# Patient Record
Sex: MALE | Race: WHITE | NOT HISPANIC OR LATINO | Employment: OTHER | URBAN - METROPOLITAN AREA
[De-identification: names, ages, dates, MRNs, and addresses within clinical notes are randomized per-mention and may not be internally consistent; named-entity substitution may affect disease eponyms.]

---

## 2017-01-17 DIAGNOSIS — Z01.812 PRE-OPERATIVE LABORATORY EXAMINATION: ICD-10-CM

## 2017-01-17 DIAGNOSIS — Z01.810 PRE-OPERATIVE CARDIOVASCULAR EXAMINATION: ICD-10-CM

## 2017-01-17 LAB
ANION GAP SERPL CALC-SCNC: 9 MMOL/L (ref 0–11.9)
APPEARANCE UR: CLEAR
BILIRUB UR QL STRIP.AUTO: NEGATIVE
BUN SERPL-MCNC: 15 MG/DL (ref 8–22)
CALCIUM SERPL-MCNC: 9.7 MG/DL (ref 8.4–10.2)
CHLORIDE SERPL-SCNC: 102 MMOL/L (ref 96–112)
CO2 SERPL-SCNC: 26 MMOL/L (ref 20–33)
COLOR UR: YELLOW
CREAT SERPL-MCNC: 0.67 MG/DL (ref 0.5–1.4)
CULTURE IF INDICATED INDCX: NO UA CULTURE
ERYTHROCYTE [DISTWIDTH] IN BLOOD BY AUTOMATED COUNT: 43.8 FL (ref 35.9–50)
GFR SERPL CREATININE-BSD FRML MDRD: >60 ML/MIN/1.73 M 2
GLUCOSE SERPL-MCNC: 118 MG/DL (ref 65–99)
GLUCOSE UR STRIP.AUTO-MCNC: NEGATIVE MG/DL
HCT VFR BLD AUTO: 42.3 % (ref 42–52)
HGB BLD-MCNC: 14.3 G/DL (ref 14–18)
KETONES UR STRIP.AUTO-MCNC: NEGATIVE MG/DL
LEUKOCYTE ESTERASE UR QL STRIP.AUTO: NEGATIVE
MCH RBC QN AUTO: 27.3 PG (ref 27–33)
MCHC RBC AUTO-ENTMCNC: 33.8 G/DL (ref 33.7–35.3)
MCV RBC AUTO: 80.7 FL (ref 81.4–97.8)
MICRO URNS: NORMAL
NITRITE UR QL STRIP.AUTO: NEGATIVE
PH UR STRIP.AUTO: 5.5 [PH]
PLATELET # BLD AUTO: 67 K/UL (ref 164–446)
PMV BLD AUTO: 9.8 FL (ref 9–12.9)
POTASSIUM SERPL-SCNC: 4.3 MMOL/L (ref 3.6–5.5)
PROT UR QL STRIP: NEGATIVE MG/DL
RBC # BLD AUTO: 5.24 M/UL (ref 4.7–6.1)
RBC UR QL AUTO: NEGATIVE
SCCMEC + MECA PNL NOSE NAA+PROBE: NEGATIVE
SCCMEC + MECA PNL NOSE NAA+PROBE: POSITIVE
SODIUM SERPL-SCNC: 137 MMOL/L (ref 135–145)
SP GR UR STRIP.AUTO: 1.02
WBC # BLD AUTO: 3.1 K/UL (ref 4.8–10.8)

## 2017-01-17 PROCEDURE — 85027 COMPLETE CBC AUTOMATED: CPT

## 2017-01-17 PROCEDURE — 36415 COLL VENOUS BLD VENIPUNCTURE: CPT

## 2017-01-17 PROCEDURE — 80048 BASIC METABOLIC PNL TOTAL CA: CPT

## 2017-01-17 PROCEDURE — 87640 STAPH A DNA AMP PROBE: CPT

## 2017-01-17 PROCEDURE — 81003 URINALYSIS AUTO W/O SCOPE: CPT

## 2017-01-17 PROCEDURE — 87641 MR-STAPH DNA AMP PROBE: CPT

## 2017-01-18 LAB — EKG IMPRESSION: NORMAL

## 2017-02-08 ENCOUNTER — TELEPHONE (OUTPATIENT)
Dept: CARDIOLOGY | Facility: MEDICAL CENTER | Age: 67
End: 2017-02-08

## 2017-02-08 ENCOUNTER — OFFICE VISIT (OUTPATIENT)
Dept: CARDIOLOGY | Facility: MEDICAL CENTER | Age: 67
End: 2017-02-08
Payer: COMMERCIAL

## 2017-02-08 VITALS
OXYGEN SATURATION: 94 % | HEIGHT: 72 IN | DIASTOLIC BLOOD PRESSURE: 78 MMHG | WEIGHT: 210 LBS | HEART RATE: 82 BPM | BODY MASS INDEX: 28.44 KG/M2 | SYSTOLIC BLOOD PRESSURE: 140 MMHG

## 2017-02-08 DIAGNOSIS — I10 ESSENTIAL HYPERTENSION: Primary | ICD-10-CM

## 2017-02-08 DIAGNOSIS — R93.1 ELEVATED CORONARY ARTERY CALCIUM SCORE: ICD-10-CM

## 2017-02-08 DIAGNOSIS — I10 ESSENTIAL HYPERTENSION, BENIGN: ICD-10-CM

## 2017-02-08 LAB — EKG IMPRESSION: NORMAL

## 2017-02-08 PROCEDURE — 99214 OFFICE O/P EST MOD 30 MIN: CPT | Performed by: NURSE PRACTITIONER

## 2017-02-08 PROCEDURE — 93000 ELECTROCARDIOGRAM COMPLETE: CPT | Performed by: NURSE PRACTITIONER

## 2017-02-08 RX ORDER — METOPROLOL SUCCINATE 25 MG/1
12.5 TABLET, EXTENDED RELEASE ORAL DAILY
Qty: 30 TAB | Refills: 1 | Status: SHIPPED | OUTPATIENT
Start: 2017-02-08 | End: 2017-06-13 | Stop reason: SDUPTHER

## 2017-02-08 ASSESSMENT — ENCOUNTER SYMPTOMS
NAUSEA: 0
SHORTNESS OF BREATH: 0
HEADACHES: 0
PALPITATIONS: 0
WEIGHT LOSS: 0
DIZZINESS: 0
SPEECH CHANGE: 0
HEMOPTYSIS: 0
ORTHOPNEA: 0
DOUBLE VISION: 0
TINGLING: 0
TREMORS: 0
PND: 0
DIARRHEA: 0
FEVER: 0
CONSTIPATION: 0
FOCAL WEAKNESS: 0
SORE THROAT: 0
ABDOMINAL PAIN: 0
BLURRED VISION: 0
CHILLS: 0
SPUTUM PRODUCTION: 0
WHEEZING: 0
LOSS OF CONSCIOUSNESS: 0
SENSORY CHANGE: 0
MUSCULOSKELETAL NEGATIVE: 1
VOMITING: 0
HEARTBURN: 0
WEAKNESS: 0
COUGH: 0

## 2017-02-08 NOTE — Clinical Note
[facility]   [address] [facility], [address] [address]  Phone: [phone]  Fax: [phone]              Nitesh Hardy Domi  1950    Encounter Date: 2/8/2017    VANESA Graves          PROGRESS NOTE:  No notes on file      NARCISA Alvarez  [address] [facility] [address]  VIA Facsimile: [phone]

## 2017-02-08 NOTE — MR AVS SNAPSHOT
"        Nitesh Duron   2017 3:20 PM   Office Visit   MRN: [id number]    Department:  [facility]   Dept Phone:  [phone]    Description:  Male : 1950   Provider:  VANESA Graves           Reason for Visit     New Patient           Allergies as of 2017     Allergen Noted Reactions    Ciprofloxacin 2016   Unspecified    convulsions    Statins [Hmg-Coa-R Inhibitors] 02/10/2016   Unspecified    Stabbing pains in kidneys    Acetaminophen 2009       Kidney pain     Ibuprofen 2009       Kidney pain    Naproxen 2009       Kidney pain    Shellfish Allergy 2017       Kidney stones    Soap &  [Alcohol] 2017   Rash    Antibacterial soap- contact dermatitis      You were diagnosed with     Essential hypertension   [8775048]  -  Primary     Essential hypertension, benign   [401.1.ICD-9-CM]       Elevated coronary artery calcium score   [7186755]         Vital Signs     Blood Pressure Pulse Height Weight Body Mass Index Oxygen Saturation    140/78 mmHg 82 1.829 m (6' 0.01\") 95.255 kg (210 lb) 28.47 kg/m2 94%    Smoking Status                   Former Smoker           Basic Information     Date Of Birth Sex Race Ethnicity Preferred Language    1950 Male White Non- English      Problem List              ICD-10-CM Priority Class Noted - Resolved    Undiagnosed cardiac murmurs R01.1   2016 - Present    Essential hypertension, benign I10   2016 - Present    Calculus of both kidneys N20.0   2016 - Present    Elevated coronary artery calcium score I25.10   2017 - Present      Health Maintenance        Date Due Completion Dates    IMM DTaP/Tdap/Td Vaccine (1 - Tdap) 1969 ---    COLONOSCOPY 2000 ---    IMM ZOSTER VACCINE 2010 ---    IMM PNEUMOCOCCAL 65+ (ADULT) LOW/MEDIUM RISK SERIES (1 of 2 - PCV13) 2015 ---    IMM INFLUENZA (1) 2016 ---            Results       Current Immunizations     No " immunizations on file.      Below and/or attached are the medications your provider expects you to take. Review all of your home medications and newly ordered medications with your provider and/or pharmacist. Follow medication instructions as directed by your provider and/or pharmacist. Please keep your medication list with you and share with your provider. Update the information when medications are discontinued, doses are changed, or new medications (including over-the-counter products) are added; and carry medication information at all times in the event of emergency situations     Allergies:  CIPROFLOXACIN - Unspecified     STATINS - Unspecified     ACETAMINOPHEN - (reactions not documented)     IBUPROFEN - (reactions not documented)     NAPROXEN - (reactions not documented)     SHELLFISH ALLERGY - (reactions not documented)     SOAP &  - Rash               Medications  Valid as of: February 08, 2017 -  4:13 PM    Generic Name Brand Name Tablet Size Instructions for use    Acai   Take  by mouth 2 Times a Day.        Astaxanthin   Take  by mouth every day.        Cholecalciferol (Tab) Vitamin D3 3000 UNITS Take  by mouth every day.        Krill Oil   Take  by mouth every day.        Lisinopril   Take 10 mg by mouth every evening.        MetFORMIN HCl (Tab) GLUCOPHAGE 1000 MG Take 1,000 mg by mouth 2 times a day, with meals.        Metoprolol Succinate (TABLET SR 24 HR) TOPROL XL 25 MG Take 0.5 Tabs by mouth every day.        Non Formulary Request Non Formulary Request  Take  by mouth 2 Times a Day. Uric acid cleanse        Non Formulary Request Non Formulary Request  every 7 days. Aloe Lax        Omega-3 Fatty Acids (Cap) fish oil 1000 MG Take 1,000 mg by mouth every day.        Resveratrol (Cap) Resveratrol 100 MG Take  by mouth every day.        Saw Palmetto (Serenoa repens)   Take  by mouth every day.        Vitamin A (Cap) vitamin A 8000 UNIT Take 8,000 Units by mouth 2 Times a Day.        Vitamin E  (Cap) VITAMIN E 400 UNIT Take 400 Units by mouth every day.        .                 Medicines prescribed today were sent to:     [facility] [place] - [address]    [address] [place] [address]    Phone: [phone] Fax: [phone]    Open 24 Hours?: No      Medication refill instructions:       If your prescription bottle indicates you have medication refills left, it is not necessary to call your provider’s office. Please contact your pharmacy and they will refill your medication.    If your prescription bottle indicates you do not have any refills left, you may request refills at any time through one of the following ways: The online [employer] system (except Urgent Care), by calling your provider’s office, or by asking your pharmacy to contact your provider’s office with a refill request. Medication refills are processed only during regular business hours and may not be available until the next business day. Your provider may request additional information or to have a follow-up visit with you prior to refilling your medication.   *Please Note: Medication refills are assigned a new Rx number when refilled electronically. Your pharmacy may indicate that no refills were authorized even though a new prescription for the same medication is available at the pharmacy. Please request the medicine by name with the pharmacy before contacting your provider for a refill.           [employer] Access Code: [email or web address]  Expires: 3/10/2017  4:13 PM    E[employer]  A secure, online tool to manage your health information     [employer]’s Ecov[employer] Design® is a secure, online tool that connects you to your personalized health information from the privacy of your home -- day or night - making it very easy for you to manage your healthcare. Once the activation process is completed, you can even access your medical information using the Ec[employer] kenny, which is available for free in the Apple Kenny  store or Google Play store.     [email or web address] provides the following levels of access (as shown below):   My Chart Features   [facility] Doctor [facility]  Specialists [facility]  Urgent  Care Non-[facility]  Primary Care  Doctor   Email your healthcare team securely and privately 24/7 X X X    Manage appointments: schedule your next appointment; view details of past/upcoming appointments X      Request prescription refills. X      View recent personal medical records, including lab and immunizations X X X X   View health record, including health history, allergies, medications X X X X   Read reports about your outpatient visits, procedures, consult and ER notes X X X X   See your discharge summary, which is a recap of your hospital and/or ER visit that includes your diagnosis, lab results, and care plan. X X       How to register for [email or web address]:  1. Go to  [email or web address].  2. Click on the Sign Up Now box, which takes you to the New Member Sign Up page. You will need to provide the following information:  a. Enter your [email or web address] Access Code exactly as it appears at the top of this page. (You will not need to use this code after you’ve completed the sign-up process. If you do not sign up before the expiration date, you must request a new code.)   b. Enter your date of birth.   c. Enter your home email address.   d. Click Submit, and follow the next screen’s instructions.  3. Create a [email or web address] ID. This will be your [email or web address] login ID and cannot be changed, so think of one that is secure and easy to remember.  4. Create a [email or web address] password. You can change your password at any time.  5. Enter your Password Reset Question and Answer. This can be used at a later time if you forget your password.   6. Enter your e-mail address. This allows you to receive e-mail notifications when new information is available in [email or web address].  7. Click Sign Up. You can now view your health information.    For assistance activating your [employer]  account, call [phone]

## 2017-02-09 NOTE — TELEPHONE ENCOUNTER
----- Message from VANESA Graves sent at 2/8/2017  4:35 PM PST -----  Can we try to get recent lab results from PCP, in particular lipids.     Thanks!

## 2017-02-09 NOTE — PROGRESS NOTES
Subjective:   Nitesh Duron is a 66 y.o. male who presents today for follow up and review of his cardiac status.    He is followed by Dr. Cano.     Today in follow up he states that he initially scheduled a surgical clearance for knee surgery.  However, he states scheduling for knee surgery took too long so he has decided against it all together.    He has not had any chest pain/pressure, or angina symptoms.  He has not had any dizziness, presyncope, syncope, dyspnea, or edema.  He relates that he has been feeling 'quite good' and that he has been trying hard to take good care of his health.     He states PCP check lipids every 3 months, has previously been intolerant to statins.     He was supposed to have an echo completed, but copay was too high so had to cancel.      Past Medical History   Diagnosis Date   • Hyperlipidemia    • Arthritis      OA- hands and L knee   • Diabetes      oral meds   • Hypertension    • Dental disorder      upper   • Cataract      OU     Past Surgical History   Procedure Laterality Date   • Rotator cuff repair Left 2014   • Cystoscopy  2004,2008     stone extraction   • Ankle orif Right 1988     spiral fx   • Meniscectomy Left 1979   • Appendectomy  1958     Family History   Problem Relation Age of Onset   • Other Mother    • Heart Attack Father    • Heart Failure Brother      History   Smoking status   • Former Smoker   • Types: Cigarettes   • Quit date: 01/01/2008   Smokeless tobacco   • Never Used     Comment: quit 2008     Allergies   Allergen Reactions   • Ciprofloxacin Unspecified     convulsions   • Statins [Hmg-Coa-R Inhibitors] Unspecified     Stabbing pains in kidneys   • Acetaminophen      Kidney pain    • Ibuprofen      Kidney pain   • Naproxen      Kidney pain   • Shellfish Allergy      Kidney stones   • Soap &  [Alcohol] Rash     Antibacterial soap- contact dermatitis     Outpatient Encounter Prescriptions as of 2/8/2017   Medication Sig Dispense Refill  "  • metoprolol SR (TOPROL XL) 25 MG TABLET SR 24 HR Take 0.5 Tabs by mouth every day. 30 Tab 1   • metformin (GLUCOPHAGE) 1000 MG tablet Take 1,000 mg by mouth 2 times a day, with meals.     • Non Formulary Request Take  by mouth 2 Times a Day. Uric acid cleanse     • vitamin e (VITAMIN E) 400 UNIT Cap Take 400 Units by mouth every day.     • vitamin A 8000 UNIT capsule Take 8,000 Units by mouth 2 Times a Day.     • Cholecalciferol (VITAMIN D3) 3000 UNITS Tab Take  by mouth every day.     • Resveratrol 100 MG Cap Take  by mouth every day.     • KRILL OIL PO Take  by mouth every day.     • Omega-3 Fatty Acids (FISH OIL) 1000 MG Cap capsule Take 1,000 mg by mouth every day.     • ASTAXANTHIN PO Take  by mouth every day.     • ACAI MATHEWS PO Take  by mouth 2 Times a Day.     • SAW PALMETTO, SERENOA REPENS, PO Take  by mouth every day.     • Non Formulary Request every 7 days. Aloe Lax     • LISINOPRIL PO Take 10 mg by mouth every evening.       No facility-administered encounter medications on file as of 2/8/2017.     Review of Systems   Constitutional: Negative for fever, chills, weight loss and malaise/fatigue.   HENT: Negative for congestion, hearing loss, sore throat and tinnitus.    Eyes: Negative for blurred vision and double vision.   Respiratory: Negative for cough, hemoptysis, sputum production, shortness of breath and wheezing.    Cardiovascular: Negative for chest pain, palpitations, orthopnea, leg swelling and PND.   Gastrointestinal: Negative for heartburn, nausea, vomiting, abdominal pain, diarrhea and constipation.   Genitourinary: Negative.    Musculoskeletal: Negative.    Skin: Negative for rash.   Neurological: Negative for dizziness, tingling, tremors, sensory change, speech change, focal weakness, loss of consciousness, weakness and headaches.        Objective:   /78 mmHg  Pulse 82  Ht 1.829 m (6' 0.01\")  Wt 95.255 kg (210 lb)  BMI 28.47 kg/m2  SpO2 94%    Physical Exam   Constitutional: " He is oriented to person, place, and time. He appears well-developed and well-nourished.   HENT:   Head: Normocephalic and atraumatic.   Eyes: Conjunctivae are normal.   Neck: Normal range of motion. Neck supple. No JVD present.   Cardiovascular: Normal rate, regular rhythm, normal heart sounds and intact distal pulses.   Extrasystoles are present. Exam reveals no gallop and no friction rub.    No murmur heard.  Pulmonary/Chest: Effort normal and breath sounds normal. No respiratory distress. He has no wheezes. He has no rales.   Abdominal: Soft. Bowel sounds are normal. There is no tenderness.   Musculoskeletal: He exhibits no edema.   Neurological: He is alert and oriented to person, place, and time.   Skin: Skin is warm and dry. No erythema.   Psychiatric: He has a normal mood and affect. His behavior is normal.       Assessment:     1. Essential hypertension  [facility] EKG (Clinic Performed)    CANCELED: EKG   2. Essential hypertension, benign  metoprolol SR (TOPROL XL) 25 MG TABLET SR 24 HR   3. Elevated coronary artery calcium score  metoprolol SR (TOPROL XL) 25 MG TABLET SR 24 HR       Medical Decision Making:  Today's Assessment / Status / Plan:   1. HTN:  - Blood pressure borderline elevated today.  Per his report SBP range at home has been 140-460, DBP 80s.  Would like improved control with his elevated coronary artery score.  Will add low dose Toprol 12.5 mg to start.   - Continue heart healthy, low sodium diet.    2. Elevated coronary artery calcium score:  - No chest pain or angina symptoms.  - Add Toprol as above.  - Continue lifestyle and risk factor modifications.  Will try to get most recent lipids from PCP.  If still too high can consider injectable vs referral to Dr. Bloch.   - Attempt to decrease red meat from diet even more.     He remains alcohol and tobacco free.     RTC in 3 months for review, sooner if needed.  Collaborating MD/ADD: Yosef.

## 2017-05-30 ENCOUNTER — TELEPHONE (OUTPATIENT)
Dept: CARDIOLOGY | Facility: MEDICAL CENTER | Age: 67
End: 2017-05-30

## 2017-05-30 NOTE — TELEPHONE ENCOUNTER
Returned patient call. Pt states he never had echo completed in April secondary to cost. He states he shopped around and wants to have it done at [facility].Order printed and given to Vijaya for scheduling. Pt states he works 2-11 pm and I can reach him before 1:30 pm or tomorrow.

## 2017-05-30 NOTE — TELEPHONE ENCOUNTER
----- Message from Madhuri Cordova L.P.N. sent at 5/30/2017 11:24 AM PDT -----  Regarding: FW: patient wants order for ultrasound      ----- Message -----     From: Chelsea Carranza R.N.     Sent: 5/30/2017  11:19 AM       To: Madhuri Cordova L.P.N.  Subject: FW: patient wants order for ultrasound           EA no longer with practice. To PTK to address with Dr Cano.       ----- Message -----     From: Nathaly Pritchett     Sent: 5/30/2017   9:30 AM       To: Chelsea Carranza R.N.  Subject: patient wants order for ultrasound               FREDY/Chelsea Mena is asking for an order for an ultrasound of his heart. He can be reached at [phone].

## 2017-06-13 ENCOUNTER — OFFICE VISIT (OUTPATIENT)
Dept: CARDIOLOGY | Facility: MEDICAL CENTER | Age: 67
End: 2017-06-13
Payer: COMMERCIAL

## 2017-06-13 VITALS
HEIGHT: 72 IN | OXYGEN SATURATION: 94 % | WEIGHT: 207 LBS | BODY MASS INDEX: 28.04 KG/M2 | SYSTOLIC BLOOD PRESSURE: 110 MMHG | HEART RATE: 78 BPM | DIASTOLIC BLOOD PRESSURE: 76 MMHG

## 2017-06-13 DIAGNOSIS — R93.1 ELEVATED CORONARY ARTERY CALCIUM SCORE: ICD-10-CM

## 2017-06-13 DIAGNOSIS — I25.10 CORONARY ARTERY DISEASE INVOLVING NATIVE CORONARY ARTERY OF NATIVE HEART WITHOUT ANGINA PECTORIS: ICD-10-CM

## 2017-06-13 DIAGNOSIS — I10 ESSENTIAL HYPERTENSION, BENIGN: ICD-10-CM

## 2017-06-13 DIAGNOSIS — N20.0 CALCULUS OF BOTH KIDNEYS: ICD-10-CM

## 2017-06-13 DIAGNOSIS — R01.1 UNDIAGNOSED CARDIAC MURMURS: ICD-10-CM

## 2017-06-13 PROCEDURE — 99215 OFFICE O/P EST HI 40 MIN: CPT | Performed by: INTERNAL MEDICINE

## 2017-06-13 RX ORDER — SPIRONOLACTONE 25 MG/1
25 TABLET ORAL DAILY
Qty: 30 TAB | Refills: 11 | Status: SHIPPED | OUTPATIENT
Start: 2017-06-13 | End: 2018-09-07

## 2017-06-13 RX ORDER — METOPROLOL SUCCINATE 25 MG/1
25 TABLET, EXTENDED RELEASE ORAL DAILY
Qty: 30 TAB | Refills: 11 | Status: SHIPPED | OUTPATIENT
Start: 2017-06-13 | End: 2018-07-05 | Stop reason: SDUPTHER

## 2017-06-13 ASSESSMENT — ENCOUNTER SYMPTOMS
CHILLS: 0
PND: 0
BRUISES/BLEEDS EASILY: 0
NEUROLOGICAL NEGATIVE: 1
MUSCULOSKELETAL NEGATIVE: 1
WHEEZING: 0
CARDIOVASCULAR NEGATIVE: 1
COUGH: 0
RESPIRATORY NEGATIVE: 1
CLAUDICATION: 0
EYES NEGATIVE: 1
SHORTNESS OF BREATH: 0
SORE THROAT: 0
WEAKNESS: 0
LOSS OF CONSCIOUSNESS: 0
CONSTITUTIONAL NEGATIVE: 1
DIZZINESS: 0
FEVER: 0
STRIDOR: 0
HEMOPTYSIS: 0
SPUTUM PRODUCTION: 0
GASTROINTESTINAL NEGATIVE: 1
ORTHOPNEA: 0
PALPITATIONS: 0

## 2017-06-13 NOTE — PROGRESS NOTES
Subjective:   Nitesh Duron is a 66 y.o. male who presents today as a follow-up for his occasional PVCs and PACs as well as his murmur. He started to get lower extremity edema but he blames this on standing at work as a . He was given a prescription for Lasix by his primary care physician but is unable to do this because he works as a  and can be leading to the bathroom all the time. He says his occasional palpitations and been calmed down by the metoprolol which is only taking 12 1/2 mg per day. He also said that it got rid of his chest pain. He does have a relatively high calcium score and has been intolerant to statins. He has not had chest pains in over a year.    Past Medical History   Diagnosis Date   • Hyperlipidemia    • Arthritis      OA- hands and L knee   • Diabetes      oral meds   • Hypertension    • Dental disorder      upper   • Cataract      OU     Past Surgical History   Procedure Laterality Date   • Rotator cuff repair Left 2014   • Cystoscopy  2004,2008     stone extraction   • Ankle orif Right 1988     spiral fx   • Meniscectomy Left 1979   • Appendectomy  1958     Family History   Problem Relation Age of Onset   • Other Mother    • Heart Attack Father    • Heart Failure Brother      History   Smoking status   • Former Smoker   • Types: Cigarettes   • Quit date: 01/01/2008   Smokeless tobacco   • Never Used     Comment: quit 2008     Allergies   Allergen Reactions   • Ciprofloxacin Unspecified     convulsions   • Statins [Hmg-Coa-R Inhibitors] Unspecified     Stabbing pains in kidneys   • Acetaminophen      Kidney pain    • Ibuprofen      Kidney pain   • Naproxen      Kidney pain   • Shellfish Allergy      Kidney stones   • Soap &  [Alcohol] Rash     Antibacterial soap- contact dermatitis     Outpatient Encounter Prescriptions as of 6/13/2017   Medication Sig Dispense Refill   • metoprolol SR (TOPROL XL) 25 MG TABLET SR 24 HR Take 1 Tab by mouth  "every day. 30 Tab 11   • spironolactone (ALDACTONE) 25 MG Tab Take 1 Tab by mouth every day. 30 Tab 11   • metformin (GLUCOPHAGE) 1000 MG tablet Take 1,000 mg by mouth 2 times a day, with meals.     • vitamin e (VITAMIN E) 400 UNIT Cap Take 400 Units by mouth every day.     • vitamin A 8000 UNIT capsule Take 8,000 Units by mouth 2 Times a Day.     • Cholecalciferol (VITAMIN D3) 3000 UNITS Tab Take  by mouth every day.     • Resveratrol 100 MG Cap Take  by mouth every day.     • KRILL OIL PO Take  by mouth every day.     • Omega-3 Fatty Acids (FISH OIL) 1000 MG Cap capsule Take 1,000 mg by mouth every day.     • ACAI MATHEWS PO Take  by mouth 2 Times a Day.     • SAW PALMETTO, SERENOA REPENS, PO Take  by mouth every day.     • LISINOPRIL PO Take 10 mg by mouth every evening.     • [DISCONTINUED] metoprolol SR (TOPROL XL) 25 MG TABLET SR 24 HR Take 0.5 Tabs by mouth every day. 30 Tab 1   • Non Formulary Request Take  by mouth 2 Times a Day. Uric acid cleanse     • ASTAXANTHIN PO Take  by mouth every day.     • Non Formulary Request every 7 days. Aloe Lax       No facility-administered encounter medications on file as of 6/13/2017.     Review of Systems   Constitutional: Negative.  Negative for fever, chills and malaise/fatigue.   HENT: Negative.  Negative for sore throat.    Eyes: Negative.    Respiratory: Negative.  Negative for cough, hemoptysis, sputum production, shortness of breath, wheezing and stridor.    Cardiovascular: Negative.  Negative for chest pain, palpitations, orthopnea, claudication, leg swelling and PND.   Gastrointestinal: Negative.    Genitourinary: Negative.    Musculoskeletal: Negative.    Skin: Negative.    Neurological: Negative.  Negative for dizziness, loss of consciousness and weakness.   Endo/Heme/Allergies: Negative.  Does not bruise/bleed easily.   All other systems reviewed and are negative.       Objective:   /76 mmHg  Pulse 78  Ht 1.829 m (6' 0.01\")  Wt 93.895 kg (207 lb)  BMI " 28.07 kg/m2  SpO2 94%    Physical Exam   Constitutional: He is oriented to person, place, and time. He appears well-developed and well-nourished. No distress.   HENT:   Head: Normocephalic.   Mouth/Throat: Oropharynx is clear and moist.   Eyes: EOM are normal. Pupils are equal, round, and reactive to light. Right eye exhibits no discharge. Left eye exhibits no discharge. No scleral icterus.   Neck: Normal range of motion. Neck supple. No JVD present. No tracheal deviation present.   Cardiovascular: Normal rate, regular rhythm, S1 normal, S2 normal, normal heart sounds, intact distal pulses and normal pulses.   Occasional extrasystoles are present. Exam reveals no gallop, no S3, no S4 and no friction rub.    No murmur heard.   No systolic murmur is present    No diastolic murmur is present   Pulses:       Carotid pulses are 2+ on the right side, and 2+ on the left side.       Radial pulses are 2+ on the right side, and 2+ on the left side.        Dorsalis pedis pulses are 2+ on the right side, and 2+ on the left side.        Posterior tibial pulses are 2+ on the right side, and 2+ on the left side.   Pulmonary/Chest: Effort normal and breath sounds normal. No respiratory distress. He has no wheezes. He has no rales.   Abdominal: Soft. Bowel sounds are normal. He exhibits no distension and no mass. There is no tenderness. There is no rebound and no guarding.   Musculoskeletal: He exhibits no edema.   Neurological: He is alert and oriented to person, place, and time. No cranial nerve deficit.   Skin: Skin is warm and dry. He is not diaphoretic. No pallor.   Psychiatric: He has a normal mood and affect. His behavior is normal. Judgment and thought content normal.   Nursing note and vitals reviewed.      Assessment:     1. Calculus of both kidneys  ECHOCARDIOGRAM COMP W/O CONT   2. Undiagnosed cardiac murmurs  ECHOCARDIOGRAM COMP W/O CONT   3. Essential hypertension, benign  metoprolol SR (TOPROL XL) 25 MG TABLET SR 24  HR    spironolactone (ALDACTONE) 25 MG Tab    BASIC METABOLIC PANEL    ECHOCARDIOGRAM COMP W/O CONT   4. Elevated coronary artery calcium score  metoprolol SR (TOPROL XL) 25 MG TABLET SR 24 HR    spironolactone (ALDACTONE) 25 MG Tab    BASIC METABOLIC PANEL    ECHOCARDIOGRAM COMP W/O CONT   5. Coronary artery disease involving native coronary artery of native heart without angina pectoris  spironolactone (ALDACTONE) 25 MG Tab    BASIC METABOLIC PANEL       Medical Decision Making:  Today's Assessment / Status / Plan:     65 y/o M with abnormal ECG, benign variant and LE edema. Now that he has Medicare we will go ahead and get an echocardiogram. I've also started him on spironolactone 25 once daily.  We'll check labs in one week. I will also increase the dose of metoprolol that he is taking to 25 mg per day.    1. Abnormal ECG, occ PVCS  - normal variant no workup  - increase metop to 25 daily    2. Murmur with PCP, not heard on exam  - outpatient echo    3. LE edema  - outpatient echo  - start francisco 25, labs in one week    4. Strong family history, Ca score 438 81% for age  - intolerant to statins, to consider injectables at next visit    Thank for you allowing me to take part in your patient's care, please call should you have any questions or would like to discuss this patient.

## 2017-06-13 NOTE — Clinical Note
[facility]   [address] [facility], [facility] [address]  [place] [address]  Phone: [phone]  Fax: [phone]              Nitesh Duron  1950    Encounter Date: 6/13/2017    Balaji Cano M.D.          PROGRESS NOTE:  Subjective:   Nitesh Duron is a 66 y.o. male who presents today as a follow-up for his occasional PVCs and PACs as well as his murmur. He started to get lower extremity edema but he blames this on standing at work as a . He was given a prescription for Lasix by his primary care physician but is unable to do this because he works as a  and can be leading to the bathroom all the time. He says his occasional palpitations and been calmed down by the metoprolol which is only taking 12 1/2 mg per day. He also said that it got rid of his chest pain. He does have a relatively high calcium score and has been intolerant to statins. He has not had chest pains in over a year.    Past Medical History   Diagnosis Date   • Hyperlipidemia    • Arthritis      OA- hands and L knee   • Diabetes      oral meds   • Hypertension    • Dental disorder      upper   • Cataract      OU     Past Surgical History   Procedure Laterality Date   • Rotator cuff repair Left 2014   • Cystoscopy  2004,2008     stone extraction   • Ankle orif Right 1988     spiral fx   • Meniscectomy Left 1979   • Appendectomy  1958     Family History   Problem Relation Age of Onset   • Other Mother    • Heart Attack Father    • Heart Failure Brother      History   Smoking status   • Former Smoker   • Types: Cigarettes   • Quit date: 01/01/2008   Smokeless tobacco   • Never Used     Comment: quit 2008     Allergies   Allergen Reactions   • Ciprofloxacin Unspecified     convulsions   • Statins [Hmg-Coa-R Inhibitors] Unspecified     Stabbing pains in kidneys   • Acetaminophen      Kidney pain    • Ibuprofen      Kidney pain   • Naproxen      Kidney pain   •  Shellfish Allergy      Kidney stones   • Soap &  [Alcohol] Rash     Antibacterial soap- contact dermatitis     Outpatient Encounter Prescriptions as of 6/13/2017   Medication Sig Dispense Refill   • metoprolol SR (TOPROL XL) 25 MG TABLET SR 24 HR Take 1 Tab by mouth every day. 30 Tab 11   • spironolactone (ALDACTONE) 25 MG Tab Take 1 Tab by mouth every day. 30 Tab 11   • metformin (GLUCOPHAGE) 1000 MG tablet Take 1,000 mg by mouth 2 times a day, with meals.     • vitamin e (VITAMIN E) 400 UNIT Cap Take 400 Units by mouth every day.     • vitamin A 8000 UNIT capsule Take 8,000 Units by mouth 2 Times a Day.     • Cholecalciferol (VITAMIN D3) 3000 UNITS Tab Take  by mouth every day.     • Resveratrol 100 MG Cap Take  by mouth every day.     • KRILL OIL PO Take  by mouth every day.     • Omega-3 Fatty Acids (FISH OIL) 1000 MG Cap capsule Take 1,000 mg by mouth every day.     • ACAI MATHEWS PO Take  by mouth 2 Times a Day.     • SAW PALMETTO, SERENOA REPENS, PO Take  by mouth every day.     • LISINOPRIL PO Take 10 mg by mouth every evening.     • [DISCONTINUED] metoprolol SR (TOPROL XL) 25 MG TABLET SR 24 HR Take 0.5 Tabs by mouth every day. 30 Tab 1   • Non Formulary Request Take  by mouth 2 Times a Day. Uric acid cleanse     • ASTAXANTHIN PO Take  by mouth every day.     • Non Formulary Request every 7 days. Aloe Lax       No facility-administered encounter medications on file as of 6/13/2017.     Review of Systems   Constitutional: Negative.  Negative for fever, chills and malaise/fatigue.   HENT: Negative.  Negative for sore throat.    Eyes: Negative.    Respiratory: Negative.  Negative for cough, hemoptysis, sputum production, shortness of breath, wheezing and stridor.    Cardiovascular: Negative.  Negative for chest pain, palpitations, orthopnea, claudication, leg swelling and PND.   Gastrointestinal: Negative.    Genitourinary: Negative.    Musculoskeletal: Negative.    Skin: Negative.    Neurological:  "Negative.  Negative for dizziness, loss of consciousness and weakness.   Endo/Heme/Allergies: Negative.  Does not bruise/bleed easily.   All other systems reviewed and are negative.       Objective:   /76 mmHg  Pulse 78  Ht 1.829 m (6' 0.01\")  Wt 93.895 kg (207 lb)  BMI 28.07 kg/m2  SpO2 94%    Physical Exam   Constitutional: He is oriented to person, place, and time. He appears well-developed and well-nourished. No distress.   HENT:   Head: Normocephalic.   Mouth/Throat: Oropharynx is clear and moist.   Eyes: EOM are normal. Pupils are equal, round, and reactive to light. Right eye exhibits no discharge. Left eye exhibits no discharge. No scleral icterus.   Neck: Normal range of motion. Neck supple. No JVD present. No tracheal deviation present.   Cardiovascular: Normal rate, regular rhythm, S1 normal, S2 normal, normal heart sounds, intact distal pulses and normal pulses.   Occasional extrasystoles are present. Exam reveals no gallop, no S3, no S4 and no friction rub.    No murmur heard.   No systolic murmur is present    No diastolic murmur is present   Pulses:       Carotid pulses are 2+ on the right side, and 2+ on the left side.       Radial pulses are 2+ on the right side, and 2+ on the left side.        Dorsalis pedis pulses are 2+ on the right side, and 2+ on the left side.        Posterior tibial pulses are 2+ on the right side, and 2+ on the left side.   Pulmonary/Chest: Effort normal and breath sounds normal. No respiratory distress. He has no wheezes. He has no rales.   Abdominal: Soft. Bowel sounds are normal. He exhibits no distension and no mass. There is no tenderness. There is no rebound and no guarding.   Musculoskeletal: He exhibits no edema.   Neurological: He is alert and oriented to person, place, and time. No cranial nerve deficit.   Skin: Skin is warm and dry. He is not diaphoretic. No pallor.   Psychiatric: He has a normal mood and affect. His behavior is normal. Judgment and " thought content normal.   Nursing note and vitals reviewed.      Assessment:     1. Calculus of both kidneys  ECHOCARDIOGRAM COMP W/O CONT   2. Undiagnosed cardiac murmurs  ECHOCARDIOGRAM COMP W/O CONT   3. Essential hypertension, benign  metoprolol SR (TOPROL XL) 25 MG TABLET SR 24 HR    spironolactone (ALDACTONE) 25 MG Tab    BASIC METABOLIC PANEL    ECHOCARDIOGRAM COMP W/O CONT   4. Elevated coronary artery calcium score  metoprolol SR (TOPROL XL) 25 MG TABLET SR 24 HR    spironolactone (ALDACTONE) 25 MG Tab    BASIC METABOLIC PANEL    ECHOCARDIOGRAM COMP W/O CONT   5. Coronary artery disease involving native coronary artery of native heart without angina pectoris  spironolactone (ALDACTONE) 25 MG Tab    BASIC METABOLIC PANEL       Medical Decision Making:  Today's Assessment / Status / Plan:     65 y/o M with abnormal ECG, benign variant and LE edema. Now that he has Medicare we will go ahead and get an echocardiogram. I've also started him on spironolactone 25 once daily.  We'll check labs in one week. I will also increase the dose of metoprolol that he is taking to 25 mg per day.    1. Abnormal ECG, occ PVCS  - normal variant no workup  - increase metop to 25 daily    2. Murmur with PCP, not heard on exam  - outpatient echo    3. LE edema  - outpatient echo  - start francisco 25, labs in one week    4. Strong family history, Ca score 438 81% for age  - intolerant to statins, to consider injectables at next visit    Thank for you allowing me to take part in your patient's care, please call should you have any questions or would like to discuss this patient.      RUPINDER Alvarez.  [address] [place] [address]  VIA Facsimile: [phone]

## 2017-06-13 NOTE — MR AVS SNAPSHOT
"        Nitesh Duron   2017 10:15 AM   Office Visit   MRN: [id number]    Department:  [facility]   Dept Phone:  [phone]    Description:  Male : 1950   Provider:  Balaji Cano M.D.           Reason for Visit     Follow-Up           Allergies as of 2017     Allergen Noted Reactions    Ciprofloxacin 2016   Unspecified    convulsions    Statins [Hmg-Coa-R Inhibitors] 02/10/2016   Unspecified    Stabbing pains in kidneys    Acetaminophen 2009       Kidney pain     Ibuprofen 2009       Kidney pain    Naproxen 2009       Kidney pain    Shellfish Allergy 2017       Kidney stones    Soap &  [Alcohol] 2017   Rash    Antibacterial soap- contact dermatitis      You were diagnosed with     Calculus of both kidneys   [473754]       Undiagnosed cardiac murmurs   [785.2.ICD-9-CM]       Essential hypertension, benign   [401.1.ICD-9-CM]       Elevated coronary artery calcium score   [4480893]       Coronary artery disease involving native coronary artery of native heart without angina pectoris   [9601847]         Vital Signs     Blood Pressure Pulse Height Weight Body Mass Index Oxygen Saturation    110/76 mmHg 78 1.829 m (6' 0.01\") 93.895 kg (207 lb) 28.07 kg/m2 94%    Smoking Status                   Former Smoker           Basic Information     Date Of Birth Sex Race Ethnicity Preferred Language    1950 Male White Non- English      Your appointments     2017 10:15 AM   ECHO with ECHO [facility] EXAM 12   ECHOCARDIOLOGY [facility] ([facility])    [address] [facility]  [place] [address]   [phone]            Sep 14, 2017 10:30 AM   FOLLOW UP with Balaji Cano M.D.   [facility] (--)    [address]  [place] [address] [phone]              Problem List              ICD-10-CM Priority Class Noted - Resolved    Undiagnosed cardiac murmurs R01.1   2016 - Present   " Essential hypertension, benign I10   1/18/2016 - Present    Calculus of both kidneys N20.0   1/18/2016 - Present    Elevated coronary artery calcium score R93.1   2/8/2017 - Present    Coronary artery disease involving native coronary artery without angina pectoris I25.10   6/13/2017 - Present      Health Maintenance        Date Due Completion Dates    IMM DTaP/Tdap/Td Vaccine (1 - Tdap) 6/18/1969 ---    COLONOSCOPY 6/18/2000 ---    IMM ZOSTER VACCINE 6/18/2010 ---    IMM PNEUMOCOCCAL 65+ (ADULT) LOW/MEDIUM RISK SERIES (1 of 2 - PCV13) 6/18/2015 ---            Current Immunizations     No immunizations on file.      Below and/or attached are the medications your provider expects you to take. Review all of your home medications and newly ordered medications with your provider and/or pharmacist. Follow medication instructions as directed by your provider and/or pharmacist. Please keep your medication list with you and share with your provider. Update the information when medications are discontinued, doses are changed, or new medications (including over-the-counter products) are added; and carry medication information at all times in the event of emergency situations     Allergies:  CIPROFLOXACIN - Unspecified     STATINS - Unspecified     ACETAMINOPHEN - (reactions not documented)     IBUPROFEN - (reactions not documented)     NAPROXEN - (reactions not documented)     SHELLFISH ALLERGY - (reactions not documented)     SOAP &  - Rash               Medications  Valid as of: June 13, 2017 - 10:37 AM    Generic Name Brand Name Tablet Size Instructions for use    Acai   Take  by mouth 2 Times a Day.        Astaxanthin   Take  by mouth every day.        Cholecalciferol (Tab) Vitamin D3 3000 UNITS Take  by mouth every day.        Krill Oil   Take  by mouth every day.        Lisinopril   Take 10 mg by mouth every evening.        MetFORMIN HCl (Tab) GLUCOPHAGE 1000 MG Take 1,000 mg by mouth 2 times a day, with meals.          Metoprolol Succinate (TABLET SR 24 HR) TOPROL XL 25 MG Take 1 Tab by mouth every day.        Non Formulary Request Non Formulary Request  Take  by mouth 2 Times a Day. Uric acid cleanse        Non Formulary Request Non Formulary Request  every 7 days. Aloe Lax        Omega-3 Fatty Acids (Cap) fish oil 1000 MG Take 1,000 mg by mouth every day.        Resveratrol (Cap) Resveratrol 100 MG Take  by mouth every day.        Saw Palmetto (Serenoa repens)   Take  by mouth every day.        Spironolactone (Tab) ALDACTONE 25 MG Take 1 Tab by mouth every day.        Vitamin A (Cap) vitamin A 8000 UNIT Take 8,000 Units by mouth 2 Times a Day.        Vitamin E (Cap) VITAMIN E 400 UNIT Take 400 Units by mouth every day.        .                 Medicines prescribed today were sent to:     [facility] [address] [place] [address] [address] [facility]    [address] [facility] [address]    Phone: [phone] Fax: [phone]    Open 24 Hours?: No      Medication refill instructions:       If your prescription bottle indicates you have medication refills left, it is not necessary to call your provider’s office. Please contact your pharmacy and they will refill your medication.    If your prescription bottle indicates you do not have any refills left, you may request refills at any time through one of the following ways: The online [employer] system (except Urgent Care), by calling your provider’s office, or by asking your pharmacy to contact your provider’s office with a refill request. Medication refills are processed only during regular business hours and may not be available until the next business day. Your provider may request additional information or to have a follow-up visit with you prior to refilling your medication.   *Please Note: Medication refills are assigned a new Rx number when refilled electronically. Your pharmacy may indicate that no refills were authorized even though a new prescription for the  same medication is available at the pharmacy. Please request the medicine by name with the pharmacy before contacting your provider for a refill.        Your To Do List     Future Labs/Procedures Complete By Expires    BASIC METABOLIC PANEL  6/20/2017 6/14/2018    ECHOCARDIOGRAM COMP W/O CONT  As directed 6/14/2018         [employer] Access Code: [email or web address]  Expires: 7/13/2017 10:35 AM    Animal Kingdom  A secure, online tool to manage your health information     [employer]® is a secure, online tool that connects you to your personalized health information from the privacy of your home -- day or night - making it very easy for you to manage your healthcare. Once the activation process is completed, you can even access your medical information using the Animal Kingdom kenny, which is available for free in the Apple Kenny store or Google Play store.     Animal Kingdom provides the following levels of access (as shown below):   My Chart Features   [facility] Doctor [facility]  Specialists [facility]  Urgent  Care [facility]  Primary Care  Doctor   Email your healthcare team securely and privately 24/7 X X X    Manage appointments: schedule your next appointment; view details of past/upcoming appointments X      Request prescription refills. X      View recent personal medical records, including lab and immunizations X X X X   View health record, including health history, allergies, medications X X X X   Read reports about your outpatient visits, procedures, consult and ER notes X X X X   See your discharge summary, which is a recap of your hospital and/or ER visit that includes your diagnosis, lab results, and care plan. X X       How to register for Animal Kingdom:  1. Go to  [email or web address].  2. Click on the Sign Up Now box, which takes you to the New Member Sign Up page. You will need to provide the following information:  a. Enter your Animal Kingdom Access Code exactly as it appears at the top of this page. (You will not  need to use this code after you’ve completed the sign-up process. If you do not sign up before the expiration date, you must request a new code.)   b. Enter your date of birth.   c. Enter your home email address.   d. Click Submit, and follow the next screen’s instructions.  3. Create a [employer] ID. This will be your [employer] login ID and cannot be changed, so think of one that is secure and easy to remember.  4. Create a [employer] password. You can change your password at any time.  5. Enter your Password Reset Question and Answer. This can be used at a later time if you forget your password.   6. Enter your e-mail address. This allows you to receive e-mail notifications when new information is available in [employer].  7. Click Sign Up. You can now view your health information.    For assistance activating your [employer] account, call [phone]

## 2017-07-12 ENCOUNTER — HOSPITAL ENCOUNTER (OUTPATIENT)
Dept: CARDIOLOGY | Facility: MEDICAL CENTER | Age: 67
End: 2017-07-12
Attending: INTERNAL MEDICINE
Payer: COMMERCIAL

## 2017-07-12 DIAGNOSIS — R01.1 UNDIAGNOSED CARDIAC MURMURS: ICD-10-CM

## 2017-07-12 DIAGNOSIS — N20.0 CALCULUS OF BOTH KIDNEYS: ICD-10-CM

## 2017-07-12 DIAGNOSIS — R93.1 ELEVATED CORONARY ARTERY CALCIUM SCORE: ICD-10-CM

## 2017-07-12 DIAGNOSIS — I10 ESSENTIAL HYPERTENSION, BENIGN: ICD-10-CM

## 2017-07-12 LAB
LV EJECT FRACT  99904: 65
LV EJECT FRACT MOD 2C 99903: 65.88
LV EJECT FRACT MOD 4C 99902: 68.11
LV EJECT FRACT MOD BP 99901: 66.24

## 2017-07-12 PROCEDURE — 93306 TTE W/DOPPLER COMPLETE: CPT

## 2017-07-12 PROCEDURE — 93306 TTE W/DOPPLER COMPLETE: CPT | Mod: 26 | Performed by: INTERNAL MEDICINE

## 2017-07-27 ENCOUNTER — TELEPHONE (OUTPATIENT)
Dept: CARDIOLOGY | Facility: MEDICAL CENTER | Age: 67
End: 2017-07-27

## 2017-07-27 NOTE — TELEPHONE ENCOUNTER
Tried calling patient, no answer. Mailed patient a letter regarding Dr. Cano's echocardiogram interpretation.    ARTURO FOSS

## 2017-08-21 ENCOUNTER — TELEPHONE (OUTPATIENT)
Dept: CARDIOLOGY | Facility: MEDICAL CENTER | Age: 67
End: 2017-08-21

## 2017-08-21 ENCOUNTER — HOSPITAL ENCOUNTER (OUTPATIENT)
Dept: RADIOLOGY | Facility: MEDICAL CENTER | Age: 67
End: 2017-08-21
Attending: SPECIALIST
Payer: COMMERCIAL

## 2017-08-21 DIAGNOSIS — B19.20 HEPATITIS C VIRUS INFECTION WITHOUT HEPATIC COMA, UNSPECIFIED CHRONICITY: ICD-10-CM

## 2017-08-21 PROCEDURE — 0346T US-LIVER ELASTOGRAPHY: CPT

## 2017-08-21 PROCEDURE — 76700 US EXAM ABDOM COMPLETE: CPT

## 2017-08-21 NOTE — TELEPHONE ENCOUNTER
LVM asking patient to call back into office to find out if he ever had the blood work done that was ordered by Rachael on 06/2017. Patient has a FV with Rachael on 8/23/2017 @ 8:45am*YARED

## 2017-08-23 ENCOUNTER — OFFICE VISIT (OUTPATIENT)
Dept: CARDIOLOGY | Facility: MEDICAL CENTER | Age: 67
End: 2017-08-23
Payer: COMMERCIAL

## 2017-08-23 VITALS
HEART RATE: 73 BPM | DIASTOLIC BLOOD PRESSURE: 70 MMHG | OXYGEN SATURATION: 98 % | SYSTOLIC BLOOD PRESSURE: 122 MMHG | WEIGHT: 202 LBS | HEIGHT: 72 IN | BODY MASS INDEX: 27.36 KG/M2

## 2017-08-23 DIAGNOSIS — R01.1 UNDIAGNOSED CARDIAC MURMURS: ICD-10-CM

## 2017-08-23 DIAGNOSIS — I25.10 CORONARY ARTERY DISEASE INVOLVING NATIVE CORONARY ARTERY OF NATIVE HEART WITHOUT ANGINA PECTORIS: ICD-10-CM

## 2017-08-23 DIAGNOSIS — R93.1 ELEVATED CORONARY ARTERY CALCIUM SCORE: ICD-10-CM

## 2017-08-23 DIAGNOSIS — I10 ESSENTIAL HYPERTENSION, BENIGN: ICD-10-CM

## 2017-08-23 PROCEDURE — 99214 OFFICE O/P EST MOD 30 MIN: CPT | Performed by: INTERNAL MEDICINE

## 2017-08-23 RX ORDER — OXYCODONE HYDROCHLORIDE AND ASPIRIN 4.8355; 325 MG/1; MG/1
TABLET ORAL
COMMUNITY
Start: 2017-08-14 | End: 2021-01-29

## 2017-08-23 RX ORDER — LISINOPRIL 10 MG/1
10 TABLET ORAL DAILY
COMMUNITY
Start: 2017-07-27 | End: 2020-07-02 | Stop reason: SDUPTHER

## 2017-08-23 ASSESSMENT — ENCOUNTER SYMPTOMS
RESPIRATORY NEGATIVE: 1
CLAUDICATION: 0
COUGH: 0
NEUROLOGICAL NEGATIVE: 1
WEAKNESS: 0
WHEEZING: 0
SPUTUM PRODUCTION: 0
CARDIOVASCULAR NEGATIVE: 1
CHILLS: 0
HEMOPTYSIS: 0
DIZZINESS: 0
PALPITATIONS: 0
PND: 0
FEVER: 0
STRIDOR: 0
ORTHOPNEA: 0
CONSTITUTIONAL NEGATIVE: 1
SORE THROAT: 0
GASTROINTESTINAL NEGATIVE: 1
EYES NEGATIVE: 1
MUSCULOSKELETAL NEGATIVE: 1
BRUISES/BLEEDS EASILY: 0
LOSS OF CONSCIOUSNESS: 0
SHORTNESS OF BREATH: 0

## 2017-08-23 NOTE — PROGRESS NOTES
Subjective:   Nitesh Duron is a 67 y.o. male who presents today as a follow-up for his premature accelerated atherosclerosis as well as high blood pressure. He's been intolerant to every statin that we've tried as well as other ones in the past. This causes unremitting back pain. His blood pressures otherwise well controlled. He's not interested in being on a cholesterol lowering medication.    Past Medical History   Diagnosis Date   • Hyperlipidemia    • Arthritis      OA- hands and L knee   • Diabetes      oral meds   • Hypertension    • Dental disorder      upper   • Cataract      OU     Past Surgical History   Procedure Laterality Date   • Rotator cuff repair Left 2014   • Cystoscopy  2004,2008     stone extraction   • Ankle orif Right 1988     spiral fx   • Meniscectomy Left 1979   • Appendectomy  1958     Family History   Problem Relation Age of Onset   • Other Mother    • Heart Attack Father    • Heart Failure Brother      History   Smoking status   • Former Smoker   • Types: Cigarettes   • Quit date: 01/01/2008   Smokeless tobacco   • Never Used     Comment: quit 2008     Allergies   Allergen Reactions   • Ciprofloxacin Unspecified     convulsions   • Statins [Hmg-Coa-R Inhibitors] Unspecified     Stabbing pains in kidneys   • Acetaminophen      Kidney pain    • Ibuprofen      Kidney pain   • Naproxen      Kidney pain   • Shellfish Allergy      Kidney stones   • Soap &  [Alcohol] Rash     Antibacterial soap- contact dermatitis     Outpatient Encounter Prescriptions as of 8/23/2017   Medication Sig Dispense Refill   • lisinopril (PRINIVIL) 10 MG Tab      • oxycodone-aspirin (PERCODAN) 4.8355-325 MG Tab      • metoprolol SR (TOPROL XL) 25 MG TABLET SR 24 HR Take 1 Tab by mouth every day. 30 Tab 11   • spironolactone (ALDACTONE) 25 MG Tab Take 1 Tab by mouth every day. 30 Tab 11   • metformin (GLUCOPHAGE) 1000 MG tablet Take 1,000 mg by mouth 2 times a day, with meals.     • vitamin e  "(VITAMIN E) 400 UNIT Cap Take 400 Units by mouth every day.     • vitamin A 8000 UNIT capsule Take 8,000 Units by mouth 2 Times a Day.     • Cholecalciferol (VITAMIN D3) 3000 UNITS Tab Take  by mouth every day.     • Resveratrol 100 MG Cap Take  by mouth every day.     • KRILL OIL PO Take  by mouth every day.     • Omega-3 Fatty Acids (FISH OIL) 1000 MG Cap capsule Take 1,000 mg by mouth every day.     • ASTAXANTHIN PO Take  by mouth every day.     • SAW PALMETTO, SERENOA REPENS, PO Take  by mouth every day.     • Non Formulary Request Take  by mouth 2 Times a Day. Uric acid cleanse     • ACAI MATHEWS PO Take  by mouth 2 Times a Day.     • Non Formulary Request every 7 days. Aloe Lax     • LISINOPRIL PO Take 10 mg by mouth every evening.       No facility-administered encounter medications on file as of 8/23/2017.     Review of Systems   Constitutional: Negative.  Negative for fever, chills and malaise/fatigue.   HENT: Negative.  Negative for sore throat.    Eyes: Negative.    Respiratory: Negative.  Negative for cough, hemoptysis, sputum production, shortness of breath, wheezing and stridor.    Cardiovascular: Negative.  Negative for chest pain, palpitations, orthopnea, claudication, leg swelling and PND.   Gastrointestinal: Negative.    Genitourinary: Negative.    Musculoskeletal: Negative.    Skin: Negative.    Neurological: Negative.  Negative for dizziness, loss of consciousness and weakness.   Endo/Heme/Allergies: Negative.  Does not bruise/bleed easily.   All other systems reviewed and are negative.       Objective:   /70 mmHg  Pulse 73  Ht 1.829 m (6' 0.01\")  Wt 91.627 kg (202 lb)  BMI 27.39 kg/m2  SpO2 98%    Physical Exam   Constitutional: He is oriented to person, place, and time. He appears well-developed and well-nourished. No distress.   HENT:   Head: Normocephalic.   Mouth/Throat: Oropharynx is clear and moist.   Eyes: EOM are normal. Pupils are equal, round, and reactive to light. Right eye " exhibits no discharge. Left eye exhibits no discharge. No scleral icterus.   Neck: Normal range of motion. Neck supple. No JVD present. No tracheal deviation present.   Cardiovascular: Normal rate, regular rhythm, S1 normal, S2 normal, normal heart sounds, intact distal pulses and normal pulses.  Exam reveals no gallop, no S3, no S4 and no friction rub.    No murmur heard.   No systolic murmur is present    No diastolic murmur is present   Pulses:       Carotid pulses are 2+ on the right side, and 2+ on the left side.       Radial pulses are 2+ on the right side, and 2+ on the left side.        Dorsalis pedis pulses are 2+ on the right side, and 2+ on the left side.        Posterior tibial pulses are 2+ on the right side, and 2+ on the left side.   Pulmonary/Chest: Effort normal and breath sounds normal. No respiratory distress. He has no wheezes. He has no rales.   Abdominal: Soft. Bowel sounds are normal. He exhibits no distension and no mass. There is no tenderness. There is no rebound and no guarding.   Musculoskeletal: He exhibits no edema.   Neurological: He is alert and oriented to person, place, and time. No cranial nerve deficit.   Skin: Skin is warm and dry. He is not diaphoretic. No pallor.   Psychiatric: He has a normal mood and affect. His behavior is normal. Judgment and thought content normal.   Nursing note and vitals reviewed.      Assessment:     1. Coronary artery disease involving native coronary artery of native heart without angina pectoris     2. Elevated coronary artery calcium score     3. Essential hypertension, benign     4. Undiagnosed cardiac murmurs         Medical Decision Making:  Today's Assessment / Status / Plan:     67 y/o M with abnormal ECG, benign variant and LE edema. He continues to do well and is taking multiple omega-3 supplements. We will recheck his cholesterol some point here in the future but he does not want to do this today. I will see him back in 6 months. His PVCs  are now resolved on the metoprolol.    1. Abnormal ECG, occ PVCS  - normal variant no workup  - cont metop to 25 daily    2. Murmur with PCP, not heard on exam  - normal echo    3. LE edema  - outpatient echo  - cont francisco 25    4. Strong family history, Ca score 438 81% for age  - intolerant to statins, to consider injectables at next visit    Thank for you allowing me to take part in your patient's care, please call should you have any questions or would like to discuss this patient.

## 2017-08-23 NOTE — MR AVS SNAPSHOT
"        Nitesh Lawsonport   2017 8:45 AM   Office Visit   MRN: [id number]    Department:  [facility]   Dept Phone:  [phone]    Description:  Male : 1950   Provider:  Balaji Cano M.D.           Reason for Visit     Follow-Up           Allergies as of 2017     Allergen Noted Reactions    Ciprofloxacin 2016   Unspecified    convulsions    Statins [Hmg-Coa-R Inhibitors] 02/10/2016   Unspecified    Stabbing pains in kidneys    Acetaminophen 2009       Kidney pain     Ibuprofen 2009       Kidney pain    Naproxen 2009       Kidney pain    Shellfish Allergy 2017       Kidney stones    Soap &  [Alcohol] 2017   Rash    Antibacterial soap- contact dermatitis      You were diagnosed with     Coronary artery disease involving native coronary artery of native heart without angina pectoris   [2811641]       Elevated coronary artery calcium score   [9051557]       Essential hypertension, benign   [401.1.ICD-9-CM]       Undiagnosed cardiac murmurs   [785.2.ICD-9-CM]         Vital Signs     Blood Pressure Pulse Height Weight Body Mass Index Oxygen Saturation    122/70 mmHg 73 1.829 m (6' 0.01\") 91.627 kg (202 lb) 27.39 kg/m2 98%    Smoking Status                   Former Smoker           Basic Information     Date Of Birth Sex Race Ethnicity Preferred Language    1950 Male White Non- English      Problem List              ICD-10-CM Priority Class Noted - Resolved    Undiagnosed cardiac murmurs R01.1   2016 - Present    Essential hypertension, benign I10   2016 - Present    Calculus of both kidneys N20.0   2016 - Present    Elevated coronary artery calcium score R93.1   2017 - Present    Coronary artery disease involving native coronary artery without angina pectoris I25.10   2017 - Present      Health Maintenance        Date Due Completion Dates    IMM DTaP/Tdap/Td Vaccine (1 - Tdap) 1969 ---    COLONOSCOPY " 6/18/2000 ---    IMM ZOSTER VACCINE 6/18/2010 ---    IMM PNEUMOCOCCAL 65+ (ADULT) LOW/MEDIUM RISK SERIES (1 of 2 - PCV13) 6/18/2015 ---    IMM INFLUENZA (1) 9/1/2017 ---            Current Immunizations     No immunizations on file.      Below and/or attached are the medications your provider expects you to take. Review all of your home medications and newly ordered medications with your provider and/or pharmacist. Follow medication instructions as directed by your provider and/or pharmacist. Please keep your medication list with you and share with your provider. Update the information when medications are discontinued, doses are changed, or new medications (including over-the-counter products) are added; and carry medication information at all times in the event of emergency situations     Allergies:  CIPROFLOXACIN - Unspecified     STATINS - Unspecified     ACETAMINOPHEN - (reactions not documented)     IBUPROFEN - (reactions not documented)     NAPROXEN - (reactions not documented)     SHELLFISH ALLERGY - (reactions not documented)     SOAP &  - Rash               Medications  Valid as of: August 23, 2017 -  9:22 AM    Generic Name Brand Name Tablet Size Instructions for use    Acai   Take  by mouth 2 Times a Day.        Astaxanthin   Take  by mouth every day.        Cholecalciferol (Tab) Vitamin D3 3000 units Take  by mouth every day.        Krill Oil   Take  by mouth every day.        Lisinopril   Take 10 mg by mouth every evening.        Lisinopril (Tab) PRINIVIL 10 MG         MetFORMIN HCl (Tab) GLUCOPHAGE 1000 MG Take 1,000 mg by mouth 2 times a day, with meals.        Metoprolol Succinate (TABLET SR 24 HR) TOPROL XL 25 MG Take 1 Tab by mouth every day.        Non Formulary Request Non Formulary Request  Take  by mouth 2 Times a Day. Uric acid cleanse        Non Formulary Request Non Formulary Request  every 7 days. Aloe Lax        Omega-3 Fatty Acids (Cap) fish oil 1000 MG Take 1,000 mg by mouth  every day.        Oxycodone-Aspirin (Tab) PERCODAN 4.8355-325 MG         Resveratrol (Cap) Resveratrol 100 MG Take  by mouth every day.        Saw Palmetto (Serenoa repens)   Take  by mouth every day.        Spironolactone (Tab) ALDACTONE 25 MG Take 1 Tab by mouth every day.        Vitamin A (Cap) vitamin A 8000 UNIT Take 8,000 Units by mouth 2 Times a Day.        Vitamin E (Cap) VITAMIN E 400 UNIT Take 400 Units by mouth every day.        .                 Medicines prescribed today were sent to:     [facility] [address] [place] [address], [place] - [address] [facility]    [address] [facility] [place] [address]    Phone: [phone] Fax: [phone]    Open 24 Hours?: No      Medication refill instructions:       If your prescription bottle indicates you have medication refills left, it is not necessary to call your provider’s office. Please contact your pharmacy and they will refill your medication.    If your prescription bottle indicates you do not have any refills left, you may request refills at any time through one of the following ways: The online [employer] system (except Urgent Care), by calling your provider’s office, or by asking your pharmacy to contact your provider’s office with a refill request. Medication refills are processed only during regular business hours and may not be available until the next business day. Your provider may request additional information or to have a follow-up visit with you prior to refilling your medication.   *Please Note: Medication refills are assigned a new Rx number when refilled electronically. Your pharmacy may indicate that no refills were authorized even though a new prescription for the same medication is available at the pharmacy. Please request the medicine by name with the pharmacy before contacting your provider for a refill.           MyChart Status: Patient Declined

## 2017-08-23 NOTE — Clinical Note
[facility]   [address] [facility], [facility] [address]  [place] [address]  Phone: [phone]  Fax: [phone]              Nitesh Duron  1950    Encounter Date: 8/23/2017    Balaji Cano M.D.          PROGRESS NOTE:  Subjective:   Nitesh Duron is a 67 y.o. male who presents today as a follow-up for his premature accelerated atherosclerosis as well as high blood pressure. He's been intolerant to every statin that we've tried as well as other ones in the past. This causes unremitting back pain. His blood pressures otherwise well controlled. He's not interested in being on a cholesterol lowering medication.    Past Medical History   Diagnosis Date   • Hyperlipidemia    • Arthritis      OA- hands and L knee   • Diabetes      oral meds   • Hypertension    • Dental disorder      upper   • Cataract      OU     Past Surgical History   Procedure Laterality Date   • Rotator cuff repair Left 2014   • Cystoscopy  2004,2008     stone extraction   • Ankle orif Right 1988     spiral fx   • Meniscectomy Left 1979   • Appendectomy  1958     Family History   Problem Relation Age of Onset   • Other Mother    • Heart Attack Father    • Heart Failure Brother      History   Smoking status   • Former Smoker   • Types: Cigarettes   • Quit date: 01/01/2008   Smokeless tobacco   • Never Used     Comment: quit 2008     Allergies   Allergen Reactions   • Ciprofloxacin Unspecified     convulsions   • Statins [Hmg-Coa-R Inhibitors] Unspecified     Stabbing pains in kidneys   • Acetaminophen      Kidney pain    • Ibuprofen      Kidney pain   • Naproxen      Kidney pain   • Shellfish Allergy      Kidney stones   • Soap &  [Alcohol] Rash     Antibacterial soap- contact dermatitis     Outpatient Encounter Prescriptions as of 8/23/2017   Medication Sig Dispense Refill   • lisinopril (PRINIVIL) 10 MG Tab      • oxycodone-aspirin (PERCODAN) 4.8355-325 MG Tab      • metoprolol  "SR (TOPROL XL) 25 MG TABLET SR 24 HR Take 1 Tab by mouth every day. 30 Tab 11   • spironolactone (ALDACTONE) 25 MG Tab Take 1 Tab by mouth every day. 30 Tab 11   • metformin (GLUCOPHAGE) 1000 MG tablet Take 1,000 mg by mouth 2 times a day, with meals.     • vitamin e (VITAMIN E) 400 UNIT Cap Take 400 Units by mouth every day.     • vitamin A 8000 UNIT capsule Take 8,000 Units by mouth 2 Times a Day.     • Cholecalciferol (VITAMIN D3) 3000 UNITS Tab Take  by mouth every day.     • Resveratrol 100 MG Cap Take  by mouth every day.     • KRILL OIL PO Take  by mouth every day.     • Omega-3 Fatty Acids (FISH OIL) 1000 MG Cap capsule Take 1,000 mg by mouth every day.     • ASTAXANTHIN PO Take  by mouth every day.     • SAW PALMETTO, SERENOA REPENS, PO Take  by mouth every day.     • Non Formulary Request Take  by mouth 2 Times a Day. Uric acid cleanse     • ACAI MATHEWS PO Take  by mouth 2 Times a Day.     • Non Formulary Request every 7 days. Aloe Lax     • LISINOPRIL PO Take 10 mg by mouth every evening.       No facility-administered encounter medications on file as of 8/23/2017.     Review of Systems   Constitutional: Negative.  Negative for fever, chills and malaise/fatigue.   HENT: Negative.  Negative for sore throat.    Eyes: Negative.    Respiratory: Negative.  Negative for cough, hemoptysis, sputum production, shortness of breath, wheezing and stridor.    Cardiovascular: Negative.  Negative for chest pain, palpitations, orthopnea, claudication, leg swelling and PND.   Gastrointestinal: Negative.    Genitourinary: Negative.    Musculoskeletal: Negative.    Skin: Negative.    Neurological: Negative.  Negative for dizziness, loss of consciousness and weakness.   Endo/Heme/Allergies: Negative.  Does not bruise/bleed easily.   All other systems reviewed and are negative.       Objective:   /70 mmHg  Pulse 73  Ht 1.829 m (6' 0.01\")  Wt 91.627 kg (202 lb)  BMI 27.39 kg/m2  SpO2 98%    Physical Exam   "   Constitutional: He is oriented to person, place, and time. He appears well-developed and well-nourished. No distress.   HENT:   Head: Normocephalic.   Mouth/Throat: Oropharynx is clear and moist.   Eyes: EOM are normal. Pupils are equal, round, and reactive to light. Right eye exhibits no discharge. Left eye exhibits no discharge. No scleral icterus.   Neck: Normal range of motion. Neck supple. No JVD present. No tracheal deviation present.   Cardiovascular: Normal rate, regular rhythm, S1 normal, S2 normal, normal heart sounds, intact distal pulses and normal pulses.  Exam reveals no gallop, no S3, no S4 and no friction rub.    No murmur heard.   No systolic murmur is present    No diastolic murmur is present   Pulses:       Carotid pulses are 2+ on the right side, and 2+ on the left side.       Radial pulses are 2+ on the right side, and 2+ on the left side.        Dorsalis pedis pulses are 2+ on the right side, and 2+ on the left side.        Posterior tibial pulses are 2+ on the right side, and 2+ on the left side.   Pulmonary/Chest: Effort normal and breath sounds normal. No respiratory distress. He has no wheezes. He has no rales.   Abdominal: Soft. Bowel sounds are normal. He exhibits no distension and no mass. There is no tenderness. There is no rebound and no guarding.   Musculoskeletal: He exhibits no edema.   Neurological: He is alert and oriented to person, place, and time. No cranial nerve deficit.   Skin: Skin is warm and dry. He is not diaphoretic. No pallor.   Psychiatric: He has a normal mood and affect. His behavior is normal. Judgment and thought content normal.   Nursing note and vitals reviewed.      Assessment:     1. Coronary artery disease involving native coronary artery of native heart without angina pectoris     2. Elevated coronary artery calcium score     3. Essential hypertension, benign     4. Undiagnosed cardiac murmurs         Medical Decision Making:  Today's Assessment / Status /  Plan:     67 y/o M with abnormal ECG, benign variant and LE edema. He continues to do well and is taking multiple omega-3 supplements. We will recheck his cholesterol some point here in the future but he does not want to do this today. I will see him back in 6 months. His PVCs are now resolved on the metoprolol.    1. Abnormal ECG, occ PVCS  - normal variant no workup  - cont metop to 25 daily    2. Murmur with PCP, not heard on exam  - normal echo    3. LE edema  - outpatient echo  - cont francisco 25    4. Strong family history, Ca score 438 81% for age  - intolerant to statins, to consider injectables at next visit    Thank for you allowing me to take part in your patient's care, please call should you have any questions or would like to discuss this patient.      RUPINDER Alvarez.  [address] [place] [address]  VIA Facsimile: [phone]

## 2018-02-28 ENCOUNTER — TELEPHONE (OUTPATIENT)
Dept: CARDIOLOGY | Facility: MEDICAL CENTER | Age: 68
End: 2018-02-28

## 2018-02-28 ENCOUNTER — OFFICE VISIT (OUTPATIENT)
Dept: CARDIOLOGY | Facility: MEDICAL CENTER | Age: 68
End: 2018-02-28
Payer: COMMERCIAL

## 2018-02-28 VITALS
WEIGHT: 210 LBS | SYSTOLIC BLOOD PRESSURE: 140 MMHG | HEART RATE: 82 BPM | DIASTOLIC BLOOD PRESSURE: 86 MMHG | HEIGHT: 72 IN | BODY MASS INDEX: 28.44 KG/M2 | OXYGEN SATURATION: 96 %

## 2018-02-28 DIAGNOSIS — R93.1 ELEVATED CORONARY ARTERY CALCIUM SCORE: ICD-10-CM

## 2018-02-28 DIAGNOSIS — I10 ESSENTIAL HYPERTENSION, BENIGN: ICD-10-CM

## 2018-02-28 DIAGNOSIS — Z79.899 HIGH RISK MEDICATION USE: ICD-10-CM

## 2018-02-28 DIAGNOSIS — R01.1 UNDIAGNOSED CARDIAC MURMURS: ICD-10-CM

## 2018-02-28 DIAGNOSIS — I25.10 CORONARY ARTERY DISEASE INVOLVING NATIVE CORONARY ARTERY OF NATIVE HEART WITHOUT ANGINA PECTORIS: ICD-10-CM

## 2018-02-28 PROCEDURE — 99215 OFFICE O/P EST HI 40 MIN: CPT | Performed by: INTERNAL MEDICINE

## 2018-02-28 ASSESSMENT — ENCOUNTER SYMPTOMS
RESPIRATORY NEGATIVE: 1
CHILLS: 0
WHEEZING: 0
HEMOPTYSIS: 0
GASTROINTESTINAL NEGATIVE: 1
BRUISES/BLEEDS EASILY: 0
PALPITATIONS: 0
ORTHOPNEA: 0
EYES NEGATIVE: 1
FEVER: 0
COUGH: 0
MUSCULOSKELETAL NEGATIVE: 1
CLAUDICATION: 0
CONSTITUTIONAL NEGATIVE: 1
SHORTNESS OF BREATH: 0
SPUTUM PRODUCTION: 0
CARDIOVASCULAR NEGATIVE: 1
SORE THROAT: 0
PND: 0
NEUROLOGICAL NEGATIVE: 1
LOSS OF CONSCIOUSNESS: 0
STRIDOR: 0
WEAKNESS: 0
DIZZINESS: 0

## 2018-02-28 NOTE — TELEPHONE ENCOUNTER
Message     The referral to Lipid Clinic will be sent to [facility].  The contact number is [phone].

## 2018-02-28 NOTE — LETTER
[facility]   [address] [facility] [facility] [address]  [place] [address]  Phone: [phone]  Fax: [phone]              Nitesh Duron  1950    Encounter Date: 2/28/2018    Balaji Cano M.D.          PROGRESS NOTE:  Subjective:   Nitesh Duron is a 67 y.o. male who presents today as a follow-up for his palpitations hyperlipidemia and elevated calcium score. Again he reports intolerance to every statin on demand. We don't have a sample of his lipids in our system. We will check that today. Otherwise he's been having no chest pain palpitations or PND. Blood pressure is controlled. His lower extremity edema that we start spironolactone 4 is stable. His echocardiogram is unremarkable.    Past Medical History:   Diagnosis Date   • Arthritis     OA- hands and L knee   • Cataract     OU   • Dental disorder     upper   • Diabetes     oral meds   • Hyperlipidemia    • Hypertension      Past Surgical History:   Procedure Laterality Date   • ROTATOR CUFF REPAIR Left 2014   • ANKLE ORIF Right 1988    spiral fx   • MENISCECTOMY Left 1979   • APPENDECTOMY  1958   • CYSTOSCOPY  2004,2008    stone extraction     Family History   Problem Relation Age of Onset   • Other Mother    • Heart Attack Father    • Heart Failure Brother      History   Smoking Status   • Former Smoker   • Types: Cigarettes   • Quit date: 1/1/2008   Smokeless Tobacco   • Never Used     Comment: quit 2008     Allergies   Allergen Reactions   • Ciprofloxacin Unspecified     convulsions   • Statins [Hmg-Coa-R Inhibitors] Unspecified     Stabbing pains in kidneys   • Acetaminophen      Kidney pain    • Ibuprofen      Kidney pain   • Naproxen      Kidney pain   • Shellfish Allergy      Kidney stones   • Soap &  [Alcohol] Rash     Antibacterial soap- contact dermatitis     Outpatient Encounter Prescriptions as of 2/28/2018   Medication Sig Dispense Refill   • lisinopril (PRINIVIL) 10 MG Tab       • oxycodone-aspirin (PERCODAN) 4.8355-325 MG Tab      • metoprolol SR (TOPROL XL) 25 MG TABLET SR 24 HR Take 1 Tab by mouth every day. 30 Tab 11   • spironolactone (ALDACTONE) 25 MG Tab Take 1 Tab by mouth every day. 30 Tab 11   • metformin (GLUCOPHAGE) 1000 MG tablet Take 1,000 mg by mouth 2 times a day, with meals.     • Non Formulary Request Take  by mouth 2 Times a Day. Uric acid cleanse     • vitamin e (VITAMIN E) 400 UNIT Cap Take 400 Units by mouth every day.     • vitamin A 8000 UNIT capsule Take 8,000 Units by mouth 2 Times a Day.     • Cholecalciferol (VITAMIN D3) 3000 UNITS Tab Take  by mouth every day.     • Resveratrol 100 MG Cap Take  by mouth every day.     • KRILL OIL PO Take  by mouth every day.     • Omega-3 Fatty Acids (FISH OIL) 1000 MG Cap capsule Take 1,000 mg by mouth every day.     • ASTAXANTHIN PO Take  by mouth every day.     • SAW PALMETTO, SERENOA REPENS, PO Take  by mouth every day.     • Non Formulary Request every 7 days. Aloe Lax     • [DISCONTINUED] ACAI MATHEWS PO Take  by mouth 2 Times a Day.     • [DISCONTINUED] LISINOPRIL PO Take 10 mg by mouth every evening.       No facility-administered encounter medications on file as of 2/28/2018.      Review of Systems   Constitutional: Negative.  Negative for chills, fever and malaise/fatigue.   HENT: Negative.  Negative for sore throat.    Eyes: Negative.    Respiratory: Negative.  Negative for cough, hemoptysis, sputum production, shortness of breath, wheezing and stridor.    Cardiovascular: Negative.  Negative for chest pain, palpitations, orthopnea, claudication, leg swelling and PND.   Gastrointestinal: Negative.    Genitourinary: Negative.    Musculoskeletal: Negative.    Skin: Negative.    Neurological: Negative.  Negative for dizziness, loss of consciousness and weakness.   Endo/Heme/Allergies: Negative.  Does not bruise/bleed easily.   All other systems reviewed and are negative.       Objective:   /86   Pulse 82   Ht  1.829 m (6')   Wt 95.3 kg (210 lb)   SpO2 96%   BMI 28.48 kg/m²      Physical Exam   Constitutional: He is oriented to person, place, and time. He appears well-developed and well-nourished. No distress.   HENT:   Head: Normocephalic.   Mouth/Throat: Oropharynx is clear and moist.   Eyes: EOM are normal. Pupils are equal, round, and reactive to light. Right eye exhibits no discharge. Left eye exhibits no discharge. No scleral icterus.   Neck: Normal range of motion. Neck supple. No JVD present. No tracheal deviation present.   Cardiovascular: Normal rate, regular rhythm, S1 normal, S2 normal, normal heart sounds, intact distal pulses and normal pulses.  Exam reveals no gallop, no S3, no S4 and no friction rub.    No murmur heard.   No systolic murmur is present    No diastolic murmur is present   Pulses:       Carotid pulses are 2+ on the right side, and 2+ on the left side.       Radial pulses are 2+ on the right side, and 2+ on the left side.        Dorsalis pedis pulses are 2+ on the right side, and 2+ on the left side.        Posterior tibial pulses are 2+ on the right side, and 2+ on the left side.   Pulmonary/Chest: Effort normal and breath sounds normal. No respiratory distress. He has no wheezes. He has no rales.   Abdominal: Soft. Bowel sounds are normal. He exhibits no distension and no mass. There is no tenderness. There is no rebound and no guarding.   Musculoskeletal: He exhibits no edema.   Neurological: He is alert and oriented to person, place, and time. No cranial nerve deficit.   Skin: Skin is warm and dry. He is not diaphoretic. No pallor.   Psychiatric: He has a normal mood and affect. His behavior is normal. Judgment and thought content normal.   Nursing note and vitals reviewed.      Assessment:     1. Elevated coronary artery calcium score     2. Coronary artery disease involving native coronary artery of native heart without angina pectoris  LIPID PROFILE    REFERRAL TO LIPID CLINIC   3.  Essential hypertension, benign  BASIC METABOLIC PANEL    REFERRAL TO LIPID CLINIC   4. Undiagnosed cardiac murmurs  BASIC METABOLIC PANEL    LIPID PROFILE   5. High risk medication use  BASIC METABOLIC PANEL    LIPID PROFILE    REFERRAL TO LIPID CLINIC       Medical Decision Making:  Today's Assessment / Status / Plan:     65 y/o M with abnormal ECG, benign variant and LE edema. He satisfied with his lower extremity edema and therefore we will just keep on the spironolactone today. We'll check a basic metabolic panel to make sure that he is not becoming hyperkalemic. In terms of his lipids and his elevated calcium score I would like to send him to the lipid clinic for further treatment. In the meantime we will check his lipids.     1. Abnormal ECG, occ PVCS  - normal variant no workup  - cont metop to 25 daily     2. Murmur with PCP, not heard on exam  - normal echo     3. LE edema  - outpatient echo  - cont francisco 25     4. Strong family history, Ca score 438 81% for age  - refer to lipid clinic    Thank for you allowing me to take part in your patient's care, please call should you have any questions or would like to discuss this patient.      Tiesha Breaux PVIANCA.  [address] [place] [id number]  VIA Facsimile: [phone]

## 2018-02-28 NOTE — PROGRESS NOTES
Subjective:   Nitesh Duron is a 67 y.o. male who presents today as a follow-up for his palpitations hyperlipidemia and elevated calcium score. Again he reports intolerance to every statin on demand. We don't have a sample of his lipids in our system. We will check that today. Otherwise he's been having no chest pain palpitations or PND. Blood pressure is controlled. His lower extremity edema that we start spironolactone 4 is stable. His echocardiogram is unremarkable.    Past Medical History:   Diagnosis Date   • Arthritis     OA- hands and L knee   • Cataract     OU   • Dental disorder     upper   • Diabetes     oral meds   • Hyperlipidemia    • Hypertension      Past Surgical History:   Procedure Laterality Date   • ROTATOR CUFF REPAIR Left 2014   • ANKLE ORIF Right 1988    spiral fx   • MENISCECTOMY Left 1979   • APPENDECTOMY  1958   • CYSTOSCOPY  2004,2008    stone extraction     Family History   Problem Relation Age of Onset   • Other Mother    • Heart Attack Father    • Heart Failure Brother      History   Smoking Status   • Former Smoker   • Types: Cigarettes   • Quit date: 1/1/2008   Smokeless Tobacco   • Never Used     Comment: quit 2008     Allergies   Allergen Reactions   • Ciprofloxacin Unspecified     convulsions   • Statins [Hmg-Coa-R Inhibitors] Unspecified     Stabbing pains in kidneys   • Acetaminophen      Kidney pain    • Ibuprofen      Kidney pain   • Naproxen      Kidney pain   • Shellfish Allergy      Kidney stones   • Soap &  [Alcohol] Rash     Antibacterial soap- contact dermatitis     Outpatient Encounter Prescriptions as of 2/28/2018   Medication Sig Dispense Refill   • lisinopril (PRINIVIL) 10 MG Tab      • oxycodone-aspirin (PERCODAN) 4.8355-325 MG Tab      • metoprolol SR (TOPROL XL) 25 MG TABLET SR 24 HR Take 1 Tab by mouth every day. 30 Tab 11   • spironolactone (ALDACTONE) 25 MG Tab Take 1 Tab by mouth every day. 30 Tab 11   • metformin (GLUCOPHAGE) 1000 MG tablet  Take 1,000 mg by mouth 2 times a day, with meals.     • Non Formulary Request Take  by mouth 2 Times a Day. Uric acid cleanse     • vitamin e (VITAMIN E) 400 UNIT Cap Take 400 Units by mouth every day.     • vitamin A 8000 UNIT capsule Take 8,000 Units by mouth 2 Times a Day.     • Cholecalciferol (VITAMIN D3) 3000 UNITS Tab Take  by mouth every day.     • Resveratrol 100 MG Cap Take  by mouth every day.     • KRILL OIL PO Take  by mouth every day.     • Omega-3 Fatty Acids (FISH OIL) 1000 MG Cap capsule Take 1,000 mg by mouth every day.     • ASTAXANTHIN PO Take  by mouth every day.     • SAW PALMETTO, SERENOA REPENS, PO Take  by mouth every day.     • Non Formulary Request every 7 days. Aloe Lax     • [DISCONTINUED] ACAI MATHEWS PO Take  by mouth 2 Times a Day.     • [DISCONTINUED] LISINOPRIL PO Take 10 mg by mouth every evening.       No facility-administered encounter medications on file as of 2/28/2018.      Review of Systems   Constitutional: Negative.  Negative for chills, fever and malaise/fatigue.   HENT: Negative.  Negative for sore throat.    Eyes: Negative.    Respiratory: Negative.  Negative for cough, hemoptysis, sputum production, shortness of breath, wheezing and stridor.    Cardiovascular: Negative.  Negative for chest pain, palpitations, orthopnea, claudication, leg swelling and PND.   Gastrointestinal: Negative.    Genitourinary: Negative.    Musculoskeletal: Negative.    Skin: Negative.    Neurological: Negative.  Negative for dizziness, loss of consciousness and weakness.   Endo/Heme/Allergies: Negative.  Does not bruise/bleed easily.   All other systems reviewed and are negative.       Objective:   /86   Pulse 82   Ht 1.829 m (6')   Wt 95.3 kg (210 lb)   SpO2 96%   BMI 28.48 kg/m²     Physical Exam   Constitutional: He is oriented to person, place, and time. He appears well-developed and well-nourished. No distress.   HENT:   Head: Normocephalic.   Mouth/Throat: Oropharynx is clear and  moist.   Eyes: EOM are normal. Pupils are equal, round, and reactive to light. Right eye exhibits no discharge. Left eye exhibits no discharge. No scleral icterus.   Neck: Normal range of motion. Neck supple. No JVD present. No tracheal deviation present.   Cardiovascular: Normal rate, regular rhythm, S1 normal, S2 normal, normal heart sounds, intact distal pulses and normal pulses.  Exam reveals no gallop, no S3, no S4 and no friction rub.    No murmur heard.   No systolic murmur is present    No diastolic murmur is present   Pulses:       Carotid pulses are 2+ on the right side, and 2+ on the left side.       Radial pulses are 2+ on the right side, and 2+ on the left side.        Dorsalis pedis pulses are 2+ on the right side, and 2+ on the left side.        Posterior tibial pulses are 2+ on the right side, and 2+ on the left side.   Pulmonary/Chest: Effort normal and breath sounds normal. No respiratory distress. He has no wheezes. He has no rales.   Abdominal: Soft. Bowel sounds are normal. He exhibits no distension and no mass. There is no tenderness. There is no rebound and no guarding.   Musculoskeletal: He exhibits no edema.   Neurological: He is alert and oriented to person, place, and time. No cranial nerve deficit.   Skin: Skin is warm and dry. He is not diaphoretic. No pallor.   Psychiatric: He has a normal mood and affect. His behavior is normal. Judgment and thought content normal.   Nursing note and vitals reviewed.      Assessment:     1. Elevated coronary artery calcium score     2. Coronary artery disease involving native coronary artery of native heart without angina pectoris  LIPID PROFILE    REFERRAL TO LIPID CLINIC   3. Essential hypertension, benign  BASIC METABOLIC PANEL    REFERRAL TO LIPID CLINIC   4. Undiagnosed cardiac murmurs  BASIC METABOLIC PANEL    LIPID PROFILE   5. High risk medication use  BASIC METABOLIC PANEL    LIPID PROFILE    REFERRAL TO LIPID CLINIC       Medical Decision  Making:  Today's Assessment / Status / Plan:     67 y/o M with abnormal ECG, benign variant and LE edema. He satisfied with his lower extremity edema and therefore we will just keep on the spironolactone today. We'll check a basic metabolic panel to make sure that he is not becoming hyperkalemic. In terms of his lipids and his elevated calcium score I would like to send him to the lipid clinic for further treatment. In the meantime we will check his lipids.     1. Abnormal ECG, occ PVCS  - normal variant no workup  - cont metop to 25 daily     2. Murmur with PCP, not heard on exam  - normal echo     3. LE edema  - outpatient echo  - cont francisco 25     4. Strong family history, Ca score 438 81% for age  - refer to lipid clinic    Thank for you allowing me to take part in your patient's care, please call should you have any questions or would like to discuss this patient.

## 2018-07-05 DIAGNOSIS — I10 ESSENTIAL HYPERTENSION, BENIGN: ICD-10-CM

## 2018-07-05 DIAGNOSIS — R93.1 ELEVATED CORONARY ARTERY CALCIUM SCORE: ICD-10-CM

## 2018-07-06 RX ORDER — METOPROLOL SUCCINATE 25 MG/1
TABLET, EXTENDED RELEASE ORAL
Qty: 90 TAB | Refills: 3 | Status: SHIPPED | OUTPATIENT
Start: 2018-07-06 | End: 2019-07-02 | Stop reason: SDUPTHER

## 2018-07-13 ENCOUNTER — HOSPITAL ENCOUNTER (OUTPATIENT)
Dept: RADIOLOGY | Facility: MEDICAL CENTER | Age: 68
End: 2018-07-13
Attending: INTERNAL MEDICINE
Payer: COMMERCIAL

## 2018-07-13 DIAGNOSIS — Z86.19 HISTORY OF HEPATITIS C: ICD-10-CM

## 2018-07-13 PROCEDURE — 76700 US EXAM ABDOM COMPLETE: CPT

## 2018-07-27 ENCOUNTER — HOSPITAL ENCOUNTER (OUTPATIENT)
Dept: LAB | Facility: MEDICAL CENTER | Age: 68
End: 2018-07-27
Attending: INTERNAL MEDICINE
Payer: COMMERCIAL

## 2018-07-27 LAB
ALBUMIN SERPL BCP-MCNC: 4.1 G/DL (ref 3.2–4.9)
ALBUMIN/GLOB SERPL: 1.2 G/DL
ALP SERPL-CCNC: 82 U/L (ref 30–99)
ALT SERPL-CCNC: 16 U/L (ref 2–50)
ANION GAP SERPL CALC-SCNC: 10 MMOL/L (ref 0–11.9)
ANISOCYTOSIS BLD QL SMEAR: ABNORMAL
AST SERPL-CCNC: 26 U/L (ref 12–45)
BASOPHILS # BLD AUTO: 1 % (ref 0–1.8)
BASOPHILS # BLD: 0.05 K/UL (ref 0–0.12)
BILIRUB SERPL-MCNC: 0.4 MG/DL (ref 0.1–1.5)
BUN SERPL-MCNC: 19 MG/DL (ref 8–22)
CALCIUM SERPL-MCNC: 9.4 MG/DL (ref 8.5–10.5)
CHLORIDE SERPL-SCNC: 103 MMOL/L (ref 96–112)
CO2 SERPL-SCNC: 25 MMOL/L (ref 20–33)
COMMENT 1642: NORMAL
CREAT SERPL-MCNC: 0.9 MG/DL (ref 0.5–1.4)
EOSINOPHIL # BLD AUTO: 0.41 K/UL (ref 0–0.51)
EOSINOPHIL NFR BLD: 8.5 % (ref 0–6.9)
ERYTHROCYTE [DISTWIDTH] IN BLOOD BY AUTOMATED COUNT: 47.8 FL (ref 35.9–50)
GLOBULIN SER CALC-MCNC: 3.3 G/DL (ref 1.9–3.5)
GLUCOSE SERPL-MCNC: 112 MG/DL (ref 65–99)
HCT VFR BLD AUTO: 37 % (ref 42–52)
HGB BLD-MCNC: 10.8 G/DL (ref 14–18)
IMM GRANULOCYTES # BLD AUTO: 0.02 K/UL (ref 0–0.11)
IMM GRANULOCYTES NFR BLD AUTO: 0.4 % (ref 0–0.9)
INR PPP: 1.08 (ref 0.87–1.13)
LYMPHOCYTES # BLD AUTO: 1.22 K/UL (ref 1–4.8)
LYMPHOCYTES NFR BLD: 25.2 % (ref 22–41)
MCH RBC QN AUTO: 21.1 PG (ref 27–33)
MCHC RBC AUTO-ENTMCNC: 29.2 G/DL (ref 33.7–35.3)
MCV RBC AUTO: 72.1 FL (ref 81.4–97.8)
MICROCYTES BLD QL SMEAR: ABNORMAL
MONOCYTES # BLD AUTO: 0.49 K/UL (ref 0–0.85)
MONOCYTES NFR BLD AUTO: 10.1 % (ref 0–13.4)
MORPHOLOGY BLD-IMP: NORMAL
NEUTROPHILS # BLD AUTO: 2.66 K/UL (ref 1.82–7.42)
NEUTROPHILS NFR BLD: 54.8 % (ref 44–72)
NRBC # BLD AUTO: 0 K/UL
NRBC BLD-RTO: 0 /100 WBC
OVALOCYTES BLD QL SMEAR: NORMAL
PLATELET # BLD AUTO: 135 K/UL (ref 164–446)
PLATELET BLD QL SMEAR: NORMAL
PMV BLD AUTO: 9.7 FL (ref 9–12.9)
POIKILOCYTOSIS BLD QL SMEAR: NORMAL
POTASSIUM SERPL-SCNC: 4.1 MMOL/L (ref 3.6–5.5)
PROT SERPL-MCNC: 7.4 G/DL (ref 6–8.2)
PROTHROMBIN TIME: 13.7 SEC (ref 12–14.6)
RBC # BLD AUTO: 5.13 M/UL (ref 4.7–6.1)
RBC BLD AUTO: PRESENT
SMUDGE CELLS BLD QL SMEAR: NORMAL
SODIUM SERPL-SCNC: 138 MMOL/L (ref 135–145)
WBC # BLD AUTO: 4.9 K/UL (ref 4.8–10.8)

## 2018-07-27 PROCEDURE — 85610 PROTHROMBIN TIME: CPT

## 2018-07-27 PROCEDURE — 82105 ALPHA-FETOPROTEIN SERUM: CPT

## 2018-07-27 PROCEDURE — 36415 COLL VENOUS BLD VENIPUNCTURE: CPT

## 2018-07-27 PROCEDURE — 85025 COMPLETE CBC W/AUTO DIFF WBC: CPT

## 2018-07-27 PROCEDURE — 80053 COMPREHEN METABOLIC PANEL: CPT

## 2018-07-29 LAB — AFP-TM SERPL-MCNC: 5 NG/ML (ref 0–9)

## 2018-07-31 ENCOUNTER — HOSPITAL ENCOUNTER (OUTPATIENT)
Dept: LAB | Facility: MEDICAL CENTER | Age: 68
End: 2018-07-31
Attending: PHYSICIAN ASSISTANT
Payer: COMMERCIAL

## 2018-07-31 LAB
ALBUMIN SERPL BCP-MCNC: 3.9 G/DL (ref 3.2–4.9)
ALBUMIN/GLOB SERPL: 1.3 G/DL
ALP SERPL-CCNC: 73 U/L (ref 30–99)
ALT SERPL-CCNC: 15 U/L (ref 2–50)
ANION GAP SERPL CALC-SCNC: 8 MMOL/L (ref 0–11.9)
ANISOCYTOSIS BLD QL SMEAR: ABNORMAL
AST SERPL-CCNC: 20 U/L (ref 12–45)
BASOPHILS # BLD AUTO: 0.9 % (ref 0–1.8)
BASOPHILS # BLD: 0.03 K/UL (ref 0–0.12)
BILIRUB SERPL-MCNC: 0.4 MG/DL (ref 0.1–1.5)
BUN SERPL-MCNC: 18 MG/DL (ref 8–22)
CALCIUM SERPL-MCNC: 9.3 MG/DL (ref 8.5–10.5)
CHLORIDE SERPL-SCNC: 104 MMOL/L (ref 96–112)
CHOLEST SERPL-MCNC: 206 MG/DL (ref 100–199)
CO2 SERPL-SCNC: 27 MMOL/L (ref 20–33)
COMMENT 1642: NORMAL
CREAT SERPL-MCNC: 0.88 MG/DL (ref 0.5–1.4)
EOSINOPHIL # BLD AUTO: 0.19 K/UL (ref 0–0.51)
EOSINOPHIL NFR BLD: 5.9 % (ref 0–6.9)
ERYTHROCYTE [DISTWIDTH] IN BLOOD BY AUTOMATED COUNT: 49.1 FL (ref 35.9–50)
ERYTHROCYTE [SEDIMENTATION RATE] IN BLOOD BY WESTERGREN METHOD: 12 MM/HOUR (ref 0–20)
GLOBULIN SER CALC-MCNC: 3 G/DL (ref 1.9–3.5)
GLUCOSE SERPL-MCNC: 122 MG/DL (ref 65–99)
HCT VFR BLD AUTO: 35.5 % (ref 42–52)
HDLC SERPL-MCNC: 33 MG/DL
HGB BLD-MCNC: 10.3 G/DL (ref 14–18)
IMM GRANULOCYTES # BLD AUTO: 0.01 K/UL (ref 0–0.11)
IMM GRANULOCYTES NFR BLD AUTO: 0.3 % (ref 0–0.9)
LDLC SERPL CALC-MCNC: 152 MG/DL
LYMPHOCYTES # BLD AUTO: 0.79 K/UL (ref 1–4.8)
LYMPHOCYTES NFR BLD: 24.6 % (ref 22–41)
MCH RBC QN AUTO: 21.2 PG (ref 27–33)
MCHC RBC AUTO-ENTMCNC: 29 G/DL (ref 33.7–35.3)
MCV RBC AUTO: 73 FL (ref 81.4–97.8)
MICROCYTES BLD QL SMEAR: ABNORMAL
MONOCYTES # BLD AUTO: 0.49 K/UL (ref 0–0.85)
MONOCYTES NFR BLD AUTO: 15.3 % (ref 0–13.4)
MORPHOLOGY BLD-IMP: NORMAL
NEUTROPHILS # BLD AUTO: 1.7 K/UL (ref 1.82–7.42)
NEUTROPHILS NFR BLD: 53 % (ref 44–72)
NRBC # BLD AUTO: 0 K/UL
NRBC BLD-RTO: 0 /100 WBC
OVALOCYTES BLD QL SMEAR: NORMAL
PLATELET # BLD AUTO: 102 K/UL (ref 164–446)
PLATELET BLD QL SMEAR: NORMAL
PMV BLD AUTO: 10.3 FL (ref 9–12.9)
POIKILOCYTOSIS BLD QL SMEAR: NORMAL
POTASSIUM SERPL-SCNC: 4.3 MMOL/L (ref 3.6–5.5)
PROT SERPL-MCNC: 6.9 G/DL (ref 6–8.2)
RBC # BLD AUTO: 4.86 M/UL (ref 4.7–6.1)
RBC BLD AUTO: PRESENT
RHEUMATOID FACT SER IA-ACNC: <10 IU/ML (ref 0–14)
SODIUM SERPL-SCNC: 139 MMOL/L (ref 135–145)
TRIGL SERPL-MCNC: 107 MG/DL (ref 0–149)
WBC # BLD AUTO: 3.2 K/UL (ref 4.8–10.8)

## 2018-07-31 PROCEDURE — 36415 COLL VENOUS BLD VENIPUNCTURE: CPT

## 2018-07-31 PROCEDURE — 80061 LIPID PANEL: CPT

## 2018-07-31 PROCEDURE — 86038 ANTINUCLEAR ANTIBODIES: CPT

## 2018-07-31 PROCEDURE — 85025 COMPLETE CBC W/AUTO DIFF WBC: CPT

## 2018-07-31 PROCEDURE — 86431 RHEUMATOID FACTOR QUANT: CPT

## 2018-07-31 PROCEDURE — 86235 NUCLEAR ANTIGEN ANTIBODY: CPT | Mod: 91

## 2018-07-31 PROCEDURE — 86225 DNA ANTIBODY NATIVE: CPT

## 2018-07-31 PROCEDURE — 80053 COMPREHEN METABOLIC PANEL: CPT

## 2018-07-31 PROCEDURE — 85652 RBC SED RATE AUTOMATED: CPT

## 2018-07-31 PROCEDURE — 83036 HEMOGLOBIN GLYCOSYLATED A1C: CPT

## 2018-08-01 LAB
EST. AVERAGE GLUCOSE BLD GHB EST-MCNC: 140 MG/DL
HBA1C MFR BLD: 6.5 % (ref 0–5.6)

## 2018-08-02 LAB — NUCLEAR IGG SER QL IA: NORMAL

## 2018-08-13 ENCOUNTER — HOSPITAL ENCOUNTER (OUTPATIENT)
Dept: LAB | Facility: MEDICAL CENTER | Age: 68
End: 2018-08-13
Attending: INTERNAL MEDICINE
Payer: COMMERCIAL

## 2018-08-13 DIAGNOSIS — I25.10 CORONARY ARTERY DISEASE INVOLVING NATIVE CORONARY ARTERY OF NATIVE HEART WITHOUT ANGINA PECTORIS: ICD-10-CM

## 2018-08-13 DIAGNOSIS — R01.1 UNDIAGNOSED CARDIAC MURMURS: ICD-10-CM

## 2018-08-13 DIAGNOSIS — Z79.899 HIGH RISK MEDICATION USE: ICD-10-CM

## 2018-08-13 LAB
ANION GAP SERPL CALC-SCNC: 9 MMOL/L (ref 0–11.9)
BUN SERPL-MCNC: 15 MG/DL (ref 8–22)
CALCIUM SERPL-MCNC: 9.3 MG/DL (ref 8.5–10.5)
CHLORIDE SERPL-SCNC: 103 MMOL/L (ref 96–112)
CHOLEST SERPL-MCNC: 231 MG/DL (ref 100–199)
CO2 SERPL-SCNC: 25 MMOL/L (ref 20–33)
CREAT SERPL-MCNC: 0.86 MG/DL (ref 0.5–1.4)
GLUCOSE SERPL-MCNC: 113 MG/DL (ref 65–99)
HDLC SERPL-MCNC: 38 MG/DL
LDLC SERPL CALC-MCNC: 176 MG/DL
POTASSIUM SERPL-SCNC: 4.1 MMOL/L (ref 3.6–5.5)
SODIUM SERPL-SCNC: 137 MMOL/L (ref 135–145)
TRIGL SERPL-MCNC: 86 MG/DL (ref 0–149)

## 2018-08-13 PROCEDURE — 80061 LIPID PANEL: CPT

## 2018-08-13 PROCEDURE — 36415 COLL VENOUS BLD VENIPUNCTURE: CPT

## 2018-08-13 PROCEDURE — 80048 BASIC METABOLIC PNL TOTAL CA: CPT

## 2018-08-16 ENCOUNTER — OFFICE VISIT (OUTPATIENT)
Dept: CARDIOLOGY | Facility: MEDICAL CENTER | Age: 68
End: 2018-08-16
Payer: COMMERCIAL

## 2018-08-16 VITALS
OXYGEN SATURATION: 98 % | HEART RATE: 76 BPM | WEIGHT: 195 LBS | DIASTOLIC BLOOD PRESSURE: 74 MMHG | HEIGHT: 72 IN | SYSTOLIC BLOOD PRESSURE: 126 MMHG | BODY MASS INDEX: 26.41 KG/M2

## 2018-08-16 DIAGNOSIS — R01.1 UNDIAGNOSED CARDIAC MURMURS: ICD-10-CM

## 2018-08-16 DIAGNOSIS — R93.1 ELEVATED CORONARY ARTERY CALCIUM SCORE: ICD-10-CM

## 2018-08-16 DIAGNOSIS — I25.10 CORONARY ARTERY DISEASE INVOLVING NATIVE CORONARY ARTERY OF NATIVE HEART WITHOUT ANGINA PECTORIS: ICD-10-CM

## 2018-08-16 DIAGNOSIS — I10 ESSENTIAL HYPERTENSION, BENIGN: ICD-10-CM

## 2018-08-16 PROCEDURE — 99214 OFFICE O/P EST MOD 30 MIN: CPT | Performed by: INTERNAL MEDICINE

## 2018-08-16 ASSESSMENT — ENCOUNTER SYMPTOMS
CHILLS: 0
CARDIOVASCULAR NEGATIVE: 1
SORE THROAT: 0
DIZZINESS: 0
ORTHOPNEA: 0
HEMOPTYSIS: 0
PALPITATIONS: 0
MUSCULOSKELETAL NEGATIVE: 1
GASTROINTESTINAL NEGATIVE: 1
NEUROLOGICAL NEGATIVE: 1
BRUISES/BLEEDS EASILY: 0
WEAKNESS: 0
COUGH: 0
SHORTNESS OF BREATH: 0
RESPIRATORY NEGATIVE: 1
PND: 0
WHEEZING: 0
LOSS OF CONSCIOUSNESS: 0
CONSTITUTIONAL NEGATIVE: 1
CLAUDICATION: 0
STRIDOR: 0
EYES NEGATIVE: 1
FEVER: 0
SPUTUM PRODUCTION: 0

## 2018-08-16 NOTE — LETTER
[facility]   [address] [place] [address] [address]  [place] [address]  Phone: [phone]  Fax: [phone]              Nitesh Duron  1950    Encounter Date: 8/16/2018    Balaji Cano M.D.          PROGRESS NOTE:  Chief Complaint   Patient presents with   • HTN (Controlled)       Subjective:   Nitesh Duron is a 68 y.o. male who presents today as a follow-up for his elevated calcium score is high cholesterol height blood pressure with lower extremity edema.  His lower extremity edema stable.  His blood pressure stable.  He continues to refuse medications for cholesterol management.  He does have a high risk calcium score and has not been having any chest pain.    Past Medical History:   Diagnosis Date   • Arthritis     OA- hands and L knee   • Cataract     OU   • Dental disorder     upper   • Diabetes     oral meds   • Hyperlipidemia    • Hypertension      Past Surgical History:   Procedure Laterality Date   • ROTATOR CUFF REPAIR Left 2014   • ANKLE ORIF Right 1988    spiral fx   • MENISCECTOMY Left 1979   • APPENDECTOMY  1958   • CYSTOSCOPY  2004,2008    stone extraction     Family History   Problem Relation Age of Onset   • Other Mother    • Heart Attack Father    • Heart Failure Brother      Social History     Social History   • Marital status: Single     Spouse name: N/A   • Number of children: N/A   • Years of education: N/A     Occupational History   • Not on file.     Social History Main Topics   • Smoking status: Former Smoker     Types: Cigarettes     Quit date: 1/1/2008   • Smokeless tobacco: Never Used      Comment: quit 2008   • Alcohol use No   • Drug use: No   • Sexual activity: No     Other Topics Concern   • Not on file     Social History Narrative   • No narrative on file     Allergies   Allergen Reactions   • Ciprofloxacin Unspecified     convulsions   • Statins [Hmg-Coa-R Inhibitors] Unspecified     Stabbing pains in kidneys      • Acetaminophen      Kidney pain    • Ibuprofen      Kidney pain   • Naproxen      Kidney pain   • Shellfish Allergy      Kidney stones   • Soap &  [Alcohol] Rash     Antibacterial soap- contact dermatitis     Outpatient Encounter Prescriptions as of 8/16/2018   Medication Sig Dispense Refill   • Flaxseed, Linseed, (FLAX SEED OIL PO) Take  by mouth.     • metoprolol SR (TOPROL XL) 25 MG TABLET SR 24 HR TAKE ONE TABLET BY MOUTH ONCE DAILY 90 Tab 3   • lisinopril (PRINIVIL) 10 MG Tab      • oxycodone-aspirin (PERCODAN) 4.8355-325 MG Tab      • spironolactone (ALDACTONE) 25 MG Tab Take 1 Tab by mouth every day. 30 Tab 11   • metformin (GLUCOPHAGE) 1000 MG tablet Take 1,000 mg by mouth 2 times a day, with meals.     • Non Formulary Request Take  by mouth 2 Times a Day. Uric acid cleanse     • vitamin e (VITAMIN E) 400 UNIT Cap Take 400 Units by mouth every day.     • vitamin A 8000 UNIT capsule Take 8,000 Units by mouth 2 Times a Day.     • Cholecalciferol (VITAMIN D3) 3000 UNITS Tab Take  by mouth every day.     • Resveratrol 100 MG Cap Take  by mouth every day.     • KRILL OIL PO Take  by mouth every day.     • Omega-3 Fatty Acids (FISH OIL) 1000 MG Cap capsule Take 1,000 mg by mouth every day.     • ASTAXANTHIN PO Take  by mouth every day.     • SAW PALMETTO, SERENOA REPENS, PO Take  by mouth every day.     • Non Formulary Request every 7 days. Aloe Lax       No facility-administered encounter medications on file as of 8/16/2018.      Review of Systems   Constitutional: Negative.  Negative for chills, fever and malaise/fatigue.   HENT: Negative.  Negative for sore throat.    Eyes: Negative.    Respiratory: Negative.  Negative for cough, hemoptysis, sputum production, shortness of breath, wheezing and stridor.    Cardiovascular: Negative.  Negative for chest pain, palpitations, orthopnea, claudication, leg swelling and PND.   Gastrointestinal: Negative.    Genitourinary: Negative.    Musculoskeletal:  Negative.    Skin: Negative.    Neurological: Negative.  Negative for dizziness, loss of consciousness and weakness.   Endo/Heme/Allergies: Negative.  Does not bruise/bleed easily.   All other systems reviewed and are negative.       Objective:   /74   Pulse 76   Ht 1.829 m (6')   Wt 88.5 kg (195 lb)   SpO2 98%   BMI 26.45 kg/m²      Physical Exam   Constitutional: He appears well-developed and well-nourished. No distress.   HENT:   Head: Normocephalic and atraumatic.   Right Ear: External ear normal.   Left Ear: External ear normal.   Nose: Nose normal.   Mouth/Throat: No oropharyngeal exudate.   Eyes: Pupils are equal, round, and reactive to light. Conjunctivae and EOM are normal. Right eye exhibits no discharge. Left eye exhibits no discharge. No scleral icterus.   Neck: Neck supple. No JVD present.   Cardiovascular: Normal rate, regular rhythm and intact distal pulses.  Exam reveals no gallop and no friction rub.    No murmur heard.  Pulmonary/Chest: Effort normal. No stridor. No respiratory distress. He has no wheezes. He has no rales. He exhibits no tenderness.   Abdominal: Soft. He exhibits no distension. There is no guarding.   Musculoskeletal: Normal range of motion. He exhibits no edema, tenderness or deformity.   Neurological: He is alert. He has normal reflexes. He displays normal reflexes. No cranial nerve deficit. He exhibits normal muscle tone. Coordination normal.   Skin: Skin is warm and dry. No rash noted. He is not diaphoretic. No erythema. No pallor.   Psychiatric: He has a normal mood and affect. His behavior is normal. Judgment and thought content normal.   Nursing note and vitals reviewed.      Assessment:     1. Undiagnosed cardiac murmurs     2. Essential hypertension, benign     3. Elevated coronary artery calcium score     4. Coronary artery disease involving native coronary artery of native heart without angina pectoris         Medical Decision Making:  Today's Assessment /  Status / Plan:   67 y/o M with abnormal ECG, benign variant and LE edema. Again, he declines lipid lowering medications.  We will continue his current medications and see him back in 6 months.     1. Abnormal ECG, occ PVCS  - normal variant no workup  - cont metop to 25 daily     2. Murmur with PCP, not heard on exam  - normal echo     3. LE edema  - outpatient echo  - cont francisco 25     4. Strong family history, Ca score 438 81% for age  - declines meds and referrals  - start red rice yeast    Thank for you allowing me to take part in your patient's care, please call should you have any questions or would like to discuss this patient.      RUPINDER Alvarez.  [address] [facility] [address]  VIA Facsimile: [phone]

## 2018-08-16 NOTE — PROGRESS NOTES
Chief Complaint   Patient presents with   • HTN (Controlled)       Subjective:   Nitesh Duron is a 68 y.o. male who presents today as a follow-up for his elevated calcium score is high cholesterol height blood pressure with lower extremity edema.  His lower extremity edema stable.  His blood pressure stable.  He continues to refuse medications for cholesterol management.  He does have a high risk calcium score and has not been having any chest pain.    Past Medical History:   Diagnosis Date   • Arthritis     OA- hands and L knee   • Cataract     OU   • Dental disorder     upper   • Diabetes     oral meds   • Hyperlipidemia    • Hypertension      Past Surgical History:   Procedure Laterality Date   • ROTATOR CUFF REPAIR Left 2014   • ANKLE ORIF Right 1988    spiral fx   • MENISCECTOMY Left 1979   • APPENDECTOMY  1958   • CYSTOSCOPY  2004,2008    stone extraction     Family History   Problem Relation Age of Onset   • Other Mother    • Heart Attack Father    • Heart Failure Brother      Social History     Social History   • Marital status: Single     Spouse name: N/A   • Number of children: N/A   • Years of education: N/A     Occupational History   • Not on file.     Social History Main Topics   • Smoking status: Former Smoker     Types: Cigarettes     Quit date: 1/1/2008   • Smokeless tobacco: Never Used      Comment: quit 2008   • Alcohol use No   • Drug use: No   • Sexual activity: No     Other Topics Concern   • Not on file     Social History Narrative   • No narrative on file     Allergies   Allergen Reactions   • Ciprofloxacin Unspecified     convulsions   • Statins [Hmg-Coa-R Inhibitors] Unspecified     Stabbing pains in kidneys   • Acetaminophen      Kidney pain    • Ibuprofen      Kidney pain   • Naproxen      Kidney pain   • Shellfish Allergy      Kidney stones   • Soap &  [Alcohol] Rash     Antibacterial soap- contact dermatitis     Outpatient Encounter Prescriptions as of 8/16/2018    Medication Sig Dispense Refill   • Flaxseed, Linseed, (FLAX SEED OIL PO) Take  by mouth.     • metoprolol SR (TOPROL XL) 25 MG TABLET SR 24 HR TAKE ONE TABLET BY MOUTH ONCE DAILY 90 Tab 3   • lisinopril (PRINIVIL) 10 MG Tab      • oxycodone-aspirin (PERCODAN) 4.8355-325 MG Tab      • spironolactone (ALDACTONE) 25 MG Tab Take 1 Tab by mouth every day. 30 Tab 11   • metformin (GLUCOPHAGE) 1000 MG tablet Take 1,000 mg by mouth 2 times a day, with meals.     • Non Formulary Request Take  by mouth 2 Times a Day. Uric acid cleanse     • vitamin e (VITAMIN E) 400 UNIT Cap Take 400 Units by mouth every day.     • vitamin A 8000 UNIT capsule Take 8,000 Units by mouth 2 Times a Day.     • Cholecalciferol (VITAMIN D3) 3000 UNITS Tab Take  by mouth every day.     • Resveratrol 100 MG Cap Take  by mouth every day.     • KRILL OIL PO Take  by mouth every day.     • Omega-3 Fatty Acids (FISH OIL) 1000 MG Cap capsule Take 1,000 mg by mouth every day.     • ASTAXANTHIN PO Take  by mouth every day.     • SAW PALMETTO, SERENOA REPENS, PO Take  by mouth every day.     • Non Formulary Request every 7 days. Komal Garza       No facility-administered encounter medications on file as of 8/16/2018.      Review of Systems   Constitutional: Negative.  Negative for chills, fever and malaise/fatigue.   HENT: Negative.  Negative for sore throat.    Eyes: Negative.    Respiratory: Negative.  Negative for cough, hemoptysis, sputum production, shortness of breath, wheezing and stridor.    Cardiovascular: Negative.  Negative for chest pain, palpitations, orthopnea, claudication, leg swelling and PND.   Gastrointestinal: Negative.    Genitourinary: Negative.    Musculoskeletal: Negative.    Skin: Negative.    Neurological: Negative.  Negative for dizziness, loss of consciousness and weakness.   Endo/Heme/Allergies: Negative.  Does not bruise/bleed easily.   All other systems reviewed and are negative.       Objective:   /74   Pulse 76   Ht  1.829 m (6')   Wt 88.5 kg (195 lb)   SpO2 98%   BMI 26.45 kg/m²     Physical Exam   Constitutional: He appears well-developed and well-nourished. No distress.   HENT:   Head: Normocephalic and atraumatic.   Right Ear: External ear normal.   Left Ear: External ear normal.   Nose: Nose normal.   Mouth/Throat: No oropharyngeal exudate.   Eyes: Pupils are equal, round, and reactive to light. Conjunctivae and EOM are normal. Right eye exhibits no discharge. Left eye exhibits no discharge. No scleral icterus.   Neck: Neck supple. No JVD present.   Cardiovascular: Normal rate, regular rhythm and intact distal pulses.  Exam reveals no gallop and no friction rub.    No murmur heard.  Pulmonary/Chest: Effort normal. No stridor. No respiratory distress. He has no wheezes. He has no rales. He exhibits no tenderness.   Abdominal: Soft. He exhibits no distension. There is no guarding.   Musculoskeletal: Normal range of motion. He exhibits no edema, tenderness or deformity.   Neurological: He is alert. He has normal reflexes. He displays normal reflexes. No cranial nerve deficit. He exhibits normal muscle tone. Coordination normal.   Skin: Skin is warm and dry. No rash noted. He is not diaphoretic. No erythema. No pallor.   Psychiatric: He has a normal mood and affect. His behavior is normal. Judgment and thought content normal.   Nursing note and vitals reviewed.      Assessment:     1. Undiagnosed cardiac murmurs     2. Essential hypertension, benign     3. Elevated coronary artery calcium score     4. Coronary artery disease involving native coronary artery of native heart without angina pectoris         Medical Decision Making:  Today's Assessment / Status / Plan:   67 y/o M with abnormal ECG, benign variant and LE edema. Again, he declines lipid lowering medications.  We will continue his current medications and see him back in 6 months.     1. Abnormal ECG, occ PVCS  - normal variant no workup  - cont metop to 25  daily     2. Murmur with PCP, not heard on exam  - normal echo     3. LE edema  - outpatient echo  - cont francisco 25     4. Strong family history, Ca score 438 81% for age  - declines meds and referrals  - start red rice yeast    Thank for you allowing me to take part in your patient's care, please call should you have any questions or would like to discuss this patient.

## 2018-09-07 ENCOUNTER — NON-PROVIDER VISIT (OUTPATIENT)
Dept: WOUND CARE | Facility: MEDICAL CENTER | Age: 68
End: 2018-09-07
Attending: PHYSICIAN ASSISTANT
Payer: COMMERCIAL

## 2018-09-07 ENCOUNTER — HOSPITAL ENCOUNTER (OUTPATIENT)
Facility: MEDICAL CENTER | Age: 68
End: 2018-09-07
Attending: NURSE PRACTITIONER
Payer: COMMERCIAL

## 2018-09-07 DIAGNOSIS — I87.8 VENOUS STASIS: ICD-10-CM

## 2018-09-07 DIAGNOSIS — Z01.810 PRE-OPERATIVE CARDIOVASCULAR EXAMINATION: ICD-10-CM

## 2018-09-07 DIAGNOSIS — Z01.812 PRE-OPERATIVE LABORATORY EXAMINATION: ICD-10-CM

## 2018-09-07 DIAGNOSIS — L03.116 CELLULITIS OF LEFT LOWER EXTREMITY: Primary | ICD-10-CM

## 2018-09-07 DIAGNOSIS — L08.9 WOUND INFECTION: Primary | ICD-10-CM

## 2018-09-07 DIAGNOSIS — T14.8XXA WOUND INFECTION: ICD-10-CM

## 2018-09-07 DIAGNOSIS — L08.9 WOUND INFECTION: ICD-10-CM

## 2018-09-07 DIAGNOSIS — T14.8XXA WOUND INFECTION: Primary | ICD-10-CM

## 2018-09-07 PROBLEM — L03.90 CELLULITIS: Status: ACTIVE | Noted: 2018-09-07

## 2018-09-07 LAB
ANION GAP SERPL CALC-SCNC: 8 MMOL/L (ref 0–11.9)
BUN SERPL-MCNC: 19 MG/DL (ref 8–22)
CALCIUM SERPL-MCNC: 9.2 MG/DL (ref 8.5–10.5)
CHLORIDE SERPL-SCNC: 104 MMOL/L (ref 96–112)
CO2 SERPL-SCNC: 23 MMOL/L (ref 20–33)
CREAT SERPL-MCNC: 1 MG/DL (ref 0.5–1.4)
ERYTHROCYTE [DISTWIDTH] IN BLOOD BY AUTOMATED COUNT: 47.8 FL (ref 35.9–50)
GLUCOSE SERPL-MCNC: 162 MG/DL (ref 65–99)
HCT VFR BLD AUTO: 35.7 % (ref 42–52)
HGB BLD-MCNC: 10.4 G/DL (ref 14–18)
MCH RBC QN AUTO: 21.3 PG (ref 27–33)
MCHC RBC AUTO-ENTMCNC: 29.1 G/DL (ref 33.7–35.3)
MCV RBC AUTO: 73.2 FL (ref 81.4–97.8)
PLATELET # BLD AUTO: 135 K/UL (ref 164–446)
PMV BLD AUTO: 9.9 FL (ref 9–12.9)
POTASSIUM SERPL-SCNC: 4.4 MMOL/L (ref 3.6–5.5)
RBC # BLD AUTO: 4.88 M/UL (ref 4.7–6.1)
SODIUM SERPL-SCNC: 135 MMOL/L (ref 135–145)
WBC # BLD AUTO: 3.9 K/UL (ref 4.8–10.8)

## 2018-09-07 PROCEDURE — 97602 WOUND(S) CARE NON-SELECTIVE: CPT

## 2018-09-07 PROCEDURE — 93005 ELECTROCARDIOGRAM TRACING: CPT

## 2018-09-07 PROCEDURE — 87070 CULTURE OTHR SPECIMN AEROBIC: CPT

## 2018-09-07 PROCEDURE — 36415 COLL VENOUS BLD VENIPUNCTURE: CPT

## 2018-09-07 PROCEDURE — 87205 SMEAR GRAM STAIN: CPT

## 2018-09-07 PROCEDURE — 85027 COMPLETE CBC AUTOMATED: CPT

## 2018-09-07 PROCEDURE — 80048 BASIC METABOLIC PNL TOTAL CA: CPT

## 2018-09-07 PROCEDURE — 93010 ELECTROCARDIOGRAM REPORT: CPT | Performed by: INTERNAL MEDICINE

## 2018-09-07 RX ORDER — AMOXICILLIN AND CLAVULANATE POTASSIUM 875; 125 MG/1; MG/1
1 TABLET, FILM COATED ORAL 2 TIMES DAILY
Qty: 20 TAB | Refills: 0 | Status: SHIPPED | OUTPATIENT
Start: 2018-09-07 | End: 2018-09-17
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
## 2018-09-08 LAB
EKG IMPRESSION: NORMAL
GRAM STN SPEC: NORMAL
SIGNIFICANT IND 70042: NORMAL
SITE SITE: NORMAL
SOURCE SOURCE: NORMAL
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
## 2018-09-10 ENCOUNTER — NON-PROVIDER VISIT (OUTPATIENT)
Dept: WOUND CARE | Facility: MEDICAL CENTER | Age: 68
End: 2018-09-10
Attending: PHYSICIAN ASSISTANT
Payer: COMMERCIAL

## 2018-09-10 LAB
BACTERIA WND AEROBE CULT: NORMAL
GRAM STN SPEC: NORMAL
SIGNIFICANT IND 70042: NORMAL
SITE SITE: NORMAL
SOURCE SOURCE: NORMAL

## 2018-09-10 PROCEDURE — 97602 WOUND(S) CARE NON-SELECTIVE: CPT
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
## 2018-09-12 ENCOUNTER — HOSPITAL ENCOUNTER (OUTPATIENT)
Dept: LAB | Facility: MEDICAL CENTER | Age: 68
End: 2018-09-12
Attending: INTERNAL MEDICINE
Payer: COMMERCIAL

## 2018-09-12 LAB
ALBUMIN SERPL BCP-MCNC: 3.9 G/DL (ref 3.2–4.9)
ALBUMIN/GLOB SERPL: 1.1 G/DL
ALP SERPL-CCNC: 82 U/L (ref 30–99)
ALT SERPL-CCNC: 16 U/L (ref 2–50)
ANION GAP SERPL CALC-SCNC: 10 MMOL/L (ref 0–11.9)
ANISOCYTOSIS BLD QL SMEAR: ABNORMAL
AST SERPL-CCNC: 21 U/L (ref 12–45)
BASOPHILS # BLD AUTO: 1.8 % (ref 0–1.8)
BASOPHILS # BLD: 0.07 K/UL (ref 0–0.12)
BILIRUB SERPL-MCNC: 0.4 MG/DL (ref 0.1–1.5)
BUN SERPL-MCNC: 18 MG/DL (ref 8–22)
CALCIUM SERPL-MCNC: 9.1 MG/DL (ref 8.5–10.5)
CHLORIDE SERPL-SCNC: 102 MMOL/L (ref 96–112)
CO2 SERPL-SCNC: 25 MMOL/L (ref 20–33)
CREAT SERPL-MCNC: 0.88 MG/DL (ref 0.5–1.4)
EOSINOPHIL # BLD AUTO: 0.03 K/UL (ref 0–0.51)
EOSINOPHIL NFR BLD: 0.9 % (ref 0–6.9)
ERYTHROCYTE [DISTWIDTH] IN BLOOD BY AUTOMATED COUNT: 46.4 FL (ref 35.9–50)
FERRITIN SERPL-MCNC: 8.6 NG/ML (ref 22–322)
GLOBULIN SER CALC-MCNC: 3.6 G/DL (ref 1.9–3.5)
GLUCOSE SERPL-MCNC: 134 MG/DL (ref 65–99)
HCT VFR BLD AUTO: 38.3 % (ref 42–52)
HGB BLD-MCNC: 11.1 G/DL (ref 14–18)
HGB RETIC QN AUTO: 22.4 PG/CELL (ref 29–35)
IMM RETICS NFR: 27.6 % (ref 9.3–17.4)
IRON SATN MFR SERPL: 8 % (ref 15–55)
IRON SERPL-MCNC: 42 UG/DL (ref 50–180)
LYMPHOCYTES # BLD AUTO: 0.92 K/UL (ref 1–4.8)
LYMPHOCYTES NFR BLD: 24.3 % (ref 22–41)
MANUAL DIFF BLD: NORMAL
MCH RBC QN AUTO: 20.9 PG (ref 27–33)
MCHC RBC AUTO-ENTMCNC: 29 G/DL (ref 33.7–35.3)
MCV RBC AUTO: 72 FL (ref 81.4–97.8)
MICROCYTES BLD QL SMEAR: ABNORMAL
MONOCYTES # BLD AUTO: 0.17 K/UL (ref 0–0.85)
MONOCYTES NFR BLD AUTO: 4.5 % (ref 0–13.4)
MORPHOLOGY BLD-IMP: NORMAL
NEUTROPHILS # BLD AUTO: 2.6 K/UL (ref 1.82–7.42)
NEUTROPHILS NFR BLD: 67.6 % (ref 44–72)
NEUTS BAND NFR BLD MANUAL: 0.9 % (ref 0–10)
NRBC # BLD AUTO: 0 K/UL
NRBC BLD-RTO: 0 /100 WBC
OVALOCYTES BLD QL SMEAR: NORMAL
PLATELET # BLD AUTO: 145 K/UL (ref 164–446)
PLATELET BLD QL SMEAR: NORMAL
PMV BLD AUTO: 9.7 FL (ref 9–12.9)
POIKILOCYTOSIS BLD QL SMEAR: NORMAL
POTASSIUM SERPL-SCNC: 4.2 MMOL/L (ref 3.6–5.5)
PROT SERPL-MCNC: 7.5 G/DL (ref 6–8.2)
RBC # BLD AUTO: 5.32 M/UL (ref 4.7–6.1)
RBC BLD AUTO: PRESENT
RETICS # AUTO: 0.07 M/UL (ref 0.04–0.06)
RETICS/RBC NFR: 1.3 % (ref 0.8–2.1)
SODIUM SERPL-SCNC: 137 MMOL/L (ref 135–145)
TIBC SERPL-MCNC: 542 UG/DL (ref 250–450)
VIT B12 SERPL-MCNC: 268 PG/ML (ref 211–911)
WBC # BLD AUTO: 3.8 K/UL (ref 4.8–10.8)

## 2018-09-12 PROCEDURE — 85046 RETICYTE/HGB CONCENTRATE: CPT

## 2018-09-12 PROCEDURE — 83883 ASSAY NEPHELOMETRY NOT SPEC: CPT

## 2018-09-12 PROCEDURE — 82607 VITAMIN B-12: CPT

## 2018-09-12 PROCEDURE — 82784 ASSAY IGA/IGD/IGG/IGM EACH: CPT

## 2018-09-12 PROCEDURE — 83010 ASSAY OF HAPTOGLOBIN QUANT: CPT

## 2018-09-12 PROCEDURE — 84165 PROTEIN E-PHORESIS SERUM: CPT

## 2018-09-12 PROCEDURE — 80053 COMPREHEN METABOLIC PANEL: CPT

## 2018-09-12 PROCEDURE — 84160 ASSAY OF PROTEIN ANY SOURCE: CPT

## 2018-09-12 PROCEDURE — 83550 IRON BINDING TEST: CPT

## 2018-09-12 PROCEDURE — 85027 COMPLETE CBC AUTOMATED: CPT

## 2018-09-12 PROCEDURE — 83540 ASSAY OF IRON: CPT

## 2018-09-12 PROCEDURE — 85007 BL SMEAR W/DIFF WBC COUNT: CPT

## 2018-09-12 PROCEDURE — 82728 ASSAY OF FERRITIN: CPT

## 2018-09-12 PROCEDURE — 36415 COLL VENOUS BLD VENIPUNCTURE: CPT

## 2018-09-13 ENCOUNTER — NON-PROVIDER VISIT (OUTPATIENT)
Dept: WOUND CARE | Facility: MEDICAL CENTER | Age: 68
End: 2018-09-13
Attending: PHYSICIAN ASSISTANT
Payer: COMMERCIAL

## 2018-09-13 PROCEDURE — 99211 OFF/OP EST MAY X REQ PHY/QHP: CPT
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
## 2018-09-14 LAB
HAPTOGLOB SERPL-MCNC: 109 MG/DL (ref 30–200)
IGA SERPL-MCNC: 317 MG/DL (ref 68–408)
IGG SERPL-MCNC: 1350 MG/DL (ref 768–1632)
IGM SERPL-MCNC: 45 MG/DL (ref 35–263)

## 2018-09-15 LAB
ALBUMIN SERPL-MCNC: 4.1 G/DL (ref 3.75–5.01)
ALPHA1 GLOB SERPL ELPH-MCNC: 0.32 G/DL (ref 0.19–0.46)
ALPHA2 GLOB SERPL ELPH-MCNC: 0.89 G/DL (ref 0.48–1.05)
B-GLOBULIN SERPL ELPH-MCNC: 0.93 G/DL (ref 0.48–1.1)
GAMMA GLOB SERPL ELPH-MCNC: 1.36 G/DL (ref 0.62–1.51)
INTERPRETATION SERPL IFE-IMP: NORMAL
KAPPA LC FREE SER-MCNC: 3.98 MG/DL (ref 0.33–1.94)
KAPPA LC FREE/LAMBDA FREE SER NEPH: 1.72 {RATIO} (ref 0.26–1.65)
LAMBDA LC FREE SERPL-MCNC: 2.31 MG/DL (ref 0.57–2.63)
MONOCLON BAND OBS SERPL: NORMAL
PATHOLOGY STUDY: NORMAL
PROT SERPL-MCNC: 7.6 G/DL (ref 6–8.3)

## 2018-09-17 ENCOUNTER — NON-PROVIDER VISIT (OUTPATIENT)
Dept: WOUND CARE | Facility: MEDICAL CENTER | Age: 68
End: 2018-09-17
Attending: PHYSICIAN ASSISTANT
Payer: COMMERCIAL

## 2018-09-17 PROCEDURE — 29580 STRAPPING UNNA BOOT: CPT
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
## 2018-09-18 ENCOUNTER — HOSPITAL ENCOUNTER (OUTPATIENT)
Facility: MEDICAL CENTER | Age: 68
End: 2018-09-18
Attending: INTERNAL MEDICINE | Admitting: INTERNAL MEDICINE
Payer: COMMERCIAL

## 2018-09-18 VITALS
OXYGEN SATURATION: 94 % | DIASTOLIC BLOOD PRESSURE: 71 MMHG | TEMPERATURE: 98.4 F | RESPIRATION RATE: 16 BRPM | BODY MASS INDEX: 25.35 KG/M2 | SYSTOLIC BLOOD PRESSURE: 118 MMHG | WEIGHT: 187.17 LBS | HEIGHT: 72 IN | HEART RATE: 69 BPM

## 2018-09-18 LAB — GLUCOSE BLD-MCNC: 120 MG/DL (ref 65–99)

## 2018-09-18 PROCEDURE — 160035 HCHG PACU - 1ST 60 MINS PHASE I: Performed by: INTERNAL MEDICINE

## 2018-09-18 PROCEDURE — 500066 HCHG BITE BLOCK, ECT: Performed by: INTERNAL MEDICINE

## 2018-09-18 PROCEDURE — 82962 GLUCOSE BLOOD TEST: CPT

## 2018-09-18 PROCEDURE — 160048 HCHG OR STATISTICAL LEVEL 1-5: Performed by: INTERNAL MEDICINE

## 2018-09-18 PROCEDURE — 160002 HCHG RECOVERY MINUTES (STAT): Performed by: INTERNAL MEDICINE

## 2018-09-18 PROCEDURE — A9270 NON-COVERED ITEM OR SERVICE: HCPCS

## 2018-09-18 PROCEDURE — 700111 HCHG RX REV CODE 636 W/ 250 OVERRIDE (IP)

## 2018-09-18 PROCEDURE — 160009 HCHG ANES TIME/MIN: Performed by: INTERNAL MEDICINE

## 2018-09-18 PROCEDURE — 700101 HCHG RX REV CODE 250

## 2018-09-18 PROCEDURE — 502240 HCHG MISC OR SUPPLY RC 0272: Performed by: INTERNAL MEDICINE

## 2018-09-18 PROCEDURE — 700102 HCHG RX REV CODE 250 W/ 637 OVERRIDE(OP)

## 2018-09-18 PROCEDURE — 160036 HCHG PACU - EA ADDL 30 MINS PHASE I: Performed by: INTERNAL MEDICINE

## 2018-09-18 PROCEDURE — 160203 HCHG ENDO MINUTES - 1ST 30 MINS LEVEL 4: Performed by: INTERNAL MEDICINE

## 2018-09-18 RX ORDER — SODIUM CHLORIDE 9 MG/ML
INJECTION, SOLUTION INTRAVENOUS CONTINUOUS
Status: DISCONTINUED | OUTPATIENT
Start: 2018-09-18 | End: 2018-09-18 | Stop reason: HOSPADM

## 2018-09-18 RX ORDER — OXYCODONE HCL 5 MG/5 ML
SOLUTION, ORAL ORAL
Status: COMPLETED
Start: 2018-09-18 | End: 2018-09-18

## 2018-09-18 RX ADMIN — OXYCODONE HYDROCHLORIDE 5 MG: 5 SOLUTION ORAL at 15:15

## 2018-09-18 RX ADMIN — SODIUM CHLORIDE: 9 INJECTION, SOLUTION INTRAVENOUS at 12:00

## 2018-09-18 RX ADMIN — OXYCODONE HYDROCHLORIDE 5 MG: 5 SOLUTION ORAL at 14:30

## 2018-09-18 RX ADMIN — FENTANYL CITRATE 25 MCG: 50 INJECTION, SOLUTION INTRAMUSCULAR; INTRAVENOUS at 15:10

## 2018-09-18 ASSESSMENT — PAIN SCALES - GENERAL
PAINLEVEL_OUTOF10: 3
PAINLEVEL_OUTOF10: 2
PAINLEVEL_OUTOF10: 4
PAINLEVEL_OUTOF10: 8
PAINLEVEL_OUTOF10: 0

## 2018-09-18 NOTE — OR SURGEON
Immediate Post OP Note    PreOp Diagnosis:  ESLD  Esophageall varices    PostOp Diagnosis:   Banding times 5 of esophageal varices  Mild portal HTN    Procedure(s):  GASTROSCOPY - Wound Class: Clean Contaminated  Endoscopic variceal band ligation  Surgeon(s):  Matt Young M.D.    Anesthesiologist/Type of Anesthesia:  Anesthesiologist: Miah Guo M.D./General    Surgical Staff:  Circulator: Shaq Osorio R.N.  Endoscopy Technician: Mago Navarro    Specimens removed if any:  None immediate    Estimated Blood Loss:None    Findings:    Banding times 5 of esophageal varices  Mild portal HTN    Complications:   None immediate    Rec:  Clears today  Advance to soft food in AM        9/18/2018 1:58 PM Matt Young M.D.

## 2018-09-18 NOTE — OR NURSING
REPORT FROM АНДРЕЙ FOSS.  PATIENT C/O PAIN 8/10; CONTINUE TO MEDICATE PER ORDER.    1535  PAIN IMPROVING - 4/10.  FRIEND IN LOBBY; ADVISED PATIENT WOULD BE READY IN THIRTY MINUTES.  1545 SLEEPING  1610  UP AND DRESSED.  AMBULATES TO BATHROOM AND VOID WITHOUT PROBLEM.  FRIEND BROUGHT TO BEDSIDE.  DISCHARGE INSTRUCTIONS TO PATIENT AND FRIEND.  PATIENT FEELS READY FOR DISCHARGE.  ALL QUESTIONS ANSWERED.
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
## 2018-09-18 NOTE — DISCHARGE INSTRUCTIONS
ACTIVITY: Rest and take it easy for the first 24 hours.  A responsible adult is recommended to remain with you during that time.  It is normal to feel sleepy.  We encourage you to not do anything that requires balance, judgment or coordination.    MILD FLU-LIKE SYMPTOMS ARE NORMAL. YOU MAY EXPERIENCE GENERALIZED MUSCLE ACHES, THROAT IRRITATION, HEADACHE AND/OR SOME NAUSEA.    FOR 24 HOURS DO NOT:  Drive, operate machinery or run household appliances.  Drink beer or alcoholic beverages.   Make important decisions or sign legal documents.    SPECIAL INSTRUCTIONS: *Clear liquids today & Advance to pureed and soft diet for next 48 to 72 days   You will need repeat EGD with variceal ligation in 6 months**    DIET: To avoid nausea, slowly advance diet as tolerated, avoiding spicy or greasy foods for the first day.  Add more substantial food to your diet according to your physician's instructions.  Babies can be fed formula or breast milk as soon as they are hungry.  INCREASE FLUIDS AND FIBER TO AVOID CONSTIPATION.    SURGICAL DRESSING/BATHING: *may shower**    FOLLOW-UP APPOINTMENT:  A follow-up appointment should be arranged with your doctor in *call for f/u 6 months*; call to schedule.    You should CALL YOUR PHYSICIAN if you develop:  Fever greater than 101 degrees F.  Pain not relieved by medication, or persistent nausea or vomiting.  Excessive bleeding (blood soaking through dressing) or unexpected drainage from the wound.  Extreme redness or swelling around the incision site, drainage of pus or foul smelling drainage.  Inability to urinate or empty your bladder within 8 hours.  Problems with breathing or chest pain.    You should call 911 if you develop problems with breathing or chest pain.  If you are unable to contact your doctor or surgical center, you should go to the nearest emergency room or urgent care center.  Physician's telephone #: *  Dr Momin [phone]**    If any questions arise, call your doctor.   If your doctor is not available, please feel free to call the Surgical Center at [phone].  The Center is open Monday through Friday from 7AM to 7PM.  You can also call the HEALTH HOTLINE open 24 hours/day, 7 days/week and speak to a nurse at [phone], or toll free at [phone].    A registered nurse may call you a few days after your surgery to see how you are doing after your procedure.    MEDICATIONS: Resume taking daily medication.  Take prescribed pain medication with food.  If no medication is prescribed, you may take non-aspirin pain medication if needed.  PAIN MEDICATION CAN BE VERY CONSTIPATING.  Take a stool softener or laxative such as senokot, pericolace, or milk of magnesia if needed.    Prescription given for *none**.  Last pain medication given at *2:30 P.M.**.    If your physician has prescribed pain medication that includes Acetaminophen (Tylenol), do not take additional Acetaminophen (Tylenol) while taking the prescribed medication.    Depression / Suicide Risk    As you are discharged from this [facility] facility, it is important to learn how to keep safe from harming yourself.    Recognize the warning signs:  · Abrupt changes in personality, positive or negative- including increase in energy   · Giving away possessions  · Change in eating patterns- significant weight changes-  positive or negative  · Change in sleeping patterns- unable to sleep or sleeping all the time   · Unwillingness or inability to communicate  · Depression  · Unusual sadness, discouragement and loneliness  · Talk of wanting to die  · Neglect of personal appearance   · Rebelliousness- reckless behavior  · Withdrawal from people/activities they love  · Confusion- inability to concentrate     If you or a loved one observes any of these behaviors or has concerns about self-harm, here's what you can do:  · Talk about it- your feelings and reasons for harming yourself  · Remove any means that you might use to  hurt yourself (examples: pills, rope, extension cords, firearm)  · Get professional help from the community (Mental Health, Substance Abuse, psychological counseling)  · Do not be alone:Call your Safe Contact- someone whom you trust who will be there for you.  · Call your local CRISIS HOTLINE 658-8908 or 055-787-0000  · Call your local Children's Mobile Crisis Response Team [place] (191) 407-5482 or www.[employer]  · Call the toll free National Suicide Prevention Hotlines   · National Suicide Prevention Lifeline 454-420-MQVW (9943)  · National Hope Line Network 800-SUICIDE (108-5925)

## 2018-09-20 ENCOUNTER — HOSPITAL ENCOUNTER (OUTPATIENT)
Dept: RADIOLOGY | Facility: MEDICAL CENTER | Age: 68
End: 2018-09-20
Attending: NURSE PRACTITIONER
Payer: COMMERCIAL

## 2018-09-20 DIAGNOSIS — I87.8 VENOUS STASIS: ICD-10-CM

## 2018-09-20 PROCEDURE — 93971 EXTREMITY STUDY: CPT | Mod: RT

## 2018-09-21 ENCOUNTER — NON-PROVIDER VISIT (OUTPATIENT)
Dept: WOUND CARE | Facility: MEDICAL CENTER | Age: 68
End: 2018-09-21
Attending: PHYSICIAN ASSISTANT
Payer: COMMERCIAL

## 2018-09-21 ENCOUNTER — HOSPITAL ENCOUNTER (OUTPATIENT)
Dept: RADIOLOGY | Facility: MEDICAL CENTER | Age: 68
End: 2018-09-21
Attending: NURSE PRACTITIONER
Payer: COMMERCIAL

## 2018-09-21 PROCEDURE — 97602 WOUND(S) CARE NON-SELECTIVE: CPT

## 2018-09-21 PROCEDURE — 93971 EXTREMITY STUDY: CPT | Mod: 26 | Performed by: SURGERY

## 2018-09-21 NOTE — PATIENT INSTRUCTIONS
Reviewed POC, compression therapy, importance of keeping the secondary dressing dry and intact, nutrition for wound healing, s/s of complications/infection, when to notify MD/go to ER.  Pt verbalized understanding to all.

## 2018-09-25 ENCOUNTER — NON-PROVIDER VISIT (OUTPATIENT)
Dept: WOUND CARE | Facility: MEDICAL CENTER | Age: 68
End: 2018-09-25
Attending: PHYSICIAN ASSISTANT
Payer: COMMERCIAL

## 2018-09-25 PROCEDURE — 29580 STRAPPING UNNA BOOT: CPT

## 2018-09-25 NOTE — PROCEDURES
Wound washed with NS and gauze, leg washed with warm wash cloth and no-rinse foam cleanser. Unna boot applied with modification to include kerlix gauze wrap in-between visco paste wrap and coban to help with absorbing drainage. Previous dressing was retaining moisture against the skin and causing irritation.

## 2018-09-27 ENCOUNTER — NON-PROVIDER VISIT (OUTPATIENT)
Dept: WOUND CARE | Facility: MEDICAL CENTER | Age: 68
End: 2018-09-27
Attending: PHYSICIAN ASSISTANT
Payer: COMMERCIAL

## 2018-09-27 PROCEDURE — 97602 WOUND(S) CARE NON-SELECTIVE: CPT

## 2018-09-27 NOTE — PROCEDURES
Nonselective debridement with soapy washcloth then NS and gauze to remove dried flaky skin from LLE and periwound

## 2018-09-27 NOTE — PATIENT INSTRUCTIONS
-Keep your wound dressing clean, dry, and intact.    -Change your dressing if it becomes soiled, soaked, or falls off.    -Monitor for signs and symptoms of infection (increased redness, pain, swelling, drainage, fever, chills, fatigue, malaise, nausea/vomiting). If you have signs and symptoms of infection, call the office or go to Urgent Care/ER.    -Remove your compression wrap if you have severe pain, severe swelling, numbness, color change, or temperature change in your toes. If you need to remove your compression wrap, do so by unrolling it. Do not cut the compression wrap off to prevent cutting yourself on accident.

## 2018-10-02 ENCOUNTER — NON-PROVIDER VISIT (OUTPATIENT)
Dept: WOUND CARE | Facility: MEDICAL CENTER | Age: 68
End: 2018-10-02
Attending: PHYSICIAN ASSISTANT
Payer: COMMERCIAL

## 2018-10-02 PROCEDURE — 97602 WOUND(S) CARE NON-SELECTIVE: CPT

## 2018-10-02 NOTE — PROCEDURES
Non selective debridement with soapy washcloth to remove non viable tissue from wound bed and riddhi wound    Modified Unna's boot applied with sof roll between layers. ABD pads x2 over lateral foot and bilateral ankles as padding per pt request.

## 2018-10-02 NOTE — PATIENT INSTRUCTIONS
-Keep your wound dressing clean, dry, and intact.    -Change your dressing if it becomes soiled, soaked, or falls off.    -Monitor for signs and symptoms of infection (increased redness, pain, swelling, drainage, fever, chills, fatigue, malaise, nausea/vomiting). If you have signs and symptoms of infection, call the office or go to Urgent Care/ER.    -Remove your compression wrap if you have severe pain, severe swelling, numbness, color change, or temperature change in your toes. If you need to remove your compression wrap, do so by unrolling it. Do not cut the compression wrap off, this is to prevent cutting yourself on accident.

## 2018-10-05 ENCOUNTER — NON-PROVIDER VISIT (OUTPATIENT)
Dept: WOUND CARE | Facility: MEDICAL CENTER | Age: 68
End: 2018-10-05
Attending: PHYSICIAN ASSISTANT
Payer: COMMERCIAL

## 2018-10-05 PROCEDURE — 97602 WOUND(S) CARE NON-SELECTIVE: CPT

## 2018-10-05 NOTE — PATIENT INSTRUCTIONS
Should you experience any significant changes in your wound(s) such as infection (redness, swelling, localized heat, increased pain, fever >101 F, chills) or have any questions regarding your home care instructions, please contact the wound center [phone]. If after hours, contact your primary care physician or go the hospital emergency room.  Keep dressing clean and dry and cover while bathing. Only change dressing if over saturated, soiled or its falling off.

## 2018-10-08 ENCOUNTER — NON-PROVIDER VISIT (OUTPATIENT)
Dept: WOUND CARE | Facility: MEDICAL CENTER | Age: 68
End: 2018-10-08
Attending: PHYSICIAN ASSISTANT
Payer: COMMERCIAL

## 2018-10-08 PROCEDURE — 97597 DBRDMT OPN WND 1ST 20 CM/<: CPT

## 2018-10-08 NOTE — PROCEDURES
CSWD with forceps and scissors to remove ~5 cm2 peeling skin and callus from around foot and ankle.

## 2018-10-12 ENCOUNTER — NON-PROVIDER VISIT (OUTPATIENT)
Dept: WOUND CARE | Facility: MEDICAL CENTER | Age: 68
End: 2018-10-12
Attending: PHYSICIAN ASSISTANT
Payer: COMMERCIAL

## 2018-10-12 PROCEDURE — 97602 WOUND(S) CARE NON-SELECTIVE: CPT

## 2018-10-12 NOTE — PATIENT INSTRUCTIONS
Avoid prolonged standing or sitting without elevating your legs.    - Multilayer compression wrap to right leg. Do not get wet and keep on for the week. Only remove if temperature or sensation changes.    Keep dressing clean and dry and cover while bathing. Only change dressing if over saturated, soiled or its falling off.     Should you experience any significant changes in your wound(s) such as infection (redness, swelling, localized heat, increased pain, fever >101 F, chills) or have any questions regarding your home care instructions, please contact the wound center [phone]. If after hours, contact your primary care physician or go the hospital emergency room.

## 2018-10-16 ENCOUNTER — NON-PROVIDER VISIT (OUTPATIENT)
Dept: WOUND CARE | Facility: MEDICAL CENTER | Age: 68
End: 2018-10-16
Attending: PHYSICIAN ASSISTANT
Payer: COMMERCIAL

## 2018-10-16 PROCEDURE — 97602 WOUND(S) CARE NON-SELECTIVE: CPT

## 2018-10-16 NOTE — PATIENT INSTRUCTIONS
Avoid prolonged standing or sitting without elevating your legs.  - Multilayer compression wrap to right leg. Do not get wet.  Keep on until your next appt. Only remove if temperature or sensation changes.   If compression needs to be removed, un-wrap it do not cut it off.     Should you experience any significant changes in your wound(s), such as infection (redness, swelling, localized heat, increased pain, fever > 101 F, chills) or have any questions regarding your home care instructions, please contact the wound center at [phone]. If after hours, contact your primary care physician or go to the hospital emergency room.   Keep dressing clean, dry and covered while bathing. Only change dressing if it becomes over saturated, soiled or falls off.

## 2018-10-19 ENCOUNTER — NON-PROVIDER VISIT (OUTPATIENT)
Dept: WOUND CARE | Facility: MEDICAL CENTER | Age: 68
End: 2018-10-19
Attending: PHYSICIAN ASSISTANT
Payer: COMMERCIAL

## 2018-10-19 PROCEDURE — 97602 WOUND(S) CARE NON-SELECTIVE: CPT

## 2018-10-19 NOTE — PATIENT INSTRUCTIONS
Should you experience any significant changes in your wound(s) such as infection (redness, swelling, localized heat, increased pain, fever >101 F, chills) or have any questions regarding your home care instructions, please contact the wound center [phone]. If after hours, contact your primary care physician or go the hospital emergency room.  Keep dressing clean and dry and cover while bathing. Only change dressing if over saturated, soiled or its falling off.

## 2018-10-24 ENCOUNTER — NON-PROVIDER VISIT (OUTPATIENT)
Dept: WOUND CARE | Facility: MEDICAL CENTER | Age: 68
End: 2018-10-24
Attending: PHYSICIAN ASSISTANT
Payer: COMMERCIAL

## 2018-10-24 PROCEDURE — 97602 WOUND(S) CARE NON-SELECTIVE: CPT

## 2018-10-24 NOTE — PATIENT INSTRUCTIONS
-Keep your wound dressing clean, dry, and intact.    -Change your dressing if it becomes soiled, soaked, or falls off.    -Remove your compression wrap if you have severe pain, severe swelling, numbness, color change, or temperature change in your toes. If you need to remove your compression wrap, do so by unrolling it. Do not cut the compression wrap off to prevent cutting yourself on accident.    -Should you experience any significant changes in your wound(s), such as infection (redness, swelling, localized heat, increased pain, fever > 101 F, chills) or have any questions regarding your home care instructions, please contact the wound center at [phone]. If after hours, contact your primary care physician or go to the hospital emergency room.

## 2018-10-26 ENCOUNTER — NON-PROVIDER VISIT (OUTPATIENT)
Dept: WOUND CARE | Facility: MEDICAL CENTER | Age: 68
End: 2018-10-26
Attending: PHYSICIAN ASSISTANT
Payer: COMMERCIAL

## 2018-10-26 PROCEDURE — 97602 WOUND(S) CARE NON-SELECTIVE: CPT

## 2018-10-26 NOTE — PROCEDURES
Wound Care Procedures 10/26/2018:  Nonselective debridement with soapy washcloth to remove loose flaking skin from periwounds.

## 2018-10-26 NOTE — PATIENT INSTRUCTIONS
-Keep dressings clean, dry and covered while bathing. Only change dressings if they become over saturated, soiled or fall off.     -Avoid prolonged standing or sitting without elevating your legs.    -Remove your compression wrap if you have severe pain, severe swelling, numbness, color change, or temperature change in your toes. If you need to remove your compression wrap, do so by unrolling it. Do not cut the compression wrap off, this is to prevent cutting yourself on accident.    -Should you experience any significant changes in your wound(s), such as infection (redness, swelling, localized heat, increased pain, fever > 101 F, chills) or have any questions regarding your home care instructions, please contact the wound center at [phone]. If after hours, contact your primary care physician or go to the hospital emergency room.

## 2018-10-30 ENCOUNTER — HOSPITAL ENCOUNTER (OUTPATIENT)
Dept: LAB | Facility: MEDICAL CENTER | Age: 68
End: 2018-10-30
Attending: PHYSICIAN ASSISTANT
Payer: COMMERCIAL

## 2018-10-30 ENCOUNTER — NON-PROVIDER VISIT (OUTPATIENT)
Dept: WOUND CARE | Facility: MEDICAL CENTER | Age: 68
End: 2018-10-30
Attending: PHYSICIAN ASSISTANT
Payer: COMMERCIAL

## 2018-10-30 LAB
ALBUMIN SERPL BCP-MCNC: 3.9 G/DL (ref 3.2–4.9)
ALBUMIN/GLOB SERPL: 1.1 G/DL
ALP SERPL-CCNC: 75 U/L (ref 30–99)
ALT SERPL-CCNC: 16 U/L (ref 2–50)
ANION GAP SERPL CALC-SCNC: 8 MMOL/L (ref 0–11.9)
ANISOCYTOSIS BLD QL SMEAR: ABNORMAL
AST SERPL-CCNC: 17 U/L (ref 12–45)
BASOPHILS # BLD AUTO: 0.6 % (ref 0–1.8)
BASOPHILS # BLD: 0.02 K/UL (ref 0–0.12)
BILIRUB SERPL-MCNC: 0.3 MG/DL (ref 0.1–1.5)
BUN SERPL-MCNC: 18 MG/DL (ref 8–22)
CALCIUM SERPL-MCNC: 9.8 MG/DL (ref 8.5–10.5)
CHLORIDE SERPL-SCNC: 103 MMOL/L (ref 96–112)
CHOLEST SERPL-MCNC: 219 MG/DL (ref 100–199)
CO2 SERPL-SCNC: 27 MMOL/L (ref 20–33)
COMMENT 1642: NORMAL
CREAT SERPL-MCNC: 0.76 MG/DL (ref 0.5–1.4)
EOSINOPHIL # BLD AUTO: 0.11 K/UL (ref 0–0.51)
EOSINOPHIL NFR BLD: 3.3 % (ref 0–6.9)
ERYTHROCYTE [DISTWIDTH] IN BLOOD BY AUTOMATED COUNT: 46.5 FL (ref 35.9–50)
EST. AVERAGE GLUCOSE BLD GHB EST-MCNC: 151 MG/DL
FASTING STATUS PATIENT QL REPORTED: NORMAL
GLOBULIN SER CALC-MCNC: 3.6 G/DL (ref 1.9–3.5)
GLUCOSE SERPL-MCNC: 98 MG/DL (ref 65–99)
HBA1C MFR BLD: 6.9 % (ref 0–5.6)
HCT VFR BLD AUTO: 37.4 % (ref 42–52)
HDLC SERPL-MCNC: 39 MG/DL
HGB BLD-MCNC: 11.1 G/DL (ref 14–18)
IMM GRANULOCYTES # BLD AUTO: 0.01 K/UL (ref 0–0.11)
IMM GRANULOCYTES NFR BLD AUTO: 0.3 % (ref 0–0.9)
LDLC SERPL CALC-MCNC: 153 MG/DL
LYMPHOCYTES # BLD AUTO: 0.82 K/UL (ref 1–4.8)
LYMPHOCYTES NFR BLD: 24.3 % (ref 22–41)
MCH RBC QN AUTO: 21.4 PG (ref 27–33)
MCHC RBC AUTO-ENTMCNC: 29.7 G/DL (ref 33.7–35.3)
MCV RBC AUTO: 72.2 FL (ref 81.4–97.8)
MICROCYTES BLD QL SMEAR: ABNORMAL
MONOCYTES # BLD AUTO: 0.32 K/UL (ref 0–0.85)
MONOCYTES NFR BLD AUTO: 9.5 % (ref 0–13.4)
MORPHOLOGY BLD-IMP: NORMAL
NEUTROPHILS # BLD AUTO: 2.09 K/UL (ref 1.82–7.42)
NEUTROPHILS NFR BLD: 62 % (ref 44–72)
NRBC # BLD AUTO: 0 K/UL
NRBC BLD-RTO: 0 /100 WBC
OVALOCYTES BLD QL SMEAR: NORMAL
PLATELET # BLD AUTO: 112 K/UL (ref 164–446)
PLATELET BLD QL SMEAR: NORMAL
PMV BLD AUTO: 10.2 FL (ref 9–12.9)
POIKILOCYTOSIS BLD QL SMEAR: NORMAL
POLYCHROMASIA BLD QL SMEAR: NORMAL
POTASSIUM SERPL-SCNC: 4.4 MMOL/L (ref 3.6–5.5)
PROT SERPL-MCNC: 7.5 G/DL (ref 6–8.2)
RBC # BLD AUTO: 5.18 M/UL (ref 4.7–6.1)
RBC BLD AUTO: PRESENT
SODIUM SERPL-SCNC: 138 MMOL/L (ref 135–145)
TRIGL SERPL-MCNC: 133 MG/DL (ref 0–149)
WBC # BLD AUTO: 3.4 K/UL (ref 4.8–10.8)

## 2018-10-30 PROCEDURE — 36415 COLL VENOUS BLD VENIPUNCTURE: CPT

## 2018-10-30 PROCEDURE — 85025 COMPLETE CBC W/AUTO DIFF WBC: CPT

## 2018-10-30 PROCEDURE — 29580 STRAPPING UNNA BOOT: CPT

## 2018-10-30 PROCEDURE — 83036 HEMOGLOBIN GLYCOSYLATED A1C: CPT

## 2018-10-30 PROCEDURE — 80061 LIPID PANEL: CPT

## 2018-10-30 PROCEDURE — 80053 COMPREHEN METABOLIC PANEL: CPT

## 2018-10-30 NOTE — PATIENT INSTRUCTIONS
Should you experience any significant changes in your wound(s) such as infection (redness, swelling, localized heat, increased pain, fever >101 F, chills) or have any questions regarding your home care instructions, please contact the wound center [phone]. If after hours, contact your primary care physician or go the hospital emergency room.  Keep dressing clean and dry and cover while bathing. Only change dressing if over saturated, soiled or its falling off.

## 2018-10-30 NOTE — PROCEDURES
Kirtigena jesusot applied to RLE with soft-roll between viscopaste layer and coban layer to help pad and protect at patient request. Wounds nearly resolved. Patient brought in a short-term disability paper for us to fill out, I gave the paper to PRUDENCE Leiva Supervisor and she said that she would complete the form for patient so he can pick-up at his next appointment on Friday.

## 2018-11-02 ENCOUNTER — NON-PROVIDER VISIT (OUTPATIENT)
Dept: WOUND CARE | Facility: MEDICAL CENTER | Age: 68
End: 2018-11-02
Attending: PHYSICIAN ASSISTANT
Payer: COMMERCIAL

## 2018-11-02 PROCEDURE — 97602 WOUND(S) CARE NON-SELECTIVE: CPT

## 2018-11-02 NOTE — PATIENT INSTRUCTIONS
Avoid prolonged standing or sitting without elevating your legs.    - Multilayer compression wrap to right leg. Do not get wet and keep on for the week. Only remove if temperature or sensation changes.    Keep dressing clean and dry and cover while bathing. Only change dressing if over saturated, soiled or its falling off.     Should you experience any significant changes in your wound(s) such as infection (redness, swelling, localized heat, increased pain, fever >101 F, chills) or have any questions regarding your home care instructions, please contact the wound center [phone]. If after hours, contact your primary care physician or go the hospital emergency room.

## 2018-11-02 NOTE — NON-PROVIDER
Pt came in with his own wrapping with abd pads and coban. Pt states wrap got wet last night while in shower so he wrapped himself. After wrap was removed new wounds to RLE circumferential noted. Previous wounds to R lateral and medial ankle wounds resolved. Discussed continuing modified unna boot with softroll, pt agreeable. Reinforced importance of keeping wrap CDI, pt verbalized understanding.

## 2018-11-05 ENCOUNTER — NON-PROVIDER VISIT (OUTPATIENT)
Dept: WOUND CARE | Facility: MEDICAL CENTER | Age: 68
End: 2018-11-05
Attending: PHYSICIAN ASSISTANT
Payer: COMMERCIAL

## 2018-11-05 PROCEDURE — 29580 STRAPPING UNNA BOOT: CPT

## 2018-11-05 NOTE — PATIENT INSTRUCTIONS
Should you experience any significant changes in your wound(s) such as infection (redness, swelling, localized heat, increased pain, fever >101 F, chills) or have any questions regarding your home care instructions, please contact the wound center [phone]. If after hours, contact your primary care physician or go the hospital emergency room.  Keep dressing clean and dry and cover while bathing. Only change dressing if over saturated, soiled or its falling off.

## 2018-11-09 ENCOUNTER — NON-PROVIDER VISIT (OUTPATIENT)
Dept: WOUND CARE | Facility: MEDICAL CENTER | Age: 68
End: 2018-11-09
Attending: PHYSICIAN ASSISTANT
Payer: COMMERCIAL

## 2018-11-09 PROCEDURE — 99211 OFF/OP EST MAY X REQ PHY/QHP: CPT

## 2018-11-09 NOTE — CERTIFICATION
Advanced Wound Care   [facility]   [address]   [address]   [place] [address]   [phone] Fax: [phone]     Discharge Note        Referring Physician:  Tiesha Breaux PA-c  Wound Etiology: CVI  Wound location: Right lower extremity  ICD-9: R LE stasis ulcers/dermatitis E11.628, S91.301A  Date of Discharge: 11/9/2018     Assessment:  Discharge patient at this time secondary to wound resolution. Educated pt on maturation process of wound healing and how it will take several months up to more than a year to fully heal, which will be about 80% tensile strength of what it was. Instructed pt to continue gentle care, protect the wound site, avoid sunlight, apply sun block if exposed to sun, and apply moisturizer daily. Pt requested MD note to return to work without restriction, note provided by Erinn WATSON. Discussed importance of daily application of compression stockings on BLE. Pt verbalized understanding to all and states he has compression stocking for RLE at home and is currently wearing them on LLE.    Thank you for the referral and the opportunity to treat your patient.

## 2018-11-09 NOTE — PATIENT INSTRUCTIONS
Wound site will take several months up to more than a year to fully heal, which will be about 80% tensile strength of what it was. Please continue gentle care, protect the wound site, avoid sunlight, apply sun block if exposed to sun, and apply moisturizer sam

## 2018-11-12 ENCOUNTER — APPOINTMENT (OUTPATIENT)
Dept: WOUND CARE | Facility: MEDICAL CENTER | Age: 68
End: 2018-11-12
Attending: PHYSICIAN ASSISTANT
Payer: COMMERCIAL

## 2019-01-29 ENCOUNTER — HOSPITAL ENCOUNTER (OUTPATIENT)
Dept: LAB | Facility: MEDICAL CENTER | Age: 69
End: 2019-01-29
Attending: INTERNAL MEDICINE
Payer: COMMERCIAL

## 2019-01-29 LAB
ALBUMIN SERPL BCP-MCNC: 3.7 G/DL (ref 3.2–4.9)
ALBUMIN/GLOB SERPL: 1.1 G/DL
ALP SERPL-CCNC: 77 U/L (ref 30–99)
ALT SERPL-CCNC: 12 U/L (ref 2–50)
ANION GAP SERPL CALC-SCNC: 9 MMOL/L (ref 0–11.9)
ANISOCYTOSIS BLD QL SMEAR: ABNORMAL
AST SERPL-CCNC: 18 U/L (ref 12–45)
BASOPHILS # BLD AUTO: 0.9 % (ref 0–1.8)
BASOPHILS # BLD: 0.03 K/UL (ref 0–0.12)
BILIRUB SERPL-MCNC: 0.3 MG/DL (ref 0.1–1.5)
BUN SERPL-MCNC: 22 MG/DL (ref 8–22)
CALCIUM SERPL-MCNC: 9.2 MG/DL (ref 8.5–10.5)
CHLORIDE SERPL-SCNC: 102 MMOL/L (ref 96–112)
CO2 SERPL-SCNC: 28 MMOL/L (ref 20–33)
CREAT SERPL-MCNC: 0.91 MG/DL (ref 0.5–1.4)
EOSINOPHIL # BLD AUTO: 0.17 K/UL (ref 0–0.51)
EOSINOPHIL NFR BLD: 4.4 % (ref 0–6.9)
ERYTHROCYTE [DISTWIDTH] IN BLOOD BY AUTOMATED COUNT: 49.9 FL (ref 35.9–50)
FERRITIN SERPL-MCNC: 10.9 NG/ML (ref 22–322)
GLOBULIN SER CALC-MCNC: 3.5 G/DL (ref 1.9–3.5)
GLUCOSE SERPL-MCNC: 136 MG/DL (ref 65–99)
HCT VFR BLD AUTO: 38.3 % (ref 42–52)
HGB BLD-MCNC: 11.1 G/DL (ref 14–18)
HYPOCHROMIA BLD QL SMEAR: ABNORMAL
LYMPHOCYTES # BLD AUTO: 0.63 K/UL (ref 1–4.8)
LYMPHOCYTES NFR BLD: 16.7 % (ref 22–41)
MANUAL DIFF BLD: NORMAL
MCH RBC QN AUTO: 22.1 PG (ref 27–33)
MCHC RBC AUTO-ENTMCNC: 29 G/DL (ref 33.7–35.3)
MCV RBC AUTO: 76.3 FL (ref 81.4–97.8)
MICROCYTES BLD QL SMEAR: ABNORMAL
MONOCYTES # BLD AUTO: 0.2 K/UL (ref 0–0.85)
MONOCYTES NFR BLD AUTO: 5.2 % (ref 0–13.4)
MORPHOLOGY BLD-IMP: NORMAL
NEUTROPHILS # BLD AUTO: 2.77 K/UL (ref 1.82–7.42)
NEUTROPHILS NFR BLD: 72.8 % (ref 44–72)
NRBC # BLD AUTO: 0 K/UL
NRBC BLD-RTO: 0 /100 WBC
PLATELET # BLD AUTO: 115 K/UL (ref 164–446)
PLATELET BLD QL SMEAR: NORMAL
PMV BLD AUTO: 10.9 FL (ref 9–12.9)
POTASSIUM SERPL-SCNC: 4.3 MMOL/L (ref 3.6–5.5)
PROT SERPL-MCNC: 7.2 G/DL (ref 6–8.2)
RBC # BLD AUTO: 5.02 M/UL (ref 4.7–6.1)
RBC BLD AUTO: PRESENT
SODIUM SERPL-SCNC: 139 MMOL/L (ref 135–145)
WBC # BLD AUTO: 3.8 K/UL (ref 4.8–10.8)

## 2019-01-29 PROCEDURE — 80053 COMPREHEN METABOLIC PANEL: CPT

## 2019-01-29 PROCEDURE — 36415 COLL VENOUS BLD VENIPUNCTURE: CPT

## 2019-01-29 PROCEDURE — 85007 BL SMEAR W/DIFF WBC COUNT: CPT

## 2019-01-29 PROCEDURE — 85027 COMPLETE CBC AUTOMATED: CPT

## 2019-01-29 PROCEDURE — 82728 ASSAY OF FERRITIN: CPT | Mod: GA

## 2019-01-30 ENCOUNTER — HOSPITAL ENCOUNTER (OUTPATIENT)
Dept: LAB | Facility: MEDICAL CENTER | Age: 69
End: 2019-01-30
Attending: PHYSICIAN ASSISTANT
Payer: COMMERCIAL

## 2019-01-30 LAB
ALBUMIN SERPL BCP-MCNC: 3.8 G/DL (ref 3.2–4.9)
ALBUMIN/GLOB SERPL: 1.2 G/DL
ALP SERPL-CCNC: 77 U/L (ref 30–99)
ALT SERPL-CCNC: 11 U/L (ref 2–50)
ANION GAP SERPL CALC-SCNC: 7 MMOL/L (ref 0–11.9)
AST SERPL-CCNC: 16 U/L (ref 12–45)
BILIRUB SERPL-MCNC: 0.4 MG/DL (ref 0.1–1.5)
BUN SERPL-MCNC: 20 MG/DL (ref 8–22)
CALCIUM SERPL-MCNC: 9.6 MG/DL (ref 8.5–10.5)
CHLORIDE SERPL-SCNC: 105 MMOL/L (ref 96–112)
CO2 SERPL-SCNC: 29 MMOL/L (ref 20–33)
CREAT SERPL-MCNC: 0.82 MG/DL (ref 0.5–1.4)
EST. AVERAGE GLUCOSE BLD GHB EST-MCNC: 137 MG/DL
GLOBULIN SER CALC-MCNC: 3.3 G/DL (ref 1.9–3.5)
GLUCOSE SERPL-MCNC: 121 MG/DL (ref 65–99)
HBA1C MFR BLD: 6.4 % (ref 0–5.6)
POTASSIUM SERPL-SCNC: 3.9 MMOL/L (ref 3.6–5.5)
PROT SERPL-MCNC: 7.1 G/DL (ref 6–8.2)
SODIUM SERPL-SCNC: 141 MMOL/L (ref 135–145)

## 2019-01-30 PROCEDURE — 83036 HEMOGLOBIN GLYCOSYLATED A1C: CPT

## 2019-01-30 PROCEDURE — 36415 COLL VENOUS BLD VENIPUNCTURE: CPT

## 2019-01-30 PROCEDURE — 80053 COMPREHEN METABOLIC PANEL: CPT

## 2019-02-14 ENCOUNTER — NON-PROVIDER VISIT (OUTPATIENT)
Dept: WOUND CARE | Facility: MEDICAL CENTER | Age: 69
End: 2019-02-14
Attending: PHYSICIAN ASSISTANT
Payer: COMMERCIAL

## 2019-02-14 PROCEDURE — 97602 WOUND(S) CARE NON-SELECTIVE: CPT

## 2019-02-14 PROCEDURE — 99211 OFF/OP EST MAY X REQ PHY/QHP: CPT

## 2019-02-14 NOTE — PATIENT INSTRUCTIONS
-Keep dressings clean, dry and covered while bathing. Only change dressings if they become over saturated, soiled or fall off.     -Avoid prolonged standing or sitting without elevating your legs.    -Remove your compression garments if you have severe pain, severe swelling, numbness, color change, or temperature change in your toes. If you need to remove your compression garments, do so by unrolling them. Do not cut the compression garments off, this is to prevent cutting yourself on accident.    -Should you experience any significant changes in your wound(s), such as infection (redness, swelling, localized heat, increased pain, fever > 101 F, chills) or have any questions regarding your home care instructions, please contact the wound center at [phone]. If after hours, contact your primary care physician or go to the hospital emergency room.

## 2019-02-14 NOTE — CERTIFICATION
[facility]  [facility]  [address]  [address]  [place] [address]  [phone] Fax: [phone]      Initial Evaluation  For Certification Period: 2/14/2019-5/4/2019  Start of Care: 2/14/2019          Referring Provider: NARCISA Addison  Primary Provider: NARCISA Addison           Wound(s): Right Lower Leg       Subjective:        HPI: Patient is a 68 year old diabetic male with right lower extremity partial thickness wounds. He was previously seen in this clinic in 2018 for similar wounds. He noticed the wounds on his right leg in early January 2019, and he was covering the wounds with Aquaphor and gauze. He had a venous duplex on 9/21/2018 which showed greater saphenous vein reflux and multiple varices.             Pain: Patient reports mild RLE pain with palpation.           Past Medical History:  Past Medical History:   Diagnosis Date   • Arrhythmia 09/07/2018   • Arthritis     OA- hands and L knee   • Cataract     OU   • Dental disorder     upper   • Diabetes     oral meds   • High cholesterol 09/07/2018    no med; diet controlled   • Hyperlipidemia    • Hypertension    • Renal disorder 09/07/2018    kidney stones       Current Medications:  Current Outpatient Prescriptions:   •  Flaxseed, Linseed, (FLAX SEED OIL PO), Take  by mouth., Disp: , Rfl:   •  metoprolol SR (TOPROL XL) 25 MG TABLET SR 24 HR, TAKE ONE TABLET BY MOUTH ONCE DAILY, Disp: 90 Tab, Rfl: 3  •  lisinopril (PRINIVIL) 10 MG Tab, 10 mg every day., Disp: , Rfl:   •  oxycodone-aspirin (PERCODAN) 4.8355-325 MG Tab, , Disp: , Rfl:   •  metformin (GLUCOPHAGE) 1000 MG tablet, Take 1,000 mg by mouth 2 times a day, with meals., Disp: , Rfl:   •  Non Formulary Request, Take  by mouth 2 Times a Day. Uric acid cleanse, Disp: , Rfl:   •  vitamin e (VITAMIN E) 400 UNIT Cap, Take 400 Units by mouth every day., Disp: , Rfl:   •  vitamin A 8000 UNIT capsule, Take 8,000 Units by mouth 2 Times a Day., Disp: , Rfl:   •   Cholecalciferol (VITAMIN D3) 3000 UNITS Tab, Take  by mouth every day., Disp: , Rfl:   •  Resveratrol 100 MG Cap, Take 250 mg by mouth every day., Disp: , Rfl:   •  KRILL OIL PO, Take  by mouth every day., Disp: , Rfl:   •  Omega-3 Fatty Acids (FISH OIL) 1000 MG Cap capsule, Take 1,000 mg by mouth every day., Disp: , Rfl:   •  ASTAXANTHIN PO, Take 4 mg by mouth every day., Disp: , Rfl:   •  SAW PALMETTO, SERENOA REPENS, PO, Take  by mouth every day., Disp: , Rfl:     Allergies:   Allergies   Allergen Reactions   • Ciprofloxacin Unspecified     convulsions   • Statins [Hmg-Coa-R Inhibitors] Unspecified     Stabbing pains in kidneys   • Acetaminophen      Kidney pain    • Ibuprofen      Kidney pain   • Naproxen      Kidney pain   • Shellfish Allergy      Kidney stones   • Soap &  [Alcohol] Rash     Antibacterial soap- contact dermatitis       Past Surgical History:   Past Surgical History:   Procedure Laterality Date   • GASTROSCOPY N/A 9/18/2018    Procedure: GASTROSCOPY;  Surgeon: Matt Young M.D.;  Location: SURGERY SAME DAY [facility];  Service: Gastroenterology   • ROTATOR CUFF REPAIR Left 2014   • ANKLE ORIF Right 1988    spiral fx   • MENISCECTOMY Left 1979   • APPENDECTOMY  1958   • CYSTOSCOPY  2004,2008    stone extraction       Social History:   Social History     Social History   • Marital status:      Spouse name: N/A   • Number of children: N/A   • Years of education: N/A     Occupational History   • Not on file.     Social History Main Topics   • Smoking status: Former Smoker     Types: Cigarettes     Quit date: 1/1/2008   • Smokeless tobacco: Never Used      Comment: quit 2008   • Alcohol use No   • Drug use: Yes     Types: Marijuana   • Sexual activity: No     Other Topics Concern   • Not on file     Social History Narrative   • No narrative on file             Objective:      Tests and Measures:  DANIELA performed in this clinic 2/14/2019:     Right   , biphasic   ,  biphasic   Brachial 120, biphasic   DANIELA PT=1.0, DP=1.08     Orthotic, protective, supportive devices: none    Fall Risk Assessment (mirtha all that apply with an X):  Completed 2/14/2019, low fall risk.    X 65 years or older        Fall within the last 2 years      Uses ambulatory devices     Loss of protective sensation in feet      Use of prostethic/orthotic       Presence of lower extremity/foot/toe amputation   X Taking medication that increases risk (per facility policy)    Interventions Recommended (if any of the above are selected):   Use of Assistive Device: None   Supervision with ambulation: Independent   Assistance with ambulation: Independent   Home safety education: Educational material provided                    Wound 02/14/19 Venous Ulcer Leg -Right Lower Leg (Active)   Wound Image      2/14/2019  7:45 AM   Site Assessment Excoriated;Pink;Red 2/14/2019  7:45 AM   Nicolasa-wound Assessment Edema 2/14/2019  7:45 AM   Margins Attached edges;Undefined edges 2/14/2019  7:45 AM   Wound Length (cm) 28 cm 2/14/2019  7:45 AM   Wound Width (cm) 24.5 cm 2/14/2019  7:45 AM   Wound Surface Area (cm^2) 686 cm^2 2/14/2019  7:45 AM   Drainage Amount Moderate 2/14/2019  7:45 AM   Drainage Description Serous 2/14/2019  7:45 AM   Non-staged Wound Description Partial thickness 2/14/2019  7:45 AM   Treatments Cleansed;Other (Comment) 2/14/2019  7:45 AM   Cleansing Approved Wound Cleanser 2/14/2019  7:45 AM   Periwound Protectant Barrier Paste 2/14/2019  7:45 AM   Dressing Options Hydrofiber Silver;Unna Boot 2/14/2019  7:45 AM   Dressing Changed New 2/14/2019  7:45 AM   WOUND NURSE ONLY - Odor None 2/14/2019  7:45 AM   WOUND NURSE ONLY - Pulses 2+;DP 2/14/2019  7:45 AM   WOUND NURSE ONLY - Exposed Structures None 2/14/2019  7:45 AM   WOUND NURSE ONLY - Tissue Type and Percentage Multiple partial thickness wounds with 100% pink moist tissue. 2/14/2019  7:45 AM        Procedures:     Debridement: Nonselective with moistened  washcloth to remove loose skin flakes and biofilm.     Cleansed with: No rinse foam cleanser.                                                                         Periwound protected with: Zinc barrier paste.   Primary dressing: Aquacel Ag   Secondary Dressing: Unna's Boot, coban.        Patient Education: Patient educated on plan of care and rationale behind dressing selection. Reviewed the s/s of infection and when to present to the ER (fever, chills, nausea/vomiting, redness that spreads from the wound, sudden increased drainage or pain), leaving dressing clean/dry/intact, instructions regarding when to remove Unna's Boots (for severe pain/swelling, numbness/color change/temperature change in toes), and how to remove Unna's Boot (by unrolling, not cutting). Patient verbalized understanding of instructions.    Professional Collaboration: Note sent to referring provider via EPIC.      Assessment:      Wound etiology: Chronic venous insufficiency.    Wound Progress: Initial Evaluation    Rationale for Treatment: Aquacel Ag to manage bioburden, absorb exudate, and maintain moist wound environment without laterally wicking exudate therefore reducing riddhi-wound maceration. Unna's boot for compression therapy for chronic venous insufficiency wounds, zinc oxide to encourage re-epithelialization of denuded areas.    Patient tolerance/compliance: Patient tolerated treatment well, agrees with plan of care.    Complicating factors: Chronic venous insufficiency, stands at work.    Need for ongoing Advanced Wound Care services: Patient requires skilled therapeutic wound care services for product selection, application of product, debridement, close monitoring with clinical assessment to expedite wound healing.         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    Frequency: Twice per  week.      At the time of each visit a thorough assessment of the patient is completed to assure the  appropriateness of our plan of care.  The dressings or modalities may need to be adapted   from the original plan to address any significant changes in the wound environment.

## 2019-02-18 ENCOUNTER — NON-PROVIDER VISIT (OUTPATIENT)
Dept: WOUND CARE | Facility: MEDICAL CENTER | Age: 69
End: 2019-02-18
Attending: PHYSICIAN ASSISTANT
Payer: COMMERCIAL

## 2019-02-18 PROCEDURE — 29580 STRAPPING UNNA BOOT: CPT

## 2019-02-18 NOTE — PATIENT INSTRUCTIONS
Should you experience any significant changes in your wound(s) such as infection (redness, swelling, localized heat, increased pain, fever >101 F, chills) or have any questions regarding your home care instructions, please contact the wound center [phone]. If after hours, contact your primary care physician or go the hospital emergency room.  Keep dressing clean and dry and cover while bathing. Only change dressing if over saturated, soiled or its falling off.

## 2019-02-18 NOTE — PROCEDURES
"RLE cleansed with foaming cleanser and warm washcloth then normal saline and gauze.   Patient reports that leg looks \"100% better\" no itchiness though this does look more like dermatitis, will continue with current plan of care.   Niecy delgado applied to RLE.  "

## 2019-02-21 ENCOUNTER — NON-PROVIDER VISIT (OUTPATIENT)
Dept: WOUND CARE | Facility: MEDICAL CENTER | Age: 69
End: 2019-02-21
Attending: PHYSICIAN ASSISTANT
Payer: COMMERCIAL

## 2019-02-21 PROCEDURE — 29580 STRAPPING UNNA BOOT: CPT

## 2019-02-21 NOTE — PATIENT INSTRUCTIONS
Should you experience any significant changes in your wound(s) such as infection (redness, swelling, localized heat, increased pain, fever >101 F, chills) or have any questions regarding your home care instructions, please contact the wound center [phone]. If after hours, contact your primary care physician or go the hospital emergency room.  Keep dressing clean and dry and cover while bathing. Only change dressing if over saturated, soiled or its falling off.

## 2019-02-25 ENCOUNTER — NON-PROVIDER VISIT (OUTPATIENT)
Dept: WOUND CARE | Facility: MEDICAL CENTER | Age: 69
End: 2019-02-25
Attending: PHYSICIAN ASSISTANT
Payer: COMMERCIAL

## 2019-02-25 PROCEDURE — 29580 STRAPPING UNNA BOOT: CPT

## 2019-02-25 NOTE — PATIENT INSTRUCTIONS
Keep dressing clean and dry and cover while bathing. Only change dressing if over saturated, soiled or its falling off.     Should you experience any significant changes in your wound(s) such as infection (redness, swelling, localized heat, increased pain, fever >101 F, chills) or have any questions regarding your home care instructions, please contact the wound center [phone]. If after hours, contact your primary care physician or go the hospital emergency room.

## 2019-02-28 ENCOUNTER — NON-PROVIDER VISIT (OUTPATIENT)
Dept: WOUND CARE | Facility: MEDICAL CENTER | Age: 69
End: 2019-02-28
Attending: PHYSICIAN ASSISTANT
Payer: COMMERCIAL

## 2019-02-28 PROCEDURE — 97602 WOUND(S) CARE NON-SELECTIVE: CPT

## 2019-02-28 NOTE — PATIENT INSTRUCTIONS
Should you experience any significant changes in your wound(s) such as infection (redness, swelling, localized heat, increased pain, fever >101 F, chills) or have any questions regarding your home care instructions, please contact the wound center [phone]. If after hours, contact your primary care physician or go the hospital emergency room.  Keep dressing clean and dry and cover while bathing. Only change dressing if over saturated, soiled or its falling off.

## 2019-03-05 ENCOUNTER — NON-PROVIDER VISIT (OUTPATIENT)
Dept: WOUND CARE | Facility: MEDICAL CENTER | Age: 69
End: 2019-03-05
Attending: PHYSICIAN ASSISTANT
Payer: COMMERCIAL

## 2019-03-05 PROCEDURE — 97602 WOUND(S) CARE NON-SELECTIVE: CPT

## 2019-03-05 NOTE — PATIENT INSTRUCTIONS
Should you experience any significant changes in your wound(s) such as infection (redness, swelling, localized heat, increased pain, fever >101 F, chills) or have any questions regarding your home care instructions, please contact the wound center [phone]. If after hours, contact your primary care physician or go the hospital emergency room.  Keep dressing clean and dry and cover while bathing. Only change dressing if over saturated, soiled or its falling off.

## 2019-03-08 ENCOUNTER — NON-PROVIDER VISIT (OUTPATIENT)
Dept: WOUND CARE | Facility: MEDICAL CENTER | Age: 69
End: 2019-03-08
Attending: PHYSICIAN ASSISTANT
Payer: COMMERCIAL

## 2019-03-08 PROCEDURE — 29580 STRAPPING UNNA BOOT: CPT

## 2019-03-11 ENCOUNTER — NON-PROVIDER VISIT (OUTPATIENT)
Dept: WOUND CARE | Facility: MEDICAL CENTER | Age: 69
End: 2019-03-11
Attending: PHYSICIAN ASSISTANT
Payer: COMMERCIAL

## 2019-03-11 PROCEDURE — 97602 WOUND(S) CARE NON-SELECTIVE: CPT

## 2019-03-11 NOTE — PATIENT INSTRUCTIONS
Keep dressing clean and dry and cover while bathing. Only change dressing if over saturated, soiled or its falling off.     Should you experience any significant changes in your wound(s) such as infection (redness, swelling, localized heat, increased pain, fever >101 F, chills) or have any questions regarding your home care instructions, please contact the wound center [phone]. If after hours, contact your primary care physician or go the hospital emergency room.

## 2019-03-14 ENCOUNTER — NON-PROVIDER VISIT (OUTPATIENT)
Dept: WOUND CARE | Facility: MEDICAL CENTER | Age: 69
End: 2019-03-14
Attending: PHYSICIAN ASSISTANT
Payer: COMMERCIAL

## 2019-03-14 PROCEDURE — 97602 WOUND(S) CARE NON-SELECTIVE: CPT

## 2019-03-14 NOTE — PATIENT INSTRUCTIONS
-Keep dressings clean, dry and covered while bathing. Only change dressings if they become over saturated, soiled or fall off.     -Avoid prolonged standing or sitting without elevating your legs.    -Remove your compression garments if you have severe pain, severe swelling, numbness, color change, or temperature change in your toes. If you need to remove your compression garments, do so by unrolling them. Do not cut the compression garments off, this is to prevent cutting yourself on accident.    -Should you experience any significant changes in your wound(s), such as infection (redness, swelling, localized heat, increased pain, fever > 101 F, chills) or have any questions regarding your home care instructions, please contact the wound center at [phone]. If after hours, contact your primary care physician or go to the hospital emergency room.

## 2019-03-14 NOTE — PROCEDURES
Wound Care Procedures 3/14/2019:  Nonselective debridement with soapy washcloth to remove biofilm from RLE partial thickness wounds.

## 2019-03-18 ENCOUNTER — HOSPITAL ENCOUNTER (OUTPATIENT)
Dept: RADIOLOGY | Facility: MEDICAL CENTER | Age: 69
End: 2019-03-18
Attending: INTERNAL MEDICINE
Payer: COMMERCIAL

## 2019-03-18 DIAGNOSIS — Z86.19 HISTORY OF HEPATITIS C: ICD-10-CM

## 2019-03-18 DIAGNOSIS — K74.60 CIRRHOSIS OF LIVER WITHOUT ASCITES, UNSPECIFIED HEPATIC CIRRHOSIS TYPE (HCC): ICD-10-CM

## 2019-03-18 PROCEDURE — 76700 US EXAM ABDOM COMPLETE: CPT

## 2019-03-19 ENCOUNTER — NON-PROVIDER VISIT (OUTPATIENT)
Dept: WOUND CARE | Facility: MEDICAL CENTER | Age: 69
End: 2019-03-19
Attending: PHYSICIAN ASSISTANT
Payer: COMMERCIAL

## 2019-03-19 PROCEDURE — 97597 DBRDMT OPN WND 1ST 20 CM/<: CPT

## 2019-03-19 NOTE — PATIENT INSTRUCTIONS
Should you experience any significant changes in your wound(s) such as infection (redness, swelling, localized heat, increased pain, fever >101 F, chills) or have any questions regarding your home care instructions, please contact the wound center [phone]. If after hours, contact your primary care physician or go the hospital emergency room.  Keep dressing clean and dry and cover while bathing. Only change dressing if over saturated, soiled or its falling off.

## 2019-03-21 ENCOUNTER — HOSPITAL ENCOUNTER (OUTPATIENT)
Dept: LAB | Facility: MEDICAL CENTER | Age: 69
End: 2019-03-21
Attending: INTERNAL MEDICINE
Payer: COMMERCIAL

## 2019-03-21 LAB
ALBUMIN SERPL BCP-MCNC: 4.1 G/DL (ref 3.2–4.9)
ALBUMIN/GLOB SERPL: 1.5 G/DL
ALP SERPL-CCNC: 73 U/L (ref 30–99)
ALT SERPL-CCNC: 17 U/L (ref 2–50)
ANION GAP SERPL CALC-SCNC: 10 MMOL/L (ref 0–11.9)
AST SERPL-CCNC: 19 U/L (ref 12–45)
BASOPHILS # BLD AUTO: 1 % (ref 0–1.8)
BASOPHILS # BLD: 0.04 K/UL (ref 0–0.12)
BILIRUB SERPL-MCNC: 0.5 MG/DL (ref 0.1–1.5)
BUN SERPL-MCNC: 24 MG/DL (ref 8–22)
CALCIUM SERPL-MCNC: 9.6 MG/DL (ref 8.5–10.5)
CHLORIDE SERPL-SCNC: 103 MMOL/L (ref 96–112)
CO2 SERPL-SCNC: 27 MMOL/L (ref 20–33)
CREAT SERPL-MCNC: 0.86 MG/DL (ref 0.5–1.4)
EOSINOPHIL # BLD AUTO: 0.36 K/UL (ref 0–0.51)
EOSINOPHIL NFR BLD: 8.7 % (ref 0–6.9)
ERYTHROCYTE [DISTWIDTH] IN BLOOD BY AUTOMATED COUNT: 59.5 FL (ref 35.9–50)
FERRITIN SERPL-MCNC: 29 NG/ML (ref 22–322)
GLOBULIN SER CALC-MCNC: 2.8 G/DL (ref 1.9–3.5)
GLUCOSE SERPL-MCNC: 212 MG/DL (ref 65–99)
HCT VFR BLD AUTO: 44.8 % (ref 42–52)
HGB BLD-MCNC: 13.6 G/DL (ref 14–18)
HGB RETIC QN AUTO: 32.3 PG/CELL (ref 29–35)
IMM GRANULOCYTES # BLD AUTO: 0.01 K/UL (ref 0–0.11)
IMM GRANULOCYTES NFR BLD AUTO: 0.2 % (ref 0–0.9)
IMM RETICS NFR: 5.2 % (ref 9.3–17.4)
LYMPHOCYTES # BLD AUTO: 1.04 K/UL (ref 1–4.8)
LYMPHOCYTES NFR BLD: 25 % (ref 22–41)
MCH RBC QN AUTO: 24.5 PG (ref 27–33)
MCHC RBC AUTO-ENTMCNC: 30.4 G/DL (ref 33.7–35.3)
MCV RBC AUTO: 80.6 FL (ref 81.4–97.8)
MONOCYTES # BLD AUTO: 0.41 K/UL (ref 0–0.85)
MONOCYTES NFR BLD AUTO: 9.9 % (ref 0–13.4)
NEUTROPHILS # BLD AUTO: 2.3 K/UL (ref 1.82–7.42)
NEUTROPHILS NFR BLD: 55.2 % (ref 44–72)
NRBC # BLD AUTO: 0 K/UL
NRBC BLD-RTO: 0 /100 WBC
PLATELET # BLD AUTO: 105 K/UL (ref 164–446)
PMV BLD AUTO: 10.4 FL (ref 9–12.9)
POTASSIUM SERPL-SCNC: 4.5 MMOL/L (ref 3.6–5.5)
PROT SERPL-MCNC: 6.9 G/DL (ref 6–8.2)
RBC # BLD AUTO: 5.56 M/UL (ref 4.7–6.1)
RETICS # AUTO: 0.07 M/UL (ref 0.04–0.06)
RETICS/RBC NFR: 1.2 % (ref 0.8–2.1)
SODIUM SERPL-SCNC: 140 MMOL/L (ref 135–145)
VIT B12 SERPL-MCNC: >1500 PG/ML (ref 211–911)
WBC # BLD AUTO: 4.2 K/UL (ref 4.8–10.8)

## 2019-03-21 PROCEDURE — 85046 RETICYTE/HGB CONCENTRATE: CPT

## 2019-03-21 PROCEDURE — 36415 COLL VENOUS BLD VENIPUNCTURE: CPT

## 2019-03-21 PROCEDURE — 80053 COMPREHEN METABOLIC PANEL: CPT

## 2019-03-21 PROCEDURE — 85025 COMPLETE CBC W/AUTO DIFF WBC: CPT

## 2019-03-21 PROCEDURE — 82607 VITAMIN B-12: CPT

## 2019-03-21 PROCEDURE — 82728 ASSAY OF FERRITIN: CPT

## 2019-03-21 PROCEDURE — 83010 ASSAY OF HAPTOGLOBIN QUANT: CPT

## 2019-03-22 ENCOUNTER — NON-PROVIDER VISIT (OUTPATIENT)
Dept: WOUND CARE | Facility: MEDICAL CENTER | Age: 69
End: 2019-03-22
Attending: PHYSICIAN ASSISTANT
Payer: COMMERCIAL

## 2019-03-22 PROCEDURE — 97602 WOUND(S) CARE NON-SELECTIVE: CPT

## 2019-03-22 NOTE — PATIENT INSTRUCTIONS
-Keep dressings clean, dry and covered while bathing. Only change dressings if they become over saturated, soiled or fall off.     -Avoid prolonged standing or sitting without elevating your legs.    -Remove your compression garments if you have severe pain, severe swelling, numbness, color change, or temperature change in your toes. If you need to remove your compression garments, do so by unrolling them. Do not cut the compression garments off, this is to prevent cutting yourself on accident.    -Should you experience any significant changes in your wound(s), such as infection (redness, swelling, localized heat, increased pain, fever > 101 F, chills) or have any questions regarding your home care instructions, please contact the wound center at [phone]. If after hours, contact your primary care physician or go to the hospital emergency room.

## 2019-03-22 NOTE — PROCEDURES
Non selective debridement with NS gauze and washcloth to remove non viable tissue from wound bed and riddhi wound

## 2019-03-23 LAB — HAPTOGLOB SERPL-MCNC: 80 MG/DL (ref 30–200)

## 2019-03-26 ENCOUNTER — NON-PROVIDER VISIT (OUTPATIENT)
Dept: WOUND CARE | Facility: MEDICAL CENTER | Age: 69
End: 2019-03-26
Attending: PHYSICIAN ASSISTANT
Payer: COMMERCIAL

## 2019-03-26 PROCEDURE — 97602 WOUND(S) CARE NON-SELECTIVE: CPT

## 2019-03-26 NOTE — PROCEDURES
Non selective with washcloth and no rinse foam cleanser to remove dry and peeling skin from right lower leg.

## 2019-03-26 NOTE — PATIENT INSTRUCTIONS
Should you experience any significant changes in your wound(s) such as infection (redness, swelling, localized heat, increased pain, fever >101 F, chills) or have any questions regarding your home care instructions, please contact the wound center [phone]. If after hours, contact your primary care physician or go the hospital emergency room.  Keep dressing clean and dry and cover while bathing. Only change dressing if over saturated, soiled or its falling off.

## 2019-03-29 ENCOUNTER — APPOINTMENT (OUTPATIENT)
Dept: WOUND CARE | Facility: MEDICAL CENTER | Age: 69
End: 2019-03-29
Attending: PHYSICIAN ASSISTANT
Payer: COMMERCIAL

## 2019-04-01 ENCOUNTER — NON-PROVIDER VISIT (OUTPATIENT)
Dept: WOUND CARE | Facility: MEDICAL CENTER | Age: 69
End: 2019-04-01
Attending: PHYSICIAN ASSISTANT
Payer: COMMERCIAL

## 2019-04-01 PROCEDURE — 99211 OFF/OP EST MAY X REQ PHY/QHP: CPT

## 2019-04-01 NOTE — PATIENT INSTRUCTIONS
Avoid prolonged standing or sitting without elevating your legs.  Wear Knee-high gradient compression to legs, especially at work.  Try soccer shin guards to protect your legs at work.  Apply AmLactin lotion to your legs if they look like alligators with the flaky brown skin; otherwise use moisturizing lotion.  Acquire a new referral if you need to return to see us due to opening of wounds.    Should you experience any significant changes in your wound(s), such as infection (redness, swelling, localized heat, increased pain, fever > 101 F, chills) or have any questions regarding your home care instructions, please contact the wound center at [phone]. If after hours, contact your primary care physician or go to the hospital emergency room.

## 2019-04-01 NOTE — CERTIFICATION
[facility]   [facility]   [address]   [address]   [place] [address]   [phone] Fax: [phone]    Discharge Note      Referring Physician: NARCISA Addison  Wound location: Right Lower Leg  Date of Discharge: 4/1/19    Assessment:  Discharge patient at this time secondary to wound resolution.    Patient taught:  Avoid prolonged standing or sitting without elevating your legs.  Wear Knee-high gradient compression to legs, especially at work.  Try soccer shin guards to protect your legs at work.  Apply AmLactin lotion to your legs if they look like alligators with the flaky brown skin; otherwise use moisturizing lotion.  Acquire a new referral if you need to return to see us due to opening of wounds.  Should you experience any significant changes in your wound(s), such as infection (redness, swelling, localized heat, increased pain, fever > 101 F, chills) or have any questions regarding your home care instructions, please contact the wound center at [phone]. If after hours, contact your primary care physician or go to the hospital emergency room.         Thank you for the referral and the opportunity to treat your patient.      Clinician Signature: _____________________________ Date:_______________

## 2019-04-15 ENCOUNTER — APPOINTMENT (OUTPATIENT)
Dept: WOUND CARE | Facility: MEDICAL CENTER | Age: 69
End: 2019-04-15
Attending: PHYSICIAN ASSISTANT
Payer: COMMERCIAL

## 2019-04-16 ENCOUNTER — HOSPITAL ENCOUNTER (OUTPATIENT)
Dept: LAB | Facility: MEDICAL CENTER | Age: 69
End: 2019-04-16
Attending: INTERNAL MEDICINE
Payer: COMMERCIAL

## 2019-04-16 LAB
ALBUMIN SERPL BCP-MCNC: 4 G/DL (ref 3.2–4.9)
ALBUMIN/GLOB SERPL: 1.3 G/DL
ALP SERPL-CCNC: 71 U/L (ref 30–99)
ALT SERPL-CCNC: 17 U/L (ref 2–50)
ANION GAP SERPL CALC-SCNC: 8 MMOL/L (ref 0–11.9)
AST SERPL-CCNC: 19 U/L (ref 12–45)
BASOPHILS # BLD AUTO: 0.9 % (ref 0–1.8)
BASOPHILS # BLD: 0.03 K/UL (ref 0–0.12)
BILIRUB SERPL-MCNC: 0.3 MG/DL (ref 0.1–1.5)
BUN SERPL-MCNC: 26 MG/DL (ref 8–22)
CALCIUM SERPL-MCNC: 9.1 MG/DL (ref 8.5–10.5)
CHLORIDE SERPL-SCNC: 105 MMOL/L (ref 96–112)
CO2 SERPL-SCNC: 27 MMOL/L (ref 20–33)
CREAT SERPL-MCNC: 0.87 MG/DL (ref 0.5–1.4)
EOSINOPHIL # BLD AUTO: 0.21 K/UL (ref 0–0.51)
EOSINOPHIL NFR BLD: 6 % (ref 0–6.9)
ERYTHROCYTE [DISTWIDTH] IN BLOOD BY AUTOMATED COUNT: 59.7 FL (ref 35.9–50)
GLOBULIN SER CALC-MCNC: 3 G/DL (ref 1.9–3.5)
GLUCOSE SERPL-MCNC: 105 MG/DL (ref 65–99)
HCT VFR BLD AUTO: 45.4 % (ref 42–52)
HGB BLD-MCNC: 13.6 G/DL (ref 14–18)
IMM GRANULOCYTES # BLD AUTO: 0.01 K/UL (ref 0–0.11)
IMM GRANULOCYTES NFR BLD AUTO: 0.3 % (ref 0–0.9)
INR PPP: 1.07 (ref 0.87–1.13)
LYMPHOCYTES # BLD AUTO: 0.69 K/UL (ref 1–4.8)
LYMPHOCYTES NFR BLD: 19.8 % (ref 22–41)
MCH RBC QN AUTO: 25.1 PG (ref 27–33)
MCHC RBC AUTO-ENTMCNC: 30 G/DL (ref 33.7–35.3)
MCV RBC AUTO: 83.8 FL (ref 81.4–97.8)
MONOCYTES # BLD AUTO: 0.38 K/UL (ref 0–0.85)
MONOCYTES NFR BLD AUTO: 10.9 % (ref 0–13.4)
NEUTROPHILS # BLD AUTO: 2.17 K/UL (ref 1.82–7.42)
NEUTROPHILS NFR BLD: 62.1 % (ref 44–72)
NRBC # BLD AUTO: 0 K/UL
NRBC BLD-RTO: 0 /100 WBC
PLATELET # BLD AUTO: 100 K/UL (ref 164–446)
PMV BLD AUTO: 10.6 FL (ref 9–12.9)
POTASSIUM SERPL-SCNC: 4.3 MMOL/L (ref 3.6–5.5)
PROT SERPL-MCNC: 7 G/DL (ref 6–8.2)
PROTHROMBIN TIME: 14 SEC (ref 12–14.6)
RBC # BLD AUTO: 5.42 M/UL (ref 4.7–6.1)
SODIUM SERPL-SCNC: 140 MMOL/L (ref 135–145)
WBC # BLD AUTO: 3.5 K/UL (ref 4.8–10.8)

## 2019-04-16 PROCEDURE — 80053 COMPREHEN METABOLIC PANEL: CPT

## 2019-04-16 PROCEDURE — 82105 ALPHA-FETOPROTEIN SERUM: CPT

## 2019-04-16 PROCEDURE — 36415 COLL VENOUS BLD VENIPUNCTURE: CPT

## 2019-04-16 PROCEDURE — 85610 PROTHROMBIN TIME: CPT

## 2019-04-16 PROCEDURE — 85025 COMPLETE CBC W/AUTO DIFF WBC: CPT

## 2019-04-18 LAB — AFP-TM SERPL-MCNC: 6 NG/ML (ref 0–9)

## 2019-04-19 ENCOUNTER — OFFICE VISIT (OUTPATIENT)
Dept: CARDIOLOGY | Facility: MEDICAL CENTER | Age: 69
End: 2019-04-19
Payer: COMMERCIAL

## 2019-04-19 VITALS
DIASTOLIC BLOOD PRESSURE: 78 MMHG | OXYGEN SATURATION: 97 % | WEIGHT: 196 LBS | HEART RATE: 68 BPM | BODY MASS INDEX: 26.55 KG/M2 | SYSTOLIC BLOOD PRESSURE: 114 MMHG | HEIGHT: 72 IN

## 2019-04-19 DIAGNOSIS — R93.1 ELEVATED CORONARY ARTERY CALCIUM SCORE: ICD-10-CM

## 2019-04-19 DIAGNOSIS — I25.10 CORONARY ARTERY DISEASE INVOLVING NATIVE CORONARY ARTERY OF NATIVE HEART WITHOUT ANGINA PECTORIS: ICD-10-CM

## 2019-04-19 DIAGNOSIS — I10 ESSENTIAL HYPERTENSION, BENIGN: ICD-10-CM

## 2019-04-19 DIAGNOSIS — R01.1 UNDIAGNOSED CARDIAC MURMURS: ICD-10-CM

## 2019-04-19 DIAGNOSIS — Z79.899 HIGH RISK MEDICATION USE: ICD-10-CM

## 2019-04-19 DIAGNOSIS — I87.8 VENOUS STASIS: ICD-10-CM

## 2019-04-19 PROCEDURE — 99214 OFFICE O/P EST MOD 30 MIN: CPT | Performed by: INTERNAL MEDICINE

## 2019-04-19 RX ORDER — TADALAFIL 20 MG/1
TABLET ORAL
COMMUNITY
Start: 2019-04-12 | End: 2022-03-15

## 2019-04-19 RX ORDER — FERROUS SULFATE 325(65) MG
325 TABLET ORAL 2 TIMES DAILY
COMMUNITY
End: 2022-03-15

## 2019-04-19 ASSESSMENT — ENCOUNTER SYMPTOMS
SORE THROAT: 0
GASTROINTESTINAL NEGATIVE: 1
EYES NEGATIVE: 1
WEAKNESS: 0
COUGH: 0
CHILLS: 0
BRUISES/BLEEDS EASILY: 0
ORTHOPNEA: 0
FEVER: 0
HEMOPTYSIS: 0
SPUTUM PRODUCTION: 0
CLAUDICATION: 0
SHORTNESS OF BREATH: 0
MUSCULOSKELETAL NEGATIVE: 1
CONSTITUTIONAL NEGATIVE: 1
LOSS OF CONSCIOUSNESS: 0
CARDIOVASCULAR NEGATIVE: 1
STRIDOR: 0
WHEEZING: 0
DIZZINESS: 0
PND: 0
RESPIRATORY NEGATIVE: 1
PALPITATIONS: 0
NEUROLOGICAL NEGATIVE: 1

## 2019-04-19 NOTE — LETTER
[facility]   [address], [address]  [place] [address]  Phone: [phone]  Fax: [phone]              Nitesh Duron  1950    Encounter Date: 4/19/2019    Balaji Cano M.D.          PROGRESS NOTE:  Chief Complaint   Patient presents with   • Coronary Artery Disease     F/V: 1 YR       Subjective:   Nitesh Duron is a 68 y.o. male who presents today as a follow-up for his hyperlipidemia and high risk calcium score and hypertension.  Since he was last seen he continues to do well.  He did not start the red rice yeast that we recommended to him.  He is having only occasional lower extremity edema.  His high blood pressures otherwise under control.    Past Medical History:   Diagnosis Date   • Arrhythmia 09/07/2018   • Arthritis     OA- hands and L knee   • Cataract     OU   • Dental disorder     upper   • Diabetes     oral meds   • High cholesterol 09/07/2018    no med; diet controlled   • Hyperlipidemia    • Hypertension    • Renal disorder 09/07/2018    kidney stones     Past Surgical History:   Procedure Laterality Date   • GASTROSCOPY N/A 9/18/2018    Procedure: GASTROSCOPY;  Surgeon: Matt Young M.D.;  Location: SURGERY SAME DAY [facility];  Service: Gastroenterology   • ROTATOR CUFF REPAIR Left 2014   • ANKLE ORIF Right 1988    spiral fx   • MENISCECTOMY Left 1979   • APPENDECTOMY  1958   • CYSTOSCOPY  2004,2008    stone extraction     Family History   Problem Relation Age of Onset   • Other Mother    • Heart Attack Father    • Heart Failure Brother      Social History     Social History   • Marital status:      Spouse name: N/A   • Number of children: N/A   • Years of education: N/A     Occupational History   • Not on file.     Social History Main Topics   • Smoking status: Former Smoker     Types: Cigarettes     Quit date: 1/1/2008   • Smokeless tobacco: Never Used      Comment: quit 2008   • Alcohol use No      • Drug use: Yes     Types: Marijuana   • Sexual activity: No     Other Topics Concern   • Not on file     Social History Narrative   • No narrative on file     Allergies   Allergen Reactions   • Ciprofloxacin Unspecified     convulsions   • Statins [Hmg-Coa-R Inhibitors] Unspecified     Stabbing pains in kidneys   • Acetaminophen      Kidney pain    • Ibuprofen      Kidney pain   • Naproxen      Kidney pain   • Shellfish Allergy      Kidney stones   • Soap &  [Alcohol] Rash     Antibacterial soap- contact dermatitis     Outpatient Encounter Prescriptions as of 4/19/2019   Medication Sig Dispense Refill   • ferrous sulfate 325 (65 Fe) MG tablet Take 325 mg by mouth 2 Times a Day.     • Cyanocobalamin (VITAMIN B-12 PO) Take  by mouth 2 Times a Day.     • Flaxseed, Linseed, (FLAX SEED OIL PO) Take  by mouth.     • metoprolol SR (TOPROL XL) 25 MG TABLET SR 24 HR TAKE ONE TABLET BY MOUTH ONCE DAILY 90 Tab 3   • lisinopril (PRINIVIL) 10 MG Tab 10 mg every day.     • oxycodone-aspirin (PERCODAN) 4.8355-325 MG Tab      • metformin (GLUCOPHAGE) 1000 MG tablet Take 1,000 mg by mouth 2 times a day, with meals.     • Non Formulary Request Take  by mouth 2 Times a Day. Uric acid cleanse     • vitamin e (VITAMIN E) 400 UNIT Cap Take 400 Units by mouth every day.     • vitamin A 8000 UNIT capsule Take 8,000 Units by mouth 2 Times a Day.     • Cholecalciferol (VITAMIN D3) 3000 UNITS Tab Take  by mouth every day.     • Resveratrol 100 MG Cap Take 250 mg by mouth every day.     • KRILL OIL PO Take  by mouth every day.     • Omega-3 Fatty Acids (FISH OIL) 1000 MG Cap capsule Take 1,000 mg by mouth every day.     • ASTAXANTHIN PO Take 4 mg by mouth every day.     • SAW PALMETTO, SERENOA REPENS, PO Take  by mouth every day.     • tadalafil (CIALIS) 20 MG tablet        No facility-administered encounter medications on file as of 4/19/2019.      Review of Systems   Constitutional: Negative.  Negative for chills, fever and  malaise/fatigue.   HENT: Negative.  Negative for sore throat.    Eyes: Negative.    Respiratory: Negative.  Negative for cough, hemoptysis, sputum production, shortness of breath, wheezing and stridor.    Cardiovascular: Negative.  Negative for chest pain, palpitations, orthopnea, claudication, leg swelling and PND.   Gastrointestinal: Negative.    Genitourinary: Negative.    Musculoskeletal: Negative.    Skin: Negative.    Neurological: Negative.  Negative for dizziness, loss of consciousness and weakness.   Endo/Heme/Allergies: Negative.  Does not bruise/bleed easily.   All other systems reviewed and are negative.       Objective:   /78 (BP Location: Left arm, Patient Position: Sitting, BP Cuff Size: Adult)   Pulse 68   Ht 1.829 m (6')   Wt 88.9 kg (196 lb)   SpO2 97%   BMI 26.58 kg/m²      Physical Exam   Constitutional: He appears well-developed and well-nourished. No distress.   HENT:   Head: Normocephalic and atraumatic.   Right Ear: External ear normal.   Left Ear: External ear normal.   Nose: Nose normal.   Mouth/Throat: No oropharyngeal exudate.   Eyes: Pupils are equal, round, and reactive to light. Conjunctivae and EOM are normal. Right eye exhibits no discharge. Left eye exhibits no discharge. No scleral icterus.   Neck: Neck supple. No JVD present.   Cardiovascular: Normal rate, regular rhythm and intact distal pulses.  Exam reveals no gallop and no friction rub.    No murmur heard.  Pulmonary/Chest: Effort normal. No stridor. No respiratory distress. He has no wheezes. He has no rales. He exhibits no tenderness.   Abdominal: Soft. He exhibits no distension. There is no guarding.   Musculoskeletal: Normal range of motion. He exhibits no edema, tenderness or deformity.   Neurological: He is alert. He has normal reflexes. He displays normal reflexes. No cranial nerve deficit. He exhibits normal muscle tone. Coordination normal.   Skin: Skin is warm and dry. No rash noted. He is not diaphoretic.  No erythema. No pallor.   Psychiatric: He has a normal mood and affect. His behavior is normal. Judgment and thought content normal.   Nursing note and vitals reviewed.      Assessment:     1. Coronary artery disease involving native coronary artery of native heart without angina pectoris     2. Elevated coronary artery calcium score     3. Essential hypertension, benign     4. High risk medication use     5. Undiagnosed cardiac murmurs     6. Venous stasis         Medical Decision Making:  Today's Assessment / Status / Plan:     60-year-old male with hyperlipidemia hypertension and elevated cardiac calcium score.  I again advised him to get some red rice yeast.  I gave him instructions on where to go to get some.  In the meantime his blood pressure will continue to follow.  He prefers to stay off medications.  We will see him back in 6 months.    Thank for you allowing me to take part in your patient's care, please call should you have any questions or would like to discuss this patient.      RUPINDER Alvarez.  [address] [place] [id number]  VIA Facsimile: [phone]

## 2019-04-19 NOTE — PROGRESS NOTES
Chief Complaint   Patient presents with   • Coronary Artery Disease     F/V: 1 YR       Subjective:   Nitesh Duron is a 68 y.o. male who presents today as a follow-up for his hyperlipidemia and high risk calcium score and hypertension.  Since he was last seen he continues to do well.  He did not start the red rice yeast that we recommended to him.  He is having only occasional lower extremity edema.  His high blood pressures otherwise under control.    Past Medical History:   Diagnosis Date   • Arrhythmia 09/07/2018   • Arthritis     OA- hands and L knee   • Cataract     OU   • Dental disorder     upper   • Diabetes     oral meds   • High cholesterol 09/07/2018    no med; diet controlled   • Hyperlipidemia    • Hypertension    • Renal disorder 09/07/2018    kidney stones     Past Surgical History:   Procedure Laterality Date   • GASTROSCOPY N/A 9/18/2018    Procedure: GASTROSCOPY;  Surgeon: Matt Young M.D.;  Location: SURGERY SAME DAY [facility];  Service: Gastroenterology   • ROTATOR CUFF REPAIR Left 2014   • ANKLE ORIF Right 1988    spiral fx   • MENISCECTOMY Left 1979   • APPENDECTOMY  1958   • CYSTOSCOPY  2004,2008    stone extraction     Family History   Problem Relation Age of Onset   • Other Mother    • Heart Attack Father    • Heart Failure Brother      Social History     Social History   • Marital status:      Spouse name: N/A   • Number of children: N/A   • Years of education: N/A     Occupational History   • Not on file.     Social History Main Topics   • Smoking status: Former Smoker     Types: Cigarettes     Quit date: 1/1/2008   • Smokeless tobacco: Never Used      Comment: quit 2008   • Alcohol use No   • Drug use: Yes     Types: Marijuana   • Sexual activity: No     Other Topics Concern   • Not on file     Social History Narrative   • No narrative on file     Allergies   Allergen Reactions   • Ciprofloxacin Unspecified     convulsions   • Statins [Hmg-Coa-R Inhibitors]  Unspecified     Stabbing pains in kidneys   • Acetaminophen      Kidney pain    • Ibuprofen      Kidney pain   • Naproxen      Kidney pain   • Shellfish Allergy      Kidney stones   • Soap &  [Alcohol] Rash     Antibacterial soap- contact dermatitis     Outpatient Encounter Prescriptions as of 4/19/2019   Medication Sig Dispense Refill   • ferrous sulfate 325 (65 Fe) MG tablet Take 325 mg by mouth 2 Times a Day.     • Cyanocobalamin (VITAMIN B-12 PO) Take  by mouth 2 Times a Day.     • Flaxseed, Linseed, (FLAX SEED OIL PO) Take  by mouth.     • metoprolol SR (TOPROL XL) 25 MG TABLET SR 24 HR TAKE ONE TABLET BY MOUTH ONCE DAILY 90 Tab 3   • lisinopril (PRINIVIL) 10 MG Tab 10 mg every day.     • oxycodone-aspirin (PERCODAN) 4.8355-325 MG Tab      • metformin (GLUCOPHAGE) 1000 MG tablet Take 1,000 mg by mouth 2 times a day, with meals.     • Non Formulary Request Take  by mouth 2 Times a Day. Uric acid cleanse     • vitamin e (VITAMIN E) 400 UNIT Cap Take 400 Units by mouth every day.     • vitamin A 8000 UNIT capsule Take 8,000 Units by mouth 2 Times a Day.     • Cholecalciferol (VITAMIN D3) 3000 UNITS Tab Take  by mouth every day.     • Resveratrol 100 MG Cap Take 250 mg by mouth every day.     • KRILL OIL PO Take  by mouth every day.     • Omega-3 Fatty Acids (FISH OIL) 1000 MG Cap capsule Take 1,000 mg by mouth every day.     • ASTAXANTHIN PO Take 4 mg by mouth every day.     • SAW PALMETTO, SERENOA REPENS, PO Take  by mouth every day.     • tadalafil (CIALIS) 20 MG tablet        No facility-administered encounter medications on file as of 4/19/2019.      Review of Systems   Constitutional: Negative.  Negative for chills, fever and malaise/fatigue.   HENT: Negative.  Negative for sore throat.    Eyes: Negative.    Respiratory: Negative.  Negative for cough, hemoptysis, sputum production, shortness of breath, wheezing and stridor.    Cardiovascular: Negative.  Negative for chest pain, palpitations,  orthopnea, claudication, leg swelling and PND.   Gastrointestinal: Negative.    Genitourinary: Negative.    Musculoskeletal: Negative.    Skin: Negative.    Neurological: Negative.  Negative for dizziness, loss of consciousness and weakness.   Endo/Heme/Allergies: Negative.  Does not bruise/bleed easily.   All other systems reviewed and are negative.       Objective:   /78 (BP Location: Left arm, Patient Position: Sitting, BP Cuff Size: Adult)   Pulse 68   Ht 1.829 m (6')   Wt 88.9 kg (196 lb)   SpO2 97%   BMI 26.58 kg/m²     Physical Exam   Constitutional: He appears well-developed and well-nourished. No distress.   HENT:   Head: Normocephalic and atraumatic.   Right Ear: External ear normal.   Left Ear: External ear normal.   Nose: Nose normal.   Mouth/Throat: No oropharyngeal exudate.   Eyes: Pupils are equal, round, and reactive to light. Conjunctivae and EOM are normal. Right eye exhibits no discharge. Left eye exhibits no discharge. No scleral icterus.   Neck: Neck supple. No JVD present.   Cardiovascular: Normal rate, regular rhythm and intact distal pulses.  Exam reveals no gallop and no friction rub.    No murmur heard.  Pulmonary/Chest: Effort normal. No stridor. No respiratory distress. He has no wheezes. He has no rales. He exhibits no tenderness.   Abdominal: Soft. He exhibits no distension. There is no guarding.   Musculoskeletal: Normal range of motion. He exhibits no edema, tenderness or deformity.   Neurological: He is alert. He has normal reflexes. He displays normal reflexes. No cranial nerve deficit. He exhibits normal muscle tone. Coordination normal.   Skin: Skin is warm and dry. No rash noted. He is not diaphoretic. No erythema. No pallor.   Psychiatric: He has a normal mood and affect. His behavior is normal. Judgment and thought content normal.   Nursing note and vitals reviewed.      Assessment:     1. Coronary artery disease involving native coronary artery of native heart  without angina pectoris     2. Elevated coronary artery calcium score     3. Essential hypertension, benign     4. High risk medication use     5. Undiagnosed cardiac murmurs     6. Venous stasis         Medical Decision Making:  Today's Assessment / Status / Plan:     60-year-old male with hyperlipidemia hypertension and elevated cardiac calcium score.  I again advised him to get some red rice yeast.  I gave him instructions on where to go to get some.  In the meantime his blood pressure will continue to follow.  He prefers to stay off medications.  We will see him back in 6 months.    Thank for you allowing me to take part in your patient's care, please call should you have any questions or would like to discuss this patient.

## 2019-05-02 ENCOUNTER — HOSPITAL ENCOUNTER (OUTPATIENT)
Dept: LAB | Facility: MEDICAL CENTER | Age: 69
End: 2019-05-02
Attending: PHYSICIAN ASSISTANT
Payer: COMMERCIAL

## 2019-05-02 LAB
ALBUMIN SERPL BCP-MCNC: 3.8 G/DL (ref 3.2–4.9)
ALBUMIN/GLOB SERPL: 1.3 G/DL
ALP SERPL-CCNC: 65 U/L (ref 30–99)
ALT SERPL-CCNC: 18 U/L (ref 2–50)
ANION GAP SERPL CALC-SCNC: 7 MMOL/L (ref 0–11.9)
AST SERPL-CCNC: 18 U/L (ref 12–45)
BASOPHILS # BLD AUTO: 0.9 % (ref 0–1.8)
BASOPHILS # BLD: 0.03 K/UL (ref 0–0.12)
BILIRUB SERPL-MCNC: 0.4 MG/DL (ref 0.1–1.5)
BUN SERPL-MCNC: 18 MG/DL (ref 8–22)
CALCIUM SERPL-MCNC: 9.3 MG/DL (ref 8.5–10.5)
CHLORIDE SERPL-SCNC: 103 MMOL/L (ref 96–112)
CHOLEST SERPL-MCNC: 188 MG/DL (ref 100–199)
CO2 SERPL-SCNC: 27 MMOL/L (ref 20–33)
CREAT SERPL-MCNC: 0.72 MG/DL (ref 0.5–1.4)
CREAT UR-MCNC: 146.1 MG/DL
EOSINOPHIL # BLD AUTO: 0.13 K/UL (ref 0–0.51)
EOSINOPHIL NFR BLD: 4 % (ref 0–6.9)
ERYTHROCYTE [DISTWIDTH] IN BLOOD BY AUTOMATED COUNT: 55.7 FL (ref 35.9–50)
EST. AVERAGE GLUCOSE BLD GHB EST-MCNC: 134 MG/DL
FASTING STATUS PATIENT QL REPORTED: NORMAL
GLOBULIN SER CALC-MCNC: 3 G/DL (ref 1.9–3.5)
GLUCOSE SERPL-MCNC: 139 MG/DL (ref 65–99)
HBA1C MFR BLD: 6.3 % (ref 0–5.6)
HCT VFR BLD AUTO: 42.4 % (ref 42–52)
HDLC SERPL-MCNC: 35 MG/DL
HGB BLD-MCNC: 13.4 G/DL (ref 14–18)
IMM GRANULOCYTES # BLD AUTO: 0.01 K/UL (ref 0–0.11)
IMM GRANULOCYTES NFR BLD AUTO: 0.3 % (ref 0–0.9)
LDLC SERPL CALC-MCNC: 136 MG/DL
LYMPHOCYTES # BLD AUTO: 0.6 K/UL (ref 1–4.8)
LYMPHOCYTES NFR BLD: 18.3 % (ref 22–41)
MCH RBC QN AUTO: 26.2 PG (ref 27–33)
MCHC RBC AUTO-ENTMCNC: 31.6 G/DL (ref 33.7–35.3)
MCV RBC AUTO: 82.8 FL (ref 81.4–97.8)
MICROALBUMIN UR-MCNC: 2.6 MG/DL
MICROALBUMIN/CREAT UR: 18 MG/G (ref 0–30)
MONOCYTES # BLD AUTO: 0.35 K/UL (ref 0–0.85)
MONOCYTES NFR BLD AUTO: 10.7 % (ref 0–13.4)
NEUTROPHILS # BLD AUTO: 2.15 K/UL (ref 1.82–7.42)
NEUTROPHILS NFR BLD: 65.8 % (ref 44–72)
NRBC # BLD AUTO: 0 K/UL
NRBC BLD-RTO: 0 /100 WBC
PLATELET # BLD AUTO: 76 K/UL (ref 164–446)
PMV BLD AUTO: 9.9 FL (ref 9–12.9)
POTASSIUM SERPL-SCNC: 4.2 MMOL/L (ref 3.6–5.5)
PROT SERPL-MCNC: 6.8 G/DL (ref 6–8.2)
RBC # BLD AUTO: 5.12 M/UL (ref 4.7–6.1)
SODIUM SERPL-SCNC: 137 MMOL/L (ref 135–145)
TRIGL SERPL-MCNC: 85 MG/DL (ref 0–149)
WBC # BLD AUTO: 3.3 K/UL (ref 4.8–10.8)

## 2019-05-02 PROCEDURE — 80061 LIPID PANEL: CPT

## 2019-05-02 PROCEDURE — 83036 HEMOGLOBIN GLYCOSYLATED A1C: CPT | Mod: GA

## 2019-05-02 PROCEDURE — 82043 UR ALBUMIN QUANTITATIVE: CPT

## 2019-05-02 PROCEDURE — 36415 COLL VENOUS BLD VENIPUNCTURE: CPT

## 2019-05-02 PROCEDURE — 82570 ASSAY OF URINE CREATININE: CPT

## 2019-05-02 PROCEDURE — 85025 COMPLETE CBC W/AUTO DIFF WBC: CPT

## 2019-05-02 PROCEDURE — 80053 COMPREHEN METABOLIC PANEL: CPT

## 2019-07-02 DIAGNOSIS — R93.1 ELEVATED CORONARY ARTERY CALCIUM SCORE: ICD-10-CM

## 2019-07-02 DIAGNOSIS — I10 ESSENTIAL HYPERTENSION, BENIGN: ICD-10-CM

## 2019-07-03 RX ORDER — METOPROLOL SUCCINATE 25 MG/1
TABLET, EXTENDED RELEASE ORAL
Qty: 90 TAB | Refills: 3 | Status: SHIPPED | OUTPATIENT
Start: 2019-07-03 | End: 2020-07-02 | Stop reason: SDUPTHER

## 2019-08-14 ENCOUNTER — HOSPITAL ENCOUNTER (OUTPATIENT)
Dept: LAB | Facility: MEDICAL CENTER | Age: 69
End: 2019-08-14
Attending: INTERNAL MEDICINE
Payer: COMMERCIAL

## 2019-08-14 LAB
ALBUMIN SERPL BCP-MCNC: 3.8 G/DL (ref 3.2–4.9)
ALBUMIN/GLOB SERPL: 1.1 G/DL
ALP SERPL-CCNC: 78 U/L (ref 30–99)
ALT SERPL-CCNC: 15 U/L (ref 2–50)
ANION GAP SERPL CALC-SCNC: 8 MMOL/L (ref 0–11.9)
AST SERPL-CCNC: 20 U/L (ref 12–45)
BASOPHILS # BLD AUTO: 0.8 % (ref 0–1.8)
BASOPHILS # BLD: 0.03 K/UL (ref 0–0.12)
BILIRUB SERPL-MCNC: 0.6 MG/DL (ref 0.1–1.5)
BUN SERPL-MCNC: 18 MG/DL (ref 8–22)
CALCIUM SERPL-MCNC: 9.6 MG/DL (ref 8.5–10.5)
CHLORIDE SERPL-SCNC: 104 MMOL/L (ref 96–112)
CO2 SERPL-SCNC: 26 MMOL/L (ref 20–33)
CREAT SERPL-MCNC: 0.9 MG/DL (ref 0.5–1.4)
EOSINOPHIL # BLD AUTO: 0.18 K/UL (ref 0–0.51)
EOSINOPHIL NFR BLD: 4.5 % (ref 0–6.9)
ERYTHROCYTE [DISTWIDTH] IN BLOOD BY AUTOMATED COUNT: 48.6 FL (ref 35.9–50)
GLOBULIN SER CALC-MCNC: 3.5 G/DL (ref 1.9–3.5)
GLUCOSE SERPL-MCNC: 118 MG/DL (ref 65–99)
HCT VFR BLD AUTO: 41.3 % (ref 42–52)
HGB BLD-MCNC: 13 G/DL (ref 14–18)
IMM GRANULOCYTES # BLD AUTO: 0.02 K/UL (ref 0–0.11)
IMM GRANULOCYTES NFR BLD AUTO: 0.5 % (ref 0–0.9)
LYMPHOCYTES # BLD AUTO: 0.75 K/UL (ref 1–4.8)
LYMPHOCYTES NFR BLD: 18.8 % (ref 22–41)
MCH RBC QN AUTO: 28.3 PG (ref 27–33)
MCHC RBC AUTO-ENTMCNC: 31.5 G/DL (ref 33.7–35.3)
MCV RBC AUTO: 90 FL (ref 81.4–97.8)
MONOCYTES # BLD AUTO: 0.44 K/UL (ref 0–0.85)
MONOCYTES NFR BLD AUTO: 11 % (ref 0–13.4)
NEUTROPHILS # BLD AUTO: 2.57 K/UL (ref 1.82–7.42)
NEUTROPHILS NFR BLD: 64.4 % (ref 44–72)
NRBC # BLD AUTO: 0 K/UL
NRBC BLD-RTO: 0 /100 WBC
PLATELET # BLD AUTO: 75 K/UL (ref 164–446)
PMV BLD AUTO: 10.4 FL (ref 9–12.9)
POTASSIUM SERPL-SCNC: 4.2 MMOL/L (ref 3.6–5.5)
PROT SERPL-MCNC: 7.3 G/DL (ref 6–8.2)
RBC # BLD AUTO: 4.59 M/UL (ref 4.7–6.1)
SODIUM SERPL-SCNC: 138 MMOL/L (ref 135–145)
WBC # BLD AUTO: 4 K/UL (ref 4.8–10.8)

## 2019-08-14 PROCEDURE — 36415 COLL VENOUS BLD VENIPUNCTURE: CPT

## 2019-08-14 PROCEDURE — 80053 COMPREHEN METABOLIC PANEL: CPT

## 2019-08-14 PROCEDURE — 85025 COMPLETE CBC W/AUTO DIFF WBC: CPT

## 2019-08-16 ENCOUNTER — HOSPITAL ENCOUNTER (OUTPATIENT)
Dept: LAB | Facility: MEDICAL CENTER | Age: 69
End: 2019-08-16
Attending: PHYSICIAN ASSISTANT
Payer: COMMERCIAL

## 2019-08-16 LAB
ALBUMIN SERPL BCP-MCNC: 3.8 G/DL (ref 3.2–4.9)
ALBUMIN/GLOB SERPL: 1.2 G/DL
ALP SERPL-CCNC: 74 U/L (ref 30–99)
ALT SERPL-CCNC: 12 U/L (ref 2–50)
ANION GAP SERPL CALC-SCNC: 6 MMOL/L (ref 0–11.9)
AST SERPL-CCNC: 16 U/L (ref 12–45)
BASOPHILS # BLD AUTO: 0.3 % (ref 0–1.8)
BASOPHILS # BLD: 0.01 K/UL (ref 0–0.12)
BILIRUB SERPL-MCNC: 0.5 MG/DL (ref 0.1–1.5)
BUN SERPL-MCNC: 20 MG/DL (ref 8–22)
CALCIUM SERPL-MCNC: 9.4 MG/DL (ref 8.5–10.5)
CHLORIDE SERPL-SCNC: 105 MMOL/L (ref 96–112)
CHOLEST SERPL-MCNC: 182 MG/DL (ref 100–199)
CO2 SERPL-SCNC: 28 MMOL/L (ref 20–33)
CREAT SERPL-MCNC: 0.85 MG/DL (ref 0.5–1.4)
EOSINOPHIL # BLD AUTO: 0.15 K/UL (ref 0–0.51)
EOSINOPHIL NFR BLD: 5 % (ref 0–6.9)
ERYTHROCYTE [DISTWIDTH] IN BLOOD BY AUTOMATED COUNT: 48.6 FL (ref 35.9–50)
FASTING STATUS PATIENT QL REPORTED: NORMAL
GLOBULIN SER CALC-MCNC: 3.2 G/DL (ref 1.9–3.5)
GLUCOSE SERPL-MCNC: 141 MG/DL (ref 65–99)
HCT VFR BLD AUTO: 40.4 % (ref 42–52)
HDLC SERPL-MCNC: 38 MG/DL
HGB BLD-MCNC: 12.9 G/DL (ref 14–18)
IMM GRANULOCYTES # BLD AUTO: 0.02 K/UL (ref 0–0.11)
IMM GRANULOCYTES NFR BLD AUTO: 0.7 % (ref 0–0.9)
LDLC SERPL CALC-MCNC: 126 MG/DL
LYMPHOCYTES # BLD AUTO: 0.54 K/UL (ref 1–4.8)
LYMPHOCYTES NFR BLD: 18.1 % (ref 22–41)
MCH RBC QN AUTO: 28.8 PG (ref 27–33)
MCHC RBC AUTO-ENTMCNC: 31.9 G/DL (ref 33.7–35.3)
MCV RBC AUTO: 90.2 FL (ref 81.4–97.8)
MONOCYTES # BLD AUTO: 0.35 K/UL (ref 0–0.85)
MONOCYTES NFR BLD AUTO: 11.7 % (ref 0–13.4)
NEUTROPHILS # BLD AUTO: 1.92 K/UL (ref 1.82–7.42)
NEUTROPHILS NFR BLD: 64.2 % (ref 44–72)
NRBC # BLD AUTO: 0 K/UL
NRBC BLD-RTO: 0 /100 WBC
PLATELET # BLD AUTO: 64 K/UL (ref 164–446)
PMV BLD AUTO: 10 FL (ref 9–12.9)
POTASSIUM SERPL-SCNC: 4 MMOL/L (ref 3.6–5.5)
PROT SERPL-MCNC: 7 G/DL (ref 6–8.2)
RBC # BLD AUTO: 4.48 M/UL (ref 4.7–6.1)
SODIUM SERPL-SCNC: 139 MMOL/L (ref 135–145)
TRIGL SERPL-MCNC: 92 MG/DL (ref 0–149)
WBC # BLD AUTO: 3 K/UL (ref 4.8–10.8)

## 2019-08-16 PROCEDURE — 80053 COMPREHEN METABOLIC PANEL: CPT

## 2019-08-16 PROCEDURE — 36415 COLL VENOUS BLD VENIPUNCTURE: CPT

## 2019-08-16 PROCEDURE — 80061 LIPID PANEL: CPT

## 2019-08-16 PROCEDURE — 83036 HEMOGLOBIN GLYCOSYLATED A1C: CPT | Mod: GA

## 2019-08-16 PROCEDURE — 85025 COMPLETE CBC W/AUTO DIFF WBC: CPT

## 2019-08-17 LAB
EST. AVERAGE GLUCOSE BLD GHB EST-MCNC: 126 MG/DL
HBA1C MFR BLD: 6 % (ref 0–5.6)

## 2019-08-26 ENCOUNTER — TELEPHONE (OUTPATIENT)
Dept: CARDIOLOGY | Facility: MEDICAL CENTER | Age: 69
End: 2019-08-26

## 2019-08-26 DIAGNOSIS — R93.1 ELEVATED CORONARY ARTERY CALCIUM SCORE: ICD-10-CM

## 2019-08-26 DIAGNOSIS — I10 ESSENTIAL HYPERTENSION, BENIGN: ICD-10-CM

## 2019-08-26 NOTE — TELEPHONE ENCOUNTER
ANTHONY/Liam      Patient is calling to request labs for 11/01 appt. He is also asking if he needs an echo order. Patient is getting ready to leave for work, so is asking for you to leave a message on his voicemail. Pt. #[phone].

## 2019-09-23 ENCOUNTER — OFFICE VISIT (OUTPATIENT)
Dept: URGENT CARE | Facility: PHYSICIAN GROUP | Age: 69
End: 2019-09-23
Payer: COMMERCIAL

## 2019-09-23 VITALS
BODY MASS INDEX: 25.63 KG/M2 | SYSTOLIC BLOOD PRESSURE: 104 MMHG | DIASTOLIC BLOOD PRESSURE: 72 MMHG | TEMPERATURE: 99 F | WEIGHT: 189 LBS | HEART RATE: 62 BPM | OXYGEN SATURATION: 95 % | RESPIRATION RATE: 14 BRPM

## 2019-09-23 DIAGNOSIS — M79.671 RIGHT FOOT PAIN: ICD-10-CM

## 2019-09-23 DIAGNOSIS — E11.9 TYPE 2 DIABETES MELLITUS WITHOUT COMPLICATION, UNSPECIFIED WHETHER LONG TERM INSULIN USE (HCC): ICD-10-CM

## 2019-09-23 PROCEDURE — 99203 OFFICE O/P NEW LOW 30 MIN: CPT | Performed by: NURSE PRACTITIONER

## 2019-09-23 ASSESSMENT — ENCOUNTER SYMPTOMS: FEVER: 0

## 2019-09-23 NOTE — PROGRESS NOTES
Subjective:      Nitesh Duron is a 69 y.o. male who presents with Letter for School/Work (to go back to work/ Pt states he was off for 3d for his R foot pain/ )            Foot Problem   This is a new problem. Episode onset: pt reports he is a diabetic. he states he sometimes struggles with foot pain and sores d/t DM. He states he developed a sore last week on his right foot and missed 3 days of work. Requesting note to return to work today. Progression since onset: he states the foot has improved significantly and he wants to go back to work. Pertinent negatives include no fever. Treatments tried: states he is wrapping the foot with his own materials and wound care creams, he is declining to remove wrap during visit because he states we cannot provide the same materials to re-wrap it. The treatment provided significant relief.       Review of Systems   Constitutional: Negative for fever.   Musculoskeletal:        Right foot problem   All other systems reviewed and are negative.    Past Medical History:   Diagnosis Date   • Arrhythmia 09/07/2018   • Arthritis     OA- hands and L knee   • Cataract     OU   • Dental disorder     upper   • Diabetes     oral meds   • High cholesterol 09/07/2018    no med; diet controlled   • Hyperlipidemia    • Hypertension    • Renal disorder 09/07/2018    kidney stones      Past Surgical History:   Procedure Laterality Date   • GASTROSCOPY N/A 9/18/2018    Procedure: GASTROSCOPY;  Surgeon: Matt Young M.D.;  Location: SURGERY SAME DAY [facility];  Service: Gastroenterology   • ROTATOR CUFF REPAIR Left 2014   • ANKLE ORIF Right 1988    spiral fx   • MENISCECTOMY Left 1979   • APPENDECTOMY  1958   • CYSTOSCOPY  2004,2008    stone extraction      Social History     Socioeconomic History   • Marital status:      Spouse name: Not on file   • Number of children: Not on file   • Years of education: Not on file   • Highest education level: Not on file   Occupational  History   • Not on file   Social Needs   • Financial resource strain: Not on file   • Food insecurity:     Worry: Not on file     Inability: Not on file   • Transportation needs:     Medical: Not on file     Non-medical: Not on file   Tobacco Use   • Smoking status: Former Smoker     Types: Cigarettes     Last attempt to quit: 2008     Years since quittin.7   • Smokeless tobacco: Never Used   • Tobacco comment: quit    Substance and Sexual Activity   • Alcohol use: No   • Drug use: Yes     Types: Marijuana   • Sexual activity: Never   Lifestyle   • Physical activity:     Days per week: Not on file     Minutes per session: Not on file   • Stress: Not on file   Relationships   • Social connections:     Talks on phone: Not on file     Gets together: Not on file     Attends Sikhism service: Not on file     Active member of club or organization: Not on file     Attends meetings of clubs or organizations: Not on file     Relationship status: Not on file   • Intimate partner violence:     Fear of current or ex partner: Not on file     Emotionally abused: Not on file     Physically abused: Not on file     Forced sexual activity: Not on file   Other Topics Concern   • Not on file   Social History Narrative   • Not on file          Objective:     /72   Pulse 62   Temp 37.2 °C (99 °F) (Temporal)   Resp 14   Wt 85.7 kg (189 lb)   SpO2 95%   BMI 25.63 kg/m²      Physical Exam   Constitutional: He is oriented to person, place, and time. Vital signs are normal. He appears well-developed and well-nourished.   HENT:   Head: Normocephalic and atraumatic.   Eyes: Pupils are equal, round, and reactive to light. EOM are normal.   Neck: Normal range of motion.   Cardiovascular: Normal rate and regular rhythm.   Pulmonary/Chest: Effort normal.   Musculoskeletal: Normal range of motion.        Feet:    Neurological: He is alert and oriented to person, place, and time.   Skin: Skin is warm and dry. Capillary  refill takes less than 2 seconds.   Psychiatric: He has a normal mood and affect. His speech is normal and behavior is normal. Thought content normal.   Vitals reviewed.              Assessment/Plan:     1. Type 2 diabetes mellitus without complication, unspecified whether long term insulin use (HCC)    2. Right foot pain    Pt is only requesting a note to return to work  He is refusing to remove current dressing for me to eval his right foot.  He states there is no infection and the current dressing is on the way he likes it and does not want to remove it  Informed pt there is no way for me to evaluate the foot and clear any infection if I cannot see it. He still refuses  Work note provided  Supportive care, differential diagnoses, and indications for immediate follow-up discussed with patient.    Pathogenesis of diagnosis discussed including typical length and natural progression.      Instructed to return to  or nearest emergency department if symptoms fail to improve, for any change in condition, further concerns, or new concerning symptoms.  Patient states understanding of the plan of care and discharge instructions.

## 2019-09-23 NOTE — LETTER
September 23, 2019         Patient: Nitesh Duron   YOB: 1950   Date of Visit: 9/23/2019           To Whom it May Concern:    Nitesh Duron was seen in my clinic on 9/23/2019. Please excuse him from work 9/18/19-9/20/19. He can return to work 9/23/19.        Sincerely,           VERNON Tejeda.  Electronically Signed

## 2019-10-04 ENCOUNTER — HOSPITAL ENCOUNTER (OUTPATIENT)
Dept: LAB | Facility: MEDICAL CENTER | Age: 69
End: 2019-10-04
Attending: INTERNAL MEDICINE
Payer: COMMERCIAL

## 2019-10-04 LAB
ALBUMIN SERPL BCP-MCNC: 4.2 G/DL (ref 3.2–4.9)
ALBUMIN/GLOB SERPL: 1.4 G/DL
ALP SERPL-CCNC: 75 U/L (ref 30–99)
ALT SERPL-CCNC: 16 U/L (ref 2–50)
ANION GAP SERPL CALC-SCNC: 6 MMOL/L (ref 0–11.9)
AST SERPL-CCNC: 20 U/L (ref 12–45)
BASOPHILS # BLD AUTO: 0.7 % (ref 0–1.8)
BASOPHILS # BLD: 0.02 K/UL (ref 0–0.12)
BILIRUB SERPL-MCNC: 0.5 MG/DL (ref 0.1–1.5)
BUN SERPL-MCNC: 17 MG/DL (ref 8–22)
CALCIUM SERPL-MCNC: 8.8 MG/DL (ref 8.5–10.5)
CHLORIDE SERPL-SCNC: 105 MMOL/L (ref 96–112)
CO2 SERPL-SCNC: 29 MMOL/L (ref 20–33)
CREAT SERPL-MCNC: 0.88 MG/DL (ref 0.5–1.4)
EOSINOPHIL # BLD AUTO: 0.15 K/UL (ref 0–0.51)
EOSINOPHIL NFR BLD: 5.1 % (ref 0–6.9)
ERYTHROCYTE [DISTWIDTH] IN BLOOD BY AUTOMATED COUNT: 50.4 FL (ref 35.9–50)
GLOBULIN SER CALC-MCNC: 2.9 G/DL (ref 1.9–3.5)
GLUCOSE SERPL-MCNC: 115 MG/DL (ref 65–99)
HCT VFR BLD AUTO: 43.6 % (ref 42–52)
HGB BLD-MCNC: 13.9 G/DL (ref 14–18)
IMM GRANULOCYTES # BLD AUTO: 0.01 K/UL (ref 0–0.11)
IMM GRANULOCYTES NFR BLD AUTO: 0.3 % (ref 0–0.9)
INR PPP: 1.09 (ref 0.87–1.13)
LYMPHOCYTES # BLD AUTO: 0.78 K/UL (ref 1–4.8)
LYMPHOCYTES NFR BLD: 26.6 % (ref 22–41)
MCH RBC QN AUTO: 29.3 PG (ref 27–33)
MCHC RBC AUTO-ENTMCNC: 31.9 G/DL (ref 33.7–35.3)
MCV RBC AUTO: 91.8 FL (ref 81.4–97.8)
MONOCYTES # BLD AUTO: 0.4 K/UL (ref 0–0.85)
MONOCYTES NFR BLD AUTO: 13.7 % (ref 0–13.4)
NEUTROPHILS # BLD AUTO: 1.57 K/UL (ref 1.82–7.42)
NEUTROPHILS NFR BLD: 53.6 % (ref 44–72)
NRBC # BLD AUTO: 0 K/UL
NRBC BLD-RTO: 0 /100 WBC
PLATELET # BLD AUTO: 69 K/UL (ref 164–446)
PMV BLD AUTO: 10.2 FL (ref 9–12.9)
POTASSIUM SERPL-SCNC: 4.2 MMOL/L (ref 3.6–5.5)
PROT SERPL-MCNC: 7.1 G/DL (ref 6–8.2)
PROTHROMBIN TIME: 14.4 SEC (ref 12–14.6)
RBC # BLD AUTO: 4.75 M/UL (ref 4.7–6.1)
SODIUM SERPL-SCNC: 140 MMOL/L (ref 135–145)
WBC # BLD AUTO: 2.9 K/UL (ref 4.8–10.8)

## 2019-10-04 PROCEDURE — 36415 COLL VENOUS BLD VENIPUNCTURE: CPT

## 2019-10-04 PROCEDURE — 85025 COMPLETE CBC W/AUTO DIFF WBC: CPT

## 2019-10-04 PROCEDURE — 85610 PROTHROMBIN TIME: CPT

## 2019-10-04 PROCEDURE — 80053 COMPREHEN METABOLIC PANEL: CPT

## 2019-10-18 ENCOUNTER — HOSPITAL ENCOUNTER (OUTPATIENT)
Dept: RADIOLOGY | Facility: MEDICAL CENTER | Age: 69
End: 2019-10-18
Attending: INTERNAL MEDICINE
Payer: COMMERCIAL

## 2019-10-18 DIAGNOSIS — K76.6 PORTAL HYPERTENSION (HCC): ICD-10-CM

## 2019-10-18 DIAGNOSIS — K72.10 END-STAGE LIVER DISEASE (HCC): ICD-10-CM

## 2019-10-18 PROCEDURE — 76700 US EXAM ABDOM COMPLETE: CPT

## 2019-10-22 ENCOUNTER — TELEPHONE (OUTPATIENT)
Dept: CARDIOLOGY | Facility: MEDICAL CENTER | Age: 69
End: 2019-10-22

## 2019-10-22 NOTE — TELEPHONE ENCOUNTER
Noted that pt called for lab orders on 8/26/19 and orders were placed at that time. Called pt and notified of this. He is planning on using a [facility] location for lab draws, so informed him they will already have the order.

## 2019-10-22 NOTE — TELEPHONE ENCOUNTER
RO    Hello, patient is calling because he would like to know If he has to do any blood work before his appt with  he would like a call back at [phone]

## 2019-10-23 NOTE — TELEPHONE ENCOUNTER
Patient called back to see if they needs to fast for their labs. Please call the patient  back [phone] to advise.     Thank you,  Janessa GONG

## 2019-10-25 ENCOUNTER — OFFICE VISIT (OUTPATIENT)
Dept: URGENT CARE | Facility: PHYSICIAN GROUP | Age: 69
End: 2019-10-25
Payer: COMMERCIAL

## 2019-10-25 VITALS
OXYGEN SATURATION: 96 % | SYSTOLIC BLOOD PRESSURE: 138 MMHG | WEIGHT: 187 LBS | BODY MASS INDEX: 25.36 KG/M2 | HEART RATE: 70 BPM | RESPIRATION RATE: 18 BRPM | TEMPERATURE: 99.6 F | DIASTOLIC BLOOD PRESSURE: 90 MMHG

## 2019-10-25 DIAGNOSIS — L98.9 SKIN LESION OF FOOT: ICD-10-CM

## 2019-10-25 PROCEDURE — 99213 OFFICE O/P EST LOW 20 MIN: CPT | Performed by: PHYSICIAN ASSISTANT

## 2019-10-25 NOTE — PROGRESS NOTES
"Chief Complaint   Patient presents with   • Letter for School/Work     patient was on FMLA and needs a work release to return to work        HISTORY OF PRESENT ILLNESS: Patient is a 69 y.o. male who presents today for the following:    Patient comes in for clearance to go back to work.  Patient had a \"diabetic flare\", and has been unable to work because of a sore on his foot.  He is being followed for this and states it is improving.  He denies any pain, redness, and drainage in his foot.  He declined removing his shoe and bandage in the clinic as he is afraid it will make it worse with exposures in the urgent care.  Patient states he is ready to go back today without restrictions.    Patient Active Problem List    Diagnosis Date Noted   • Cellulitis 09/07/2018   • Venous stasis 09/07/2018   • High risk medication use 02/28/2018   • Coronary artery disease involving native coronary artery without angina pectoris 06/13/2017   • Elevated coronary artery calcium score 02/08/2017   • Undiagnosed cardiac murmurs 01/18/2016   • Essential hypertension, benign 01/18/2016   • Calculus of both kidneys 01/18/2016       Allergies:Ciprofloxacin; Statins [hmg-coa-r inhibitors]; Acetaminophen; Ibuprofen; Naproxen; Shellfish allergy; and Soap &  [alcohol]    Current Outpatient Medications Ordered in Epic   Medication Sig Dispense Refill   • metoprolol SR (TOPROL XL) 25 MG TABLET SR 24 HR TAKE 1 TABLET BY MOUTH ONCE DAILY 90 Tab 3   • tadalafil (CIALIS) 20 MG tablet      • ferrous sulfate 325 (65 Fe) MG tablet Take 325 mg by mouth 2 Times a Day.     • Cyanocobalamin (VITAMIN B-12 PO) Take  by mouth 2 Times a Day.     • Flaxseed, Linseed, (FLAX SEED OIL PO) Take  by mouth.     • lisinopril (PRINIVIL) 10 MG Tab 10 mg every day.     • oxycodone-aspirin (PERCODAN) 4.8355-325 MG Tab      • metformin (GLUCOPHAGE) 1000 MG tablet Take 1,000 mg by mouth 2 times a day, with meals.     • Non Formulary Request Take  by mouth 2 Times a " Day. Uric acid cleanse     • vitamin e (VITAMIN E) 400 UNIT Cap Take 400 Units by mouth every day.     • vitamin A 8000 UNIT capsule Take 8,000 Units by mouth 2 Times a Day.     • Cholecalciferol (VITAMIN D3) 3000 UNITS Tab Take  by mouth every day.     • KRILL OIL PO Take  by mouth every day.     • ASTAXANTHIN PO Take 4 mg by mouth every day.     • SAW PALMETTO, SERENOA REPENS, PO Take  by mouth every day.     • Resveratrol 100 MG Cap Take 250 mg by mouth every day.     • Omega-3 Fatty Acids (FISH OIL) 1000 MG Cap capsule Take 1,000 mg by mouth every day.       No current [facility]-ordered facility-administered medications on file.        Past Medical History:   Diagnosis Date   • Arrhythmia 2018   • Arthritis     OA- hands and L knee   • Cataract     OU   • Dental disorder     upper   • Diabetes     oral meds   • High cholesterol 2018    no med; diet controlled   • Hyperlipidemia    • Hypertension    • Renal disorder 2018    kidney stones       Social History     Tobacco Use   • Smoking status: Former Smoker     Types: Cigarettes     Last attempt to quit: 2008     Years since quittin.8   • Smokeless tobacco: Never Used   • Tobacco comment: quit    Substance Use Topics   • Alcohol use: No   • Drug use: Yes     Types: Marijuana       Family Status   Relation Name Status   • Mo     • Fa     • Bro       Family History   Problem Relation Age of Onset   • Other Mother    • Heart Attack Father    • Heart Failure Brother        Review of Systems:   Constitutional ROS: No unexpected change in weight, No weakness, No fatigue  Musculoskeletal/Extremities ROS: Right foot wound.  Hematologic/Lymphatic ROS: No chills, No night sweats, No weight loss  Skin/Integumentary ROS: No edema, No evidence of rash, No itching      Exam:  /90   Pulse 70   Temp 37.6 °C (99.6 °F) (Temporal)   Resp 18   Wt 84.8 kg (187 lb)   SpO2 96%   General: Well developed, well nourished. No  distress.  Pulmonary: Unlabored respiratory effort.    Extremities: Deferred by patient.  Neurologic: Grossly nonfocal. No facial asymmetry noted.  Skin: Warm, dry, good turgor. No rashes in visible areas.   Psych: Normal mood. Alert and oriented x3. Judgment and insight is normal.    Assessment/Plan:  Return to work without restrictions per patient request.  1. Skin lesion of foot

## 2019-10-25 NOTE — LETTER
October 25, 2019         Patient: Nitesh Duron   YOB: 1950   Date of Visit: 10/25/2019           To Whom it May Concern:    Nitesh Duron was seen in my clinic on 10/25/2019. He may return to work on 10/25/19 without restrictions.    If you have any questions or concerns, please don't hesitate to call.        Sincerely,           Roxana Hauser P.A.-C.  Electronically Signed

## 2019-11-02 ENCOUNTER — HOSPITAL ENCOUNTER (OUTPATIENT)
Dept: LAB | Facility: MEDICAL CENTER | Age: 69
End: 2019-11-02
Attending: INTERNAL MEDICINE
Payer: COMMERCIAL

## 2019-11-02 ENCOUNTER — HOSPITAL ENCOUNTER (OUTPATIENT)
Dept: LAB | Facility: MEDICAL CENTER | Age: 69
End: 2019-11-02
Attending: PHYSICIAN ASSISTANT
Payer: COMMERCIAL

## 2019-11-02 DIAGNOSIS — R93.1 ELEVATED CORONARY ARTERY CALCIUM SCORE: ICD-10-CM

## 2019-11-02 DIAGNOSIS — I10 ESSENTIAL HYPERTENSION, BENIGN: ICD-10-CM

## 2019-11-02 LAB
ALBUMIN SERPL BCP-MCNC: 4.1 G/DL (ref 3.2–4.9)
ALBUMIN/GLOB SERPL: 1.2 G/DL
ALP SERPL-CCNC: 76 U/L (ref 30–99)
ALT SERPL-CCNC: 20 U/L (ref 2–50)
ANION GAP SERPL CALC-SCNC: 7 MMOL/L (ref 0–11.9)
ANION GAP SERPL CALC-SCNC: 8 MMOL/L (ref 0–11.9)
AST SERPL-CCNC: 24 U/L (ref 12–45)
BASOPHILS # BLD AUTO: 0.9 % (ref 0–1.8)
BASOPHILS # BLD: 0.03 K/UL (ref 0–0.12)
BILIRUB SERPL-MCNC: 0.7 MG/DL (ref 0.1–1.5)
BUN SERPL-MCNC: 14 MG/DL (ref 8–22)
BUN SERPL-MCNC: 15 MG/DL (ref 8–22)
CALCIUM SERPL-MCNC: 9.2 MG/DL (ref 8.5–10.5)
CALCIUM SERPL-MCNC: 9.2 MG/DL (ref 8.5–10.5)
CHLORIDE SERPL-SCNC: 104 MMOL/L (ref 96–112)
CHLORIDE SERPL-SCNC: 105 MMOL/L (ref 96–112)
CHOLEST SERPL-MCNC: 187 MG/DL (ref 100–199)
CHOLEST SERPL-MCNC: 188 MG/DL (ref 100–199)
CO2 SERPL-SCNC: 29 MMOL/L (ref 20–33)
CO2 SERPL-SCNC: 30 MMOL/L (ref 20–33)
CREAT SERPL-MCNC: 0.92 MG/DL (ref 0.5–1.4)
CREAT SERPL-MCNC: 0.95 MG/DL (ref 0.5–1.4)
EOSINOPHIL # BLD AUTO: 0.12 K/UL (ref 0–0.51)
EOSINOPHIL NFR BLD: 3.6 % (ref 0–6.9)
ERYTHROCYTE [DISTWIDTH] IN BLOOD BY AUTOMATED COUNT: 47.1 FL (ref 35.9–50)
EST. AVERAGE GLUCOSE BLD GHB EST-MCNC: 114 MG/DL
GLOBULIN SER CALC-MCNC: 3.5 G/DL (ref 1.9–3.5)
GLUCOSE SERPL-MCNC: 111 MG/DL (ref 65–99)
GLUCOSE SERPL-MCNC: 113 MG/DL (ref 65–99)
HBA1C MFR BLD: 5.6 % (ref 0–5.6)
HCT VFR BLD AUTO: 44.5 % (ref 42–52)
HDLC SERPL-MCNC: 35 MG/DL
HDLC SERPL-MCNC: 36 MG/DL
HGB BLD-MCNC: 14.6 G/DL (ref 14–18)
IMM GRANULOCYTES # BLD AUTO: 0.02 K/UL (ref 0–0.11)
IMM GRANULOCYTES NFR BLD AUTO: 0.6 % (ref 0–0.9)
LDLC SERPL CALC-MCNC: 132 MG/DL
LDLC SERPL CALC-MCNC: 132 MG/DL
LYMPHOCYTES # BLD AUTO: 0.71 K/UL (ref 1–4.8)
LYMPHOCYTES NFR BLD: 21.3 % (ref 22–41)
MCH RBC QN AUTO: 29.4 PG (ref 27–33)
MCHC RBC AUTO-ENTMCNC: 32.8 G/DL (ref 33.7–35.3)
MCV RBC AUTO: 89.5 FL (ref 81.4–97.8)
MONOCYTES # BLD AUTO: 0.3 K/UL (ref 0–0.85)
MONOCYTES NFR BLD AUTO: 9 % (ref 0–13.4)
NEUTROPHILS # BLD AUTO: 2.16 K/UL (ref 1.82–7.42)
NEUTROPHILS NFR BLD: 64.6 % (ref 44–72)
NRBC # BLD AUTO: 0 K/UL
NRBC BLD-RTO: 0 /100 WBC
PLATELET # BLD AUTO: 74 K/UL (ref 164–446)
PMV BLD AUTO: 10 FL (ref 9–12.9)
POTASSIUM SERPL-SCNC: 4.3 MMOL/L (ref 3.6–5.5)
POTASSIUM SERPL-SCNC: 4.4 MMOL/L (ref 3.6–5.5)
PROT SERPL-MCNC: 7.6 G/DL (ref 6–8.2)
RBC # BLD AUTO: 4.97 M/UL (ref 4.7–6.1)
SODIUM SERPL-SCNC: 141 MMOL/L (ref 135–145)
SODIUM SERPL-SCNC: 142 MMOL/L (ref 135–145)
TRIGL SERPL-MCNC: 101 MG/DL (ref 0–149)
TRIGL SERPL-MCNC: 102 MG/DL (ref 0–149)
WBC # BLD AUTO: 3.3 K/UL (ref 4.8–10.8)

## 2019-11-02 PROCEDURE — 83036 HEMOGLOBIN GLYCOSYLATED A1C: CPT

## 2019-11-02 PROCEDURE — 80061 LIPID PANEL: CPT | Mod: 91

## 2019-11-02 PROCEDURE — 80061 LIPID PANEL: CPT

## 2019-11-02 PROCEDURE — 80053 COMPREHEN METABOLIC PANEL: CPT

## 2019-11-02 PROCEDURE — 80048 BASIC METABOLIC PNL TOTAL CA: CPT

## 2019-11-02 PROCEDURE — 85025 COMPLETE CBC W/AUTO DIFF WBC: CPT

## 2019-11-11 ENCOUNTER — OFFICE VISIT (OUTPATIENT)
Dept: CARDIOLOGY | Facility: MEDICAL CENTER | Age: 69
End: 2019-11-11
Payer: COMMERCIAL

## 2019-11-11 VITALS
SYSTOLIC BLOOD PRESSURE: 116 MMHG | DIASTOLIC BLOOD PRESSURE: 76 MMHG | OXYGEN SATURATION: 97 % | WEIGHT: 188 LBS | HEIGHT: 72 IN | BODY MASS INDEX: 25.47 KG/M2 | HEART RATE: 66 BPM

## 2019-11-11 DIAGNOSIS — R93.1 ELEVATED CORONARY ARTERY CALCIUM SCORE: ICD-10-CM

## 2019-11-11 DIAGNOSIS — R01.1 UNDIAGNOSED CARDIAC MURMURS: ICD-10-CM

## 2019-11-11 DIAGNOSIS — R16.1 SPLENOMEGALY: ICD-10-CM

## 2019-11-11 DIAGNOSIS — Z79.899 HIGH RISK MEDICATION USE: ICD-10-CM

## 2019-11-11 DIAGNOSIS — I10 ESSENTIAL HYPERTENSION, BENIGN: ICD-10-CM

## 2019-11-11 DIAGNOSIS — D61.818 PANCYTOPENIA (HCC): ICD-10-CM

## 2019-11-11 DIAGNOSIS — I25.10 CORONARY ARTERY DISEASE INVOLVING NATIVE CORONARY ARTERY OF NATIVE HEART WITHOUT ANGINA PECTORIS: ICD-10-CM

## 2019-11-11 PROCEDURE — 99214 OFFICE O/P EST MOD 30 MIN: CPT | Performed by: INTERNAL MEDICINE

## 2019-11-11 RX ORDER — GARLIC 180 MG
TABLET, DELAYED RELEASE (ENTERIC COATED) ORAL DAILY
COMMUNITY
End: 2022-03-15

## 2019-11-11 RX ORDER — EZETIMIBE 10 MG/1
10 TABLET ORAL DAILY
Qty: 30 TAB | Refills: 11 | Status: SHIPPED | OUTPATIENT
Start: 2019-11-11 | End: 2020-07-02 | Stop reason: SDUPTHER

## 2019-11-11 RX ORDER — LORAZEPAM 0.5 MG
2500 TABLET ORAL 2 TIMES DAILY
COMMUNITY
End: 2021-07-12

## 2019-11-11 ASSESSMENT — ENCOUNTER SYMPTOMS
SORE THROAT: 0
SHORTNESS OF BREATH: 0
ORTHOPNEA: 0
MUSCULOSKELETAL NEGATIVE: 1
BRUISES/BLEEDS EASILY: 0
CHILLS: 0
SPUTUM PRODUCTION: 0
PALPITATIONS: 0
WHEEZING: 0
COUGH: 0
PND: 0
HEMOPTYSIS: 0
EYES NEGATIVE: 1
LOSS OF CONSCIOUSNESS: 0
CARDIOVASCULAR NEGATIVE: 1
FEVER: 0
WEAKNESS: 0
CLAUDICATION: 0
STRIDOR: 0
GASTROINTESTINAL NEGATIVE: 1
DIZZINESS: 0
CONSTITUTIONAL NEGATIVE: 1
NEUROLOGICAL NEGATIVE: 1
RESPIRATORY NEGATIVE: 1

## 2019-11-11 NOTE — PROGRESS NOTES
Chief Complaint   Patient presents with   • Coronary Artery Disease     F/V: 6 MO       Subjective:   Nitesh Duron is a 69 y.o. male who presents today as a follow-up for his hyperlipidemia high risk calcium score diabetes and high risk medication use.  Since he was last seen his blood pressure continued to be controlled.  He said no chest pain palpitations or PND.  He did start the red rice yeast twice per day.  His most recent LDL actually increased.  In terms of his statins he said that he always gets back and kidney pain from statins.  He is tried numerous ones of them in the past.  He is never been on Zetia.  He does have a dad who had a heart attack at the age of 49.  His other brother had a heart attack at 64.    Past Medical History:   Diagnosis Date   • Arrhythmia 09/07/2018   • Arthritis     OA- hands and L knee   • Cataract     OU   • Dental disorder     upper   • Diabetes     oral meds   • High cholesterol 09/07/2018    no med; diet controlled   • Hyperlipidemia    • Hypertension    • Renal disorder 09/07/2018    kidney stones     Past Surgical History:   Procedure Laterality Date   • GASTROSCOPY N/A 9/18/2018    Procedure: GASTROSCOPY;  Surgeon: Matt Young M.D.;  Location: SURGERY SAME DAY [facility];  Service: Gastroenterology   • ROTATOR CUFF REPAIR Left 2014   • ANKLE ORIF Right 1988    spiral fx   • MENISCECTOMY Left 1979   • APPENDECTOMY  1958   • CYSTOSCOPY  2004,2008    stone extraction     Family History   Problem Relation Age of Onset   • Other Mother    • Heart Attack Father    • Heart Failure Brother      Social History     Socioeconomic History   • Marital status:      Spouse name: Not on file   • Number of children: Not on file   • Years of education: Not on file   • Highest education level: Not on file   Occupational History   • Not on file   Social Needs   • Financial resource strain: Not on file   • Food insecurity:     Worry: Not on file     Inability: Not on  file   • Transportation needs:     Medical: Not on file     Non-medical: Not on file   Tobacco Use   • Smoking status: Former Smoker     Types: Cigarettes     Last attempt to quit: 2008     Years since quittin.8   • Smokeless tobacco: Never Used   • Tobacco comment: quit    Substance and Sexual Activity   • Alcohol use: No   • Drug use: Yes     Types: Marijuana   • Sexual activity: Never   Lifestyle   • Physical activity:     Days per week: Not on file     Minutes per session: Not on file   • Stress: Not on file   Relationships   • Social connections:     Talks on phone: Not on file     Gets together: Not on file     Attends Anglican service: Not on file     Active member of club or organization: Not on file     Attends meetings of clubs or organizations: Not on file     Relationship status: Not on file   • Intimate partner violence:     Fear of current or ex partner: Not on file     Emotionally abused: Not on file     Physically abused: Not on file     Forced sexual activity: Not on file   Other Topics Concern   • Not on file   Social History Narrative   • Not on file     Allergies   Allergen Reactions   • Ciprofloxacin Unspecified     convulsions   • Statins [Hmg-Coa-R Inhibitors] Unspecified     Stabbing pains in kidneys   • Acetaminophen      Kidney pain    • Ibuprofen      Kidney pain   • Naproxen      Kidney pain   • Shellfish Allergy      Kidney stones   • Soap &  [Alcohol] Rash     Antibacterial soap- contact dermatitis     Outpatient Encounter Medications as of 2019   Medication Sig Dispense Refill   • Potassium 99 MG Tab Take  by mouth every day.     • Non Formulary Request Collagen-1000 mg BID     • Red Yeast Rice 600 MG Tab Take  by mouth 2 Times a Day.     • Non Formulary Request Beet Root- 1000 mg daily     • Ginkgo Biloba 60 MG Cap Take  by mouth every day.     • Misc Natural Products (TART PENNY ADVANCED) Cap Take 2,500 Caps by mouth 2 Times a Day.     • Apolonia, Zingiber  officinalis, (GINGER ROOT) 550 MG Cap Take  by mouth as needed.     • ezetimibe (ZETIA) 10 MG Tab Take 1 Tab by mouth every day. 30 Tab 11   • metoprolol SR (TOPROL XL) 25 MG TABLET SR 24 HR TAKE 1 TABLET BY MOUTH ONCE DAILY 90 Tab 3   • tadalafil (CIALIS) 20 MG tablet      • ferrous sulfate 325 (65 Fe) MG tablet Take 325 mg by mouth 2 Times a Day.     • Cyanocobalamin (VITAMIN B-12 PO) Take  by mouth 2 Times a Day.     • Flaxseed, Linseed, (FLAX SEED OIL PO) Take  by mouth.     • lisinopril (PRINIVIL) 10 MG Tab 10 mg every day.     • oxycodone-aspirin (PERCODAN) 4.8355-325 MG Tab      • metformin (GLUCOPHAGE) 1000 MG tablet Take 500 mg by mouth 2 times a day, with meals.     • Non Formulary Request Take  by mouth 2 Times a Day. Uric acid cleanse     • vitamin e (VITAMIN E) 400 UNIT Cap Take 400 Units by mouth every day.     • vitamin A 8000 UNIT capsule Take 8,000 Units by mouth 2 Times a Day.     • Cholecalciferol (VITAMIN D3) 3000 UNITS Tab Take  by mouth every day.     • Resveratrol 100 MG Cap Take 250 mg by mouth every day.     • KRILL OIL PO Take  by mouth every day.     • Omega-3 Fatty Acids (FISH OIL) 1000 MG Cap capsule Take 1,000 mg by mouth every day.     • ASTAXANTHIN PO Take 4 mg by mouth every day.     • SAW PALMETTO, SERENOA REPENS, PO Take  by mouth every day.       No facility-administered encounter medications on file as of 11/11/2019.      Review of Systems   Constitutional: Negative.  Negative for chills, fever and malaise/fatigue.   HENT: Negative.  Negative for sore throat.    Eyes: Negative.    Respiratory: Negative.  Negative for cough, hemoptysis, sputum production, shortness of breath, wheezing and stridor.    Cardiovascular: Negative.  Negative for chest pain, palpitations, orthopnea, claudication, leg swelling and PND.   Gastrointestinal: Negative.    Genitourinary: Negative.    Musculoskeletal: Negative.    Skin: Negative.    Neurological: Negative.  Negative for dizziness, loss of  consciousness and weakness.   Endo/Heme/Allergies: Negative.  Does not bruise/bleed easily.   All other systems reviewed and are negative.       Objective:   /76 (BP Location: Left arm, Patient Position: Sitting, BP Cuff Size: Adult)   Pulse 66   Ht 1.829 m (6')   Wt 85.3 kg (188 lb)   SpO2 97%   BMI 25.50 kg/m²     Physical Exam   Constitutional: He appears well-developed and well-nourished. No distress.   HENT:   Head: Normocephalic and atraumatic.   Right Ear: External ear normal.   Left Ear: External ear normal.   Nose: Nose normal.   Mouth/Throat: No oropharyngeal exudate.   Eyes: Pupils are equal, round, and reactive to light. Conjunctivae and EOM are normal. Right eye exhibits no discharge. Left eye exhibits no discharge. No scleral icterus.   Neck: Neck supple. No JVD present.   Cardiovascular: Normal rate, regular rhythm and intact distal pulses. Exam reveals no gallop and no friction rub.   No murmur heard.  Pulmonary/Chest: Effort normal. No stridor. No respiratory distress. He has no wheezes. He has no rales. He exhibits no tenderness.   Abdominal: Soft. He exhibits no distension. There is no guarding.   Musculoskeletal: Normal range of motion.         General: No tenderness, deformity or edema.   Neurological: He is alert. He has normal reflexes. He displays normal reflexes. No cranial nerve deficit. He exhibits normal muscle tone. Coordination normal.   Skin: Skin is warm and dry. No rash noted. He is not diaphoretic. No erythema. No pallor.   Psychiatric: He has a normal mood and affect. His behavior is normal. Judgment and thought content normal.   Nursing note and vitals reviewed.      Assessment:     1. Coronary artery disease involving native coronary artery of native heart without angina pectoris  ezetimibe (ZETIA) 10 MG Tab   2. Elevated coronary artery calcium score  ezetimibe (ZETIA) 10 MG Tab    REFERRAL TO LIPID CLINIC   3. Essential hypertension, benign  REFERRAL TO LIPID CLINIC    4. High risk medication use  ezetimibe (ZETIA) 10 MG Tab    REFERRAL TO LIPID CLINIC   5. Undiagnosed cardiac murmurs     6. Pancytopenia (HCC)     7. Splenomegaly  REFERRAL TO LIPID CLINIC       Medical Decision Making:  Today's Assessment / Status / Plan:     69-year-old male with possible FH, high risk medication usage and a elevated calcium score which is high risk.  I will put him on Zetia.  I would like him to start on Praluent.  He will need training to do this.  In the meantime will refer him to the lipid clinic to get started on a PCSK9 inhibitor.

## 2019-12-04 ENCOUNTER — HOSPITAL ENCOUNTER (OUTPATIENT)
Dept: LAB | Facility: MEDICAL CENTER | Age: 69
End: 2019-12-04
Attending: INTERNAL MEDICINE
Payer: COMMERCIAL

## 2019-12-04 PROCEDURE — 36415 COLL VENOUS BLD VENIPUNCTURE: CPT

## 2019-12-04 PROCEDURE — 82105 ALPHA-FETOPROTEIN SERUM: CPT

## 2019-12-06 LAB — AFP-TM SERPL-MCNC: 6 NG/ML (ref 0–9)

## 2020-02-03 ENCOUNTER — HOSPITAL ENCOUNTER (OUTPATIENT)
Dept: LAB | Facility: MEDICAL CENTER | Age: 70
End: 2020-02-03
Attending: PHYSICIAN ASSISTANT
Payer: COMMERCIAL

## 2020-02-03 LAB
ALBUMIN SERPL BCP-MCNC: 4.2 G/DL (ref 3.2–4.9)
ALBUMIN/GLOB SERPL: 1.2 G/DL
ALP SERPL-CCNC: 77 U/L (ref 30–99)
ALT SERPL-CCNC: 14 U/L (ref 2–50)
ANION GAP SERPL CALC-SCNC: 9 MMOL/L (ref 0–11.9)
AST SERPL-CCNC: 17 U/L (ref 12–45)
BASOPHILS # BLD AUTO: 0.9 % (ref 0–1.8)
BASOPHILS # BLD: 0.03 K/UL (ref 0–0.12)
BILIRUB SERPL-MCNC: 0.6 MG/DL (ref 0.1–1.5)
BUN SERPL-MCNC: 19 MG/DL (ref 8–22)
CALCIUM SERPL-MCNC: 9.6 MG/DL (ref 8.5–10.5)
CHLORIDE SERPL-SCNC: 105 MMOL/L (ref 96–112)
CHOLEST SERPL-MCNC: 169 MG/DL (ref 100–199)
CO2 SERPL-SCNC: 29 MMOL/L (ref 20–33)
CREAT SERPL-MCNC: 0.87 MG/DL (ref 0.5–1.4)
CREAT UR-MCNC: 127.2 MG/DL
EOSINOPHIL # BLD AUTO: 0.16 K/UL (ref 0–0.51)
EOSINOPHIL NFR BLD: 4.8 % (ref 0–6.9)
ERYTHROCYTE [DISTWIDTH] IN BLOOD BY AUTOMATED COUNT: 48.9 FL (ref 35.9–50)
EST. AVERAGE GLUCOSE BLD GHB EST-MCNC: 117 MG/DL
FASTING STATUS PATIENT QL REPORTED: NORMAL
GLOBULIN SER CALC-MCNC: 3.5 G/DL (ref 1.9–3.5)
GLUCOSE SERPL-MCNC: 135 MG/DL (ref 65–99)
HBA1C MFR BLD: 5.7 % (ref 0–5.6)
HCT VFR BLD AUTO: 45.2 % (ref 42–52)
HDLC SERPL-MCNC: 39 MG/DL
HGB BLD-MCNC: 14.6 G/DL (ref 14–18)
IMM GRANULOCYTES # BLD AUTO: 0.01 K/UL (ref 0–0.11)
IMM GRANULOCYTES NFR BLD AUTO: 0.3 % (ref 0–0.9)
LDLC SERPL CALC-MCNC: 111 MG/DL
LYMPHOCYTES # BLD AUTO: 0.74 K/UL (ref 1–4.8)
LYMPHOCYTES NFR BLD: 22.3 % (ref 22–41)
MCH RBC QN AUTO: 29.3 PG (ref 27–33)
MCHC RBC AUTO-ENTMCNC: 32.3 G/DL (ref 33.7–35.3)
MCV RBC AUTO: 90.6 FL (ref 81.4–97.8)
MICROALBUMIN UR-MCNC: 1.9 MG/DL
MICROALBUMIN/CREAT UR: 15 MG/G (ref 0–30)
MONOCYTES # BLD AUTO: 0.39 K/UL (ref 0–0.85)
MONOCYTES NFR BLD AUTO: 11.7 % (ref 0–13.4)
NEUTROPHILS # BLD AUTO: 1.99 K/UL (ref 1.82–7.42)
NEUTROPHILS NFR BLD: 60 % (ref 44–72)
NRBC # BLD AUTO: 0 K/UL
NRBC BLD-RTO: 0 /100 WBC
PLATELET # BLD AUTO: 84 K/UL (ref 164–446)
PMV BLD AUTO: 9.9 FL (ref 9–12.9)
POTASSIUM SERPL-SCNC: 4 MMOL/L (ref 3.6–5.5)
PROT SERPL-MCNC: 7.7 G/DL (ref 6–8.2)
RBC # BLD AUTO: 4.99 M/UL (ref 4.7–6.1)
SODIUM SERPL-SCNC: 143 MMOL/L (ref 135–145)
TRIGL SERPL-MCNC: 93 MG/DL (ref 0–149)
WBC # BLD AUTO: 3.3 K/UL (ref 4.8–10.8)

## 2020-02-03 PROCEDURE — 80053 COMPREHEN METABOLIC PANEL: CPT

## 2020-02-03 PROCEDURE — 82570 ASSAY OF URINE CREATININE: CPT

## 2020-02-03 PROCEDURE — 83036 HEMOGLOBIN GLYCOSYLATED A1C: CPT

## 2020-02-03 PROCEDURE — 85025 COMPLETE CBC W/AUTO DIFF WBC: CPT

## 2020-02-03 PROCEDURE — 80061 LIPID PANEL: CPT

## 2020-02-03 PROCEDURE — 36415 COLL VENOUS BLD VENIPUNCTURE: CPT

## 2020-02-03 PROCEDURE — 82043 UR ALBUMIN QUANTITATIVE: CPT

## 2020-02-14 ENCOUNTER — HOSPITAL ENCOUNTER (OUTPATIENT)
Dept: LAB | Facility: MEDICAL CENTER | Age: 70
End: 2020-02-14
Attending: INTERNAL MEDICINE
Payer: COMMERCIAL

## 2020-02-14 LAB
ALBUMIN SERPL BCP-MCNC: 4.2 G/DL (ref 3.2–4.9)
ALBUMIN/GLOB SERPL: 1.1 G/DL
ALP SERPL-CCNC: 89 U/L (ref 30–99)
ALT SERPL-CCNC: 16 U/L (ref 2–50)
ANION GAP SERPL CALC-SCNC: 8 MMOL/L (ref 0–11.9)
AST SERPL-CCNC: 18 U/L (ref 12–45)
BASOPHILS # BLD AUTO: 0.2 % (ref 0–1.8)
BASOPHILS # BLD: 0.01 K/UL (ref 0–0.12)
BILIRUB SERPL-MCNC: 0.5 MG/DL (ref 0.1–1.5)
BUN SERPL-MCNC: 31 MG/DL (ref 8–22)
CALCIUM SERPL-MCNC: 9.8 MG/DL (ref 8.5–10.5)
CHLORIDE SERPL-SCNC: 105 MMOL/L (ref 96–112)
CO2 SERPL-SCNC: 25 MMOL/L (ref 20–33)
CREAT SERPL-MCNC: 0.85 MG/DL (ref 0.5–1.4)
EOSINOPHIL # BLD AUTO: 0.01 K/UL (ref 0–0.51)
EOSINOPHIL NFR BLD: 0.2 % (ref 0–6.9)
ERYTHROCYTE [DISTWIDTH] IN BLOOD BY AUTOMATED COUNT: 49.1 FL (ref 35.9–50)
GLOBULIN SER CALC-MCNC: 3.7 G/DL (ref 1.9–3.5)
GLUCOSE SERPL-MCNC: 137 MG/DL (ref 65–99)
HCT VFR BLD AUTO: 46.9 % (ref 42–52)
HGB BLD-MCNC: 15.1 G/DL (ref 14–18)
IMM GRANULOCYTES # BLD AUTO: 0.02 K/UL (ref 0–0.11)
IMM GRANULOCYTES NFR BLD AUTO: 0.4 % (ref 0–0.9)
LYMPHOCYTES # BLD AUTO: 0.86 K/UL (ref 1–4.8)
LYMPHOCYTES NFR BLD: 15.4 % (ref 22–41)
MCH RBC QN AUTO: 28.8 PG (ref 27–33)
MCHC RBC AUTO-ENTMCNC: 32.2 G/DL (ref 33.7–35.3)
MCV RBC AUTO: 89.5 FL (ref 81.4–97.8)
MONOCYTES # BLD AUTO: 0.54 K/UL (ref 0–0.85)
MONOCYTES NFR BLD AUTO: 9.6 % (ref 0–13.4)
NEUTROPHILS # BLD AUTO: 4.16 K/UL (ref 1.82–7.42)
NEUTROPHILS NFR BLD: 74.2 % (ref 44–72)
NRBC # BLD AUTO: 0 K/UL
NRBC BLD-RTO: 0 /100 WBC
PLATELET # BLD AUTO: 103 K/UL (ref 164–446)
PMV BLD AUTO: 10.5 FL (ref 9–12.9)
POTASSIUM SERPL-SCNC: 4 MMOL/L (ref 3.6–5.5)
PROT SERPL-MCNC: 7.9 G/DL (ref 6–8.2)
RBC # BLD AUTO: 5.24 M/UL (ref 4.7–6.1)
SODIUM SERPL-SCNC: 138 MMOL/L (ref 135–145)
WBC # BLD AUTO: 5.6 K/UL (ref 4.8–10.8)

## 2020-02-14 PROCEDURE — 82784 ASSAY IGA/IGD/IGG/IGM EACH: CPT

## 2020-02-14 PROCEDURE — 84165 PROTEIN E-PHORESIS SERUM: CPT

## 2020-02-14 PROCEDURE — 80053 COMPREHEN METABOLIC PANEL: CPT

## 2020-02-14 PROCEDURE — 84155 ASSAY OF PROTEIN SERUM: CPT

## 2020-02-14 PROCEDURE — 36415 COLL VENOUS BLD VENIPUNCTURE: CPT

## 2020-02-14 PROCEDURE — 83883 ASSAY NEPHELOMETRY NOT SPEC: CPT

## 2020-02-14 PROCEDURE — 85025 COMPLETE CBC W/AUTO DIFF WBC: CPT

## 2020-02-16 LAB
IGA SERPL-MCNC: 252 MG/DL (ref 68–408)
IGG SERPL-MCNC: 1260 MG/DL (ref 768–1632)
IGM SERPL-MCNC: 44 MG/DL (ref 35–263)

## 2020-02-17 LAB
KAPPA LC FREE SER-MCNC: 1.96 MG/DL (ref 0.33–1.94)
KAPPA LC FREE/LAMBDA FREE SER NEPH: 1.38 {RATIO} (ref 0.26–1.65)
LAMBDA LC FREE SERPL-MCNC: 1.42 MG/DL (ref 0.57–2.63)

## 2020-02-18 LAB
ALBUMIN SERPL ELPH-MCNC: 4.52 G/DL (ref 3.75–5.01)
ALPHA1 GLOB SERPL ELPH-MCNC: 0.28 G/DL (ref 0.19–0.46)
ALPHA2 GLOB SERPL ELPH-MCNC: 0.76 G/DL (ref 0.48–1.05)
B-GLOBULIN SERPL ELPH-MCNC: 0.78 G/DL (ref 0.48–1.1)
EER PROT ELECT SER Q1092: NORMAL
GAMMA GLOB SERPL ELPH-MCNC: 1.36 G/DL (ref 0.62–1.51)
INTERPRETATION SERPL IFE-IMP: NORMAL
PROT SERPL-MCNC: 7.7 G/DL (ref 6.3–8.2)

## 2020-04-20 ENCOUNTER — TELEPHONE (OUTPATIENT)
Dept: CARDIOLOGY | Facility: MEDICAL CENTER | Age: 70
End: 2020-04-20

## 2020-04-20 NOTE — TELEPHONE ENCOUNTER
ANTHONY evangelista is requesting to have labs ordered for his upcoming appt in May. #[phone]

## 2020-04-21 ENCOUNTER — TELEPHONE (OUTPATIENT)
Dept: CARDIOLOGY | Facility: MEDICAL CENTER | Age: 70
End: 2020-04-21

## 2020-04-21 NOTE — TELEPHONE ENCOUNTER
ANTHONY Hylton, patient would like to get some labs done prior to appt. He does get his labs done at [facility] through [facility]. He would like a call back at [phone]

## 2020-04-22 DIAGNOSIS — R93.1 ELEVATED CORONARY ARTERY CALCIUM SCORE: ICD-10-CM

## 2020-04-22 DIAGNOSIS — Z79.899 HIGH RISK MEDICATION USE: ICD-10-CM

## 2020-04-22 DIAGNOSIS — I10 ESSENTIAL HYPERTENSION, BENIGN: ICD-10-CM

## 2020-04-22 DIAGNOSIS — I25.10 CORONARY ARTERY DISEASE INVOLVING NATIVE CORONARY ARTERY OF NATIVE HEART WITHOUT ANGINA PECTORIS: ICD-10-CM

## 2020-04-22 NOTE — TELEPHONE ENCOUNTER
Balaji Cano M.D.  You 21 minutes ago (3:06 PM)      Lp(a), fasting lipids, CMP    Routing comment      Ordered per MD request.    Pt called to notify. No answer. LVM with information and fasting lab instructions. Will mail lab req to patients home.

## 2020-04-24 ENCOUNTER — HOSPITAL ENCOUNTER (OUTPATIENT)
Dept: LAB | Facility: MEDICAL CENTER | Age: 70
End: 2020-04-24
Attending: INTERNAL MEDICINE
Payer: COMMERCIAL

## 2020-04-24 DIAGNOSIS — I25.10 CORONARY ARTERY DISEASE INVOLVING NATIVE CORONARY ARTERY OF NATIVE HEART WITHOUT ANGINA PECTORIS: ICD-10-CM

## 2020-04-24 DIAGNOSIS — R93.1 ELEVATED CORONARY ARTERY CALCIUM SCORE: ICD-10-CM

## 2020-04-24 DIAGNOSIS — Z79.899 HIGH RISK MEDICATION USE: ICD-10-CM

## 2020-04-24 DIAGNOSIS — I10 ESSENTIAL HYPERTENSION, BENIGN: ICD-10-CM

## 2020-04-24 LAB
ALBUMIN SERPL BCP-MCNC: 4.2 G/DL (ref 3.2–4.9)
ALBUMIN/GLOB SERPL: 1.6 G/DL
ALP SERPL-CCNC: 87 U/L (ref 30–99)
ALT SERPL-CCNC: 13 U/L (ref 2–50)
ANION GAP SERPL CALC-SCNC: 10 MMOL/L (ref 7–16)
AST SERPL-CCNC: 16 U/L (ref 12–45)
BILIRUB SERPL-MCNC: 0.5 MG/DL (ref 0.1–1.5)
BUN SERPL-MCNC: 18 MG/DL (ref 8–22)
CALCIUM SERPL-MCNC: 9 MG/DL (ref 8.5–10.5)
CHLORIDE SERPL-SCNC: 103 MMOL/L (ref 96–112)
CHOLEST SERPL-MCNC: 169 MG/DL (ref 100–199)
CO2 SERPL-SCNC: 27 MMOL/L (ref 20–33)
CREAT SERPL-MCNC: 0.8 MG/DL (ref 0.5–1.4)
FASTING STATUS PATIENT QL REPORTED: NORMAL
GLOBULIN SER CALC-MCNC: 2.7 G/DL (ref 1.9–3.5)
GLUCOSE SERPL-MCNC: 115 MG/DL (ref 65–99)
HDLC SERPL-MCNC: 39 MG/DL
LDLC SERPL CALC-MCNC: 117 MG/DL
POTASSIUM SERPL-SCNC: 4.5 MMOL/L (ref 3.6–5.5)
PROT SERPL-MCNC: 6.9 G/DL (ref 6–8.2)
SODIUM SERPL-SCNC: 140 MMOL/L (ref 135–145)
TRIGL SERPL-MCNC: 63 MG/DL (ref 0–149)

## 2020-04-24 PROCEDURE — 80053 COMPREHEN METABOLIC PANEL: CPT

## 2020-04-24 PROCEDURE — 36415 COLL VENOUS BLD VENIPUNCTURE: CPT

## 2020-04-24 PROCEDURE — 80061 LIPID PANEL: CPT

## 2020-04-24 PROCEDURE — 83695 ASSAY OF LIPOPROTEIN(A): CPT

## 2020-04-26 LAB — LPA SERPL-MCNC: <6 MG/DL

## 2020-04-30 ENCOUNTER — HOSPITAL ENCOUNTER (OUTPATIENT)
Dept: LAB | Facility: MEDICAL CENTER | Age: 70
End: 2020-04-30
Attending: PHYSICIAN ASSISTANT
Payer: COMMERCIAL

## 2020-04-30 LAB
BASOPHILS # BLD AUTO: 0.9 % (ref 0–1.8)
BASOPHILS # BLD: 0.03 K/UL (ref 0–0.12)
EOSINOPHIL # BLD AUTO: 0.17 K/UL (ref 0–0.51)
EOSINOPHIL NFR BLD: 5.1 % (ref 0–6.9)
ERYTHROCYTE [DISTWIDTH] IN BLOOD BY AUTOMATED COUNT: 49.8 FL (ref 35.9–50)
HCT VFR BLD AUTO: 40.9 % (ref 42–52)
HGB BLD-MCNC: 13.6 G/DL (ref 14–18)
IMM GRANULOCYTES # BLD AUTO: 0.01 K/UL (ref 0–0.11)
IMM GRANULOCYTES NFR BLD AUTO: 0.3 % (ref 0–0.9)
LYMPHOCYTES # BLD AUTO: 0.77 K/UL (ref 1–4.8)
LYMPHOCYTES NFR BLD: 23.1 % (ref 22–41)
MCH RBC QN AUTO: 30.2 PG (ref 27–33)
MCHC RBC AUTO-ENTMCNC: 33.3 G/DL (ref 33.7–35.3)
MCV RBC AUTO: 90.9 FL (ref 81.4–97.8)
MONOCYTES # BLD AUTO: 0.32 K/UL (ref 0–0.85)
MONOCYTES NFR BLD AUTO: 9.6 % (ref 0–13.4)
NEUTROPHILS # BLD AUTO: 2.03 K/UL (ref 1.82–7.42)
NEUTROPHILS NFR BLD: 61 % (ref 44–72)
NRBC # BLD AUTO: 0 K/UL
NRBC BLD-RTO: 0 /100 WBC
PLATELET # BLD AUTO: 84 K/UL (ref 164–446)
PMV BLD AUTO: 10 FL (ref 9–12.9)
RBC # BLD AUTO: 4.5 M/UL (ref 4.7–6.1)
WBC # BLD AUTO: 3.3 K/UL (ref 4.8–10.8)

## 2020-04-30 PROCEDURE — 83036 HEMOGLOBIN GLYCOSYLATED A1C: CPT | Mod: GA

## 2020-04-30 PROCEDURE — 85025 COMPLETE CBC W/AUTO DIFF WBC: CPT

## 2020-04-30 PROCEDURE — 36415 COLL VENOUS BLD VENIPUNCTURE: CPT

## 2020-04-30 PROCEDURE — 80053 COMPREHEN METABOLIC PANEL: CPT

## 2020-05-01 ENCOUNTER — APPOINTMENT (OUTPATIENT)
Dept: CARDIOLOGY | Facility: MEDICAL CENTER | Age: 70
End: 2020-05-01
Payer: MEDICARE

## 2020-05-01 LAB
ALBUMIN SERPL BCP-MCNC: 3.8 G/DL (ref 3.2–4.9)
ALBUMIN/GLOB SERPL: 1.2 G/DL
ALP SERPL-CCNC: 84 U/L (ref 30–99)
ALT SERPL-CCNC: 15 U/L (ref 2–50)
ANION GAP SERPL CALC-SCNC: 11 MMOL/L (ref 7–16)
AST SERPL-CCNC: 20 U/L (ref 12–45)
BILIRUB SERPL-MCNC: 0.5 MG/DL (ref 0.1–1.5)
BUN SERPL-MCNC: 21 MG/DL (ref 8–22)
CALCIUM SERPL-MCNC: 9.3 MG/DL (ref 8.5–10.5)
CHLORIDE SERPL-SCNC: 101 MMOL/L (ref 96–112)
CO2 SERPL-SCNC: 27 MMOL/L (ref 20–33)
CREAT SERPL-MCNC: 0.9 MG/DL (ref 0.5–1.4)
EST. AVERAGE GLUCOSE BLD GHB EST-MCNC: 120 MG/DL
GLOBULIN SER CALC-MCNC: 3.2 G/DL (ref 1.9–3.5)
GLUCOSE SERPL-MCNC: 110 MG/DL (ref 65–99)
HBA1C MFR BLD: 5.8 % (ref 0–5.6)
POTASSIUM SERPL-SCNC: 4.6 MMOL/L (ref 3.6–5.5)
PROT SERPL-MCNC: 7 G/DL (ref 6–8.2)
SODIUM SERPL-SCNC: 139 MMOL/L (ref 135–145)

## 2020-06-10 ENCOUNTER — HOSPITAL ENCOUNTER (OUTPATIENT)
Dept: RADIOLOGY | Facility: MEDICAL CENTER | Age: 70
End: 2020-06-10
Attending: INTERNAL MEDICINE
Payer: COMMERCIAL

## 2020-06-10 DIAGNOSIS — Z86.19 HISTORY OF HEPATITIS C: ICD-10-CM

## 2020-06-10 DIAGNOSIS — K74.60 CIRRHOSIS OF LIVER WITHOUT ASCITES, UNSPECIFIED HEPATIC CIRRHOSIS TYPE (HCC): ICD-10-CM

## 2020-06-10 DIAGNOSIS — K76.6 PORTAL HYPERTENSION (HCC): ICD-10-CM

## 2020-06-10 PROCEDURE — 76700 US EXAM ABDOM COMPLETE: CPT

## 2020-06-18 ENCOUNTER — HOSPITAL ENCOUNTER (OUTPATIENT)
Dept: LAB | Facility: MEDICAL CENTER | Age: 70
End: 2020-06-18
Attending: INTERNAL MEDICINE
Payer: COMMERCIAL

## 2020-06-18 LAB
ALBUMIN SERPL BCP-MCNC: 4.2 G/DL (ref 3.2–4.9)
ALBUMIN/GLOB SERPL: 1.4 G/DL
ALP SERPL-CCNC: 89 U/L (ref 30–99)
ALT SERPL-CCNC: 13 U/L (ref 2–50)
ANION GAP SERPL CALC-SCNC: 13 MMOL/L (ref 7–16)
AST SERPL-CCNC: 15 U/L (ref 12–45)
BASOPHILS # BLD AUTO: 0.8 % (ref 0–1.8)
BASOPHILS # BLD: 0.03 K/UL (ref 0–0.12)
BILIRUB SERPL-MCNC: 0.5 MG/DL (ref 0.1–1.5)
BUN SERPL-MCNC: 17 MG/DL (ref 8–22)
CALCIUM SERPL-MCNC: 9.4 MG/DL (ref 8.5–10.5)
CHLORIDE SERPL-SCNC: 102 MMOL/L (ref 96–112)
CO2 SERPL-SCNC: 25 MMOL/L (ref 20–33)
CREAT SERPL-MCNC: 0.73 MG/DL (ref 0.5–1.4)
EOSINOPHIL # BLD AUTO: 0.11 K/UL (ref 0–0.51)
EOSINOPHIL NFR BLD: 2.8 % (ref 0–6.9)
ERYTHROCYTE [DISTWIDTH] IN BLOOD BY AUTOMATED COUNT: 48.2 FL (ref 35.9–50)
GLOBULIN SER CALC-MCNC: 2.9 G/DL (ref 1.9–3.5)
GLUCOSE SERPL-MCNC: 125 MG/DL (ref 65–99)
HCT VFR BLD AUTO: 43.6 % (ref 42–52)
HGB BLD-MCNC: 14.1 G/DL (ref 14–18)
IMM GRANULOCYTES # BLD AUTO: 0.02 K/UL (ref 0–0.11)
IMM GRANULOCYTES NFR BLD AUTO: 0.5 % (ref 0–0.9)
LYMPHOCYTES # BLD AUTO: 0.75 K/UL (ref 1–4.8)
LYMPHOCYTES NFR BLD: 18.8 % (ref 22–41)
MCH RBC QN AUTO: 29.7 PG (ref 27–33)
MCHC RBC AUTO-ENTMCNC: 32.3 G/DL (ref 33.7–35.3)
MCV RBC AUTO: 91.8 FL (ref 81.4–97.8)
MONOCYTES # BLD AUTO: 0.31 K/UL (ref 0–0.85)
MONOCYTES NFR BLD AUTO: 7.8 % (ref 0–13.4)
NEUTROPHILS # BLD AUTO: 2.76 K/UL (ref 1.82–7.42)
NEUTROPHILS NFR BLD: 69.3 % (ref 44–72)
NRBC # BLD AUTO: 0 K/UL
NRBC BLD-RTO: 0 /100 WBC
PLATELET # BLD AUTO: 98 K/UL (ref 164–446)
PMV BLD AUTO: 10.3 FL (ref 9–12.9)
POTASSIUM SERPL-SCNC: 4.3 MMOL/L (ref 3.6–5.5)
PROT SERPL-MCNC: 7.1 G/DL (ref 6–8.2)
RBC # BLD AUTO: 4.75 M/UL (ref 4.7–6.1)
SODIUM SERPL-SCNC: 140 MMOL/L (ref 135–145)
WBC # BLD AUTO: 4 K/UL (ref 4.8–10.8)

## 2020-06-18 PROCEDURE — 85025 COMPLETE CBC W/AUTO DIFF WBC: CPT

## 2020-06-18 PROCEDURE — 80053 COMPREHEN METABOLIC PANEL: CPT

## 2020-06-18 PROCEDURE — 36415 COLL VENOUS BLD VENIPUNCTURE: CPT

## 2020-06-18 PROCEDURE — 82105 ALPHA-FETOPROTEIN SERUM: CPT

## 2020-06-20 LAB — AFP-TM SERPL-MCNC: 6 NG/ML (ref 0–9)

## 2020-06-26 ENCOUNTER — HOSPITAL ENCOUNTER (OUTPATIENT)
Dept: LAB | Facility: MEDICAL CENTER | Age: 70
End: 2020-06-26
Attending: INTERNAL MEDICINE
Payer: COMMERCIAL

## 2020-07-02 ENCOUNTER — OFFICE VISIT (OUTPATIENT)
Dept: CARDIOLOGY | Facility: MEDICAL CENTER | Age: 70
End: 2020-07-02
Payer: COMMERCIAL

## 2020-07-02 VITALS
OXYGEN SATURATION: 95 % | HEIGHT: 70 IN | SYSTOLIC BLOOD PRESSURE: 140 MMHG | BODY MASS INDEX: 26.97 KG/M2 | DIASTOLIC BLOOD PRESSURE: 68 MMHG | HEART RATE: 68 BPM | WEIGHT: 188.4 LBS | RESPIRATION RATE: 18 BRPM

## 2020-07-02 DIAGNOSIS — R93.1 ELEVATED CORONARY ARTERY CALCIUM SCORE: ICD-10-CM

## 2020-07-02 DIAGNOSIS — I25.10 CORONARY ARTERY DISEASE INVOLVING NATIVE CORONARY ARTERY OF NATIVE HEART WITHOUT ANGINA PECTORIS: ICD-10-CM

## 2020-07-02 DIAGNOSIS — Z79.899 HIGH RISK MEDICATION USE: ICD-10-CM

## 2020-07-02 DIAGNOSIS — I10 ESSENTIAL HYPERTENSION, BENIGN: ICD-10-CM

## 2020-07-02 DIAGNOSIS — R01.1 UNDIAGNOSED CARDIAC MURMURS: ICD-10-CM

## 2020-07-02 DIAGNOSIS — R16.1 SPLENOMEGALY: ICD-10-CM

## 2020-07-02 PROCEDURE — 99214 OFFICE O/P EST MOD 30 MIN: CPT | Performed by: INTERNAL MEDICINE

## 2020-07-02 RX ORDER — METOPROLOL SUCCINATE 25 MG/1
25 TABLET, EXTENDED RELEASE ORAL DAILY
Qty: 90 TAB | Refills: 3 | Status: SHIPPED | OUTPATIENT
Start: 2020-07-02 | End: 2021-01-07 | Stop reason: SDUPTHER

## 2020-07-02 RX ORDER — EZETIMIBE 10 MG/1
10 TABLET ORAL DAILY
Qty: 90 TAB | Refills: 3 | Status: SHIPPED | OUTPATIENT
Start: 2020-07-02 | End: 2021-01-07 | Stop reason: SDUPTHER

## 2020-07-02 RX ORDER — LISINOPRIL 10 MG/1
10 TABLET ORAL DAILY
Qty: 90 TAB | Refills: 3 | Status: SHIPPED | OUTPATIENT
Start: 2020-07-02 | End: 2021-01-07 | Stop reason: SDUPTHER

## 2020-07-02 ASSESSMENT — ENCOUNTER SYMPTOMS
DIZZINESS: 0
NEUROLOGICAL NEGATIVE: 1
RESPIRATORY NEGATIVE: 1
PALPITATIONS: 0
HEMOPTYSIS: 0
SPUTUM PRODUCTION: 0
GASTROINTESTINAL NEGATIVE: 1
WEAKNESS: 0
SHORTNESS OF BREATH: 0
WHEEZING: 0
SORE THROAT: 0
ORTHOPNEA: 0
COUGH: 0
CONSTITUTIONAL NEGATIVE: 1
LOSS OF CONSCIOUSNESS: 0
PND: 0
STRIDOR: 0
CHILLS: 0
BRUISES/BLEEDS EASILY: 0
CLAUDICATION: 0
FEVER: 0
EYES NEGATIVE: 1
MUSCULOSKELETAL NEGATIVE: 1
CARDIOVASCULAR NEGATIVE: 1

## 2020-07-02 ASSESSMENT — FIBROSIS 4 INDEX: FIB4 SCORE: 2.97

## 2020-07-02 NOTE — PROGRESS NOTES
Chief Complaint   Patient presents with   • Coronary Artery Disease      involving native coronary artery of native heart without angina pectoris    • HTN (Controlled)       Subjective:   Nitesh Duron is a 70 y.o. male who presents today as a follow-up for his high cholesterol and high calcium score with [facility].  Since he was last seen he did get started on Zetia.  He is tolerating without any problems.  He did run out of red rice yeast.  He is being worked up for cirrhosis.  He is blood pressure has been controlled.  He has no chest pain or shortness of breath.    Past Medical History:   Diagnosis Date   • Arrhythmia 09/07/2018   • Arthritis     OA- hands and L knee   • Cataract     OU   • Dental disorder     upper   • Diabetes     oral meds   • High cholesterol 09/07/2018    no med; diet controlled   • Hyperlipidemia    • Hypertension    • Renal disorder 09/07/2018    kidney stones     Past Surgical History:   Procedure Laterality Date   • GASTROSCOPY N/A 9/18/2018    Procedure: GASTROSCOPY;  Surgeon: Matt Young M.D.;  Location: SURGERY SAME DAY [facility];  Service: Gastroenterology   • ROTATOR CUFF REPAIR Left 2014   • ANKLE ORIF Right 1988    spiral fx   • MENISCECTOMY Left 1979   • APPENDECTOMY  1958   • CYSTOSCOPY  2004,2008    stone extraction     Family History   Problem Relation Age of Onset   • Other Mother    • Heart Attack Father    • Heart Failure Brother      Social History     Socioeconomic History   • Marital status:      Spouse name: Not on file   • Number of children: Not on file   • Years of education: Not on file   • Highest education level: Not on file   Occupational History   • Not on file   Social Needs   • Financial resource strain: Not on file   • Food insecurity     Worry: Not on file     Inability: Not on file   • Transportation needs     Medical: Not on file     Non-medical: Not on file   Tobacco Use   • Smoking status: Former Smoker     Types: Cigarettes      Last attempt to quit: 2008     Years since quittin.5   • Smokeless tobacco: Never Used   • Tobacco comment: quit    Substance and Sexual Activity   • Alcohol use: No   • Drug use: Yes     Types: Marijuana   • Sexual activity: Never   Lifestyle   • Physical activity     Days per week: Not on file     Minutes per session: Not on file   • Stress: Not on file   Relationships   • Social connections     Talks on phone: Not on file     Gets together: Not on file     Attends Cheondoism service: Not on file     Active member of club or organization: Not on file     Attends meetings of clubs or organizations: Not on file     Relationship status: Not on file   • Intimate partner violence     Fear of current or ex partner: Not on file     Emotionally abused: Not on file     Physically abused: Not on file     Forced sexual activity: Not on file   Other Topics Concern   • Not on file   Social History Narrative   • Not on file     Allergies   Allergen Reactions   • Ciprofloxacin Unspecified     convulsions   • Statins [Hmg-Coa-R Inhibitors] Unspecified     Stabbing pains in kidneys   • Acetaminophen      Kidney pain    • Ibuprofen      Kidney pain   • Naproxen      Kidney pain   • Shellfish Allergy      Kidney stones   • Soap &  [Alcohol] Rash     Antibacterial soap- contact dermatitis     Outpatient Encounter Medications as of 2020   Medication Sig Dispense Refill   • ezetimibe (ZETIA) 10 MG Tab Take 1 Tab by mouth every day. 90 Tab 3   • metoprolol SR (TOPROL XL) 25 MG TABLET SR 24 HR Take 1 Tab by mouth every day. 90 Tab 3   • lisinopril (PRINIVIL) 10 MG Tab Take 1 Tab by mouth every day. 90 Tab 3   • Potassium 99 MG Tab Take  by mouth every day.     • Non Formulary Request Collagen-1000 mg BID     • Red Yeast Rice 600 MG Tab Take  by mouth 2 Times a Day.     • Non Formulary Request Beet Root- 1000 mg daily     • Ginkgo Biloba 60 MG Cap Take  by mouth every day.     • Misc Natural Products (TART CHERRY  ADVANCED) Cap Take 2,500 Caps by mouth 2 Times a Day.     • Ginger, Zingiber officinalis, (GINGER ROOT) 550 MG Cap Take  by mouth as needed.     • tadalafil (CIALIS) 20 MG tablet      • ferrous sulfate 325 (65 Fe) MG tablet Take 325 mg by mouth 2 Times a Day.     • Cyanocobalamin (VITAMIN B-12 PO) Take  by mouth 2 Times a Day.     • Flaxseed, Linseed, (FLAX SEED OIL PO) Take  by mouth.     • oxycodone-aspirin (PERCODAN) 4.8355-325 MG Tab      • Non Formulary Request Take  by mouth 2 Times a Day. Uric acid cleanse     • vitamin e (VITAMIN E) 400 UNIT Cap Take 400 Units by mouth every day.     • vitamin A 8000 UNIT capsule Take 8,000 Units by mouth 2 Times a Day.     • Cholecalciferol (VITAMIN D3) 3000 UNITS Tab Take  by mouth every day.     • Resveratrol 100 MG Cap Take 250 mg by mouth every day.     • KRILL OIL PO Take  by mouth every day.     • Omega-3 Fatty Acids (FISH OIL) 1000 MG Cap capsule Take 1,000 mg by mouth every day.     • ASTAXANTHIN PO Take 4 mg by mouth every day.     • SAW PALMETTO, SERENOA REPENS, PO Take  by mouth every day.     • [DISCONTINUED] ezetimibe (ZETIA) 10 MG Tab Take 1 Tab by mouth every day. 30 Tab 11   • [DISCONTINUED] metoprolol SR (TOPROL XL) 25 MG TABLET SR 24 HR TAKE 1 TABLET BY MOUTH ONCE DAILY 90 Tab 3   • [DISCONTINUED] lisinopril (PRINIVIL) 10 MG Tab 10 mg every day.     • metformin (GLUCOPHAGE) 1000 MG tablet Take 500 mg by mouth 2 times a day, with meals.       No facility-administered encounter medications on file as of 7/2/2020.      Review of Systems   Constitutional: Negative.  Negative for chills, fever and malaise/fatigue.   HENT: Negative.  Negative for sore throat.    Eyes: Negative.    Respiratory: Negative.  Negative for cough, hemoptysis, sputum production, shortness of breath, wheezing and stridor.    Cardiovascular: Negative.  Negative for chest pain, palpitations, orthopnea, claudication, leg swelling and PND.   Gastrointestinal: Negative.    Genitourinary:  "Negative.    Musculoskeletal: Negative.    Skin: Negative.    Neurological: Negative.  Negative for dizziness, loss of consciousness and weakness.   Endo/Heme/Allergies: Negative.  Does not bruise/bleed easily.   All other systems reviewed and are negative.       Objective:   /68 (BP Location: Left arm, Patient Position: Sitting, BP Cuff Size: Adult)   Pulse 68   Resp 18   Ht 1.778 m (5' 10\")   Wt 85.5 kg (188 lb 6.4 oz)   SpO2 95%   BMI 27.03 kg/m²     Physical Exam   Constitutional: He appears well-developed and well-nourished. No distress.   HENT:   Head: Normocephalic and atraumatic.   Right Ear: External ear normal.   Left Ear: External ear normal.   Nose: Nose normal.   Mouth/Throat: No oropharyngeal exudate.   Eyes: Pupils are equal, round, and reactive to light. Conjunctivae and EOM are normal. Right eye exhibits no discharge. Left eye exhibits no discharge. No scleral icterus.   Neck: Neck supple. No JVD present.   Cardiovascular: Normal rate, regular rhythm and intact distal pulses. Exam reveals no gallop and no friction rub.   No murmur heard.  Pulmonary/Chest: Effort normal. No stridor. No respiratory distress. He has no wheezes. He has no rales. He exhibits no tenderness.   Abdominal: Soft. He exhibits no distension. There is no guarding.   Musculoskeletal: Normal range of motion.         General: No tenderness, deformity or edema.   Neurological: He is alert. He has normal reflexes. He displays normal reflexes. No cranial nerve deficit. He exhibits normal muscle tone. Coordination normal.   Skin: Skin is warm and dry. No rash noted. He is not diaphoretic. No erythema. No pallor.   Psychiatric: He has a normal mood and affect. His behavior is normal. Judgment and thought content normal.   Nursing note and vitals reviewed.      Assessment:     1. High risk medication use  ezetimibe (ZETIA) 10 MG Tab    LIPID PANEL   2. Splenomegaly  LIPID PANEL   3. Undiagnosed cardiac murmurs     4. " Essential hypertension, benign  metoprolol SR (TOPROL XL) 25 MG TABLET SR 24 HR    lisinopril (PRINIVIL) 10 MG Tab    LIPID PANEL   5. Elevated coronary artery calcium score  ezetimibe (ZETIA) 10 MG Tab    metoprolol SR (TOPROL XL) 25 MG TABLET SR 24 HR    LIPID PANEL   6. Coronary artery disease involving native coronary artery of native heart without angina pectoris  ezetimibe (ZETIA) 10 MG Tab       Medical Decision Making:  Today's Assessment / Status / Plan:     70-year-old male with some degree of cirrhosis with an elevated calcium score high blood pressure and hyperlipidemia.  We will get a lipid panel to see where he is on Zetia.  I refilled his lisinopril metoprolol and Zetia.  I will await the results of his liver ultrasound.  I will see him back in 6 months.

## 2020-07-30 ENCOUNTER — HOSPITAL ENCOUNTER (OUTPATIENT)
Dept: LAB | Facility: MEDICAL CENTER | Age: 70
End: 2020-07-30
Attending: PHYSICIAN ASSISTANT
Payer: COMMERCIAL

## 2020-07-30 LAB
ALBUMIN SERPL BCP-MCNC: 4.2 G/DL (ref 3.2–4.9)
ALBUMIN/GLOB SERPL: 1.6 G/DL
ALP SERPL-CCNC: 75 U/L (ref 30–99)
ALT SERPL-CCNC: 10 U/L (ref 2–50)
ANION GAP SERPL CALC-SCNC: 12 MMOL/L (ref 7–16)
AST SERPL-CCNC: 11 U/L (ref 12–45)
BASOPHILS # BLD AUTO: 1.1 % (ref 0–1.8)
BASOPHILS # BLD: 0.05 K/UL (ref 0–0.12)
BILIRUB SERPL-MCNC: 0.5 MG/DL (ref 0.1–1.5)
BUN SERPL-MCNC: 20 MG/DL (ref 8–22)
CALCIUM SERPL-MCNC: 9 MG/DL (ref 8.5–10.5)
CHLORIDE SERPL-SCNC: 103 MMOL/L (ref 96–112)
CHOLEST SERPL-MCNC: 176 MG/DL (ref 100–199)
CO2 SERPL-SCNC: 24 MMOL/L (ref 20–33)
CREAT SERPL-MCNC: 0.97 MG/DL (ref 0.5–1.4)
EOSINOPHIL # BLD AUTO: 0.26 K/UL (ref 0–0.51)
EOSINOPHIL NFR BLD: 5.7 % (ref 0–6.9)
ERYTHROCYTE [DISTWIDTH] IN BLOOD BY AUTOMATED COUNT: 45.3 FL (ref 35.9–50)
EST. AVERAGE GLUCOSE BLD GHB EST-MCNC: 120 MG/DL
FASTING STATUS PATIENT QL REPORTED: NORMAL
GLOBULIN SER CALC-MCNC: 2.6 G/DL (ref 1.9–3.5)
GLUCOSE SERPL-MCNC: 115 MG/DL (ref 65–99)
HBA1C MFR BLD: 5.8 % (ref 0–5.6)
HCT VFR BLD AUTO: 42.5 % (ref 42–52)
HDLC SERPL-MCNC: 37 MG/DL
HGB BLD-MCNC: 13.8 G/DL (ref 14–18)
IMM GRANULOCYTES # BLD AUTO: 0.01 K/UL (ref 0–0.11)
IMM GRANULOCYTES NFR BLD AUTO: 0.2 % (ref 0–0.9)
LDLC SERPL CALC-MCNC: 125 MG/DL
LYMPHOCYTES # BLD AUTO: 1.05 K/UL (ref 1–4.8)
LYMPHOCYTES NFR BLD: 23.2 % (ref 22–41)
MCH RBC QN AUTO: 29 PG (ref 27–33)
MCHC RBC AUTO-ENTMCNC: 32.5 G/DL (ref 33.7–35.3)
MCV RBC AUTO: 89.3 FL (ref 81.4–97.8)
MONOCYTES # BLD AUTO: 0.38 K/UL (ref 0–0.85)
MONOCYTES NFR BLD AUTO: 8.4 % (ref 0–13.4)
NEUTROPHILS # BLD AUTO: 2.78 K/UL (ref 1.82–7.42)
NEUTROPHILS NFR BLD: 61.4 % (ref 44–72)
NRBC # BLD AUTO: 0 K/UL
NRBC BLD-RTO: 0 /100 WBC
PLATELET # BLD AUTO: 96 K/UL (ref 164–446)
PMV BLD AUTO: 9.3 FL (ref 9–12.9)
POTASSIUM SERPL-SCNC: 4.2 MMOL/L (ref 3.6–5.5)
PROT SERPL-MCNC: 6.8 G/DL (ref 6–8.2)
RBC # BLD AUTO: 4.76 M/UL (ref 4.7–6.1)
SODIUM SERPL-SCNC: 139 MMOL/L (ref 135–145)
TRIGL SERPL-MCNC: 72 MG/DL (ref 0–149)
WBC # BLD AUTO: 4.5 K/UL (ref 4.8–10.8)

## 2020-07-30 PROCEDURE — 36415 COLL VENOUS BLD VENIPUNCTURE: CPT

## 2020-07-30 PROCEDURE — 80061 LIPID PANEL: CPT

## 2020-07-30 PROCEDURE — 85025 COMPLETE CBC W/AUTO DIFF WBC: CPT

## 2020-07-30 PROCEDURE — 83036 HEMOGLOBIN GLYCOSYLATED A1C: CPT | Mod: GA

## 2020-07-30 PROCEDURE — 80053 COMPREHEN METABOLIC PANEL: CPT

## 2020-08-10 ENCOUNTER — OFFICE VISIT (OUTPATIENT)
Dept: WOUND CARE | Facility: MEDICAL CENTER | Age: 70
End: 2020-08-10
Attending: PHYSICIAN ASSISTANT
Payer: COMMERCIAL

## 2020-08-10 VITALS
DIASTOLIC BLOOD PRESSURE: 76 MMHG | HEART RATE: 66 BPM | OXYGEN SATURATION: 96 % | TEMPERATURE: 97.5 F | RESPIRATION RATE: 20 BRPM | SYSTOLIC BLOOD PRESSURE: 134 MMHG

## 2020-08-10 DIAGNOSIS — L89.512 PRESSURE INJURY OF RIGHT ANKLE, STAGE 2 (HCC): ICD-10-CM

## 2020-08-10 DIAGNOSIS — I87.2 VENOUS INSUFFICIENCY OF RIGHT LEG: ICD-10-CM

## 2020-08-10 PROCEDURE — 99215 OFFICE O/P EST HI 40 MIN: CPT

## 2020-08-10 PROCEDURE — 11042 DBRDMT SUBQ TIS 1ST 20SQCM/<: CPT

## 2020-08-10 ASSESSMENT — PAIN SCALES - GENERAL: PAINLEVEL: NO PAIN

## 2020-08-10 ASSESSMENT — ENCOUNTER SYMPTOMS
RESPIRATORY NEGATIVE: 1
GASTROINTESTINAL NEGATIVE: 1
EYES NEGATIVE: 1
CONSTITUTIONAL NEGATIVE: 1
PSYCHIATRIC NEGATIVE: 1
PALPITATIONS: 0
NEUROLOGICAL NEGATIVE: 1
MUSCULOSKELETAL NEGATIVE: 1

## 2020-08-10 NOTE — PROGRESS NOTES
" Provider Encounter- Pressure Injury          HISTORY OF PRESENT ILLNESS     START OF CARE IN CLINIC: 8/10/74049    REFERRING PROVIDER: Tiesha Breaux,      WOUND- Pressure injury   STAGE:2   LOCATION: Right anterior ankle   WOUND HISTORY: Patient wears boots and it sounds like it rubbed on the anterior aspect of the ankle.     PREVIOUS TREATMENT: Compression and wound care for a different type of wound in 2019   PERTINENT PMH: Diabetes \"beat after I lost 60 pounds\"; venous insufficiency,  High cholesterol     IMAGING:none   LAST  WOUND CULTURE:  DATE : none                OFFLOADING: N/A    DIABETES: resolved with weight loss    TOBACCO USE: no    RESULTS:   Most recent A1c:  5.8 DATE 7/30/2020    VASCULAR STUDIES:  LE Vein Reflux 9/21/2018   Report      Vascular Laboratory   CONCLUSIONS   Right greater saphenous has significant reflux and is generally small in    diameter.   No reflux right lesser saphenous vein.   No deep venous reflux.   No venous thrombosis.          Greater Saphenous Vein          RIGHT          Diam          (cm)                  Reflux     Characteristics    SFJ   0.71    Yes        Normal       HT    0.24    Yes        Normal       MT    0.38    Yes        Normal       LT    0.47    Yes        Normal       Knee  0.22    Yes        Normal       HC    0.26    Yes        Normal       LC                       Normal    Ankle                    Normal         PAST MEDICAL HISTORY:   Past Medical History:   Diagnosis Date   • Arrhythmia 09/07/2018   • Arthritis     OA- hands and L knee   • Cataract     OU   • Dental disorder     upper   • Diabetes     oral meds   • High cholesterol 09/07/2018    no med; diet controlled   • Hyperlipidemia    • Hypertension    • Renal disorder 09/07/2018    kidney stones       PAST SURGICAL HISTORY:   Past Surgical History:   Procedure Laterality Date   • GASTROSCOPY N/A 9/18/2018    Procedure: GASTROSCOPY;  Surgeon: Matt Young M.D.;  Location: SURGERY SAME " [facility];  Service: Gastroenterology   • ROTATOR CUFF REPAIR Left 2014   • ANKLE ORIF Right 1988    spiral fx   • MENISCECTOMY Left 1979   • APPENDECTOMY  1958   • CYSTOSCOPY  2004,2008    stone extraction        MEDICATIONS:   Current Outpatient Medications   Medication   • ezetimibe (ZETIA) 10 MG Tab   • metoprolol SR (TOPROL XL) 25 MG TABLET SR 24 HR   • lisinopril (PRINIVIL) 10 MG Tab   • Ginkgo Biloba 60 MG Cap   • Cyanocobalamin (VITAMIN B-12 PO)   • oxycodone-aspirin (PERCODAN) 4.8355-325 MG Tab   • Non Formulary Request   • vitamin e (VITAMIN E) 400 UNIT Cap   • vitamin A 8000 UNIT capsule   • Resveratrol 100 MG Cap   • KRILL OIL PO   • Omega-3 Fatty Acids (FISH OIL) 1000 MG Cap capsule   • ASTAXANTHIN PO   • SAW PALMETTO, SERENOA REPENS, PO   • Potassium 99 MG Tab   • Non Formulary Request   • Red Yeast Rice 600 MG Tab   • Non Formulary Request   • Misc Natural Products (TART CHERRY ADVANCED) Cap   • Apolonia, Zingiber officinalis, (APOLONIA ROOT) 550 MG Cap   • tadalafil (CIALIS) 20 MG tablet   • ferrous sulfate 325 (65 Fe) MG tablet   • Flaxseed, Linseed, (FLAX SEED OIL PO)   • metformin (GLUCOPHAGE) 1000 MG tablet   • Cholecalciferol (VITAMIN D3) 3000 UNITS Tab     No current facility-administered medications for this visit.        ALLERGIES:    Allergies   Allergen Reactions   • Ciprofloxacin Unspecified     convulsions   • Statins [Hmg-Coa-R Inhibitors] Unspecified     Stabbing pains in kidneys   • Acetaminophen      Kidney pain    • Ibuprofen      Kidney pain   • Naproxen      Kidney pain   • Shellfish Allergy      Kidney stones   • Soap &  [Alcohol] Rash     Antibacterial soap- contact dermatitis         SOCIAL HISTORY:   Social History     Socioeconomic History   • Marital status:      Spouse name: Not on file   • Number of children: Not on file   • Years of education: Not on file   • Highest education level: Not on file   Occupational History   • Not on file   Social Needs   •  Financial resource strain: Not on file   • Food insecurity     Worry: Not on file     Inability: Not on file   • Transportation needs     Medical: Not on file     Non-medical: Not on file   Tobacco Use   • Smoking status: Former Smoker     Types: Cigarettes     Quit date: 2008     Years since quittin.6   • Smokeless tobacco: Never Used   • Tobacco comment: quit    Substance and Sexual Activity   • Alcohol use: No   • Drug use: Yes     Types: Marijuana   • Sexual activity: Never   Lifestyle   • Physical activity     Days per week: Not on file     Minutes per session: Not on file   • Stress: Not on file   Relationships   • Social connections     Talks on phone: Not on file     Gets together: Not on file     Attends Muslim service: Not on file     Active member of club or organization: Not on file     Attends meetings of clubs or organizations: Not on file     Relationship status: Not on file   • Intimate partner violence     Fear of current or ex partner: Not on file     Emotionally abused: Not on file     Physically abused: Not on file     Forced sexual activity: Not on file   Other Topics Concern   • Not on file   Social History Narrative   • Not on file       FAMILY HISTORY:   Family History   Problem Relation Age of Onset   • Other Mother    • Heart Attack Father    • Heart Failure Brother           REVIEW OF SYSTEMS:   Review of Systems   Constitutional: Negative.    HENT: Negative.    Eyes: Negative.    Respiratory: Negative.    Cardiovascular: Negative for chest pain, palpitations and leg swelling.        Legs swell without compression   Gastrointestinal: Negative.    Musculoskeletal: Negative.    Skin:        Tiny open wound on the anterior aspect of the right ankle   Neurological: Negative.    Endo/Heme/Allergies: Negative.    Psychiatric/Behavioral: Negative.        PHYSICAL EXAMINATION:   /76 (BP Location: Right arm, Patient Position: Sitting, BP Cuff Size: Adult)   Pulse 66   Temp  36.4 °C (97.5 °F) (Temporal)   Resp 20   SpO2 96%   Physical Exam   Constitutional: He is oriented to person, place, and time and well-developed, well-nourished, and in no distress.   HENT:   Head: Normocephalic and atraumatic.   Eyes: Pupils are equal, round, and reactive to light.   Neck: Normal range of motion. Neck supple.   Cardiovascular: Normal rate, regular rhythm and intact distal pulses.   Pulmonary/Chest: Effort normal. No respiratory distress.   Abdominal: Soft. He exhibits no distension.   Musculoskeletal: Normal range of motion.         General: No deformity or edema.   Neurological: He is alert and oriented to person, place, and time.   Skin:   Tiny Stage 2 pressure ulcer on the anterior right ankle.   Psychiatric: Memory, affect and judgment normal.       WOUND ASSESSMENT- Refer to wound flowsheet     Wound 08/10/20 Pretibial Distal Right lower leg distal anterior (Active)   Wound Image     08/10/20 1000   Site Assessment Red;Yellow 08/10/20 1000   Periwound Assessment Fragile;Dry;Other (Comment);Edema 08/10/20 1000   Margins Attached edges 08/10/20 1000   Closure Secondary intention 08/10/20 1000   Drainage Amount Moderate 08/10/20 1000   Drainage Description Serous 08/10/20 1000   Treatments Cleansed;Provider debridement;Topical Lidocaine 08/10/20 1000   Wound Cleansing Normal Saline Irrigation 08/10/20 1000   Periwound Protectant Barrier Paste 08/10/20 1000   Dressing Cleansing/Solutions Normal Saline 08/10/20 1000   Dressing Options Hydrofera Blue Ready;Hypafix Tape;Dry Roll Gauze;Petrolatum Gauze (yellow) 08/10/20 1000   Dressing Changed Changed 08/10/20 1000   Dressing Status Clean;Dry;Intact 08/10/20 1000   Dressing Change/Treatment Frequency Every 48 hrs, and As Needed 08/10/20 1000   Non-staged Wound Description Full thickness 08/10/20 1000   Wound Length (cm) 0.3 cm 08/10/20 1000   Wound Width (cm) 0.4 cm 08/10/20 1000   Wound Depth (cm) 0.1 cm 08/10/20 1000   Wound Surface Area (cm^2)  0.12 cm^2 08/10/20 1000   Wound Volume (cm^3) 0.01 cm^3 08/10/20 1000   Post-Procedure Length (cm) 0.3 cm 08/10/20 1000   Post-Procedure Width (cm) 0.9 cm 08/10/20 1000   Post-Procedure Depth (cm) 0.1 cm 08/10/20 1000   Post-Procedure Surface Area (cm^2) 0.27 cm^2 08/10/20 1000   Post-Procedure Volume (cm^3) 0.03 cm^3 08/10/20 1000   Wound Bed Granulation (%) - Post-Procedure 90 % 08/10/20 1000   Wound Bed Slough % - (Post-Procedure) 10 % 08/10/20 1000   Tunneling (cm) 0 cm 08/10/20 1000   Undermining (cm) 0 cm 08/10/20 1000   Shape oval 08/10/20 1000   Wound Odor None 08/10/20 1000   Pulses 2+;Right;DP 08/10/20 1000   Exposed Structures None 08/10/20 1000          PROCEDURE  -2% viscous lidocaine applied topically to wound bed for approximately 5 minutes prior to debridement  -  4m curette used to debride wound bed..  Excisional debridement was performed to remove devitalized tissue until healthy, bleeding tissue was visualized.   Entire surface of wound, .12cm2, debrided  Tissue debrided into the subcutaneous layer.    -Pain controlled with manual pressure   -Wound care completed by PRUDENCE Soto , per orders- refer to flowsheet    PATIENT EDUCATION  -Etiology of pressure injury  -Importance of offloading  -Strategies for offloading in bed and when seated discussed and demonstrated  - Importance of adequate nutrition for wound healing  -Increase protein intake (unless contraindicated by renal status)   - Advised to go to ER for any increased redness, swelling, drainage or odor, or if patient develops fever, chills, nausea or vomiting.    ASSESSMENT AND PLAN:   1. Pressure injury of right ankle, stage 2 ([facility])  8/10/2020:  Today's ulcer is tiny and does not represent a venous stasis wound with it's location.  LIkely related to bunched up socks in his boot.  It may bell be healed when he returns next week.  No indication for venous intervention as the patient is completely compliant with compression.  (Although he cut his  current pair)    2. Venous insufficiency of right leg  8/10/2020:  Documented previously, but I do not think this wound is related.  No indication for intervention at present.        Please note that this dictation was created using voice recognition software. I have worked with technical experts from [facility] [employer] to optimize the interface.  I have made every reasonable attempt to correct obvious errors, but there may be errors of grammar and possibly content that I did not discover before finalizing the note.

## 2020-08-10 NOTE — PATIENT INSTRUCTIONS
Avoid prolonged standing or sitting without elevating your legs.  - Apply knee high compression stockings to your legs ending 2 fingers below back of knee without wrinkles & all the way to your toes.   Change your dressing every 2 days as shown during your appointment & your can shower.     Should you experience any significant changes in your wound(s), such as infection (redness, swelling, localized heat, increased pain, fever > 101 F, chills) or have any questions regarding your home care instructions, please contact the wound center at [phone]. If after hours, contact your primary care physician or go to the hospital emergency room.   Change dressing if it becomes over saturated, soiled or falls off.   Your supplies will come from a company call [employer] within a week.

## 2020-08-14 ENCOUNTER — HOSPITAL ENCOUNTER (OUTPATIENT)
Dept: LAB | Facility: MEDICAL CENTER | Age: 70
End: 2020-08-14
Attending: INTERNAL MEDICINE
Payer: COMMERCIAL

## 2020-08-14 LAB
ALBUMIN SERPL BCP-MCNC: 4.2 G/DL (ref 3.2–4.9)
ALBUMIN/GLOB SERPL: 1.7 G/DL
ALP SERPL-CCNC: 82 U/L (ref 30–99)
ALT SERPL-CCNC: 12 U/L (ref 2–50)
ANION GAP SERPL CALC-SCNC: 13 MMOL/L (ref 7–16)
AST SERPL-CCNC: 15 U/L (ref 12–45)
BASOPHILS # BLD AUTO: 0.8 % (ref 0–1.8)
BASOPHILS # BLD: 0.04 K/UL (ref 0–0.12)
BILIRUB SERPL-MCNC: 0.7 MG/DL (ref 0.1–1.5)
BUN SERPL-MCNC: 18 MG/DL (ref 8–22)
CALCIUM SERPL-MCNC: 9.5 MG/DL (ref 8.5–10.5)
CHLORIDE SERPL-SCNC: 100 MMOL/L (ref 96–112)
CO2 SERPL-SCNC: 25 MMOL/L (ref 20–33)
CREAT SERPL-MCNC: 0.8 MG/DL (ref 0.5–1.4)
EOSINOPHIL # BLD AUTO: 0.29 K/UL (ref 0–0.51)
EOSINOPHIL NFR BLD: 5.8 % (ref 0–6.9)
ERYTHROCYTE [DISTWIDTH] IN BLOOD BY AUTOMATED COUNT: 48 FL (ref 35.9–50)
GLOBULIN SER CALC-MCNC: 2.5 G/DL (ref 1.9–3.5)
GLUCOSE SERPL-MCNC: 127 MG/DL (ref 65–99)
HCT VFR BLD AUTO: 43.3 % (ref 42–52)
HGB BLD-MCNC: 13.9 G/DL (ref 14–18)
IMM GRANULOCYTES # BLD AUTO: 0.04 K/UL (ref 0–0.11)
IMM GRANULOCYTES NFR BLD AUTO: 0.8 % (ref 0–0.9)
LYMPHOCYTES # BLD AUTO: 0.77 K/UL (ref 1–4.8)
LYMPHOCYTES NFR BLD: 15.4 % (ref 22–41)
MCH RBC QN AUTO: 29.1 PG (ref 27–33)
MCHC RBC AUTO-ENTMCNC: 32.1 G/DL (ref 33.7–35.3)
MCV RBC AUTO: 90.8 FL (ref 81.4–97.8)
MONOCYTES # BLD AUTO: 0.4 K/UL (ref 0–0.85)
MONOCYTES NFR BLD AUTO: 8 % (ref 0–13.4)
NEUTROPHILS # BLD AUTO: 3.47 K/UL (ref 1.82–7.42)
NEUTROPHILS NFR BLD: 69.2 % (ref 44–72)
NRBC # BLD AUTO: 0 K/UL
NRBC BLD-RTO: 0 /100 WBC
PLATELET # BLD AUTO: 89 K/UL (ref 164–446)
PMV BLD AUTO: 10.1 FL (ref 9–12.9)
POTASSIUM SERPL-SCNC: 4.6 MMOL/L (ref 3.6–5.5)
PROT SERPL-MCNC: 6.7 G/DL (ref 6–8.2)
RBC # BLD AUTO: 4.77 M/UL (ref 4.7–6.1)
SODIUM SERPL-SCNC: 138 MMOL/L (ref 135–145)
WBC # BLD AUTO: 5 K/UL (ref 4.8–10.8)

## 2020-08-14 PROCEDURE — 85025 COMPLETE CBC W/AUTO DIFF WBC: CPT

## 2020-08-14 PROCEDURE — 36415 COLL VENOUS BLD VENIPUNCTURE: CPT

## 2020-08-14 PROCEDURE — 80053 COMPREHEN METABOLIC PANEL: CPT

## 2020-08-18 ENCOUNTER — HOSPITAL ENCOUNTER (OUTPATIENT)
Dept: LAB | Facility: MEDICAL CENTER | Age: 70
End: 2020-08-18
Attending: INTERNAL MEDICINE
Payer: COMMERCIAL

## 2020-08-18 LAB — FERRITIN SERPL-MCNC: 139 NG/ML (ref 22–322)

## 2020-08-18 PROCEDURE — 82728 ASSAY OF FERRITIN: CPT

## 2020-08-25 ENCOUNTER — OFFICE VISIT (OUTPATIENT)
Dept: WOUND CARE | Facility: MEDICAL CENTER | Age: 70
End: 2020-08-25
Attending: PHYSICIAN ASSISTANT
Payer: COMMERCIAL

## 2020-08-25 VITALS
DIASTOLIC BLOOD PRESSURE: 76 MMHG | HEART RATE: 76 BPM | TEMPERATURE: 97.3 F | SYSTOLIC BLOOD PRESSURE: 136 MMHG | RESPIRATION RATE: 20 BRPM | OXYGEN SATURATION: 96 %

## 2020-08-25 DIAGNOSIS — I87.2 VENOUS INSUFFICIENCY OF RIGHT LEG: Primary | ICD-10-CM

## 2020-08-25 DIAGNOSIS — L89.512 PRESSURE INJURY OF RIGHT ANKLE, STAGE 2 (HCC): ICD-10-CM

## 2020-08-25 PROCEDURE — 99213 OFFICE O/P EST LOW 20 MIN: CPT | Performed by: NURSE PRACTITIONER

## 2020-08-25 PROCEDURE — 29580 STRAPPING UNNA BOOT: CPT

## 2020-08-25 PROCEDURE — 99213 OFFICE O/P EST LOW 20 MIN: CPT

## 2020-08-25 ASSESSMENT — ENCOUNTER SYMPTOMS
HEADACHES: 0
VOMITING: 0
WEAKNESS: 0
COUGH: 0
BLURRED VISION: 0
SHORTNESS OF BREATH: 0
FEVER: 0
DIZZINESS: 0
NAUSEA: 0
WHEEZING: 0
PALPITATIONS: 0
CHILLS: 0

## 2020-08-25 NOTE — PATIENT INSTRUCTIONS
-Keep your wound dressing clean, dry, and intact.    -Change your dressing if it becomes soiled, soaked, or falls off.    -Remove your compression wrap if you have severe pain, severe swelling, numbness, color change, or temperature change in your toes. If you need to remove your compression wrap, do so by unrolling it. Do not cut the compression wrap off to prevent cutting yourself on accident.    -Should you experience any significant changes in your wound(s), such as infection (redness, swelling, localized heat, increased pain, fever > 101 F, chills) or have any questions regarding your home care instructions, please contact the wound center at [phone]. If after hours, contact your primary care physician or go to the hospital emergency room.

## 2020-08-26 NOTE — PROGRESS NOTES
" Provider Encounter- Pressure Injury          HISTORY OF PRESENT ILLNESS     START OF CARE IN CLINIC: 8/10/86658    REFERRING PROVIDER: Tiesha Breaux,      WOUND- Pressure injury   STAGE:2   LOCATION: Right anterior ankle   WOUND HISTORY: Patient wears boots and it sounds like it rubbed on the anterior aspect of the ankle.     PREVIOUS TREATMENT: Compression and wound care for a different type of wound in 2019   PERTINENT PMH: Diabetes \"beat after I lost 60 pounds\"; venous insufficiency,  High cholesterol     IMAGING:none   LAST  WOUND CULTURE:  DATE : none                OFFLOADING: N/A    DIABETES: resolved with weight loss    TOBACCO USE: no    Interval History: 8/25/2020: Clinic visit with WALTER Berger.  Patient states that they are feeling well today.  Patient denies fever, chills, nausea, vomiting, lightheadedness, dizziness, shortness of breath and chest pain. Ulcer has resolved patient does have significant denuded tissue applied Unna boot with hopeful resolution next week.    RESULTS:   Most recent A1c:  5.8 DATE 7/30/2020    VASCULAR STUDIES:  LE Vein Reflux 9/21/2018   Report      Vascular Laboratory   CONCLUSIONS   Right greater saphenous has significant reflux and is generally small in    diameter.   No reflux right lesser saphenous vein.   No deep venous reflux.   No venous thrombosis.          Greater Saphenous Vein          RIGHT          Diam          (cm)                  Reflux     Characteristics    SFJ   0.71    Yes        Normal       HT    0.24    Yes        Normal       MT    0.38    Yes        Normal       LT    0.47    Yes        Normal       Knee  0.22    Yes        Normal       HC    0.26    Yes        Normal       LC                       Normal    Ankle                    Normal         PAST MEDICAL HISTORY:   Past Medical History:   Diagnosis Date   • Arrhythmia 09/07/2018   • Arthritis     OA- hands and L knee   • Cataract     OU   • Dental disorder     upper   • Diabetes  "    oral meds   • High cholesterol 09/07/2018    no med; diet controlled   • Hyperlipidemia    • Hypertension    • Renal disorder 09/07/2018    kidney stones       PAST SURGICAL HISTORY:   Past Surgical History:   Procedure Laterality Date   • GASTROSCOPY N/A 9/18/2018    Procedure: GASTROSCOPY;  Surgeon: Matt Young M.D.;  [facility];  Service: Gastroenterology   • ROTATOR CUFF REPAIR Left 2014   • ANKLE ORIF Right 1988    spiral fx   • MENISCECTOMY Left 1979   • APPENDECTOMY  1958   • CYSTOSCOPY  2004,2008    stone extraction        MEDICATIONS:   Current Outpatient Medications   Medication   • ezetimibe (ZETIA) 10 MG Tab   • metoprolol SR (TOPROL XL) 25 MG TABLET SR 24 HR   • lisinopril (PRINIVIL) 10 MG Tab   • Potassium 99 MG Tab   • Non Formulary Request   • Red Yeast Rice 600 MG Tab   • Non Formulary Request   • Ginkgo Biloba 60 MG Cap   • Misc Natural Products (TART CHERRY ADVANCED) Cap   • Apolonia, Zingiber officinalis, (APOLONIA ROOT) 550 MG Cap   • tadalafil (CIALIS) 20 MG tablet   • ferrous sulfate 325 (65 Fe) MG tablet   • Cyanocobalamin (VITAMIN B-12 PO)   • Flaxseed, Linseed, (FLAX SEED OIL PO)   • oxycodone-aspirin (PERCODAN) 4.8355-325 MG Tab   • metformin (GLUCOPHAGE) 1000 MG tablet   • Non Formulary Request   • vitamin e (VITAMIN E) 400 UNIT Cap   • vitamin A 8000 UNIT capsule   • Cholecalciferol (VITAMIN D3) 3000 UNITS Tab   • Resveratrol 100 MG Cap   • KRILL OIL PO   • Omega-3 Fatty Acids (FISH OIL) 1000 MG Cap capsule   • ASTAXANTHIN PO   • SAW PALMETTO, SERENOA REPENS, PO     No current facility-administered medications for this visit.        ALLERGIES:    Allergies   Allergen Reactions   • Ciprofloxacin Unspecified     convulsions   • Statins [Hmg-Coa-R Inhibitors] Unspecified     Stabbing pains in kidneys   • Acetaminophen      Kidney pain    • Ibuprofen      Kidney pain   • Naproxen      Kidney pain   • Shellfish Allergy      Kidney stones   • Soap &   [Alcohol] Rash     Antibacterial soap- contact dermatitis         SOCIAL HISTORY:   Social History     Socioeconomic History   • Marital status:      Spouse name: Not on file   • Number of children: Not on file   • Years of education: Not on file   • Highest education level: Not on file   Occupational History   • Not on file   Social Needs   • Financial resource strain: Not on file   • Food insecurity     Worry: Not on file     Inability: Not on file   • Transportation needs     Medical: Not on file     Non-medical: Not on file   Tobacco Use   • Smoking status: Former Smoker     Types: Cigarettes     Quit date: 2008     Years since quittin.6   • Smokeless tobacco: Never Used   • Tobacco comment: quit    Substance and Sexual Activity   • Alcohol use: No   • Drug use: Yes     Types: Marijuana   • Sexual activity: Never   Lifestyle   • Physical activity     Days per week: Not on file     Minutes per session: Not on file   • Stress: Not on file   Relationships   • Social connections     Talks on phone: Not on file     Gets together: Not on file     Attends Christianity service: Not on file     Active member of club or organization: Not on file     Attends meetings of clubs or organizations: Not on file     Relationship status: Not on file   • Intimate partner violence     Fear of current or ex partner: Not on file     Emotionally abused: Not on file     Physically abused: Not on file     Forced sexual activity: Not on file   Other Topics Concern   • Not on file   Social History Narrative   • Not on file       FAMILY HISTORY:   Family History   Problem Relation Age of Onset   • Other Mother    • Heart Attack Father    • Heart Failure Brother           REVIEW OF SYSTEMS:   Review of Systems   Constitutional: Negative for chills and fever.   HENT: Negative.    Eyes: Negative for blurred vision.   Respiratory: Negative for cough, shortness of breath and wheezing.    Cardiovascular: Positive for leg  swelling. Negative for chest pain and palpitations.        Swelling right leg     Gastrointestinal: Negative for nausea and vomiting.   Skin:        Tiny open wound on the anterior aspect of the right ankle  New denudes skin from right foot up to right ankle   Neurological: Negative for dizziness, weakness and headaches.       PHYSICAL EXAMINATION:   /76   Pulse 76   Temp 36.3 °C (97.3 °F)   Resp 20   SpO2 96%   Physical Exam   Constitutional: He is oriented to person, place, and time and well-developed, well-nourished, and in no distress.   HENT:   Head: Normocephalic and atraumatic.   Eyes: Pupils are equal, round, and reactive to light.   Neck: Normal range of motion. Neck supple.   Cardiovascular: Regular rhythm and intact distal pulses.   Pulmonary/Chest: Effort normal. No respiratory distress. He has no wheezes.   Musculoskeletal: Normal range of motion.         General: No deformity.      Comments: Minimal edema dependent to right LE   Neurological: He is alert and oriented to person, place, and time.   Skin: There is erythema.   Denuded skin to right foot up to right ankle   Psychiatric: Memory, affect and judgment normal.       WOUND ASSESSMENT       Wound 08/10/20 Pretibial Distal Right lower leg distal anterior (Active)   Wound Image   08/25/20 1600   Site Assessment Other (Comment) 08/25/20 1600   Periwound Assessment Other (Comment);Denuded;Edema;Red 08/25/20 1600   Margins Attached edges 08/10/20 1000   Closure Secondary intention 08/25/20 1600   Drainage Amount Moderate 08/25/20 1600   Drainage Description Serosanguineous 08/25/20 1600   Treatments Cleansed 08/25/20 1600   Wound Cleansing Foam Cleanser/Washcloth 08/25/20 1600   Periwound Protectant Not Applicable 08/25/20 1600   Dressing Cleansing/Solutions Normal Saline 08/10/20 1000   Dressing Options Unna Boot;Other (Comments) 08/25/20 1600   Dressing Changed Changed 08/25/20 1600   Dressing Status Clean;Dry;Intact 08/25/20 1600    Dressing Change/Treatment Frequency Every 72 hrs, and As Needed 08/25/20 1600   Non-staged Wound Description Full thickness 08/25/20 1600   Wound Length (cm) 0.3 cm 08/10/20 1000   Wound Width (cm) 0.4 cm 08/10/20 1000   Wound Depth (cm) 0.1 cm 08/10/20 1000   Wound Surface Area (cm^2) 0.12 cm^2 08/10/20 1000   Wound Volume (cm^3) 0.01 cm^3 08/10/20 1000   Post-Procedure Length (cm) 0.3 cm 08/10/20 1000   Post-Procedure Width (cm) 0.9 cm 08/10/20 1000   Post-Procedure Depth (cm) 0.1 cm 08/10/20 1000   Post-Procedure Surface Area (cm^2) 0.27 cm^2 08/10/20 1000   Post-Procedure Volume (cm^3) 0.03 cm^3 08/10/20 1000   Wound Bed Granulation (%) - Post-Procedure 90 % 08/10/20 1000   Wound Bed Slough % - (Post-Procedure) 10 % 08/10/20 1000   Tunneling (cm) 0 cm 08/10/20 1000   Undermining (cm) 0 cm 08/10/20 1000   Shape oval 08/10/20 1000   Wound Odor None 08/25/20 1600   Pulses 2+;Right;DP 08/10/20 1000   Exposed Structures None 08/25/20 1600        PROCEDURE  No debridement necessary in clinic today.     PATIENT EDUCATION  -Etiology of pressure injury  -Importance of offloading  -Strategies for offloading in bed and when seated discussed and demonstrated  - Importance of adequate nutrition for wound healing  -Increase protein intake (unless contraindicated by renal status)   - Advised to go to ER for any increased redness, swelling, drainage or odor, or if patient develops fever, chills, nausea or vomiting.    ASSESSMENT AND PLAN:   1. Pressure injury of right ankle, stage 2 ([facility])  08/25/20: Ulcer has resolved.  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  He has been standing on his legs all his life.  If ulcer returns may need further review with more current study.    - right ankle to foot with significant denuded tissue.    - Will have patient return again  to assess denuded irritated tissue with hopeful discharge.     2. Venous insufficiency of right  leg  08/25/20: Was previously seen by Dr. Hemphill who stated no venous intervention warranted at this time(through review of previous note).    -Right lower extremity edema. Compression wrap applied   - Denuded tissue to right lower extremity.       Wound Care: Unna Boot, kerlix between layers, hydrocolloid to small fissure under hallux.  Wool between toes to prevent breakdown due to moisture.         Please note that this dictation was created using voice recognition software. I have worked with technical experts from [facility] to optimize the interface.  I have made every reasonable attempt to correct obvious errors, but there may be errors of grammar and possibly content that I did not discover before finalizing the note.

## 2020-09-01 ENCOUNTER — OFFICE VISIT (OUTPATIENT)
Dept: WOUND CARE | Facility: MEDICAL CENTER | Age: 70
End: 2020-09-01
Attending: PHYSICIAN ASSISTANT
Payer: COMMERCIAL

## 2020-09-01 VITALS
SYSTOLIC BLOOD PRESSURE: 133 MMHG | DIASTOLIC BLOOD PRESSURE: 82 MMHG | TEMPERATURE: 97.2 F | RESPIRATION RATE: 16 BRPM | OXYGEN SATURATION: 97 % | HEART RATE: 70 BPM

## 2020-09-01 DIAGNOSIS — I87.8 VENOUS STASIS: ICD-10-CM

## 2020-09-01 DIAGNOSIS — I87.2 VENOUS INSUFFICIENCY OF RIGHT LEG: ICD-10-CM

## 2020-09-01 PROCEDURE — 99213 OFFICE O/P EST LOW 20 MIN: CPT | Performed by: NURSE PRACTITIONER

## 2020-09-01 PROCEDURE — 99214 OFFICE O/P EST MOD 30 MIN: CPT

## 2020-09-01 ASSESSMENT — ENCOUNTER SYMPTOMS
WEAKNESS: 0
CHILLS: 0
COUGH: 0
WHEEZING: 0
NAUSEA: 0
BLURRED VISION: 0
PALPITATIONS: 0
FEVER: 0
HEADACHES: 0
SHORTNESS OF BREATH: 0
DIZZINESS: 0
VOMITING: 0

## 2020-09-01 NOTE — PROGRESS NOTES
" Provider Encounter- Pressure Injury          HISTORY OF PRESENT ILLNESS     START OF CARE IN CLINIC: 8/10/62239    REFERRING PROVIDER: Tiesha Breaux,      WOUND- Pressure injury   STAGE:2   LOCATION: Right anterior ankle   WOUND HISTORY: Patient wears boots and it sounds like it rubbed on the anterior aspect of the ankle.     PREVIOUS TREATMENT: Compression and wound care for a different type of wound in 2019   PERTINENT PMH: Diabetes \"beat after I lost 60 pounds\"; venous insufficiency,  High cholesterol     IMAGING:none   LAST  WOUND CULTURE:  DATE : none                OFFLOADING: N/A    DIABETES: resolved with weight loss    TOBACCO USE: no    Interval History: 8/25/2020: Clinic visit with WALTER Berger.  Patient states that they are feeling well today.  Patient denies fever, chills, nausea, vomiting, lightheadedness, dizziness, shortness of breath and chest pain. Ulcer has resolved patient does have significant denuded tissue applied Unna boot with hopeful resolution next week.    9/1/2020: Clinic visit with WALTER Berger. Patient states that they are feeling well today.  Patient denies fever, chills, nausea, vomiting, lightheadedness, dizziness, shortness of breath and chest pain.  Patient continues to have severely denuded tissue to right lower extremity.  Right lateral lower extremity with weeping.    RESULTS:   Most recent A1c:  5.8 DATE 7/30/2020    VASCULAR STUDIES:  LE Vein Reflux 9/21/2018   Report      Vascular Laboratory   CONCLUSIONS   Right greater saphenous has significant reflux and is generally small in    diameter.   No reflux right lesser saphenous vein.   No deep venous reflux.   No venous thrombosis.          Greater Saphenous Vein          RIGHT          Diam          (cm)                  Reflux     Characteristics    SFJ   0.71    Yes        Normal       HT    0.24    Yes        Normal       MT    0.38    Yes        Normal       LT    0.47    Yes        Normal       Knee  " 0.22    Yes        Normal       HC    0.26    Yes        Normal       LC                       Normal    Ankle                    Normal         PAST MEDICAL HISTORY:   Past Medical History:   Diagnosis Date   • Arrhythmia 09/07/2018   • Arthritis     OA- hands and L knee   • Cataract     OU   • Dental disorder     upper   • Diabetes     oral meds   • High cholesterol 09/07/2018    no med; diet controlled   • Hyperlipidemia    • Hypertension    • Renal disorder 09/07/2018    kidney stones       PAST SURGICAL HISTORY:   Past Surgical History:   Procedure Laterality Date   • GASTROSCOPY N/A 9/18/2018    Procedure: GASTROSCOPY;  Surgeon: Matt Young M.D.;  Location: SURGERY SAME DAY [facility];  Service: Gastroenterology   • ROTATOR CUFF REPAIR Left 2014   • ANKLE ORIF Right 1988    spiral fx   • MENISCECTOMY Left 1979   • APPENDECTOMY  1958   • CYSTOSCOPY  2004,2008    stone extraction        MEDICATIONS:   Current Outpatient Medications   Medication   • ezetimibe (ZETIA) 10 MG Tab   • metoprolol SR (TOPROL XL) 25 MG TABLET SR 24 HR   • lisinopril (PRINIVIL) 10 MG Tab   • Potassium 99 MG Tab   • Non Formulary Request   • Red Yeast Rice 600 MG Tab   • Non Formulary Request   • Ginkgo Biloba 60 MG Cap   • Misc Natural Products (TART CHERRY ADVANCED) Cap   • Apolonia, Zingiber officinalis, (APOLONIA ROOT) 550 MG Cap   • tadalafil (CIALIS) 20 MG tablet   • ferrous sulfate 325 (65 Fe) MG tablet   • Cyanocobalamin (VITAMIN B-12 PO)   • Flaxseed, Linseed, (FLAX SEED OIL PO)   • oxycodone-aspirin (PERCODAN) 4.8355-325 MG Tab   • metformin (GLUCOPHAGE) 1000 MG tablet   • Non Formulary Request   • vitamin e (VITAMIN E) 400 UNIT Cap   • vitamin A 8000 UNIT capsule   • Cholecalciferol (VITAMIN D3) 3000 UNITS Tab   • Resveratrol 100 MG Cap   • KRILL OIL PO   • Omega-3 Fatty Acids (FISH OIL) 1000 MG Cap capsule   • ASTAXANTHIN PO   • SAW PALMETTO, SERENOA REPENS, PO     No current facility-administered medications for this  visit.        ALLERGIES:    Allergies   Allergen Reactions   • Ciprofloxacin Unspecified     convulsions   • Statins [Hmg-Coa-R Inhibitors] Unspecified     Stabbing pains in kidneys   • Acetaminophen      Kidney pain    • Ibuprofen      Kidney pain   • Naproxen      Kidney pain   • Shellfish Allergy      Kidney stones   • Soap &  [Alcohol] Rash     Antibacterial soap- contact dermatitis         SOCIAL HISTORY:   Social History     Socioeconomic History   • Marital status:      Spouse name: Not on file   • Number of children: Not on file   • Years of education: Not on file   • Highest education level: Not on file   Occupational History   • Not on file   Social Needs   • Financial resource strain: Not on file   • Food insecurity     Worry: Not on file     Inability: Not on file   • Transportation needs     Medical: Not on file     Non-medical: Not on file   Tobacco Use   • Smoking status: Former Smoker     Types: Cigarettes     Quit date: 2008     Years since quittin.6   • Smokeless tobacco: Never Used   • Tobacco comment: quit    Substance and Sexual Activity   • Alcohol use: No   • Drug use: Yes     Types: Marijuana   • Sexual activity: Never   Lifestyle   • Physical activity     Days per week: Not on file     Minutes per session: Not on file   • Stress: Not on file   Relationships   • Social connections     Talks on phone: Not on file     Gets together: Not on file     Attends Congregation service: Not on file     Active member of club or organization: Not on file     Attends meetings of clubs or organizations: Not on file     Relationship status: Not on file   • Intimate partner violence     Fear of current or ex partner: Not on file     Emotionally abused: Not on file     Physically abused: Not on file     Forced sexual activity: Not on file   Other Topics Concern   • Not on file   Social History Narrative   • Not on file       FAMILY HISTORY:   Family History   Problem Relation Age of  Onset   • Other Mother    • Heart Attack Father    • Heart Failure Brother           REVIEW OF SYSTEMS:   Review of Systems   Constitutional: Negative for chills and fever.   HENT: Negative.    Eyes: Negative for blurred vision.   Respiratory: Negative for cough, shortness of breath and wheezing.    Cardiovascular: Positive for leg swelling. Negative for chest pain and palpitations.        Swelling right leg     Gastrointestinal: Negative for nausea and vomiting.   Skin:        Denuded tissue to right lower extremity   Neurological: Negative for dizziness, weakness and headaches.       PHYSICAL EXAMINATION:   There were no vitals taken for this visit.  Physical Exam   Constitutional: He is oriented to person, place, and time and well-developed, well-nourished, and in no distress.   HENT:   Head: Normocephalic and atraumatic.   Eyes: Pupils are equal, round, and reactive to light.   Neck: Normal range of motion. Neck supple.   Cardiovascular: Regular rhythm and intact distal pulses.   Pulmonary/Chest: Effort normal. No respiratory distress. He has no wheezes.   Musculoskeletal: Normal range of motion.         General: Edema present. No deformity.      Comments:  edema dependent to right LE   Neurological: He is alert and oriented to person, place, and time.   Skin: There is erythema.   Denuded skin to right foot and right leg   Psychiatric: Memory, affect and judgment normal.       WOUND ASSESSMENT             Wound 08/10/20 Pretibial Distal Right lower leg distal anterior (Active)   Wound Image      09/01/20 1400   Site Assessment Other (Comment) 09/01/20 1400   Periwound Assessment Other (Comment);Denuded;Edema;Red 09/01/20 1400   Margins Attached edges 08/10/20 1000   Closure Secondary intention 09/01/20 1400   Drainage Amount Moderate 09/01/20 1400   Drainage Description Serous 09/01/20 1400   Treatments Cleansed 09/01/20 1400   Wound Cleansing Foam Cleanser/Washcloth 09/01/20 1400   Periwound Protectant Not  Applicable 09/01/20 1400   Dressing Cleansing/Solutions Normal Saline 08/10/20 1000   Dressing Options Viscopaste;Dry Gauze;Absorbent Abdominal Pad;Dry Roll Gauze;Tubigrip;Other (Comments) 09/01/20 1400   Dressing Changed New 09/01/20 1400   Dressing Status Clean;Dry;Intact 09/01/20 1400   Dressing Change/Treatment Frequency Every 48 hrs, and As Needed 09/01/20 1400   Non-staged Wound Description Partial thickness 09/01/20 1400   Wound Length (cm) 0.3 cm 08/10/20 1000   Wound Width (cm) 0.4 cm 08/10/20 1000   Wound Depth (cm) 0.1 cm 08/10/20 1000   Wound Surface Area (cm^2) 0.12 cm^2 08/10/20 1000   Wound Volume (cm^3) 0.01 cm^3 08/10/20 1000   Post-Procedure Length (cm) 0.3 cm 08/10/20 1000   Post-Procedure Width (cm) 0.9 cm 08/10/20 1000   Post-Procedure Depth (cm) 0.1 cm 08/10/20 1000   Post-Procedure Surface Area (cm^2) 0.27 cm^2 08/10/20 1000   Post-Procedure Volume (cm^3) 0.03 cm^3 08/10/20 1000   Wound Bed Granulation (%) - Post-Procedure 90 % 08/10/20 1000   Wound Bed Slough % - (Post-Procedure) 10 % 08/10/20 1000   Tunneling (cm) 0 cm 09/01/20 1400   Undermining (cm) 0 cm 09/01/20 1400   Shape oval 08/10/20 1000   Wound Odor None 09/01/20 1400   Pulses 2+;Right;DP 08/10/20 1000   Exposed Structures None 09/01/20 1400     PROCEDURE  Discussed ways to decrease the amount of maceration and denuded tissue.  Patient is to increase dressing changes to daily or as needed due to saturation.  Patient is to check the area daily especially after working 8 hours standing as a .  Patient is to complete venous ultrasound bilateral lower extremities to rule out perforators, reflux, and varicose veins as contributing to his right lower extremity edema, denuded tissues and weeping.        ASSESSMENT AND PLAN:   1. 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  He has been standing on his legs all his  "life.  If ulcer returns may need further review with more current study.    - right ankle to foot with significant denuded tissue.    -Denuded tissue has not decreased in fact is slightly worse in clinic today.  Patient has had significant weeping from right lower extremity.  Patient is not able to take any time off of work states that, \"if he does not work he does not eat\".      2. Venous insufficiency of right leg  9/1/2020: Was previously seen by Dr. Hemphill who stated no venous intervention warranted at this time(through review of previous note).    -Right lower extremity edema.  2 layer Tubigrip applied to allow the patient to more easily remove the dressing and change with more frequent sleep.  - Denuded tissue to right lower extremity has increased.  -Weeping right lateral lower extremity  -Venous ultrasound of bilateral lower extremities ordered in clinic to rule out for venous reflux, incompetent perforators, superficial reflux, varicose veins that could be contributing to patient's right lower extremity erythema, edema and weeping.     Wound Care: Viscopaste, dry gauze, absorbent abdominal pad, dry roll gauze Tubigrip E    > 15 min spent face to face with patient, >50% of time spent counseling, coordinating care, reviewing records, discussing POC, educating patient    Please note that this dictation was created using voice recognition software. I have worked with technical experts from [employer] to optimize the interface.  I have made every reasonable attempt to correct obvious errors, but there may be errors of grammar and possibly content that I did not discover before finalizing the note.  "

## 2020-09-01 NOTE — PATIENT INSTRUCTIONS
-Keep your wound dressing clean, dry, and intact.    -Change your dressing every 48 hours and if it becomes soiled, soaked, or falls off.    -Should you experience any significant changes in your wound(s), such as infection (redness, swelling, localized heat, increased pain, fever > 101 F, chills) or have any questions regarding your home care instructions, please contact the wound center at [phone]. If after hours, contact your primary care physician or go to the hospital emergency room.

## 2020-09-04 ENCOUNTER — APPOINTMENT (OUTPATIENT)
Dept: WOUND CARE | Facility: MEDICAL CENTER | Age: 70
End: 2020-09-04
Attending: PHYSICIAN ASSISTANT
Payer: COMMERCIAL

## 2020-09-08 ENCOUNTER — OFFICE VISIT (OUTPATIENT)
Dept: WOUND CARE | Facility: MEDICAL CENTER | Age: 70
End: 2020-09-08
Attending: PHYSICIAN ASSISTANT
Payer: COMMERCIAL

## 2020-09-08 ENCOUNTER — HOSPITAL ENCOUNTER (OUTPATIENT)
Dept: RADIOLOGY | Facility: MEDICAL CENTER | Age: 70
End: 2020-09-08
Attending: NURSE PRACTITIONER
Payer: COMMERCIAL

## 2020-09-08 VITALS
TEMPERATURE: 98.4 F | RESPIRATION RATE: 20 BRPM | HEART RATE: 72 BPM | SYSTOLIC BLOOD PRESSURE: 135 MMHG | OXYGEN SATURATION: 96 % | DIASTOLIC BLOOD PRESSURE: 84 MMHG

## 2020-09-08 DIAGNOSIS — I87.8 VENOUS STASIS: ICD-10-CM

## 2020-09-08 DIAGNOSIS — I87.2 VENOUS INSUFFICIENCY OF RIGHT LEG: Primary | ICD-10-CM

## 2020-09-08 DIAGNOSIS — I87.2 VENOUS INSUFFICIENCY OF RIGHT LEG: ICD-10-CM

## 2020-09-08 DIAGNOSIS — L89.512 PRESSURE INJURY OF RIGHT ANKLE, STAGE 2 (HCC): ICD-10-CM

## 2020-09-08 PROCEDURE — 99213 OFFICE O/P EST LOW 20 MIN: CPT | Performed by: NURSE PRACTITIONER

## 2020-09-08 PROCEDURE — 99214 OFFICE O/P EST MOD 30 MIN: CPT

## 2020-09-08 PROCEDURE — 93970 EXTREMITY STUDY: CPT

## 2020-09-08 ASSESSMENT — ENCOUNTER SYMPTOMS
HEADACHES: 0
BLURRED VISION: 0
CHILLS: 0
WEAKNESS: 0
SHORTNESS OF BREATH: 0
PALPITATIONS: 0
WHEEZING: 0
COUGH: 0
DIZZINESS: 0
VOMITING: 0
FEVER: 0
NAUSEA: 0

## 2020-09-08 NOTE — PROGRESS NOTES
" Provider Encounter- Pressure Injury          HISTORY OF PRESENT ILLNESS     START OF CARE IN CLINIC: 8/10/27311    REFERRING PROVIDER: Tiesha Breaux,      WOUND- Pressure injury   STAGE:2   LOCATION: Right anterior ankle   WOUND HISTORY: Patient wears boots and it sounds like it rubbed on the anterior aspect of the ankle.     PREVIOUS TREATMENT: Compression and wound care for a different type of wound in 2019   PERTINENT PMH: Diabetes \"beat after I lost 60 pounds\"; venous insufficiency,  High cholesterol     IMAGING:none   LAST  WOUND CULTURE:  DATE : none                OFFLOADING: N/A    DIABETES: resolved with weight loss    TOBACCO USE: no    Interval History: 8/25/2020: Clinic visit with WALTER Berger.  Patient states that they are feeling well today.  Patient denies fever, chills, nausea, vomiting, lightheadedness, dizziness, shortness of breath and chest pain. Ulcer has resolved patient does have significant denuded tissue applied Unna boot with hopeful resolution next week.    9/1/2020: Clinic visit with WALTER Berger. Patient states that they are feeling well today.  Patient denies fever, chills, nausea, vomiting, lightheadedness, dizziness, shortness of breath and chest pain.  Patient continues to have severely denuded tissue to right lower extremity.  Right lateral lower extremity with weeping.    9/8/2020: Clinic visit with WALTER Berger. Patient states that they are feeling well today.  Patient denies fever, chills, nausea, vomiting, lightheadedness, dizziness, shortness of breath and chest pain.  Improving erythema and denuded tissue to right lower extremity.  Review of patient's ultrasound results on 9/9/2020 following patient visit positive for venous reflux as well as perforators patient needs to be referred to vascular see Dr. Hemphill in clinic.    RESULTS:   Most recent A1c:  5.8 DATE 7/30/2020    VASCULAR STUDIES:  LE Vein Reflux 9/21/2018   Report      Vascular " Laboratory   CONCLUSIONS   Right greater saphenous has significant reflux and is generally small in    diameter.   No reflux right lesser saphenous vein.   No deep venous reflux.   No venous thrombosis.          Greater Saphenous Vein          RIGHT          Diam          (cm)                  Reflux     Characteristics    SFJ   0.71    Yes        Normal       HT    0.24    Yes        Normal       MT    0.38    Yes        Normal       LT    0.47    Yes        Normal       Knee  0.22    Yes        Normal       HC    0.26    Yes        Normal       LC                       Normal    Ankle                    Normal         PAST MEDICAL HISTORY:   Past Medical History:   Diagnosis Date   • Arrhythmia 09/07/2018   • Arthritis     OA- hands and L knee   • Cataract     OU   • Dental disorder     upper   • Diabetes     oral meds   • High cholesterol 09/07/2018    no med; diet controlled   • Hyperlipidemia    • Hypertension    • Renal disorder 09/07/2018    kidney stones       PAST SURGICAL HISTORY:   Past Surgical History:   Procedure Laterality Date   • GASTROSCOPY N/A 9/18/2018    Procedure: GASTROSCOPY;  Surgeon: Matt Young M.D.;  Location: SURGERY SAME DAY [facility];  Service: Gastroenterology   • ROTATOR CUFF REPAIR Left 2014   • ANKLE ORIF Right 1988    spiral fx   • MENISCECTOMY Left 1979   • APPENDECTOMY  1958   • CYSTOSCOPY  2004,2008    stone extraction        MEDICATIONS:   Current Outpatient Medications   Medication   • ezetimibe (ZETIA) 10 MG Tab   • metoprolol SR (TOPROL XL) 25 MG TABLET SR 24 HR   • lisinopril (PRINIVIL) 10 MG Tab   • Potassium 99 MG Tab   • Non Formulary Request   • Red Yeast Rice 600 MG Tab   • Non Formulary Request   • Ginkgo Biloba 60 MG Cap   • Misc Natural Products (TART CHERRY ADVANCED) Cap   • Apolonia, Zingiber officinalis, (APOLONIA ROOT) 550 MG Cap   • tadalafil (CIALIS) 20 MG tablet   • ferrous sulfate 325 (65 Fe) MG tablet   • Cyanocobalamin (VITAMIN B-12 PO)   • Flaxseed,  Linseed, (FLAX SEED OIL PO)   • oxycodone-aspirin (PERCODAN) 4.8355-325 MG Tab   • metformin (GLUCOPHAGE) 1000 MG tablet   • Non Formulary Request   • vitamin e (VITAMIN E) 400 UNIT Cap   • vitamin A 8000 UNIT capsule   • Cholecalciferol (VITAMIN D3) 3000 UNITS Tab   • Resveratrol 100 MG Cap   • KRILL OIL PO   • Omega-3 Fatty Acids (FISH OIL) 1000 MG Cap capsule   • ASTAXANTHIN PO   • SAW PALMETTO, SERENOA REPENS, PO     No current facility-administered medications for this visit.        ALLERGIES:    Allergies   Allergen Reactions   • Ciprofloxacin Unspecified     convulsions   • Statins [Hmg-Coa-R Inhibitors] Unspecified     Stabbing pains in kidneys   • Acetaminophen      Kidney pain    • Ibuprofen      Kidney pain   • Naproxen      Kidney pain   • Shellfish Allergy      Kidney stones   • Soap &  [Alcohol] Rash     Antibacterial soap- contact dermatitis         SOCIAL HISTORY:   Social History     Socioeconomic History   • Marital status:      Spouse name: Not on file   • Number of children: Not on file   • Years of education: Not on file   • Highest education level: Not on file   Occupational History   • Not on file   Social Needs   • Financial resource strain: Not on file   • Food insecurity     Worry: Not on file     Inability: Not on file   • Transportation needs     Medical: Not on file     Non-medical: Not on file   Tobacco Use   • Smoking status: Former Smoker     Types: Cigarettes     Quit date: 2008     Years since quittin.6   • Smokeless tobacco: Never Used   • Tobacco comment: quit    Substance and Sexual Activity   • Alcohol use: No   • Drug use: Yes     Types: Marijuana   • Sexual activity: Never   Lifestyle   • Physical activity     Days per week: Not on file     Minutes per session: Not on file   • Stress: Not on file   Relationships   • Social connections     Talks on phone: Not on file     Gets together: Not on file     Attends [employer] service: Not on file      Active member of club or organization: Not on file     Attends meetings of clubs or organizations: Not on file     Relationship status: Not on file   • Intimate partner violence     Fear of current or ex partner: Not on file     Emotionally abused: Not on file     Physically abused: Not on file     Forced sexual activity: Not on file   Other Topics Concern   • Not on file   Social History Narrative   • Not on file       FAMILY HISTORY:   Family History   Problem Relation Age of Onset   • Other Mother    • Heart Attack Father    • Heart Failure Brother           REVIEW OF SYSTEMS:   Review of Systems   Constitutional: Negative for chills and fever.   HENT: Negative.    Eyes: Negative for blurred vision.   Respiratory: Negative for cough, shortness of breath and wheezing.    Cardiovascular: Positive for leg swelling. Negative for chest pain and palpitations.        Swelling right leg     Gastrointestinal: Negative for nausea and vomiting.   Skin:        Denuded tissue to right lower extremity   Neurological: Negative for dizziness, weakness and headaches.       PHYSICAL EXAMINATION:   /84   Pulse 72   Temp 36.9 °C (98.4 °F)   Resp 20   SpO2 96%   Physical Exam   Constitutional: He is oriented to person, place, and time and well-developed, well-nourished, and in no distress.   HENT:   Head: Normocephalic and atraumatic.   Eyes: Pupils are equal, round, and reactive to light.   Neck: Normal range of motion. Neck supple.   Cardiovascular: Regular rhythm and intact distal pulses.   Pulmonary/Chest: Effort normal. No respiratory distress. He has no wheezes.   Musculoskeletal: Normal range of motion.         General: Edema present. No deformity.      Comments:  edema dependent to right LE   Neurological: He is alert and oriented to person, place, and time.   Skin: There is erythema.   Denuded skin to right foot and right leg   Psychiatric: Memory, affect and judgment normal.       WOUND ASSESSMENT       Wound  08/10/20 Pretibial Distal Right lower leg distal anterior (Active)   Wound Image      09/08/20 1400   Site Assessment Other (Comment) 09/08/20 1400   Periwound Assessment Denuded;Edema;Red;Fragile 09/08/20 1400   Margins Attached edges 08/10/20 1000   Closure Secondary intention 09/08/20 1400   Drainage Amount Small 09/08/20 1400   Drainage Description Serous 09/08/20 1400   Treatments Cleansed 09/08/20 1400   Wound Cleansing Foam Cleanser/Washcloth 09/08/20 1400   Periwound Protectant Not Applicable 09/08/20 1400   Dressing Cleansing/Solutions Normal Saline 08/10/20 1000   Dressing Options Dry Gauze;Dry Roll Gauze;Tubigrip;Other (Comments);Petrolatum Gauze (yellow) 09/08/20 1400   Dressing Changed Changed 09/08/20 1400   Dressing Status Clean;Dry;Intact 09/08/20 1400   Dressing Change/Treatment Frequency Every 48 hrs, and As Needed 09/08/20 1400   Non-staged Wound Description Partial thickness 09/08/20 1400   Wound Length (cm) 0.3 cm 08/10/20 1000   Wound Width (cm) 0.4 cm 08/10/20 1000   Wound Depth (cm) 0.1 cm 08/10/20 1000   Wound Surface Area (cm^2) 0.12 cm^2 08/10/20 1000   Wound Volume (cm^3) 0.01 cm^3 08/10/20 1000   Post-Procedure Length (cm) 0.3 cm 08/10/20 1000   Post-Procedure Width (cm) 0.9 cm 08/10/20 1000   Post-Procedure Depth (cm) 0.1 cm 08/10/20 1000   Post-Procedure Surface Area (cm^2) 0.27 cm^2 08/10/20 1000   Post-Procedure Volume (cm^3) 0.03 cm^3 08/10/20 1000   Wound Bed Granulation (%) - Post-Procedure 90 % 08/10/20 1000   Wound Bed Slough % - (Post-Procedure) 10 % 08/10/20 1000   Tunneling (cm) 0 cm 09/08/20 1400   Undermining (cm) 0 cm 09/08/20 1400   Shape oval 08/10/20 1000   Wound Odor None 09/08/20 1400   Pulses 2+;Right;DP 08/10/20 1000   Exposed Structures None 09/08/20 1400       PROCEDURE patient's right lower extremity assessed no need for debridement in clinic today.  Patient to follow-up with Dr. Hemphill regarding his ultrasound results.  Patient with red irritated denuded  "tissue.  Improvement from last week.  Patient has been using Xeroform with a cover dressing with success.  Patient had venous ultrasound after this clinical appointment.  Petroleum gauze yellow, dry gauze, dry roll gauze, Tubigrip placed, to help with denuded irritated tissue and mild compression.        ASSESSMENT AND PLAN:   1. 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  He has been standing on his legs all his life.  If ulcer returns may need further review with more current study.    - right ankle to foot with significant denuded tissue.    -Denuded tissue has decreased. Patient has had significant weeping from right lower extremity.  Patient is not able to take any time off of work states that, \"if he does not work he does not eat\".  -Venous ultrasound study to be performed later today 9/8/2020   Wound care: Petroleum gauze (yellow), dry gauze, dry roll gauze, Tubigrip      2. Venous insufficiency of right leg  9/8/2020:     -Right lower extremity edema.  2 layer Tubigrip applied to allow the patient to more easily remove the dressing and change with more frequent sleep.  - Denuded tissue to right lower extremity has decreased  -Weeping right lateral lower extremity  -Venous ultrasound of bilateral lower extremities ordered in clinic to rule out for venous reflux, incompetent perforators, superficial reflux, varicose veins that could be contributing to patient's right lower extremity erythema, edema and weeping.     Wound Care: Petroleum gauze (yellow), dry gauze, dry roll gauze, Tubigrip    > 15 min spent face to face with patient, >50% of time spent counseling, coordinating care, reviewing records, discussing POC, educating patient      Please note that this dictation was created using voice recognition software. I have worked with technical experts from [employer] to optimize the interface.  I " have made every reasonable attempt to correct obvious errors, but there may be errors of grammar and possibly content that I did not discover before finalizing the note.

## 2020-09-08 NOTE — PATIENT INSTRUCTIONS
-Keep your wound dressing clean, dry, and intact.    -Change your dressing if it becomes soiled, soaked, or falls off.    -Should you experience any significant changes in your wound(s), such as infection (redness, swelling, localized heat, increased pain, fever > 101 F, chills) or have any questions regarding your home care instructions, please contact the wound center at [phone]. If after hours, contact your primary care physician or go to the hospital emergency room.

## 2020-09-15 ENCOUNTER — NON-PROVIDER VISIT (OUTPATIENT)
Dept: WOUND CARE | Facility: MEDICAL CENTER | Age: 70
End: 2020-09-15
Attending: PHYSICIAN ASSISTANT
Payer: COMMERCIAL

## 2020-09-15 PROCEDURE — 97602 WOUND(S) CARE NON-SELECTIVE: CPT

## 2020-09-15 NOTE — PROCEDURES
Non selective blunt debridement with foam cleanser and washcloth to remove non viable tissue covering epithelialized wound bed. Patient tolerated well.     Took photo of dorsal foot just below toes due to redness. Patient states that this is resolving and looks less red today. Will have patient come back next week to assess.

## 2020-09-22 ENCOUNTER — NON-PROVIDER VISIT (OUTPATIENT)
Dept: WOUND CARE | Facility: MEDICAL CENTER | Age: 70
End: 2020-09-22
Attending: PHYSICIAN ASSISTANT
Payer: COMMERCIAL

## 2020-09-22 PROCEDURE — 97602 WOUND(S) CARE NON-SELECTIVE: CPT

## 2020-09-22 NOTE — PROCEDURES
Non selective blunt debridement with no rinse foam cleanser and washcloth to remove loose peeling skin. Patient tolerated well .

## 2020-09-29 ENCOUNTER — NON-PROVIDER VISIT (OUTPATIENT)
Dept: WOUND CARE | Facility: MEDICAL CENTER | Age: 70
End: 2020-09-29
Attending: PHYSICIAN ASSISTANT
Payer: COMMERCIAL

## 2020-09-29 PROCEDURE — 97602 WOUND(S) CARE NON-SELECTIVE: CPT

## 2020-09-30 NOTE — PATIENT INSTRUCTIONS
Avoid prolonged standing or sitting without elevating your legs.    - Knee-high gradient compression to legs  - Multilayer compression wrap to R leg. Do not get wet and keep on for the week. Only remove if temperature or sensation changes.   If compression needs to be removed, un-wrap it do not cut it off.     Should you experience any significant changes in your wound(s), such as infection (redness, swelling, localized heat, increased pain, fever > 101 F, chills) or have any questions regarding your home care instructions, please contact the wound center at [phone]. If after hours, contact your primary care physician or go to the hospital emergency room.   Keep dressing clean, dry and covered while bathing. Only change dressing if it becomes over saturated, soiled or falls off.

## 2020-10-06 ENCOUNTER — NON-PROVIDER VISIT (OUTPATIENT)
Dept: WOUND CARE | Facility: MEDICAL CENTER | Age: 70
End: 2020-10-06
Attending: PHYSICIAN ASSISTANT
Payer: COMMERCIAL

## 2020-10-06 DIAGNOSIS — B49 FUNGAL INFECTION: ICD-10-CM

## 2020-10-06 PROCEDURE — 97602 WOUND(S) CARE NON-SELECTIVE: CPT

## 2020-10-06 RX ORDER — NYSTATIN 100000 [USP'U]/G
POWDER TOPICAL
Qty: 60 G | Refills: 2 | Status: SHIPPED | OUTPATIENT
Start: 2020-10-06 | End: 2022-03-15

## 2020-10-06 NOTE — PROGRESS NOTES
I was asked briefly to stop by and look at this patient's right plantar foot for possible infection.  It appears through assessment that the patient has a fungal infection due to severe moisture to the distal aspect of the right foot.  Therefore I have ordered nystatin powder to be placed twice daily.  I have sent a prescription to the patient's pharmacy [facility] in [place].

## 2020-10-07 NOTE — PATIENT INSTRUCTIONS
Should you experience any significant changes in your wound(s) such as infection (redness, swelling, localized heat, increased pain, fever >101 F, chills) or have any questions regarding your home care instructions, please contact the wound center [phone]. If after hours, contact your primary care physician or go the hospital emergency room.  Keep dressing clean and dry and cover while bathing. Only change dressing if over saturated, soiled or its falling off.

## 2020-10-13 ENCOUNTER — OFFICE VISIT (OUTPATIENT)
Dept: WOUND CARE | Facility: MEDICAL CENTER | Age: 70
End: 2020-10-13
Attending: PHYSICIAN ASSISTANT
Payer: COMMERCIAL

## 2020-10-13 VITALS
DIASTOLIC BLOOD PRESSURE: 79 MMHG | RESPIRATION RATE: 18 BRPM | SYSTOLIC BLOOD PRESSURE: 133 MMHG | TEMPERATURE: 98.1 F | HEART RATE: 75 BPM

## 2020-10-13 DIAGNOSIS — I87.2 VENOUS INSUFFICIENCY OF RIGHT LEG: Primary | ICD-10-CM

## 2020-10-13 DIAGNOSIS — L89.512 PRESSURE INJURY OF RIGHT ANKLE, STAGE 2 (HCC): ICD-10-CM

## 2020-10-13 PROCEDURE — 11042 DBRDMT SUBQ TIS 1ST 20SQCM/<: CPT | Performed by: NURSE PRACTITIONER

## 2020-10-13 PROCEDURE — 11042 DBRDMT SUBQ TIS 1ST 20SQCM/<: CPT

## 2020-10-13 ASSESSMENT — ENCOUNTER SYMPTOMS
CHILLS: 0
SHORTNESS OF BREATH: 0
HEADACHES: 0
VOMITING: 0
FEVER: 0
NAUSEA: 0
BLURRED VISION: 0
WHEEZING: 0
WEAKNESS: 0
DIZZINESS: 0
PALPITATIONS: 0
COUGH: 0

## 2020-10-13 ASSESSMENT — PAIN SCALES - GENERAL: PAIN_LEVEL: 1

## 2020-10-13 NOTE — PROGRESS NOTES
" Provider Encounter- Pressure Injury          HISTORY OF PRESENT ILLNESS     START OF CARE IN CLINIC: 8/10/51776    REFERRING PROVIDER: Tiesha Breaux,      WOUND- Pressure injury   STAGE:2   LOCATION: Right anterior ankle   WOUND HISTORY: Patient wears boots and it sounds like it rubbed on the anterior aspect of the ankle.     PREVIOUS TREATMENT: Compression and wound care for a different type of wound in 2019   PERTINENT PMH: Diabetes \"beat after I lost 60 pounds\"; venous insufficiency,  High cholesterol     IMAGING:none   LAST  WOUND CULTURE:  DATE : none                OFFLOADING: N/A    DIABETES: resolved with weight loss    TOBACCO USE: no    Interval History: 8/25/2020: Clinic visit with WALTER Berger.  Patient states that they are feeling well today.  Patient denies fever, chills, nausea, vomiting, lightheadedness, dizziness, shortness of breath and chest pain. Ulcer has resolved patient does have significant denuded tissue applied Unna boot with hopeful resolution next week.    9/1/2020: Clinic visit with WALTER Berger. Patient states that they are feeling well today.  Patient denies fever, chills, nausea, vomiting, lightheadedness, dizziness, shortness of breath and chest pain.  Patient continues to have severely denuded tissue to right lower extremity.  Right lateral lower extremity with weeping.    9/8/2020: Clinic visit with WALTER Berger. Patient states that they are feeling well today.  Patient denies fever, chills, nausea, vomiting, lightheadedness, dizziness, shortness of breath and chest pain.  Improving erythema and denuded tissue to right lower extremity.  Review of patient's ultrasound results on 9/9/2020 following patient visit positive for venous reflux as well as perforators patient needs to be referred to vascular see Dr. Hemphill in clinic.    10/13/2020: Clinic visit with WALTER Berger. Patient states that they are feeling well today.  Patient denies " fever, chills, nausea, vomiting, lightheadedness, dizziness, shortness of breath and chest pain.  Erythema to right foot has significantly improved.  Patient has 2 small open wounds to right ankle.  Erythema significantly improved with nystatin powder.  Patient to continue that this week.  Patient has appointment with Dr. Hemphill on 10/26/2020 for vascular procedure.    RESULTS:   Most recent A1c:  5.8 DATE 7/30/2020    VASCULAR STUDIES:  LE Vein Reflux 9/21/2018   Report      Vascular Laboratory   CONCLUSIONS   Right greater saphenous has significant reflux and is generally small in    diameter.   No reflux right lesser saphenous vein.   No deep venous reflux.   No venous thrombosis.          Greater Saphenous Vein          RIGHT          Diam          (cm)                  Reflux     Characteristics    SFJ   0.71    Yes        Normal       HT    0.24    Yes        Normal       MT    0.38    Yes        Normal       LT    0.47    Yes        Normal       Knee  0.22    Yes        Normal       HC    0.26    Yes        Normal       LC                       Normal    Ankle                    Normal         PAST MEDICAL HISTORY:   Past Medical History:   Diagnosis Date   • Arrhythmia 09/07/2018   • Arthritis     OA- hands and L knee   • Cataract     OU   • Dental disorder     upper   • Diabetes     oral meds   • High cholesterol 09/07/2018    no med; diet controlled   • Hyperlipidemia    • Hypertension    • Renal disorder 09/07/2018    kidney stones       PAST SURGICAL HISTORY:   Past Surgical History:   Procedure Laterality Date   • GASTROSCOPY N/A 9/18/2018    Procedure: GASTROSCOPY;  Surgeon: Matt Young M.D.;  Location: SURGERY SAME DAY [facility];  Service: Gastroenterology   • ROTATOR CUFF REPAIR Left 2014   • ANKLE ORIF Right 1988    spiral fx   • MENISCECTOMY Left 1979   • APPENDECTOMY  1958   • CYSTOSCOPY  2004,2008    stone extraction        MEDICATIONS:   Current Outpatient Medications   Medication   •  nystatin (MYCOSTATIN) powder   • ezetimibe (ZETIA) 10 MG Tab   • metoprolol SR (TOPROL XL) 25 MG TABLET SR 24 HR   • lisinopril (PRINIVIL) 10 MG Tab   • Potassium 99 MG Tab   • Non Formulary Request   • Red Yeast Rice 600 MG Tab   • Non Formulary Request   • Ginkgo Biloba 60 MG Cap   • Misc Natural Products (TART CHERRY ADVANCED) Cap   • Apolonia, Zingiber officinalis, (APOLONIA ROOT) 550 MG Cap   • tadalafil (CIALIS) 20 MG tablet   • ferrous sulfate 325 (65 Fe) MG tablet   • Cyanocobalamin (VITAMIN B-12 PO)   • Flaxseed, Linseed, (FLAX SEED OIL PO)   • oxycodone-aspirin (PERCODAN) 4.8355-325 MG Tab   • metformin (GLUCOPHAGE) 1000 MG tablet   • Non Formulary Request   • vitamin e (VITAMIN E) 400 UNIT Cap   • vitamin A 8000 UNIT capsule   • Cholecalciferol (VITAMIN D3) 3000 UNITS Tab   • Resveratrol 100 MG Cap   • KRILL OIL PO   • Omega-3 Fatty Acids (FISH OIL) 1000 MG Cap capsule   • ASTAXANTHIN PO   • SAW PALMETTO, SERENOA REPENS, PO     No current facility-administered medications for this visit.        ALLERGIES:    Allergies   Allergen Reactions   • Ciprofloxacin Unspecified     convulsions   • Statins [Hmg-Coa-R Inhibitors] Unspecified     Stabbing pains in kidneys   • Acetaminophen      Kidney pain    • Ibuprofen      Kidney pain   • Naproxen      Kidney pain   • Shellfish Allergy      Kidney stones   • Soap &  [Alcohol] Rash     Antibacterial soap- contact dermatitis         SOCIAL HISTORY:   Social History     Socioeconomic History   • Marital status:      Spouse name: Not on file   • Number of children: Not on file   • Years of education: Not on file   • Highest education level: Not on file   Occupational History   • Not on file   Social Needs   • Financial resource strain: Not on file   • Food insecurity     Worry: Not on file     Inability: Not on file   • Transportation needs     Medical: Not on file     Non-medical: Not on file   Tobacco Use   • Smoking status: Former Smoker     Types:  Cigarettes     Quit date: 2008     Years since quittin.7   • Smokeless tobacco: Never Used   • Tobacco comment: quit    Substance and Sexual Activity   • Alcohol use: No   • Drug use: Yes     Types: Marijuana   • Sexual activity: Never   Lifestyle   • Physical activity     Days per week: Not on file     Minutes per session: Not on file   • Stress: Not on file   Relationships   • Social connections     Talks on phone: Not on file     Gets together: Not on file     Attends Protestant service: Not on file     Active member of club or organization: Not on file     Attends meetings of clubs or organizations: Not on file     Relationship status: Not on file   • Intimate partner violence     Fear of current or ex partner: Not on file     Emotionally abused: Not on file     Physically abused: Not on file     Forced sexual activity: Not on file   Other Topics Concern   • Not on file   Social History Narrative   • Not on file       FAMILY HISTORY:   Family History   Problem Relation Age of Onset   • Other Mother    • Heart Attack Father    • Heart Failure Brother           REVIEW OF SYSTEMS:   Review of Systems   Constitutional: Negative for chills and fever.   HENT: Negative.    Eyes: Negative for blurred vision.   Respiratory: Negative for cough, shortness of breath and wheezing.    Cardiovascular: Positive for leg swelling. Negative for chest pain and palpitations.        Swelling right leg     Gastrointestinal: Negative for nausea and vomiting.   Skin:        Denuded tissue to right lower extremity   Neurological: Negative for dizziness, weakness and headaches.       PHYSICAL EXAMINATION:   There were no vitals taken for this visit.  Physical Exam   Constitutional: He is oriented to person, place, and time and well-developed, well-nourished, and in no distress.   HENT:   Head: Normocephalic and atraumatic.   Eyes: Pupils are equal, round, and reactive to light.   Neck: Normal range of motion. Neck supple.      Cardiovascular: Regular rhythm and intact distal pulses.   Pulmonary/Chest: Effort normal. No respiratory distress. He has no wheezes.   Musculoskeletal: Normal range of motion.         General: Edema present. No deformity.      Comments:  edema dependent to right LE   Neurological: He is alert and oriented to person, place, and time.   Skin: There is erythema.   Denuded skin to right foot and right leg   Psychiatric: Memory, affect and judgment normal.       WOUND ASSESSMENT        Wound 08/10/20 Pretibial Distal Right lower leg distal anterior (Active)   Wound Image   10/13/20 1500   Site Assessment Red 10/13/20 1500   Periwound Assessment Dry;Intact 10/13/20 1500   Margins Defined edges 10/13/20 1500   Closure Secondary intention 10/13/20 1500   Drainage Amount Scant 10/13/20 1500   Drainage Description Serosanguineous 10/13/20 1500   Treatments Cleansed 10/13/20 1500   Wound Cleansing Approved Wound Cleanser 10/13/20 1500   Periwound Protectant Barrier Paste 10/13/20 1500   Dressing Cleansing/Solutions Normal Saline 10/13/20 1500   Dressing Options Coban;Viscopaste;Unna Boot 10/13/20 1500   Dressing Changed Changed 10/13/20 1500   Dressing Status Clean;Dry;Intact 10/13/20 1500   Dressing Change/Treatment Frequency Every 48 hrs, and As Needed 09/29/20 1500   Non-staged Wound Description Partial thickness 10/13/20 1500   Wound Length (cm) 0.5 cm 10/13/20 1500   Wound Width (cm) 0.3 cm 10/13/20 1500   Wound Depth (cm) 0.1 cm 10/13/20 1500   Wound Surface Area (cm^2) 0.15 cm^2 10/13/20 1500   Wound Volume (cm^3) 0.02 cm^3 10/13/20 1500   Post-Procedure Length (cm) 0 cm 09/15/20 0730   Post-Procedure Width (cm) 0 cm 09/15/20 0730   Post-Procedure Depth (cm) 0 cm 09/15/20 0730   Post-Procedure Surface Area (cm^2) 0 cm^2 09/15/20 0730   Post-Procedure Volume (cm^3) 0 cm^3 09/15/20 0730   Wound Healing % -100 10/13/20 1500   Wound Bed Granulation (%) 100 % 10/13/20 1500   Wound Bed Granulation (%) - Post-Procedure 90  % 08/10/20 1000   Wound Bed Slough % - (Post-Procedure) 10 % 08/10/20 1000   Tunneling (cm) 0 cm 10/13/20 1500   Undermining (cm) 0 cm 10/13/20 1500   Shape oval 08/10/20 1000   Wound Odor None 10/13/20 1500   Pulses 2+;Right;DP 08/10/20 1000   Exposed Structures None 10/13/20 1500       Wound 09/29/20 Venous Ulcer Foot Dorsal Right CVI ulcer distal R dorsal foot and base of toes (Active)   Wound Image   10/13/20 1500   Site Assessment Red 10/13/20 1500   Periwound Assessment Denuded;Red 10/13/20 1500   Margins Attached edges;Defined edges 10/13/20 1500   Closure Secondary intention 10/13/20 1500   Drainage Amount Large 10/13/20 1500   Drainage Description Yellow 10/13/20 1500   Treatments Cleansed;Provider debridement 10/13/20 1500   Wound Cleansing Approved Wound Cleanser 10/13/20 1500   Periwound Protectant Barrier Paste;Viscopaste;Antifungal Therapy 10/13/20 1500   Dressing Cleansing/Solutions Normal Saline 10/13/20 1500   Dressing Options Viscopaste;Unna Boot;Dry Roll Gauze;Absorbent Abdominal Pad 10/13/20 1500   Dressing Changed Changed 10/13/20 1500   Dressing Status Clean;Dry;Intact 10/13/20 1500   Dressing Change/Treatment Frequency Weekly, and As Needed 10/13/20 1500   Non-staged Wound Description Partial thickness 10/13/20 1500   Wound Length (cm) 6.5 cm 10/06/20 0900   Wound Width (cm) 8.5 cm 10/06/20 0900   Wound Depth (cm) 0.1 cm 10/06/20 0900   Wound Surface Area (cm^2) 55.25 cm^2 10/06/20 0900   Wound Volume (cm^3) 5.52 cm^3 10/06/20 0900   Post-Procedure Length (cm) 5 cm 10/13/20 1500   Post-Procedure Width (cm) 9 cm 10/13/20 1500   Post-Procedure Depth (cm) 0.1 cm 10/13/20 1500   Post-Procedure Surface Area (cm^2) 45 cm^2 10/13/20 1500   Post-Procedure Volume (cm^3) 4.5 cm^3 10/13/20 1500   Wound Healing % -15 10/06/20 0900   Wound Bed Granulation (%) 100 % 10/06/20 0900   Wound Bed Granulation (%) - Post-Procedure 100 % 10/13/20 1500   Tunneling (cm) 0 cm 10/13/20 1500   Undermining (cm) 0 cm  10/13/20 1500   Wound Odor None 10/13/20 1500   Pulses 1+;Right;DP;PT 09/29/20 1500   Exposed Structures None 10/13/20 1500       Wound 09/29/20 Venous Ulcer Ankle Lateral Right R lateral ankle CVI ulcer PTW (Active)   Wound Image   10/13/20 1500   Site Assessment Red 10/13/20 1500   Periwound Assessment Denuded 10/13/20 1500   Margins Attached edges 10/13/20 1500   Closure Secondary intention 10/13/20 1500   Drainage Amount Large 10/13/20 1500   Drainage Description Serosanguineous 10/13/20 1500   Treatments Cleansed 10/13/20 1500   Wound Cleansing Soap and Water 10/13/20 1500   Periwound Protectant Barrier Paste;Antifungal Therapy 10/13/20 1500   Dressing Cleansing/Solutions Normal Saline 10/13/20 1500   Dressing Options Viscopaste;Unna Boot;Super Absorbent Pad 10/13/20 1500   Dressing Changed Changed 10/13/20 1500   Dressing Status Clean;Dry;Intact 10/13/20 1500   Dressing Change/Treatment Frequency Every 72 hrs, and As Needed 10/13/20 1500   Non-staged Wound Description Full thickness 10/13/20 1500   Wound Length (cm) 8 cm 10/06/20 0900   Wound Width (cm) 7 cm 10/06/20 0900   Wound Depth (cm) 0.3 cm 10/06/20 0900   Wound Surface Area (cm^2) 56 cm^2 10/06/20 0900   Wound Volume (cm^3) 16.8 cm^3 10/06/20 0900   Post-Procedure Length (cm) 7.5 cm 10/13/20 1500   Post-Procedure Width (cm) 7 cm 10/13/20 1500   Post-Procedure Depth (cm) 0.3 cm 10/13/20 1500   Post-Procedure Surface Area (cm^2) 52.5 cm^2 10/13/20 1500   Post-Procedure Volume (cm^3) 15.75 cm^3 10/13/20 1500   Wound Healing % -78 10/06/20 0900   Wound Bed Granulation (%) 100 % 10/06/20 0900   Wound Bed Granulation (%) - Post-Procedure 100 % 10/13/20 1500   Tunneling (cm) 0 cm 10/13/20 1500   Undermining (cm) 0 cm 10/13/20 1500   Wound Odor None 10/13/20 1500   Exposed Structures None 10/13/20 1500      Debridement     Consent obtained? verbal  Consent given by: patient  Risks discussed? procedural risks discussed  Performed by: NP  Pain control: lidocaine  2%  Post-debridement measurements  Length (cm): 0.5  Width (cm): 0.4  Depth (cm): 0.2  Percent debrided: 100%  Surface Area (cm^2): 0.2  Area debrided (cm^2): 0.2  Volume (cm^3): 0.04  Tissue and other material debrided: subcutaneous tissue  Devitalized tissue debrided: slough  Instrument(s) utilized: curette  Bleeding: small  Hemostasis obtained with: pressure  Procedural pain (0-10): 0  Post-procedural pain: 1   Response to treatment: procedure was tolerated well               ASSESSMENT AND PLAN:   1. Pressure injury of right ankle, stage 2 ([facility], located within 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  He has been standing on his legs all his life.  If ulcer returns may need further review with more current study.    -Excisional debridement of wound in clinic today, medically necessary to promote wound healing.  -Patient to return to clinic weekly for assessment and debridement  -Patient to change dressing 1-2 times per week in between clinic visits     Wound care: Lambswool between toes, Viscopaste-Unna boot, dry roll gauze, absorbent abdominal pad.  Antifungal powder between toes and distal aspect of right foot.      2. Venous insufficiency of right leg  10/13/2020:     -Right lower extremity edema.  2 layer Tubigrip applied to allow the patient to more easily remove the dressing and change with more frequent sleep.  - Denuded tissue to right lower extremity has decreased.  Improved with nystatin.  -Weeping right lateral lower extremity significantly improved  -Patient has significant venous insufficiency see ultrasound results.  Patient to undergo procedure with Dr. Hemphill on 10/26/2020.          Please note that this dictation was created using voice recognition software. I have worked with technical experts from [employer] to optimize the interface.  I have made every reasonable attempt to correct obvious errors, but there may be errors of grammar  and possibly content that I did not discover before finalizing the note.

## 2020-10-13 NOTE — PATIENT INSTRUCTIONS
Avoid prolonged standing or sitting without elevating your legs.    - Knee-high gradient compression to legs  - Multilayer compression wrap to R leg. Do not get wet and keep on for the week. Only remove if temperature or sensation changes.   If compression needs to be removed, un-wrap it do not cut it off.     Should you experience any significant changes in your wound(s), such as infection (redness, swelling, localized heat, increased pain, fever > 101 F, chills) or have any questions regarding your home care instructions, please contact the wound center at [phone]. If after hours, contact your primary care physician or go to the hospital emergency room.   Keep dressing clean, dry and covered while bathing. Only change dressing if it becomes over saturated, soiled or falls off.

## 2020-10-16 ENCOUNTER — NON-PROVIDER VISIT (OUTPATIENT)
Dept: WOUND CARE | Facility: MEDICAL CENTER | Age: 70
End: 2020-10-16
Attending: PHYSICIAN ASSISTANT
Payer: COMMERCIAL

## 2020-10-16 PROCEDURE — 97602 WOUND(S) CARE NON-SELECTIVE: CPT

## 2020-10-17 NOTE — PATIENT INSTRUCTIONS
-Keep your wound dressing clean, dry, and intact.    -Remove your compression wrap if you have severe pain, severe swelling, numbness, color change, or temperature change in your toes. If you need to remove your compression wrap, do so by unrolling it. Do not cut the compression wrap off to prevent cutting yourself on accident.    -Should you experience any significant changes in your wound(s), such as infection (redness, swelling, localized heat, increased pain, fever > 101 F, chills) or have any questions regarding your home care instructions, please contact the wound center at [phone]. If after hours, contact your primary care physician or go to the hospital emergency room.

## 2020-10-17 NOTE — PROCEDURES
Nonselective debridement with Foam cleanser and a washcloth  to remove nonviable tissue from wound beds. Patient tolerated the dressing change well.

## 2020-10-20 ENCOUNTER — OFFICE VISIT (OUTPATIENT)
Dept: WOUND CARE | Facility: MEDICAL CENTER | Age: 70
End: 2020-10-20
Attending: PHYSICIAN ASSISTANT
Payer: COMMERCIAL

## 2020-10-20 VITALS
TEMPERATURE: 98.1 F | HEART RATE: 74 BPM | DIASTOLIC BLOOD PRESSURE: 65 MMHG | RESPIRATION RATE: 20 BRPM | SYSTOLIC BLOOD PRESSURE: 143 MMHG | OXYGEN SATURATION: 99 %

## 2020-10-20 DIAGNOSIS — I87.2 VENOUS INSUFFICIENCY OF RIGHT LEG: ICD-10-CM

## 2020-10-20 DIAGNOSIS — L89.512 PRESSURE INJURY OF RIGHT ANKLE, STAGE 2 (HCC): ICD-10-CM

## 2020-10-20 PROCEDURE — 99213 OFFICE O/P EST LOW 20 MIN: CPT | Mod: 25 | Performed by: NURSE PRACTITIONER

## 2020-10-20 PROCEDURE — 11042 DBRDMT SUBQ TIS 1ST 20SQCM/<: CPT

## 2020-10-20 PROCEDURE — 99213 OFFICE O/P EST LOW 20 MIN: CPT

## 2020-10-20 PROCEDURE — 11042 DBRDMT SUBQ TIS 1ST 20SQCM/<: CPT | Performed by: NURSE PRACTITIONER

## 2020-10-20 ASSESSMENT — ENCOUNTER SYMPTOMS
WEAKNESS: 0
SHORTNESS OF BREATH: 0
CHILLS: 0
NAUSEA: 0
HEADACHES: 0
COUGH: 0
WHEEZING: 0
BLURRED VISION: 0
PALPITATIONS: 0
FEVER: 0
VOMITING: 0
DIZZINESS: 0

## 2020-10-20 NOTE — PROGRESS NOTES
" Provider Encounter- Pressure Injury          HISTORY OF PRESENT ILLNESS     START OF CARE IN CLINIC: 8/10/19208    REFERRING PROVIDER: Tiesha Breaux,      WOUND- Pressure injury   STAGE:2   LOCATION: Right anterior ankle   WOUND HISTORY: Patient wears boots and it sounds like it rubbed on the anterior aspect of the ankle.     PREVIOUS TREATMENT: Compression and wound care for a different type of wound in 2019   PERTINENT PMH: Diabetes \"beat after I lost 60 pounds\"; venous insufficiency,  High cholesterol     IMAGING:none   LAST  WOUND CULTURE:  DATE : none                OFFLOADING: N/A    DIABETES: resolved with weight loss    TOBACCO USE: no    Interval History: 8/25/2020: Clinic visit with WALTER Berger.  Patient states that they are feeling well today.  Patient denies fever, chills, nausea, vomiting, lightheadedness, dizziness, shortness of breath and chest pain. Ulcer has resolved patient does have significant denuded tissue applied Unna boot with hopeful resolution next week.    9/1/2020: Clinic visit with WALTER Berger. Patient states that they are feeling well today.  Patient denies fever, chills, nausea, vomiting, lightheadedness, dizziness, shortness of breath and chest pain.  Patient continues to have severely denuded tissue to right lower extremity.  Right lateral lower extremity with weeping.    9/8/2020: Clinic visit with WALTER Berger. Patient states that they are feeling well today.  Patient denies fever, chills, nausea, vomiting, lightheadedness, dizziness, shortness of breath and chest pain.  Improving erythema and denuded tissue to right lower extremity.  Review of patient's ultrasound results on 9/9/2020 following patient visit positive for venous reflux as well as perforators patient needs to be referred to vascular see Dr. Hemphill in clinic.    10/13/2020: Clinic visit with WALTER Berger. Patient states that they are feeling well today.  Patient denies " fever, chills, nausea, vomiting, lightheadedness, dizziness, shortness of breath and chest pain.  Erythema to right foot has significantly improved.  Patient has 2 small open wounds to right ankle.  Erythema significantly improved with nystatin powder.  Patient to continue that this week.  Patient has appointment with Dr. Hemphill on 10/26/2020 for vascular procedure.    10/20/20: Clinic visit with WALTER Ackerman, [place].  This is my first time meeting Mr. Duron.  Patient reports feeling well.  Does feel the erythema is improved with nystatin powder.  He is scheduled for a venous procedure with Dr. Hemphill on 10/26/2020.  Denies wound drainage, odor. Denies erythema, swelling, pain.  Patient stating that he does not check his blood sugars as he was told he is no longer diabetic after losing 60 pounds.  Last A1c 5.8 on July 30, 2020.  History of tobacco use, but denies any current use.    RESULTS:   Most recent A1c:  5.8 DATE 7/30/2020    VASCULAR STUDIES:  LE Vein Reflux 9/21/2018   Report      Vascular Laboratory   CONCLUSIONS   Right greater saphenous has significant reflux and is generally small in    diameter.   No reflux right lesser saphenous vein.   No deep venous reflux.   No venous thrombosis.          Greater Saphenous Vein          RIGHT          Diam          (cm)                  Reflux     Characteristics    SFJ   0.71    Yes        Normal       HT    0.24    Yes        Normal       MT    0.38    Yes        Normal       LT    0.47    Yes        Normal       Knee  0.22    Yes        Normal       HC    0.26    Yes        Normal       LC                       Normal    Ankle                    Normal         PAST MEDICAL HISTORY:   Past Medical History:   Diagnosis Date   • Arrhythmia 09/07/2018   • Arthritis     OA- hands and L knee   • Cataract     OU   • Dental disorder     upper   • Diabetes     oral meds   • High cholesterol 09/07/2018    no med; diet controlled   • Hyperlipidemia    •  Hypertension    • Renal disorder 09/07/2018    kidney stones       PAST SURGICAL HISTORY:   Past Surgical History:   Procedure Laterality Date   • GASTROSCOPY N/A 9/18/2018    Procedure: GASTROSCOPY;  Surgeon: Matt Young M.D.;  Location: SURGERY SAME DAY [facility];  Service: Gastroenterology   • ROTATOR CUFF REPAIR Left 2014   • ANKLE ORIF Right 1988    spiral fx   • MENISCECTOMY Left 1979   • APPENDECTOMY  1958   • CYSTOSCOPY  2004,2008    stone extraction        MEDICATIONS:   Current Outpatient Medications   Medication   • nystatin (MYCOSTATIN) powder   • ezetimibe (ZETIA) 10 MG Tab   • metoprolol SR (TOPROL XL) 25 MG TABLET SR 24 HR   • lisinopril (PRINIVIL) 10 MG Tab   • Potassium 99 MG Tab   • Non Formulary Request   • Red Yeast Rice 600 MG Tab   • Non Formulary Request   • Ginkgo Biloba 60 MG Cap   • Misc Natural Products (TART CHERRY ADVANCED) Cap   • Apolonia, Zingiber officinalis, (APOLONIA ROOT) 550 MG Cap   • tadalafil (CIALIS) 20 MG tablet   • ferrous sulfate 325 (65 Fe) MG tablet   • Cyanocobalamin (VITAMIN B-12 PO)   • Flaxseed, Linseed, (FLAX SEED OIL PO)   • oxycodone-aspirin (PERCODAN) 4.8355-325 MG Tab   • metformin (GLUCOPHAGE) 1000 MG tablet   • Non Formulary Request   • vitamin e (VITAMIN E) 400 UNIT Cap   • vitamin A 8000 UNIT capsule   • Cholecalciferol (VITAMIN D3) 3000 UNITS Tab   • Resveratrol 100 MG Cap   • KRILL OIL PO   • Omega-3 Fatty Acids (FISH OIL) 1000 MG Cap capsule   • ASTAXANTHIN PO   • SAW PALMETTO, SERENOA REPENS, PO     No current facility-administered medications for this visit.        ALLERGIES:    Allergies   Allergen Reactions   • Ciprofloxacin Unspecified     convulsions   • Statins [Hmg-Coa-R Inhibitors] Unspecified     Stabbing pains in kidneys   • Acetaminophen      Kidney pain    • Ibuprofen      Kidney pain   • Naproxen      Kidney pain   • Shellfish Allergy      Kidney stones   • Soap &  [Alcohol] Rash     Antibacterial soap- contact dermatitis                 SOCIAL HISTORY:   Social History     Socioeconomic History   • Marital status:      Spouse name: Not on file   • Number of children: Not on file   • Years of education: Not on file   • Highest education level: Not on file   Occupational History   • Not on file   Social Needs   • Financial resource strain: Not on file   • Food insecurity     Worry: Not on file     Inability: Not on file   • Transportation needs     Medical: Not on file     Non-medical: Not on file   Tobacco Use   • Smoking status: Former Smoker     Types: Cigarettes     Quit date: 2008     Years since quittin.8   • Smokeless tobacco: Never Used   • Tobacco comment: quit    Substance and Sexual Activity   • Alcohol use: No   • Drug use: Yes     Types: Marijuana   • Sexual activity: Never   Lifestyle   • Physical activity     Days per week: Not on file     Minutes per session: Not on file   • Stress: Not on file   Relationships   • Social connections     Talks on phone: Not on file     Gets together: Not on file     Attends Denominational service: Not on file     Active member of club or organization: Not on file     Attends meetings of clubs or organizations: Not on file     Relationship status: Not on file   • Intimate partner violence     Fear of current or ex partner: Not on file     Emotionally abused: Not on file     Physically abused: Not on file     Forced sexual activity: Not on file   Other Topics Concern   • Not on file   Social History Narrative   • Not on file       FAMILY HISTORY:   Family History   Problem Relation Age of Onset   • Other Mother    • Heart Attack Father    • Heart Failure Brother           REVIEW OF SYSTEMS:   Review of Systems   Constitutional: Negative for chills and fever.   HENT: Negative.    Eyes: Negative for blurred vision.   Respiratory: Negative for cough, shortness of breath and wheezing.    Cardiovascular: Positive for leg swelling. Negative for chest pain and palpitations.         Swelling right leg     Gastrointestinal: Negative for nausea and vomiting.   Skin:        Denuded tissue to right lower extremity   Neurological: Negative for dizziness, weakness and headaches.       PHYSICAL EXAMINATION:   /65 (BP Location: Left arm, Patient Position: Sitting)   Pulse 74   Temp 36.7 °C (98.1 °F) (Temporal)   Resp 20   SpO2 99%   Physical Exam   Constitutional: He is oriented to person, place, and time and well-developed, well-nourished, and in no distress.   HENT:   Head: Normocephalic and atraumatic.   Eyes: Pupils are equal, round, and reactive to light.   Neck: Normal range of motion. Neck supple.   Cardiovascular: Regular rhythm and intact distal pulses.   Pulmonary/Chest: Effort normal. No respiratory distress. He has no wheezes.   Musculoskeletal: Normal range of motion.         General: Edema present. No deformity.      Comments:  edema dependent to right LE   Neurological: He is alert and oriented to person, place, and time.   Skin: There is erythema.   Denuded skin to right foot and right leg   Psychiatric: Memory, affect and judgment normal.       WOUND ASSESSMENT         Wound 09/29/20 Venous Ulcer Foot Dorsal Right CVI ulcer distal R dorsal foot and base of toes (Active)   Wound Image   10/20/20 1632   Site Assessment Red;Fragile 10/20/20 1632   Periwound Assessment Denuded;Rash 10/20/20 1632   Margins Undefined edges 10/20/20 1632   Closure Secondary intention 10/16/20 1630   Drainage Amount Large 10/20/20 1632   Drainage Description Serosanguineous 10/20/20 1632   Treatments Cleansed;Site care 10/20/20 1632   Wound Cleansing Foam Cleanser/Washcloth 10/20/20 1632   Periwound Protectant Barrier Paste;Antifungal Therapy 10/20/20 1632   Dressing Cleansing/Solutions Not Applicable 10/16/20 1630   Dressing Options Hydrofiber Silver;Viscopaste;Dry Roll Gauze;Coban 10/20/20 1632   Dressing Changed Changed 10/16/20 1630   Dressing Status Clean;Dry;Intact 10/16/20 1630   Dressing  Change/Treatment Frequency Weekly, and As Needed 10/13/20 1500   Non-staged Wound Description Partial thickness 10/20/20 1632   Wound Length (cm) 3.5 cm 10/20/20 1632   Wound Width (cm) 9 cm 10/20/20 1632   Wound Depth (cm) 0 cm 10/20/20 1632   Wound Surface Area (cm^2) 31.5 cm^2 10/20/20 1632   Wound Volume (cm^3) 0 cm^3 10/20/20 1632   Post-Procedure Length (cm) 5 cm 10/13/20 1500   Post-Procedure Width (cm) 9 cm 10/13/20 1500   Post-Procedure Depth (cm) 0.1 cm 10/13/20 1500   Post-Procedure Surface Area (cm^2) 45 cm^2 10/13/20 1500   Post-Procedure Volume (cm^3) 4.5 cm^3 10/13/20 1500   Wound Healing % 100 10/20/20 1632   Wound Bed Granulation (%) 100 % 10/06/20 0900   Wound Bed Slough (%) 5 % 10/20/20 1632   Wound Bed Granulation (%) - Post-Procedure 100 % 10/13/20 1500   Tunneling (cm) 0 cm 10/20/20 1632   Undermining (cm) 0 cm 10/20/20 1632   Wound Odor None 10/20/20 1632   Pulses 1+;Right;DP;PT 09/29/20 1500   Exposed Structures None 10/20/20 1632       Wound 09/29/20 Venous Ulcer Ankle Lateral Right R lateral ankle (Active)   Wound Image    10/20/20 1632   Site Assessment Yellow;Red 10/20/20 1632   Periwound Assessment Edema;Denuded 10/20/20 1632   Margins Epibole (rolled edges) 10/20/20 1632   Closure Secondary intention 10/16/20 1630   Drainage Amount Large 10/20/20 1632   Drainage Description Serosanguineous 10/20/20 1632   Treatments Cleansed;Topical Lidocaine;Provider debridement 10/20/20 1632   Wound Cleansing Foam Cleanser/Washcloth 10/20/20 1632   Periwound Protectant Barrier Paste;Antifungal Therapy 10/20/20 1632   Dressing Cleansing/Solutions Not Applicable 10/16/20 1630   Dressing Options Hydrofiber Silver;Viscopaste;Dry Roll Gauze;Coban 10/20/20 1632   Dressing Changed Changed 10/16/20 1630   Dressing Status Clean;Dry;Intact 10/16/20 1630   Dressing Change/Treatment Frequency Every 72 hrs, and As Needed 10/13/20 1500   Non-staged Wound Description Full thickness 10/20/20 1632   Wound Length (cm)  0.9 cm 10/20/20 1632   Wound Width (cm) 0.4 cm 10/20/20 1632   Wound Depth (cm) 0.3 cm 10/20/20 1632   Wound Surface Area (cm^2) 0.36 cm^2 10/20/20 1632   Wound Volume (cm^3) 0.11 cm^3 10/20/20 1632   Post-Procedure Length (cm) 0.5 cm 10/20/20 1632   Post-Procedure Width (cm) 0.5 cm 10/20/20 1632   Post-Procedure Depth (cm) 0.3 cm 10/20/20 1632   Post-Procedure Surface Area (cm^2) 0.25 cm^2 10/20/20 1632   Post-Procedure Volume (cm^3) 0.08 cm^3 10/20/20 1632   Wound Healing % 99 10/20/20 1632   Wound Bed Granulation (%) 100 % 10/06/20 0900   Wound Bed Slough (%) 75 % 10/20/20 1632   Wound Bed Granulation (%) - Post-Procedure 100 % 10/13/20 1500   Tunneling (cm) 0 cm 10/20/20 1632   Undermining (cm) 0 cm 10/20/20 1632   Wound Odor None 10/20/20 1632   Exposed Structures None 10/20/20 1632         Debridement     Consent obtained? verbal  Consent given by: patient  Risks discussed? procedural risks discussed  Immediately prior to the procedure a time out was called  Performed by: NP  Pain control: lidocaine gel  Post-debridement measurements  Length (cm): 0.9  Width (cm): 0.4  Depth (cm): 0.3  Percent debrided: 100%  Surface Area (cm^2): 0.36  Area debrided (cm^2): 0.36  Volume (cm^3): 0.11  Tissue and other material debrided: subcutaneous tissue  Devitalized tissue debrided: biofilm and slough  Instrument(s) utilized: curette  Bleeding: small  Hemostasis obtained with: pressure  Response to treatment: procedure was tolerated well        ASSESSMENT AND PLAN:   1. 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  He has been standing on his legs all his life.    Excisional debridement of wound in clinic today, medically necessary to promote wound healing.  [facility] paperwork completed  and faxed.  Copy also scanned into medical record  -Patient to return to clinic  weekly for assessment and debridement  -Patient to change dressing 1-2 times per week in between clinic visits     Wound care: Lambswool between toes, Viscopaste-Unna boot, dry roll gauze, absorbent abdominal pad.  Antifungal powder between toes and distal aspect of right foot.      2. Venous insufficiency of right leg  10/20/2020:     -Right lower extremity edema.  2 layer Tubigrip applied to allow the patient to more easily remove the dressing and change with more frequent sleep.  - Denuded tissue to right lower extremity has decreased.  Improved with nystatin.  -Weeping right lateral lower extremity significantly improved  -Patient has significant venous insufficiency.  Patient to undergo procedure with Dr. Hemphill on 10/26/2020.      15 min spent face to face with patient, >50% of time spent counseling, coordinating care, reviewing records, discussing POC, educating patient.  This time was spent in excess to procedure time      Please note that this dictation was created using voice recognition software. I have worked with technical experts from [facility] to optimize the interface.  I have made every reasonable attempt to correct obvious errors, but there may be errors of grammar and possibly content that I did not discover before finalizing the note.

## 2020-10-20 NOTE — PROGRESS NOTES
[facility] paperwork filled out by RN supervisor and APRN    Paperwork faxed to [facility] #[phone]    Paperwork filed in basket to be scanned into chart by PARs.

## 2020-10-23 ENCOUNTER — NON-PROVIDER VISIT (OUTPATIENT)
Dept: WOUND CARE | Facility: MEDICAL CENTER | Age: 70
End: 2020-10-23
Attending: PHYSICIAN ASSISTANT
Payer: COMMERCIAL

## 2020-10-23 PROCEDURE — 97602 WOUND(S) CARE NON-SELECTIVE: CPT

## 2020-10-23 NOTE — PROCEDURES
Non-selective debridement to wound and periwound using foam cleanser and washcloth to remove nonviable tissue and biofilm.

## 2020-10-26 ENCOUNTER — OFFICE VISIT (OUTPATIENT)
Dept: WOUND CARE | Facility: MEDICAL CENTER | Age: 70
End: 2020-10-26
Attending: PHYSICIAN ASSISTANT
Payer: COMMERCIAL

## 2020-10-26 VITALS
TEMPERATURE: 99.5 F | SYSTOLIC BLOOD PRESSURE: 138 MMHG | RESPIRATION RATE: 16 BRPM | OXYGEN SATURATION: 95 % | DIASTOLIC BLOOD PRESSURE: 87 MMHG | HEART RATE: 70 BPM

## 2020-10-26 DIAGNOSIS — L89.512 PRESSURE INJURY OF RIGHT ANKLE, STAGE 2 (HCC): ICD-10-CM

## 2020-10-26 DIAGNOSIS — I87.2 VENOUS INSUFFICIENCY OF RIGHT LEG: ICD-10-CM

## 2020-10-26 PROCEDURE — 29580 STRAPPING UNNA BOOT: CPT

## 2020-10-26 PROCEDURE — 11042 DBRDMT SUBQ TIS 1ST 20SQCM/<: CPT

## 2020-10-26 PROCEDURE — 99213 OFFICE O/P EST LOW 20 MIN: CPT

## 2020-10-26 ASSESSMENT — ENCOUNTER SYMPTOMS
FEVER: 0
PALPITATIONS: 0
VOMITING: 0
DIZZINESS: 0
PND: 1
COUGH: 0
CHILLS: 0
HEADACHES: 0
NAUSEA: 0
WEAKNESS: 0
BLURRED VISION: 0
SHORTNESS OF BREATH: 0
WHEEZING: 0

## 2020-10-26 NOTE — PROGRESS NOTES
" Provider Encounter- Pressure Injury          HISTORY OF PRESENT ILLNESS     START OF CARE IN CLINIC: 8/10/84891    REFERRING PROVIDER: Tiesha Breaux,      WOUND- Pressure injury   STAGE:2   LOCATION: Right anterior ankle   WOUND HISTORY: Patient wears boots and it sounds like it rubbed on the anterior aspect of the ankle.     PREVIOUS TREATMENT: Compression and wound care for a different type of wound in 2019   PERTINENT PMH: Diabetes \"beat after I lost 60 pounds\"; venous insufficiency,  High cholesterol     IMAGING:none   LAST  WOUND CULTURE:  DATE : none                OFFLOADING: N/A    DIABETES: resolved with weight loss    TOBACCO USE: no    Interval History: 8/25/2020: Clinic visit with WALTER Berger.  Patient states that they are feeling well today.  Patient denies fever, chills, nausea, vomiting, lightheadedness, dizziness, shortness of breath and chest pain. Ulcer has resolved patient does have significant denuded tissue applied Unna boot with hopeful resolution next week.    9/1/2020: Clinic visit with WALTER Berger. Patient states that they are feeling well today.  Patient denies fever, chills, nausea, vomiting, lightheadedness, dizziness, shortness of breath and chest pain.  Patient continues to have severely denuded tissue to right lower extremity.  Right lateral lower extremity with weeping.    9/8/2020: Clinic visit with WALTER Berger. Patient states that they are feeling well today.  Patient denies fever, chills, nausea, vomiting, lightheadedness, dizziness, shortness of breath and chest pain.  Improving erythema and denuded tissue to right lower extremity.  Review of patient's ultrasound results on 9/9/2020 following patient visit positive for venous reflux as well as perforators patient needs to be referred to vascular see Dr. Hemphill in clinic.    10/13/2020: Clinic visit with WALTER Berger. Patient states that they are feeling well today.  Patient denies " fever, chills, nausea, vomiting, lightheadedness, dizziness, shortness of breath and chest pain.  Erythema to right foot has significantly improved.  Patient has 2 small open wounds to right ankle.  Erythema significantly improved with nystatin powder.  Patient to continue that this week.  Patient has appointment with Dr. Hemphill on 10/26/2020 for vascular procedure.    10/20/20: Clinic visit with WALTER Ackerman, EM-BC.  This is my first time meeting Mr. Duron.  Patient reports feeling well.  Does feel the erythema is improved with nystatin powder. Denies wound drainage, odor. Denies erythema, swelling, pain.  Patient stating that he does not check his blood sugars as he was told he is no longer diabetic after losing 60 pounds.  Last A1c 5.8 on July 30, 2020.  History of tobacco use, but denies any current use.    10/29/20:  Clinic visit with Dr. Hemphill.  Venous studies show greater saphenous vein reflux bilaterally.  Patient is adamant about not wanting any intervention.     RESULTS:   Most recent A1c:  5.8 DATE 7/30/2020    VASCULAR STUDIES:  LE Vein Reflux 9/21/2018   Report      Vascular Laboratory   CONCLUSIONS   Right greater saphenous has significant reflux and is generally small in    diameter.   No reflux right lesser saphenous vein.   No deep venous reflux.   No venous thrombosis.          Greater Saphenous Vein          RIGHT          Diam          (cm)                  Reflux     Characteristics    SFJ   0.71    Yes        Normal       HT    0.24    Yes        Normal       MT    0.38    Yes        Normal       LT    0.47    Yes        Normal       Knee  0.22    Yes        Normal       HC    0.26    Yes        Normal       LC                       Normal    Ankle                    Normal         PAST MEDICAL HISTORY:   Past Medical History:   Diagnosis Date   • Arrhythmia 09/07/2018   • Arthritis     OA- hands and L knee   • Cataract     OU   • Dental disorder     upper   • Diabetes     oral  meds   • High cholesterol 09/07/2018    no med; diet controlled   • Hyperlipidemia    • Hypertension    • Renal disorder 09/07/2018    kidney stones       PAST SURGICAL HISTORY:   Past Surgical History:   Procedure Laterality Date   • GASTROSCOPY N/A 9/18/2018    Procedure: GASTROSCOPY;  Surgeon: Matt Young M.D.;  Location: SURGERY SAME DAY [facility];  Service: Gastroenterology   • ROTATOR CUFF REPAIR Left 2014   • ANKLE ORIF Right 1988    spiral fx   • MENISCECTOMY Left 1979   • APPENDECTOMY  1958   • CYSTOSCOPY  2004,2008    stone extraction        MEDICATIONS:   Current Outpatient Medications   Medication   • nystatin (MYCOSTATIN) powder   • ezetimibe (ZETIA) 10 MG Tab   • metoprolol SR (TOPROL XL) 25 MG TABLET SR 24 HR   • lisinopril (PRINIVIL) 10 MG Tab   • Potassium 99 MG Tab   • Non Formulary Request   • Red Yeast Rice 600 MG Tab   • Non Formulary Request   • Ginkgo Biloba 60 MG Cap   • Misc Natural Products (TART CHERRY ADVANCED) Cap   • Apolonia, Zingiber officinalis, (APOLONIA ROOT) 550 MG Cap   • tadalafil (CIALIS) 20 MG tablet   • ferrous sulfate 325 (65 Fe) MG tablet   • Cyanocobalamin (VITAMIN B-12 PO)   • Flaxseed, Linseed, (FLAX SEED OIL PO)   • oxycodone-aspirin (PERCODAN) 4.8355-325 MG Tab   • metformin (GLUCOPHAGE) 1000 MG tablet   • Non Formulary Request   • vitamin e (VITAMIN E) 400 UNIT Cap   • vitamin A 8000 UNIT capsule   • Cholecalciferol (VITAMIN D3) 3000 UNITS Tab   • Resveratrol 100 MG Cap   • KRILL OIL PO   • Omega-3 Fatty Acids (FISH OIL) 1000 MG Cap capsule   • ASTAXANTHIN PO   • SAW PALMETTO, SERENOA REPENS, PO     No current facility-administered medications for this visit.        ALLERGIES:    Allergies   Allergen Reactions   • Ciprofloxacin Unspecified     convulsions   • Statins [Hmg-Coa-R Inhibitors] Unspecified     Stabbing pains in kidneys   • Acetaminophen      Kidney pain    • Ibuprofen      Kidney pain   • Naproxen      Kidney pain   • Shellfish Allergy      Kidney  stones   • Soap &  [Alcohol] Rash     Antibacterial soap- contact dermatitis         SOCIAL HISTORY:   Social History     Socioeconomic History   • Marital status:      Spouse name: Not on file   • Number of children: Not on file   • Years of education: Not on file   • Highest education level: Not on file   Occupational History   • Not on file   Social Needs   • Financial resource strain: Not on file   • Food insecurity     Worry: Not on file     Inability: Not on file   • Transportation needs     Medical: Not on file     Non-medical: Not on file   Tobacco Use   • Smoking status: Former Smoker     Types: Cigarettes     Quit date: 2008     Years since quittin.8   • Smokeless tobacco: Never Used   • Tobacco comment: quit    Substance and Sexual Activity   • Alcohol use: No   • Drug use: Yes     Types: Marijuana   • Sexual activity: Never   Lifestyle   • Physical activity     Days per week: Not on file     Minutes per session: Not on file   • Stress: Not on file   Relationships   • Social connections     Talks on phone: Not on file     Gets together: Not on file     Attends [employer] service: Not on file     Active member of club or organization: Not on file     Attends meetings of clubs or organizations: Not on file     Relationship status: Not on file   • Intimate partner violence     Fear of current or ex partner: Not on file     Emotionally abused: Not on file     Physically abused: Not on file     Forced sexual activity: Not on file   Other Topics Concern   • Not on file   Social History Narrative   • Not on file       FAMILY HISTORY:   Family History   Problem Relation Age of Onset   • Other Mother    • Heart Attack Father    • Heart Failure Brother           REVIEW OF SYSTEMS:   Review of Systems   Constitutional: Negative for chills and fever.   HENT: Negative.    Eyes: Negative for blurred vision.   Respiratory: Negative for cough, shortness of breath and wheezing.     Cardiovascular: Positive for PND. Negative for chest pain and palpitations.   Gastrointestinal: Negative for nausea and vomiting.   Skin:        Denuded tissue to right lower extremity   Neurological: Negative for dizziness, weakness and headaches.       PHYSICAL EXAMINATION:   /87   Pulse 70   Temp 37.5 °C (99.5 °F)   Resp 16   SpO2 95%   Physical Exam   Constitutional: He is oriented to person, place, and time and well-developed, well-nourished, and in no distress.   HENT:   Head: Normocephalic and atraumatic.   Eyes: Pupils are equal, round, and reactive to light.   Neck: Normal range of motion. Neck supple.   Cardiovascular: Regular rhythm and intact distal pulses.   Pulmonary/Chest: Effort normal. No respiratory distress. He has no wheezes.   Musculoskeletal: Normal range of motion.         General: Edema present. No deformity.      Comments:  edema dependent to right LE   Neurological: He is alert and oriented to person, place, and time.   Skin: There is erythema.   Denuded skin to right foot and right leg   Psychiatric: Memory, affect and judgment normal.       WOUND ASSESSMENT        Wound 09/29/20 Venous Ulcer Foot Dorsal Right CVI ulcer distal R dorsal foot and base of toes (Active)   Wound Image   10/26/20 1400   Site Assessment Red;Fragile 10/26/20 1400   Periwound Assessment Denuded;Rash 10/26/20 1400   Margins Undefined edges 10/26/20 1400   Drainage Amount Large 10/26/20 1400   Drainage Description Serosanguineous 10/26/20 1400   Treatments Cleansed;Site care 10/26/20 1400   Wound Cleansing Foam Cleanser/Washcloth 10/26/20 1400   Periwound Protectant Barrier Paste;Antifungal Therapy 10/26/20 1400   Dressing Options Hydrofiber Silver;Viscopaste;Dry Roll Gauze;Coban 10/26/20 1400   Dressing Changed Changed 10/26/20 1400   Non-staged Wound Description Partial thickness 10/26/20 1400   Wound Length (cm) 3.5 cm 10/26/20 1400   Wound Width (cm) 7.5 cm 10/26/20 1400   Wound Depth (cm) 0 cm  10/26/20 1400   Wound Surface Area (cm^2) 26.25 cm^2 10/26/20 1400   Wound Volume (cm^3) 0 cm^3 10/26/20 1400   Wound Healing % 100 10/26/20 1400   Tunneling (cm) 0 cm 10/26/20 1400   Undermining (cm) 0 cm 10/26/20 1400   Wound Odor None 10/26/20 1400   Exposed Structures None 10/26/20 1400       Wound 09/29/20 Venous Ulcer Ankle Lateral Right R lateral ankle (Active)   Wound Image    10/26/20 1400   Site Assessment Yellow;Red 10/26/20 1400   Periwound Assessment Edema;Denuded 10/26/20 1400   Margins Epibole (rolled edges) 10/26/20 1400   Drainage Amount Large 10/26/20 1400   Drainage Description Serosanguineous 10/26/20 1400   Treatments Cleansed;Topical Lidocaine;Provider debridement 10/26/20 1400   Wound Cleansing Foam Cleanser/Washcloth 10/26/20 1400   Periwound Protectant Barrier Paste;Antifungal Therapy 10/26/20 1400   Dressing Options Hydrofiber Silver;Viscopaste;Dry Roll Gauze;Coban 10/26/20 1400   Dressing Changed Changed 10/26/20 1400   Non-staged Wound Description Full thickness 10/26/20 1400   Wound Length (cm) 0.6 cm 10/26/20 1400   Wound Width (cm) 0.4 cm 10/26/20 1400   Wound Depth (cm) 0.2 cm 10/26/20 1400   Wound Surface Area (cm^2) 0.24 cm^2 10/26/20 1400   Wound Volume (cm^3) 0.05 cm^3 10/26/20 1400   Post-Procedure Length (cm) 0.8 cm 10/26/20 1400   Post-Procedure Width (cm) 0.4 cm 10/26/20 1400   Post-Procedure Depth (cm) 0.2 cm 10/26/20 1400   Post-Procedure Surface Area (cm^2) 0.32 cm^2 10/26/20 1400   Post-Procedure Volume (cm^3) 0.06 cm^3 10/26/20 1400   Wound Healing % 99 10/26/20 1400   Tunneling (cm) 0 cm 10/26/20 1400   Undermining (cm) 0 cm 10/26/20 1400   Wound Odor None 10/26/20 1400   Exposed Structures None 10/26/20 1400       Wound 10/30/20 Right Medial Ankle (Active)        Procedures  ASSESSMENT AND PLAN:   1. Pressure injury of right ankle, stage 2 (HCC)  10/26/2020:     No excisional debridement of wound in clinic today, wounds are superficial.  -Patient to return to clinic  weekly for assessment and debridement  -Patient to change dressing 1-2 times per week in between clinic visits.  Adamant that he does not want or need home health.     Wound care: Lambswool between toes, Viscopaste-Unna boot, dry roll gauze, absorbent abdominal pad.  Antifungal powder between toes and distal aspect of right foot.      2. Venous insufficiency of right leg  10/26/2020:     -Bilateral venous insufficiency. Compression optimal assist with healing although the wounds themselves are inconsistent with venous stasi wounds.  Patient is not al all interested in any procedures. 2 layer Tubigrip applied to allow the patient to more easily remove the dressing and change with more frequent sleep.  - Denuded tissue to right lower extremity.  Improved with nystatin.  -Weeping right lateral lower extremity significantly improved    15 min spent face to face with patient, >50% of time spent counseling, coordinating care, reviewing records, discussing POC, educating patient.  This time was spent in excess to procedure time    Please note that this dictation was created using voice recognition software. I have worked with technical experts from [employer] to optimize the interface.  I have made every reasonable attempt to correct obvious errors, but there may be errors of grammar and possibly content that I did not discover before finalizing the note.

## 2020-10-26 NOTE — PATIENT INSTRUCTIONS
-Keep your wound dressing clean, dry, and intact.    -Remove your compression wrap if you have severe pain, severe swelling, numbness, color change, or temperature change in your toes. If you need to remove your compression wrap, do so by unrolling it. Do not cut the compression wrap off to prevent cutting yourself on accident.    -Should you experience any significant changes in your wound(s), such as infection (redness, swelling, localized heat, increased pain, fever > 101 F, chills) or have any questions regarding your home care instructions, please contact the wound center at [phone]. If after hours, contact your primary care physician or go to the hospital emergency room.

## 2020-10-30 ENCOUNTER — NON-PROVIDER VISIT (OUTPATIENT)
Dept: WOUND CARE | Facility: MEDICAL CENTER | Age: 70
End: 2020-10-30
Attending: PHYSICIAN ASSISTANT
Payer: COMMERCIAL

## 2020-10-30 PROCEDURE — 97602 WOUND(S) CARE NON-SELECTIVE: CPT

## 2020-10-30 NOTE — PROCEDURES
Non-selective debridement to wound and periwound using foam cleanser and washcloth to remove nonviable tissue and biofilm. Patient tolerated toe procedure well.

## 2020-10-30 NOTE — PATIENT INSTRUCTIONS
-Keep your wound dressing clean, dry, and intact.    -Remove your compression wrap if you have severe pain, severe swelling, numbness, color change, or temperature change in your toes. If you need to remove your compression wrap, do so by unrolling it. Do not cut the compression wrap off to prevent cutting yourself on accident.    -Should you experience any significant changes in your wound(s), such as infection (redness, swelling, localized heat, increased pain, fever > 101 F, chills) or have any questions regarding your home care instructions, please contact the wound center at [phone]. If after hours, contact your primary care physician or go to the hospital emergency room.

## 2020-10-31 ENCOUNTER — HOSPITAL ENCOUNTER (OUTPATIENT)
Dept: LAB | Facility: MEDICAL CENTER | Age: 70
End: 2020-10-31
Attending: PHYSICIAN ASSISTANT
Payer: COMMERCIAL

## 2020-10-31 LAB
ALBUMIN SERPL BCP-MCNC: 4.1 G/DL (ref 3.2–4.9)
ALBUMIN/GLOB SERPL: 1.5 G/DL
ALP SERPL-CCNC: 78 U/L (ref 30–99)
ALT SERPL-CCNC: 14 U/L (ref 2–50)
ANION GAP SERPL CALC-SCNC: 9 MMOL/L (ref 7–16)
AST SERPL-CCNC: 16 U/L (ref 12–45)
BASOPHILS # BLD AUTO: 0.7 % (ref 0–1.8)
BASOPHILS # BLD: 0.03 K/UL (ref 0–0.12)
BILIRUB SERPL-MCNC: 0.9 MG/DL (ref 0.1–1.5)
BUN SERPL-MCNC: 18 MG/DL (ref 8–22)
CALCIUM SERPL-MCNC: 9.3 MG/DL (ref 8.5–10.5)
CHLORIDE SERPL-SCNC: 103 MMOL/L (ref 96–112)
CHOLEST SERPL-MCNC: 183 MG/DL (ref 100–199)
CO2 SERPL-SCNC: 27 MMOL/L (ref 20–33)
CREAT SERPL-MCNC: 0.86 MG/DL (ref 0.5–1.4)
EOSINOPHIL # BLD AUTO: 0.15 K/UL (ref 0–0.51)
EOSINOPHIL NFR BLD: 3.6 % (ref 0–6.9)
ERYTHROCYTE [DISTWIDTH] IN BLOOD BY AUTOMATED COUNT: 49.1 FL (ref 35.9–50)
EST. AVERAGE GLUCOSE BLD GHB EST-MCNC: 120 MG/DL
FASTING STATUS PATIENT QL REPORTED: NORMAL
GLOBULIN SER CALC-MCNC: 2.8 G/DL (ref 1.9–3.5)
GLUCOSE SERPL-MCNC: 130 MG/DL (ref 65–99)
HBA1C MFR BLD: 5.8 % (ref 0–5.6)
HCT VFR BLD AUTO: 44.3 % (ref 42–52)
HDLC SERPL-MCNC: 42 MG/DL
HGB BLD-MCNC: 14.1 G/DL (ref 14–18)
IMM GRANULOCYTES # BLD AUTO: 0.01 K/UL (ref 0–0.11)
IMM GRANULOCYTES NFR BLD AUTO: 0.2 % (ref 0–0.9)
LDLC SERPL CALC-MCNC: 126 MG/DL
LYMPHOCYTES # BLD AUTO: 0.6 K/UL (ref 1–4.8)
LYMPHOCYTES NFR BLD: 14.6 % (ref 22–41)
MCH RBC QN AUTO: 28.5 PG (ref 27–33)
MCHC RBC AUTO-ENTMCNC: 31.8 G/DL (ref 33.7–35.3)
MCV RBC AUTO: 89.7 FL (ref 81.4–97.8)
MONOCYTES # BLD AUTO: 0.32 K/UL (ref 0–0.85)
MONOCYTES NFR BLD AUTO: 7.8 % (ref 0–13.4)
NEUTROPHILS # BLD AUTO: 3.01 K/UL (ref 1.82–7.42)
NEUTROPHILS NFR BLD: 73.1 % (ref 44–72)
NRBC # BLD AUTO: 0 K/UL
NRBC BLD-RTO: 0 /100 WBC
PLATELET # BLD AUTO: 93 K/UL (ref 164–446)
PMV BLD AUTO: 10.1 FL (ref 9–12.9)
POTASSIUM SERPL-SCNC: 4.7 MMOL/L (ref 3.6–5.5)
PROT SERPL-MCNC: 6.9 G/DL (ref 6–8.2)
RBC # BLD AUTO: 4.94 M/UL (ref 4.7–6.1)
SODIUM SERPL-SCNC: 139 MMOL/L (ref 135–145)
TRIGL SERPL-MCNC: 73 MG/DL (ref 0–149)
WBC # BLD AUTO: 4.1 K/UL (ref 4.8–10.8)

## 2020-10-31 PROCEDURE — 83036 HEMOGLOBIN GLYCOSYLATED A1C: CPT | Mod: GA

## 2020-10-31 PROCEDURE — 85025 COMPLETE CBC W/AUTO DIFF WBC: CPT

## 2020-10-31 PROCEDURE — 80053 COMPREHEN METABOLIC PANEL: CPT

## 2020-10-31 PROCEDURE — 80061 LIPID PANEL: CPT

## 2020-10-31 PROCEDURE — 36415 COLL VENOUS BLD VENIPUNCTURE: CPT

## 2020-11-03 ENCOUNTER — NON-PROVIDER VISIT (OUTPATIENT)
Dept: WOUND CARE | Facility: MEDICAL CENTER | Age: 70
End: 2020-11-03
Attending: PHYSICIAN ASSISTANT
Payer: COMMERCIAL

## 2020-11-03 DIAGNOSIS — R21 RASH: ICD-10-CM

## 2020-11-03 PROCEDURE — 99211 OFF/OP EST MAY X REQ PHY/QHP: CPT

## 2020-11-03 PROCEDURE — 29580 STRAPPING UNNA BOOT: CPT

## 2020-11-03 NOTE — PATIENT INSTRUCTIONS
-Keep dressings clean, dry and covered while bathing. Change dressings if they become over saturated, soiled or fall off; or once in between clinic visits.     -Avoid prolonged standing or sitting without elevating your legs.    -Remove your compression garments if you have severe pain, severe swelling, numbness, color change, or temperature change in your toes. If you need to remove your compression garments, do so by unrolling them. Do not cut the compression garments off, this is to prevent cutting yourself on accident.    -Never walk around the house barefoot. Always wear a rubber soled slipper when walking around the house.    -Should you experience any significant changes in your wound(s), such as infection (redness, swelling, localized heat, increased pain, fever > 101 F, chills) or have any questions regarding your home care instructions, please contact the wound center at [phone]. If after hours, contact your primary care physician or go to the hospital emergency room.

## 2020-11-03 NOTE — LETTER
November 3, 2020        Nitesh Duron   1950    To Employer,  Nitesh Duron may return to work on 2020. Patient is able to perform work duties.      Thank you,              Young WATSON

## 2020-11-03 NOTE — PROCEDURES
Wounds cleansed with foam cleanser and wash cloth and site care performed, unna boot applied to right lower leg.

## 2020-11-03 NOTE — NON-PROVIDER
Referral to dermatology placed with consult and order by Young Griggs for denuded skin and rash not responding to antifungal and zinc treatment.   Letter written per patient request to return to work on 11/6/2020, able to perform work duties.

## 2020-11-06 ENCOUNTER — APPOINTMENT (OUTPATIENT)
Dept: WOUND CARE | Facility: MEDICAL CENTER | Age: 70
End: 2020-11-06
Attending: PHYSICIAN ASSISTANT
Payer: COMMERCIAL

## 2020-11-10 ENCOUNTER — NON-PROVIDER VISIT (OUTPATIENT)
Dept: WOUND CARE | Facility: MEDICAL CENTER | Age: 70
End: 2020-11-10
Attending: PHYSICIAN ASSISTANT
Payer: COMMERCIAL

## 2020-11-10 PROCEDURE — 97597 DBRDMT OPN WND 1ST 20 CM/<: CPT

## 2020-11-10 NOTE — PROCEDURES
CSWD with scissors, forceps and scalpel to remove <1 cm2 callus from plantar hallux and toes. Patient tolerated well.

## 2020-11-13 ENCOUNTER — NON-PROVIDER VISIT (OUTPATIENT)
Dept: WOUND CARE | Facility: MEDICAL CENTER | Age: 70
End: 2020-11-13
Attending: PHYSICIAN ASSISTANT
Payer: COMMERCIAL

## 2020-11-13 PROCEDURE — 97602 WOUND(S) CARE NON-SELECTIVE: CPT

## 2020-11-13 NOTE — PATIENT INSTRUCTIONS
-Keep your wound dressing clean, dry, and intact.    -Change your dressing if it becomes soiled, soaked, or falls off.    -Remove your compression wrap if you have severe pain, severe swelling, numbness, color change, or temperature change in your toes. If you need to remove your compression wrap, do so by unrolling it. Do not cut the compression wrap off to prevent cutting yourself on accident.    -Should you experience any significant changes in your wound(s), such as infection (redness, swelling, localized heat, increased pain, fever > 101 F, chills) or have any questions regarding your home care instructions, please contact the wound center at [phone]. If after hours, contact your primary care physician or go to the hospital emergency room.

## 2020-11-17 ENCOUNTER — OFFICE VISIT (OUTPATIENT)
Dept: WOUND CARE | Facility: MEDICAL CENTER | Age: 70
End: 2020-11-17
Attending: PHYSICIAN ASSISTANT
Payer: COMMERCIAL

## 2020-11-17 VITALS
HEART RATE: 69 BPM | TEMPERATURE: 98.6 F | RESPIRATION RATE: 20 BRPM | SYSTOLIC BLOOD PRESSURE: 131 MMHG | OXYGEN SATURATION: 97 % | DIASTOLIC BLOOD PRESSURE: 81 MMHG

## 2020-11-17 DIAGNOSIS — L89.512 PRESSURE INJURY OF RIGHT ANKLE, STAGE 2 (HCC): Primary | ICD-10-CM

## 2020-11-17 DIAGNOSIS — I87.2 VENOUS INSUFFICIENCY OF RIGHT LEG: ICD-10-CM

## 2020-11-17 PROCEDURE — 11042 DBRDMT SUBQ TIS 1ST 20SQCM/<: CPT

## 2020-11-17 PROCEDURE — 11042 DBRDMT SUBQ TIS 1ST 20SQCM/<: CPT | Performed by: NURSE PRACTITIONER

## 2020-11-17 ASSESSMENT — ENCOUNTER SYMPTOMS
CHILLS: 0
VOMITING: 0
COUGH: 0
BLURRED VISION: 0
FEVER: 0
WHEEZING: 0
DIZZINESS: 0
SHORTNESS OF BREATH: 0
HEADACHES: 0
WEAKNESS: 0
PALPITATIONS: 0
NAUSEA: 0

## 2020-11-17 ASSESSMENT — PAIN SCALES - GENERAL
PAINLEVEL: 5=MODERATE PAIN
PAIN_LEVEL: 4

## 2020-11-17 NOTE — PROGRESS NOTES
" Provider Encounter- Pressure Injury          HISTORY OF PRESENT ILLNESS     START OF CARE IN CLINIC: 8/10/93349    REFERRING PROVIDER: Tiesha Breaux,      WOUND- Pressure injury   STAGE:2   LOCATION: Right anterior ankle   WOUND HISTORY: Patient wears boots and it sounds like it rubbed on the anterior aspect of the ankle.     PREVIOUS TREATMENT: Compression and wound care for a different type of wound in 2019   PERTINENT PMH: Diabetes \"beat after I lost 60 pounds\"; venous insufficiency,  High cholesterol     IMAGING:none   LAST  WOUND CULTURE:  DATE : none                OFFLOADING: N/A    DIABETES: resolved with weight loss    TOBACCO USE: no    Interval History: 8/25/2020: Clinic visit with WALTER Berger.  Patient states that they are feeling well today.  Patient denies fever, chills, nausea, vomiting, lightheadedness, dizziness, shortness of breath and chest pain. Ulcer has resolved patient does have significant denuded tissue applied Unna boot with hopeful resolution next week.    9/1/2020: Clinic visit with WALTER Berger. Patient states that they are feeling well today.  Patient denies fever, chills, nausea, vomiting, lightheadedness, dizziness, shortness of breath and chest pain.  Patient continues to have severely denuded tissue to right lower extremity.  Right lateral lower extremity with weeping.    9/8/2020: Clinic visit with WALTER Berger. Patient states that they are feeling well today.  Patient denies fever, chills, nausea, vomiting, lightheadedness, dizziness, shortness of breath and chest pain.  Improving erythema and denuded tissue to right lower extremity.  Review of patient's ultrasound results on 9/9/2020 following patient visit positive for venous reflux as well as perforators patient needs to be referred to vascular see Dr. Hemphill in clinic.    10/13/2020: Clinic visit with WALTER Berger. Patient states that they are feeling well today.  Patient denies " fever, chills, nausea, vomiting, lightheadedness, dizziness, shortness of breath and chest pain.  Erythema to right foot has significantly improved.  Patient has 2 small open wounds to right ankle.  Erythema significantly improved with nystatin powder.  Patient to continue that this week.  Patient has appointment with Dr. Hemphill on 10/26/2020 for vascular procedure.    10/20/20: Clinic visit with WALTER Ackerman, [facility].  This is my first time meeting Mr. Duron.  Patient reports feeling well.  Does feel the erythema is improved with nystatin powder. Denies wound drainage, odor. Denies erythema, swelling, pain.  Patient stating that he does not check his blood sugars as he was told he is no longer diabetic after losing 60 pounds.  Last A1c 5.8 on July 30, 2020.  History of tobacco use, but denies any current use.    10/29/20:  Clinic visit with Dr. Hemphill.  Venous studies show greater saphenous vein reflux bilaterally.  Patient is adamant about not wanting any intervention.     11/17/2020: Clinic visit with WALTER Berger. Patient states that they are feeling well today.  Patient denies fever, chills, nausea, vomiting, lightheadedness, dizziness, shortness of breath and chest pain.  I reviewed with the patient the discussion the patient had with Dr. Hemphill.  Dr. Hemphill clearly states that the patient's venous studies show greater saphenous vein reflux bilaterally.  However, the patient is adamant about not wanting any intervention.  Patient denied this and again I have read the direct quote from the past note by Dr. Hemphill.  Patient now has 2 small medial and lateral ulcers to his right ankle.  Again I discussed the patient needs to reconsider vascular intervention to help with these wounds heal and prevent further breakdown and loss of limb.  Patient reports that he will reconsider however he does not have any time off until after January he is used up all of his sick days.    RESULTS:   Most  recent A1c:  5.8 DATE 7/30/2020    VASCULAR STUDIES:  LE Vein Reflux 9/21/2018   Report      Vascular Laboratory   CONCLUSIONS   Right greater saphenous has significant reflux and is generally small in    diameter.   No reflux right lesser saphenous vein.   No deep venous reflux.   No venous thrombosis.          Greater Saphenous Vein          RIGHT          Diam          (cm)                  Reflux     Characteristics    SFJ   0.71    Yes        Normal       HT    0.24    Yes        Normal       MT    0.38    Yes        Normal       LT    0.47    Yes        Normal       Knee  0.22    Yes        Normal       HC    0.26    Yes        Normal       LC                       Normal    Ankle                    Normal         PAST MEDICAL HISTORY:   Past Medical History:   Diagnosis Date   • Arrhythmia 09/07/2018   • Arthritis     OA- hands and L knee   • Cataract     OU   • Dental disorder     upper   • Diabetes     oral meds   • High cholesterol 09/07/2018    no med; diet controlled   • Hyperlipidemia    • Hypertension    • Renal disorder 09/07/2018    kidney stones       PAST SURGICAL HISTORY:   Past Surgical History:   Procedure Laterality Date   • GASTROSCOPY N/A 9/18/2018    Procedure: GASTROSCOPY;  Surgeon: Matt Young M.D.;  Location: SURGERY SAME DAY [facility];  Service: Gastroenterology   • ROTATOR CUFF REPAIR Left 2014   • ANKLE ORIF Right 1988    spiral fx   • MENISCECTOMY Left 1979   • APPENDECTOMY  1958   • CYSTOSCOPY  2004,2008    stone extraction        MEDICATIONS:   Current Outpatient Medications   Medication   • nystatin (MYCOSTATIN) powder   • ezetimibe (ZETIA) 10 MG Tab   • metoprolol SR (TOPROL XL) 25 MG TABLET SR 24 HR   • lisinopril (PRINIVIL) 10 MG Tab   • Potassium 99 MG Tab   • Non Formulary Request   • Red Yeast Rice 600 MG Tab   • Non Formulary Request   • Ginkgo Biloba 60 MG Cap   • Misc Natural Products (TART CHERRY ADVANCED) Cap   • Apolonia, Zingiber officinalis, (APOLONIA ROOT) 550  MG Cap   • tadalafil (CIALIS) 20 MG tablet   • ferrous sulfate 325 (65 Fe) MG tablet   • Cyanocobalamin (VITAMIN B-12 PO)   • Flaxseed, Linseed, (FLAX SEED OIL PO)   • oxycodone-aspirin (PERCODAN) 4.8355-325 MG Tab   • metformin (GLUCOPHAGE) 1000 MG tablet   • Non Formulary Request   • vitamin e (VITAMIN E) 400 UNIT Cap   • vitamin A 8000 UNIT capsule   • Cholecalciferol (VITAMIN D3) 3000 UNITS Tab   • Resveratrol 100 MG Cap   • KRILL OIL PO   • Omega-3 Fatty Acids (FISH OIL) 1000 MG Cap capsule   • ASTAXANTHIN PO   • SAW PALMETTO, SERENOA REPENS, PO     No current facility-administered medications for this visit.        ALLERGIES:    Allergies   Allergen Reactions   • Ciprofloxacin Unspecified     convulsions   • Statins [Hmg-Coa-R Inhibitors] Unspecified     Stabbing pains in kidneys   • Acetaminophen      Kidney pain    • Ibuprofen      Kidney pain   • Naproxen      Kidney pain   • Shellfish Allergy      Kidney stones   • Soap &  [Alcohol] Rash     Antibacterial soap- contact dermatitis         SOCIAL HISTORY:   Social History     Socioeconomic History   • Marital status:      Spouse name: Not on file   • Number of children: Not on file   • Years of education: Not on file   • Highest education level: Not on file   Occupational History   • Not on file   Social Needs   • Financial resource strain: Not on file   • Food insecurity     Worry: Not on file     Inability: Not on file   • Transportation needs     Medical: Not on file     Non-medical: Not on file   Tobacco Use   • Smoking status: Former Smoker     Types: Cigarettes     Quit date: 2008     Years since quittin.8   • Smokeless tobacco: Never Used   • Tobacco comment: quit    Substance and Sexual Activity   • Alcohol use: No   • Drug use: Yes     Types: Marijuana   • Sexual activity: Never   Lifestyle   • Physical activity     Days per week: Not on file     Minutes per session: Not on file   • Stress: Not on file   Relationships    • Social connections     Talks on phone: Not on file     Gets together: Not on file     Attends [employer] service: Not on file     Active member of club or organization: Not on file     Attends meetings of clubs or organizations: Not on file     Relationship status: Not on file   • Intimate partner violence     Fear of current or ex partner: Not on file     Emotionally abused: Not on file     Physically abused: Not on file     Forced sexual activity: Not on file   Other Topics Concern   • Not on file   Social History Narrative   • Not on file       FAMILY HISTORY:   Family History   Problem Relation Age of Onset   • Other Mother    • Heart Attack Father    • Heart Failure Brother           REVIEW OF SYSTEMS:   Review of Systems   Constitutional: Negative for chills and fever.   HENT: Negative.    Eyes: Negative for blurred vision.   Respiratory: Negative for cough, shortness of breath and wheezing.    Cardiovascular: Negative for chest pain and palpitations.   Gastrointestinal: Negative for nausea and vomiting.   Skin:        Denuded tissue to right lower extremity   Neurological: Negative for dizziness, weakness and headaches.       PHYSICAL EXAMINATION:   /81   Pulse 69   Temp 37 °C (98.6 °F)   Resp 20   SpO2 97%   Physical Exam   Constitutional: He is oriented to person, place, and time and well-developed, well-nourished, and in no distress.   HENT:   Head: Normocephalic and atraumatic.   Eyes: Pupils are equal, round, and reactive to light.   Neck: Normal range of motion. Neck supple.   Cardiovascular: Regular rhythm and intact distal pulses.   Pulmonary/Chest: Effort normal. No respiratory distress. He has no wheezes.   Musculoskeletal: Normal range of motion.         General: Edema present. No deformity.      Comments:  edema dependent to right LE   Neurological: He is alert and oriented to person, place, and time.   Skin: There is erythema.   Denuded skin to right foot and right leg  Patient has  small ulcers to lateral and medial right ankle   Psychiatric: Memory, affect and judgment normal.       WOUND ASSESSMENT        Wound 09/29/20 Venous Ulcer Foot Dorsal Right CVI ulcer distal R dorsal foot and base of toes (Active)   Wound Image   11/17/20 0900   Site Assessment Red 11/17/20 0900   Periwound Assessment Rash 11/17/20 0900   Margins Undefined edges 11/17/20 0900   Closure Secondary intention 10/16/20 1630   Drainage Amount Moderate 11/17/20 0900   Drainage Description Serous 11/17/20 0900   Treatments Cleansed;Site care 11/17/20 0900   Wound Cleansing Foam Cleanser/Washcloth 11/17/20 0900   Periwound Protectant Antifungal Therapy 11/17/20 0900   Dressing Cleansing/Solutions Antifungal Therapy;Other (Comments) 11/10/20 0830   Dressing Options Viscopaste;Lamb's Wool;Soft Conforming Roll;Hypafix Tape 11/17/20 0900   Dressing Changed Changed 11/17/20 0900   Dressing Status Clean;Dry;Intact 11/17/20 0900   Dressing Change/Treatment Frequency Every 72 hrs, and As Needed 11/17/20 0900   Non-staged Wound Description Partial thickness 11/17/20 0900   Wound Length (cm) 7 cm 11/03/20 0900   Wound Width (cm) 8.2 cm 11/03/20 0900   Wound Depth (cm) 0 cm 11/03/20 0900   Wound Surface Area (cm^2) 57.4 cm^2 11/03/20 0900   Wound Volume (cm^3) 0 cm^3 11/03/20 0900   Post-Procedure Length (cm) 5 cm 10/13/20 1500   Post-Procedure Width (cm) 9 cm 10/13/20 1500   Post-Procedure Depth (cm) 0.1 cm 10/13/20 1500   Post-Procedure Surface Area (cm^2) 45 cm^2 10/13/20 1500   Post-Procedure Volume (cm^3) 4.5 cm^3 10/13/20 1500   Wound Healing % 100 11/03/20 0900   Wound Bed Granulation (%) 100 % 10/06/20 0900   Wound Bed Slough (%) 5 % 10/20/20 1632   Wound Bed Granulation (%) - Post-Procedure 100 % 10/13/20 1500   Tunneling (cm) 0 cm 11/17/20 0900   Undermining (cm) 0 cm 11/17/20 0900   Wound Odor None 11/17/20 0900   Pulses Right;1+;DP;PT 11/03/20 0900   Exposed Structures None 11/17/20 0900       Wound 09/29/20 Venous Ulcer  Ankle Lateral Right R lateral ankle (Active)   Wound Image    11/17/20 0900   Site Assessment Red;Yellow 11/17/20 0900   Periwound Assessment Denuded 11/17/20 0900   Margins Attached edges 11/17/20 0900   Closure Secondary intention 10/16/20 1630   Drainage Amount Moderate 11/17/20 0900   Drainage Description Serous 11/17/20 0900   Treatments Cleansed;Topical Lidocaine;Provider debridement 11/17/20 0900   Wound Cleansing Foam Cleanser/Washcloth 11/17/20 0900   Periwound Protectant Barrier Paste;Antifungal Therapy 11/17/20 0900   Dressing Cleansing/Solutions Not Applicable 11/17/20 0900   Dressing Options Viscopaste;Dry Roll Gauze;Nonadhesive Foam;Coban 11/17/20 0900   Dressing Changed Changed 11/17/20 0900   Dressing Status Clean;Dry;Intact 11/17/20 0900   Dressing Change/Treatment Frequency Every 72 hrs, and As Needed 11/17/20 0900   Non-staged Wound Description Full thickness 11/17/20 0900   Wound Length (cm) 0.5 cm 11/17/20 0900   Wound Width (cm) 0.4 cm 11/17/20 0900   Wound Depth (cm) 0.2 cm 11/17/20 0900   Wound Surface Area (cm^2) 0.2 cm^2 11/17/20 0900   Wound Volume (cm^3) 0.04 cm^3 11/17/20 0900   Post-Procedure Length (cm) 0.5 cm 11/17/20 0900   Post-Procedure Width (cm) 0.5 cm 11/17/20 0900   Post-Procedure Depth (cm) 0.3 cm 11/17/20 0900   Post-Procedure Surface Area (cm^2) 0.25 cm^2 11/17/20 0900   Post-Procedure Volume (cm^3) 0.08 cm^3 11/17/20 0900   Wound Healing % 100 11/17/20 0900   Wound Bed Granulation (%) 100 % 10/06/20 0900   Wound Bed Slough (%) 75 % 10/20/20 1632   Wound Bed Granulation (%) - Post-Procedure 100 % 10/13/20 1500   Tunneling (cm) 0 cm 11/17/20 0900   Undermining (cm) 0 cm 11/17/20 0900   Wound Odor None 11/17/20 0900   Exposed Structures None 11/17/20 0900       Wound 10/30/20 Right Medial Ankle (Active)   Wound Image    11/17/20 0900   Site Assessment Pink;Red;Other (Comment) 11/17/20 0900   Periwound Assessment Rash 11/17/20 0900   Closure Secondary intention 10/30/20 0900    Drainage Amount Moderate 11/17/20 0900   Drainage Description Serous 11/17/20 0900   Treatments Cleansed;Topical Lidocaine;Provider debridement 11/17/20 0900   Wound Cleansing Foam Cleanser/Washcloth 11/17/20 0900   Periwound Protectant Barrier Paste 11/17/20 0900   Dressing Cleansing/Solutions Antifungal Therapy;Other (Comments) 11/17/20 0900   Dressing Options Unna Boot;Dry Roll Gauze;Nonadhesive Foam 11/17/20 0900   Dressing Changed Changed 11/17/20 0900   Dressing Status Clean;Dry;Intact 11/17/20 0900   Non-staged Wound Description Partial thickness 11/17/20 0900   Wound Length (cm) 0.2 cm 11/17/20 0900   Wound Width (cm) 0.5 cm 11/17/20 0900   Wound Depth (cm) 0.1 cm 11/17/20 0900   Wound Surface Area (cm^2) 0.1 cm^2 11/17/20 0900   Wound Volume (cm^3) 0.01 cm^3 11/17/20 0900   Post-Procedure Length (cm) 0.3 cm 11/17/20 0900   Post-Procedure Width (cm) 0.5 cm 11/17/20 0900   Post-Procedure Depth (cm) 0.2 cm 11/17/20 0900   Post-Procedure Surface Area (cm^2) 0.15 cm^2 11/17/20 0900   Post-Procedure Volume (cm^3) 0.03 cm^3 11/17/20 0900   Tunneling (cm) 0 cm 11/17/20 0900   Undermining (cm) 0 cm 11/17/20 0900   Wound Odor None 11/17/20 0900   Exposed Structures None 11/17/20 0900         Debridement     Consent obtained? verbal  Consent given by: patient  Risks discussed? procedural risks discussed  Performed by: NP  Pain control: lidocaine with epinephrine  Post-debridement measurements  Length (cm): 0.3  Width (cm): 0.5  Depth (cm): 0.2  Percent debrided: 100%  Surface Area (cm^2): 0.15  Area debrided (cm^2): 0.15  Volume (cm^3): 0.03  Tissue and other material debrided: subcutaneous tissue  Devitalized tissue debrided: slough  Instrument(s) utilized: curette  Bleeding: small  Hemostasis obtained with: pressure  Procedural pain (0-10): 0  Post-procedural pain: 4   Response to treatment: procedure was tolerated well        ASSESSMENT AND PLAN:   1. Pressure injury of right ankle, stage 2 (HCC)  11/17/2020:    Comments: Patient now has 2 ulcers to the right ankle 1 medial one lateral  -Excisional debridement of wound in clinic today, medically necessary to promote wound healing.  -Patient to return to clinic weekly for assessment and debridement  -Patient to change dressing 1-2 times per week in between clinic visits  -Discussed the possibility of venous intervention for greater saphenous vein reflux due to the nonhealing nature of the patient's wounds.  Patient is to reconsider.  Patient reports that he would not be able to have any interventions done until January due to the fact that he is used up all of his sick days at work and he is afraid of losing employment and insurance.   Wound care: Lambswool between toes, Viscopaste-Unna boot, dry roll gauze, absorbent abdominal pad.  Antifungal powder between toes and distal aspect of right foot.      2. Venous insufficiency of right leg  11/17/2020:     -Bilateral venous insufficiency. Compression optimal assist with healing although the wounds themselves are inconsistent with venous stasi wounds.  Patient is not al all interested in any procedures.   - Denuded tissue to right lower extremity.  Improved with nystatin.  -Weeping right lateral lower extremity significantly improved      Please note that this dictation was created using voice recognition software. I have worked with technical experts from [employer] to optimize the interface.  I have made every reasonable attempt to correct obvious errors, but there may be errors of grammar and possibly content that I did not discover before finalizing the note.

## 2020-11-20 ENCOUNTER — OFFICE VISIT (OUTPATIENT)
Dept: WOUND CARE | Facility: MEDICAL CENTER | Age: 70
End: 2020-11-20
Attending: PHYSICIAN ASSISTANT
Payer: COMMERCIAL

## 2020-11-20 DIAGNOSIS — L89.512 PRESSURE INJURY OF RIGHT ANKLE, STAGE 2 (HCC): ICD-10-CM

## 2020-11-20 PROCEDURE — 97602 WOUND(S) CARE NON-SELECTIVE: CPT

## 2020-11-20 NOTE — PATIENT INSTRUCTIONS
-Keep your wound dressing clean, dry, and intact.    -Remove your compression wrap if you have severe pain, severe swelling, numbness, color change, or temperature change in your toes. If you need to remove your compression wrap, do so by unrolling it. Do not cut the compression wrap off to prevent cutting yourself on accident.    -Should you experience any significant changes in your wound(s), such as infection (redness, swelling, localized heat, increased pain, fever > 101 F, chills) or have any questions regarding your home care instructions, please contact the wound center at [phone]. If after hours, contact your primary care physician or go to the hospital emergency room.

## 2020-11-20 NOTE — PROCEDURES
Non-selective debridement to wounds and periwounds using wound cleanser and washcloth to remove nonviable tissue and biofilm.

## 2020-11-20 NOTE — PROGRESS NOTES
Patient was seen by provider earlier this week, at which time he was prepared he does not need to be seen by me today.  Wound care by nursing

## 2020-11-27 ENCOUNTER — NON-PROVIDER VISIT (OUTPATIENT)
Dept: WOUND CARE | Facility: MEDICAL CENTER | Age: 70
End: 2020-11-27
Attending: PHYSICIAN ASSISTANT
Payer: COMMERCIAL

## 2020-11-27 PROCEDURE — 97602 WOUND(S) CARE NON-SELECTIVE: CPT

## 2020-11-28 NOTE — PATIENT INSTRUCTIONS
Avoid prolonged standing or sitting without elevating your legs.  - Multilayer compression wrap to R leg. Do not get wet and keep on for the week. Only remove if temperature or sensation changes.   If compression needs to be removed, un-wrap it do not cut it off.     Should you experience any significant changes in your wound(s), such as infection (redness, swelling, localized heat, increased pain, fever > 101 F, chills) or have any questions regarding your home care instructions, please contact the wound center at [phone]. If after hours, contact your primary care physician or go to the hospital emergency room.   Keep dressing clean, dry and covered while bathing. Only change dressing if it becomes over saturated, soiled or falls off.

## 2020-12-01 ENCOUNTER — OFFICE VISIT (OUTPATIENT)
Dept: WOUND CARE | Facility: MEDICAL CENTER | Age: 70
End: 2020-12-01
Attending: PHYSICIAN ASSISTANT
Payer: COMMERCIAL

## 2020-12-01 VITALS
RESPIRATION RATE: 18 BRPM | TEMPERATURE: 98.3 F | HEART RATE: 66 BPM | SYSTOLIC BLOOD PRESSURE: 109 MMHG | OXYGEN SATURATION: 98 % | DIASTOLIC BLOOD PRESSURE: 61 MMHG

## 2020-12-01 DIAGNOSIS — I87.2 VENOUS INSUFFICIENCY OF RIGHT LEG: Primary | ICD-10-CM

## 2020-12-01 DIAGNOSIS — L89.512 PRESSURE INJURY OF RIGHT ANKLE, STAGE 2 (HCC): ICD-10-CM

## 2020-12-01 PROCEDURE — 11042 DBRDMT SUBQ TIS 1ST 20SQCM/<: CPT | Performed by: NURSE PRACTITIONER

## 2020-12-01 PROCEDURE — 11042 DBRDMT SUBQ TIS 1ST 20SQCM/<: CPT

## 2020-12-01 ASSESSMENT — ENCOUNTER SYMPTOMS
PALPITATIONS: 0
SHORTNESS OF BREATH: 0
NAUSEA: 0
FEVER: 0
DIZZINESS: 0
HEADACHES: 0
CHILLS: 0
BLURRED VISION: 0
VOMITING: 0
COUGH: 0
WEAKNESS: 0
WHEEZING: 0

## 2020-12-01 NOTE — PROGRESS NOTES
Wound care supply order to [facility] 12/01/2020        Wound 09/29/20 Venous Ulcer Ankle Lateral Right R lateral ankle (Active)   Wound Image    12/01/20 0807   Site Assessment Red;Yellow 12/01/20 0807   Periwound Assessment Edema;Fragile 12/01/20 0807   Margins Attached edges;Epibole (rolled edges) 12/01/20 0807   Closure Secondary intention 11/27/20 0800   Drainage Amount Moderate 12/01/20 0807   Drainage Description Serosanguineous 12/01/20 0807   Treatments Cleansed;Topical Lidocaine;Provider debridement 12/01/20 0807   Wound Cleansing Foam Cleanser/Washcloth 12/01/20 0807   Periwound Protectant Barrier Paste 12/01/20 0807   Dressing Cleansing/Solutions Not Applicable 11/27/20 0800   Dressing Options Collagen Dressing;Hydrofiber Silver;Viscopaste;Dry Roll Gauze;Coban 12/01/20 0807   Dressing Changed Changed 11/27/20 0800   Dressing Status Clean;Dry;Intact 11/27/20 0800   Dressing Change/Treatment Frequency Every 72 hrs, and As Needed 11/27/20 0800   Non-staged Wound Description Full thickness 12/01/20 0807   Wound Length (cm) 0.8 cm 12/01/20 0807   Wound Width (cm) 0.3 cm 12/01/20 0807   Wound Depth (cm) 0.3 cm 12/01/20 0807   Wound Surface Area (cm^2) 0.24 cm^2 12/01/20 0807   Wound Volume (cm^3) 0.07 cm^3 12/01/20 0807   Post-Procedure Length (cm) 0.9 cm 12/01/20 0807   Post-Procedure Width (cm) 0.4 cm 12/01/20 0807   Post-Procedure Depth (cm) 0.3 cm 12/01/20 0807   Post-Procedure Surface Area (cm^2) 0.36 cm^2 12/01/20 0807   Post-Procedure Volume (cm^3) 0.11 cm^3 12/01/20 0807   Wound Healing % 99 12/01/20 0807   Wound Bed Granulation (%) 100 % 11/27/20 0800   Wound Bed Slough (%) 75 % 10/20/20 1632   Wound Bed Granulation (%) - Post-Procedure 100 % 10/13/20 1500   Tunneling (cm) 0 cm 12/01/20 0807   Undermining (cm) 0 cm 12/01/20 0807   Wound Odor None 12/01/20 0807   Exposed Structures None 12/01/20 0807       Wound 10/30/20 Right Medial Ankle (Active)   Wound Image    12/01/20 0807   Site  Assessment Red;Yellow 12/01/20 0807   Periwound Assessment Edema 12/01/20 0807   Margins Attached edges 11/27/20 0800   Closure Secondary intention 11/27/20 0800   Drainage Amount Moderate 12/01/20 0807   Drainage Description Serosanguineous 12/01/20 0807   Treatments Cleansed;Topical Lidocaine;Provider debridement 12/01/20 0807   Wound Cleansing Foam Cleanser/Washcloth 12/01/20 0807   Periwound Protectant Barrier Paste;Antifungal Therapy 12/01/20 0807   Dressing Cleansing/Solutions Antifungal Therapy;Other (Comments) 11/27/20 0800   Dressing Options Collagen Dressing;Hydrofiber Silver;Viscopaste;Dry Roll Gauze;Coban 12/01/20 0807   Dressing Changed Changed 11/27/20 0800   Dressing Status Clean;Dry;Intact 11/27/20 0800   Non-staged Wound Description Full thickness 12/01/20 0807   Wound Length (cm) 0.9 cm 12/01/20 0807   Wound Width (cm) 1.4 cm 12/01/20 0807   Wound Depth (cm) 0.2 cm 12/01/20 0807   Wound Surface Area (cm^2) 1.26 cm^2 12/01/20 0807   Wound Volume (cm^3) 0.25 cm^3 12/01/20 0807   Post-Procedure Length (cm) 1 cm 12/01/20 0807   Post-Procedure Width (cm) 1.5 cm 12/01/20 0807   Post-Procedure Depth (cm) 0.2 cm 12/01/20 0807   Post-Procedure Surface Area (cm^2) 1.5 cm^2 12/01/20 0807   Post-Procedure Volume (cm^3) 0.3 cm^3 12/01/20 0807   Wound Bed Granulation (%) 100 % 11/27/20 0800   Tunneling (cm) 0 cm 12/01/20 0807   Undermining (cm) 0 cm 12/01/20 0807   Wound Odor None 12/01/20 0807   Exposed Structures None 12/01/20 0807

## 2020-12-01 NOTE — PROGRESS NOTES
" Provider Encounter- Pressure Injury          HISTORY OF PRESENT ILLNESS     START OF CARE IN CLINIC: 8/10/02715    REFERRING PROVIDER: Tiesha Breaux,      WOUND- Pressure injury   STAGE:2   LOCATION: Right anterior ankle   WOUND HISTORY: Patient wears boots and it sounds like it rubbed on the anterior aspect of the ankle.     PREVIOUS TREATMENT: Compression and wound care for a different type of wound in 2019   PERTINENT PMH: Diabetes \"beat after I lost 60 pounds\"; venous insufficiency,  High cholesterol     IMAGING:none   LAST  WOUND CULTURE:  DATE : none                OFFLOADING: N/A    DIABETES: resolved with weight loss    TOBACCO USE: no    Interval History: 8/25/2020: Clinic visit with WALTER Berger.  Patient states that they are feeling well today.  Patient denies fever, chills, nausea, vomiting, lightheadedness, dizziness, shortness of breath and chest pain. Ulcer has resolved patient does have significant denuded tissue applied Unna boot with hopeful resolution next week.    9/1/2020: Clinic visit with WALTER Berger. Patient states that they are feeling well today.  Patient denies fever, chills, nausea, vomiting, lightheadedness, dizziness, shortness of breath and chest pain.  Patient continues to have severely denuded tissue to right lower extremity.  Right lateral lower extremity with weeping.    9/8/2020: Clinic visit with WALTER Berger. Patient states that they are feeling well today.  Patient denies fever, chills, nausea, vomiting, lightheadedness, dizziness, shortness of breath and chest pain.  Improving erythema and denuded tissue to right lower extremity.  Review of patient's ultrasound results on 9/9/2020 following patient visit positive for venous reflux as well as perforators patient needs to be referred to vascular see Dr. Hemphill in clinic.    10/13/2020: Clinic visit with WALTER Berger. Patient states that they are feeling well today.  Patient denies " fever, chills, nausea, vomiting, lightheadedness, dizziness, shortness of breath and chest pain.  Erythema to right foot has significantly improved.  Patient has 2 small open wounds to right ankle.  Erythema significantly improved with nystatin powder.  Patient to continue that this week.  Patient has appointment with Dr. Hemphill on 10/26/2020 for vascular procedure.    10/20/20: Clinic visit with WALTER Ackerman, [facility].  This is my first time meeting Mr. Duron.  Patient reports feeling well.  Does feel the erythema is improved with nystatin powder. Denies wound drainage, odor. Denies erythema, swelling, pain.  Patient stating that he does not check his blood sugars as he was told he is no longer diabetic after losing 60 pounds.  Last A1c 5.8 on July 30, 2020.  History of tobacco use, but denies any current use.    10/29/20:  Clinic visit with Dr. Hemphill.  Venous studies show greater saphenous vein reflux bilaterally.  Patient is adamant about not wanting any intervention.     11/17/2020: Clinic visit with WALTER Berger. Patient states that they are feeling well today.  Patient denies fever, chills, nausea, vomiting, lightheadedness, dizziness, shortness of breath and chest pain.  I reviewed with the patient the discussion the patient had with Dr. Hemphill.  Dr. Hemphill clearly states that the patient's venous studies show greater saphenous vein reflux bilaterally.  However, the patient is adamant about not wanting any intervention.  Patient denied this and again I have read the direct quote from the past note by Dr. Hemphill.  Patient now has 2 small medial and lateral ulcers to his right ankle.  Again I discussed the patient needs to reconsider vascular intervention to help with these wounds heal and prevent further breakdown and loss of limb.  Patient reports that he will reconsider however he does not have any time off until after January he is used up all of his sick days.    12/1/2020:  Clinic visit with WALTER Berger. Patient states that they are feeling well today.  Patient denies fever, chills, nausea, vomiting, lightheadedness, dizziness, shortness of breath and chest pain.  Patient has had a decrease in erythema to the distal aspect of his right foot.  Wounds are approximately the same size.  Patient reports that he is changing his insurance at the first of the year and will have available time off.  At this point hopeful that he may be able to undergo venous procedure with Dr. Hemphill.      RESULTS:   Most recent A1c:  5.8 DATE 7/30/2020    VASCULAR STUDIES:  LE Vein Reflux 9/21/2018   Report      Vascular Laboratory   CONCLUSIONS   Right greater saphenous has significant reflux and is generally small in    diameter.   No reflux right lesser saphenous vein.   No deep venous reflux.   No venous thrombosis.          Greater Saphenous Vein          RIGHT          Diam          (cm)                  Reflux     Characteristics    SFJ   0.71    Yes        Normal       HT    0.24    Yes        Normal       MT    0.38    Yes        Normal       LT    0.47    Yes        Normal       Knee  0.22    Yes        Normal       HC    0.26    Yes        Normal       LC                       Normal    Ankle                    Normal         PAST MEDICAL HISTORY:   Past Medical History:   Diagnosis Date   • Arrhythmia 09/07/2018   • Arthritis     OA- hands and L knee   • Cataract     OU   • Dental disorder     upper   • Diabetes     oral meds   • High cholesterol 09/07/2018    no med; diet controlled   • Hyperlipidemia    • Hypertension    • Renal disorder 09/07/2018    kidney stones       PAST SURGICAL HISTORY:   Past Surgical History:   Procedure Laterality Date   • GASTROSCOPY N/A 9/18/2018    Procedure: GASTROSCOPY;  Surgeon: Matt Young M.D.;  Location: SURGERY SAME DAY [facility];  Service: Gastroenterology   • ROTATOR CUFF REPAIR Left 2014   • ANKLE ORIF Right 1988    spiral fx   •  MENISCECTOMY Left 1979   • APPENDECTOMY  1958   • CYSTOSCOPY  2004,2008    stone extraction        MEDICATIONS:   Current Outpatient Medications   Medication   • nystatin (MYCOSTATIN) powder   • ezetimibe (ZETIA) 10 MG Tab   • metoprolol SR (TOPROL XL) 25 MG TABLET SR 24 HR   • lisinopril (PRINIVIL) 10 MG Tab   • Potassium 99 MG Tab   • Non Formulary Request   • Red Yeast Rice 600 MG Tab   • Non Formulary Request   • Ginkgo Biloba 60 MG Cap   • Misc Natural Products (TART CHERRY ADVANCED) Cap   • Apolonia, Zingiber officinalis, (APOLONIA ROOT) 550 MG Cap   • tadalafil (CIALIS) 20 MG tablet   • ferrous sulfate 325 (65 Fe) MG tablet   • Cyanocobalamin (VITAMIN B-12 PO)   • Flaxseed, Linseed, (FLAX SEED OIL PO)   • oxycodone-aspirin (PERCODAN) 4.8355-325 MG Tab   • metformin (GLUCOPHAGE) 1000 MG tablet   • Non Formulary Request   • vitamin e (VITAMIN E) 400 UNIT Cap   • vitamin A 8000 UNIT capsule   • Cholecalciferol (VITAMIN D3) 3000 UNITS Tab   • Resveratrol 100 MG Cap   • KRILL OIL PO   • Omega-3 Fatty Acids (FISH OIL) 1000 MG Cap capsule   • ASTAXANTHIN PO   • SAW PALMETTO, SERENOA REPENS, PO     No current facility-administered medications for this visit.        ALLERGIES:    Allergies   Allergen Reactions   • Ciprofloxacin Unspecified     convulsions   • Statins [Hmg-Coa-R Inhibitors] Unspecified     Stabbing pains in kidneys   • Acetaminophen      Kidney pain    • Ibuprofen      Kidney pain   • Naproxen      Kidney pain   • Shellfish Allergy      Kidney stones   • Soap &  [Alcohol] Rash     Antibacterial soap- contact dermatitis         SOCIAL HISTORY:   Social History     Socioeconomic History   • Marital status:      Spouse name: Not on file   • Number of children: Not on file   • Years of education: Not on file   • Highest education level: Not on file   Occupational History   • Not on file   Social Needs   • Financial resource strain: Not on file   • Food insecurity     Worry: Not on file      Inability: Not on file   • Transportation needs     Medical: Not on file     Non-medical: Not on file   Tobacco Use   • Smoking status: Former Smoker     Types: Cigarettes     Quit date: 2008     Years since quittin.9   • Smokeless tobacco: Never Used   • Tobacco comment: quit    Substance and Sexual Activity   • Alcohol use: No   • Drug use: Yes     Types: Marijuana   • Sexual activity: Never   Lifestyle   • Physical activity     Days per week: Not on file     Minutes per session: Not on file   • Stress: Not on file   Relationships   • Social connections     Talks on phone: Not on file     Gets together: Not on file     Attends Jainism service: Not on file     Active member of club or organization: Not on file     Attends meetings of clubs or organizations: Not on file     Relationship status: Not on file   • Intimate partner violence     Fear of current or ex partner: Not on file     Emotionally abused: Not on file     Physically abused: Not on file     Forced sexual activity: Not on file   Other Topics Concern   • Not on file   Social History Narrative   • Not on file       FAMILY HISTORY:   Family History   Problem Relation Age of Onset   • Other Mother    • Heart Attack Father    • Heart Failure Brother           REVIEW OF SYSTEMS:   Review of Systems   Constitutional: Negative for chills and fever.   HENT: Negative.    Eyes: Negative for blurred vision.   Respiratory: Negative for cough, shortness of breath and wheezing.    Cardiovascular: Negative for chest pain and palpitations.   Gastrointestinal: Negative for nausea and vomiting.   Skin:        Denuded tissue to right lower extremity   Neurological: Negative for dizziness, weakness and headaches.       PHYSICAL EXAMINATION:   /61   Pulse 66   Temp 36.8 °C (98.3 °F) (Temporal)   Resp 18   SpO2 98%   Physical Exam   Constitutional: He is oriented to person, place, and time and well-developed, well-nourished, and in no distress.    HENT:   Head: Normocephalic and atraumatic.   Eyes: Pupils are equal, round, and reactive to light.   Neck: Normal range of motion. Neck supple.   Cardiovascular: Regular rhythm and intact distal pulses.   Pulmonary/Chest: Effort normal. No respiratory distress. He has no wheezes.   Musculoskeletal: Normal range of motion.         General: Edema present. No deformity.      Comments:  edema dependent to right LE improved     Neurological: He is alert and oriented to person, place, and time.   Skin: There is erythema.   Denuded skin to right foot improved  Patient has small ulcers to lateral and medial right ankle   Psychiatric: Memory, affect and judgment normal.       WOUND ASSESSMENT        Wound 09/29/20 Venous Ulcer Foot Dorsal Right CVI ulcer distal R dorsal foot and base of toes (Active)   Wound Image   12/01/20 0807   Site Assessment Pink;Red 12/01/20 0807   Periwound Assessment Fragile 12/01/20 0807   Margins Undefined edges 12/01/20 0807   Closure Secondary intention 10/16/20 1630   Drainage Amount Small 12/01/20 0807   Drainage Description Serous 12/01/20 0807   Treatments Cleansed;Site care 12/01/20 0807   Wound Cleansing Foam Cleanser/Washcloth 12/01/20 0807   Periwound Protectant Antifungal Therapy 12/01/20 0807   Dressing Cleansing/Solutions Antifungal Therapy;Other (Comments) 11/10/20 0830   Dressing Options Viscopaste;Soft Conforming Roll;Hypafix Tape 12/01/20 0807   Dressing Changed Changed 11/27/20 0800   Dressing Status Clean;Dry;Intact 11/27/20 0800   Dressing Change/Treatment Frequency Every 72 hrs, and As Needed 11/27/20 0800   Non-staged Wound Description Partial thickness 12/01/20 0807   Wound Length (cm) 7 cm 11/03/20 0900   Wound Width (cm) 8.2 cm 11/03/20 0900   Wound Depth (cm) 0 cm 11/03/20 0900   Wound Surface Area (cm^2) 57.4 cm^2 11/03/20 0900   Wound Volume (cm^3) 0 cm^3 11/03/20 0900   Post-Procedure Length (cm) 5 cm 10/13/20 1500   Post-Procedure Width (cm) 9 cm 10/13/20 1500    Post-Procedure Depth (cm) 0.1 cm 10/13/20 1500   Post-Procedure Surface Area (cm^2) 45 cm^2 10/13/20 1500   Post-Procedure Volume (cm^3) 4.5 cm^3 10/13/20 1500   Wound Healing % 100 11/03/20 0900   Wound Bed Granulation (%) 100 % 10/06/20 0900   Wound Bed Slough (%) 5 % 10/20/20 1632   Wound Bed Granulation (%) - Post-Procedure 100 % 10/13/20 1500   Tunneling (cm) 0 cm 12/01/20 0807   Undermining (cm) 0 cm 12/01/20 0807   Wound Odor None 12/01/20 0807   Pulses Right;1+;DP;PT 11/03/20 0900   Exposed Structures None 12/01/20 0807       Wound 09/29/20 Venous Ulcer Ankle Lateral Right R lateral ankle (Active)   Wound Image    12/01/20 0807   Site Assessment Red;Yellow 12/01/20 0807   Periwound Assessment Edema;Fragile 12/01/20 0807   Margins Attached edges;Epibole (rolled edges) 12/01/20 0807   Closure Secondary intention 11/27/20 0800   Drainage Amount Moderate 12/01/20 0807   Drainage Description Serosanguineous 12/01/20 0807   Treatments Cleansed;Topical Lidocaine;Provider debridement 12/01/20 0807   Wound Cleansing Foam Cleanser/Washcloth 12/01/20 0807   Periwound Protectant Barrier Paste 12/01/20 0807   Dressing Cleansing/Solutions Not Applicable 11/27/20 0800   Dressing Options Collagen Dressing;Hydrofiber Silver;Viscopaste;Dry Roll Gauze;Coban 12/01/20 0807   Dressing Changed Changed 11/27/20 0800   Dressing Status Clean;Dry;Intact 11/27/20 0800   Dressing Change/Treatment Frequency Every 72 hrs, and As Needed 11/27/20 0800   Non-staged Wound Description Full thickness 12/01/20 0807   Wound Length (cm) 0.8 cm 12/01/20 0807   Wound Width (cm) 0.3 cm 12/01/20 0807   Wound Depth (cm) 0.3 cm 12/01/20 0807   Wound Surface Area (cm^2) 0.24 cm^2 12/01/20 0807   Wound Volume (cm^3) 0.07 cm^3 12/01/20 0807   Post-Procedure Length (cm) 0.9 cm 12/01/20 0807   Post-Procedure Width (cm) 0.4 cm 12/01/20 0807   Post-Procedure Depth (cm) 0.3 cm 12/01/20 0807   Post-Procedure Surface Area (cm^2) 0.36 cm^2 12/01/20 0807    Post-Procedure Volume (cm^3) 0.11 cm^3 12/01/20 0807   Wound Healing % 99 12/01/20 0807   Wound Bed Granulation (%) 100 % 11/27/20 0800   Wound Bed Slough (%) 75 % 10/20/20 1632   Wound Bed Granulation (%) - Post-Procedure 100 % 10/13/20 1500   Tunneling (cm) 0 cm 12/01/20 0807   Undermining (cm) 0 cm 12/01/20 0807   Wound Odor None 12/01/20 0807   Exposed Structures None 12/01/20 0807       Wound 10/30/20 Right Medial Ankle (Active)   Wound Image    12/01/20 0807   Site Assessment Red;Yellow 12/01/20 0807   Periwound Assessment Edema 12/01/20 0807   Margins Attached edges 11/27/20 0800   Closure Secondary intention 11/27/20 0800   Drainage Amount Moderate 12/01/20 0807   Drainage Description Serosanguineous 12/01/20 0807   Treatments Cleansed;Topical Lidocaine;Provider debridement 12/01/20 0807   Wound Cleansing Foam Cleanser/Washcloth 12/01/20 0807   Periwound Protectant Barrier Paste;Antifungal Therapy 12/01/20 0807   Dressing Cleansing/Solutions Antifungal Therapy;Other (Comments) 11/27/20 0800   Dressing Options Collagen Dressing;Hydrofiber Silver;Viscopaste;Dry Roll Gauze;Coban 12/01/20 0807   Dressing Changed Changed 11/27/20 0800   Dressing Status Clean;Dry;Intact 11/27/20 0800   Non-staged Wound Description Full thickness 12/01/20 0807   Wound Length (cm) 0.9 cm 12/01/20 0807   Wound Width (cm) 1.4 cm 12/01/20 0807   Wound Depth (cm) 0.2 cm 12/01/20 0807   Wound Surface Area (cm^2) 1.26 cm^2 12/01/20 0807   Wound Volume (cm^3) 0.25 cm^3 12/01/20 0807   Post-Procedure Length (cm) 1 cm 12/01/20 0807   Post-Procedure Width (cm) 1.5 cm 12/01/20 0807   Post-Procedure Depth (cm) 0.2 cm 12/01/20 0807   Post-Procedure Surface Area (cm^2) 1.5 cm^2 12/01/20 0807   Post-Procedure Volume (cm^3) 0.3 cm^3 12/01/20 0807   Wound Bed Granulation (%) 100 % 11/27/20 0800   Tunneling (cm) 0 cm 12/01/20 0807   Undermining (cm) 0 cm 12/01/20 0807   Wound Odor None 12/01/20 0807   Exposed Structures None 12/01/20 0807         -2% viscous lidocaine applied topically to wound bed for approximately 5 minutes prior to debridement  -Curette used to debride wound bed.  Excisional debridement was performed to remove devitalized tissue until healthy, bleeding tissue was visualized.   Entire surface of wound, 1.86 cm2 debrided.  Tissue debrided into the subcutaneous  layer.    -Bleeding controlled with manual pressure.    -Wound care completed by wound RN, refer to flowsheet  -Patient tolerated the procedure well, without c/o pain or discomfort.             ASSESSMENT AND PLAN:   1. Pressure injury of right ankle, stage 2 (HCC)  12/01/20:   Comments: Patient now has 2 ulcers to the right ankle 1 medial one lateral  -Excisional debridement of wound in clinic today, medically necessary to promote wound healing.  -Patient to return to clinic weekly for assessment and debridement  -Discussed the possibility of venous intervention for greater saphenous vein reflux due to the nonhealing nature of the patient's wounds.  Patient is to reconsider.  Patient reports that he is changing his insurance in January which will have a longer co-pay hopeful that the patient will be able to undergo vascular procedure sometime in January I think this will be beneficial and assist the wounds to heal.  Comments: Patient reports that he is taking collagen and eating a significant amount of plant-based protein as well as vegetables.   Wound care: Lambswool between toes, Viscopaste-Unna boot, dry roll gauze, absorbent abdominal pad.  Antifungal powder between toes and distal aspect of right foot.      2. Venous insufficiency of right leg  12/01/20:     -Bilateral venous insufficiency. Compression optimal assist with healing although the wounds themselves are inconsistent with venous stasis wounds.    - Denuded tissue to right lower extremity.  Improved with nystatin.  -Weeping right lateral lower extremity significantly improved, minimal at this time      Please note  that this dictation was created using voice recognition software. I have worked with technical [facility] to optimize the interface.  I have made every reasonable attempt to correct obvious errors, but there may be errors of grammar and possibly content that I did not discover before finalizing the note.

## 2020-12-07 ASSESSMENT — ENCOUNTER SYMPTOMS
CHILLS: 0
HEADACHES: 0
FEVER: 0
SHORTNESS OF BREATH: 0
NAUSEA: 0
BLURRED VISION: 0
DIZZINESS: 0
PALPITATIONS: 0
WHEEZING: 0
VOMITING: 0
WEAKNESS: 0
COUGH: 0

## 2020-12-08 ENCOUNTER — OFFICE VISIT (OUTPATIENT)
Dept: WOUND CARE | Facility: MEDICAL CENTER | Age: 70
End: 2020-12-08
Attending: SPECIALIST
Payer: COMMERCIAL

## 2020-12-08 VITALS
RESPIRATION RATE: 16 BRPM | SYSTOLIC BLOOD PRESSURE: 124 MMHG | TEMPERATURE: 98.2 F | DIASTOLIC BLOOD PRESSURE: 69 MMHG | HEART RATE: 65 BPM | OXYGEN SATURATION: 97 %

## 2020-12-08 DIAGNOSIS — I87.2 VENOUS INSUFFICIENCY OF RIGHT LEG: ICD-10-CM

## 2020-12-08 DIAGNOSIS — L89.512 PRESSURE INJURY OF RIGHT ANKLE, STAGE 2 (HCC): Primary | ICD-10-CM

## 2020-12-08 PROCEDURE — 11042 DBRDMT SUBQ TIS 1ST 20SQCM/<: CPT | Performed by: NURSE PRACTITIONER

## 2020-12-08 PROCEDURE — 11042 DBRDMT SUBQ TIS 1ST 20SQCM/<: CPT

## 2020-12-08 NOTE — PROGRESS NOTES
" Provider Encounter- Pressure Injury          HISTORY OF PRESENT ILLNESS     START OF CARE IN CLINIC: 8/10/21439    REFERRING PROVIDER: Tiesha Breaux,      WOUND- Pressure injury   STAGE:2   LOCATION: Right anterior ankle   WOUND HISTORY: Patient wears boots and it sounds like it rubbed on the anterior aspect of the ankle.     PREVIOUS TREATMENT: Compression and wound care for a different type of wound in 2019   PERTINENT PMH: Diabetes \"beat after I lost 60 pounds\"; venous insufficiency,  High cholesterol     IMAGING:none   LAST  WOUND CULTURE:  DATE : none                OFFLOADING: N/A    DIABETES: resolved with weight loss    TOBACCO USE: no    Interval History: 8/25/2020: Clinic visit with WALTER Berger.  Patient states that they are feeling well today.  Patient denies fever, chills, nausea, vomiting, lightheadedness, dizziness, shortness of breath and chest pain. Ulcer has resolved patient does have significant denuded tissue applied Unna boot with hopeful resolution next week.    9/1/2020: Clinic visit with WALTER Berger. Patient states that they are feeling well today.  Patient denies fever, chills, nausea, vomiting, lightheadedness, dizziness, shortness of breath and chest pain.  Patient continues to have severely denuded tissue to right lower extremity.  Right lateral lower extremity with weeping.    9/8/2020: Clinic visit with WALTER Berger. Patient states that they are feeling well today.  Patient denies fever, chills, nausea, vomiting, lightheadedness, dizziness, shortness of breath and chest pain.  Improving erythema and denuded tissue to right lower extremity.  Review of patient's ultrasound results on 9/9/2020 following patient visit positive for venous reflux as well as perforators patient needs to be referred to vascular see Dr. Hemphill in clinic.    10/13/2020: Clinic visit with WALTER Berger. Patient states that they are feeling well today.  Patient denies " fever, chills, nausea, vomiting, lightheadedness, dizziness, shortness of breath and chest pain.  Erythema to right foot has significantly improved.  Patient has 2 small open wounds to right ankle.  Erythema significantly improved with nystatin powder.  Patient to continue that this week.  Patient has appointment with Dr. Hemphill on 10/26/2020 for vascular procedure.    10/20/20: Clinic visit with WALTER Ackerman, [facility].  This is my first time meeting Mr. Duron.  Patient reports feeling well.  Does feel the erythema is improved with nystatin powder. Denies wound drainage, odor. Denies erythema, swelling, pain.  Patient stating that he does not check his blood sugars as he was told he is no longer diabetic after losing 60 pounds.  Last A1c 5.8 on July 30, 2020.  History of tobacco use, but denies any current use.    10/29/20:  Clinic visit with Dr. Hemphill.  Venous studies show greater saphenous vein reflux bilaterally.  Patient is adamant about not wanting any intervention.     11/17/2020: Clinic visit with WALTER Berger. Patient states that they are feeling well today.  Patient denies fever, chills, nausea, vomiting, lightheadedness, dizziness, shortness of breath and chest pain.  I reviewed with the patient the discussion the patient had with Dr. Hemphill.  Dr. Hemphill clearly states that the patient's venous studies show greater saphenous vein reflux bilaterally.  However, the patient is adamant about not wanting any intervention.  Patient denied this and again I have read the direct quote from the past note by Dr. Hemphill.  Patient now has 2 small medial and lateral ulcers to his right ankle.  Again I discussed the patient needs to reconsider vascular intervention to help with these wounds heal and prevent further breakdown and loss of limb.  Patient reports that he will reconsider however he does not have any time off until after January he is used up all of his sick days.    12/1/2020:  Clinic visit with WALTER Berger. Patient states that they are feeling well today.  Patient denies fever, chills, nausea, vomiting, lightheadedness, dizziness, shortness of breath and chest pain.  Patient has had a decrease in erythema to the distal aspect of his right foot.  Wounds are approximately the same size.  Patient reports that he is changing his insurance at the first of the year and will have available time off.  At this point hopeful that he may be able to undergo venous procedure with Dr. Hemphill.    12/8/2020: Clinic visit with WALTER Berger. Patient states that they are feeling well today.  Patient denies fever, chills, nausea, vomiting, lightheadedness, dizziness, shortness of breath and chest pain.  Overall skin condition quality has improved however, the patient's wounds are relatively the same size in clinic this week.  I do believe venous intervention is needed to help resolve these wounds.  We will wait to the first of the year when the patient's insurance will allow him to have the vascular procedure performed and he will be able to take some time off of work.      RESULTS:   Most recent A1c:  5.8 DATE 7/30/2020    VASCULAR STUDIES:  LE Vein Reflux 9/21/2018   Report      Vascular Laboratory   CONCLUSIONS   Right greater saphenous has significant reflux and is generally small in    diameter.   No reflux right lesser saphenous vein.   No deep venous reflux.   No venous thrombosis.          Greater Saphenous Vein          RIGHT          Diam          (cm)                  Reflux     Characteristics    SFJ   0.71    Yes        Normal       HT    0.24    Yes        Normal       MT    0.38    Yes        Normal       LT    0.47    Yes        Normal       Knee  0.22    Yes        Normal       HC    0.26    Yes        Normal       LC                       Normal    Ankle                    Normal         PAST MEDICAL HISTORY:   Past Medical History:   Diagnosis Date   • Arrhythmia  09/07/2018   • Arthritis     OA- hands and L knee   • Cataract     OU   • Dental disorder     upper   • Diabetes     oral meds   • High cholesterol 09/07/2018    no med; diet controlled   • Hyperlipidemia    • Hypertension    • Renal disorder 09/07/2018    kidney stones       PAST SURGICAL HISTORY:   Past Surgical History:   Procedure Laterality Date   • GASTROSCOPY N/A 9/18/2018    Procedure: GASTROSCOPY;  Surgeon: Matt Young M.D.;  Location: SURGERY SAME DAY [facility];  Service: Gastroenterology   • ROTATOR CUFF REPAIR Left 2014   • ANKLE ORIF Right 1988    spiral fx   • MENISCECTOMY Left 1979   • APPENDECTOMY  1958   • CYSTOSCOPY  2004,2008    stone extraction        MEDICATIONS:   Current Outpatient Medications   Medication   • nystatin (MYCOSTATIN) powder   • ezetimibe (ZETIA) 10 MG Tab   • metoprolol SR (TOPROL XL) 25 MG TABLET SR 24 HR   • lisinopril (PRINIVIL) 10 MG Tab   • Potassium 99 MG Tab   • Non Formulary Request   • Red Yeast Rice 600 MG Tab   • Non Formulary Request   • Ginkgo Biloba 60 MG Cap   • Misc Natural Products (TART CHERRY ADVANCED) Cap   • Apolonia, Zingiber officinalis, (APOLONIA ROOT) 550 MG Cap   • tadalafil (CIALIS) 20 MG tablet   • ferrous sulfate 325 (65 Fe) MG tablet   • Cyanocobalamin (VITAMIN B-12 PO)   • Flaxseed, Linseed, (FLAX SEED OIL PO)   • oxycodone-aspirin (PERCODAN) 4.8355-325 MG Tab   • metformin (GLUCOPHAGE) 1000 MG tablet   • Non Formulary Request   • vitamin e (VITAMIN E) 400 UNIT Cap   • vitamin A 8000 UNIT capsule   • Cholecalciferol (VITAMIN D3) 3000 UNITS Tab   • Resveratrol 100 MG Cap   • KRILL OIL PO   • Omega-3 Fatty Acids (FISH OIL) 1000 MG Cap capsule   • ASTAXANTHIN PO   • SAW PALMETTO, SERENOA REPENS, PO     No current facility-administered medications for this visit.        ALLERGIES:    Allergies   Allergen Reactions   • Ciprofloxacin Unspecified     convulsions   • Statins [Hmg-Coa-R Inhibitors] Unspecified     Stabbing pains in kidneys   •  Acetaminophen      Kidney pain    • Ibuprofen      Kidney pain   • Naproxen      Kidney pain   • Shellfish Allergy      Kidney stones   • Soap &  [Alcohol] Rash     Antibacterial soap- contact dermatitis         SOCIAL HISTORY:   Social History     Socioeconomic History   • Marital status:      Spouse name: Not on file   • Number of children: Not on file   • Years of education: Not on file   • Highest education level: Not on file   Occupational History   • Not on file   Social Needs   • Financial resource strain: Not on file   • Food insecurity     Worry: Not on file     Inability: Not on file   • Transportation needs     Medical: Not on file     Non-medical: Not on file   Tobacco Use   • Smoking status: Former Smoker     Types: Cigarettes     Quit date: 2008     Years since quittin.9   • Smokeless tobacco: Never Used   • Tobacco comment: quit    Substance and Sexual Activity   • Alcohol use: No   • Drug use: Yes     Types: Marijuana   • Sexual activity: Never   Lifestyle   • Physical activity     Days per week: Not on file     Minutes per session: Not on file   • Stress: Not on file   Relationships   • Social connections     Talks on phone: Not on file     Gets together: Not on file     Attends Denominational service: Not on file     Active member of club or organization: Not on file     Attends meetings of clubs or organizations: Not on file     Relationship status: Not on file   • Intimate partner violence     Fear of current or ex partner: Not on file     Emotionally abused: Not on file     Physically abused: Not on file     Forced sexual activity: Not on file   Other Topics Concern   • Not on file   Social History Narrative   • Not on file       FAMILY HISTORY:   Family History   Problem Relation Age of Onset   • Other Mother    • Heart Attack Father    • Heart Failure Brother           REVIEW OF SYSTEMS:   Review of Systems   Constitutional: Negative for chills and fever.   HENT:  Negative.    Eyes: Negative for blurred vision.   Respiratory: Negative for cough, shortness of breath and wheezing.    Cardiovascular: Negative for chest pain and palpitations.   Gastrointestinal: Negative for nausea and vomiting.   Skin:        Denuded tissue to right lower extremity   Neurological: Negative for dizziness, weakness and headaches.       PHYSICAL EXAMINATION:   /69   Pulse 65   Temp 36.8 °C (98.2 °F) (Temporal)   Resp 16   SpO2 97%   Physical Exam   Constitutional: He is oriented to person, place, and time and well-developed, well-nourished, and in no distress.   HENT:   Head: Normocephalic and atraumatic.   Eyes: Pupils are equal, round, and reactive to light.   Neck: Normal range of motion. Neck supple.   Cardiovascular: Regular rhythm and intact distal pulses.   Pulmonary/Chest: Effort normal. No respiratory distress. He has no wheezes.   Musculoskeletal: Normal range of motion.         General: Edema present. No deformity.      Comments:  edema dependent to right LE improved     Neurological: He is alert and oriented to person, place, and time.   Skin: There is erythema.   Denuded skin to right foot improved  Patient has small ulcers to lateral and medial right ankle   Psychiatric: Memory, affect and judgment normal.       WOUND ASSESSMENT          Wound 09/29/20 Venous Ulcer Foot Dorsal Right CVI ulcer distal R dorsal foot and base of toes (Active)   Wound Image   12/08/20 0844   Site Assessment Pink;Red 12/08/20 0844   Periwound Assessment Fragile 12/08/20 0844   Margins Undefined edges 12/08/20 0844   Closure Secondary intention 10/16/20 1630   Drainage Amount Small 12/08/20 0844   Drainage Description Serous 12/08/20 0844   Treatments Cleansed;Site care 12/08/20 0844   Wound Cleansing Puracyn [place] 12/08/20 0844   Periwound Protectant Antifungal Therapy 12/08/20 0844   Dressing Cleansing/Solutions Antifungal Therapy;Other (Comments) 11/10/20 0830   Dressing Options Viscopaste;Soft  Conforming Roll;Hypafix Tape 12/01/20 0807   Dressing Changed Changed 11/27/20 0800   Dressing Status Clean;Dry;Intact 11/27/20 0800   Dressing Change/Treatment Frequency Every 72 hrs, and As Needed 11/27/20 0800   Non-staged Wound Description Partial thickness 12/08/20 0844   Wound Length (cm) 7 cm 11/03/20 0900   Wound Width (cm) 8.2 cm 11/03/20 0900   Wound Depth (cm) 0 cm 11/03/20 0900   Wound Surface Area (cm^2) 57.4 cm^2 11/03/20 0900   Wound Volume (cm^3) 0 cm^3 11/03/20 0900   Post-Procedure Length (cm) 5 cm 10/13/20 1500   Post-Procedure Width (cm) 9 cm 10/13/20 1500   Post-Procedure Depth (cm) 0.1 cm 10/13/20 1500   Post-Procedure Surface Area (cm^2) 45 cm^2 10/13/20 1500   Post-Procedure Volume (cm^3) 4.5 cm^3 10/13/20 1500   Wound Healing % 100 11/03/20 0900   Wound Bed Granulation (%) 100 % 10/06/20 0900   Wound Bed Slough (%) 5 % 10/20/20 1632   Wound Bed Granulation (%) - Post-Procedure 100 % 10/13/20 1500   Tunneling (cm) 0 cm 12/08/20 0844   Undermining (cm) 0 cm 12/08/20 0844   Wound Odor None 12/08/20 0844   Pulses Right;1+;DP;PT 11/03/20 0900   Exposed Structures None 12/08/20 0844       Wound 09/29/20 Venous Ulcer Ankle Lateral Right R lateral ankle (Active)   Wound Image    12/08/20 0844   Site Assessment Red;Yellow 12/08/20 0844   Periwound Assessment Edema;Fragile 12/08/20 0844   Margins Attached edges 12/08/20 0844   Closure Secondary intention 11/27/20 0800   Drainage Amount Moderate 12/08/20 0844   Drainage Description Serosanguineous 12/08/20 0844   Treatments Cleansed;Topical Lidocaine;Provider debridement 12/08/20 0844   Wound Cleansing Foam Cleanser/Washcloth 12/08/20 0844   Periwound Protectant Barrier Paste;Antifungal Therapy 12/08/20 0844   Dressing Cleansing/Solutions Not Applicable 11/27/20 0800   Dressing Options Collagen Dressing;Hydrofiber Silver;Viscopaste;Dry Roll Gauze;Coban 12/08/20 0844   Dressing Changed Changed 11/27/20 0800   Dressing Status Clean;Dry;Intact 11/27/20  0800   Dressing Change/Treatment Frequency Every 72 hrs, and As Needed 11/27/20 0800   Non-staged Wound Description Full thickness 12/08/20 0844   Wound Length (cm) 0.4 cm 12/08/20 0844   Wound Width (cm) 0.2 cm 12/08/20 0844   Wound Depth (cm) 0.1 cm 12/08/20 0844   Wound Surface Area (cm^2) 0.08 cm^2 12/08/20 0844   Wound Volume (cm^3) 0.01 cm^3 12/08/20 0844   Post-Procedure Length (cm) 0.6 cm 12/08/20 0844   Post-Procedure Width (cm) 0.4 cm 12/08/20 0844   Post-Procedure Depth (cm) 0.1 cm 12/08/20 0844   Post-Procedure Surface Area (cm^2) 0.24 cm^2 12/08/20 0844   Post-Procedure Volume (cm^3) 0.02 cm^3 12/08/20 0844   Wound Healing % 100 12/08/20 0844   Wound Bed Granulation (%) 100 % 11/27/20 0800   Wound Bed Slough (%) 75 % 10/20/20 1632   Wound Bed Granulation (%) - Post-Procedure 100 % 10/13/20 1500   Tunneling (cm) 0 cm 12/08/20 0844   Undermining (cm) 0 cm 12/08/20 0844   Wound Odor None 12/08/20 0844   Exposed Structures None 12/08/20 0844       Wound 10/30/20 Right Medial Ankle (Active)   Wound Image    12/08/20 0844   Site Assessment Red;Yellow 12/08/20 0844   Periwound Assessment Edema 12/08/20 0844   Margins Attached edges 12/08/20 0844   Closure Secondary intention 11/27/20 0800   Drainage Amount Moderate 12/08/20 0844   Drainage Description Serosanguineous 12/08/20 0844   Treatments Cleansed;Topical Lidocaine;Provider debridement 12/08/20 0844   Wound Cleansing Puracyn [place] 12/08/20 0844   Periwound Protectant Barrier Paste;Antifungal Therapy 12/08/20 0844   Dressing Cleansing/Solutions Antifungal Therapy;Other (Comments) 11/27/20 0800   Dressing Options Collagen Dressing;Hydrofiber Silver;Viscopaste;Dry Roll Gauze;Coban 12/08/20 0844   Dressing Changed Changed 11/27/20 0800   Dressing Status Clean;Dry;Intact 11/27/20 0800   Non-staged Wound Description Full thickness 12/08/20 0844   Wound Length (cm) 1.1 cm 12/08/20 0844   Wound Width (cm) 1.4 cm 12/08/20 0844   Wound Depth (cm) 0.2 cm 12/08/20  0844   Wound Surface Area (cm^2) 1.54 cm^2 12/08/20 0844   Wound Volume (cm^3) 0.31 cm^3 12/08/20 0844   Post-Procedure Length (cm) 1.1 cm 12/08/20 0844   Post-Procedure Width (cm) 1.5 cm 12/08/20 0844   Post-Procedure Depth (cm) 0.2 cm 12/08/20 0844   Post-Procedure Surface Area (cm^2) 1.65 cm^2 12/08/20 0844   Post-Procedure Volume (cm^3) 0.33 cm^3 12/08/20 0844   Wound Bed Granulation (%) 100 % 11/27/20 0800   Tunneling (cm) 0 cm 12/08/20 0844   Undermining (cm) 0 cm 12/08/20 0844   Wound Odor None 12/08/20 0844   Exposed Structures None 12/08/20 0844       Wound 12/08/20 Leg Right Right lateral leg (Active)   Wound Image    12/08/20 0844   Site Assessment Red 12/08/20 0844   Periwound Assessment Pink;Excoriated 12/08/20 0844   Margins Attached edges 12/08/20 0844   Drainage Amount Small 12/08/20 0844   Drainage Description Serosanguineous 12/08/20 0844   Treatments Cleansed;Topical Lidocaine 12/08/20 0844   Wound Cleansing Puracyn [place] 12/08/20 0844   Non-staged Wound Description Full thickness 12/08/20 0844   Wound Length (cm) 1.9 cm 12/08/20 0844   Wound Width (cm) 1.5 cm 12/08/20 0844   Wound Depth (cm) 0.1 cm 12/08/20 0844   Wound Surface Area (cm^2) 2.85 cm^2 12/08/20 0844   Wound Volume (cm^3) 0.28 cm^3 12/08/20 0844   Post-Procedure Length (cm) 1.9 cm 12/08/20 0844   Post-Procedure Width (cm) 1.5 cm 12/08/20 0844   Post-Procedure Depth (cm) 0.1 cm 12/08/20 0844   Post-Procedure Surface Area (cm^2) 2.85 cm^2 12/08/20 0844   Post-Procedure Volume (cm^3) 0.28 cm^3 12/08/20 0844   Tunneling (cm) 0 cm 12/08/20 0844   Undermining (cm) 0 cm 12/08/20 0844   Wound Odor None 12/08/20 0844   Exposed Structures None 12/08/20 0844                 -2% viscous lidocaine applied topically to wound bed for approximately 5 minutes prior to debridement  -Curette used to debride wound bed.  Excisional debridement was performed to remove devitalized tissue until healthy, bleeding tissue was visualized.   Entire surface of  wound, 4.74 cm2 debrided.  Tissue debrided into the subcutaneous  layer.    -Bleeding controlled with manual pressure.    -Wound care completed by wound RN, refer to flowsheet  -Patient tolerated the procedure well, without c/o pain or discomfort.             ASSESSMENT AND PLAN:   1. Pressure injury of right ankle, stage 2 (HCC)  12/08/20:   Comments: Patient now has 2 ulcers to the right ankle 1 medial one lateral  -Excisional debridement of wound in clinic today, medically necessary to promote wound healing.  -Patient to return to clinic weekly for assessment and debridement  -Discussed the possibility of venous intervention for greater saphenous vein reflux due to the nonhealing nature of the patient's wounds.  Patient is to reconsider after January 1st .  Patient reports that he is changing his insurance in January which will have a lower co-pay hopeful that the patient will be able to undergo vascular procedure sometime in January I think this will be beneficial and assist the wounds to heal.  Comments: Patient reports that he is taking collagen supplements and eating a significant amount of plant-based protein as well as vegetables.   Wound care: Lambswool between toes, Viscopaste-Unna boot, dry roll gauze, absorbent abdominal pad.  Antifungal powder between toes and distal aspect of right foot.      2. Venous insufficiency of right leg  12/08/20:     -Bilateral venous insufficiency. Compression optimal assist with healing although the wounds themselves are inconsistent with venous stasis wounds.    - Denuded tissue to right lower extremity.  Improved with nystatin.  -Weeping right lateral lower extremity significantly improved, minimal at this time      Please note that this dictation was created using voice recognition software. I have worked with technical experts from [employer] to optimize the interface.  I have made every reasonable attempt to correct obvious errors, but there may be errors of  grammar and possibly content that I did not discover before finalizing the note.

## 2020-12-15 ENCOUNTER — OFFICE VISIT (OUTPATIENT)
Dept: WOUND CARE | Facility: MEDICAL CENTER | Age: 70
End: 2020-12-15
Attending: PHYSICIAN ASSISTANT
Payer: COMMERCIAL

## 2020-12-15 VITALS
HEART RATE: 74 BPM | DIASTOLIC BLOOD PRESSURE: 61 MMHG | TEMPERATURE: 98.3 F | RESPIRATION RATE: 20 BRPM | SYSTOLIC BLOOD PRESSURE: 126 MMHG | OXYGEN SATURATION: 98 %

## 2020-12-15 DIAGNOSIS — L89.512 PRESSURE INJURY OF RIGHT ANKLE, STAGE 2 (HCC): ICD-10-CM

## 2020-12-15 DIAGNOSIS — I87.2 VENOUS INSUFFICIENCY OF RIGHT LEG: Primary | ICD-10-CM

## 2020-12-15 PROCEDURE — 11042 DBRDMT SUBQ TIS 1ST 20SQCM/<: CPT | Performed by: NURSE PRACTITIONER

## 2020-12-15 PROCEDURE — 11042 DBRDMT SUBQ TIS 1ST 20SQCM/<: CPT

## 2020-12-15 RX ORDER — OXYCODONE HYDROCHLORIDE 5 MG/1
TABLET ORAL
COMMUNITY
Start: 2020-12-07 | End: 2022-03-15

## 2020-12-15 ASSESSMENT — ENCOUNTER SYMPTOMS
FEVER: 0
CHILLS: 0
WEAKNESS: 0
COUGH: 0
NAUSEA: 0
PALPITATIONS: 0
DIZZINESS: 0
SHORTNESS OF BREATH: 0
BLURRED VISION: 0
WHEEZING: 0
HEADACHES: 0
VOMITING: 0

## 2020-12-15 ASSESSMENT — PAIN SCALES - GENERAL: PAINLEVEL: 3=SLIGHT PAIN

## 2020-12-22 ENCOUNTER — OFFICE VISIT (OUTPATIENT)
Dept: WOUND CARE | Facility: MEDICAL CENTER | Age: 70
End: 2020-12-22
Attending: PHYSICIAN ASSISTANT
Payer: COMMERCIAL

## 2020-12-22 VITALS
SYSTOLIC BLOOD PRESSURE: 108 MMHG | RESPIRATION RATE: 20 BRPM | DIASTOLIC BLOOD PRESSURE: 49 MMHG | TEMPERATURE: 98.4 F | OXYGEN SATURATION: 97 % | HEART RATE: 85 BPM

## 2020-12-22 DIAGNOSIS — I87.2 VENOUS INSUFFICIENCY OF RIGHT LEG: ICD-10-CM

## 2020-12-22 DIAGNOSIS — L89.512 PRESSURE INJURY OF RIGHT ANKLE, STAGE 2 (HCC): Primary | ICD-10-CM

## 2020-12-22 PROCEDURE — 11042 DBRDMT SUBQ TIS 1ST 20SQCM/<: CPT | Performed by: NURSE PRACTITIONER

## 2020-12-22 PROCEDURE — 11042 DBRDMT SUBQ TIS 1ST 20SQCM/<: CPT

## 2020-12-22 ASSESSMENT — ENCOUNTER SYMPTOMS
HEADACHES: 0
WHEEZING: 0
WEAKNESS: 0
CHILLS: 0
DIZZINESS: 0
BLURRED VISION: 0
NAUSEA: 0
FEVER: 0
COUGH: 0
SHORTNESS OF BREATH: 0
VOMITING: 0
PALPITATIONS: 0

## 2020-12-22 ASSESSMENT — PAIN SCALES - GENERAL: PAINLEVEL: 3=SLIGHT PAIN

## 2020-12-22 NOTE — PROGRESS NOTES
" Provider Encounter- Pressure Injury          HISTORY OF PRESENT ILLNESS     START OF CARE IN CLINIC: 8/10/49233    REFERRING PROVIDER: Tiesha Breaux,      WOUND- Pressure injury   STAGE:2   LOCATION: Right anterior ankle   WOUND HISTORY: Patient wears boots and it sounds like it rubbed on the anterior aspect of the ankle.     PREVIOUS TREATMENT: Compression and wound care for a different type of wound in 2019   PERTINENT PMH: Diabetes \"beat after I lost 60 pounds\"; venous insufficiency,  High cholesterol     IMAGING:none   LAST  WOUND CULTURE:  DATE : none                OFFLOADING: N/A    DIABETES: resolved with weight loss    TOBACCO USE: no    Interval History: 8/25/2020: Clinic visit with WALTER Berger.  Patient states that they are feeling well today.  Patient denies fever, chills, nausea, vomiting, lightheadedness, dizziness, shortness of breath and chest pain. Ulcer has resolved patient does have significant denuded tissue applied Unna boot with hopeful resolution next week.    9/1/2020: Clinic visit with WALTER Berger. Patient states that they are feeling well today.  Patient denies fever, chills, nausea, vomiting, lightheadedness, dizziness, shortness of breath and chest pain.  Patient continues to have severely denuded tissue to right lower extremity.  Right lateral lower extremity with weeping.    9/8/2020: Clinic visit with WALTER Berger. Patient states that they are feeling well today.  Patient denies fever, chills, nausea, vomiting, lightheadedness, dizziness, shortness of breath and chest pain.  Improving erythema and denuded tissue to right lower extremity.  Review of patient's ultrasound results on 9/9/2020 following patient visit positive for venous reflux as well as perforators patient needs to be referred to vascular see Dr. Hemphill in clinic.    10/13/2020: Clinic visit with WALTER Berger. Patient states that they are feeling well today.  Patient denies " fever, chills, nausea, vomiting, lightheadedness, dizziness, shortness of breath and chest pain.  Erythema to right foot has significantly improved.  Patient has 2 small open wounds to right ankle.  Erythema significantly improved with nystatin powder.  Patient to continue that this week.  Patient has appointment with Dr. Hemphill on 10/26/2020 for vascular procedure.    10/20/20: Clinic visit with WALTER Ackerman, [facility].  This is my first time meeting Mr. Duron.  Patient reports feeling well.  Does feel the erythema is improved with nystatin powder. Denies wound drainage, odor. Denies erythema, swelling, pain.  Patient stating that he does not check his blood sugars as he was told he is no longer diabetic after losing 60 pounds.  Last A1c 5.8 on July 30, 2020.  History of tobacco use, but denies any current use.    10/29/20:  Clinic visit with Dr. Hemphill.  Venous studies show greater saphenous vein reflux bilaterally.  Patient is adamant about not wanting any intervention.     11/17/2020: Clinic visit with WALTER Berger. Patient states that they are feeling well today.  Patient denies fever, chills, nausea, vomiting, lightheadedness, dizziness, shortness of breath and chest pain.  I reviewed with the patient the discussion the patient had with Dr. Hemphill.  Dr. Hemphill clearly states that the patient's venous studies show greater saphenous vein reflux bilaterally.  However, the patient is adamant about not wanting any intervention.  Patient denied this and again I have read the direct quote from the past note by Dr. Hemphill.  Patient now has 2 small medial and lateral ulcers to his right ankle.  Again I discussed the patient needs to reconsider vascular intervention to help with these wounds heal and prevent further breakdown and loss of limb.  Patient reports that he will reconsider however he does not have any time off until after January he is used up all of his sick days.    12/1/2020:  Clinic visit with WALTER Berger. Patient states that they are feeling well today.  Patient denies fever, chills, nausea, vomiting, lightheadedness, dizziness, shortness of breath and chest pain.  Patient has had a decrease in erythema to the distal aspect of his right foot.  Wounds are approximately the same size.  Patient reports that he is changing his insurance at the first of the year and will have available time off.  At this point hopeful that he may be able to undergo venous procedure with Dr. Hemphill.    12/8/2020: Clinic visit with WALTER Berger. Patient states that they are feeling well today.  Patient denies fever, chills, nausea, vomiting, lightheadedness, dizziness, shortness of breath and chest pain.  Overall skin condition quality has improved however, the patient's wounds are relatively the same size in clinic this week.  I do believe venous intervention is needed to help resolve these wounds.  We will wait to the first of the year when the patient's insurance will allow him to have the vascular procedure performed and he will be able to take some time off of work.    12/15/2020: Clinic visit with WALTER Berger. Patient states that they are feeling well today.  Patient denies fever, chills, nausea, vomiting, lightheadedness, dizziness, shortness of breath and chest pain.  Overall Nitesh's skin has improved.  Wounds are relatively the same size in clinic today.  As noted previously patient will have an opportunity for vascular intervention in the new year once his insurance has come into the new calendar year.    12/22/2020: Clinic visit with WALTER Berger. Patient states that they are feeling well today.  Patient denies fever, chills, nausea, vomiting, lightheadedness, dizziness, shortness of breath and chest pain.  Wounds are relatively the same size in clinic they continue to wax and wane.  Again as I have stated previously patient will need venous intervention  to help resolve these wounds and or significant time off of his job.  He is unable to take time off his job.          RESULTS:   Most recent A1c:  5.8 DATE 7/30/2020    VASCULAR STUDIES:            REVIEW OF SYSTEMS:   Review of Systems   Constitutional: Negative for chills and fever.   HENT: Negative.    Eyes: Negative for blurred vision.   Respiratory: Negative for cough, shortness of breath and wheezing.    Cardiovascular: Negative for chest pain and palpitations.   Gastrointestinal: Negative for nausea and vomiting.   Skin:        Denuded tissue to right lower extremity   Neurological: Negative for dizziness, weakness and headaches.       PHYSICAL EXAMINATION:   /49   Pulse 85   Temp 36.9 °C (98.4 °F)   Resp 20   SpO2 97%   Physical Exam   Constitutional: He is oriented to person, place, and time and well-developed, well-nourished, and in no distress.   HENT:   Head: Normocephalic and atraumatic.   Eyes: Pupils are equal, round, and reactive to light.   Neck: Normal range of motion. Neck supple.   Cardiovascular: Regular rhythm and intact distal pulses.   Pulmonary/Chest: Effort normal. No respiratory distress. He has no wheezes.   Musculoskeletal: Normal range of motion.         General: Edema present. No deformity.      Comments:  edema dependent to right LE improved     Neurological: He is alert and oriented to person, place, and time.   Skin: There is erythema.   Denuded skin to right foot improved  Patient has small ulcers to lateral and medial right ankle   Psychiatric: Memory, affect and judgment normal.       WOUND ASSESSMENT            Wound 09/29/20 Venous Ulcer Foot Dorsal Right CVI ulcer distal R dorsal foot and base of toes (Active)   Wound Image    12/22/20 0834   Site Assessment [place] 12/22/20 0834   Periwound Assessment Fragile;Denuded 12/22/20 0834   Margins Undefined edges 12/22/20 0834   Closure Secondary intention 10/16/20 1630   Drainage Amount Large 12/22/20 0834   Drainage  Description Serosanguineous;Yellow 12/22/20 0834   Treatments Cleansed;Site care 12/22/20 0834   Wound Cleansing Normal Saline Irrigation 12/22/20 0834   Periwound Protectant Antifungal Therapy;Barrier Paste 12/22/20 0834   Dressing Cleansing/Solutions Antifungal Therapy;Other (Comments) 11/10/20 0830   Dressing Options Dry Gauze;Viscopaste;Soft Conforming Roll;Coban 12/22/20 0834   Dressing Changed Changed 12/15/20 0845   Dressing Status Clean;Dry;Intact 12/15/20 0845   Dressing Change/Treatment Frequency Every 72 hrs, and As Needed 12/15/20 0845   Non-staged Wound Description Partial thickness 12/22/20 0834   Wound Length (cm) 7 cm 11/03/20 0900   Wound Width (cm) 8.2 cm 11/03/20 0900   Wound Depth (cm) 0 cm 11/03/20 0900   Wound Surface Area (cm^2) 57.4 cm^2 11/03/20 0900   Wound Volume (cm^3) 0 cm^3 11/03/20 0900   Post-Procedure Length (cm) 5 cm 10/13/20 1500   Post-Procedure Width (cm) 9 cm 10/13/20 1500   Post-Procedure Depth (cm) 0.1 cm 10/13/20 1500   Post-Procedure Surface Area (cm^2) 45 cm^2 10/13/20 1500   Post-Procedure Volume (cm^3) 4.5 cm^3 10/13/20 1500   Wound Healing % 100 11/03/20 0900   Wound Bed Granulation (%) 100 % 10/06/20 0900   Wound Bed Slough (%) 5 % 10/20/20 1632   Wound Bed Granulation (%) - Post-Procedure 100 % 10/13/20 1500   Tunneling (cm) 0 cm 12/22/20 0834   Undermining (cm) 0 cm 12/22/20 0834   Wound Odor None 12/22/20 0834   Pulses Right;1+;DP;PT 11/03/20 0900   Exposed Structures None 12/22/20 0834       Wound 09/29/20 Venous Ulcer Ankle Lateral Right R lateral ankle (Active)   Wound Image    12/22/20 0834   Site Assessment Red;Yellow 12/22/20 0834   Periwound Assessment Edema;Fragile;Denuded 12/22/20 0834   Margins Attached edges 12/22/20 0834   Closure Secondary intention 12/15/20 0845   Drainage Amount Large 12/22/20 0834   Drainage Description Serosanguineous;Yellow 12/22/20 0834   Treatments Cleansed;Topical Lidocaine;Provider debridement 12/22/20 0834   Wound Cleansing  Normal Saline Irrigation 12/22/20 0834   Periwound Protectant Barrier Paste;Antifungal Therapy 12/22/20 0834   Dressing Cleansing/Solutions Not Applicable 11/27/20 0800   Dressing Options Collagen Dressing;Dry Gauze;Viscopaste;Soft Conforming Roll;Coban 12/22/20 0834   Dressing Changed Changed 12/15/20 0845   Dressing Status Clean;Dry;Intact 12/15/20 0845   Dressing Change/Treatment Frequency Every 72 hrs, and As Needed 12/15/20 0845   Non-staged Wound Description Full thickness 12/22/20 0834   Wound Length (cm) 0.5 cm 12/22/20 0834   Wound Width (cm) 0.4 cm 12/22/20 0834   Wound Depth (cm) 0.2 cm 12/22/20 0834   Wound Surface Area (cm^2) 0.2 cm^2 12/22/20 0834   Wound Volume (cm^3) 0.04 cm^3 12/22/20 0834   Post-Procedure Length (cm) 0.7 cm 12/22/20 0834   Post-Procedure Width (cm) 0.5 cm 12/22/20 0834   Post-Procedure Depth (cm) 0.2 cm 12/22/20 0834   Post-Procedure Surface Area (cm^2) 0.35 cm^2 12/22/20 0834   Post-Procedure Volume (cm^3) 0.07 cm^3 12/22/20 0834   Wound Healing % 100 12/22/20 0834   Wound Bed Granulation (%) 100 % 11/27/20 0800   Wound Bed Slough (%) 75 % 10/20/20 1632   Wound Bed Granulation (%) - Post-Procedure 100 % 10/13/20 1500   Tunneling (cm) 0 cm 12/22/20 0834   Undermining (cm) 0 cm 12/22/20 0834   Wound Odor None 12/22/20 0834   Exposed Structures None 12/22/20 0834       Wound 10/30/20 Right Medial Ankle (Active)   Wound Image    12/22/20 0834   Site Assessment Red;Yellow 12/22/20 0834   Periwound Assessment Edema;Denuded 12/22/20 0834   Margins Attached edges 12/22/20 0834   Closure Secondary intention 11/27/20 0800   Drainage Amount Large 12/22/20 0834   Drainage Description Serosanguineous;Yellow 12/22/20 0834   Treatments Cleansed;Topical Lidocaine;Provider debridement 12/22/20 0834   Wound Cleansing Puracyn Pella 12/22/20 0834   Periwound Protectant Barrier Paste;Antifungal Therapy 12/22/20 0834   Dressing Cleansing/Solutions Antifungal Therapy;Other (Comments) 11/27/20 0800    Dressing Options Collagen Dressing;Hydrofiber Silver;Dry Gauze;Viscopaste;Soft Conforming Roll;Coban 12/22/20 0834   Dressing Changed Changed 12/15/20 0845   Dressing Status Clean;Dry;Intact 12/15/20 0845   Dressing Change/Treatment Frequency Every 72 hrs, and As Needed 12/15/20 0845   Non-staged Wound Description Full thickness 12/22/20 0834   Wound Length (cm) 0.7 cm 12/22/20 0834   Wound Width (cm) 1.5 cm 12/22/20 0834   Wound Depth (cm) 0.2 cm 12/22/20 0834   Wound Surface Area (cm^2) 1.05 cm^2 12/22/20 0834   Wound Volume (cm^3) 0.21 cm^3 12/22/20 0834   Post-Procedure Length (cm) 1.4 cm 12/22/20 0834   Post-Procedure Width (cm) 1.5 cm 12/22/20 0834   Post-Procedure Depth (cm) 0.2 cm 12/22/20 0834   Post-Procedure Surface Area (cm^2) 2.1 cm^2 12/22/20 0834   Post-Procedure Volume (cm^3) 0.42 cm^3 12/22/20 0834   Wound Bed Granulation (%) 100 % 11/27/20 0800   Tunneling (cm) 0 cm 12/22/20 0834   Undermining (cm) 0 cm 12/22/20 0834   Wound Odor None 12/22/20 0834   Exposed Structures None 12/22/20 0834       -2% viscous lidocaine applied topically to wound bed for approximately 5 minutes prior to debridement  -Curette used to debride wound bed.  Excisional debridement was performed to remove devitalized tissue until healthy, bleeding tissue was visualized.   Entire surface of wound,  2.45 cm2 debrided.  Tissue debrided into the subcutaneous  layer.    -Bleeding controlled with manual pressure.    -Wound care completed by wound RN, refer to flowsheet  -Patient tolerated the procedure well, without c/o pain or discomfort.             ASSESSMENT AND PLAN:   1. Pressure injury of right ankle, stage 2 ([facility])  12/22/20:   Comments: Patient now has 2 ulcers to the right ankle 1 medial one lateral  -Excisional debridement of wound in clinic today, medically necessary to promote wound healing.  -Patient to return to clinic weekly for assessment and debridement  -Discussed the possibility of venous intervention for  greater saphenous vein reflux due to the nonhealing nature of the patient's wounds.  Patient is to reconsider after January 1st .  Patient reports that he is changing his insurance in January which will have a lower co-pay.  I am hopeful that the patient will be able to undergo vascular procedure sometime in January. I believe this will be beneficial and assist the wounds to heal.  Comments: Patient reports that he is taking collagen supplements and eating a significant amount of plant-based protein as well as vegetables.   Wound care: Collagen dressing, Hydrofiber silver, dry gauze, Viscopaste, soft conforming roll, Coban    2. Venous insufficiency of right leg  12/22/20:     -Bilateral venous insufficiency.   -Continue with compression to help with venous insufficiency and reduce right lower extremity edema.  - Denuded tissue to right lower extremity.  Improved with nystatin. Continue to use lamb wool  -Weeping right lateral lower extremity Improved still has moisture mostly to medial and lateral ankles.       Please note that this dictation was created using voice recognition software. I have worked with technical experts from [email or web address] to optimize the interface.  I have made every reasonable attempt to correct obvious errors, but there may be errors of grammar and possibly content that I did not discover before finalizing the note.

## 2020-12-29 ENCOUNTER — OFFICE VISIT (OUTPATIENT)
Dept: WOUND CARE | Facility: MEDICAL CENTER | Age: 70
End: 2020-12-29
Attending: PHYSICIAN ASSISTANT
Payer: COMMERCIAL

## 2020-12-29 ENCOUNTER — HOSPITAL ENCOUNTER (OUTPATIENT)
Facility: MEDICAL CENTER | Age: 70
End: 2020-12-29
Attending: NURSE PRACTITIONER
Payer: COMMERCIAL

## 2020-12-29 VITALS
HEART RATE: 79 BPM | OXYGEN SATURATION: 96 % | TEMPERATURE: 98.2 F | SYSTOLIC BLOOD PRESSURE: 100 MMHG | DIASTOLIC BLOOD PRESSURE: 43 MMHG | RESPIRATION RATE: 20 BRPM

## 2020-12-29 DIAGNOSIS — T14.8XXA INFECTED WOUND: ICD-10-CM

## 2020-12-29 DIAGNOSIS — I87.2 VENOUS INSUFFICIENCY OF RIGHT LEG: ICD-10-CM

## 2020-12-29 DIAGNOSIS — L89.513 PRESSURE INJURY OF RIGHT ANKLE, STAGE 3 (HCC): Primary | ICD-10-CM

## 2020-12-29 DIAGNOSIS — B49 FUNGAL INFECTION: ICD-10-CM

## 2020-12-29 DIAGNOSIS — L08.9 INFECTED WOUND: ICD-10-CM

## 2020-12-29 PROCEDURE — 87205 SMEAR GRAM STAIN: CPT

## 2020-12-29 PROCEDURE — 87186 SC STD MICRODIL/AGAR DIL: CPT

## 2020-12-29 PROCEDURE — 11042 DBRDMT SUBQ TIS 1ST 20SQCM/<: CPT

## 2020-12-29 PROCEDURE — 87070 CULTURE OTHR SPECIMN AEROBIC: CPT

## 2020-12-29 PROCEDURE — 11042 DBRDMT SUBQ TIS 1ST 20SQCM/<: CPT | Performed by: NURSE PRACTITIONER

## 2020-12-29 PROCEDURE — 87077 CULTURE AEROBIC IDENTIFY: CPT | Mod: 91

## 2020-12-29 ASSESSMENT — ENCOUNTER SYMPTOMS
HEADACHES: 0
WEAKNESS: 0
COUGH: 0
CHILLS: 0
VOMITING: 0
SHORTNESS OF BREATH: 0
FEVER: 0
BLURRED VISION: 0
PALPITATIONS: 0
NAUSEA: 0
WHEEZING: 0
DIZZINESS: 0

## 2020-12-29 ASSESSMENT — PAIN SCALES - GENERAL: PAINLEVEL: 4=SLIGHT-MODERATE PAIN

## 2020-12-29 NOTE — PROGRESS NOTES
" Provider Encounter- Pressure Injury          HISTORY OF PRESENT ILLNESS     START OF CARE IN CLINIC: 8/10/16468    REFERRING PROVIDER: Tiesha Breaux,      WOUND- Pressure injury, complicated by CVI STAGE:3                               Dermatitis to right forefoot   LOCATION: Right medial ankle                                   Right lateral ankle   WOUND HISTORY: Patient wears boots and it sounds like it rubbed on the ankles, causing wounds.   PREVIOUS TREATMENT: Compression and wound care for a different type of wound in 2019   PERTINENT PMH: Diabetes \"beat after I lost 60 pounds\"; venous insufficiency,  High cholesterol     IMAGING:none   LAST  WOUND CULTURE:  DATE : none                OFFLOADING: N/A    DIABETES: resolved with weight loss    TOBACCO USE: no    Interval History: 8/25/2020: Clinic visit with WALTER Berger.  Patient states that they are feeling well today.  Patient denies fever, chills, nausea, vomiting, lightheadedness, dizziness, shortness of breath and chest pain. Ulcer has resolved patient does have significant denuded tissue applied Unna boot with hopeful resolution next week.    9/1/2020: Clinic visit with WALTER Berger. Patient states that they are feeling well today.  Patient denies fever, chills, nausea, vomiting, lightheadedness, dizziness, shortness of breath and chest pain.  Patient continues to have severely denuded tissue to right lower extremity.  Right lateral lower extremity with weeping.    9/8/2020: Clinic visit with WALTER Berger. Patient states that they are feeling well today.  Patient denies fever, chills, nausea, vomiting, lightheadedness, dizziness, shortness of breath and chest pain.  Improving erythema and denuded tissue to right lower extremity.  Review of patient's ultrasound results on 9/9/2020 following patient visit positive for venous reflux as well as perforators patient needs to be referred to vascular see Dr. Hemphill in " clinic.    10/13/2020: Clinic visit with WALTER Berger. Patient states that they are feeling well today.  Patient denies fever, chills, nausea, vomiting, lightheadedness, dizziness, shortness of breath and chest pain.  Erythema to right foot has significantly improved.  Patient has 2 small open wounds to right ankle.  Erythema significantly improved with nystatin powder.  Patient to continue that this week.  Patient has appointment with Dr. Hemphill on 10/26/2020 for vascular procedure.    10/20/20: Clinic visit with WALTER Ackerman, [facility].  This is my first time meeting Mr. Duron.  Patient reports feeling well.  Does feel the erythema is improved with nystatin powder. Denies wound drainage, odor. Denies erythema, swelling, pain.  Patient stating that he does not check his blood sugars as he was told he is no longer diabetic after losing 60 pounds.  Last A1c 5.8 on July 30, 2020.  History of tobacco use, but denies any current use.    10/29/20:  Clinic visit with Dr. Hemphill.  Venous studies show greater saphenous vein reflux bilaterally.  Patient is adamant about not wanting any intervention.     11/17/2020: Clinic visit with WALTER Berger. Patient states that they are feeling well today.  Patient denies fever, chills, nausea, vomiting, lightheadedness, dizziness, shortness of breath and chest pain.  I reviewed with the patient the discussion the patient had with Dr. Hemphill.  Dr. Hemphill clearly states that the patient's venous studies show greater saphenous vein reflux bilaterally.  However, the patient is adamant about not wanting any intervention.  Patient denied this and again I have read the direct quote from the past note by Dr. Hemphill.  Patient now has 2 small medial and lateral ulcers to his right ankle.  Again I discussed the patient needs to reconsider vascular intervention to help with these wounds heal and prevent further breakdown and loss of limb.  Patient reports that  he will reconsider however he does not have any time off until after January he is used up all of his sick days.    12/1/2020: Clinic visit with WALTER Berger. Patient states that they are feeling well today.  Patient denies fever, chills, nausea, vomiting, lightheadedness, dizziness, shortness of breath and chest pain.  Patient has had a decrease in erythema to the distal aspect of his right foot.  Wounds are approximately the same size.  Patient reports that he is changing his insurance at the first of the year and will have available time off.  At this point hopeful that he may be able to undergo venous procedure with Dr. Hemphill.    12/8/2020: Clinic visit with WALTER Berger. Patient states that they are feeling well today.  Patient denies fever, chills, nausea, vomiting, lightheadedness, dizziness, shortness of breath and chest pain.  Overall skin condition quality has improved however, the patient's wounds are relatively the same size in clinic this week.  I do believe venous intervention is needed to help resolve these wounds.  We will wait to the first of the year when the patient's insurance will allow him to have the vascular procedure performed and he will be able to take some time off of work.    12/15/2020: Clinic visit with WALTER Berger. Patient states that they are feeling well today.  Patient denies fever, chills, nausea, vomiting, lightheadedness, dizziness, shortness of breath and chest pain.  Overall Nitesh's skin has improved.  Wounds are relatively the same size in clinic today.  As noted previously patient will have an opportunity for vascular intervention in the new year once his insurance has come into the new calendar year.    12/22/2020: Clinic visit with WALTER Berger. Patient states that they are feeling well today.  Patient denies fever, chills, nausea, vomiting, lightheadedness, dizziness, shortness of breath and chest pain.  Wounds are relatively  the same size in clinic they continue to wax and wane.  Again as I have stated previously patient will need venous intervention to help resolve these wounds and or significant time off of his job.  He is unable to take time off his job.      12/29/2020 : Clinic visit with WALTER Hameed.  Nitesh states he is feeling well, denies fevers, chills, nausea, vomiting, cough or shortness of breath.  He feels he has been tolerating compression without difficulty, however he presents today with wrap only up to mid calf.  He continues to work full-time as a , on his feet most of the day.  States he is supposed to have a venous procedure by Dr. Hemphill, but not until early next year sometime, due to work considerations.  He has been applying zinc paste with nystatin to dermatitis on his forefoot.  I advised that he stop using the zinc paste, and use nystatin powder only, also advised him to wear a clean cotton sock over his foot, change at least daily.        RESULTS:   Most recent A1c:  5.8 DATE 7/30/2020    VASCULAR STUDIES:    9/8/2020-venous duplex with reflux study   FINDINGS   Bilateral lower extremities -. Complete color filling and compressibility    with normal venous flow dynamics including spontaneous flow, response to    augmentation maneuvers, and respiratory phasicity. No superficial or deep    venous thrombosis. The peroneal and posterior tibial veins are difficult to    assess for compressibility, but flow response to augmentation is    demonstrated.       Sustained reflux is demonstrated throughout the greatrer saphenous vein    bilaterally as described above.  Multiple varicosies seen from groin to    ankle bilaterally.  Difficult to follow the greater saphenous vein through    the calf due to multiple varicosites.  There are bilateral incompetent    perforators seen at the distal/ankle.  The right  measures 0.27    cm and the left  measures 0.44 cm.     REVIEW OF  SYSTEMS:   Review of Systems   Constitutional: Negative for chills and fever.   HENT: Negative.    Eyes: Negative for blurred vision.   Respiratory: Negative for cough, shortness of breath and wheezing.    Cardiovascular: Negative for chest pain and palpitations.   Gastrointestinal: Negative for nausea and vomiting.   Skin:        Denuded tissue to right lower extremity   Neurological: Negative for dizziness, weakness and headaches.       PHYSICAL EXAMINATION:   /43   Pulse 79   Temp 36.8 °C (98.2 °F)   Resp 20   SpO2 96%   Physical Exam   Constitutional: He is oriented to person, place, and time and well-developed, well-nourished, and in no distress.   HENT:   Head: Normocephalic and atraumatic.   Eyes: Pupils are equal, round, and reactive to light.   Neck: Normal range of motion. Neck supple.   Cardiovascular: Regular rhythm and intact distal pulses.   Pulmonary/Chest: Effort normal. No respiratory distress. He has no wheezes.   Musculoskeletal: Normal range of motion.         General: Edema present. No deformity.      Comments:  edema dependent to right LE improved     Neurological: He is alert and oriented to person, place, and time.   Skin: There is erythema.   Denuded skin to right foot improved  Patient has small ulcers to lateral and medial right ankle   Psychiatric: Memory, affect and judgment normal.       WOUND ASSESSMENT  Wound 09/29/20 Venous Ulcer Foot Dorsal Right CVI ulcer distal R dorsal foot and base of toes (Active)   Wound Image   12/29/20 0900   Site Assessment [place] 12/29/20 0900   Periwound Assessment Fragile;Denuded 12/29/20 0900   Margins Undefined edges 12/29/20 0900   Closure Secondary intention 10/16/20 1630   Drainage Amount Moderate 12/29/20 0900   Drainage Description Serous 12/29/20 0900   Treatments Cleansed;Site care 12/29/20 0900   Wound Cleansing Normal Saline Irrigation 12/29/20 0900   Periwound Protectant Antifungal Therapy 12/29/20 0900   Dressing Cleansing/Solutions  Antifungal Therapy;Other (Comments) 11/10/20 0830   Dressing Options Viscopaste;Soft Conforming Roll;Coban;Dry Gauze 12/29/20 0900   Dressing Changed Changed 12/29/20 0900   Dressing Status Clean;Dry;Intact 12/29/20 0900   Dressing Change/Treatment Frequency Every 72 hrs, and As Needed 12/15/20 0845   Non-staged Wound Description Partial thickness 12/29/20 0900   Wound Length (cm) 7 cm 11/03/20 0900   Wound Width (cm) 8.2 cm 11/03/20 0900   Wound Depth (cm) 0 cm 11/03/20 0900   Wound Surface Area (cm^2) 57.4 cm^2 11/03/20 0900   Wound Volume (cm^3) 0 cm^3 11/03/20 0900   Post-Procedure Length (cm) 5 cm 10/13/20 1500   Post-Procedure Width (cm) 9 cm 10/13/20 1500   Post-Procedure Depth (cm) 0.1 cm 10/13/20 1500   Post-Procedure Surface Area (cm^2) 45 cm^2 10/13/20 1500   Post-Procedure Volume (cm^3) 4.5 cm^3 10/13/20 1500   Wound Healing % 100 11/03/20 0900   Wound Bed Granulation (%) 100 % 10/06/20 0900   Wound Bed Slough (%) 5 % 10/20/20 1632   Wound Bed Granulation (%) - Post-Procedure 100 % 10/13/20 1500   Tunneling (cm) 0 cm 12/29/20 0900   Undermining (cm) 0 cm 12/29/20 0900   Wound Odor None 12/29/20 0900   Pulses Right;1+;DP;PT 11/03/20 0900   Exposed Structures None 12/29/20 0900       Wound 09/29/20 Venous Ulcer Ankle Lateral Right R lateral ankle (Active)   Wound Image    12/29/20 0900   Site Assessment Red;Yellow 12/29/20 0900   Periwound Assessment Edema;Fragile;Denuded 12/29/20 0900   Margins Attached edges 12/29/20 0900   Closure Secondary intention 12/15/20 0845   Drainage Amount Moderate 12/29/20 0900   Drainage Description Serosanguineous 12/29/20 0900   Treatments Cleansed;Topical Lidocaine;Provider debridement;Site care 12/29/20 0900   Wound Cleansing Normal Saline Irrigation 12/29/20 0900   Periwound Protectant Antifungal Therapy;Barrier Paste 12/29/20 0900   Dressing Cleansing/Solutions Not Applicable 12/29/20 0900   Dressing Options Collagen Dressing;Hydrofiber Silver;Viscopaste;Nonadhesive  Foam;Soft Conforming Roll;Coban 12/29/20 0900   Dressing Changed Changed 12/29/20 0900   Dressing Status Clean;Dry;Intact 12/29/20 0900   Dressing Change/Treatment Frequency Every 72 hrs, and As Needed 12/15/20 0845   Non-staged Wound Description Full thickness 12/29/20 0900   Wound Length (cm) 0.8 cm 12/29/20 0900   Wound Width (cm) 0.5 cm 12/29/20 0900   Wound Depth (cm) 0.1 cm 12/29/20 0900   Wound Surface Area (cm^2) 0.4 cm^2 12/29/20 0900   Wound Volume (cm^3) 0.04 cm^3 12/29/20 0900   Post-Procedure Length (cm) 0.9 cm 12/29/20 0900   Post-Procedure Width (cm) 0.7 cm 12/29/20 0900   Post-Procedure Depth (cm) 0.2 cm 12/29/20 0900   Post-Procedure Surface Area (cm^2) 0.63 cm^2 12/29/20 0900   Post-Procedure Volume (cm^3) 0.13 cm^3 12/29/20 0900   Wound Healing % 100 12/29/20 0900   Wound Bed Granulation (%) 100 % 11/27/20 0800   Wound Bed Slough (%) 75 % 10/20/20 1632   Wound Bed Granulation (%) - Post-Procedure 100 % 10/13/20 1500   Tunneling (cm) 0 cm 12/29/20 0900   Undermining (cm) 0 cm 12/29/20 0900   Wound Odor None 12/29/20 0900   Exposed Structures None 12/29/20 0900       Wound 10/30/20 Right Medial Ankle (Active)   Wound Image    12/29/20 0900   Site Assessment Red;Yellow 12/29/20 0900   Periwound Assessment Edema;Denuded 12/29/20 0900   Margins Attached edges 12/29/20 0900   Closure Secondary intention 11/27/20 0800   Drainage Amount Moderate 12/29/20 0900   Drainage Description Serosanguineous;Yellow 12/29/20 0900   Treatments Cleansed;Topical Lidocaine;Provider debridement;Site care 12/29/20 0900   Wound Cleansing Normal Saline Irrigation 12/29/20 0900   Periwound Protectant Barrier Paste;Antifungal Therapy 12/29/20 0900   Dressing Cleansing/Solutions Antifungal Therapy;Other (Comments) 11/27/20 0800   Dressing Options Collagen Dressing;Hydrofiber Silver;Viscopaste;Nonadhesive Foam;Soft Conforming Roll;Coban 12/29/20 0900   Dressing Changed Changed 12/29/20 0900   Dressing Status Clean;Dry;Intact  12/29/20 0900   Dressing Change/Treatment Frequency Every 72 hrs, and As Needed 12/15/20 0845   Non-staged Wound Description Full thickness 12/29/20 0900   Wound Length (cm) 1.5 cm 12/29/20 0900   Wound Width (cm) 1.3 cm 12/29/20 0900   Wound Depth (cm) 0.2 cm 12/29/20 0900   Wound Surface Area (cm^2) 1.95 cm^2 12/29/20 0900   Wound Volume (cm^3) 0.39 cm^3 12/29/20 0900   Post-Procedure Length (cm) 1.6 cm 12/29/20 0900   Post-Procedure Width (cm) 1.5 cm 12/29/20 0900   Post-Procedure Depth (cm) 0.2 cm 12/29/20 0900   Post-Procedure Surface Area (cm^2) 2.4 cm^2 12/29/20 0900   Post-Procedure Volume (cm^3) 0.48 cm^3 12/29/20 0900   Wound Bed Granulation (%) 100 % 11/27/20 0800   Tunneling (cm) 0 cm 12/29/20 0900   Undermining (cm) 0 cm 12/29/20 0900   Wound Odor None 12/29/20 0900   Exposed Structures None 12/29/20 0900             PROCEDURE: Excisional debridement of right medial and lateral ankle ulcers    -2% viscous lidocaine applied topically to wound beds for approximately 5 minutes prior to debridement  -Curette used to excise slough and senescent red tissue from wound beds.  Excisional debridement was performed to remove devitalized tissue until healthy, bleeding tissue was visualized.   Entire surface of wounds, 3.03 cm2 debrided.  Tissue debrided into the subcutaneous  layer.    -Bleeding controlled with manual pressure.    -Wound care completed by wound RN, refer to flowsheet  -Patient tolerated the procedure well, without c/o pain or discomfort.             ASSESSMENT AND PLAN:   1. Pressure injury of right ankle, stage 3 (HCC)  Comments: Previously documented as stage II.  I am in disagreement of the staging, ulcer extends into the subcutaneous layer.  Patient originally referred to [facility] for an anterior ankle ulcer, which apparently has resolved.  Now has ulcers to right medial and lateral ankle, which he states were caused by a rubbing from his boot.  Complicated by CVI.        12/29/20:   Area of ulcers  has increased since last assessment.  He presented today with a compression wrap only to mid calf, I suspect he applied this himself.  -Excisional debridement of wounda in clinic today, medically necessary to promote wound healing.  -Patient to return to clinic weekly for assessment and debridement     Wound care: Collagen dressing, Hydrofiber silver, dry gauze, Unna's boot with soft roll as middle layer.    2. Venous insufficiency of right leg  Comments: Venous duplex study shows reflux and incompetent .  Patient has been seen by Dr. Hemphill      12/29/20:   Patient states that venous procedure was recommended by Dr. Hemphill, but that he could not afford to time off from work, nor did he have adequate insurance coverage.  States he hopes to be able to get this done early next year, will have new insurance.  -Unna's boot to manage edema, changed weekly.     3.  Wound infection    12/29/2020: Patient presents today with significant periwound erythema.  -Culture collected in clinic, follow results    4.  Fungal infection    12/29/2020: Patient has distinct area of redness to forefoot and toes.  Appears to be fungal.  He has been applying zinc and nystatin powder to the area.  Appears to be too wet today  -Patient advised to stop using zinc, apply nystatin powder only.  Advised to apply 2 times per day, wear clean cotton sock over forefoot, change at least daily.      Please note that this dictation was created using voice recognition software. I have worked with technical experts from [facility] to optimize the interface.  I have made every reasonable attempt to correct obvious errors, but there may be errors of grammar and possibly content that I did not discover before finalizing the note.

## 2020-12-29 NOTE — PATIENT INSTRUCTIONS
-Keep your wound dressing clean, dry, and intact.    -Remove your compression wrap if you have severe pain, severe swelling, numbness, color change, or temperature change in your toes. If you need to remove your compression wrap, do so by unrolling it. Do not cut the compression wrap off to prevent cutting yourself on accident.    -Should you experience any significant changes in your wound(s), such as infection (redness, swelling, localized heat, increased pain, fever > 101 F, chills) or have any questions regarding your home care instructions, please contact the wound center at [phone]. If after hours, contact your primary care physician or go to the hospital emergency room.

## 2020-12-30 LAB
GRAM STN SPEC: NORMAL
SIGNIFICANT IND 70042: NORMAL
SITE SITE: NORMAL
SOURCE SOURCE: NORMAL

## 2020-12-31 LAB
BACTERIA WND AEROBE CULT: ABNORMAL
GRAM STN SPEC: ABNORMAL
SIGNIFICANT IND 70042: ABNORMAL
SITE SITE: ABNORMAL
SOURCE SOURCE: ABNORMAL

## 2021-01-04 ENCOUNTER — HOSPITAL ENCOUNTER (OUTPATIENT)
Dept: LAB | Facility: MEDICAL CENTER | Age: 71
End: 2021-01-04
Attending: INTERNAL MEDICINE
Payer: COMMERCIAL

## 2021-01-04 LAB
CHOLEST SERPL-MCNC: 177 MG/DL (ref 100–199)
FASTING STATUS PATIENT QL REPORTED: NORMAL
HDLC SERPL-MCNC: 41 MG/DL
LDLC SERPL CALC-MCNC: 116 MG/DL
TRIGL SERPL-MCNC: 102 MG/DL (ref 0–149)

## 2021-01-04 PROCEDURE — 36415 COLL VENOUS BLD VENIPUNCTURE: CPT

## 2021-01-04 PROCEDURE — 80061 LIPID PANEL: CPT

## 2021-01-05 ENCOUNTER — OFFICE VISIT (OUTPATIENT)
Dept: WOUND CARE | Facility: MEDICAL CENTER | Age: 71
End: 2021-01-05
Attending: PHYSICIAN ASSISTANT
Payer: COMMERCIAL

## 2021-01-05 VITALS
DIASTOLIC BLOOD PRESSURE: 74 MMHG | OXYGEN SATURATION: 96 % | SYSTOLIC BLOOD PRESSURE: 133 MMHG | RESPIRATION RATE: 20 BRPM | TEMPERATURE: 97.9 F | HEART RATE: 82 BPM

## 2021-01-05 DIAGNOSIS — I87.2 VENOUS INSUFFICIENCY OF RIGHT LEG: ICD-10-CM

## 2021-01-05 DIAGNOSIS — L89.513 PRESSURE INJURY OF RIGHT ANKLE, STAGE 3 (HCC): ICD-10-CM

## 2021-01-05 DIAGNOSIS — T14.8XXA INFECTED WOUND: ICD-10-CM

## 2021-01-05 DIAGNOSIS — B49 FUNGAL INFECTION: ICD-10-CM

## 2021-01-05 DIAGNOSIS — L08.9 INFECTED WOUND: ICD-10-CM

## 2021-01-05 PROCEDURE — 11042 DBRDMT SUBQ TIS 1ST 20SQCM/<: CPT

## 2021-01-05 PROCEDURE — 11042 DBRDMT SUBQ TIS 1ST 20SQCM/<: CPT | Performed by: NURSE PRACTITIONER

## 2021-01-05 ASSESSMENT — ENCOUNTER SYMPTOMS
WHEEZING: 0
VOMITING: 0
FEVER: 0
WEAKNESS: 0
BLURRED VISION: 0
CHILLS: 0
SHORTNESS OF BREATH: 0
DIZZINESS: 0
NAUSEA: 0
HEADACHES: 0
COUGH: 0
PALPITATIONS: 0

## 2021-01-05 NOTE — PROGRESS NOTES
" Provider Encounter- Pressure Injury          HISTORY OF PRESENT ILLNESS     START OF CARE IN CLINIC: 8/10/87038    REFERRING PROVIDER: Tiesha Breaux,      WOUND- Pressure injury, complicated by CVI STAGE:3                               Dermatitis to right forefoot                               Partial-thickness wound-right lateral heel-first observed in clinic 1/5/2021   LOCATION: Right medial ankle                                   Right lateral ankle                                   Right lateral heel   WOUND HISTORY: Patient wears boots and it sounds like it rubbed on the ankles, causing wounds.   PREVIOUS TREATMENT: Compression and wound care for a different type of wound in 2019   PERTINENT PMH: Diabetes \"beat after I lost 60 pounds\"; venous insufficiency,  High cholesterol     IMAGING:none   LAST  WOUND CULTURE:  DATE : none                OFFLOADING: N/A    DIABETES: resolved with weight loss    TOBACCO USE: no    Interval History:     8/25/2020: Clinic visit with WALTER Berger.  Patient states that they are feeling well today.  Patient denies fever, chills, nausea, vomiting, lightheadedness, dizziness, shortness of breath and chest pain. Ulcer has resolved patient does have significant denuded tissue applied Unna boot with hopeful resolution next week.    9/1/2020: Clinic visit with WALTER Berger. Patient states that they are feeling well today.  Patient denies fever, chills, nausea, vomiting, lightheadedness, dizziness, shortness of breath and chest pain.  Patient continues to have severely denuded tissue to right lower extremity.  Right lateral lower extremity with weeping.    9/8/2020: Clinic visit with WALTER Berger. Patient states that they are feeling well today.  Patient denies fever, chills, nausea, vomiting, lightheadedness, dizziness, shortness of breath and chest pain.  Improving erythema and denuded tissue to right lower extremity.  Review of patient's ultrasound " results on 9/9/2020 following patient visit positive for venous reflux as well as perforators patient needs to be referred to vascular see Dr. Hemphill in clinic.    10/13/2020: Clinic visit with WALTER Berger. Patient states that they are feeling well today.  Patient denies fever, chills, nausea, vomiting, lightheadedness, dizziness, shortness of breath and chest pain.  Erythema to right foot has significantly improved.  Patient has 2 small open wounds to right ankle.  Erythema significantly improved with nystatin powder.  Patient to continue that this week.  Patient has appointment with Dr. Hemphill on 10/26/2020 for vascular procedure.    10/20/20: Clinic visit with WALTER Ackerman, [facility].  This is my first time meeting Mr. Duron.  Patient reports feeling well.  Does feel the erythema is improved with nystatin powder. Denies wound drainage, odor. Denies erythema, swelling, pain.  Patient stating that he does not check his blood sugars as he was told he is no longer diabetic after losing 60 pounds.  Last A1c 5.8 on July 30, 2020.  History of tobacco use, but denies any current use.    10/29/20:  Clinic visit with Dr. Hemphill.  Venous studies show greater saphenous vein reflux bilaterally.  Patient is adamant about not wanting any intervention.     11/17/2020: Clinic visit with WALTER Berger. Patient states that they are feeling well today.  Patient denies fever, chills, nausea, vomiting, lightheadedness, dizziness, shortness of breath and chest pain.  I reviewed with the patient the discussion the patient had with Dr. Hemphill.  Dr. Hemphill clearly states that the patient's venous studies show greater saphenous vein reflux bilaterally.  However, the patient is adamant about not wanting any intervention.  Patient denied this and again I have read the direct quote from the past note by Dr. Hemphill.  Patient now has 2 small medial and lateral ulcers to his right ankle.  Again I discussed  the patient needs to reconsider vascular intervention to help with these wounds heal and prevent further breakdown and loss of limb.  Patient reports that he will reconsider however he does not have any time off until after January he is used up all of his sick days.    12/1/2020: Clinic visit with WALTER Berger. Patient states that they are feeling well today.  Patient denies fever, chills, nausea, vomiting, lightheadedness, dizziness, shortness of breath and chest pain.  Patient has had a decrease in erythema to the distal aspect of his right foot.  Wounds are approximately the same size.  Patient reports that he is changing his insurance at the first of the year and will have available time off.  At this point hopeful that he may be able to undergo venous procedure with Dr. Hemphill.    12/8/2020: Clinic visit with WALTER Berger. Patient states that they are feeling well today.  Patient denies fever, chills, nausea, vomiting, lightheadedness, dizziness, shortness of breath and chest pain.  Overall skin condition quality has improved however, the patient's wounds are relatively the same size in clinic this week.  I do believe venous intervention is needed to help resolve these wounds.  We will wait to the first of the year when the patient's insurance will allow him to have the vascular procedure performed and he will be able to take some time off of work.    12/15/2020: Clinic visit with WALTER Berger. Patient states that they are feeling well today.  Patient denies fever, chills, nausea, vomiting, lightheadedness, dizziness, shortness of breath and chest pain.  Overall Nitesh's skin has improved.  Wounds are relatively the same size in clinic today.  As noted previously patient will have an opportunity for vascular intervention in the new year once his insurance has come into the new calendar year.    12/22/2020: Clinic visit with WALTER Berger. Patient states that they are  feeling well today.  Patient denies fever, chills, nausea, vomiting, lightheadedness, dizziness, shortness of breath and chest pain.  Wounds are relatively the same size in clinic they continue to wax and wane.  Again as I have stated previously patient will need venous intervention to help resolve these wounds and or significant time off of his job.  He is unable to take time off his job.      12/29/2020 : Clinic visit with WALTER Hameed.  Nitesh states he is feeling well, denies fevers, chills, nausea, vomiting, cough or shortness of breath.  He feels he has been tolerating compression without difficulty, however he presents today with wrap only up to mid calf.  He continues to work full-time as a , on his feet most of the day.  States he is supposed to have a venous procedure by Dr. Hemphill, but not until early next year sometime, due to work considerations.  He has been applying zinc paste with nystatin to dermatitis on his forefoot.  I advised that he stop using the zinc paste, and use nystatin powder only, also advised him to wear a clean cotton sock over his foot, change at least daily.    1/5/2021 : Clinic visit with WALTER Hameed.  Nitesh states he is feeling well, denies fevers, chills, nausea, vomiting, cough or shortness of breath.  He has a new shallow, linear wound to his lateral heel, which he states is quite painful.  Treatment initiated in clinic today, the wound is shallow, no need for debridement.  He now has new insurance, which should cover procedure proposed by Dr. Hemphill.  He has not yet contacted our office, states that he is waiting until he is able to take time off from work.        RESULTS:   Most recent A1c:  5.8 DATE 7/30/2020    VASCULAR STUDIES:    9/8/2020-venous duplex with reflux study   FINDINGS   Bilateral lower extremities -. Complete color filling and compressibility    with normal venous flow dynamics including spontaneous flow, response to     augmentation maneuvers, and respiratory phasicity. No superficial or deep    venous thrombosis. The peroneal and posterior tibial veins are difficult to    assess for compressibility, but flow response to augmentation is    demonstrated.       Sustained reflux is demonstrated throughout the greatrer saphenous vein    bilaterally as described above.  Multiple varicosies seen from groin to    ankle bilaterally.  Difficult to follow the greater saphenous vein through    the calf due to multiple varicosites.  There are bilateral incompetent    perforators seen at the distal/ankle.  The right  measures 0.27    cm and the left  measures 0.44 cm.     REVIEW OF SYSTEMS:   Review of Systems   Constitutional: Negative for chills and fever.   HENT: Negative.    Eyes: Negative for blurred vision.   Respiratory: Negative for cough, shortness of breath and wheezing.    Cardiovascular: Negative for chest pain and palpitations.   Gastrointestinal: Negative for nausea and vomiting.   Skin:        Denuded tissue to right lower extremity   Neurological: Negative for dizziness, weakness and headaches.       PHYSICAL EXAMINATION:   /74   Pulse 82   Temp 36.6 °C (97.9 °F) (Temporal)   Resp 20   SpO2 96%   Physical Exam   Constitutional: He is oriented to person, place, and time and well-developed, well-nourished, and in no distress.   HENT:   Head: Normocephalic and atraumatic.   Eyes: Pupils are equal, round, and reactive to light.   Neck: Normal range of motion. Neck supple.   Cardiovascular: Regular rhythm and intact distal pulses.   Pulmonary/Chest: Effort normal. No respiratory distress. He has no wheezes.   Musculoskeletal: Normal range of motion.         General: Edema present. No deformity.      Comments:  edema dependent to right LE improved     Neurological: He is alert and oriented to person, place, and time.   Skin: There is erythema.   Full-thickness ulcers to right lateral and medial  ankle  Linear, shallow wound to right lateral heel  Refer to wound flowsheet and photos    Resolving rash to right dorsal forefoot  Skin around foot and ankle is dry and flaking     Psychiatric: Memory, affect and judgment normal.       WOUND ASSESSMENT     Wound 09/29/20 Venous Ulcer Foot Dorsal Right CVI ulcer distal R dorsal foot and base of toes (Active)   Wound Image   01/05/21 0924  Nearly resolved   Site Assessment [place] 01/05/21 0924   Periwound Assessment Fragile;Denuded 01/05/21 0924   Margins Undefined edges 01/05/21 0924   Closure Secondary intention 10/16/20 1630   Drainage Amount Moderate 01/05/21 0924   Drainage Description Serous 01/05/21 0924   Treatments Cleansed;Site care 01/05/21 0924   Wound Cleansing Foam Cleanser/Washcloth 01/05/21 0924   Periwound Protectant Antifungal Therapy 01/05/21 0924   Dressing Cleansing/Solutions Antifungal Therapy;Other (Comments) 11/10/20 0830   Dressing Options Viscopaste;Soft Conforming Roll;Hypafix Tape;Coban 01/05/21 0924   Dressing Changed Changed 12/29/20 0900   Dressing Status Clean;Dry;Intact 12/29/20 0900   Dressing Change/Treatment Frequency Every 72 hrs, and As Needed 12/15/20 0845   Non-staged Wound Description Partial thickness 01/05/21 0924   Wound Length (cm) 7 cm 11/03/20 0900   Wound Width (cm) 8.2 cm 11/03/20 0900   Wound Depth (cm) 0 cm 11/03/20 0900   Wound Surface Area (cm^2) 57.4 cm^2 11/03/20 0900   Wound Volume (cm^3) 0 cm^3 11/03/20 0900   Post-Procedure Length (cm) 5 cm 10/13/20 1500   Post-Procedure Width (cm) 9 cm 10/13/20 1500   Post-Procedure Depth (cm) 0.1 cm 10/13/20 1500   Post-Procedure Surface Area (cm^2) 45 cm^2 10/13/20 1500   Post-Procedure Volume (cm^3) 4.5 cm^3 10/13/20 1500   Wound Healing % 100 11/03/20 0900   Wound Bed Granulation (%) 100 % 10/06/20 0900   Wound Bed Slough (%) 5 % 10/20/20 1632   Wound Bed Granulation (%) - Post-Procedure 100 % 10/13/20 1500   Tunneling (cm) 0 cm 01/05/21 0924   Undermining (cm) 0 cm  01/05/21 0924   Wound Odor None 01/05/21 0924   Pulses Right;1+;DP;PT 11/03/20 0900   Exposed Structures None 01/05/21 0924       Wound 09/29/20 Venous Ulcer Ankle Lateral Right R lateral ankle (Active)   Wound Image    01/05/21 0924   Site Assessment Red;Yellow 01/05/21 0924   Periwound Assessment Edema;Fragile;Denuded 01/05/21 0924   Margins Attached edges;Epibole (rolled edges) 01/05/21 0924   Closure Secondary intention 12/15/20 0845   Drainage Amount Moderate 01/05/21 0924   Drainage Description Serosanguineous;Yellow 01/05/21 0924   Treatments Cleansed;Topical Lidocaine;Provider debridement 01/05/21 0924   Wound Cleansing Foam Cleanser/Washcloth 01/05/21 0924   Periwound Protectant Antifungal Therapy;Barrier Paste 01/05/21 0924   Dressing Cleansing/Solutions Normal Saline 01/05/21 0924   Dressing Options Collagen Dressing;Hydrofiber Silver;Viscopaste;Nonadhesive Foam;Soft Conforming Roll;Coban 01/05/21 0924   Dressing Changed Changed 12/29/20 0900   Dressing Status Clean;Dry;Intact 12/29/20 0900   Dressing Change/Treatment Frequency Every 72 hrs, and As Needed 12/15/20 0845   Non-staged Wound Description Full thickness 01/05/21 0924   Wound Length (cm) 0.5 cm 01/05/21 0924   Wound Width (cm) 0.3 cm 01/05/21 0924   Wound Depth (cm) 0.1 cm 01/05/21 0924   Wound Surface Area (cm^2) 0.15 cm^2 01/05/21 0924   Wound Volume (cm^3) 0.02 cm^3 01/05/21 0924   Post-Procedure Length (cm) 0.5 cm 01/05/21 0924   Post-Procedure Width (cm) 0.6 cm 01/05/21 0924   Post-Procedure Depth (cm) 0.2 cm 01/05/21 0924   Post-Procedure Surface Area (cm^2) 0.3 cm^2 01/05/21 0924   Post-Procedure Volume (cm^3) 0.06 cm^3 01/05/21 0924   Wound Healing % 100 01/05/21 0924   Wound Bed Granulation (%) 100 % 11/27/20 0800   Wound Bed Slough (%) 75 % 10/20/20 1632   Wound Bed Granulation (%) - Post-Procedure 100 % 10/13/20 1500   Tunneling (cm) 0 cm 01/05/21 0924   Undermining (cm) 0 cm 01/05/21 0924   Wound Odor None 01/05/21 0924   Exposed  Structures None 01/05/21 0924       Wound 10/30/20 Right Medial Ankle (Active)   Wound Image    01/05/21 0924   Site Assessment Red;Yellow 01/05/21 0924   Periwound Assessment Edema;Denuded 01/05/21 0924   Margins Attached edges 01/05/21 0924   Closure Secondary intention 11/27/20 0800   Drainage Amount Moderate 01/05/21 0924   Drainage Description Serosanguineous;Yellow 01/05/21 0924   Treatments Cleansed;Topical Lidocaine;Provider debridement 01/05/21 0924   Wound Cleansing Foam Cleanser/Washcloth 01/05/21 0924   Periwound Protectant Barrier Paste;Antifungal Therapy 01/05/21 0924   Dressing Cleansing/Solutions Normal Saline 01/05/21 0924   Dressing Options Collagen Dressing;Hydrofiber Silver;Viscopaste;Nonadhesive Foam;Soft Conforming Roll;Coban 01/05/21 0924   Dressing Changed Changed 12/29/20 0900   Dressing Status Clean;Dry;Intact 12/29/20 0900   Dressing Change/Treatment Frequency Every 72 hrs, and As Needed 12/15/20 0845   Non-staged Wound Description Full thickness 01/05/21 0924   Wound Length (cm) 1.1 cm 01/05/21 0924   Wound Width (cm) 1 cm 01/05/21 0924   Wound Depth (cm) 0.2 cm 01/05/21 0924   Wound Surface Area (cm^2) 1.1 cm^2 01/05/21 0924   Wound Volume (cm^3) 0.22 cm^3 01/05/21 0924   Post-Procedure Length (cm) 1.4 cm 01/05/21 0924   Post-Procedure Width (cm) 1.2 cm 01/05/21 0924   Post-Procedure Depth (cm) 0.2 cm 01/05/21 0924   Post-Procedure Surface Area (cm^2) 1.68 cm^2 01/05/21 0924   Post-Procedure Volume (cm^3) 0.34 cm^3 01/05/21 0924   Wound Bed Granulation (%) 100 % 11/27/20 0800   Tunneling (cm) 0 cm 01/05/21 0924   Undermining (cm) 0 cm 01/05/21 0924   Wound Odor None 01/05/21 0924   Exposed Structures None 01/05/21 0924                PROCEDURE: Excisional debridement of right medial and lateral ankle ulcers    -2% viscous lidocaine applied topically to wound beds for approximately 5 minutes prior to debridement  -Curette used to excise slough and senescent red tissue from wound beds.   Excisional debridement was performed to remove devitalized tissue until healthy, bleeding tissue was visualized.   Entire surface of wounds, 1.98 cm2 debrided.  Tissue debrided into the subcutaneous  layer.    -Bleeding controlled with manual pressure.    -Wound care completed by wound RN, refer to flowsheet  -Patient tolerated the procedure well, without c/o pain or discomfort.       Pertinent Labs and Diagnostics:      IMAGING: None found in epic    VASCULAR STUDIES:   9/8/2020-venous duplex  FINDINGS   Bilateral lower extremities -. Complete color filling and compressibility    with normal venous flow dynamics including spontaneous flow, response to    augmentation maneuvers, and respiratory phasicity. No superficial or deep    venous thrombosis. The peroneal and posterior tibial veins are difficult to    assess for compressibility, but flow response to augmentation is    demonstrated.       Sustained reflux is demonstrated throughout the greatrer saphenous vein    bilaterally as described above.  Multiple varicosies seen from groin to    ankle bilaterally.  Difficult to follow the greater saphenous vein through    the calf due to multiple varicosites.  There are bilateral incompetent    perforators seen at the distal/ankle.  The right  measures 0.27    cm and the left  measures 0.44 cm.    LAST  WOUND CULTURE:  DATE :      12/29/2020      Culture Result Abnormal   Staphylococcus aureus   Light growth     Culture Result Abnormal   Pseudomonas aeruginosa   Light growth              ASSESSMENT AND PLAN:   1. Pressure injury of right ankle, stage 3 ([facility])  Comments: Previously documented as stage II.  I am in disagreement of the staging, ulcer extends into the subcutaneous layer.  Patient originally referred to [facility] for an anterior ankle ulcer, which apparently has resolved.  Now has ulcers to right medial and lateral ankle, which he states were caused by a rubbing from his boot.  Complicated by  CVI.        1/5/2021:   Area of both ulcers have decreased since last assessment.  He tolerated compression wrap without any difficulty.  He does have a new, shallow wound to right lateral heel, no need for debridement of this wound.  -Excisional debridement of ankle wounds in clinic today, medically necessary to promote wound healing.  -Patient to return to clinic weekly for assessment and debridement     Wound care: Collagen dressing, Hydrofiber silver, dry gauze, Unna's boot with soft roll as middle layer.    2. Venous insufficiency of right leg  Comments: Venous duplex study shows reflux and incompetent .  Patient has been seen by Dr. Hemphill      1/5/2021:   Patient states that venous procedure was recommended by Dr. Hemphill, but that he could not afford to time off from work, nor did he have adequate insurance coverage.  He now has no insurance, but does not have any time off from work to get procedure done.  -Unna's boot to manage edema, change weekly.  -Would benefit from lifelong compression with compression stockings    3.  Wound infection    1/5/2021: Culture results from last visit positive for light growth of Pseudomonas and MSSA.  Because both wounds are progressing well, no need for antibiotics at this time.  -Monitor for signs and symptoms of infection each clinic visit    4.  Fungal infection    1/5/2021: Rash to distal foot and between toes appears to be resolving with use of nystatin powder  -Patient to continue daily applications of nystatin.  Advised to apply 2 times per day, wear clean cotton sock over forefoot, change at least daily.      Please note that this dictation was created using voice recognition software. I have worked with technical experts from [employer] to optimize the interface.  I have made every reasonable attempt to correct obvious errors, but there may be errors of grammar and possibly content that I did not discover before finalizing the note.

## 2021-01-07 ENCOUNTER — OFFICE VISIT (OUTPATIENT)
Dept: CARDIOLOGY | Facility: MEDICAL CENTER | Age: 71
End: 2021-01-07
Payer: COMMERCIAL

## 2021-01-07 VITALS
BODY MASS INDEX: 26.51 KG/M2 | WEIGHT: 185.2 LBS | SYSTOLIC BLOOD PRESSURE: 120 MMHG | DIASTOLIC BLOOD PRESSURE: 76 MMHG | OXYGEN SATURATION: 95 % | RESPIRATION RATE: 16 BRPM | HEART RATE: 75 BPM | HEIGHT: 70 IN

## 2021-01-07 DIAGNOSIS — R73.01 IMPAIRED FASTING BLOOD SUGAR: ICD-10-CM

## 2021-01-07 DIAGNOSIS — I10 ESSENTIAL HYPERTENSION, BENIGN: ICD-10-CM

## 2021-01-07 DIAGNOSIS — Z79.899 HIGH RISK MEDICATION USE: ICD-10-CM

## 2021-01-07 DIAGNOSIS — I25.10 CORONARY ARTERY DISEASE INVOLVING NATIVE CORONARY ARTERY OF NATIVE HEART WITHOUT ANGINA PECTORIS: ICD-10-CM

## 2021-01-07 DIAGNOSIS — D61.818 PANCYTOPENIA (HCC): ICD-10-CM

## 2021-01-07 DIAGNOSIS — R93.1 ELEVATED CORONARY ARTERY CALCIUM SCORE: ICD-10-CM

## 2021-01-07 DIAGNOSIS — R01.1 UNDIAGNOSED CARDIAC MURMURS: ICD-10-CM

## 2021-01-07 DIAGNOSIS — K74.60 CIRRHOSIS OF LIVER WITHOUT ASCITES, UNSPECIFIED HEPATIC CIRRHOSIS TYPE (HCC): ICD-10-CM

## 2021-01-07 DIAGNOSIS — R16.1 SPLENOMEGALY: ICD-10-CM

## 2021-01-07 PROCEDURE — 99214 OFFICE O/P EST MOD 30 MIN: CPT | Performed by: INTERNAL MEDICINE

## 2021-01-07 RX ORDER — METOPROLOL SUCCINATE 25 MG/1
25 TABLET, EXTENDED RELEASE ORAL DAILY
Qty: 90 TAB | Refills: 3 | Status: SHIPPED | OUTPATIENT
Start: 2021-01-07 | End: 2021-07-12 | Stop reason: SDUPTHER

## 2021-01-07 RX ORDER — LISINOPRIL 10 MG/1
10 TABLET ORAL DAILY
Qty: 90 TAB | Refills: 3 | Status: SHIPPED | OUTPATIENT
Start: 2021-01-07 | End: 2021-07-12 | Stop reason: SDUPTHER

## 2021-01-07 RX ORDER — EZETIMIBE 10 MG/1
10 TABLET ORAL DAILY
Qty: 90 TAB | Refills: 3 | Status: SHIPPED | OUTPATIENT
Start: 2021-01-07 | End: 2021-07-12 | Stop reason: SDUPTHER

## 2021-01-07 ASSESSMENT — ENCOUNTER SYMPTOMS
CONSTITUTIONAL NEGATIVE: 1
PND: 0
RESPIRATORY NEGATIVE: 1
FEVER: 0
CLAUDICATION: 0
HEMOPTYSIS: 0
PALPITATIONS: 0
ORTHOPNEA: 0
SPUTUM PRODUCTION: 0
COUGH: 0
WEAKNESS: 0
CHILLS: 0
GASTROINTESTINAL NEGATIVE: 1
NEUROLOGICAL NEGATIVE: 1
EYES NEGATIVE: 1
DIZZINESS: 0
SORE THROAT: 0
CARDIOVASCULAR NEGATIVE: 1
BRUISES/BLEEDS EASILY: 0
STRIDOR: 0
WHEEZING: 0
MUSCULOSKELETAL NEGATIVE: 1
LOSS OF CONSCIOUSNESS: 0
SHORTNESS OF BREATH: 0

## 2021-01-07 ASSESSMENT — FIBROSIS 4 INDEX: FIB4 SCORE: 3.22

## 2021-01-07 NOTE — PROGRESS NOTES
Chief Complaint   Patient presents with   • Coronary Artery Disease       Subjective:   Nitesh Duron is a 70 y.o. male who presents today as a follow-up for his high cholesterol and high calcium score with Watson.  Since we last saw him we did an ultrasound of his abdomen that showed early signs of cirrhosis.  He has been having some trace lower extremity edema but declines medical therapy for this.  His LDL dropped from 126-116.  He continues on the Zetia with no problems.  He continues to decline statins.  His blood pressure is controlled.  He has no chest pain or shortness of breath.  He is limited in exertion by his healing right ankle.    Past Medical History:   Diagnosis Date   • Arrhythmia 09/07/2018   • Arthritis     OA- hands and L knee   • Cataract     OU   • Dental disorder     upper   • Diabetes     oral meds   • High cholesterol 09/07/2018    no med; diet controlled   • Hyperlipidemia    • Hypertension    • Renal disorder 09/07/2018    kidney stones     Past Surgical History:   Procedure Laterality Date   • GASTROSCOPY N/A 9/18/2018    Procedure: GASTROSCOPY;  Surgeon: Matt Young M.D.;  Location: SURGERY SAME DAY [facility];  Service: Gastroenterology   • ROTATOR CUFF REPAIR Left 2014   • ANKLE ORIF Right 1988    spiral fx   • MENISCECTOMY Left 1979   • APPENDECTOMY  1958   • CYSTOSCOPY  2004,2008    stone extraction     Family History   Problem Relation Age of Onset   • Other Mother    • Heart Attack Father    • Heart Failure Brother      Social History     Socioeconomic History   • Marital status:      Spouse name: Not on file   • Number of children: Not on file   • Years of education: Not on file   • Highest education level: Not on file   Occupational History   • Not on file   Social Needs   • Financial resource strain: Not on file   • Food insecurity     Worry: Not on file     Inability: Not on file   • Transportation needs     Medical: Not on file     Non-medical: Not on  file   Tobacco Use   • Smoking status: Former Smoker     Types: Cigarettes     Quit date: 2008     Years since quittin.0   • Smokeless tobacco: Never Used   • Tobacco comment: quit    Substance and Sexual Activity   • Alcohol use: No   • Drug use: Yes     Types: Marijuana   • Sexual activity: Never   Lifestyle   • Physical activity     Days per week: Not on file     Minutes per session: Not on file   • Stress: Not on file   Relationships   • Social connections     Talks on phone: Not on file     Gets together: Not on file     Attends Jainism service: Not on file     Active member of club or organization: Not on file     Attends meetings of clubs or organizations: Not on file     Relationship status: Not on file   • Intimate partner violence     Fear of current or ex partner: Not on file     Emotionally abused: Not on file     Physically abused: Not on file     Forced sexual activity: Not on file   Other Topics Concern   • Not on file   Social History Narrative   • Not on file     Allergies   Allergen Reactions   • Ciprofloxacin Unspecified     convulsions   • Statins [Hmg-Coa-R Inhibitors] Unspecified     Stabbing pains in kidneys   • Acetaminophen      Kidney pain    • Ibuprofen      Kidney pain   • Naproxen      Kidney pain   • Shellfish Allergy      Kidney stones   • Soap &  [Alcohol] Rash     Antibacterial soap- contact dermatitis     Outpatient Encounter Medications as of 2021   Medication Sig Dispense Refill   • ezetimibe (ZETIA) 10 MG Tab Take 1 Tab by mouth every day. 90 Tab 3   • lisinopril (PRINIVIL) 10 MG Tab Take 1 Tab by mouth every day. 90 Tab 3   • metoprolol SR (TOPROL XL) 25 MG TABLET SR 24 HR Take 1 Tab by mouth every day. 90 Tab 3   • oxyCODONE immediate-release (ROXICODONE) 5 MG Tab      • nystatin (MYCOSTATIN) powder Apply to affected area twice daily.  Gently cleanse area prior to reapplication of powder with mild soap and water. 60 g 2   • Potassium 99 MG Tab Take   by mouth every day.     • Non Formulary Request Collagen-1000 mg BID     • Red Yeast Rice 600 MG Tab Take  by mouth 2 Times a Day.     • Non Formulary Request Beet Root- 1000 mg daily     • Ginkgo Biloba 60 MG Cap Take  by mouth every day.     • Misc Natural Products (TART PENNY ADVANCED) Cap Take 2,500 Caps by mouth 2 Times a Day.     • Ginger, Zingiber officinalis, (GINGER ROOT) 550 MG Cap Take  by mouth as needed.     • tadalafil (CIALIS) 20 MG tablet      • ferrous sulfate 325 (65 Fe) MG tablet Take 325 mg by mouth 2 Times a Day.     • Cyanocobalamin (VITAMIN B-12 PO) Take  by mouth 2 Times a Day.     • Flaxseed, Linseed, (FLAX SEED OIL PO) Take  by mouth.     • oxycodone-aspirin (PERCODAN) 4.8355-325 MG Tab      • Non Formulary Request Take  by mouth 2 Times a Day. Uric acid cleanse     • vitamin e (VITAMIN E) 400 UNIT Cap Take 400 Units by mouth every day.     • vitamin A 8000 UNIT capsule Take 8,000 Units by mouth 2 Times a Day.     • Cholecalciferol (VITAMIN D3) 3000 UNITS Tab Take  by mouth every day.     • Resveratrol 100 MG Cap Take 250 mg by mouth every day.     • KRILL OIL PO Take  by mouth every day.     • Omega-3 Fatty Acids (FISH OIL) 1000 MG Cap capsule Take 1,000 mg by mouth every day.     • ASTAXANTHIN PO Take 4 mg by mouth every day.     • SAW PALMETTO, SERENOA REPENS, PO Take  by mouth every day.     • [DISCONTINUED] ezetimibe (ZETIA) 10 MG Tab Take 1 Tab by mouth every day. 90 Tab 3   • [DISCONTINUED] metoprolol SR (TOPROL XL) 25 MG TABLET SR 24 HR Take 1 Tab by mouth every day. 90 Tab 3   • [DISCONTINUED] lisinopril (PRINIVIL) 10 MG Tab Take 1 Tab by mouth every day. 90 Tab 3   • metformin (GLUCOPHAGE) 1000 MG tablet Take 500 mg by mouth 2 times a day, with meals.       No facility-administered encounter medications on file as of 1/7/2021.      Review of Systems   Constitutional: Negative.  Negative for chills, fever and malaise/fatigue.   HENT: Negative.  Negative for sore throat.    Eyes:  "Negative.    Respiratory: Negative.  Negative for cough, hemoptysis, sputum production, shortness of breath, wheezing and stridor.    Cardiovascular: Negative.  Negative for chest pain, palpitations, orthopnea, claudication, leg swelling and PND.   Gastrointestinal: Negative.    Genitourinary: Negative.    Musculoskeletal: Negative.    Skin: Negative.    Neurological: Negative.  Negative for dizziness, loss of consciousness and weakness.   Endo/Heme/Allergies: Negative.  Does not bruise/bleed easily.   All other systems reviewed and are negative.       Objective:   /76 (BP Location: Right arm, Patient Position: Sitting, BP Cuff Size: Adult)   Pulse 75   Resp 16   Ht 1.778 m (5' 10\")   Wt 84 kg (185 lb 3.2 oz)   SpO2 95%   BMI 26.57 kg/m²     Physical Exam   Constitutional: He appears well-developed and well-nourished. No distress.   HENT:   Head: Normocephalic and atraumatic.   Right Ear: External ear normal.   Left Ear: External ear normal.   Nose: Nose normal.   Mouth/Throat: No oropharyngeal exudate.   Eyes: Pupils are equal, round, and reactive to light. Conjunctivae and EOM are normal. Right eye exhibits no discharge. Left eye exhibits no discharge. No scleral icterus.   Neck: Neck supple. No JVD present.   Cardiovascular: Normal rate, regular rhythm and intact distal pulses. Exam reveals no gallop and no friction rub.   No murmur heard.  Pulmonary/Chest: Effort normal. No stridor. No respiratory distress. He has no wheezes. He has no rales. He exhibits no tenderness.   Abdominal: Soft. He exhibits no distension. There is no guarding.   Musculoskeletal: Normal range of motion.         General: No tenderness, deformity or edema.   Neurological: He is alert. He has normal reflexes. He displays normal reflexes. No cranial nerve deficit. He exhibits normal muscle tone. Coordination normal.   Skin: Skin is warm and dry. No rash noted. He is not diaphoretic. No erythema. No pallor.   Psychiatric: He has a " normal mood and affect. His behavior is normal. Judgment and thought content normal.   Nursing note and vitals reviewed.      Assessment:     1. Pancytopenia (HCC)  Comp Metabolic Panel    Lipid Profile    CBC W/ DIFF W/O PLATELETS   2. Undiagnosed cardiac murmurs  Comp Metabolic Panel    Lipid Profile    CBC W/ DIFF W/O PLATELETS   3. Splenomegaly  Comp Metabolic Panel   4. Essential hypertension, benign  Lipid Profile    CBC W/ DIFF W/O PLATELETS    lisinopril (PRINIVIL) 10 MG Tab    metoprolol SR (TOPROL XL) 25 MG TABLET SR 24 HR   5. High risk medication use  Comp Metabolic Panel    Lipid Profile    CBC W/ DIFF W/O PLATELETS    ezetimibe (ZETIA) 10 MG Tab   6. Elevated coronary artery calcium score  Comp Metabolic Panel    Lipid Profile    ezetimibe (ZETIA) 10 MG Tab    metoprolol SR (TOPROL XL) 25 MG TABLET SR 24 HR   7. Coronary artery disease involving native coronary artery of native heart without angina pectoris  Comp Metabolic Panel    Lipid Profile    ezetimibe (ZETIA) 10 MG Tab   8. Cirrhosis of liver without ascites, unspecified hepatic cirrhosis type (HCC)  CBC W/ DIFF W/O PLATELETS   9. Impaired fasting blood sugar         Medical Decision Making:  Today's Assessment / Status / Plan:     70-year-old male with some degree of cirrhosis with an elevated calcium score high blood pressure and hyperlipidemia.  I will repeat a lipid panel and a CMP in 6 months.  We will keep him on the Zetia lisinopril metoprolol.  We have discussed numerous times being on a statin but he declines.  I will see him back in 6 months.

## 2021-01-12 ENCOUNTER — NON-PROVIDER VISIT (OUTPATIENT)
Dept: WOUND CARE | Facility: MEDICAL CENTER | Age: 71
End: 2021-01-12
Attending: PHYSICIAN ASSISTANT
Payer: COMMERCIAL

## 2021-01-12 PROCEDURE — 97602 WOUND(S) CARE NON-SELECTIVE: CPT

## 2021-01-12 NOTE — PATIENT INSTRUCTIONS
-Keep your wound dressing clean, dry, and intact.    -Remove your compression wrap if you have severe pain, severe swelling, numbness, color change, or temperature change in your toes. If you need to remove your compression wrap, do so by unrolling it. Do not cut the compression wrap off to prevent cutting yourself on accident.    -Should you experience any significant changes in your wound(s), such as infection (redness, swelling, localized heat, increased pain, fever > 101 F, chills) or have any questions regarding your home care instructions, please contact the wound center at [phone]. If after hours, contact your primary care physician or go to the hospital emergency room.

## 2021-01-12 NOTE — PROCEDURES
Non selective debridement to wounds and riddhi wounds with foam cleanser and wash cloth.   emergency department

## 2021-01-19 ENCOUNTER — OFFICE VISIT (OUTPATIENT)
Dept: WOUND CARE | Facility: MEDICAL CENTER | Age: 71
End: 2021-01-19
Attending: PHYSICIAN ASSISTANT
Payer: COMMERCIAL

## 2021-01-19 VITALS
SYSTOLIC BLOOD PRESSURE: 116 MMHG | TEMPERATURE: 98.6 F | OXYGEN SATURATION: 98 % | HEART RATE: 74 BPM | DIASTOLIC BLOOD PRESSURE: 57 MMHG | RESPIRATION RATE: 16 BRPM

## 2021-01-19 DIAGNOSIS — I87.8 VENOUS STASIS: ICD-10-CM

## 2021-01-19 DIAGNOSIS — L89.513 PRESSURE INJURY OF RIGHT ANKLE, STAGE 3 (HCC): ICD-10-CM

## 2021-01-19 DIAGNOSIS — B49 FUNGAL INFECTION: ICD-10-CM

## 2021-01-19 DIAGNOSIS — I87.2 VENOUS INSUFFICIENCY OF RIGHT LEG: ICD-10-CM

## 2021-01-19 PROCEDURE — 97602 WOUND(S) CARE NON-SELECTIVE: CPT

## 2021-01-19 PROCEDURE — 99213 OFFICE O/P EST LOW 20 MIN: CPT

## 2021-01-19 PROCEDURE — 99213 OFFICE O/P EST LOW 20 MIN: CPT | Performed by: NURSE PRACTITIONER

## 2021-01-19 ASSESSMENT — ENCOUNTER SYMPTOMS
NAUSEA: 0
WEAKNESS: 0
HEADACHES: 0
FEVER: 0
WHEEZING: 0
SHORTNESS OF BREATH: 0
PALPITATIONS: 0
BLURRED VISION: 0
VOMITING: 0
CHILLS: 0
COUGH: 0
DIZZINESS: 0

## 2021-01-19 ASSESSMENT — PAIN SCALES - GENERAL: PAINLEVEL: 3=SLIGHT PAIN

## 2021-01-19 NOTE — PATIENT INSTRUCTIONS
-Keep your wound dressing clean, dry, and intact.    -Remove your compression wrap if you have severe pain, severe swelling, numbness, color change, or temperature change in your toes. If you need to remove your compression wrap, do so by unrolling it. Do not cut the compression wrap off to prevent cutting yourself on accident.    -Should you experience any significant changes in your wound(s), such as infection (redness, swelling, localized heat, increased pain, fever > 101 F, chills) or have any questions regarding your home care instructions, please contact the wound center at [phone]. If after hours, contact your primary care physician or go to the hospital emergency room.

## 2021-01-19 NOTE — PROGRESS NOTES
" Provider Encounter- Pressure Injury          HISTORY OF PRESENT ILLNESS     START OF CARE IN CLINIC: 8/10/70225    REFERRING PROVIDER: Tiesha Breaux,      WOUND- Pressure injury, complicated by CVI STAGE:3                               Dermatitis to right forefoot                               Partial-thickness wound-right lateral heel-first observed in clinic 1/5/2021   LOCATION: Right medial ankle                                   Right lateral ankle                                   Right lateral heel   WOUND HISTORY: Patient wears boots and it sounds like it rubbed on the ankles, causing wounds.   PREVIOUS TREATMENT: Compression and wound care for a different type of wound in 2019   PERTINENT PMH: Diabetes \"beat after I lost 60 pounds\"; venous insufficiency,  High cholesterol     IMAGING:none   LAST  WOUND CULTURE:  DATE : none                OFFLOADING: N/A    DIABETES: resolved with weight loss    TOBACCO USE: no    Interval History:     8/25/2020: Clinic visit with WALTER Berger.  Patient states that they are feeling well today.  Patient denies fever, chills, nausea, vomiting, lightheadedness, dizziness, shortness of breath and chest pain. Ulcer has resolved patient does have significant denuded tissue applied Unna boot with hopeful resolution next week.    9/1/2020: Clinic visit with WALTER Berger. Patient states that they are feeling well today.  Patient denies fever, chills, nausea, vomiting, lightheadedness, dizziness, shortness of breath and chest pain.  Patient continues to have severely denuded tissue to right lower extremity.  Right lateral lower extremity with weeping.    9/8/2020: Clinic visit with WALTER Berger. Patient states that they are feeling well today.  Patient denies fever, chills, nausea, vomiting, lightheadedness, dizziness, shortness of breath and chest pain.  Improving erythema and denuded tissue to right lower extremity.  Review of patient's ultrasound " results on 9/9/2020 following patient visit positive for venous reflux as well as perforators patient needs to be referred to vascular see Dr. Hemphill in clinic.    10/13/2020: Clinic visit with WALTER Berger. Patient states that they are feeling well today.  Patient denies fever, chills, nausea, vomiting, lightheadedness, dizziness, shortness of breath and chest pain.  Erythema to right foot has significantly improved.  Patient has 2 small open wounds to right ankle.  Erythema significantly improved with nystatin powder.  Patient to continue that this week.  Patient has appointment with Dr. Hemphill on 10/26/2020 for vascular procedure.    10/20/20: Clinic visit with WALTER Ackerman, [facility].  This is my first time meeting Mr. Duron.  Patient reports feeling well.  Does feel the erythema is improved with nystatin powder. Denies wound drainage, odor. Denies erythema, swelling, pain.  Patient stating that he does not check his blood sugars as he was told he is no longer diabetic after losing 60 pounds.  Last A1c 5.8 on July 30, 2020.  History of tobacco use, but denies any current use.    10/29/20:  Clinic visit with Dr. Hemphill.  Venous studies show greater saphenous vein reflux bilaterally.  Patient is adamant about not wanting any intervention.     11/17/2020: Clinic visit with WALTER Berger. Patient states that they are feeling well today.  Patient denies fever, chills, nausea, vomiting, lightheadedness, dizziness, shortness of breath and chest pain.  I reviewed with the patient the discussion the patient had with Dr. Hemphill.  Dr. Hemphill clearly states that the patient's venous studies show greater saphenous vein reflux bilaterally.  However, the patient is adamant about not wanting any intervention.  Patient denied this and again I have read the direct quote from the past note by Dr. Hemphill.  Patient now has 2 small medial and lateral ulcers to his right ankle.  Again I discussed  the patient needs to reconsider vascular intervention to help with these wounds heal and prevent further breakdown and loss of limb.  Patient reports that he will reconsider however he does not have any time off until after January he is used up all of his sick days.    12/1/2020: Clinic visit with WALTER Berger. Patient states that they are feeling well today.  Patient denies fever, chills, nausea, vomiting, lightheadedness, dizziness, shortness of breath and chest pain.  Patient has had a decrease in erythema to the distal aspect of his right foot.  Wounds are approximately the same size.  Patient reports that he is changing his insurance at the first of the year and will have available time off.  At this point hopeful that he may be able to undergo venous procedure with Dr. Hemphill.    12/8/2020: Clinic visit with WALTER Berger. Patient states that they are feeling well today.  Patient denies fever, chills, nausea, vomiting, lightheadedness, dizziness, shortness of breath and chest pain.  Overall skin condition quality has improved however, the patient's wounds are relatively the same size in clinic this week.  I do believe venous intervention is needed to help resolve these wounds.  We will wait to the first of the year when the patient's insurance will allow him to have the vascular procedure performed and he will be able to take some time off of work.    12/15/2020: Clinic visit with WALTER Berger. Patient states that they are feeling well today.  Patient denies fever, chills, nausea, vomiting, lightheadedness, dizziness, shortness of breath and chest pain.  Overall Nitesh's skin has improved.  Wounds are relatively the same size in clinic today.  As noted previously patient will have an opportunity for vascular intervention in the new year once his insurance has come into the new calendar year.    12/22/2020: Clinic visit with WALTER Berger. Patient states that they are  feeling well today.  Patient denies fever, chills, nausea, vomiting, lightheadedness, dizziness, shortness of breath and chest pain.  Wounds are relatively the same size in clinic they continue to wax and wane.  Again as I have stated previously patient will need venous intervention to help resolve these wounds and or significant time off of his job.  He is unable to take time off his job.      12/29/2020 : Clinic visit with WALTER Hameed.  Nitesh states he is feeling well, denies fevers, chills, nausea, vomiting, cough or shortness of breath.  He feels he has been tolerating compression without difficulty, however he presents today with wrap only up to mid calf.  He continues to work full-time as a , on his feet most of the day.  States he is supposed to have a venous procedure by Dr. Hemphill, but not until early next year sometime, due to work considerations.  He has been applying zinc paste with nystatin to dermatitis on his forefoot.  I advised that he stop using the zinc paste, and use nystatin powder only, also advised him to wear a clean cotton sock over his foot, change at least daily.    1/5/2021 : Clinic visit with WALTER Hameed.  Nitesh states he is feeling well, denies fevers, chills, nausea, vomiting, cough or shortness of breath.  He has a new shallow, linear wound to his lateral heel, which he states is quite painful.  Treatment initiated in clinic today, the wound is shallow, no need for debridement.  He now has new insurance, which should cover procedure proposed by Dr. Hemphill.  He has not yet contacted our office, states that he is waiting until he is able to take time off from work.    1/19/2021: Clinic visit with WALTER Berger. Patient states that they are feeling well today.  Patient denies fever, chills, nausea, vomiting, lightheadedness, dizziness, shortness of breath and chest pain.  Nitesh refuses excisional debridement patient does have slough to both  wounds.  Patient reports that last debridement his legs hurt for approximately 3 days following.  Patient refused excisional debridement would allow for nonselective debridement however, this was not successful in removing slough from wound bed.        RESULTS:   Most recent A1c:  5.8 DATE 7/30/2020    VASCULAR STUDIES:    9/8/2020-venous duplex with reflux study   FINDINGS   Bilateral lower extremities -. Complete color filling and compressibility    with normal venous flow dynamics including spontaneous flow, response to    augmentation maneuvers, and respiratory phasicity. No superficial or deep    venous thrombosis. The peroneal and posterior tibial veins are difficult to    assess for compressibility, but flow response to augmentation is    demonstrated.       Sustained reflux is demonstrated throughout the greatrer saphenous vein    bilaterally as described above.  Multiple varicosies seen from groin to    ankle bilaterally.  Difficult to follow the greater saphenous vein through    the calf due to multiple varicosites.  There are bilateral incompetent    perforators seen at the distal/ankle.  The right  measures 0.27    cm and the left  measures 0.44 cm.     REVIEW OF SYSTEMS:   Review of Systems   Constitutional: Negative for chills and fever.   HENT: Negative.    Eyes: Negative for blurred vision.   Respiratory: Negative for cough, shortness of breath and wheezing.    Cardiovascular: Negative for chest pain and palpitations.   Gastrointestinal: Negative for nausea and vomiting.   Skin:        Denuded tissue to right lower extremity   Neurological: Negative for dizziness, weakness and headaches.       PHYSICAL EXAMINATION:   /57   Pulse 74   Temp 37 °C (98.6 °F)   Resp 16   SpO2 98%   Physical Exam   Constitutional: He is oriented to person, place, and time and well-developed, well-nourished, and in no distress.   HENT:   Head: Normocephalic and atraumatic.   Eyes: Pupils are  equal, round, and reactive to light.   Neck: Normal range of motion. Neck supple.   Cardiovascular: Regular rhythm and intact distal pulses.   Pulmonary/Chest: Effort normal. No respiratory distress. He has no wheezes.   Musculoskeletal: Normal range of motion.         General: Edema present. No deformity.      Comments:  edema dependent to right LE improved     Neurological: He is alert and oriented to person, place, and time.   Skin: There is erythema.   Full-thickness ulcers to right lateral and medial ankle  Linear, shallow wound to right lateral heel  Refer to wound flowsheet and photos    Resolving rash to right dorsal forefoot  Skin around foot and ankle is dry and flaking     Psychiatric: Memory, affect and judgment normal.       WOUND ASSESSMENT          Wound 09/29/20 Venous Ulcer Foot Dorsal Right CVI ulcer distal R dorsal foot and base of toes (Active)   Wound Image   01/19/21 0830   Site Assessment Epithelialization 01/19/21 0830   Periwound Assessment Fragile 01/19/21 0830   Margins Undefined edges 01/12/21 0900   Closure Secondary intention 10/16/20 1630   Drainage Amount Small 01/19/21 0830   Drainage Description Serous 01/19/21 0830   Treatments Cleansed;Site care 01/19/21 0830   Wound Cleansing Foam Cleanser/Washcloth 01/19/21 0830   Periwound Protectant Antifungal Therapy;Barrier Paste 01/19/21 0830   Dressing Cleansing/Solutions 1/4 Strength Dakin's Solution 01/12/21 0900   Dressing Options Viscopaste;Soft Conforming Roll;Tape 01/19/21 0830   Dressing Changed Changed 01/12/21 0900   Dressing Status Clean;Dry;Intact 01/12/21 0900   Dressing Change/Treatment Frequency Every 72 hrs, and As Needed 12/15/20 0845   Non-staged Wound Description Partial thickness 01/12/21 0900   Wound Length (cm) 7 cm 11/03/20 0900   Wound Width (cm) 8.2 cm 11/03/20 0900   Wound Depth (cm) 0 cm 11/03/20 0900   Wound Surface Area (cm^2) 57.4 cm^2 11/03/20 0900   Wound Volume (cm^3) 0 cm^3 11/03/20 0900   Post-Procedure  Length (cm) 5 cm 10/13/20 1500   Post-Procedure Width (cm) 9 cm 10/13/20 1500   Post-Procedure Depth (cm) 0.1 cm 10/13/20 1500   Post-Procedure Surface Area (cm^2) 45 cm^2 10/13/20 1500   Post-Procedure Volume (cm^3) 4.5 cm^3 10/13/20 1500   Wound Healing % 100 11/03/20 0900   Wound Bed Granulation (%) 100 % 10/06/20 0900   Wound Bed Slough (%) 5 % 10/20/20 1632   Wound Bed Granulation (%) - Post-Procedure 100 % 10/13/20 1500   Tunneling (cm) 0 cm 01/19/21 0830   Undermining (cm) 0 cm 01/19/21 0830   Wound Odor None 01/19/21 0830   Pulses Right;1+;DP;PT 11/03/20 0900   Exposed Structures None 01/19/21 0830       Wound 09/29/20 Venous Ulcer Ankle Lateral Right R lateral ankle (Active)   Wound Image   01/19/21 0830   Site Assessment Red;Yellow 01/19/21 0830   Periwound Assessment Edema;Fragile;Denuded 01/19/21 0830   Margins Attached edges 01/19/21 0830   Closure Secondary intention 01/12/21 0900   Drainage Amount Moderate 01/19/21 0830   Drainage Description Serosanguineous 01/19/21 0830   Treatments Cleansed;Site care 01/19/21 0830   Wound Cleansing Foam Cleanser/Washcloth 01/19/21 0830   Periwound Protectant Antifungal Therapy;Barrier Paste 01/19/21 0830   Dressing Cleansing/Solutions Other (Comments) 01/12/21 0900   Dressing Options Hydrofiber Silver;Viscopaste;Nonadhesive Foam;Soft Conforming Roll;Coban 01/19/21 0830   Dressing Changed Changed 01/12/21 0900   Dressing Status Clean;Dry;Intact 01/12/21 0900   Dressing Change/Treatment Frequency Every 72 hrs, and As Needed 12/15/20 0845   Non-staged Wound Description Full thickness 01/19/21 0830   Wound Length (cm) 1 cm 01/19/21 0830   Wound Width (cm) 0.7 cm 01/19/21 0830   Wound Depth (cm) 0.2 cm 01/19/21 0830   Wound Surface Area (cm^2) 0.7 cm^2 01/19/21 0830   Wound Volume (cm^3) 0.14 cm^3 01/19/21 0830   Post-Procedure Length (cm) 0.6 cm 01/12/21 0900   Post-Procedure Width (cm) 0.7 cm 01/12/21 0900   Post-Procedure Depth (cm) 0.2 cm 01/12/21 0900    Post-Procedure Surface Area (cm^2) 0.42 cm^2 01/12/21 0900   Post-Procedure Volume (cm^3) 0.08 cm^3 01/12/21 0900   Wound Healing % 99 01/19/21 0830   Wound Bed Granulation (%) 100 % 11/27/20 0800   Wound Bed Slough (%) 100 % 01/12/21 0900   Wound Bed Granulation (%) - Post-Procedure 100 % 10/13/20 1500   Tunneling (cm) 0 cm 01/19/21 0830   Undermining (cm) 0 cm 01/19/21 0830   Wound Odor None 01/19/21 0830   Exposed Structures None 01/19/21 0830       Wound 10/30/20 Right Medial Ankle (Active)   Wound Image   01/19/21 0830   Site Assessment Red;Yellow 01/19/21 0830   Periwound Assessment Edema;Denuded 01/19/21 0830   Margins Attached edges 01/19/21 0830   Closure Secondary intention 11/27/20 0800   Drainage Amount Moderate 01/19/21 0830   Drainage Description Serosanguineous;Yellow 01/19/21 0830   Treatments Cleansed;Site care 01/19/21 0830   Wound Cleansing Foam Cleanser/Washcloth 01/19/21 0830   Periwound Protectant Barrier Paste;Antifungal Therapy 01/19/21 0830   Dressing Cleansing/Solutions Other (Comments) 01/12/21 0900   Dressing Options Hydrofiber Silver;Viscopaste;Nonadhesive Foam;Soft Conforming Roll;Coban 01/19/21 0830   Dressing Changed Changed 01/12/21 0900   Dressing Status Clean;Dry;Intact 01/12/21 0900   Dressing Change/Treatment Frequency Every 72 hrs, and As Needed 12/15/20 0845   Non-staged Wound Description Full thickness 01/19/21 0830   Wound Length (cm) 1.6 cm 01/19/21 0830   Wound Width (cm) 1.4 cm 01/19/21 0830   Wound Depth (cm) 0.3 cm 01/19/21 0830   Wound Surface Area (cm^2) 2.24 cm^2 01/19/21 0830   Wound Volume (cm^3) 0.67 cm^3 01/19/21 0830   Post-Procedure Length (cm) 2.2 cm 01/12/21 0900   Post-Procedure Width (cm) 1.2 cm 01/12/21 0900   Post-Procedure Depth (cm) 0.3 cm 01/12/21 0900   Post-Procedure Surface Area (cm^2) 2.64 cm^2 01/12/21 0900   Post-Procedure Volume (cm^3) 0.79 cm^3 01/12/21 0900   Wound Bed Granulation (%) 100 % 11/27/20 0800   Tunneling (cm) 0 cm 01/19/21 0830    Undermining (cm) 0 cm 01/19/21 0830   Wound Odor None 01/19/21 0830   Exposed Structures None 01/19/21 0830       PROCEDURE: Patient assessed in clinic today he has refused excisional debridement.  Nonselective debridement performed.  Discussed with the patient the need for excisional debridement to remove hardened slough to wound bed.  Patient refused due to last debridement and the pain he experienced.  Unfortunately nonselective debridement did not remove slough from wound bed.  I educated the patient on the fact that with slough in the wound bed it will prohibit granulation tissue formation and healing of the wound.  Patient reports that he does have new insurance and will be scheduling an appointment with Dr. Joby green.      Pertinent Labs and Diagnostics:      IMAGING: None found in epic    VASCULAR STUDIES:   9/8/2020-venous duplex  FINDINGS   Bilateral lower extremities -. Complete color filling and compressibility    with normal venous flow dynamics including spontaneous flow, response to    augmentation maneuvers, and respiratory phasicity. No superficial or deep    venous thrombosis. The peroneal and posterior tibial veins are difficult to    assess for compressibility, but flow response to augmentation is    demonstrated.       Sustained reflux is demonstrated throughout the greatrer saphenous vein    bilaterally as described above.  Multiple varicosies seen from groin to    ankle bilaterally.  Difficult to follow the greater saphenous vein through    the calf due to multiple varicosites.  There are bilateral incompetent    perforators seen at the distal/ankle.  The right  measures 0.27    cm and the left  measures 0.44 cm.    LAST  WOUND CULTURE:  DATE :      12/29/2020      Culture Result Abnormal   Staphylococcus aureus   Light growth     Culture Result Abnormal   Pseudomonas aeruginosa   Light growth              ASSESSMENT AND PLAN:   1. Pressure injury of right ankle,  stage 3 (HCC)  Comments: Previously documented as stage II.  I am in disagreement of the staging, ulcer extends into the subcutaneous layer.  Patient originally referred to [facility] for an anterior ankle ulcer, which apparently has resolved.  Now has ulcers to right medial and lateral ankle, which he states were caused by a rubbing from his boot.  Complicated by CVI.        1/19/2021:   -Nonselective debridement performed in clinic today patient refused excisional debridement.  -We will continue with Viscopaste underneath compression to promote denuded tissue healing  -Patient has been changing his dressing at home when saturated.   Wound care: Hydrofiber silver, Viscopaste, nonadhesive foam, soft compartment roll, Coban    2. Venous insufficiency of right leg  Comments: Venous duplex study shows reflux and incompetent .  Patient has been seen by Dr. Hemphill      1/19/2021:   Patient states that venous procedure was recommended by Dr. Hemphill, but that he could not afford to time off from work, nor did he have adequate insurance coverage.  He now has no insurance, patient reports that he will be calling Dr. Hemphill's office in the next week to schedule an appointment.  -Soft roll and Covan to manage edema.  -Would benefit from lifelong compression with compression stockings    3.  Wound infection    1/19/2021: Culture results from last visit positive for light growth of Pseudomonas and MSSA.  Because both wounds are progressing well, no need for antibiotics at this time.  -Monitor for signs and symptoms of infection each clinic visit    4.  Fungal infection    1/19/2021: Rash to distal foot and between toes appears to be resolving with use of nystatin powder  -Patient to apply nystatin with every dressing change.  Nystatin and zinc mixture applied in clinic today    > 20 minutes was spent assessing the patient, discussing the use of excisional debridement versus nonselective treatment, the importance of the  patient make an appointment with Dr. Hemphill for vascular intervention to help heal his wounds due to reflux.  Had a thorough discussion with the patient regarding slough buildup to the wound bed which will inhibit granulation tissue formation and the healing of his wounds.  Reviewed prior notes by WALTER Hameed regarding prior interventions.      Please note that this dictation was created using voice recognition software. I have worked with technical experts from [facility] to optimize the interface.  I have made every reasonable attempt to correct obvious errors, but there may be errors of grammar and possibly content that I did not discover before finalizing the note.

## 2021-01-26 ENCOUNTER — OFFICE VISIT (OUTPATIENT)
Dept: WOUND CARE | Facility: MEDICAL CENTER | Age: 71
End: 2021-01-26
Attending: PHYSICIAN ASSISTANT
Payer: COMMERCIAL

## 2021-01-26 VITALS
HEART RATE: 80 BPM | OXYGEN SATURATION: 95 % | DIASTOLIC BLOOD PRESSURE: 77 MMHG | RESPIRATION RATE: 20 BRPM | TEMPERATURE: 99.2 F | SYSTOLIC BLOOD PRESSURE: 147 MMHG

## 2021-01-26 DIAGNOSIS — I87.8 VENOUS STASIS: ICD-10-CM

## 2021-01-26 DIAGNOSIS — I87.2 VENOUS INSUFFICIENCY OF RIGHT LEG: ICD-10-CM

## 2021-01-26 DIAGNOSIS — B49 FUNGAL INFECTION: ICD-10-CM

## 2021-01-26 DIAGNOSIS — L89.513 PRESSURE INJURY OF RIGHT ANKLE, STAGE 3 (HCC): Primary | ICD-10-CM

## 2021-01-26 PROCEDURE — 99213 OFFICE O/P EST LOW 20 MIN: CPT

## 2021-01-26 PROCEDURE — 99213 OFFICE O/P EST LOW 20 MIN: CPT | Performed by: NURSE PRACTITIONER

## 2021-01-26 PROCEDURE — 97602 WOUND(S) CARE NON-SELECTIVE: CPT

## 2021-01-26 ASSESSMENT — ENCOUNTER SYMPTOMS
NAUSEA: 0
FEVER: 0
WEAKNESS: 0
WHEEZING: 0
HEADACHES: 0
VOMITING: 0
PALPITATIONS: 0
BLURRED VISION: 0
SHORTNESS OF BREATH: 0
DIZZINESS: 0
COUGH: 0
CHILLS: 0

## 2021-01-26 ASSESSMENT — PAIN SCALES - GENERAL: PAINLEVEL: NO PAIN

## 2021-01-26 NOTE — PROGRESS NOTES
" Provider Encounter- Pressure Injury          HISTORY OF PRESENT ILLNESS     START OF CARE IN CLINIC: 8/10/99073    REFERRING PROVIDER: Tiesha Breaux,      WOUND- Pressure injury, complicated by CVI STAGE:3                               Dermatitis to right forefoot                               Partial-thickness wound-right lateral heel-first observed in clinic 1/5/2021   LOCATION: Right medial ankle                                   Right lateral ankle                                   Right lateral heel   WOUND HISTORY: Patient wears boots and it sounds like it rubbed on the ankles, causing wounds.   PREVIOUS TREATMENT: Compression and wound care for a different type of wound in 2019   PERTINENT PMH: Diabetes \"beat after I lost 60 pounds\"; venous insufficiency,  High cholesterol     IMAGING:none   LAST  WOUND CULTURE:  DATE : none                OFFLOADING: N/A    DIABETES: resolved with weight loss    TOBACCO USE: no    Interval History:     8/25/2020: Clinic visit with WALTER Berger.  Patient states that they are feeling well today.  Patient denies fever, chills, nausea, vomiting, lightheadedness, dizziness, shortness of breath and chest pain. Ulcer has resolved patient does have significant denuded tissue applied Unna boot with hopeful resolution next week.    9/1/2020: Clinic visit with AWLTER Berger. Patient states that they are feeling well today.  Patient denies fever, chills, nausea, vomiting, lightheadedness, dizziness, shortness of breath and chest pain.  Patient continues to have severely denuded tissue to right lower extremity.  Right lateral lower extremity with weeping.    9/8/2020: Clinic visit with WALTER Berger. Patient states that they are feeling well today.  Patient denies fever, chills, nausea, vomiting, lightheadedness, dizziness, shortness of breath and chest pain.  Improving erythema and denuded tissue to right lower extremity.  Review of patient's ultrasound " results on 9/9/2020 following patient visit positive for venous reflux as well as perforators patient needs to be referred to vascular see Dr. Hemphill in clinic.    10/13/2020: Clinic visit with WALTER Berger. Patient states that they are feeling well today.  Patient denies fever, chills, nausea, vomiting, lightheadedness, dizziness, shortness of breath and chest pain.  Erythema to right foot has significantly improved.  Patient has 2 small open wounds to right ankle.  Erythema significantly improved with nystatin powder.  Patient to continue that this week.  Patient has appointment with Dr. Hemphill on 10/26/2020 for vascular procedure.    10/20/20: Clinic visit with WALTER Ackerman, [facility].  This is my first time meeting Mr. Duron.  Patient reports feeling well.  Does feel the erythema is improved with nystatin powder. Denies wound drainage, odor. Denies erythema, swelling, pain.  Patient stating that he does not check his blood sugars as he was told he is no longer diabetic after losing 60 pounds.  Last A1c 5.8 on July 30, 2020.  History of tobacco use, but denies any current use.    10/29/20:  Clinic visit with Dr. Hemphill.  Venous studies show greater saphenous vein reflux bilaterally.  Patient is adamant about not wanting any intervention.     11/17/2020: Clinic visit with WALTER Berger. Patient states that they are feeling well today.  Patient denies fever, chills, nausea, vomiting, lightheadedness, dizziness, shortness of breath and chest pain.  I reviewed with the patient the discussion the patient had with Dr. Hemphill.  Dr. Hemphill clearly states that the patient's venous studies show greater saphenous vein reflux bilaterally.  However, the patient is adamant about not wanting any intervention.  Patient denied this and again I have read the direct quote from the past note by Dr. Hemphill.  Patient now has 2 small medial and lateral ulcers to his right ankle.  Again I discussed  the patient needs to reconsider vascular intervention to help with these wounds heal and prevent further breakdown and loss of limb.  Patient reports that he will reconsider however he does not have any time off until after January he is used up all of his sick days.    12/1/2020: Clinic visit with WALTER Berger. Patient states that they are feeling well today.  Patient denies fever, chills, nausea, vomiting, lightheadedness, dizziness, shortness of breath and chest pain.  Patient has had a decrease in erythema to the distal aspect of his right foot.  Wounds are approximately the same size.  Patient reports that he is changing his insurance at the first of the year and will have available time off.  At this point hopeful that he may be able to undergo venous procedure with Dr. Hemphill.    12/8/2020: Clinic visit with WALTER Berger. Patient states that they are feeling well today.  Patient denies fever, chills, nausea, vomiting, lightheadedness, dizziness, shortness of breath and chest pain.  Overall skin condition quality has improved however, the patient's wounds are relatively the same size in clinic this week.  I do believe venous intervention is needed to help resolve these wounds.  We will wait to the first of the year when the patient's insurance will allow him to have the vascular procedure performed and he will be able to take some time off of work.    12/15/2020: Clinic visit with WALTER Berger. Patient states that they are feeling well today.  Patient denies fever, chills, nausea, vomiting, lightheadedness, dizziness, shortness of breath and chest pain.  Overall Nitesh's skin has improved.  Wounds are relatively the same size in clinic today.  As noted previously patient will have an opportunity for vascular intervention in the new year once his insurance has come into the new calendar year.    12/22/2020: Clinic visit with WALTER Berger. Patient states that they are  feeling well today.  Patient denies fever, chills, nausea, vomiting, lightheadedness, dizziness, shortness of breath and chest pain.  Wounds are relatively the same size in clinic they continue to wax and wane.  Again as I have stated previously patient will need venous intervention to help resolve these wounds and or significant time off of his job.  He is unable to take time off his job.      12/29/2020 : Clinic visit with WALTER Hameed.  Nitesh states he is feeling well, denies fevers, chills, nausea, vomiting, cough or shortness of breath.  He feels he has been tolerating compression without difficulty, however he presents today with wrap only up to mid calf.  He continues to work full-time as a , on his feet most of the day.  States he is supposed to have a venous procedure by Dr. Hemphill, but not until early next year sometime, due to work considerations.  He has been applying zinc paste with nystatin to dermatitis on his forefoot.  I advised that he stop using the zinc paste, and use nystatin powder only, also advised him to wear a clean cotton sock over his foot, change at least daily.    1/5/2021 : Clinic visit with WALTER Hameed.  Nitesh states he is feeling well, denies fevers, chills, nausea, vomiting, cough or shortness of breath.  He has a new shallow, linear wound to his lateral heel, which he states is quite painful.  Treatment initiated in clinic today, the wound is shallow, no need for debridement.  He now has new insurance, which should cover procedure proposed by Dr. Hemphill.  He has not yet contacted our office, states that he is waiting until he is able to take time off from work.    1/19/2021: Clinic visit with WALTER Berger. Patient states that they are feeling well today.  Patient denies fever, chills, nausea, vomiting, lightheadedness, dizziness, shortness of breath and chest pain.  Nitesh refuses excisional debridement patient does have slough to both  wounds.  Patient reports that last debridement his legs hurt for approximately 3 days following.  Patient refused excisional debridement would allow for nonselective debridement however, this was not successful in removing slough from wound bed.    1/26/2021: Clinic visit with WALTER Berger. Patient states that they are feeling well today.  Patient denies fever, chills, nausea, vomiting, lightheadedness, dizziness, shortness of breath and chest pain.  Patient's right distal foot and toes have significantly improved.  Patient's wound beds are approximately the same size to medial and lateral ulcers.  Patient again would not allow for excisional debridement.  Minimal nonselective debridement performed in clinic today.  Discussed with the patient contacting Dr. Hemphill's office to establish a vascular appointment to consider possible venous intervention.         RESULTS:   Most recent A1c:  5.8 DATE 7/30/2020    VASCULAR STUDIES:    9/8/2020-venous duplex with reflux study   FINDINGS   Bilateral lower extremities -. Complete color filling and compressibility    with normal venous flow dynamics including spontaneous flow, response to    augmentation maneuvers, and respiratory phasicity. No superficial or deep    venous thrombosis. The peroneal and posterior tibial veins are difficult to    assess for compressibility, but flow response to augmentation is    demonstrated.       Sustained reflux is demonstrated throughout the greatrer saphenous vein    bilaterally as described above.  Multiple varicosies seen from groin to    ankle bilaterally.  Difficult to follow the greater saphenous vein through    the calf due to multiple varicosites.  There are bilateral incompetent    perforators seen at the distal/ankle.  The right  measures 0.27    cm and the left  measures 0.44 cm.     REVIEW OF SYSTEMS:   Review of Systems   Constitutional: Negative for chills and fever.   HENT: Negative.    Eyes:  Negative for blurred vision.   Respiratory: Negative for cough, shortness of breath and wheezing.    Cardiovascular: Negative for chest pain and palpitations.   Gastrointestinal: Negative for nausea and vomiting.   Skin:        Denuded tissue to right lower extremity significantly improved   Neurological: Negative for dizziness, weakness and headaches.       PHYSICAL EXAMINATION:   /77   Pulse 80   Temp 37.3 °C (99.2 °F)   Resp 20   SpO2 95%   Physical Exam   Constitutional: He is oriented to person, place, and time and well-developed, well-nourished, and in no distress.   HENT:   Head: Normocephalic and atraumatic.   Eyes: Pupils are equal, round, and reactive to light.   Neck: Normal range of motion. Neck supple.   Cardiovascular: Regular rhythm and intact distal pulses.   Pulmonary/Chest: Effort normal. No respiratory distress. He has no wheezes.   Musculoskeletal: Normal range of motion.         General: Edema present. No deformity.      Comments:  edema dependent to right LE improved     Neurological: He is alert and oriented to person, place, and time.   Skin: There is erythema.   Full-thickness ulcers to right lateral and medial ankle  Linear, shallow wound to right lateral heel  Refer to wound flowsheet and photos    Resolving rash to right dorsal forefoot  Skin skin quality around distal foot and toes has significantly improved almost back to baseline     Psychiatric: Memory, affect and judgment normal.       WOUND ASSESSMENT    Wound 09/29/20 Venous Ulcer Foot Dorsal Right CVI ulcer distal R dorsal foot and base of toes (Active)   Wound Image   01/26/21 0900   Site Assessment Epithelialization 01/26/21 0900   Periwound Assessment Fragile 01/26/21 0900   Margins Undefined edges 01/12/21 0900   Closure Secondary intention 10/16/20 1630   Drainage Amount None 01/26/21 0900   Drainage Description Serous 01/19/21 0830   Treatments Cleansed;Site care 01/26/21 0900   Wound Cleansing Foam  Cleanser/Washcloth 01/26/21 0900   Periwound Protectant Antifungal Therapy 01/26/21 0900   Dressing Cleansing/Solutions Not Applicable 01/26/21 0900   Dressing Options Hydrofiber Silver;Viscopaste;Soft Conforming Roll;Tape 01/26/21 0900   Dressing Changed Changed 01/26/21 0900   Dressing Status Clean;Dry;Intact 01/26/21 0900   Dressing Change/Treatment Frequency Every 72 hrs, and As Needed 12/15/20 0845   Non-staged Wound Description Partial thickness 01/12/21 0900   Wound Length (cm) 7 cm 11/03/20 0900   Wound Width (cm) 8.2 cm 11/03/20 0900   Wound Depth (cm) 0 cm 11/03/20 0900   Wound Surface Area (cm^2) 57.4 cm^2 11/03/20 0900   Wound Volume (cm^3) 0 cm^3 11/03/20 0900   Post-Procedure Length (cm) 5 cm 10/13/20 1500   Post-Procedure Width (cm) 9 cm 10/13/20 1500   Post-Procedure Depth (cm) 0.1 cm 10/13/20 1500   Post-Procedure Surface Area (cm^2) 45 cm^2 10/13/20 1500   Post-Procedure Volume (cm^3) 4.5 cm^3 10/13/20 1500   Wound Healing % 100 11/03/20 0900   Wound Bed Granulation (%) 100 % 10/06/20 0900   Wound Bed Slough (%) 5 % 10/20/20 1632   Wound Bed Granulation (%) - Post-Procedure 100 % 10/13/20 1500   Tunneling (cm) 0 cm 01/26/21 0900   Undermining (cm) 0 cm 01/26/21 0900   Wound Odor None 01/26/21 0900   Pulses Right;1+;DP;PT 11/03/20 0900   Exposed Structures None 01/26/21 0900       Wound 09/29/20 Venous Ulcer Ankle Lateral Right R lateral ankle (Active)   Wound Image   01/26/21 0900   Site Assessment Red;Yellow 01/26/21 0900   Periwound Assessment Edema;Fragile;Denuded 01/26/21 0900   Margins Attached edges 01/26/21 0900   Closure Secondary intention 01/12/21 0900   Drainage Amount Moderate 01/26/21 0900   Drainage Description Serosanguineous 01/26/21 0900   Treatments Cleansed;Site care 01/26/21 0900   Wound Cleansing Foam Cleanser/Washcloth 01/26/21 0900   Periwound Protectant Barrier Paste;Antifungal Therapy 01/26/21 0900   Dressing Cleansing/Solutions Not Applicable 01/26/21 0900   Dressing  Options Hydrofiber Silver;Viscopaste;Nonadhesive Foam;Soft Conforming Roll;Coban 01/26/21 0900   Dressing Changed Changed 01/26/21 0900   Dressing Status Clean;Dry;Intact 01/26/21 0900   Dressing Change/Treatment Frequency Every 72 hrs, and As Needed 12/15/20 0845   Non-staged Wound Description Full thickness 01/26/21 0900   Wound Length (cm) 1.2 cm 01/26/21 0900   Wound Width (cm) 0.6 cm 01/26/21 0900   Wound Depth (cm) 0.1 cm 01/26/21 0900   Wound Surface Area (cm^2) 0.72 cm^2 01/26/21 0900   Wound Volume (cm^3) 0.07 cm^3 01/26/21 0900   Post-Procedure Length (cm) 0.6 cm 01/12/21 0900   Post-Procedure Width (cm) 0.7 cm 01/12/21 0900   Post-Procedure Depth (cm) 0.2 cm 01/12/21 0900   Post-Procedure Surface Area (cm^2) 0.42 cm^2 01/12/21 0900   Post-Procedure Volume (cm^3) 0.08 cm^3 01/12/21 0900   Wound Healing % 99 01/26/21 0900   Wound Bed Granulation (%) 100 % 11/27/20 0800   Wound Bed Slough (%) 100 % 01/12/21 0900   Wound Bed Granulation (%) - Post-Procedure 100 % 10/13/20 1500   Tunneling (cm) 0 cm 01/26/21 0900   Undermining (cm) 0 cm 01/26/21 0900   Wound Odor None 01/26/21 0900   Exposed Structures None 01/26/21 0900       Wound 10/30/20 Right Medial Ankle (Active)   Wound Image   01/26/21 0900   Site Assessment Red;Yellow 01/26/21 0900   Periwound Assessment Edema;Denuded 01/26/21 0900   Margins Attached edges 01/26/21 0900   Closure Secondary intention 11/27/20 0800   Drainage Amount Moderate 01/26/21 0900   Drainage Description Serosanguineous 01/26/21 0900   Treatments Cleansed;Site care 01/26/21 0900   Wound Cleansing Foam Cleanser/Washcloth 01/26/21 0900   Periwound Protectant Barrier Paste;Antifungal Therapy 01/26/21 0900   Dressing Cleansing/Solutions Not Applicable 01/26/21 0900   Dressing Options Hydrofiber Silver;Viscopaste;Nonadhesive Foam;Soft Conforming Roll;Coban 01/26/21 0900   Dressing Changed Changed 01/26/21 0900   Dressing Status Clean;Dry;Intact 01/26/21 0900   Dressing  Change/Treatment Frequency Every 72 hrs, and As Needed 12/15/20 0845   Non-staged Wound Description Full thickness 01/26/21 0900   Wound Length (cm) 1.5 cm 01/26/21 0900   Wound Width (cm) 1.3 cm 01/26/21 0900   Wound Depth (cm) 0.2 cm 01/26/21 0900   Wound Surface Area (cm^2) 1.95 cm^2 01/26/21 0900   Wound Volume (cm^3) 0.39 cm^3 01/26/21 0900   Post-Procedure Length (cm) 2.2 cm 01/12/21 0900   Post-Procedure Width (cm) 1.2 cm 01/12/21 0900   Post-Procedure Depth (cm) 0.3 cm 01/12/21 0900   Post-Procedure Surface Area (cm^2) 2.64 cm^2 01/12/21 0900   Post-Procedure Volume (cm^3) 0.79 cm^3 01/12/21 0900   Wound Bed Granulation (%) 100 % 11/27/20 0800   Tunneling (cm) 0 cm 01/26/21 0900   Undermining (cm) 0 cm 01/26/21 0900   Wound Odor None 01/26/21 0900   Exposed Structures None 01/26/21 0900       PROCEDURE: Patient assessed in clinic today again refuses excisional debridement due to pain.  Patient reports that he has pain that lasts approximately 2 to 3 days following excisional debridement.  Minimal nonselective debridement with purasyn and gauze performed in clinic however this was not adequate enough to remove slough from wound beds.  However, patient refused any further nonselective debridement due to pain.  Again is recommended that the patient call and follow-up with Dr. Hemphill regarding venous intervention.  Patient now has time that he can take off of work due to the new year as well as related insurance.  Patient reports that he will contact Dr. Hemphill's office following this appointment today.      Pertinent Labs and Diagnostics:      IMAGING: None found in epic    VASCULAR STUDIES:   9/8/2020-venous duplex  FINDINGS   Bilateral lower extremities -. Complete color filling and compressibility    with normal venous flow dynamics including spontaneous flow, response to    augmentation maneuvers, and respiratory phasicity. No superficial or deep    venous thrombosis. The peroneal and posterior tibial  veins are difficult to    assess for compressibility, but flow response to augmentation is    demonstrated.       Sustained reflux is demonstrated throughout the greatrer saphenous vein    bilaterally as described above.  Multiple varicosies seen from groin to    ankle bilaterally.  Difficult to follow the greater saphenous vein through    the calf due to multiple varicosites.  There are bilateral incompetent    perforators seen at the distal/ankle.  The right  measures 0.27    cm and the left  measures 0.44 cm.    LAST  WOUND CULTURE:  DATE :      12/29/2020      Culture Result Abnormal   Staphylococcus aureus   Light growth     Culture Result Abnormal   Pseudomonas aeruginosa   Light growth              ASSESSMENT AND PLAN:   1. Pressure injury of right ankle, stage 3 ([facility])  Comments: Previously documented as stage II.  I am in disagreement of the staging, ulcer extends into the subcutaneous layer.  Patient originally referred to [facility] for an anterior ankle ulcer, which apparently has resolved.  Now has ulcers to right medial and lateral ankle, which he states were caused by a rubbing from his boot.  Complicated by CVI.        1/26/2021:   -Nonselective debridement performed in clinic today patient refused excisional debridement.  -We will continue with Viscopaste underneath compression to promote denuded tissue healing  -Patient has been changing the dressing to his distal foot and toes when saturated   Wound care:     2. Venous insufficiency of right leg  Comments: Venous duplex study shows reflux and incompetent .  Patient has been seen by Dr. Hemphill      1/26/2021:   Venous intervention has been recommended previously by Dr. Hemphill.  Patient now has time off work that he can schedule this appointment.  Discussed the need for venous intervention to heal medial and lateral ulcers to right lower extremity.  Patient reports that he will contact Dr. Hemphill's office following this  appointment to schedule for venous evaluation and possible intervention.  -Soft roll and Covan to manage edema.  -Would benefit from lifelong compression with compression stockings    3.  Wound infection    1/26/2021: Culture results from last visit positive for light growth of Pseudomonas and MSSA.  Because both wounds are progressing well, no need for antibiotics at this time.  -Wounds do not appear to be overgrown with Pseudomonas.  We will continue to monitor at each additional clinic visit.  We will try to keep the wound beds dry as possible to limit pseudomonal growth.    4.  Fungal infection    1/26/2021: Rash to distal foot and between toes appears to be resolving (distal foot and toes are near) baseline with use of nystatin powder  -Patient to apply nystatin with every dressing change.  Nystatin and zinc mixture applied in clinic today    > 20 spent discussing venous intervention with Dr. Hemphill.  Discussing the appropriateness and necessity for excisional debridement to remove nonviable tissue and slough from wound beds.  Time spent discussing with the patient wound dressing maintenance including 2 layer compression wrap and if he would be comfortable performing this at home.  Reviewed with the patient the signs symptoms of infection and to monitor for pseudomonal growth(increased fluorescent drainage).      Please note that this dictation was created using voice recognition software. I have worked with technical experts from [employer] to optimize the interface.  I have made every reasonable attempt to correct obvious errors, but there may be errors of grammar and possibly content that I did not discover before finalizing the note.

## 2021-01-26 NOTE — PATIENT INSTRUCTIONS
-Keep your wound dressing clean, dry, and intact.    -Remove your compression wrap if you have severe pain, severe swelling, numbness, color change, or temperature change in your toes. If you need to remove your compression wrap, do so by unrolling it. Do not cut the compression wrap off to prevent cutting yourself on accident.    -Should you experience any significant changes in your wound(s), such as infection (redness, swelling, localized heat, increased pain, fever > 101 F, chills) or have any questions regarding your home care instructions, please contact the wound center at [phone]. If after hours, contact your primary care physician or go to the hospital emergency room.

## 2021-01-29 ENCOUNTER — HOSPITAL ENCOUNTER (OUTPATIENT)
Dept: LAB | Facility: MEDICAL CENTER | Age: 71
End: 2021-01-29
Attending: PHYSICIAN ASSISTANT
Payer: COMMERCIAL

## 2021-01-29 ENCOUNTER — OFFICE VISIT (OUTPATIENT)
Dept: URGENT CARE | Facility: PHYSICIAN GROUP | Age: 71
End: 2021-01-29
Payer: COMMERCIAL

## 2021-01-29 VITALS
SYSTOLIC BLOOD PRESSURE: 136 MMHG | OXYGEN SATURATION: 97 % | RESPIRATION RATE: 16 BRPM | TEMPERATURE: 98.7 F | DIASTOLIC BLOOD PRESSURE: 74 MMHG | BODY MASS INDEX: 25.2 KG/M2 | WEIGHT: 176 LBS | HEIGHT: 70 IN | HEART RATE: 67 BPM

## 2021-01-29 DIAGNOSIS — Z76.89 RETURN TO WORK EVALUATION: ICD-10-CM

## 2021-01-29 LAB
ALBUMIN SERPL BCP-MCNC: 4.2 G/DL (ref 3.2–4.9)
ALBUMIN/GLOB SERPL: 1.3 G/DL
ALP SERPL-CCNC: 84 U/L (ref 30–99)
ALT SERPL-CCNC: 15 U/L (ref 2–50)
ANION GAP SERPL CALC-SCNC: 8 MMOL/L (ref 7–16)
AST SERPL-CCNC: 21 U/L (ref 12–45)
BASOPHILS # BLD AUTO: 1 % (ref 0–1.8)
BASOPHILS # BLD: 0.03 K/UL (ref 0–0.12)
BILIRUB SERPL-MCNC: 0.4 MG/DL (ref 0.1–1.5)
BUN SERPL-MCNC: 15 MG/DL (ref 8–22)
CALCIUM SERPL-MCNC: 9.4 MG/DL (ref 8.5–10.5)
CHLORIDE SERPL-SCNC: 103 MMOL/L (ref 96–112)
CHOLEST SERPL-MCNC: 182 MG/DL (ref 100–199)
CO2 SERPL-SCNC: 26 MMOL/L (ref 20–33)
CREAT SERPL-MCNC: 0.79 MG/DL (ref 0.5–1.4)
EOSINOPHIL # BLD AUTO: 0.31 K/UL (ref 0–0.51)
EOSINOPHIL NFR BLD: 10.1 % (ref 0–6.9)
ERYTHROCYTE [DISTWIDTH] IN BLOOD BY AUTOMATED COUNT: 48.1 FL (ref 35.9–50)
EST. AVERAGE GLUCOSE BLD GHB EST-MCNC: 111 MG/DL
FASTING STATUS PATIENT QL REPORTED: NORMAL
GLOBULIN SER CALC-MCNC: 3.2 G/DL (ref 1.9–3.5)
GLUCOSE SERPL-MCNC: 115 MG/DL (ref 65–99)
HBA1C MFR BLD: 5.5 % (ref 0–5.6)
HCT VFR BLD AUTO: 45.3 % (ref 42–52)
HDLC SERPL-MCNC: 42 MG/DL
HGB BLD-MCNC: 14 G/DL (ref 14–18)
IMM GRANULOCYTES # BLD AUTO: 0.01 K/UL (ref 0–0.11)
IMM GRANULOCYTES NFR BLD AUTO: 0.3 % (ref 0–0.9)
LDLC SERPL CALC-MCNC: 125 MG/DL
LYMPHOCYTES # BLD AUTO: 0.67 K/UL (ref 1–4.8)
LYMPHOCYTES NFR BLD: 21.8 % (ref 22–41)
MCH RBC QN AUTO: 27.2 PG (ref 27–33)
MCHC RBC AUTO-ENTMCNC: 30.9 G/DL (ref 33.7–35.3)
MCV RBC AUTO: 88.1 FL (ref 81.4–97.8)
MONOCYTES # BLD AUTO: 0.3 K/UL (ref 0–0.85)
MONOCYTES NFR BLD AUTO: 9.7 % (ref 0–13.4)
NEUTROPHILS # BLD AUTO: 1.76 K/UL (ref 1.82–7.42)
NEUTROPHILS NFR BLD: 57.1 % (ref 44–72)
NRBC # BLD AUTO: 0 K/UL
NRBC BLD-RTO: 0 /100 WBC
PLATELET # BLD AUTO: 98 K/UL (ref 164–446)
PMV BLD AUTO: 9.8 FL (ref 9–12.9)
POTASSIUM SERPL-SCNC: 4 MMOL/L (ref 3.6–5.5)
PROT SERPL-MCNC: 7.4 G/DL (ref 6–8.2)
RBC # BLD AUTO: 5.14 M/UL (ref 4.7–6.1)
SODIUM SERPL-SCNC: 137 MMOL/L (ref 135–145)
TRIGL SERPL-MCNC: 77 MG/DL (ref 0–149)
WBC # BLD AUTO: 3.1 K/UL (ref 4.8–10.8)

## 2021-01-29 PROCEDURE — 85025 COMPLETE CBC W/AUTO DIFF WBC: CPT

## 2021-01-29 PROCEDURE — 83036 HEMOGLOBIN GLYCOSYLATED A1C: CPT | Mod: GA

## 2021-01-29 PROCEDURE — 80053 COMPREHEN METABOLIC PANEL: CPT

## 2021-01-29 PROCEDURE — 80061 LIPID PANEL: CPT

## 2021-01-29 PROCEDURE — 36415 COLL VENOUS BLD VENIPUNCTURE: CPT

## 2021-01-29 PROCEDURE — 99212 OFFICE O/P EST SF 10 MIN: CPT | Performed by: FAMILY MEDICINE

## 2021-01-29 ASSESSMENT — FIBROSIS 4 INDEX: FIB4 SCORE: 3.22

## 2021-01-29 NOTE — PROGRESS NOTES
Chief Complaint:    Chief Complaint   Patient presents with   • Other     needs note to return to work-was sick and called out monday-thursday        History of Present Illness:    This is an acute problem. He missed work this past week due to URI symptoms which have all resolved and feels back to his usual state of health, would like to return to work today, needs note.      Review of Systems:    Constitutional: Negative for fever, chills, and diaphoresis.   Eyes: Negative for change in vision, photophobia, pain, redness, and discharge.  ENT: Negative for ear pain, ear discharge, hearing loss, tinnitus, nasal congestion, nosebleeds, and sore throat.    Respiratory: Negative for cough, hemoptysis, sputum production, shortness of breath, wheezing, and stridor.    Cardiovascular: Negative for chest pain.   Gastrointestinal: Negative for abdominal pain, nausea, vomiting, diarrhea, constipation, blood in stool, and melena.   Genitourinary: Negative for dysuria, urinary urgency, urinary frequency, hematuria, and flank pain.   Musculoskeletal: No new symptoms.   Skin: No new symptoms.  Neurological: Negative for dizziness, tingling, tremors, sensory change, speech change, focal weakness, seizures, loss of consciousness, and headaches.   Endo: Diabetic, on medication.  Heme: Does not bruise/bleed easily.   Psychiatric/Behavioral: Negative for depression, suicidal ideas, hallucinations, memory loss and substance abuse. The patient is not nervous/anxious and does not have insomnia.        Past Medical History:    Past Medical History:   Diagnosis Date   • Arrhythmia 09/07/2018   • Arthritis     OA- hands and L knee   • Cataract     OU   • Dental disorder     upper   • Diabetes     oral meds   • High cholesterol 09/07/2018    no med; diet controlled   • Hyperlipidemia    • Hypertension    • Renal disorder 09/07/2018    kidney stones     Past Surgical History:    Past Surgical History:   Procedure Laterality Date   •  GASTROSCOPY N/A 2018    Procedure: GASTROSCOPY;  Surgeon: Matt Young M.D.;  Location: SURGERY SAME DAY [facility];  Service: Gastroenterology   • ROTATOR CUFF REPAIR Left    • ANKLE ORIF Right     spiral fx   • MENISCECTOMY Left    • APPENDECTOMY     • CYSTOSCOPY  ,    stone extraction     Social History:    Social History     Socioeconomic History   • Marital status:      Spouse name: Not on file   • Number of children: Not on file   • Years of education: Not on file   • Highest education level: Not on file   Occupational History   • Not on file   Social Needs   • Financial resource strain: Not on file   • Food insecurity     Worry: Not on file     Inability: Not on file   • Transportation needs     Medical: Not on file     Non-medical: Not on file   Tobacco Use   • Smoking status: Former Smoker     Types: Cigarettes     Quit date: 2008     Years since quittin.0   • Smokeless tobacco: Never Used   • Tobacco comment: quit    Substance and Sexual Activity   • Alcohol use: No   • Drug use: Yes     Types: Marijuana   • Sexual activity: Never   Lifestyle   • Physical activity     Days per week: Not on file     Minutes per session: Not on file   • Stress: Not on file   Relationships   • Social connections     Talks on phone: Not on file     Gets together: Not on file     Attends Caodaism service: Not on file     Active member of club or organization: Not on file     Attends meetings of clubs or organizations: Not on file     Relationship status: Not on file   • Intimate partner violence     Fear of current or ex partner: Not on file     Emotionally abused: Not on file     Physically abused: Not on file     Forced sexual activity: Not on file   Other Topics Concern   • Not on file   Social History Narrative   • Not on file     Family History:    Family History   Problem Relation Age of Onset   • Other Mother    • Heart Attack Father    • Heart Failure Brother       Medications:    Current Outpatient Medications on File Prior to Visit   Medication Sig Dispense Refill   • ezetimibe (ZETIA) 10 MG Tab Take 1 Tab by mouth every day. 90 Tab 3   • lisinopril (PRINIVIL) 10 MG Tab Take 1 Tab by mouth every day. 90 Tab 3   • metoprolol SR (TOPROL XL) 25 MG TABLET SR 24 HR Take 1 Tab by mouth every day. 90 Tab 3   • oxyCODONE immediate-release (ROXICODONE) 5 MG Tab      • nystatin (MYCOSTATIN) powder Apply to affected area twice daily.  Gently cleanse area prior to reapplication of powder with mild soap and water. 60 g 2   • Potassium 99 MG Tab Take  by mouth every day.     • Non Formulary Request Collagen-1000 mg BID     • Red Yeast Rice 600 MG Tab Take  by mouth 2 Times a Day.     • Non Formulary Request Beet Root- 1000 mg daily     • Ginkgo Biloba 60 MG Cap Take  by mouth every day.     • Misc Natural Products (TART PENNY ADVANCED) Cap Take 2,500 Caps by mouth 2 Times a Day.     • Ginger, Zingiber officinalis, (GINGER ROOT) 550 MG Cap Take  by mouth as needed.     • tadalafil (CIALIS) 20 MG tablet      • ferrous sulfate 325 (65 Fe) MG tablet Take 325 mg by mouth 2 Times a Day.     • Cyanocobalamin (VITAMIN B-12 PO) Take  by mouth 2 Times a Day.     • Flaxseed, Linseed, (FLAX SEED OIL PO) Take  by mouth.     • metformin (GLUCOPHAGE) 1000 MG tablet Take 500 mg by mouth 2 times a day, with meals.     • Non Formulary Request Take  by mouth 2 Times a Day. Uric acid cleanse     • vitamin e (VITAMIN E) 400 UNIT Cap Take 400 Units by mouth every day.     • vitamin A 8000 UNIT capsule Take 8,000 Units by mouth 2 Times a Day.     • Cholecalciferol (VITAMIN D3) 3000 UNITS Tab Take  by mouth every day.     • Resveratrol 100 MG Cap Take 250 mg by mouth every day.     • KRILL OIL PO Take  by mouth every day.     • Omega-3 Fatty Acids (FISH OIL) 1000 MG Cap capsule Take 1,000 mg by mouth every day.     • ASTAXANTHIN PO Take 4 mg by mouth every day.     • SAW PALMETTO, SERENOA REPENS, PO Take   "by mouth every day.       No current facility-administered medications on file prior to visit.      Allergies:    Allergies   Allergen Reactions   • Ciprofloxacin Unspecified     convulsions   • Statins [Hmg-Coa-R Inhibitors] Unspecified     Stabbing pains in kidneys   • Acetaminophen      Kidney pain    • Atorvastatin      Patient declines   • Ibuprofen      Kidney pain   • Naproxen      Kidney pain   • Shellfish Allergy      Kidney stones   • Soap &  [Alcohol] Rash     Antibacterial soap- contact dermatitis       Vitals:    Vitals:    01/29/21 0913   BP: 136/74   Pulse: 67   Resp: 16   Temp: 37.1 °C (98.7 °F)   SpO2: 97%   Weight: 79.8 kg (176 lb)   Height: 1.778 m (5' 10\")       Physical Exam:    Constitutional: Vital signs reviewed. Appears well-developed and well-nourished. No acute distress.   Eyes: Sclera white, conjunctivae clear.   ENT: External ears normal. Hearing normal.   Neck: Neck supple.   Pulmonary/Chest: Respirations non-labored.   Neurological: Alert and oriented to person, place, and time. Muscle tone normal. Coordination normal.   Psychiatric: Normal mood and affect. Behavior is normal. Judgment and thought content normal.       Assessment / Plan:    1. Return to work evaluation      Discussed with him DDX, management options, and risks, benefits, and alternatives to treatment plan agreed upon.    Work note given - excuse for 1/25 thru and including 1/28/21. He may return to regular full duty today, 1/29/21.    He will return to urgent care if needed.  "

## 2021-01-29 NOTE — LETTER
January 29, 2021         Patient: Nitesh Duron   YOB: 1950   Date of Visit: 1/29/2021           To Whom it May Concern:    Nitesh Duron was seen in my clinic on 1/29/2021.     Please excuse from work for 1/25 thru and including 1/28/21 due to medical condition.    He may return to regular full duty today, 1/29/21.    If you have any questions or concerns, please don't hesitate to call.        Sincerely,           Shahid Flores M.D.  Electronically Signed

## 2021-02-02 ENCOUNTER — NON-PROVIDER VISIT (OUTPATIENT)
Dept: WOUND CARE | Facility: MEDICAL CENTER | Age: 71
End: 2021-02-02
Attending: PHYSICIAN ASSISTANT
Payer: COMMERCIAL

## 2021-02-02 DIAGNOSIS — I87.2 VENOUS INSUFFICIENCY OF RIGHT LEG: ICD-10-CM

## 2021-02-02 DIAGNOSIS — I87.8 VENOUS STASIS: ICD-10-CM

## 2021-02-02 PROCEDURE — 97602 WOUND(S) CARE NON-SELECTIVE: CPT

## 2021-02-02 NOTE — WOUND TEAM
"Patient was advised last appointment to call Dr. Hemphill's office to speak to her about a possible venous intervention. He states that he did not call because \"I had to make 25 other calls\". Advised him that he needs to call her today after appointment so we can determine next steps in his plan of care to heal these wounds. Patient agreeable and states that he will call her office today.   "

## 2021-02-02 NOTE — PROCEDURES
Non selective blunt debridement with no rinse foam cleanser and gauze to lower leg and puracyn spray to wound beds to remove non viable tissue. Patient tolerated well.

## 2021-02-09 ENCOUNTER — APPOINTMENT (OUTPATIENT)
Dept: WOUND CARE | Facility: MEDICAL CENTER | Age: 71
End: 2021-02-09
Attending: PHYSICIAN ASSISTANT
Payer: COMMERCIAL

## 2021-02-10 ENCOUNTER — OFFICE VISIT (OUTPATIENT)
Dept: WOUND CARE | Facility: MEDICAL CENTER | Age: 71
End: 2021-02-10
Attending: PHYSICIAN ASSISTANT
Payer: COMMERCIAL

## 2021-02-10 VITALS
OXYGEN SATURATION: 96 % | DIASTOLIC BLOOD PRESSURE: 63 MMHG | SYSTOLIC BLOOD PRESSURE: 126 MMHG | TEMPERATURE: 98 F | RESPIRATION RATE: 20 BRPM | HEART RATE: 72 BPM

## 2021-02-10 DIAGNOSIS — L97.319 VENOUS ULCER OF ANKLE, RIGHT (HCC): ICD-10-CM

## 2021-02-10 DIAGNOSIS — I83.013 VENOUS ULCER OF ANKLE, RIGHT (HCC): ICD-10-CM

## 2021-02-10 PROCEDURE — 29580 STRAPPING UNNA BOOT: CPT

## 2021-02-10 ASSESSMENT — PAIN SCALES - GENERAL: PAINLEVEL: 1=MINIMAL PAIN

## 2021-02-10 NOTE — PROCEDURES
"Wound Care Procedures 2/10/2021:  Unna Boot wrap applied to right lower leg. Patient declined any debridement today.    Asked patient if he scheduled an appointment with a vascular surgeon. Patient states \"I called and was put on hold forever, they were supposed to call me back, but they never did.\" Advised patient to call [facility] again for a consultation appointment.      "

## 2021-02-10 NOTE — PATIENT INSTRUCTIONS
-Keep dressings clean and dry. Change dressings if they become over saturated, soiled or fall off.     -Avoid prolonged standing or sitting without elevating your legs.    -Remove your compression garments if you have severe pain, severe swelling, numbness, color change, or temperature change in your toes. If you need to remove your compression garments, do so by unrolling them. Do not cut the compression garments off, this is to prevent cutting yourself on accident.    -Should you experience any significant changes in your wound(s), such as infection (redness, swelling, localized heat, increased pain, fever > 101 F, chills) or have any questions regarding your home care instructions, please contact the wound center at [phone]. If after hours, contact your primary care physician or go to the hospital emergency room.

## 2021-02-16 ENCOUNTER — OFFICE VISIT (OUTPATIENT)
Dept: WOUND CARE | Facility: MEDICAL CENTER | Age: 71
End: 2021-02-16
Attending: PHYSICIAN ASSISTANT
Payer: COMMERCIAL

## 2021-02-16 DIAGNOSIS — I83.013 VENOUS ULCER OF ANKLE, RIGHT (HCC): ICD-10-CM

## 2021-02-16 DIAGNOSIS — L97.319 VENOUS ULCER OF ANKLE, RIGHT (HCC): ICD-10-CM

## 2021-02-16 PROCEDURE — 97602 WOUND(S) CARE NON-SELECTIVE: CPT

## 2021-02-16 NOTE — PATIENT INSTRUCTIONS
-Keep your wound dressing clean, dry, and intact.    -Remove your compression wrap if you have severe pain, severe swelling, numbness, color change, or temperature change in your toes. If you need to remove your compression wrap, do so by unrolling it. Do not cut the compression wrap off to prevent cutting yourself on accident.    -Should you experience any significant changes in your wound(s), such as infection (redness, swelling, localized heat, increased pain, fever > 101 F, chills) or have any questions regarding your home care instructions, please contact the wound center at [phone]. If after hours, contact your primary care physician or go to the hospital emergency room.

## 2021-02-23 ENCOUNTER — NON-PROVIDER VISIT (OUTPATIENT)
Dept: WOUND CARE | Facility: MEDICAL CENTER | Age: 71
End: 2021-02-23
Attending: PHYSICIAN ASSISTANT
Payer: COMMERCIAL

## 2021-02-23 DIAGNOSIS — L97.319 VENOUS ULCER OF ANKLE, RIGHT (HCC): Primary | ICD-10-CM

## 2021-02-23 DIAGNOSIS — I83.013 VENOUS ULCER OF ANKLE, RIGHT (HCC): Primary | ICD-10-CM

## 2021-02-23 PROCEDURE — 97597 DBRDMT OPN WND 1ST 20 CM/<: CPT

## 2021-02-24 NOTE — PROCEDURES
After 10 mins dwell time of topical viscous lidocaine 2%, wounds were selectively debrided with a curette/ tweezers  to remove riddhi wound callus and non viable tissue from wound bed. Area debrided <20cm2. Pt jonathan well.

## 2021-02-24 NOTE — PATIENT INSTRUCTIONS
Avoid prolonged standing or sitting without elevating your legs.  - Knee-high gradient compression to legs  - Multilayer compression wrap to R leg. Do not get wet and keep on for the week. Only remove if temperature or sensation changes.   If compression needs to be removed, un-wrap it do not cut it off.     Should you experience any significant changes in your wound(s), such as infection (redness, swelling, localized heat, increased pain, fever > 101 F, chills) or have any questions regarding your home care instructions, please contact the wound center at [phone]. If after hours, contact your primary care physician or go to the hospital emergency room.   Keep dressing clean, dry and covered while bathing. Only change dressing if it becomes over saturated, soiled or falls off.

## 2021-03-02 ENCOUNTER — NON-PROVIDER VISIT (OUTPATIENT)
Dept: WOUND CARE | Facility: MEDICAL CENTER | Age: 71
End: 2021-03-02
Attending: PHYSICIAN ASSISTANT
Payer: COMMERCIAL

## 2021-03-02 PROCEDURE — 97602 WOUND(S) CARE NON-SELECTIVE: CPT

## 2021-03-02 NOTE — PATIENT INSTRUCTIONS
-Keep your wound dressing clean, dry, and intact.    -Change your dressing if it becomes soiled, soaked, or falls off.    -Remove your compression wrap if you have severe pain, severe swelling, numbness, color change, or temperature change in your toes. If you need to remove your compression wrap, do so by unrolling it. Do not cut the compression wrap off to prevent cutting yourself on accident.    -Should you experience any significant changes in your wound(s), such as infection (redness, swelling, localized heat, increased pain, fever > 101 F, chills) or have any questions regarding your home care instructions, please contact the wound center at [phone]. If after hours, contact your primary care physician or go to the hospital emergency room.

## 2021-03-02 NOTE — PROCEDURES
Non-selective debridement to wounds and periwounds using foam cleanser and washcloth to remove nonviable tissue and biofilm.

## 2021-03-09 ENCOUNTER — OFFICE VISIT (OUTPATIENT)
Dept: WOUND CARE | Facility: MEDICAL CENTER | Age: 71
End: 2021-03-09
Attending: PHYSICIAN ASSISTANT
Payer: COMMERCIAL

## 2021-03-09 VITALS
TEMPERATURE: 99.2 F | RESPIRATION RATE: 20 BRPM | HEART RATE: 92 BPM | SYSTOLIC BLOOD PRESSURE: 131 MMHG | DIASTOLIC BLOOD PRESSURE: 74 MMHG | OXYGEN SATURATION: 96 %

## 2021-03-09 DIAGNOSIS — L97.319 VENOUS ULCER OF ANKLE, RIGHT (HCC): ICD-10-CM

## 2021-03-09 DIAGNOSIS — I83.013 VENOUS ULCER OF ANKLE, RIGHT (HCC): ICD-10-CM

## 2021-03-09 PROCEDURE — 97602 WOUND(S) CARE NON-SELECTIVE: CPT

## 2021-03-09 NOTE — PATIENT INSTRUCTIONS
-Keep your wound dressing clean, dry, and intact.    -Change your dressing if it becomes soiled, soaked, or falls off.    -Remove your compression wrap if you have severe pain, severe swelling, numbness, color change, or temperature change in your toes. If you need to remove your compression wrap, do so by unrolling it. Do not cut the compression wrap off to prevent cutting yourself on accident.    - Resolved wound be fragile for a few days, bathe and dry area gently, only ever regains a maximum of 80% of the tensile strength of the surrounding skin, remodeling of scar can continue for 6mo - a year. Contact PCP for a referral back her if any problems with area opening and draining again.    -Should you experience any significant changes in your wound(s), such as infection (redness, swelling, localized heat, increased pain, fever > 101 F, chills) or have any questions regarding your home care instructions, please contact the wound center at [phone]. If after hours, contact your primary care physician or go to the hospital emergency room.

## 2021-03-10 NOTE — NON-PROVIDER
Non-selective debridement to wound and riddhi-wound using normal saline and gauze to remove biofilm and non-viable tissue. Patient tolerated well.

## 2021-03-12 ENCOUNTER — TELEPHONE (OUTPATIENT)
Dept: WOUND CARE | Facility: MEDICAL CENTER | Age: 71
End: 2021-03-12

## 2021-03-16 ENCOUNTER — APPOINTMENT (OUTPATIENT)
Dept: WOUND CARE | Facility: MEDICAL CENTER | Age: 71
End: 2021-03-16
Attending: PHYSICIAN ASSISTANT
Payer: COMMERCIAL

## 2021-03-23 ENCOUNTER — NON-PROVIDER VISIT (OUTPATIENT)
Dept: WOUND CARE | Facility: MEDICAL CENTER | Age: 71
End: 2021-03-23
Attending: PHYSICIAN ASSISTANT
Payer: COMMERCIAL

## 2021-03-23 PROCEDURE — 97602 WOUND(S) CARE NON-SELECTIVE: CPT

## 2021-03-23 NOTE — PATIENT INSTRUCTIONS
Avoid prolonged standing or sitting without elevating your legs.    - Multilayer compression wrap to right leg. Do not get wet and keep on for the week. Only remove if temperature or sensation changes.    Keep dressing clean and dry and cover while bathing. Only change dressing if over saturated, soiled or its falling off.     Should you experience any significant changes in your wound(s) such as infection (redness, swelling, localized heat, increased pain, fever >101 F, chills) or have any questions regarding your home care instructions, please contact the wound center [phone]. If after hours, contact your primary care physician or go the hospital emergency room.

## 2021-03-23 NOTE — NON-PROVIDER
Pt states he has only received small quantities of supplies from [facility]. Contacted [facility] and the order was forwarded to [facility] because pt's insurance is no longer in contract with [facility]. Spoke to Marcelle at [facility] who confirmed order was shipped and estimated to be delivered by 3/25/21. Pt updated via phone and verbalizes understanding.

## 2021-03-30 ENCOUNTER — NON-PROVIDER VISIT (OUTPATIENT)
Dept: WOUND CARE | Facility: MEDICAL CENTER | Age: 71
End: 2021-03-30
Attending: PHYSICIAN ASSISTANT
Payer: COMMERCIAL

## 2021-03-30 PROCEDURE — 29580 STRAPPING UNNA BOOT: CPT

## 2021-03-30 NOTE — PATIENT INSTRUCTIONS
-Keep dressings clean and dry. Change dressings if they become over saturated, soiled or fall off.     -Avoid prolonged standing or sitting without elevating your legs.    -Remove your compression garments if you have severe pain, severe swelling, numbness, color change, or temperature change in your toes. If you need to remove your compression garments, do so by unrolling them. Do not cut the compression garments off, this is to prevent cutting yourself on accident.    -Should you experience any significant changes in your wound(s), such as signs of infection (redness, swelling, localized heat, increased pain, fever > 101 F, chills) or have any questions regarding your home care instructions, please contact the wound center at [phone]. If after hours, contact your primary care physician or go to the hospital emergency room.

## 2021-03-31 ENCOUNTER — TELEPHONE (OUTPATIENT)
Dept: WOUND CARE | Facility: MEDICAL CENTER | Age: 71
End: 2021-03-31

## 2021-03-31 NOTE — TELEPHONE ENCOUNTER
Pt. Called with concern over receiving coban instead of roll gauze in [facility] supply order. Called [facility] and order was placed correctly and pulled/filled incorrectly. He will be receiving the roll gauze to correct order.

## 2021-04-02 ENCOUNTER — TELEPHONE (OUTPATIENT)
Dept: WOUND CARE | Facility: MEDICAL CENTER | Age: 71
End: 2021-04-02

## 2021-04-02 NOTE — TELEPHONE ENCOUNTER
Confirmed physical address with patient, [address] [place] [address]. Faxed to [employer] with tracking number [id number].

## 2021-04-06 ENCOUNTER — NON-PROVIDER VISIT (OUTPATIENT)
Dept: WOUND CARE | Facility: MEDICAL CENTER | Age: 71
End: 2021-04-06
Attending: PHYSICIAN ASSISTANT
Payer: COMMERCIAL

## 2021-04-06 PROCEDURE — 97597 DBRDMT OPN WND 1ST 20 CM/<: CPT

## 2021-04-06 NOTE — PATIENT INSTRUCTIONS
Avoid prolonged standing or sitting without elevating your legs.  - Multilayer compression wrap to R leg. Do not get wet and keep on for the week. Only remove if temperature or sensation changes.   If compression needs to be removed, un-wrap it do not cut it off.     Should you experience any significant changes in your wound(s), such as infection (redness, swelling, localized heat, increased pain, fever > 101 F, chills) or have any questions regarding your home care instructions, please contact the wound center at [phone]. If after hours, contact your primary care physician or go to the hospital emergency room.   Keep dressing clean, dry and covered while bathing. Only change dressing if it becomes over saturated, soiled or falls off.

## 2021-04-06 NOTE — PROCEDURES
Wounds were selectively debrided with a tweezers  to remove riddhi wound callus and non viable tissue from wound bed. Area debrided <20cm2. Unna boot wrap and coban to LE. Pt jonathan well.

## 2021-04-13 ENCOUNTER — NON-PROVIDER VISIT (OUTPATIENT)
Dept: WOUND CARE | Facility: MEDICAL CENTER | Age: 71
End: 2021-04-13
Attending: PHYSICIAN ASSISTANT
Payer: COMMERCIAL

## 2021-04-13 PROCEDURE — 97602 WOUND(S) CARE NON-SELECTIVE: CPT

## 2021-04-13 NOTE — PATIENT INSTRUCTIONS
Avoid prolonged standing or sitting without elevating your legs.  - Multilayer compression wrap to R leg. Do not get wet and keep on for the week. Only remove if temperature or sensation changes.   If compression needs to be removed, un-wrap it do not cut it off.     Should you experience any significant changes in your wound(s), such as infection (redness, swelling, localized heat, increased pain, fever > 101 F, chills) or have any questions regarding your home care instructions, please contact the wound center at [phone]. If after hours, contact your primary care physician or go to the hospital emergency room.   Keep dressing clean, dry and covered while bathing. Only change dressing if it becomes over saturated, soiled or falls off.

## 2021-04-20 ENCOUNTER — NON-PROVIDER VISIT (OUTPATIENT)
Dept: WOUND CARE | Facility: MEDICAL CENTER | Age: 71
End: 2021-04-20
Attending: PHYSICIAN ASSISTANT
Payer: COMMERCIAL

## 2021-04-20 PROCEDURE — 97602 WOUND(S) CARE NON-SELECTIVE: CPT

## 2021-04-20 NOTE — PATIENT INSTRUCTIONS
Avoid prolonged standing or sitting without elevating your legs.    - Multilayer compression wrap to right leg. Do not get wet and keep on for the week. Only remove if temperature or sensation changes.   If compression needs to be removed, un-wrap it do not cut it off.     Should you experience any significant changes in your wound(s), such as infection (redness, swelling, localized heat, increased pain, fever > 101 F, chills) or have any questions regarding your home care instructions, please contact the wound center at [phone]. If after hours, contact your primary care physician or go to the hospital emergency room.   Keep dressing clean, dry and covered while bathing. Only change dressing if it becomes over saturated, soiled or falls off.

## 2021-04-27 ENCOUNTER — NON-PROVIDER VISIT (OUTPATIENT)
Dept: WOUND CARE | Facility: MEDICAL CENTER | Age: 71
End: 2021-04-27
Attending: PHYSICIAN ASSISTANT
Payer: COMMERCIAL

## 2021-04-27 PROCEDURE — 97597 DBRDMT OPN WND 1ST 20 CM/<: CPT

## 2021-04-27 NOTE — PROCEDURES
After 10 mins dwell time of topical viscous lidocaine 2%, wound was selectively debrided with a curette  to remove riddhi wound callus and non viable tissue from wound bed. Area debrided <20cm2. Pt jonathan well.

## 2021-04-27 NOTE — PATIENT INSTRUCTIONS
Avoid prolonged standing or sitting without elevating your legs.  - Multilayer compression wrap to R leg. Do not get wet and keep on for the week. Only remove if temperature or sensation changes.   If compression needs to be removed, un-wrap it do not cut it off.     Should you experience any significant changes in your wound(s), such as infection (redness, swelling, localized heat, increased pain, fever > 101 F, chills) or have any questions regarding your home care instructions, please contact the wound center at [phone]. If after hours, contact your primary care physician or go to the hospital emergency room.   Keep dressing clean, dry and covered while bathing. Only change dressing if it becomes over saturated, soiled or falls off.

## 2021-05-03 ENCOUNTER — HOSPITAL ENCOUNTER (OUTPATIENT)
Dept: LAB | Facility: MEDICAL CENTER | Age: 71
End: 2021-05-03
Attending: PHYSICIAN ASSISTANT
Payer: COMMERCIAL

## 2021-05-03 PROCEDURE — 80061 LIPID PANEL: CPT

## 2021-05-03 PROCEDURE — 80053 COMPREHEN METABOLIC PANEL: CPT

## 2021-05-03 PROCEDURE — 36415 COLL VENOUS BLD VENIPUNCTURE: CPT

## 2021-05-03 PROCEDURE — 85025 COMPLETE CBC W/AUTO DIFF WBC: CPT

## 2021-05-03 PROCEDURE — 83036 HEMOGLOBIN GLYCOSYLATED A1C: CPT

## 2021-05-04 ENCOUNTER — NON-PROVIDER VISIT (OUTPATIENT)
Dept: WOUND CARE | Facility: MEDICAL CENTER | Age: 71
End: 2021-05-04
Attending: PHYSICIAN ASSISTANT
Payer: COMMERCIAL

## 2021-05-04 LAB
ALBUMIN SERPL BCP-MCNC: 3.9 G/DL (ref 3.2–4.9)
ALBUMIN/GLOB SERPL: 1.3 G/DL
ALP SERPL-CCNC: 89 U/L (ref 30–99)
ALT SERPL-CCNC: 14 U/L (ref 2–50)
ANION GAP SERPL CALC-SCNC: 8 MMOL/L (ref 7–16)
AST SERPL-CCNC: 16 U/L (ref 12–45)
BASOPHILS # BLD AUTO: 1 % (ref 0–1.8)
BASOPHILS # BLD: 0.04 K/UL (ref 0–0.12)
BILIRUB SERPL-MCNC: 0.5 MG/DL (ref 0.1–1.5)
BUN SERPL-MCNC: 18 MG/DL (ref 8–22)
CALCIUM SERPL-MCNC: 9.2 MG/DL (ref 8.5–10.5)
CHLORIDE SERPL-SCNC: 105 MMOL/L (ref 96–112)
CHOLEST SERPL-MCNC: 155 MG/DL (ref 100–199)
CO2 SERPL-SCNC: 28 MMOL/L (ref 20–33)
CREAT SERPL-MCNC: 0.75 MG/DL (ref 0.5–1.4)
EOSINOPHIL # BLD AUTO: 0.1 K/UL (ref 0–0.51)
EOSINOPHIL NFR BLD: 2.6 % (ref 0–6.9)
ERYTHROCYTE [DISTWIDTH] IN BLOOD BY AUTOMATED COUNT: 51.4 FL (ref 35.9–50)
EST. AVERAGE GLUCOSE BLD GHB EST-MCNC: 117 MG/DL
GLOBULIN SER CALC-MCNC: 3.1 G/DL (ref 1.9–3.5)
GLUCOSE SERPL-MCNC: 122 MG/DL (ref 65–99)
HBA1C MFR BLD: 5.7 % (ref 4–5.6)
HCT VFR BLD AUTO: 45 % (ref 42–52)
HDLC SERPL-MCNC: 43 MG/DL
HGB BLD-MCNC: 14.1 G/DL (ref 14–18)
IMM GRANULOCYTES # BLD AUTO: 0.02 K/UL (ref 0–0.11)
IMM GRANULOCYTES NFR BLD AUTO: 0.5 % (ref 0–0.9)
LDLC SERPL CALC-MCNC: 100 MG/DL
LYMPHOCYTES # BLD AUTO: 0.56 K/UL (ref 1–4.8)
LYMPHOCYTES NFR BLD: 14.4 % (ref 22–41)
MCH RBC QN AUTO: 27.3 PG (ref 27–33)
MCHC RBC AUTO-ENTMCNC: 31.3 G/DL (ref 33.7–35.3)
MCV RBC AUTO: 87.2 FL (ref 81.4–97.8)
MONOCYTES # BLD AUTO: 0.28 K/UL (ref 0–0.85)
MONOCYTES NFR BLD AUTO: 7.2 % (ref 0–13.4)
NEUTROPHILS # BLD AUTO: 2.88 K/UL (ref 1.82–7.42)
NEUTROPHILS NFR BLD: 74.3 % (ref 44–72)
NRBC # BLD AUTO: 0 K/UL
NRBC BLD-RTO: 0 /100 WBC
PLATELET # BLD AUTO: 88 K/UL (ref 164–446)
PMV BLD AUTO: 10.7 FL (ref 9–12.9)
POTASSIUM SERPL-SCNC: 4.5 MMOL/L (ref 3.6–5.5)
PROT SERPL-MCNC: 7 G/DL (ref 6–8.2)
RBC # BLD AUTO: 5.16 M/UL (ref 4.7–6.1)
SODIUM SERPL-SCNC: 141 MMOL/L (ref 135–145)
TRIGL SERPL-MCNC: 59 MG/DL (ref 0–149)
WBC # BLD AUTO: 3.9 K/UL (ref 4.8–10.8)

## 2021-05-04 PROCEDURE — 97602 WOUND(S) CARE NON-SELECTIVE: CPT

## 2021-05-04 NOTE — PATIENT INSTRUCTIONS
-Keep your wound dressing clean, dry, and intact.    -Change your dressing if it becomes soiled, soaked, or falls off.    -Remove your compression wrap if you have severe pain, severe swelling, numbness, color change, or temperature change in your toes. If you need to remove your compression wrap, do so by unrolling it. Do not cut the compression wrap off to prevent cutting yourself on accident.    -Should you experience any significant changes in your wound(s), such as infection (redness, swelling, localized heat, increased pain, fever > 101 F, chills) or have any questions regarding your home care instructions, please contact the wound center at [phone]. If after hours, contact your primary care physician or go to the hospital emergency room.

## 2021-05-11 ENCOUNTER — NON-PROVIDER VISIT (OUTPATIENT)
Dept: WOUND CARE | Facility: MEDICAL CENTER | Age: 71
End: 2021-05-11
Attending: PHYSICIAN ASSISTANT
Payer: COMMERCIAL

## 2021-05-11 PROCEDURE — 97597 DBRDMT OPN WND 1ST 20 CM/<: CPT

## 2021-05-11 NOTE — PATIENT INSTRUCTIONS
Should you experience any significant changes in your wound(s) such as infection (redness, swelling, localized heat, increased pain, fever >101 F, chills) or have any questions regarding your home care instructions, please contact the wound center [phone]. If after hours, contact your primary care physician or go the hospital emergency room.  Keep dressing clean and dry and cover while bathing. Only change dressing if over saturated, soiled or its falling off.

## 2021-05-18 ENCOUNTER — NON-PROVIDER VISIT (OUTPATIENT)
Dept: WOUND CARE | Facility: MEDICAL CENTER | Age: 71
End: 2021-05-18
Attending: PHYSICIAN ASSISTANT
Payer: COMMERCIAL

## 2021-05-18 PROCEDURE — 97597 DBRDMT OPN WND 1ST 20 CM/<: CPT

## 2021-05-18 NOTE — PATIENT INSTRUCTIONS
-Keep your wound dressing clean, dry, and intact.    -Change your dressing if it becomes soiled, soaked, or falls off.    -Remove your compression wrap if you have severe pain, severe swelling, numbness, color change, or temperature change in your toes. If you need to remove your compression wrap, do so by unrolling it. Do not cut the compression wrap off to prevent cutting yourself on accident.    -Should you experience any significant changes in your wound(s), such as infection (redness, swelling, localized heat, increased pain, fever > 101 F, chills) or have any questions regarding your home care instructions, please contact the wound center at [phone]. If after hours, contact your primary care physician or go to the hospital emergency room.

## 2021-05-18 NOTE — PROCEDURES
CSWD using curette to remove ~1 to 2cm2 nonviable tissue from wound bed. 2% topical Lidocaine applied prior to debridement with ~5 minute dwell time. Patient tolerated well; denied pain.

## 2021-05-25 ENCOUNTER — NON-PROVIDER VISIT (OUTPATIENT)
Dept: WOUND CARE | Facility: MEDICAL CENTER | Age: 71
End: 2021-05-25
Attending: PHYSICIAN ASSISTANT
Payer: COMMERCIAL

## 2021-05-25 PROCEDURE — 97602 WOUND(S) CARE NON-SELECTIVE: CPT

## 2021-05-25 NOTE — PATIENT INSTRUCTIONS
-Keep your wound dressing clean, dry, and intact.    -Change your dressing if it becomes soiled, soaked, or falls off.    -Remove your compression wrap if you have severe pain, severe swelling, numbness, color change, or temperature change in your toes. If you need to remove your compression wrap, do so by unrolling it. Do not cut the compression wrap off to prevent cutting yourself on accident.    -Should you experience any significant changes in your wound(s), such as infection (redness, swelling, localized heat, increased pain, fever > 101 F, chills) or have any questions regarding your home care instructions, please contact the wound center at [phone]. If after hours, contact your primary care physician or go to the hospital emergency room.

## 2021-06-01 ENCOUNTER — NON-PROVIDER VISIT (OUTPATIENT)
Dept: WOUND CARE | Facility: MEDICAL CENTER | Age: 71
End: 2021-06-01
Attending: PHYSICIAN ASSISTANT
Payer: COMMERCIAL

## 2021-06-01 PROCEDURE — 97597 DBRDMT OPN WND 1ST 20 CM/<: CPT

## 2021-06-01 NOTE — PROCEDURES
CSWD using curette to remove ~1cm2 nonviable tissue from wound bed. 2% topical Lidocaine applied prior to debridement with ~5 minute dwell time. Patient tolerated well; denied pain.

## 2021-06-01 NOTE — PATIENT INSTRUCTIONS
-Keep your wound dressing clean, dry, and intact.    -Change your dressing if it becomes soiled, soaked, or falls off.    -Remove your compression wrap if you have severe pain, severe swelling, numbness, color change, or temperature change in your toes. If you need to remove your compression wrap, do so by unrolling it. Do not cut the compression wrap off to prevent cutting yourself on accident.    -Should you experience any significant changes in your wound(s), such as infection (redness, swelling, localized heat, increased pain, fever > 101 F, chills) or have any questions regarding your home care instructions, please contact the wound center at [phone]. If after hours, contact your primary care physician or go to the hospital emergency room.

## 2021-06-08 ENCOUNTER — NON-PROVIDER VISIT (OUTPATIENT)
Dept: WOUND CARE | Facility: MEDICAL CENTER | Age: 71
End: 2021-06-08
Attending: PHYSICIAN ASSISTANT
Payer: COMMERCIAL

## 2021-06-08 PROCEDURE — 97602 WOUND(S) CARE NON-SELECTIVE: CPT

## 2021-06-08 NOTE — PATIENT INSTRUCTIONS
-Keep dressings clean and dry. Change dressings if they become over saturated, soiled or fall off.     -Avoid prolonged standing or sitting without elevating your legs.    -Remove your compression garments if you have severe pain, severe swelling, numbness, color change, or temperature change in your toes. If you need to remove your compression garments, do so by unrolling them. Do not cut the compression garments off, this is to prevent cutting yourself on accident.    -Should you experience any significant changes in your wound, such as signs of infection (redness, swelling, localized heat, increased pain, fever > 101 F, chills) or have any questions regarding your home care instructions, please contact the wound center at [phone]. If after hours, contact your primary care physician or go to the hospital emergency room.

## 2021-06-08 NOTE — PROCEDURES
Wound Care Procedures 6/8/2021:  Nonselective debridement with normal saline moistened gauze to remove biofilm from right medial ankle wound.

## 2021-06-15 ENCOUNTER — NON-PROVIDER VISIT (OUTPATIENT)
Dept: WOUND CARE | Facility: MEDICAL CENTER | Age: 71
End: 2021-06-15
Attending: PHYSICIAN ASSISTANT
Payer: COMMERCIAL

## 2021-06-15 PROCEDURE — 97597 DBRDMT OPN WND 1ST 20 CM/<: CPT

## 2021-06-15 NOTE — PATIENT INSTRUCTIONS
-Keep your wound dressing clean, dry, and intact.    -Remove your compression wrap if you have severe pain, severe swelling, numbness, color change, or temperature change in your toes. If you need to remove your compression wrap, do so by unrolling it. Do not cut the compression wrap off to prevent cutting yourself on accident.    -Should you experience any significant changes in your wound(s), such as infection (redness, swelling, localized heat, increased pain, fever > 101 F, chills) or have any questions regarding your home care instructions, please contact the wound center at [phone]. If after hours, contact your primary care physician or go to the hospital emergency room.

## 2021-06-15 NOTE — PROCEDURES
2% viscous lidocaine applied topically to wound bed for approximately 5 minutes prior to debridement. CSWD with curette to debride wound bed and periwound of non-viable tissue. Entire surface of wound, < 20 cm2 debrided. Patient tolerated the procedure well.

## 2021-06-22 ENCOUNTER — NON-PROVIDER VISIT (OUTPATIENT)
Dept: WOUND CARE | Facility: MEDICAL CENTER | Age: 71
End: 2021-06-22
Attending: PHYSICIAN ASSISTANT
Payer: COMMERCIAL

## 2021-06-22 PROCEDURE — 97602 WOUND(S) CARE NON-SELECTIVE: CPT

## 2021-06-22 NOTE — PATIENT INSTRUCTIONS
-Keep your wound dressing clean, dry, and intact.    -Change your dressing if it becomes soiled, soaked, or falls off.    -Remove your compression wrap if you have severe pain, severe swelling, numbness, color change, or temperature change in your toes. If you need to remove your compression wrap, do so by unrolling it. Do not cut the compression wrap off to prevent cutting yourself on accident.    -Should you experience any significant changes in your wound(s), such as infection (redness, swelling, localized heat, increased pain, fever > 101 F, chills) or have any questions regarding your home care instructions, please contact the wound center at [phone]. If after hours, contact your primary care physician or go to the hospital emergency room.

## 2021-06-29 ENCOUNTER — NON-PROVIDER VISIT (OUTPATIENT)
Dept: WOUND CARE | Facility: MEDICAL CENTER | Age: 71
End: 2021-06-29
Attending: PHYSICIAN ASSISTANT
Payer: COMMERCIAL

## 2021-06-29 PROCEDURE — 97602 WOUND(S) CARE NON-SELECTIVE: CPT

## 2021-06-29 NOTE — PATIENT INSTRUCTIONS
-Keep your wound dressing clean, dry, and intact.    -Remove your compression wrap if you have severe pain, severe swelling, numbness, color change, or temperature change in your toes. If you need to remove your compression wrap, do so by unrolling it. Do not cut the compression wrap off to prevent cutting yourself on accident.    -Should you experience any significant changes in your wound(s), such as infection (redness, swelling, localized heat, increased pain, fever > 101 F, chills) or have any questions regarding your home care instructions, please contact the wound center at [phone]. If after hours, contact your primary care physician or go to the hospital emergency room.

## 2021-06-29 NOTE — PROCEDURES
Non-selective debridement using foam cleanser and a washcloth to remove non-viable tissue from the wound bed and periwound area. Patient tolerated the procedure well.

## 2021-07-06 ENCOUNTER — HOSPITAL ENCOUNTER (OUTPATIENT)
Dept: LAB | Facility: MEDICAL CENTER | Age: 71
End: 2021-07-06
Attending: INTERNAL MEDICINE
Payer: MEDICARE

## 2021-07-06 ENCOUNTER — NON-PROVIDER VISIT (OUTPATIENT)
Dept: WOUND CARE | Facility: MEDICAL CENTER | Age: 71
End: 2021-07-06
Attending: PHYSICIAN ASSISTANT
Payer: MEDICARE

## 2021-07-06 DIAGNOSIS — R01.1 UNDIAGNOSED CARDIAC MURMURS: ICD-10-CM

## 2021-07-06 DIAGNOSIS — Z79.899 HIGH RISK MEDICATION USE: ICD-10-CM

## 2021-07-06 DIAGNOSIS — R16.1 SPLENOMEGALY: ICD-10-CM

## 2021-07-06 DIAGNOSIS — I10 ESSENTIAL HYPERTENSION, BENIGN: ICD-10-CM

## 2021-07-06 DIAGNOSIS — D61.818 PANCYTOPENIA (HCC): ICD-10-CM

## 2021-07-06 DIAGNOSIS — K74.60 CIRRHOSIS OF LIVER WITHOUT ASCITES, UNSPECIFIED HEPATIC CIRRHOSIS TYPE (HCC): ICD-10-CM

## 2021-07-06 DIAGNOSIS — I25.10 CORONARY ARTERY DISEASE INVOLVING NATIVE CORONARY ARTERY OF NATIVE HEART WITHOUT ANGINA PECTORIS: ICD-10-CM

## 2021-07-06 DIAGNOSIS — R93.1 ELEVATED CORONARY ARTERY CALCIUM SCORE: ICD-10-CM

## 2021-07-06 PROCEDURE — 85041 AUTOMATED RBC COUNT: CPT

## 2021-07-06 PROCEDURE — 85014 HEMATOCRIT: CPT

## 2021-07-06 PROCEDURE — 80061 LIPID PANEL: CPT

## 2021-07-06 PROCEDURE — 97602 WOUND(S) CARE NON-SELECTIVE: CPT

## 2021-07-06 PROCEDURE — 85018 HEMOGLOBIN: CPT

## 2021-07-06 PROCEDURE — 85048 AUTOMATED LEUKOCYTE COUNT: CPT

## 2021-07-06 PROCEDURE — 36415 COLL VENOUS BLD VENIPUNCTURE: CPT

## 2021-07-06 PROCEDURE — 80053 COMPREHEN METABOLIC PANEL: CPT

## 2021-07-06 NOTE — PROCEDURES
Procedures  Non-selective debridement using foam cleanser and a wash cloth to remove non-viable tissue from the wound bed and periwound area.  Patient tolerated procedure well.

## 2021-07-06 NOTE — PATIENT INSTRUCTIONS
-Keep your wound dressing clean, dry, and intact.    -Change your dressing if it becomes soiled, soaked, or falls off.    -Remove your compression wrap if you have severe pain, severe swelling, numbness, color change, or temperature change in your toes. If you need to remove your compression wrap, do so by unrolling it. Do not cut the compression wrap off to prevent cutting yourself on accident.    - Resolved wound be fragile for a few days, bathe and dry area gently, only ever regains a maximum of 80% of the tensile strength of the surrounding skin, remodeling of scar can continue for 6mo - a year. Contact PCP for a referral back her if any problems with area opening and draining again.    -Should you experience any significant changes in your wound(s), such as infection (redness, swelling, localized heat, increased pain, fever > 101 F, chills) or have any questions regarding your home care instructions, please contact the wound center at [phone]. If after hours, contact your primary care physician or go to the hospital emergency room.

## 2021-07-07 LAB
ALBUMIN SERPL BCP-MCNC: 4.3 G/DL (ref 3.2–4.9)
ALBUMIN/GLOB SERPL: 1.4 G/DL
ALP SERPL-CCNC: 91 U/L (ref 30–99)
ALT SERPL-CCNC: 14 U/L (ref 2–50)
ANION GAP SERPL CALC-SCNC: 8 MMOL/L (ref 7–16)
AST SERPL-CCNC: 17 U/L (ref 12–45)
BASOPHILS # BLD AUTO: 0.5 % (ref 0–1.8)
BASOPHILS # BLD: 0.02 K/UL (ref 0–0.12)
BILIRUB SERPL-MCNC: 0.7 MG/DL (ref 0.1–1.5)
BUN SERPL-MCNC: 17 MG/DL (ref 8–22)
CALCIUM SERPL-MCNC: 9.3 MG/DL (ref 8.5–10.5)
CHLORIDE SERPL-SCNC: 103 MMOL/L (ref 96–112)
CHOLEST SERPL-MCNC: 179 MG/DL (ref 100–199)
CO2 SERPL-SCNC: 26 MMOL/L (ref 20–33)
CREAT SERPL-MCNC: 0.93 MG/DL (ref 0.5–1.4)
EOSINOPHIL # BLD AUTO: 0.15 K/UL (ref 0–0.51)
EOSINOPHIL NFR BLD: 3.5 % (ref 0–6.9)
ERYTHROCYTE [DISTWIDTH] IN BLOOD BY AUTOMATED COUNT: 50.5 FL (ref 35.9–50)
FASTING STATUS PATIENT QL REPORTED: NORMAL
GLOBULIN SER CALC-MCNC: 3 G/DL (ref 1.9–3.5)
GLUCOSE SERPL-MCNC: 137 MG/DL (ref 65–99)
HCT VFR BLD AUTO: 49.3 % (ref 42–52)
HDLC SERPL-MCNC: 43 MG/DL
HGB BLD-MCNC: 15.6 G/DL (ref 14–18)
IMM GRANULOCYTES # BLD AUTO: 0.02 K/UL (ref 0–0.11)
IMM GRANULOCYTES NFR BLD AUTO: 0.5 % (ref 0–0.9)
LDLC SERPL CALC-MCNC: 118 MG/DL
LYMPHOCYTES # BLD AUTO: 0.71 K/UL (ref 1–4.8)
LYMPHOCYTES NFR BLD: 16.7 % (ref 22–41)
MCH RBC QN AUTO: 27.8 PG (ref 27–33)
MCHC RBC AUTO-ENTMCNC: 31.6 G/DL (ref 33.7–35.3)
MCV RBC AUTO: 87.7 FL (ref 81.4–97.8)
MONOCYTES # BLD AUTO: 0.31 K/UL (ref 0–0.85)
MONOCYTES NFR BLD AUTO: 7.3 % (ref 0–13.4)
NEUTROPHILS # BLD AUTO: 3.05 K/UL (ref 1.82–7.42)
NEUTROPHILS NFR BLD: 71.5 % (ref 44–72)
NRBC # BLD AUTO: 0 K/UL
NRBC BLD-RTO: 0 /100 WBC
POTASSIUM SERPL-SCNC: 4.5 MMOL/L (ref 3.6–5.5)
PROT SERPL-MCNC: 7.3 G/DL (ref 6–8.2)
RBC # BLD AUTO: 5.62 M/UL (ref 4.7–6.1)
SODIUM SERPL-SCNC: 137 MMOL/L (ref 135–145)
TRIGL SERPL-MCNC: 88 MG/DL (ref 0–149)
WBC # BLD AUTO: 4.3 K/UL (ref 4.8–10.8)

## 2021-07-09 ENCOUNTER — HOSPITAL ENCOUNTER (OUTPATIENT)
Dept: RADIOLOGY | Facility: MEDICAL CENTER | Age: 71
End: 2021-07-09
Attending: PAIN MEDICINE
Payer: MEDICARE

## 2021-07-09 DIAGNOSIS — M54.16 LUMBAR RADICULOPATHY: ICD-10-CM

## 2021-07-09 PROCEDURE — 72148 MRI LUMBAR SPINE W/O DYE: CPT | Mod: MH

## 2021-07-12 ENCOUNTER — OFFICE VISIT (OUTPATIENT)
Dept: CARDIOLOGY | Facility: MEDICAL CENTER | Age: 71
End: 2021-07-12
Payer: MEDICARE

## 2021-07-12 VITALS
HEIGHT: 70 IN | OXYGEN SATURATION: 93 % | WEIGHT: 181.8 LBS | BODY MASS INDEX: 26.03 KG/M2 | DIASTOLIC BLOOD PRESSURE: 70 MMHG | HEART RATE: 69 BPM | RESPIRATION RATE: 15 BRPM | SYSTOLIC BLOOD PRESSURE: 122 MMHG

## 2021-07-12 DIAGNOSIS — R93.1 ELEVATED CORONARY ARTERY CALCIUM SCORE: ICD-10-CM

## 2021-07-12 DIAGNOSIS — I10 ESSENTIAL HYPERTENSION, BENIGN: ICD-10-CM

## 2021-07-12 DIAGNOSIS — K74.60 CIRRHOSIS OF LIVER WITHOUT ASCITES, UNSPECIFIED HEPATIC CIRRHOSIS TYPE (HCC): ICD-10-CM

## 2021-07-12 DIAGNOSIS — N20.0 CALCULUS OF BOTH KIDNEYS: ICD-10-CM

## 2021-07-12 DIAGNOSIS — R16.1 SPLENOMEGALY: ICD-10-CM

## 2021-07-12 DIAGNOSIS — L03.116 CELLULITIS OF LEFT LOWER EXTREMITY: ICD-10-CM

## 2021-07-12 DIAGNOSIS — R01.1 UNDIAGNOSED CARDIAC MURMURS: ICD-10-CM

## 2021-07-12 DIAGNOSIS — Z79.899 HIGH RISK MEDICATION USE: ICD-10-CM

## 2021-07-12 DIAGNOSIS — I25.10 CORONARY ARTERY DISEASE INVOLVING NATIVE CORONARY ARTERY OF NATIVE HEART WITHOUT ANGINA PECTORIS: ICD-10-CM

## 2021-07-12 DIAGNOSIS — D61.818 PANCYTOPENIA (HCC): ICD-10-CM

## 2021-07-12 PROCEDURE — 99214 OFFICE O/P EST MOD 30 MIN: CPT | Performed by: INTERNAL MEDICINE

## 2021-07-12 RX ORDER — GABAPENTIN 100 MG/1
100 CAPSULE ORAL 3 TIMES DAILY
COMMUNITY
Start: 2021-06-16 | End: 2022-03-15

## 2021-07-12 RX ORDER — LISINOPRIL 10 MG/1
10 TABLET ORAL DAILY
Qty: 90 TABLET | Refills: 3 | Status: SHIPPED | OUTPATIENT
Start: 2021-07-12 | End: 2022-02-23

## 2021-07-12 RX ORDER — METOPROLOL SUCCINATE 25 MG/1
25 TABLET, EXTENDED RELEASE ORAL DAILY
Qty: 90 TABLET | Refills: 3 | Status: SHIPPED | OUTPATIENT
Start: 2021-07-12 | End: 2022-02-14

## 2021-07-12 RX ORDER — EZETIMIBE 10 MG/1
10 TABLET ORAL DAILY
Qty: 90 TABLET | Refills: 3 | Status: SHIPPED | OUTPATIENT
Start: 2021-07-12 | End: 2022-03-02

## 2021-07-12 ASSESSMENT — ENCOUNTER SYMPTOMS
RESPIRATORY NEGATIVE: 1
MUSCULOSKELETAL NEGATIVE: 1
GASTROINTESTINAL NEGATIVE: 1
NEUROLOGICAL NEGATIVE: 1
CHILLS: 0
SHORTNESS OF BREATH: 0
WHEEZING: 0
STRIDOR: 0
CLAUDICATION: 0
LOSS OF CONSCIOUSNESS: 0
ORTHOPNEA: 0
CARDIOVASCULAR NEGATIVE: 1
PALPITATIONS: 0
PND: 0
WEAKNESS: 0
SPUTUM PRODUCTION: 0
EYES NEGATIVE: 1
DIZZINESS: 0
SORE THROAT: 0
BRUISES/BLEEDS EASILY: 0
COUGH: 0
FEVER: 0
CONSTITUTIONAL NEGATIVE: 1
HEMOPTYSIS: 0

## 2021-07-12 ASSESSMENT — FIBROSIS 4 INDEX: FIB4 SCORE: 3.67

## 2021-07-12 NOTE — PROGRESS NOTES
Chief Complaint   Patient presents with   • Coronary Artery Disease     F/V Dx: Coronary artery disease involving native coronary artery of native heart without angina pectoris       Subjective:   Nitesh Duron is a 70 y.o. male who presents today as a follow-up for his high cholesterol and high calcium score with [facility].      Since he was last seen he retired.  He is feeling significantly is less stressed.  He is taking his Zetia.  He is again continues to reiterate that he does want a statin.  His blood pressure is controlled.  His LDL is mildly reduced.  Past Medical History:   Diagnosis Date   • Arrhythmia 2018   • Arthritis     OA- hands and L knee   • Cataract     OU   • Dental disorder     upper   • Diabetes     oral meds   • High cholesterol 2018    no med; diet controlled   • Hyperlipidemia    • Hypertension    • Renal disorder 2018    kidney stones     Past Surgical History:   Procedure Laterality Date   • GASTROSCOPY N/A 2018    Procedure: GASTROSCOPY;  Surgeon: Matt Young M.D.;  Location: SURGERY SAME DAY [facility];  Service: Gastroenterology   • ROTATOR CUFF REPAIR Left    • ANKLE ORIF Right     spiral fx   • MENISCECTOMY Left    • APPENDECTOMY     • CYSTOSCOPY  ,    stone extraction     Family History   Problem Relation Age of Onset   • Other Mother    • Heart Attack Father    • Heart Failure Brother      Social History     Socioeconomic History   • Marital status:      Spouse name: Not on file   • Number of children: Not on file   • Years of education: Not on file   • Highest education level: Not on file   Occupational History   • Not on file   Tobacco Use   • Smoking status: Former Smoker     Types: Cigarettes     Quit date: 2008     Years since quittin.5   • Smokeless tobacco: Never Used   • Tobacco comment: quit    Vaping Use   • Vaping Use: Never used   Substance and Sexual Activity   • Alcohol use: No   • Drug  use: Yes     Types: Marijuana   • Sexual activity: Never   Other Topics Concern   • Not on file   Social History Narrative   • Not on file     Social Determinants of Health     Financial Resource Strain:    • Difficulty of Paying Living Expenses:    Food Insecurity:    • Worried About Running Out of Food in the Last Year:    • Ran Out of Food in the Last Year:    Transportation Needs:    • Lack of Transportation (Medical):    • Lack of Transportation (Non-Medical):    Physical Activity:    • Days of Exercise per Week:    • Minutes of Exercise per Session:    Stress:    • Feeling of Stress :    Social Connections:    • Frequency of Communication with Friends and Family:    • Frequency of Social Gatherings with Friends and Family:    • Attends [employer] Services:    • Active Member of Clubs or Organizations:    • Attends Club or Organization Meetings:    • Marital Status:    Intimate Partner Violence:    • Fear of Current or Ex-Partner:    • Emotionally Abused:    • Physically Abused:    • Sexually Abused:      Allergies   Allergen Reactions   • Ciprofloxacin Unspecified     convulsions   • Statins [Hmg-Coa-R Inhibitors] Unspecified     Stabbing pains in kidneys   • Acetaminophen      Kidney pain    • Atorvastatin      Patient declines   • Ibuprofen      Kidney pain   • Naproxen      Kidney pain   • Shellfish Allergy      Kidney stones   • Soap &  [Alcohol] Rash     Antibacterial soap- contact dermatitis     Outpatient Encounter Medications as of 7/12/2021   Medication Sig Dispense Refill   • gabapentin (NEURONTIN) 100 MG Cap Take 100 mg by mouth 3 times a day.     • ezetimibe (ZETIA) 10 MG Tab Take 1 tablet by mouth every day. 90 tablet 3   • metoprolol SR (TOPROL XL) 25 MG TABLET SR 24 HR Take 1 tablet by mouth every day. 90 tablet 3   • lisinopril (PRINIVIL) 10 MG Tab Take 1 tablet by mouth every day. 90 tablet 3   • oxyCODONE immediate-release (ROXICODONE) 5 MG Tab      • nystatin (MYCOSTATIN) powder Apply  to affected area twice daily.  Gently cleanse area prior to reapplication of powder with mild soap and water. 60 g 2   • Potassium 99 MG Tab Take  by mouth every day.     • Non Formulary Request Collagen-1000 mg BID     • Red Yeast Rice 600 MG Tab Take  by mouth 2 Times a Day.     • Non Formulary Request Beet Root- 1000 mg daily     • Ginkgo Biloba 60 MG Cap Take  by mouth every day.     • Ginger, Zingiber officinalis, (GINGER ROOT) 550 MG Cap Take  by mouth as needed.     • tadalafil (CIALIS) 20 MG tablet      • ferrous sulfate 325 (65 Fe) MG tablet Take 325 mg by mouth 2 Times a Day.     • Cyanocobalamin (VITAMIN B-12 PO) Take  by mouth 2 Times a Day.     • Non Formulary Request Take  by mouth 2 Times a Day. Uric acid cleanse     • vitamin e (VITAMIN E) 400 UNIT Cap Take 400 Units by mouth every day.     • vitamin A 8000 UNIT capsule Take 8,000 Units by mouth 2 Times a Day.     • Cholecalciferol (VITAMIN D3) 3000 UNITS Tab Take  by mouth every day.     • Resveratrol 100 MG Cap Take 250 mg by mouth every day.     • KRILL OIL PO Take  by mouth every day.     • Omega-3 Fatty Acids (FISH OIL) 1000 MG Cap capsule Take 1,000 mg by mouth every day.     • SAW PALMETTO, SERENOA REPENS, PO Take  by mouth every day.     • [DISCONTINUED] ezetimibe (ZETIA) 10 MG Tab Take 1 Tab by mouth every day. 90 Tab 3   • [DISCONTINUED] lisinopril (PRINIVIL) 10 MG Tab Take 1 Tab by mouth every day. (Patient not taking: Reported on 7/12/2021) 90 Tab 3   • [DISCONTINUED] metoprolol SR (TOPROL XL) 25 MG TABLET SR 24 HR Take 1 Tab by mouth every day. 90 Tab 3   • [DISCONTINUED] Misc Natural Products (TART PENNY ADVANCED) Cap Take 2,500 Caps by mouth 2 Times a Day. (Patient not taking: Reported on 7/12/2021)     • [DISCONTINUED] Flaxseed, Linseed, (FLAX SEED OIL PO) Take  by mouth. (Patient not taking: Reported on 7/12/2021)     • ASTAXANTHIN PO Take 4 mg by mouth every day.     • [DISCONTINUED] metformin (GLUCOPHAGE) 1000 MG tablet Take 500  "mg by mouth 2 times a day, with meals. (Patient not taking: Reported on 7/12/2021)       No facility-administered encounter medications on file as of 7/12/2021.     Review of Systems   Constitutional: Negative.  Negative for chills, fever and malaise/fatigue.   HENT: Negative.  Negative for sore throat.    Eyes: Negative.    Respiratory: Negative.  Negative for cough, hemoptysis, sputum production, shortness of breath, wheezing and stridor.    Cardiovascular: Negative.  Negative for chest pain, palpitations, orthopnea, claudication, leg swelling and PND.   Gastrointestinal: Negative.    Genitourinary: Negative.    Musculoskeletal: Negative.    Skin: Negative.    Neurological: Negative.  Negative for dizziness, loss of consciousness and weakness.   Endo/Heme/Allergies: Negative.  Does not bruise/bleed easily.   All other systems reviewed and are negative.       Objective:   /70 (BP Location: Left arm, Patient Position: Sitting, BP Cuff Size: Adult)   Pulse 69   Resp 15   Ht 1.778 m (5' 10\")   Wt 82.5 kg (181 lb 12.8 oz)   SpO2 93%   BMI 26.09 kg/m²     Physical Exam   Constitutional: He appears well-developed. No distress.   HENT:   Head: Normocephalic and atraumatic.   Right Ear: External ear normal.   Left Ear: External ear normal.   Nose: Nose normal.   Mouth/Throat: No oropharyngeal exudate.   Eyes: Pupils are equal, round, and reactive to light. Conjunctivae are normal. Right eye exhibits no discharge. Left eye exhibits no discharge. No scleral icterus.   Neck: No JVD present.   Cardiovascular: Normal rate and regular rhythm. Exam reveals no gallop and no friction rub.   No murmur heard.  Pulmonary/Chest: Effort normal. No stridor. No respiratory distress. He has no wheezes. He has no rales. He exhibits no tenderness.   Abdominal: Soft. He exhibits no distension. There is no guarding.   Musculoskeletal:         General: No tenderness or deformity. Normal range of motion.      Cervical back: Neck " supple.   Neurological: He is alert. He has normal reflexes. He displays normal reflexes. No cranial nerve deficit. He exhibits normal muscle tone. Coordination normal.   Skin: Skin is warm and dry. No rash noted. He is not diaphoretic. No erythema. No pallor.   Psychiatric: His behavior is normal. Judgment and thought content normal.   Nursing note and vitals reviewed.      Assessment:     1. Undiagnosed cardiac murmurs     2. Splenomegaly     3. Pancytopenia (HCC)     4. High risk medication use  ezetimibe (ZETIA) 10 MG Tab   5. Essential hypertension, benign  metoprolol SR (TOPROL XL) 25 MG TABLET SR 24 HR    lisinopril (PRINIVIL) 10 MG Tab   6. Elevated coronary artery calcium score  ezetimibe (ZETIA) 10 MG Tab    metoprolol SR (TOPROL XL) 25 MG TABLET SR 24 HR   7. Coronary artery disease involving native coronary artery of native heart without angina pectoris  ezetimibe (ZETIA) 10 MG Tab   8. Cirrhosis of liver without ascites, unspecified hepatic cirrhosis type (HCC)     9. Cellulitis of left lower extremity     10. Calculus of both kidneys         Medical Decision Making:  Today's Assessment / Status / Plan:     70-year-old male with some degree of cirrhosis with an elevated calcium score high blood pressure and hyperlipidemia.  I refilled his medications.  We will keep him on the same dose of Zetia.  I broached the issue of atorvastatin and other statins numerous times with him and he declines in her time.  Otherwise we will see him back in 1 year.

## 2021-07-13 ENCOUNTER — OFFICE VISIT (OUTPATIENT)
Dept: WOUND CARE | Facility: MEDICAL CENTER | Age: 71
End: 2021-07-13
Attending: PHYSICIAN ASSISTANT
Payer: MEDICARE

## 2021-07-13 VITALS
TEMPERATURE: 97.3 F | SYSTOLIC BLOOD PRESSURE: 132 MMHG | DIASTOLIC BLOOD PRESSURE: 79 MMHG | RESPIRATION RATE: 18 BRPM | HEART RATE: 73 BPM | OXYGEN SATURATION: 97 %

## 2021-07-13 DIAGNOSIS — I83.013 VENOUS ULCER OF ANKLE, RIGHT (HCC): ICD-10-CM

## 2021-07-13 DIAGNOSIS — I87.8 VENOUS STASIS: ICD-10-CM

## 2021-07-13 DIAGNOSIS — L97.319 VENOUS ULCER OF ANKLE, RIGHT (HCC): ICD-10-CM

## 2021-07-13 DIAGNOSIS — I87.2 VENOUS INSUFFICIENCY OF RIGHT LEG: ICD-10-CM

## 2021-07-13 PROCEDURE — 99213 OFFICE O/P EST LOW 20 MIN: CPT

## 2021-07-13 PROCEDURE — 99212 OFFICE O/P EST SF 10 MIN: CPT | Performed by: NURSE PRACTITIONER

## 2021-07-13 NOTE — PROGRESS NOTES
Mr. Duron was on my schedule today for initial provider visit.  All previous clinic appointments have been with nursing.  His wounds are healed, and he is discharged from [facility].  Time spent today discussing etiology of CVI, and need for lifelong compression.  Patient states he has compression stockings at home, understands that he should wear these daily.    -Discharge from [facility]  -Patient to return to clinic ASAP if wound recurs, or if he develops new wounds    15 min spent  with patient, time spent counseling, coordinating care, reviewing records, discussing POC, educating patient regarding wound healing and progression.  This time was spent in excess to procedure time.

## 2021-07-14 ENCOUNTER — TELEPHONE (OUTPATIENT)
Dept: WOUND CARE | Facility: MEDICAL CENTER | Age: 71
End: 2021-07-14

## 2021-07-14 NOTE — TELEPHONE ENCOUNTER
[facility] prescription received for wound care supplies. Signed by Young WATSON and faxed [phone].

## 2021-07-30 ENCOUNTER — HOSPITAL ENCOUNTER (OUTPATIENT)
Dept: LAB | Facility: MEDICAL CENTER | Age: 71
End: 2021-07-30
Attending: INTERNAL MEDICINE
Payer: MEDICARE

## 2021-07-30 LAB
ALBUMIN SERPL BCP-MCNC: 4.2 G/DL (ref 3.2–4.9)
ALBUMIN/GLOB SERPL: 1.6 G/DL
ALP SERPL-CCNC: 87 U/L (ref 30–99)
ALT SERPL-CCNC: 13 U/L (ref 2–50)
ANION GAP SERPL CALC-SCNC: 9 MMOL/L (ref 7–16)
AST SERPL-CCNC: 20 U/L (ref 12–45)
BASOPHILS # BLD AUTO: 0.7 % (ref 0–1.8)
BASOPHILS # BLD: 0.02 K/UL (ref 0–0.12)
BILIRUB SERPL-MCNC: 0.7 MG/DL (ref 0.1–1.5)
BUN SERPL-MCNC: 17 MG/DL (ref 8–22)
CALCIUM SERPL-MCNC: 9 MG/DL (ref 8.5–10.5)
CHLORIDE SERPL-SCNC: 103 MMOL/L (ref 96–112)
CO2 SERPL-SCNC: 25 MMOL/L (ref 20–33)
CREAT SERPL-MCNC: 0.82 MG/DL (ref 0.5–1.4)
EOSINOPHIL # BLD AUTO: 0.07 K/UL (ref 0–0.51)
EOSINOPHIL NFR BLD: 2.3 % (ref 0–6.9)
ERYTHROCYTE [DISTWIDTH] IN BLOOD BY AUTOMATED COUNT: 52.3 FL (ref 35.9–50)
GLOBULIN SER CALC-MCNC: 2.6 G/DL (ref 1.9–3.5)
GLUCOSE SERPL-MCNC: 109 MG/DL (ref 65–99)
HBV CORE AB SERPL QL IA: REACTIVE
HBV SURFACE AB SERPL IA-ACNC: <3.5 MIU/ML (ref 0–10)
HCT VFR BLD AUTO: 43.1 % (ref 42–52)
HGB BLD-MCNC: 13.6 G/DL (ref 14–18)
IMM GRANULOCYTES # BLD AUTO: 0.01 K/UL (ref 0–0.11)
IMM GRANULOCYTES NFR BLD AUTO: 0.3 % (ref 0–0.9)
INR PPP: 1.14 (ref 0.87–1.13)
LYMPHOCYTES # BLD AUTO: 0.65 K/UL (ref 1–4.8)
LYMPHOCYTES NFR BLD: 21.3 % (ref 22–41)
MCH RBC QN AUTO: 28.3 PG (ref 27–33)
MCHC RBC AUTO-ENTMCNC: 31.6 G/DL (ref 33.7–35.3)
MCV RBC AUTO: 89.6 FL (ref 81.4–97.8)
MONOCYTES # BLD AUTO: 0.33 K/UL (ref 0–0.85)
MONOCYTES NFR BLD AUTO: 10.8 % (ref 0–13.4)
NEUTROPHILS # BLD AUTO: 1.97 K/UL (ref 1.82–7.42)
NEUTROPHILS NFR BLD: 64.6 % (ref 44–72)
NRBC # BLD AUTO: 0 K/UL
NRBC BLD-RTO: 0 /100 WBC
PLATELET # BLD AUTO: 82 K/UL (ref 164–446)
PMV BLD AUTO: 10.2 FL (ref 9–12.9)
POTASSIUM SERPL-SCNC: 4.7 MMOL/L (ref 3.6–5.5)
PROT SERPL-MCNC: 6.8 G/DL (ref 6–8.2)
PROTHROMBIN TIME: 14.3 SEC (ref 12–14.6)
RBC # BLD AUTO: 4.81 M/UL (ref 4.7–6.1)
SODIUM SERPL-SCNC: 137 MMOL/L (ref 135–145)
WBC # BLD AUTO: 3.1 K/UL (ref 4.8–10.8)

## 2021-07-30 PROCEDURE — 36415 COLL VENOUS BLD VENIPUNCTURE: CPT

## 2021-07-30 PROCEDURE — 85610 PROTHROMBIN TIME: CPT

## 2021-07-30 PROCEDURE — 85025 COMPLETE CBC W/AUTO DIFF WBC: CPT

## 2021-07-30 PROCEDURE — 80053 COMPREHEN METABOLIC PANEL: CPT

## 2021-07-30 PROCEDURE — 86708 HEPATITIS A ANTIBODY: CPT

## 2021-07-30 PROCEDURE — 82105 ALPHA-FETOPROTEIN SERUM: CPT

## 2021-07-30 PROCEDURE — 86704 HEP B CORE ANTIBODY TOTAL: CPT | Mod: GA

## 2021-07-30 PROCEDURE — 86706 HEP B SURFACE ANTIBODY: CPT | Mod: GA

## 2021-08-02 LAB
AFP-TM SERPL-MCNC: 10 NG/ML (ref 0–9)
HAV AB SER QL IA: POSITIVE

## 2021-08-27 ENCOUNTER — HOSPITAL ENCOUNTER (OUTPATIENT)
Dept: RADIOLOGY | Facility: MEDICAL CENTER | Age: 71
End: 2021-08-27
Attending: INTERNAL MEDICINE
Payer: MEDICARE

## 2021-08-27 DIAGNOSIS — K76.6 PORTAL HYPERTENSION (HCC): ICD-10-CM

## 2021-08-27 DIAGNOSIS — Z86.19 HISTORY OF HEPATITIS C: ICD-10-CM

## 2021-08-27 DIAGNOSIS — K74.60 HEPATIC CIRRHOSIS, UNSPECIFIED HEPATIC CIRRHOSIS TYPE, UNSPECIFIED WHETHER ASCITES PRESENT (HCC): ICD-10-CM

## 2021-08-27 PROCEDURE — 76700 US EXAM ABDOM COMPLETE: CPT

## 2021-09-11 ENCOUNTER — HOSPITAL ENCOUNTER (OUTPATIENT)
Dept: LAB | Facility: MEDICAL CENTER | Age: 71
End: 2021-09-11
Attending: INTERNAL MEDICINE
Payer: MEDICARE

## 2021-09-11 PROCEDURE — 36415 COLL VENOUS BLD VENIPUNCTURE: CPT

## 2021-09-11 PROCEDURE — 87517 HEPATITIS B DNA QUANT: CPT

## 2021-09-14 LAB
HBV DNA SERPL NAA+PROBE-ACNC: NOT DETECTED IU/ML
HBV DNA SERPL NAA+PROBE-LOG IU: NOT DETECTED LOG IU/ML
HBV DNA SERPL QL NAA+PROBE: NOT DETECTED

## 2021-10-12 ENCOUNTER — APPOINTMENT (OUTPATIENT)
Dept: RADIOLOGY | Facility: MEDICAL CENTER | Age: 71
End: 2021-10-12
Attending: INTERNAL MEDICINE
Payer: MEDICARE

## 2021-11-03 ENCOUNTER — APPOINTMENT (OUTPATIENT)
Dept: RADIOLOGY | Facility: MEDICAL CENTER | Age: 71
End: 2021-11-03
Attending: INTERNAL MEDICINE
Payer: MEDICARE

## 2022-02-14 DIAGNOSIS — I10 ESSENTIAL HYPERTENSION, BENIGN: ICD-10-CM

## 2022-02-14 DIAGNOSIS — R93.1 ELEVATED CORONARY ARTERY CALCIUM SCORE: ICD-10-CM

## 2022-02-14 RX ORDER — METOPROLOL SUCCINATE 25 MG/1
TABLET, EXTENDED RELEASE ORAL
Qty: 90 TABLET | Refills: 0 | Status: SHIPPED | OUTPATIENT
Start: 2022-02-14 | End: 2022-05-17

## 2022-02-23 DIAGNOSIS — I10 ESSENTIAL HYPERTENSION, BENIGN: ICD-10-CM

## 2022-02-23 RX ORDER — LISINOPRIL 10 MG/1
TABLET ORAL
Qty: 90 TABLET | Refills: 0 | Status: SHIPPED | OUTPATIENT
Start: 2022-02-23 | End: 2022-03-21 | Stop reason: SDUPTHER

## 2022-03-02 DIAGNOSIS — R93.1 ELEVATED CORONARY ARTERY CALCIUM SCORE: ICD-10-CM

## 2022-03-02 DIAGNOSIS — Z79.899 HIGH RISK MEDICATION USE: ICD-10-CM

## 2022-03-02 DIAGNOSIS — I25.10 CORONARY ARTERY DISEASE INVOLVING NATIVE CORONARY ARTERY OF NATIVE HEART WITHOUT ANGINA PECTORIS: ICD-10-CM

## 2022-03-02 RX ORDER — EZETIMIBE 10 MG/1
TABLET ORAL
Qty: 90 TABLET | Refills: 0 | Status: SHIPPED | OUTPATIENT
Start: 2022-03-02 | End: 2022-03-15

## 2022-03-09 ENCOUNTER — HOSPITAL ENCOUNTER (OUTPATIENT)
Dept: RADIOLOGY | Facility: MEDICAL CENTER | Age: 72
End: 2022-03-09
Payer: MEDICARE

## 2022-03-14 NOTE — PROGRESS NOTES
"Surgical Oncology History and Physical    Date of Evaluation: 3/15/2022    Reason for Referral: HCC    Referred By: Matt Young MD    HPI: Patient is a 70 y/o M with PMHx of HTN, DM (resolved after 70lbs weight loss - no longer on Metformin), HCV/EtOH cirrhosis (s/p treatment with Harvoni 2017 - s/p treatment for esophageal varices, no history of HE, bleeds, or ascites), \"irregular heart beat\", HLD, chronic low back pain referred to clinic after recent diagnosis of HCC on MRI.  He was first diagnosed with HCV several years ago has undergone treatment with reported irradication (per GI notes).  He additionally reports a prior history of heavy EtOH use - but is abstinent now.  He denies any complication from his cirrhosis and has been undergoing screening EGD for varices as well as q6m abdominal US for HCC screening.  His US from 2021 noted a new R sided lesion which prompted an MRI on which a LIRAD 5 lesion was identified.  He has been referred for subsequent surgical evaluation.    Summary of work-up to date:   MRI Abdomen ([facility]) - 3/1/2022:  In segment 8 there is a 2.4 x 1.9cm mass demonstrating arterial hyperenhancement, non-peripheral washout, mild T2 hyperintensity and positive restricted diffusion.  LI-RAD 5.  Liver somewhat lobular in morphology with enlarged caudate.  Gallbladder non-distended.  No biliary dilation.  No ascites.      Today he states that he is in his usual state of health.  Denies any issues other than his chronic lower back pain.  Is in the process of getting a medical MJ card.      Body mass index is 27.12 kg/m².    ECO    Past Medical History:          Past Medical History:   Diagnosis Date   • Arrhythmia 2018   • Arthritis     OA- hands and L knee   • Cataract     OU   • Dental disorder     upper   • Diabetes     oral meds   • High cholesterol 2018    no med; diet controlled   • Hyperlipidemia    • Hypertension    • Renal disorder 2018    kidney stones   -HCV " "cirrhosis  -\"Irregular heart beat\"  -DM    Past Surgical History:        Past Surgical History:   Procedure Laterality Date   • GASTROSCOPY N/A 9/18/2018    Procedure: GASTROSCOPY;  Surgeon: Matt Young M.D.;  Location: SURGERY SAME DAY [facility];  Service: Gastroenterology   • ROTATOR CUFF REPAIR Left 2014   • ORIF, ANKLE Right 1988    spiral fx   • MENISCECTOMY Left 1979   • APPENDECTOMY  1958   • CYSTOSCOPY  2004,2008    stone extraction   -open appendectomy as a child (RLQ incision)    Current Medications:   Current Outpatient Medications on File Prior to Visit   Medication Sig Dispense Refill   • lisinopril (PRINIVIL) 10 MG Tab Take 1 tablet by mouth once daily 90 Tablet 0   • metoprolol SR (TOPROL XL) 25 MG TABLET SR 24 HR Take 1 tablet by mouth once daily 90 Tablet 0   • Red Yeast Rice 600 MG Tab Take  by mouth 2 Times a Day.     • Ginger, Zingiber officinalis, (GINGER ROOT) 550 MG Cap Take  by mouth as needed.     • Cyanocobalamin (VITAMIN B-12 PO) Take  by mouth 2 Times a Day.     • vitamin e (VITAMIN E) 400 UNIT Cap Take 400 Units by mouth every day.     • vitamin A 8000 UNIT capsule Take 8,000 Units by mouth 2 Times a Day.     • Cholecalciferol (VITAMIN D3) 3000 UNITS Tab Take  by mouth every day.     • Omega-3 Fatty Acids (FISH OIL) 1000 MG Cap capsule Take 1,000 mg by mouth every day.     • ASTAXANTHIN PO Take 4 mg by mouth every day.     • ezetimibe (ZETIA) 10 MG Tab Take 1 tablet by mouth once daily 90 Tablet 0   • nystatin (MYCOSTATIN) powder Apply to affected area twice daily.  Gently cleanse area prior to reapplication of powder with mild soap and water. 60 g 2     No current facility-administered medications on file prior to visit.           -Anticoagulation:  ASA 81 (occasionally)       Allergies:         Allergies   Allergen Reactions   • Ciprofloxacin Unspecified     convulsions   • Statins [Hmg-Coa-R Inhibitors] Unspecified     Stabbing pains in kidneys   • Acetaminophen      Kidney " "pain    • Atorvastatin      Patient declines   • Ibuprofen      Kidney pain   • Naproxen      Kidney pain   • Shellfish Allergy      Kidney stones   • Soap &  [Alcohol] Rash     Antibacterial soap- contact dermatitis   -Denies known contrast allergy    Family History:          Family History   Problem Relation Age of Onset   • Other Mother    • Heart Attack Father    • Heart Failure Brother    -HTN  -DM  -Father  from CVA, CAD  -Mother with breast cancer    Social History:       -Tobacco: prior smoker - quit       -EtOH: prior heavy use - currently abstinent      -IVDU: Denies       Review of Systems:  Review of Systems - History obtained from the patient    Review of Systems   Constitutional: Negative for chills, fever and weight loss.   HENT: Negative for hearing loss and sore throat.    Eyes: Negative for blurred vision, double vision and pain.   Respiratory: Negative for cough, shortness of breath, wheezing and stridor.    Cardiovascular: Negative for chest pain, palpitations and leg swelling.   Gastrointestinal: Negative for abdominal pain, blood in stool, constipation, diarrhea, heartburn, nausea and vomiting.   Genitourinary: Negative for dysuria, frequency, hematuria and urgency.   Musculoskeletal: Positive for back pain. Negative for falls, joint pain and myalgias.   Skin: Negative for itching and rash.   Neurological: Negative for dizziness, tremors, speech change, focal weakness, seizures, weakness and headaches.   Endo/Heme/Allergies: Negative for polydipsia. Does not bruise/bleed easily.   Psychiatric/Behavioral: Negative for depression, hallucinations and memory loss.       All other ROS negative.      Physical Exam:  /80 (BP Location: Right arm, Patient Position: Sitting, BP Cuff Size: Adult)   Pulse (!) 104   Temp 37.5 °C (99.5 °F) (Temporal)   Ht 1.778 m (5' 10\")   Wt 85.7 kg (189 lb)   SpO2 97%   BMI 27.12 kg/m²      -General: Patient is cooperative, appears stated " "age, in no acute distress, AAOx3        -HEENT:  Head is atraumatic, neck supple, no scleral icterus       -Neck: trachea is midline        -Respiratory:  no increased work of breathing, stable on room air, clear to auscultation bilaterally    -Cardiovascular: normal peripheral perfusion, normal rate, irregula, no murmur appreciated    -Abdomen: soft, non-tender to palpation, well healed RLQ incision    -: Deferred       -MSK/Extremities: atraumatic, normal range of motion and strength, ambulatory         -Integumentary: warm, dry, no obvious skin lesions or rashes appreciated, no jaundice, multiple tattoos           -Neurologic: alert, speech clear and coherent, functional cognition intact           -Psychiatric:  cooperative, appropriate mood and affect         Imaging:   MRI Abdomen ([facility]) - 3/1/2022:  In segment 8 there is a 2.4 x 1.9cm mass demonstrating arterial hyperenhancement, non-peripheral washout, mild T2 hyperintensity and positive restricted diffusion.  LI-RAD 5.  Liver somewhat lobular in morphology with enlarged caudate.  Gallbladder non-distended.  No biliary dilation.  No ascites.      Pathology:   None available for review    Labs:   No recent labs in the last 6 months available for review    Assessment and Plan:  Patient is a 72 y/o M with PMHx of HTN, DM (resolved after 70lbs weight loss - no longer on Metformin), HCV/EtOH cirrhosis (s/p treatment with Harvoni 2017 - s/p treatment for esophageal varices, no history of HE, bleeds, or ascites), \"irregular heart beat\", HLD, chronic low back pain referred to clinic after recent diagnosis of HCC on MRI.  Today in clinic we reviewed his work-up to date including imaging.  We discussed the natural history of HCC including standard of care treatment recommendations as set forth in the [facility] and [facility] guidelines.  We reviewed that HCC is one of only very few cancers that can be diagnosed radiographically - and his lesion has been " designated as LI-RAD 5 which is dignostic of HCC.  As this is a new diagnosis recommendation made to obtain complete staging imaging.  We additionally discussed that in order to make appropriate treatment recommendations we will also need labs to assess his underlying synthetic liver function as this is critical to the treatment decision making process.  Will plan to obtain staging imaging and labs and have him return to clinic following this to discuss treatment strategies.      Plan:  -Labs ordered including AFP  -Staging imaging  -RTC in 2 weeks following labs and imaging to discuss treatment options    The patient asked several great questions which were answered to his satisfaction.  He is in agreement with the care plan as outlined above.      Karma Fisher MD  March 15, 2022, 2:27 PM    ADDENDUM:  Team had notified me of patient confusion regarding the testing he needed completed.  He has also been evaluated by Dr. Mckee of Medical Oncology.  I have reviewed Dr. Mckee recommendations - which are very similar as mine.  Called patient to discuss.  I reviewed with him that he needs completion staging imaging including CT chest as well as and A/P for further anatomic definition and to stage the pelvis.  Patient expressed understanding.  He has requested that he have his imaging completed at [facility] in [place].  I discussed with him that I think his authorization was already completed at [facility] (a different institution) based on a preference he had mentioned while he was here in clinic.  He stated that it would be very difficult for him to have his imaging completed at [facility].  We discussed that it may be possible for a new authorization to be obtained - however this will likely delay his care.  Patient reiterated that he would like his imaging performed at [facility].  I discussed with him as well that he does not need 2 chest CT performed - and therefore only needs the  imaging completed that I have ordered (given that I have ordered A/P as well).  He expressed understanding.  Will cancel his appointment for this upcoming Tuesday as it is not anticipated his imaging will be completed before then.  Will re-schedule his appointment for after his imaging completed.  Patient expressed gratitude for the call.    Karma Fisher MD  March 24, 2022, 11:25 AM

## 2022-03-15 ENCOUNTER — OFFICE VISIT (OUTPATIENT)
Dept: SURGICAL ONCOLOGY | Facility: MEDICAL CENTER | Age: 72
End: 2022-03-15
Payer: MEDICARE

## 2022-03-15 VITALS
TEMPERATURE: 99.5 F | OXYGEN SATURATION: 97 % | WEIGHT: 189 LBS | HEART RATE: 104 BPM | HEIGHT: 70 IN | BODY MASS INDEX: 27.06 KG/M2 | DIASTOLIC BLOOD PRESSURE: 80 MMHG | SYSTOLIC BLOOD PRESSURE: 128 MMHG

## 2022-03-15 DIAGNOSIS — C22.0 HEPATOCELLULAR CARCINOMA (HCC): ICD-10-CM

## 2022-03-15 DIAGNOSIS — E78.5 HYPERLIPIDEMIA, UNSPECIFIED HYPERLIPIDEMIA TYPE: ICD-10-CM

## 2022-03-15 DIAGNOSIS — K74.60 CIRRHOSIS OF LIVER WITHOUT ASCITES, UNSPECIFIED HEPATIC CIRRHOSIS TYPE (HCC): ICD-10-CM

## 2022-03-15 PROCEDURE — 99205 OFFICE O/P NEW HI 60 MIN: CPT | Performed by: SURGERY

## 2022-03-15 ASSESSMENT — ENCOUNTER SYMPTOMS
SHORTNESS OF BREATH: 0
BLOOD IN STOOL: 0
CONSTIPATION: 0
FEVER: 0
POLYDIPSIA: 0
HALLUCINATIONS: 0
SORE THROAT: 0
BACK PAIN: 1
MEMORY LOSS: 0
HEARTBURN: 0
WHEEZING: 0
HEADACHES: 0
EYE PAIN: 0
STRIDOR: 0
DIARRHEA: 0
WEAKNESS: 0
MYALGIAS: 0
FALLS: 0
DIZZINESS: 0
TREMORS: 0
FOCAL WEAKNESS: 0
BLURRED VISION: 0
CHILLS: 0
DEPRESSION: 0
ABDOMINAL PAIN: 0
PALPITATIONS: 0
DOUBLE VISION: 0
WEIGHT LOSS: 0
VOMITING: 0
SPEECH CHANGE: 0
NAUSEA: 0
SEIZURES: 0
COUGH: 0
BRUISES/BLEEDS EASILY: 0

## 2022-03-15 ASSESSMENT — FIBROSIS 4 INDEX: FIB4 SCORE: 4.8

## 2022-03-21 DIAGNOSIS — I10 ESSENTIAL HYPERTENSION, BENIGN: ICD-10-CM

## 2022-03-21 RX ORDER — LISINOPRIL 10 MG/1
10 TABLET ORAL DAILY
Qty: 100 TABLET | Refills: 1 | Status: ON HOLD | OUTPATIENT
Start: 2022-03-21 | End: 2022-07-25

## 2022-03-29 ENCOUNTER — APPOINTMENT (OUTPATIENT)
Dept: SURGICAL ONCOLOGY | Facility: MEDICAL CENTER | Age: 72
End: 2022-03-29
Payer: MEDICARE

## 2022-04-13 ENCOUNTER — TELEPHONE (OUTPATIENT)
Dept: SURGICAL ONCOLOGY | Facility: MEDICAL CENTER | Age: 72
End: 2022-04-13
Payer: MEDICARE

## 2022-04-13 NOTE — TELEPHONE ENCOUNTER
Called Patient to schedule follow up with  to go over scans that were ordered at the last visit. Patient was offered sooner appointment 4/20/22 but that did not work for him. Patient is scheduled for 4/26/22 to go over results from scans.

## 2022-04-19 ENCOUNTER — HOSPITAL ENCOUNTER (OUTPATIENT)
Dept: RADIOLOGY | Facility: MEDICAL CENTER | Age: 72
End: 2022-04-19
Attending: SURGERY
Payer: MEDICARE

## 2022-04-22 NOTE — PROGRESS NOTES
"Surgical Oncology History and Physical    Date of Evaluation: 4/26/2022    Reason for Referral: HCC     Referred By: Matt Young MD     HPI: Patient is a 72 y/o M with PMHx of HTN, DM (resolved after 70lbs weight loss - no longer on Metformin), HCV/EtOH cirrhosis (s/p treatment with Harvoni 2017 - s/p treatment for esophageal varices, no history of HE, bleeds, or ascites), \"irregular heart beat\", HLD, chronic low back pain referred to clinic after recent diagnosis of HCC on MRI.  He was first diagnosed with HCV several years ago has undergone treatment with reported irradication (per GI notes).  He additionally reports a prior history of heavy EtOH use - but is abstinent now.  He denies any complication from his cirrhosis and has been undergoing screening EGD for varices as well as q6m abdominal US for HCC screening.  His US from 8/2021 noted a new R sided lesion which prompted an MRI on which a LIRAD 5 lesion was identified.  He has been referred for subsequent surgical evaluation.    He was initially evaluated in clinic 3/15/2022 and recommendation made for completion staging imaging at that time with return to clinic to discuss results.  There has been a delay in him getting his work-up and subsequent return given his preferences in imaging locations, transportation and ability to make it back for return appointment - as well as confusion as Dr. Mckee ordered imaging as well.     Summary of work-up to date:   MRI Abdomen ([facility]) - 3/1/2022:  In segment 8 there is a 2.4 x 1.9cm mass demonstrating arterial hyperenhancement, non-peripheral washout, mild T2 hyperintensity and positive restricted diffusion.  LI-RAD 5.  Liver somewhat lobular in morphology with enlarged caudate.  Gallbladder non-distended.  No biliary dilation.  No ascites.      CT Chest ([place]) - 4/5/2022:  Mild atherosclerotic calcifications.  No axillary, mediastinal, or hilar adenopathy is observed.  No significant coronary artery " "calcifications.  No pericardial abnormalities.  Bilateral scattered peripheral opacities suggesting atalectasis or scarring.  No chest mass or pulmonary nodules.      CT A/P ([place]) - 2022:  Liver is contracted and heterogeneous in attenuation with nodular contours and caudate lobar disproportion.  Segment 8 ill-defined mass which is difficult to appreciate but corresponds with MRI findings measuring approximately 3.5 x 2cm (previous 2.4 x 1.9cm).  No significant biliary ductal dilation.  Gallbladder grossly unremarkable.  Spleen normal in appearance.  Pancreas with normal appearance.  Adrenals normal in size and morphology.  Small RIGHT renal cyst measuring 1.7cm.  Bilateral renal vascular calcifications.  Moderate atherosclerotic calcifications.  No obvious retroperitoneal, pelvis, or inguinal LAD.  Mild to moderate sigmoid colonic diverticula without diverticulitis.   Prostate mildly enlarged with central calcifications.  Calcified gluteal injection granulomas.  Moderate degenerative changes of the spine.  Fat containing LEFT inguinal hernia.      Today he states that he is in his usual state of health.  Denies any changes to his healthcare.  Is anxious to proceed with moving forward with treatment.    Body mass index is 29.15 kg/m².    ECO    Past Medical History:          Past Medical History:   Diagnosis Date   • Arrhythmia 2018   • Arthritis     OA- hands and L knee   • Cataract     OU   • Dental disorder     upper   • Diabetes     oral meds   • High cholesterol 2018    no med; diet controlled   • Hyperlipidemia    • Hypertension    • Renal disorder 2018    kidney stones   -HCV cirrhosis  -\"Irregular heart beat\"  -DM    Past Surgical History:        Past Surgical History:   Procedure Laterality Date   • GASTROSCOPY N/A 2018    Procedure: GASTROSCOPY;  Surgeon: Matt Young M.D.;  Location: SURGERY SAME DAY [facility];  Service: Gastroenterology   • ROTATOR CUFF REPAIR " Left    • ORIF, ANKLE Right     spiral fx   • MENISCECTOMY Left    • APPENDECTOMY     • CYSTOSCOPY  ,    stone extraction   -open appendectomy as a child (RLQ incision)    Current Medications:   Current Outpatient Medications on File Prior to Visit   Medication Sig Dispense Refill   • lisinopril (PRINIVIL) 10 MG Tab Take 1 Tablet by mouth every day. 100 Tablet 1   • metoprolol SR (TOPROL XL) 25 MG TABLET SR 24 HR Take 1 tablet by mouth once daily 90 Tablet 0   • Red Yeast Rice 600 MG Tab Take  by mouth 2 Times a Day.     • Ginger, Zingiber officinalis, (GINGER ROOT) 550 MG Cap Take  by mouth as needed.     • Cyanocobalamin (VITAMIN B-12 PO) Take  by mouth 2 Times a Day.     • vitamin e (VITAMIN E) 400 UNIT Cap Take 400 Units by mouth every day.     • vitamin A 8000 UNIT capsule Take 8,000 Units by mouth 2 Times a Day.     • Cholecalciferol (VITAMIN D3) 3000 UNITS Tab Take  by mouth every day.     • Omega-3 Fatty Acids (FISH OIL) 1000 MG Cap capsule Take 1,000 mg by mouth every day.     • ASTAXANTHIN PO Take 4 mg by mouth every day.       No current facility-administered medications on file prior to visit.           -Anticoagulation:  Denies       Allergies:         Allergies   Allergen Reactions   • Ciprofloxacin Unspecified     convulsions   • Statins [Hmg-Coa-R Inhibitors] Unspecified     Stabbing pains in kidneys   • Acetaminophen      Kidney pain    • Atorvastatin      Patient declines   • Ibuprofen      Kidney pain   • Naproxen      Kidney pain   • Shellfish Allergy      Kidney stones   • Soap &  [Alcohol] Rash     Antibacterial soap- contact dermatitis       Family History:          Family History   Problem Relation Age of Onset   • Other Mother    • Heart Attack Father    • Heart Failure Brother    -HTN  -DM  -Father  from CVA, CAD  -Mother with breast cancer  -States his is the last surviving member of his family as all other .    Social History:   -Tobacco:  "prior smoker - quit 2008      -EtOH: prior heavy use - currently abstinent      -IVDU: Denies           Review of Systems:  Review of Systems - History obtained from the patient    Review of Systems   Constitutional: Negative for chills, fever and weight loss.   HENT: Negative for hearing loss and sore throat.    Eyes: Negative for blurred vision, double vision and pain.   Respiratory: Negative for cough, shortness of breath, wheezing and stridor.    Cardiovascular: Negative for chest pain, palpitations and leg swelling.   Gastrointestinal: Negative for abdominal pain, blood in stool, constipation, diarrhea, heartburn, nausea and vomiting.   Genitourinary: Negative for dysuria, frequency, hematuria and urgency.   Musculoskeletal: Negative for back pain, falls, joint pain and myalgias.   Skin: Negative for itching and rash.   Neurological: Negative for dizziness, tremors, speech change, focal weakness, seizures, weakness and headaches.   Endo/Heme/Allergies: Negative for polydipsia. Does not bruise/bleed easily.   Psychiatric/Behavioral: Negative for depression, hallucinations and memory loss.       All other ROS negative.      Physical Exam:  /62 (BP Location: Right arm, Patient Position: Sitting, BP Cuff Size: Adult)   Pulse 77   Temp 37.3 °C (99.1 °F) (Temporal)   Ht 1.727 m (5' 8\")   Wt 87 kg (191 lb 11.2 oz)   SpO2 97%   BMI 29.15 kg/m²      -General: Patient is cooperative, appears stated age, in no acute distress, AAOx3        -HEENT:  Head is atraumatic, neck supple, no scleral icterus       -Neck: trachea is midline        -Respiratory:  no increased work of breathing, stable on room air, clear to auscultation bilaterally    -Cardiovascular: normal peripheral perfusion, normal rate, irregula, no murmur appreciated    -Abdomen: soft, non-tender to palpation, well healed RLQ incision    -: Deferred       -MSK/Extremities: atraumatic, normal range of motion and strength, ambulatory         " -Integumentary: warm, dry, no obvious skin lesions or rashes appreciated, no jaundice, multiple tattoos           -Neurologic: alert, speech clear and coherent, functional cognition intact           -Psychiatric:  cooperative, appropriate mood and affect          Imaging:   MRI Abdomen ([facility]) - 3/1/2022:    -In segment 8 there is a 2.4 x 1.9cm mass demonstrating arterial hyperenhancement, non-peripheral washout, mild T2 hyperintensity and positive restricted diffusion.  LI-RAD 5.   -Liver somewhat lobular in morphology with enlarged caudate.   - Gallbladder non-distended.    -No biliary dilation.    -No ascites.      CT Chest ([place]) - 4/5/2022:    -Mild atherosclerotic calcifications.    -No axillary, mediastinal, or hilar adenopathy is observed.    -No significant coronary artery calcifications.    -No pericardial abnormalities.    -Bilateral scattered peripheral opacities suggesting atalectasis or scarring.    -No chest mass or pulmonary nodules.      CT A/P ([place]) - 4/5/2022:    -Liver is contracted and heterogeneous in attenuation with nodular contours and caudate lobar disproportion.    -Segment 8 ill-defined mass which is difficult to appreciate but corresponds with MRI findings measuring approximately 3.5 x 2cm (previous 2.4 x 1.9cm).    -No significant biliary ductal dilation.    -Gallbladder grossly unremarkable.    -Spleen normal in appearance.    -Pancreas with normal appearance.   -Adrenals normal in size and morphology.    -Small RIGHT renal cyst measuring 1.7cm.    -Bilateral renal vascular calcifications.    -Moderate atherosclerotic calcifications.    -No obvious retroperitoneal, pelvis, or inguinal LAD.    -Mild to moderate sigmoid colonic diverticula without diverticulitis.    - Prostate mildly enlarged with central calcifications.   -Calcified gluteal injection granulomas.    -Moderate degenerative changes of the spine.    -Fat containing LEFT inguinal hernia.      Pathology:   None available  "for review    Labs:   Reviewed from 3/19/2022:  -CBC: WBC 4.1, H/H 13.6/41.3, Platelets 92  -BMP: unremarkable electrolytes with Cr 0.8  -LFT: T bili 0.7, AST 17, ALT 21, Alk Phos 75, Albumin 3.5  -AFP: 13.9    Assessment and Plan:  Patient is a 70 y/o M with PMHx of HTN, DM (resolved after 70lbs weight loss - no longer on Metformin), HCV/EtOH cirrhosis (s/p treatment with Harvoni 2017 - s/p treatment for esophageal varices, no history of HE, bleeds, or ascites), \"irregular heart beat\", HLD, chronic low back pain referred to clinic after recent diagnosis of HCC on MRI.  Today in clinic we reviewed his work-up to date including most recent imaging and labs.  Although the contrast timing on his recent CT scan is not ideal for a liver protocol we discussed that his MRI is consistent with a LIRAD 5 lesion which is diagnostic of hepatocellular carcinoma (HCC).  His imaging demonstrates no evidence of metastatic disease.  His tumor marker (AFP) is mildly elevated at 13.9.  Additionally his liver function is preserved with a MELD of 6 and Len score of A6 - although he does have radiographic evidence of obvious cirrhosis as well as thrombocytopenia.  Today we discussed the natural history of HCC including standard of care treatment recommendations as set forth in the NCCN and [facility] Liver Cancer guidelines.  Cartoons and diagrams were utilized to demonstrate relevant anatomy.  Although he does fall within [place] criteria for consideration of transplant - I suspect that his age will likely be a precluding factor for potential listing and/or further transplant evaluation.  We discussed that I believe he would be an acceptable candidate for laparoscopic ablation and in order to accomplish this safely I believe he would need a cholecystectomy as well given the relationship of the tumor to gallbladder wall.  We discussed that should intra-operative ultrasound demonstrate tumor to be further away for gallbladder which " would allow for safe ablation without risk to gallbladder then it is possible that the cholecystectomy would be avoided - however I am doubtful that this would be the case.  Patient did initially express some hesitancy towards moving forward with a surgical procedure and we discussed that an alternative option would be for IR embolization (bland v. Y90) - but again given his relationship between the tumor and gallbladder would put him at risk of developing potential cholecystitis, and that I suspect the success rate for tumor destruction to be better with ablation compared with embolization given he is a candidate for ablation.  Following detailed discussion - patient in agreement to proceed with surgical intervention.  Patient is aware that he will require detailed and lifelong surveillance given that this entire liver is at risk given his history of cirrhosis.    Recommendation made for: laparoscopic (possible open) cholecystectomy, laparoscopic liver ablation, possible liver biopsy, intraoperative ultrasound of liver, all other indicated procedures.    I described the procedure in detail including purpose, potential risks/complications, as well as benefits and alternative treatments.  Risks of surgery are included but not limited to: pain, scar, bleeding, infection, damage to surrounding structures including nerves, vessels, and tissues, bile duct injury, wound healing complications, hernia, need for re-operation or additional procedures, bile leak, post-hepatectomy liver insufficiency/failure, venous thromboembolism, local or systemic disease recurrence, cardiac or pulmonary complications, or death.  I explained that despite complete resection, recurrence (local or systemic) may still occur in the future.  We discussed in detail the patient's expectations and the likelihood of the patient achieving their clinical goals post-procedure, including the expected course of post-procedure recovery and rehabilitation.   All of the patient's questions about the procedure were answered and the patient has authorized the performance of the procedure.      I additionally addressed the [facility] policy recommendation for COVID-19 testing prior to surgery for unvaccinated patients.  We discussed the importance of self-quarantine 7-14 days prior to surgery to decrease risk of COVID exposure.  Even with after vaccination or testing we did acknowledge the risk of potentially being an asymptomatic carrier or pre-symptomatic patient in the setting of general anesthesia could results in a worse course of COVID-19 infection or death.    Plan:  -Surgical orders placed  -Scheduling for next available OR  -He continues to remain abstinent from alcohol and we discussed the importance of this.      The patient asked several great questions which were answered to his satisfaction.  He is in agreement with the care plan as outlined above.    Karma Fisher MD  April 26, 2022, 1:02 PM

## 2022-04-26 ENCOUNTER — OFFICE VISIT (OUTPATIENT)
Dept: SURGICAL ONCOLOGY | Facility: MEDICAL CENTER | Age: 72
End: 2022-04-26
Payer: MEDICARE

## 2022-04-26 VITALS
HEIGHT: 68 IN | DIASTOLIC BLOOD PRESSURE: 62 MMHG | SYSTOLIC BLOOD PRESSURE: 124 MMHG | WEIGHT: 191.7 LBS | OXYGEN SATURATION: 97 % | HEART RATE: 77 BPM | BODY MASS INDEX: 29.05 KG/M2 | TEMPERATURE: 99.1 F

## 2022-04-26 DIAGNOSIS — C22.0 HEPATOCELLULAR CARCINOMA (HCC): ICD-10-CM

## 2022-04-26 DIAGNOSIS — K74.60 CIRRHOSIS OF LIVER WITHOUT ASCITES, UNSPECIFIED HEPATIC CIRRHOSIS TYPE (HCC): ICD-10-CM

## 2022-04-26 PROCEDURE — 99215 OFFICE O/P EST HI 40 MIN: CPT | Performed by: SURGERY

## 2022-04-26 ASSESSMENT — ENCOUNTER SYMPTOMS
FOCAL WEAKNESS: 0
HALLUCINATIONS: 0
WEAKNESS: 0
VOMITING: 0
EYE PAIN: 0
MYALGIAS: 0
SORE THROAT: 0
DIARRHEA: 0
ABDOMINAL PAIN: 0
SHORTNESS OF BREATH: 0
BLOOD IN STOOL: 0
WEIGHT LOSS: 0
POLYDIPSIA: 0
TREMORS: 0
HEARTBURN: 0
BACK PAIN: 0
BLURRED VISION: 0
STRIDOR: 0
SPEECH CHANGE: 0
COUGH: 0
MEMORY LOSS: 0
NAUSEA: 0
FEVER: 0
PALPITATIONS: 0
WHEEZING: 0
BRUISES/BLEEDS EASILY: 0
CHILLS: 0
SEIZURES: 0
CONSTIPATION: 0
FALLS: 0
DIZZINESS: 0
DEPRESSION: 0
DOUBLE VISION: 0
HEADACHES: 0

## 2022-04-26 ASSESSMENT — FIBROSIS 4 INDEX: FIB4 SCORE: 4.8

## 2022-04-27 ENCOUNTER — HOSPITAL ENCOUNTER (OUTPATIENT)
Facility: MEDICAL CENTER | Age: 72
End: 2022-04-27
Attending: SURGERY | Admitting: SURGERY
Payer: MEDICARE

## 2022-05-02 ENCOUNTER — TELEPHONE (OUTPATIENT)
Dept: SURGERY | Facility: MEDICAL CENTER | Age: 72
End: 2022-05-02

## 2022-05-02 ENCOUNTER — HOSPITAL ENCOUNTER (OUTPATIENT)
Facility: MEDICAL CENTER | Age: 72
End: 2022-05-02
Attending: SURGERY | Admitting: SURGERY
Payer: MEDICARE

## 2022-05-02 RX ORDER — SODIUM CHLORIDE, SODIUM LACTATE, POTASSIUM CHLORIDE, CALCIUM CHLORIDE 600; 310; 30; 20 MG/100ML; MG/100ML; MG/100ML; MG/100ML
INJECTION, SOLUTION INTRAVENOUS CONTINUOUS
Status: CANCELLED | OUTPATIENT
Start: 2022-05-02 | End: 2022-05-02

## 2022-05-02 NOTE — TELEPHONE ENCOUNTER
Spoke to patient this AM in pre-admitting.  Was scheduled for OR today for cholecystectomy and ablation.  Unfortunately his authorization is still pending approval by his insurance company (was sent last Wednesday and marked as urgent - my clinic team was told approval was expected last week) as of this AM.  We discussed that unfortunately given the pending status his case will have to be canceled.  Patient expressed disappointment, which is understandable.  My team is working to get him rescheduled ASAP as soon as authorization is approved.      Karma Fisher MD  May 2, 2022, 9:25 AM

## 2022-05-03 ENCOUNTER — TELEPHONE (OUTPATIENT)
Dept: SURGERY | Facility: MEDICAL CENTER | Age: 72
End: 2022-05-03
Payer: MEDICARE

## 2022-05-03 NOTE — TELEPHONE ENCOUNTER
"Received notification that patient called clinic today requesting to cancel his surgical procedure.  I called him this afternoon to discuss.  The authorization which required us to cancel his case yesterday has gone through - although patient now states that he is unable to make the payment for the copay required by his insurance.  He additionally states that he has been on a payment plan with [facility] before and was disappointed with it as he reports he was making payments but [facility] reported him to the crediting agency for not making payments and it \"ruined his credit.\"  He reports that he is trying to purchase a home with his girlfriend and is not in a position to \"have my credit messed up\" at this time.  He additionally states that the \"tumor has not grown that much - and that I would rather wait until I can switch my insurance at re-enrollment time before proceeding with treatment.\"    I discussed with him that while I am certainly sorry that he has had issues in the past - and that we had the issue we did yesterday - I certainly can NOT medially recommend delaying treating his cancer.  We discussed that untreated cancers will only progress - and that it is possible that during that time his disease may progress to a point where the treatment intent would have to go from curative intentt to palliative intent - or even that he could have a significant complication or even mortality from his disease.  He state that he is aware but at this time he wants to hold off on treatment - and would rather \"keep a close eye on it\".  I told him that I was going to reach out to our  to reach out to him regarding financial options for him to receive treatment.  He was agreeable to this.    I reached out to Genny Fitzgerald who stated that financial payment plans are really the only option - and she stated that she would place a consult to our financial team to reach out to him.    Given patient not wanting to pursue " treatment at this time I discussed with him that I will be canceling his case which is currently scheduled for next Monday 5/9.  I discussed that I would be reaching out to him next week to have a repeat discussion following him being able to discuss with the financial team.  He expressed understanding.    Karma Fisher MD  May 3, 2022, 2:52 PM

## 2022-05-09 ENCOUNTER — TELEPHONE (OUTPATIENT)
Dept: SURGERY | Facility: MEDICAL CENTER | Age: 72
End: 2022-05-09

## 2022-05-09 NOTE — TELEPHONE ENCOUNTER
Attempted to reach patient regarding follow-up after his discussion with the Financial counselors - however call went to unidentified voicemail.  Will continue to try and reach out.    Karma Fisher MD  May 9, 2022, 2:44 PM

## 2022-05-12 ENCOUNTER — PRE-ADMISSION TESTING (OUTPATIENT)
Dept: ADMISSIONS | Facility: MEDICAL CENTER | Age: 72
End: 2022-05-12
Attending: SURGERY
Payer: MEDICARE

## 2022-05-12 DIAGNOSIS — Z01.810 PRE-OPERATIVE CARDIOVASCULAR EXAMINATION: ICD-10-CM

## 2022-05-12 DIAGNOSIS — Z01.812 PRE-OPERATIVE LABORATORY EXAMINATION: ICD-10-CM

## 2022-05-12 LAB
ABO GROUP BLD: NORMAL
ALBUMIN SERPL BCP-MCNC: 4.2 G/DL (ref 3.2–4.9)
ALBUMIN/GLOB SERPL: 1.5 G/DL
ALP SERPL-CCNC: 74 U/L (ref 30–99)
ALT SERPL-CCNC: 10 U/L (ref 2–50)
ANION GAP SERPL CALC-SCNC: 11 MMOL/L (ref 7–16)
AST SERPL-CCNC: 14 U/L (ref 12–45)
BASOPHILS # BLD AUTO: 0.8 % (ref 0–1.8)
BASOPHILS # BLD: 0.03 K/UL (ref 0–0.12)
BILIRUB SERPL-MCNC: 0.6 MG/DL (ref 0.1–1.5)
BLD GP AB SCN SERPL QL: NORMAL
BUN SERPL-MCNC: 16 MG/DL (ref 8–22)
CALCIUM SERPL-MCNC: 9.2 MG/DL (ref 8.5–10.5)
CHLORIDE SERPL-SCNC: 103 MMOL/L (ref 96–112)
CO2 SERPL-SCNC: 25 MMOL/L (ref 20–33)
CREAT SERPL-MCNC: 0.84 MG/DL (ref 0.5–1.4)
EKG IMPRESSION: NORMAL
EOSINOPHIL # BLD AUTO: 0.13 K/UL (ref 0–0.51)
EOSINOPHIL NFR BLD: 3.4 % (ref 0–6.9)
ERYTHROCYTE [DISTWIDTH] IN BLOOD BY AUTOMATED COUNT: 44.9 FL (ref 35.9–50)
EST. AVERAGE GLUCOSE BLD GHB EST-MCNC: 134 MG/DL
GFR SERPLBLD CREATININE-BSD FMLA CKD-EPI: 93 ML/MIN/1.73 M 2
GLOBULIN SER CALC-MCNC: 2.8 G/DL (ref 1.9–3.5)
GLUCOSE SERPL-MCNC: 131 MG/DL (ref 65–99)
HBA1C MFR BLD: 6.3 % (ref 4–5.6)
HCT VFR BLD AUTO: 42.8 % (ref 42–52)
HGB BLD-MCNC: 14.4 G/DL (ref 14–18)
IMM GRANULOCYTES # BLD AUTO: 0.01 K/UL (ref 0–0.11)
IMM GRANULOCYTES NFR BLD AUTO: 0.3 % (ref 0–0.9)
INR PPP: 1.11 (ref 0.87–1.13)
LYMPHOCYTES # BLD AUTO: 0.89 K/UL (ref 1–4.8)
LYMPHOCYTES NFR BLD: 23.5 % (ref 22–41)
MCH RBC QN AUTO: 29.8 PG (ref 27–33)
MCHC RBC AUTO-ENTMCNC: 33.6 G/DL (ref 33.7–35.3)
MCV RBC AUTO: 88.6 FL (ref 81.4–97.8)
MONOCYTES # BLD AUTO: 0.34 K/UL (ref 0–0.85)
MONOCYTES NFR BLD AUTO: 9 % (ref 0–13.4)
NEUTROPHILS # BLD AUTO: 2.38 K/UL (ref 1.82–7.42)
NEUTROPHILS NFR BLD: 63 % (ref 44–72)
NRBC # BLD AUTO: 0 K/UL
NRBC BLD-RTO: 0 /100 WBC
PLATELET # BLD AUTO: 81 K/UL (ref 164–446)
PMV BLD AUTO: 9.7 FL (ref 9–12.9)
POTASSIUM SERPL-SCNC: 4.1 MMOL/L (ref 3.6–5.5)
PROT SERPL-MCNC: 7 G/DL (ref 6–8.2)
PROTHROMBIN TIME: 14 SEC (ref 12–14.6)
RBC # BLD AUTO: 4.83 M/UL (ref 4.7–6.1)
RH BLD: NORMAL
SODIUM SERPL-SCNC: 139 MMOL/L (ref 135–145)
WBC # BLD AUTO: 3.8 K/UL (ref 4.8–10.8)

## 2022-05-12 PROCEDURE — 86850 RBC ANTIBODY SCREEN: CPT

## 2022-05-12 PROCEDURE — 86900 BLOOD TYPING SEROLOGIC ABO: CPT

## 2022-05-12 PROCEDURE — 80053 COMPREHEN METABOLIC PANEL: CPT

## 2022-05-12 PROCEDURE — 85025 COMPLETE CBC W/AUTO DIFF WBC: CPT

## 2022-05-12 PROCEDURE — 36415 COLL VENOUS BLD VENIPUNCTURE: CPT

## 2022-05-12 PROCEDURE — 93005 ELECTROCARDIOGRAM TRACING: CPT

## 2022-05-12 PROCEDURE — 83036 HEMOGLOBIN GLYCOSYLATED A1C: CPT

## 2022-05-12 PROCEDURE — 85610 PROTHROMBIN TIME: CPT

## 2022-05-12 PROCEDURE — 86901 BLOOD TYPING SEROLOGIC RH(D): CPT

## 2022-05-12 PROCEDURE — 93010 ELECTROCARDIOGRAM REPORT: CPT | Performed by: INTERNAL MEDICINE

## 2022-05-12 RX ORDER — OXYCODONE HYDROCHLORIDE 5 MG/1
5 TABLET ORAL EVERY 4 HOURS PRN
COMMUNITY

## 2022-05-12 RX ORDER — EZETIMIBE 10 MG/1
10 TABLET ORAL EVERY EVENING
COMMUNITY
End: 2022-06-02

## 2022-05-12 ASSESSMENT — FIBROSIS 4 INDEX: FIB4 SCORE: 4.8

## 2022-05-12 NOTE — PREPROCEDURE INSTRUCTIONS
Patient here for preadmit appointment.  Patient instructed to stop vitamins/supplements/herbal medications/diet pills and giving fasting instructions- all per anesthesia guidelines.  Patient understands all instructions and denies questions at this time.

## 2022-05-19 ENCOUNTER — ANESTHESIA (OUTPATIENT)
Dept: SURGERY | Facility: MEDICAL CENTER | Age: 72
End: 2022-05-19
Payer: MEDICARE

## 2022-05-19 ENCOUNTER — TELEPHONE (OUTPATIENT)
Dept: SURGERY | Facility: MEDICAL CENTER | Age: 72
End: 2022-05-19
Payer: MEDICARE

## 2022-05-19 ENCOUNTER — HOSPITAL ENCOUNTER (OUTPATIENT)
Facility: MEDICAL CENTER | Age: 72
End: 2022-05-19
Attending: SURGERY | Admitting: SURGERY
Payer: MEDICARE

## 2022-05-19 ENCOUNTER — ANESTHESIA EVENT (OUTPATIENT)
Dept: SURGERY | Facility: MEDICAL CENTER | Age: 72
End: 2022-05-19
Payer: MEDICARE

## 2022-05-19 VITALS
OXYGEN SATURATION: 98 % | BODY MASS INDEX: 26.67 KG/M2 | DIASTOLIC BLOOD PRESSURE: 59 MMHG | RESPIRATION RATE: 16 BRPM | TEMPERATURE: 98.4 F | SYSTOLIC BLOOD PRESSURE: 121 MMHG | WEIGHT: 186.29 LBS | HEIGHT: 70 IN | HEART RATE: 68 BPM

## 2022-05-19 DIAGNOSIS — C22.0 HEPATOCELLULAR CARCINOMA (HCC): ICD-10-CM

## 2022-05-19 DIAGNOSIS — K74.60 CIRRHOSIS OF LIVER WITHOUT ASCITES, UNSPECIFIED HEPATIC CIRRHOSIS TYPE (HCC): ICD-10-CM

## 2022-05-19 LAB
ABO + RH BLD: NORMAL
GLUCOSE BLD STRIP.AUTO-MCNC: 88 MG/DL (ref 65–99)

## 2022-05-19 PROCEDURE — 160036 HCHG PACU - EA ADDL 30 MINS PHASE I: Performed by: SURGERY

## 2022-05-19 PROCEDURE — 160048 HCHG OR STATISTICAL LEVEL 1-5: Performed by: SURGERY

## 2022-05-19 PROCEDURE — 49320 DIAG LAPARO SEPARATE PROC: CPT | Mod: AS | Performed by: NURSE PRACTITIONER

## 2022-05-19 PROCEDURE — 160041 HCHG SURGERY MINUTES - EA ADDL 1 MIN LEVEL 4: Performed by: SURGERY

## 2022-05-19 PROCEDURE — 82962 GLUCOSE BLOOD TEST: CPT

## 2022-05-19 PROCEDURE — 160029 HCHG SURGERY MINUTES - 1ST 30 MINS LEVEL 4: Performed by: SURGERY

## 2022-05-19 PROCEDURE — 00790 ANES IPER UPR ABD NOS: CPT | Performed by: ANESTHESIOLOGY

## 2022-05-19 PROCEDURE — 99100 ANES PT EXTEME AGE<1 YR&>70: CPT | Performed by: ANESTHESIOLOGY

## 2022-05-19 PROCEDURE — 700111 HCHG RX REV CODE 636 W/ 250 OVERRIDE (IP): Performed by: ANESTHESIOLOGY

## 2022-05-19 PROCEDURE — 76998 US GUIDE INTRAOP: CPT | Mod: 26 | Performed by: SURGERY

## 2022-05-19 PROCEDURE — 49320 DIAG LAPARO SEPARATE PROC: CPT | Performed by: SURGERY

## 2022-05-19 PROCEDURE — 501838 HCHG SUTURE GENERAL: Performed by: SURGERY

## 2022-05-19 PROCEDURE — 160002 HCHG RECOVERY MINUTES (STAT): Performed by: SURGERY

## 2022-05-19 PROCEDURE — 110454 HCHG SHELL REV 250: Performed by: SURGERY

## 2022-05-19 PROCEDURE — A9270 NON-COVERED ITEM OR SERVICE: HCPCS | Performed by: ANESTHESIOLOGY

## 2022-05-19 PROCEDURE — 501571 HCHG TROCAR, SEPARATOR 12X100: Performed by: SURGERY

## 2022-05-19 PROCEDURE — 700105 HCHG RX REV CODE 258: Performed by: SURGERY

## 2022-05-19 PROCEDURE — 160046 HCHG PACU - 1ST 60 MINS PHASE II: Performed by: SURGERY

## 2022-05-19 PROCEDURE — 160035 HCHG PACU - 1ST 60 MINS PHASE I: Performed by: SURGERY

## 2022-05-19 PROCEDURE — 160009 HCHG ANES TIME/MIN: Performed by: SURGERY

## 2022-05-19 PROCEDURE — 160025 RECOVERY II MINUTES (STATS): Performed by: SURGERY

## 2022-05-19 PROCEDURE — 700101 HCHG RX REV CODE 250: Performed by: ANESTHESIOLOGY

## 2022-05-19 PROCEDURE — 700102 HCHG RX REV CODE 250 W/ 637 OVERRIDE(OP): Performed by: ANESTHESIOLOGY

## 2022-05-19 PROCEDURE — 36415 COLL VENOUS BLD VENIPUNCTURE: CPT

## 2022-05-19 PROCEDURE — 501570 HCHG TROCAR, SEPARATOR: Performed by: SURGERY

## 2022-05-19 PROCEDURE — 700101 HCHG RX REV CODE 250: Performed by: SURGERY

## 2022-05-19 RX ORDER — SODIUM CHLORIDE, SODIUM LACTATE, POTASSIUM CHLORIDE, CALCIUM CHLORIDE 600; 310; 30; 20 MG/100ML; MG/100ML; MG/100ML; MG/100ML
INJECTION, SOLUTION INTRAVENOUS CONTINUOUS
Status: ACTIVE | OUTPATIENT
Start: 2022-05-19 | End: 2022-05-19

## 2022-05-19 RX ORDER — SODIUM CHLORIDE, SODIUM LACTATE, POTASSIUM CHLORIDE, CALCIUM CHLORIDE 600; 310; 30; 20 MG/100ML; MG/100ML; MG/100ML; MG/100ML
INJECTION, SOLUTION INTRAVENOUS CONTINUOUS
Status: DISCONTINUED | OUTPATIENT
Start: 2022-05-19 | End: 2022-05-19 | Stop reason: HOSPADM

## 2022-05-19 RX ORDER — OXYCODONE HCL 5 MG/5 ML
10 SOLUTION, ORAL ORAL
Status: COMPLETED | OUTPATIENT
Start: 2022-05-19 | End: 2022-05-19

## 2022-05-19 RX ORDER — ALBUTEROL SULFATE 2.5 MG/3ML
2.5 SOLUTION RESPIRATORY (INHALATION)
Status: DISCONTINUED | OUTPATIENT
Start: 2022-05-19 | End: 2022-05-19 | Stop reason: HOSPADM

## 2022-05-19 RX ORDER — ONDANSETRON 2 MG/ML
4 INJECTION INTRAMUSCULAR; INTRAVENOUS
Status: DISCONTINUED | OUTPATIENT
Start: 2022-05-19 | End: 2022-05-19 | Stop reason: HOSPADM

## 2022-05-19 RX ORDER — DEXAMETHASONE SODIUM PHOSPHATE 4 MG/ML
INJECTION, SOLUTION INTRA-ARTICULAR; INTRALESIONAL; INTRAMUSCULAR; INTRAVENOUS; SOFT TISSUE PRN
Status: DISCONTINUED | OUTPATIENT
Start: 2022-05-19 | End: 2022-05-19 | Stop reason: SURG

## 2022-05-19 RX ORDER — ROCURONIUM BROMIDE 10 MG/ML
INJECTION, SOLUTION INTRAVENOUS PRN
Status: DISCONTINUED | OUTPATIENT
Start: 2022-05-19 | End: 2022-05-19 | Stop reason: SURG

## 2022-05-19 RX ORDER — LIDOCAINE HYDROCHLORIDE 10 MG/ML
INJECTION, SOLUTION INFILTRATION; PERINEURAL PRN
Status: DISCONTINUED | OUTPATIENT
Start: 2022-05-19 | End: 2022-05-19 | Stop reason: SURG

## 2022-05-19 RX ORDER — OXYCODONE HCL 5 MG/5 ML
5 SOLUTION, ORAL ORAL
Status: COMPLETED | OUTPATIENT
Start: 2022-05-19 | End: 2022-05-19

## 2022-05-19 RX ORDER — HYDRALAZINE HYDROCHLORIDE 20 MG/ML
5 INJECTION INTRAMUSCULAR; INTRAVENOUS
Status: DISCONTINUED | OUTPATIENT
Start: 2022-05-19 | End: 2022-05-19 | Stop reason: HOSPADM

## 2022-05-19 RX ORDER — ONDANSETRON 2 MG/ML
INJECTION INTRAMUSCULAR; INTRAVENOUS PRN
Status: DISCONTINUED | OUTPATIENT
Start: 2022-05-19 | End: 2022-05-19 | Stop reason: SURG

## 2022-05-19 RX ORDER — HYDROMORPHONE HYDROCHLORIDE 1 MG/ML
0.4 INJECTION, SOLUTION INTRAMUSCULAR; INTRAVENOUS; SUBCUTANEOUS
Status: DISCONTINUED | OUTPATIENT
Start: 2022-05-19 | End: 2022-05-19 | Stop reason: HOSPADM

## 2022-05-19 RX ORDER — BUPIVACAINE HYDROCHLORIDE AND EPINEPHRINE 5; 5 MG/ML; UG/ML
INJECTION, SOLUTION PERINEURAL
Status: DISCONTINUED | OUTPATIENT
Start: 2022-05-19 | End: 2022-05-19 | Stop reason: HOSPADM

## 2022-05-19 RX ORDER — MEPERIDINE HYDROCHLORIDE 25 MG/ML
12.5 INJECTION INTRAMUSCULAR; INTRAVENOUS; SUBCUTANEOUS
Status: DISCONTINUED | OUTPATIENT
Start: 2022-05-19 | End: 2022-05-19 | Stop reason: HOSPADM

## 2022-05-19 RX ORDER — HYDROMORPHONE HYDROCHLORIDE 1 MG/ML
0.2 INJECTION, SOLUTION INTRAMUSCULAR; INTRAVENOUS; SUBCUTANEOUS
Status: DISCONTINUED | OUTPATIENT
Start: 2022-05-19 | End: 2022-05-19 | Stop reason: HOSPADM

## 2022-05-19 RX ORDER — HALOPERIDOL 5 MG/ML
1 INJECTION INTRAMUSCULAR
Status: DISCONTINUED | OUTPATIENT
Start: 2022-05-19 | End: 2022-05-19 | Stop reason: HOSPADM

## 2022-05-19 RX ORDER — DIPHENHYDRAMINE HYDROCHLORIDE 50 MG/ML
6.25 INJECTION INTRAMUSCULAR; INTRAVENOUS
Status: DISCONTINUED | OUTPATIENT
Start: 2022-05-19 | End: 2022-05-19 | Stop reason: HOSPADM

## 2022-05-19 RX ORDER — CEFAZOLIN SODIUM 1 G/3ML
INJECTION, POWDER, FOR SOLUTION INTRAMUSCULAR; INTRAVENOUS PRN
Status: DISCONTINUED | OUTPATIENT
Start: 2022-05-19 | End: 2022-05-19 | Stop reason: SURG

## 2022-05-19 RX ORDER — HYDROMORPHONE HYDROCHLORIDE 1 MG/ML
0.1 INJECTION, SOLUTION INTRAMUSCULAR; INTRAVENOUS; SUBCUTANEOUS
Status: DISCONTINUED | OUTPATIENT
Start: 2022-05-19 | End: 2022-05-19 | Stop reason: HOSPADM

## 2022-05-19 RX ADMIN — FENTANYL CITRATE 50 MCG: 50 INJECTION, SOLUTION INTRAMUSCULAR; INTRAVENOUS at 08:42

## 2022-05-19 RX ADMIN — SUGAMMADEX 200 MG: 100 INJECTION, SOLUTION INTRAVENOUS at 08:11

## 2022-05-19 RX ADMIN — FENTANYL CITRATE 50 MCG: 50 INJECTION, SOLUTION INTRAMUSCULAR; INTRAVENOUS at 07:59

## 2022-05-19 RX ADMIN — OXYCODONE HYDROCHLORIDE 10 MG: 5 SOLUTION ORAL at 08:39

## 2022-05-19 RX ADMIN — EPHEDRINE SULFATE 10 MG: 50 INJECTION, SOLUTION INTRAVENOUS at 07:36

## 2022-05-19 RX ADMIN — SODIUM CHLORIDE, POTASSIUM CHLORIDE, SODIUM LACTATE AND CALCIUM CHLORIDE: 600; 310; 30; 20 INJECTION, SOLUTION INTRAVENOUS at 06:32

## 2022-05-19 RX ADMIN — FENTANYL CITRATE 25 MCG: 50 INJECTION, SOLUTION INTRAMUSCULAR; INTRAVENOUS at 08:53

## 2022-05-19 RX ADMIN — ONDANSETRON 4 MG: 2 INJECTION INTRAMUSCULAR; INTRAVENOUS at 08:09

## 2022-05-19 RX ADMIN — PROPOFOL 180 MG: 10 INJECTION, EMULSION INTRAVENOUS at 07:31

## 2022-05-19 RX ADMIN — FENTANYL CITRATE 100 MCG: 50 INJECTION, SOLUTION INTRAMUSCULAR; INTRAVENOUS at 07:31

## 2022-05-19 RX ADMIN — CEFAZOLIN 2 G: 330 INJECTION, POWDER, FOR SOLUTION INTRAMUSCULAR; INTRAVENOUS at 07:31

## 2022-05-19 RX ADMIN — ROCURONIUM BROMIDE 60 MG: 10 INJECTION, SOLUTION INTRAVENOUS at 07:31

## 2022-05-19 RX ADMIN — DEXAMETHASONE SODIUM PHOSPHATE 8 MG: 4 INJECTION, SOLUTION INTRA-ARTICULAR; INTRALESIONAL; INTRAMUSCULAR; INTRAVENOUS; SOFT TISSUE at 07:36

## 2022-05-19 RX ADMIN — LIDOCAINE HYDROCHLORIDE 80 MG: 10 INJECTION, SOLUTION INFILTRATION; PERINEURAL at 07:31

## 2022-05-19 ASSESSMENT — PAIN DESCRIPTION - PAIN TYPE
TYPE: SURGICAL PAIN
TYPE: ACUTE PAIN
TYPE: SURGICAL PAIN
TYPE: SURGICAL PAIN

## 2022-05-19 ASSESSMENT — FIBROSIS 4 INDEX: FIB4 SCORE: 3.88

## 2022-05-19 NOTE — ANESTHESIA TIME REPORT
Anesthesia Start and Stop Event Times     Date Time Event    5/19/2022 0727 Ready for Procedure     0727 Anesthesia Start     0832 Anesthesia Stop        Responsible Staff  05/19/22    Name Role Begin End    Tejas Link M.D. Anesth 0727 0832        Overtime Reason:  no overtime (within assigned shift)    Comments:                                                       Yes

## 2022-05-19 NOTE — OR NURSING
Pt. Ready to DC home. VSS, on RA, reviewed Dc instructions with pt. Pt. Able to get dressed on his own, able to void, IV removed. Pt. DC in w/c with RN.

## 2022-05-19 NOTE — ANESTHESIA PROCEDURE NOTES
Airway    Date/Time: 5/19/2022 7:31 AM  Performed by: Tejas Link M.D.  Authorized by: Tejas Link M.D.     Location:  OR  Urgency:  Elective  Indications for Airway Management:  Anesthesia      Spontaneous Ventilation: absent    Sedation Level:  Deep  Preoxygenated: Yes    Patient Position:  Sniffing  Mask Difficulty Assessment:  1 - vent by mask  Final Airway Type:  Endotracheal airway  Final Endotracheal Airway:  ETT  Cuffed: Yes    Technique Used for Successful ETT Placement:  Direct laryngoscopy  Devices/Methods Used in Placement:  Intubating stylet and anterior pressure/BURP    Insertion Site:  Oral  Blade Type:  Ophelia  Laryngoscope Blade/Videolaryngoscope Blade Size:  3  ETT Size (mm):  7.5  Measured from:  Teeth  ETT to Teeth (cm):  23  Placement Verified by: auscultation and capnometry    Cormack-Lehane Classification:  Grade IIa - partial view of glottis  Number of Attempts at Approach:  1

## 2022-05-19 NOTE — ANESTHESIA POSTPROCEDURE EVALUATION
Patient: Nitesh Duron    Procedure Summary     Date: 05/19/22 Room / Location: [place] [id number] / SURGERY [facility]    Anesthesia Start: 0727 Anesthesia Stop: 0832    Procedures:       ULTRASOUND GUIDANCE - AND OTHER INDICATED PROEDURES (N/A Abdomen)      LAPAROSCOPY (N/A Abdomen) Diagnosis: (Hepatocellular Carcinoma , Cirrhosis of the liver )    Surgeons: Karma Fisher M.D. Responsible Provider: Tejas Link M.D.    Anesthesia Type: general ASA Status: 3          Final Anesthesia Type: general  Last vitals  BP   Blood Pressure : 121/59    Temp   36.9 °C (98.4 °F)    Pulse   68   Resp   16    SpO2   98 %      Anesthesia Post Evaluation    Patient location during evaluation: PACU  Patient participation: complete - patient participated  Level of consciousness: awake and alert    Airway patency: patent  Anesthetic complications: no  Cardiovascular status: hemodynamically stable  Respiratory status: acceptable  Hydration status: euvolemic    PONV: none          No complications documented.     Nurse Pain Score: 3 (NPRS)

## 2022-05-19 NOTE — OR NURSING
Arrived to Phase II after report from PRUDENCE Hernandez    VSS, denies nausea, reports pain 2/10 but tolerable.  Ambulated from gurney to chair with standby assist.    Surgical site- 3 sites closed with dermabond. No drainage.     Alarms on and set to appropriate parameters. Call light within reach, rounding in place.

## 2022-05-19 NOTE — DISCHARGE INSTRUCTIONS
ACTIVITY: Rest and take it easy for the first 24 hours.  A responsible adult is recommended to remain with you during that time.  It is normal to feel sleepy.  We encourage you to not do anything that requires balance, judgment or coordination.    MILD FLU-LIKE SYMPTOMS ARE NORMAL. YOU MAY EXPERIENCE GENERALIZED MUSCLE ACHES, THROAT IRRITATION, HEADACHE AND/OR SOME NAUSEA.    FOR 24 HOURS DO NOT:  Drive, operate machinery or run household appliances.  Drink beer or alcoholic beverages.   Make important decisions or sign legal documents.    SPECIAL INSTRUCTIONS: Follow instructions given to you by Dr. Fisher.     DIET: To avoid nausea, slowly advance diet as tolerated, avoiding spicy or greasy foods for the first day.  Add more substantial food to your diet according to your physician's instructions.  Babies can be fed formula or breast milk as soon as they are hungry.  INCREASE FLUIDS AND FIBER TO AVOID CONSTIPATION.    SURGICAL DRESSING/BATHING: May shower.    FOLLOW-UP APPOINTMENT:  A follow-up appointment should be arranged with your doctor; call to schedule.    You should CALL YOUR PHYSICIAN if you develop:  Fever greater than 101 degrees F.  Pain not relieved by medication, or persistent nausea or vomiting.  Excessive bleeding (blood soaking through dressing) or unexpected drainage from the wound.  Extreme redness or swelling around the incision site, drainage of pus or foul smelling drainage.  Inability to urinate or empty your bladder within 8 hours.  Problems with breathing or chest pain.    You should call 911 if you develop problems with breathing or chest pain.  If you are unable to contact your doctor or surgical center, you should go to the nearest emergency room or urgent care center.  Physician's telephone #: [phone]    If any questions arise, call your doctor.  If your doctor is not available, please feel free to call the Surgical Center at [phone].     A registered nurse may call you a  few days after your surgery to see how you are doing after your procedure.    MEDICATIONS: Resume taking daily medication.  Take prescribed pain medication with food.  If no medication is prescribed, you may take non-aspirin pain medication if needed.  PAIN MEDICATION CAN BE VERY CONSTIPATING.  Take a stool softener or laxative such as senokot, pericolace, or milk of magnesia if needed.    Prescription given for- NONE.  Last pain medication given at- NONE.     If your physician has prescribed pain medication that includes Acetaminophen (Tylenol), do not take additional Acetaminophen (Tylenol) while taking the prescribed medication.    Depression / Suicide Risk    As you are discharged from this [facility], it is important to learn how to keep safe from harming yourself.    Recognize the warning signs:  Abrupt changes in personality, positive or negative- including increase in energy   Giving away possessions  Change in eating patterns- significant weight changes-  positive or negative  Change in sleeping patterns- unable to sleep or sleeping all the time   Unwillingness or inability to communicate  Depression  Unusual sadness, discouragement and loneliness  Talk of wanting to die  Neglect of personal appearance   Rebelliousness- reckless behavior  Withdrawal from people/activities they love  Confusion- inability to concentrate     If you or a loved one observes any of these behaviors or has concerns about self-harm, here's what you can do:  Talk about it- your feelings and reasons for harming yourself  Remove any means that you might use to hurt yourself (examples: pills, rope, extension cords, firearm)  Get professional help from the community (Mental Health, Substance Abuse, psychological counseling)  Do not be alone:Call your Safe Contact- someone whom you trust who will be there for you.  Call your local CRISIS HOTLINE 031-7178 or 011-745-4215  Call your local Children's Mobile Crisis Response Team  [facility] (718) 183-0642 or www.[employer]  Call the toll free National Suicide Prevention Hotlines   National Suicide Prevention Lifeline 192-295-JPIY (6009)  [facility] [employer] 800-SUICIDE (326-9662)            Surgical Oncology Discharge Instructions  Dr. Phillip Duron   Age: 71 y.o.   : 1950    During this admission you have been treated for:  LIVER CA  Patient Active Problem List    Diagnosis Date Noted    Hepatocellular carcinoma (HCC) 03/15/2022    Hyperlipidemia 03/15/2022    Cirrhosis of liver without ascites (HCC) 2021    Impaired fasting blood sugar 2021    Pancytopenia (HCC) 2019    Splenomegaly 2019    Cellulitis 2018    Venous stasis 2018    High risk medication use 2018    Coronary artery disease involving native coronary artery without angina pectoris 2017    Elevated coronary artery calcium score 2017    Undiagnosed cardiac murmurs 2016    Essential hypertension, benign 2016    Calculus of both kidneys 2016       Activity:   Resume light to normal activity as tolerated.  (ie - ok to walk, climb stairs, ect)  Do not engage in strenuous activity that may place stress on the operative site for 7-10 days.   Do not lift more than 10 pounds for the next 4-6 weeks.   It is important that you are up walking several times a day to decrease the chance of getting a blood clot     Diet:  Resume regular diet as tolerated. To help prevent post operative nausea and vomiting avoid spicy or greasy foods; eat small, frequent meals and maintain adequate water/fluid intake.    Medication(s):   Current Discharge Medication List        CONTINUE these medications which have NOT CHANGED    Details   metoprolol SR (TOPROL XL) 25 MG TABLET SR 24 HR Take 1 Tablet by mouth every day. MUST BE SEEN FOR FURTHER REFILLS  Qty: 90 Tablet, Refills: 0    Comments: Please call to schedule follow up appointment for  further refills. Please call [phone]. Thank you.  Associated Diagnoses: Essential hypertension, benign; Elevated coronary artery calcium score      VITAMIN A PO Take 4,800 Units by mouth 2 times a day.      NON SPECIFIED Take 1 Tablet by mouth 2 times a day. Uric Acid Cleanse      ginkgo biloba extract (GINKO BILOBA) 60 MG Cap Take 180 mg by mouth every day.      ezetimibe (ZETIA) 10 MG Tab Take 10 mg by mouth every evening.      oxyCODONE immediate-release (ROXICODONE) 5 MG Tab Take 5 mg by mouth every four hours as needed for Severe Pain.      Saw Palmetto 450 MG Cap Take 450 mg by mouth 2 times a day.      CHROMIUM PO Take 1 Tablet by mouth every day.      Sodium Hyaluronate, oral, (HYALURONIC ACID PO) Take 1 Tablet by mouth every day.      lisinopril (PRINIVIL) 10 MG Tab Take 1 Tablet by mouth every day.  Qty: 100 Tablet, Refills: 1    Associated Diagnoses: Essential hypertension, benign      Red Yeast Rice 600 MG Tab Take 600 mg by mouth 2 times a day.      Cyanocobalamin (VITAMIN B-12 PO) Take 1 Tablet by mouth 2 times a day.      vitamin e (VITAMIN E) 400 UNIT Cap Take 400 Units by mouth every day.      Cholecalciferol (VITAMIN D3) 3000 UNITS Tab Take 1 Capsule by mouth every day.      Omega-3 Fatty Acids (FISH OIL) 1000 MG Cap capsule Take 1,000 mg by mouth every day.            See prescription(s); take as directed.   You do NOT have to take all of your prescription pain medication.  Take only as needed if pain not controlled with over the counter medications (ex: tylenol, ibuprofen, ect).    If you are taking narcotic pain medication be sure to take a stool softener (available over the counter - ex: miralax, magnesium citrate, dulcolax/bisacoidyl, ect)  to prevent constipation.    Additionally make sure that you are taking in enough fluids.  Do NOT drive or make any important decision while taking prescription pain medication.      Wound Care:      Your incision has been covered with: surgical glue  which will peel off by itself in a few days  Do NOT apply any creams, lotions, ect to the incision unless you have been specifically instructed to do so by your surgical team.    It is normal to have a small amount of clear/yellow/pink drainage from you incision as it heals.    Please call the surgical clinic if you have:              -increasing drainage from incision              -change in the color of drainage from you incision              -redness surrounding the incision which extends more than 1 fingerbreadth (1 centimeter) from the incision edge  Avoid exposing your incision to the sun      Bathing/Showering:  Please shower daily starting in 48 hours (on Saturday). You may wash over incisions with regular soap and water and pat dry.  Ok to allow water from the shower to run over you incision.  Avoid submerging you incision under water (ie - no baths, swimming or hot tubs) until fully healed.        Suggestions for post discharge nausea and/or vomiting:  Limit your mobility, take slow, deep breaths.      Restrictions:  No smoking  No alcohol while taking prescription pain medications  No strenuous activity until cleared by surgery clinic  No heavy lifting (more than 10 lbs) for at least the next 4-6 weeks  No driving while taking prescription pain medication  No driving until you have the mobility to be a safe     Additional Instructions:  If you experience chest pain or shortness of breath, or have an acute emergency - please call 911    Please call clinic or seek evaluation at the ER if you have any of the following:  -Fever >101.5     -Wound infection (increasing redness, swelling, drainage, pus)     -Severe pain not controlled with oral medications     -Severe nausea or vomiting, inability to tolerate PO intake     -Bleeding that does not stop with holding pressure to the area       -Yellowing of the skin or eyes     -Change in mental status (confusion or lethargy)      -New numbness or weakness       -Any other concerns.    Follow-up:    Please follow-up in Dr. Fisher's clinic in on 5/31/2022 for post-operative appointment.  Clinic will call you to schedule the exact time    Please call clinic to confirm appointment [phone]  Please keep all follow-up appointments    Disposition:  To home

## 2022-05-19 NOTE — OR SURGEON
Immediate Post OP Note    PreOp Diagnosis:   1. Hepatocellular carcinoma  2. Cirrhosis  3. Diabetes  4. Hyperlipidemia    PostOp Diagnosis:   1. Hepatocellular carcinoma  2. Cirrhosis  3. Diabetes  4. Hyperlipidemia      Procedure(s):  1. Diagnostic laparoscopy  2. Laparoscopic Intraoperative ultrasound of liver    Surgeon(s):  LANIE Farmer M.D.    Anesthesiologist/Type of Anesthesia:  Anesthesiologist: Tejas Link M.D./* No anesthesia type entered *    Surgical Staff:  Assistant: Yayo Hernandez R.N.  Circulator: Radha Ponce R.N.; Marlena Santiago R.N.  Scrub Person: Kory Gonzales    Specimens removed if any:  none    Estimated Blood Loss: <5cc    Findings: nodular, cirrhotic appearance of liver.  On ultrasound tumor noted to be diffuse and infiltrative without defined margins and therefore ablation aborted    Complications: none apparent        5/19/2022 8:27 AM Karma Fisher M.D.

## 2022-05-19 NOTE — OP REPORT
DATE OF SERVICE:  05/19/2022     PREOPERATIVE DIAGNOSES:  1.  Hepatocellular carcinoma.  2.  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  3.  Diabetes.  4.  Hyperlipidemia.     POSTOPERATIVE DIAGNOSES:  1.  Hepatocellular carcinoma.  2.  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  3.  Diabetes.  4.  Hyperlipidemia.     PROCEDURES:  1.  Diagnostic laparoscopy.  2.  Laparoscopic intraoperative ultrasound of liver.     SURGEON:  Karma Fisher MD     ASSISTANT:  WALTER Jerry     ANESTHESIA:  General endotracheal anesthesia.     ESTIMATED BLOOD LOSS:  Less than 5 mL     SPECIMENS:  None.     FINDINGS:  A nodular and cirrhotic appearance of the liver.  Intraoperative   ultrasound demonstrated tumor to be diffuse and infiltrative in nature without   well-defined margins and therefore ablation was aborted.     COUNTS:  Sponge and instrument counts were reported correct at the conclusion   of the operation x2.     STATEMENT OF PRESENCE:  I, Dr. Fisher was present for all portions of the   operative procedure.     STATEMENT OF MEDICAL NECESSITY:  The patient is a 71-year-old male with past   medical history as listed above, who was initially referred to clinic after   finding of a liver mass.  He was first diagnosed with hepatitis C several   years prior and had undergone eradication.  He additionally had a prior   history of heavy alcohol use; however, states he is abstinent now.  He denies   any prior complication related to his cirrhosis.  He has been undergoing   surveillance ultrasounds given his history of cirrhosis under the care of the   GI team and his ultrasound from 08/2021 noted a new right-sided lesion, which   subsequently prompted an MRI.  This MRI demonstrated an approximately 2.4x1.9   cm mass with arterial hyperenhancement, non-peripheral washout and restricted   diffusion and was designated a LI-RADS 5. Upon referral to clinic, we had a   discussion about the natural history of  hepatocellular carcinoma and   recommendation was made for completion staging imaging.  He underwent further   imaging, which demonstrated no evidence of metastatic disease.  His repeat CT   scan demonstrated an ill-defined mass, which corresponded to the prior MRI   findings, which had noted to enlarge to 3.5x2 cm and was again morphologically   consistent with hepatocellular carcinoma.  Of note, the patient did have some   delay in obtaining his scans.  Upon return to clinic, we discussed the   findings of his imaging as well as treatment recommendations.  We discussed   that this tumor appears to be abutting the wall of the   Gallbladder based on imaging, although it did appear to be amenable for laparoscopic ablation.    Given that the lesion abutted the wall of the gallbladder, we discussed that   it was likely going to be necessary to perform a cholecystectomy given concern   for the first thermal injury from the microwave ablation to the gallbladder   wall, subsequently causing cholecystitis and/or gallbladder perforation.    Recommendation was made for laparoscopic, possible open cholecystectomy,   laparoscopic intraoperative ultrasound of liver, laparoscopic ablation of   liver mass and all other indicated procedures.  After full discussion of   material risks, benefits and alternatives, the patient has elected to proceed.     Of note, we did initially have difficulty obtaining insurance approval, which   prompted a delay in the patient presenting to the operating room.  Following   obtaining that authorization, the patient then had some financial   concerns regarding regarding the procedure - however this was ultimately able to   be worked out and he presents for surgery today.     DESCRIPTION OF PROCEDURE:  The patient was placed supine on the operating   table.  Pressure points were padded and sequential compression garments were   applied to lower extremities.  A timeout was performed.  Appropriate    perioperative antibiotics were administered.  General endotracheal anesthesia   was administered.  The patient's abdomen was prepped and draped in typical   standard sterile fashion.  Procedure began by making a curvilinear incision   around the inferior rim of the umbilicus.  Dissection was carried down through   skin and subcutaneous tissues utilizing electrocautery and blunt dissection   until the fascia was identified.  A Kocher clamp was applied to the base of   the umbilical stalk and retracted outwardly. The fascia was sharply entered   with an 11 blade scalpel.  Peritoneal cavity was bluntly entered with a Nieves   and direct digital inspection revealed no evidence of adhesions.  An 0 Vicryl   stitch was used to place fascial sutures in figure-of-eight fashion such to be   utilized for fascial closure at the conclusion of the case.  A 12 mm Demetrius   cannula was inserted through the fascial defect and the abdomen was   insufflated with carbon dioxide until an intraperitoneal pressure of 15 mmHg was   obtained.  Direct intraperitoneal inspection with a 0-degree laparoscope   revealed no evidence of injury upon entry.  The 0 degree scope was exchanged   for 30 degree scope and a brief diagnostic laparoscopy of the abdomen was   performed.  There was no gross evidence of metastatic disease.  The patient   was then transitioned to the reverse Trendelenburg position.  The liver was   noted to be nodular and grossly cirrhotic in appearance.  A photograph was   taken for inclusion in the medical record.  There was some omentum, which was   swept of the liver, which was easily able to be swept down.  A 5 mm trocar was   placed in the right upper quadrant under direct visualization without   evidence of bleeding or complication.  The camera was then transitioned to   this port such that the larger trocar could accommodate the laparoscopic   ultrasound probe.  A laparoscopic ultrasound probe was placed through this    cannula; however, it was noted to not have enough length to reach the liver.    Given this, we then elected to place an additional 12 mm trocar in the   subxiphoid position just to the right of midline.  This was placed under   direct visualization without any evidence of bleeding or complication.  The   laparoscopic ultrasound was then placed through this trocar and we then   commenced with a laparoscopic ultrasound of the liver.  The liver was noted to   have a relatively heterogeneous appearance on ultrasound, which likely   corresponded to the gross evidence of nodularity seen externally.  There was   no obvious evidence of distinct marginated mass.  Adjacent to the gallbladder   at the junction of segments 5 and 8.  There was an area which corresponded to   his preoperative imaging, which likely represented his tumor.  However, the   nature of this lesion was very diffuse and infiltrative without any clearly   defined margins.  At this point, I had my partner, Dr. Mckeon joined me   in the operating room to review the ultrasound images.  We agreed at this time   given that there was no clear margination on the lesion that it would not be   possible to obtain a reasonable ablation response given that the margin of the   tumor could not be defined.  Several images from the ultrasound were saved   and printed for inclusion in the medical record.  At this point, we decided to   abort the procedure.  The patient was transitioned back to the supine   position.  Ultrasound probe was removed.  The 12 mm cannula in the subxiphoid   position was removed without evidence of bleeding or complication.  A   laparoscopic suture passer was utilized to place an 0 Vicryl stitch in   figure-of-eight fashion such to provide fascial closure.  The 5 mm port was   removed under direct visualization without evidence of bleeding or   complication.  The abdomen was then fully desufflated.  The previously placed   fascial sutures  were subsequently   tied down.  Subcutaneous tissues were examined and noted to be hemostatic and were infiltrated with   local anesthetic, totalling 30 mL.  Final skin closure performed at all sites with 4-0 Monocryl. Final dressing applied with Dermabond.  The patient was then awakened from general anesthesia and transferred to the PACU   in stable condition.     DISPOSITION:  The patient to be discharged when all PACU criteria are met.  We   will plan for referral to interventional radiology for arterial based therapies.        ______________________________  MD NOEMY Farmer/YAN    DD:  05/19/2022 09:27  DT:  05/19/2022 10:16    Job#:  [id number]

## 2022-05-19 NOTE — ANESTHESIA PREPROCEDURE EVALUATION
Case: [id number] Date/Time: 05/19/22 0715    Procedures:       CHOLECYSTECTOMY, LAPAROSCOPIC      CHOLECYSTECTOMY - POSSIBLE      ABLATION, LESION, LIVER, LAPAROSCOPIC      BIOPSY, LIVER - POSSIBLE      ULTRASOUND GUIDANCE - AND OTHER INDICATED PROEDURES    Pre-op diagnosis: LIVER CA    Location: [facility] OR 10 / SURGERY [facility]    Surgeons: Karma Fisher M.D.          Relevant Problems   CARDIAC   (positive) Coronary artery disease involving native coronary artery without angina pectoris   (positive) Elevated coronary artery calcium score   (positive) Essential hypertension, benign   (positive) Undiagnosed cardiac murmurs         (positive) Calculus of both kidneys   (positive) Cirrhosis of liver without ascites (HCC)   (positive) Hepatocellular carcinoma (HCC)      Other   (positive) Splenomegaly       Physical Exam    Airway   Mallampati: II  TM distance: >3 FB  Neck ROM: full       Cardiovascular - normal exam  Rhythm: regular  Rate: normal  (-) murmur     Dental - normal exam  (+) upper dentures      Very poor dentition   Pulmonary - normal exam  Breath sounds clear to auscultation     Abdominal    Neurological - normal exam               Anesthesia Plan    ASA 3   ASA physical status 3 criteria: CAD/stents (> 3 months)    Plan - general       Airway plan will be ETT          Induction: intravenous    Postoperative Plan: Postoperative administration of opioids is intended.    Pertinent diagnostic labs and testing reviewed    Informed Consent:    Anesthetic plan and risks discussed with patient.

## 2022-05-19 NOTE — OR NURSING
Awake, alert and tolerating PO fluids.  Medicated for pain - pain now managed.  Dermabond to 3 abdominal sites.  Vitals stable.  On monitors with alarms audible.    Called girlfriend with update and approximate DC timeline.

## 2022-05-19 NOTE — PROGRESS NOTES
Surgical Oncology H&P Update      I have reviewed the initial History and Physical that was completed within 30 days of this admission. I have also reviewed any recent changes: No changes    There following are pertinent changes to the exam: No changes    The following are pertinent changes to the history: No changes    Patient states that they have no updates to their medical history or any new symptoms since last seen in clinic.  Denies fevers, chills, recent illnesses, shortness of breath, chest pain, PO intolerance, or abdominal pain.     Plan: We will proceed with the following planned procedure: laparoscopic (possible open) cholecystectomy, laparoscopic liver ablation, possible liver biopsy, intraoperative ultrasound of liver, all other indicated procedures    Informed surgical consent for signed after full discussion of material risks, benefits, and alternatives.  All patient questions have been answered.  Patient has elected to proceed.      Karma Fisher MD  May 19, 2022, 7:30 AM

## 2022-05-19 NOTE — OR NURSING
Incentive spirometry teaching done with return demonstration.  Pt transported to phase 2.  Dr Fisher notified of phase 2 room number.

## 2022-05-20 ENCOUNTER — TELEPHONE (OUTPATIENT)
Dept: SURGERY | Facility: MEDICAL CENTER | Age: 72
End: 2022-05-20
Payer: MEDICARE

## 2022-05-20 NOTE — TELEPHONE ENCOUNTER
Called patient to follow-up after surgery yesterday.  He states that he is doing well.  Does have some pain at incision sites - but is managed with his medication.  Tolerating a diet and denies N/V.   States is up and walking and we reviewed the importance of this to avoid a blood clot.  He is aware of different regimens to avoid constipation.  We again reviewed the post-op instructions and will plan to see him back in clinic 5/31 for post-operative evaluation.    Karma Fisher MD  May 20, 2022, 11:25 AM

## 2022-05-23 ENCOUNTER — TELEPHONE (OUTPATIENT)
Dept: SURGICAL ONCOLOGY | Facility: MEDICAL CENTER | Age: 72
End: 2022-05-23
Payer: MEDICARE

## 2022-06-06 NOTE — PROGRESS NOTES
"Surgical Oncology History and Physical    Date of Evaluation: 6/7/2022    Reason for Referral: HCC     Referred By: Matt Young MD     HPI: Patient is a 72 y/o M with PMHx of HTN, DM (resolved after 70lbs weight loss - no longer on Metformin), HCV/EtOH cirrhosis (s/p treatment with Harvoni 2017 - s/p treatment for esophageal varices, no history of HE, bleeds, or ascites), \"irregular heart beat\", HLD, chronic low back pain referred to clinic after recent diagnosis of HCC on MRI.  He was first diagnosed with HCV several years ago has undergone treatment with reported irradication (per GI notes).  He additionally reports a prior history of heavy EtOH use - but is abstinent now.  He denies any complication from his cirrhosis and has been undergoing screening EGD for varices as well as q6m abdominal US for HCC screening.  His US from 8/2021 noted a new R sided lesion which prompted an MRI on which a LIRAD 5 lesion was identified.  He has been referred for subsequent surgical evaluation.     He was initially evaluated in clinic 3/15/2022 and recommendation made for completion staging imaging at that time with return to clinic to discuss results.  There has been a delay in him getting his work-up and subsequent return given his preferences in imaging locations, transportation and ability to make it back for return appointment - as well as confusion as Dr. Mckee ordered imaging as well.    He was seen in follow-up 4/26/2022 with recommendation at that time for laparoscopic ablation.  Given location of tumor and relationship to adjacent gallbladder we discussed that operation would likely require a cholecystectomy as well.  There was an initial delay in the OR awaiting insurance approval for his case which required us to cancel his OR case on the day it was initially scheduled.  Following this patient then was hesitant to re-schedule as there was a copay required.  Fortunately this was able to be resolved and he was " taken to the OR 5/19/2022.  Intra-operative ultrasound findings revealed tumor to be very diffuse and infiltrative in nature without distinct boarders - given this ablation was aborted.  He has subsequently been referred to IR for consideration arterial directed therapy.     Summary of work-up to date:   MRI Abdomen ([facility]) - 3/1/2022:  In segment 8 there is a 2.4 x 1.9cm mass demonstrating arterial hyperenhancement, non-peripheral washout, mild T2 hyperintensity and positive restricted diffusion.  LI-RAD 5.  Liver somewhat lobular in morphology with enlarged caudate.  Gallbladder non-distended.  No biliary dilation.  No ascites.       CT Chest ([facility]) - 4/5/2022:  Mild atherosclerotic calcifications.  No axillary, mediastinal, or hilar adenopathy is observed.  No significant coronary artery calcifications.  No pericardial abnormalities.  Bilateral scattered peripheral opacities suggesting atalectasis or scarring.  No chest mass or pulmonary nodules.       CT A/P ([facility]) - 4/5/2022:  Liver is contracted and heterogeneous in attenuation with nodular contours and caudate lobar disproportion.  Segment 8 ill-defined mass which is difficult to appreciate but corresponds with MRI findings measuring approximately 3.5 x 2cm (previous 2.4 x 1.9cm).  No significant biliary ductal dilation.  Gallbladder grossly unremarkable.  Spleen normal in appearance.  Pancreas with normal appearance.  Adrenals normal in size and morphology.  Small RIGHT renal cyst measuring 1.7cm.  Bilateral renal vascular calcifications.  Moderate atherosclerotic calcifications.  No obvious retroperitoneal, pelvis, or inguinal LAD.  Mild to moderate sigmoid colonic diverticula without diverticulitis.   Prostate mildly enlarged with central calcifications.  Calcified gluteal injection granulomas.  Moderate degenerative changes of the spine.  Fat containing LEFT inguinal hernia.      Today he states that he is doing well.  He presents today for his  "post-operative evaluation.  No complaints regarding incisions.  Denies pain - state this resolved a few days after surgery.  Dermabond has peeled off.  Is anxious about a new job prospect.  Presents to clinic today alone.      Body mass index is 27.94 kg/m².    ECO    Past Medical History:          Past Medical History:   Diagnosis Date   • Arrhythmia 2018   • Arthritis     OA- hands and L knee   • Cataract     bilateral IOL   • Cirrhosis (HCC)    • Dental disorder     upper   • Diabetes 2022    history of medications, pt has not been on diabetic meds since weight loss   • Hepatitis C     treated in 2017   • High cholesterol 2018    no med; diet controlled   • Hyperlipidemia    • Hypertension    • Renal disorder 2018    kidney stones   -HCV cirrhosis  -\"Irregular heart beat\"  -DM    Past Surgical History:        Past Surgical History:   Procedure Laterality Date   • CA ULTRASONIC GUIDANCE, INTRAOPERATIVE N/A 2022    Procedure: ULTRASOUND GUIDANCE - AND OTHER INDICATED PROEDURES;  Surgeon: Karma Fisher M.D.;  Location: SURGERY [facility];  Service: General   • CA LAP,DIAGNOSTIC ABDOMEN N/A 2022    Procedure: LAPAROSCOPY;  Surgeon: Karma Fisher M.D.;  Location: SURGERY [facility];  Service: General   • GASTROSCOPY N/A 2018    Procedure: GASTROSCOPY;  Surgeon: Matt Young M.D.;  Location: SURGERY [facility] DAY [facility];  Service: Gastroenterology   • ROTATOR CUFF REPAIR Left    • ORIF, ANKLE Right     spiral fx   • MENISCECTOMY Left    • APPENDECTOMY     • CYSTOSCOPY  ,    stone extraction   -open appendectomy as a child (RLQ incision)    Current Medications:   Current Outpatient Medications on File Prior to Visit   Medication Sig Dispense Refill   • ezetimibe (ZETIA) 10 MG Tab Take 1 Tablet by mouth every day. NEEDS TO BE SEEN FOR FURTHER REFILLS. 90 Tablet 0   • metoprolol SR (TOPROL XL) 25 MG TABLET SR 24 HR Take 1 Tablet by mouth " every day. MUST BE SEEN FOR FURTHER REFILLS 90 Tablet 0   • VITAMIN A PO Take 4,800 Units by mouth 2 times a day.     • NON SPECIFIED Take 1 Tablet by mouth 2 times a day. Uric Acid Cleanse     • ginkgo biloba extract (GINKO BILOBA) 60 MG Cap Take 180 mg by mouth every day.     • oxyCODONE immediate-release (ROXICODONE) 5 MG Tab Take 5 mg by mouth every four hours as needed for Severe Pain.     • Saw Palmetto 450 MG Cap Take 450 mg by mouth 2 times a day.     • CHROMIUM PO Take 1 Tablet by mouth every day.     • Sodium Hyaluronate, oral, (HYALURONIC ACID PO) Take 1 Tablet by mouth every day.     • lisinopril (PRINIVIL) 10 MG Tab Take 1 Tablet by mouth every day. (Patient taking differently: Take 10 mg by mouth every evening.) 100 Tablet 1   • Red Yeast Rice 600 MG Tab Take 600 mg by mouth 2 times a day.     • Cyanocobalamin (VITAMIN B-12 PO) Take 1 Tablet by mouth 2 times a day.     • vitamin e (VITAMIN E) 400 UNIT Cap Take 400 Units by mouth every day.     • Cholecalciferol (VITAMIN D3) 3000 UNITS Tab Take 1 Capsule by mouth every day.     • Omega-3 Fatty Acids (FISH OIL) 1000 MG Cap capsule Take 1,000 mg by mouth every day.       No current facility-administered medications on file prior to visit.             -Anticoagulation:  Denies       Allergies:         Allergies   Allergen Reactions   • Ciprofloxacin Unspecified     convulsions   • Statins [Hmg-Coa-R Inhibitors] Unspecified     Stabbing pains in kidneys   • Acetaminophen      Kidney pain    • Atorvastatin      Patient declines   • Ibuprofen      Kidney pain   • Naproxen      Kidney pain   • Shellfish Allergy      Kidney stones   • Soap &  [Alcohol] Rash     Antibacterial soap- contact dermatitis       Family History:          Family History   Problem Relation Age of Onset   • Other Mother    • Heart Attack Father    • Heart Failure Brother    -HTN  -DM  -Father  from CVA, CAD  -Mother with breast cancer  -States his is the last surviving  "member of his family as all other .    Social History:       -Tobacco: prior smoker - quit       -EtOH: prior heavy use - currently abstinent      -IVDU: Denies           Review of Systems:  Review of Systems - History obtained from the patient    Review of Systems   Constitutional: Negative for chills, fever and weight loss.   HENT: Negative for hearing loss and sore throat.    Eyes: Negative for blurred vision, double vision and pain.   Respiratory: Negative for cough, shortness of breath, wheezing and stridor.    Cardiovascular: Negative for chest pain, palpitations and leg swelling.   Gastrointestinal: Negative for abdominal pain, blood in stool, constipation, diarrhea, heartburn, nausea and vomiting.   Genitourinary: Negative for dysuria, frequency, hematuria and urgency.   Musculoskeletal: Negative for back pain, falls, joint pain and myalgias.   Skin: Negative for itching and rash.   Neurological: Negative for dizziness, tremors, speech change, focal weakness, seizures, weakness and headaches.   Endo/Heme/Allergies: Negative for polydipsia. Does not bruise/bleed easily.   Psychiatric/Behavioral: Negative for depression, hallucinations and memory loss.       All other ROS negative.      Physical Exam:  /66 (BP Location: Right arm, Patient Position: Sitting, BP Cuff Size: Adult)   Pulse 71   Temp 37 °C (98.6 °F) (Temporal)   Ht 1.77 m (5' 9.69\")   Wt 87.5 kg (193 lb)   SpO2 96%   BMI 27.94 kg/m²       -General: Patient is cooperative, appears stated age, in no acute distress, AAOx3        -HEENT:  Head is atraumatic, neck supple, no scleral icterus       -Neck: trachea is midline        -Respiratory:  no increased work of breathing, stable on room air, clear to auscultation bilaterally    -Cardiovascular: normal peripheral perfusion, normal rate, irregula, no murmur appreciated    -Abdomen: soft, non-tender to palpation, well healed RLQ incision, well healed laparoscopic incisions.  " Dermabond already off.    -: Deferred       -MSK/Extremities: atraumatic, normal range of motion and strength, ambulatory         -Integumentary: warm, dry, no obvious skin lesions or rashes appreciated, no jaundice, multiple tattoos           -Neurologic: alert, speech clear and coherent, functional cognition intact           -Psychiatric:  cooperative, appropriate mood and affect      MRI Abdomen ([facility]) - 3/1/2022:    -In segment 8 there is a 2.4 x 1.9cm mass demonstrating arterial hyperenhancement, non-peripheral washout, mild T2 hyperintensity and positive restricted diffusion.  LI-RAD 5.   -Liver somewhat lobular in morphology with enlarged caudate.   - Gallbladder non-distended.    -No biliary dilation.    -No ascites.       CT Chest ([facility]) - 4/5/2022:    -Mild atherosclerotic calcifications.    -No axillary, mediastinal, or hilar adenopathy is observed.    -No significant coronary artery calcifications.    -No pericardial abnormalities.    -Bilateral scattered peripheral opacities suggesting atalectasis or scarring.    -No chest mass or pulmonary nodules.       CT A/P ([facility]) - 4/5/2022:    -Liver is contracted and heterogeneous in attenuation with nodular contours and caudate lobar disproportion.    -Segment 8 ill-defined mass which is difficult to appreciate but corresponds with MRI findings measuring approximately 3.5 x 2cm (previous 2.4 x 1.9cm).    -No significant biliary ductal dilation.    -Gallbladder grossly unremarkable.    -Spleen normal in appearance.    -Pancreas with normal appearance.   -Adrenals normal in size and morphology.    -Small RIGHT renal cyst measuring 1.7cm.    -Bilateral renal vascular calcifications.    -Moderate atherosclerotic calcifications.    -No obvious retroperitoneal, pelvis, or inguinal LAD.    -Mild to moderate sigmoid colonic diverticula without diverticulitis.    - Prostate mildly enlarged with central calcifications.   -Calcified gluteal injection granulomas.   "  -Moderate degenerative changes of the spine.    -Fat containing LEFT inguinal hernia.      Pathology:   None available for review     Labs:   Reviewed from 3/19/2022:  -CBC: WBC 4.1, H/H 13.6/41.3, Platelets 92  -BMP: unremarkable electrolytes with Cr 0.8  -LFT: T bili 0.7, AST 17, ALT 21, Alk Phos 75, Albumin 3.5  -AFP: 13.9    Assessment and Plan:  Patient is a 70 y/o M with PMHx of HTN, DM (resolved after 70lbs weight loss - no longer on Metformin), HCV/EtOH cirrhosis (s/p treatment with Harvoni 2017 - s/p treatment for esophageal varices, no history of HE, bleeds, or ascites), \"irregular heart beat\", HLD, chronic low back pain referred to clinic after recent diagnosis of HCC on MRI.  Today in clinic we again reviewed the intra-operative findings as well as the reasons why ablation was aborted.  He has already been referred to IR for consideration arterial directed therapy and is scheduled to meet with them next week on 6/14/2022.  I have strongly encouraged him to keep this appointment.  We additionally reviewed that he will require lifelong surveillance as his entire liver is at risk given his cirrhosis.  Will plan to see him back in clinic in 4 months (to allow time for IR treatment) with repeat MRI as well as labs.      Regarding his recovery we discussed that he has expected post-operative findings.  His incisions have healed well.  Discussed continued recommendation to avoid heavy lifting for at least 4-6 weeks following surgery to allow complete fascial healing to avoid hernia.    Plan:  -Expected post-operative findings  -Intra-operative findings reviewed.  -Scheduled to meet with IR 6/14/2022 for evaluation arterial directed therapy given that ablation was not appropriate.  -RTC 4 months for surveillance with repeat MRI and labs.    The patient  asked several great questions which were answered to his satisfaction.  He is in agreement with the care plan as outlined above.      Karma Fisher, " MD  June 7, 2022, 12:03 PM

## 2022-06-07 ENCOUNTER — OFFICE VISIT (OUTPATIENT)
Dept: SURGICAL ONCOLOGY | Facility: MEDICAL CENTER | Age: 72
End: 2022-06-07
Payer: MEDICARE

## 2022-06-07 VITALS
DIASTOLIC BLOOD PRESSURE: 66 MMHG | SYSTOLIC BLOOD PRESSURE: 132 MMHG | HEIGHT: 70 IN | WEIGHT: 193 LBS | BODY MASS INDEX: 27.63 KG/M2 | TEMPERATURE: 98.6 F | OXYGEN SATURATION: 96 % | HEART RATE: 71 BPM

## 2022-06-07 DIAGNOSIS — C22.0 HEPATOCELLULAR CARCINOMA (HCC): ICD-10-CM

## 2022-06-07 PROCEDURE — 99999 PR NO CHARGE: CPT | Performed by: SURGERY

## 2022-06-07 ASSESSMENT — ENCOUNTER SYMPTOMS
WEAKNESS: 0
CHILLS: 0
POLYDIPSIA: 0
SEIZURES: 0
DIZZINESS: 0
WHEEZING: 0
TREMORS: 0
DIARRHEA: 0
WEIGHT LOSS: 0
BRUISES/BLEEDS EASILY: 0
MYALGIAS: 0
FALLS: 0
BLOOD IN STOOL: 0
HEADACHES: 0
MEMORY LOSS: 0
STRIDOR: 0
FEVER: 0
PALPITATIONS: 0
BLURRED VISION: 0
SORE THROAT: 0
VOMITING: 0
HEARTBURN: 0
CONSTIPATION: 0
FOCAL WEAKNESS: 0
COUGH: 0
SHORTNESS OF BREATH: 0
ABDOMINAL PAIN: 0
SPEECH CHANGE: 0
NAUSEA: 0
HALLUCINATIONS: 0
DEPRESSION: 0
BACK PAIN: 0
EYE PAIN: 0
DOUBLE VISION: 0

## 2022-06-07 ASSESSMENT — FIBROSIS 4 INDEX: FIB4 SCORE: 3.88

## 2022-06-13 NOTE — PROGRESS NOTES
Interventional Oncology Consultation      Re: Nitesh Duron     MRN: [id number]   : 1950    Nitesh Duron was referred by Karma Fisher MD. He is a 71 y.o. male seen in clinic for evaluation and possible intervention of unresectable hepatocellular carcinoma. He is also under the care of Yayo Beckford MD, Boo Mckee MD, and Thanh Loyola MD. He has been evaluated by Burt Blum MD in the past.     History of Present Illness:   presents with liver masses. He has a history of hepatitis C treated with Harvoni in 2017. He had been undergoing surveillance and imaging revealed a right liver mass. He underwent an intraoperative ablation but it was aborted due to indistinct margins.  has been referred to interventional radiology for evaluation and management with locoregional therapy. Today, his chief complaints include back pain and joint pain. He is under pain management with epidural steroid injections. He denies complaints of ascites and jaundice. He reports anterior abdominal fullness at one of the laparoscopy sites. He expresses the desire to keep his gall bladder. He describes being mindful of his health with trying to maintain good lifestyle choices and takes a great number of supplements. He lives in [place] and works as an , although he is not currently working pending his medical appointments. He is unaccompanied by a support person to today's consultation.    He is seen today for review of imaging studies and discussion of possible intervention with image-guided ablation, drug eluting bead chemoembolization, or Y90 radioembolization.     Past Medical History:   Diagnosis Date   • Arrhythmia 2018   • Arthritis     OA- hands and L knee   • Cataract     bilateral IOL   • Cirrhosis (HCC)    • Dental disorder     upper   • Diabetes 2022    history of medications, pt has not been on diabetic meds since weight loss   • Hepatitis C      treated in 2017   • High cholesterol 2018    no med; diet controlled   • Hyperlipidemia    • Hypertension    • Renal disorder 2018    kidney stones     Past Surgical History:   Procedure Laterality Date   • MA ULTRASONIC GUIDANCE, INTRAOPERATIVE N/A 2022    Procedure: ULTRASOUND GUIDANCE - AND OTHER INDICATED PROEDURES;  Surgeon: Karma Fisher M.D.;  Location: SURGERY [facility];  Service: General   • MA LAP,DIAGNOSTIC ABDOMEN N/A 2022    Procedure: LAPAROSCOPY;  Surgeon: Karma Fisher M.D.;  Location: SURGERY [facility];  Service: General   • GASTROSCOPY N/A 2018    Procedure: GASTROSCOPY;  Surgeon: Matt Young M.D.;  Location: SURGERY SAME DAY [facility];  Service: Gastroenterology   • ROTATOR CUFF REPAIR Left    • ORIF, ANKLE Right     spiral fx   • MENISCECTOMY Left    • APPENDECTOMY     • CYSTOSCOPY  ,    stone extraction     22 at [facility] ([place]):  PROCEDURES:  1.  Diagnostic laparoscopy.  2.  Laparoscopic intraoperative ultrasound of liver.      Social History     Socioeconomic History   • Marital status:      Spouse name: Not on file   • Number of children: Not on file   • Years of education: Not on file   • Highest education level: Not on file   Occupational History   • Not on file   Tobacco Use   • Smoking status: Former Smoker     Packs/day: 0.50     Years: 43.00     Pack years: 21.50     Types: Cigarettes     Quit date: 2008     Years since quittin.4   • Smokeless tobacco: Never Used   • Tobacco comment: quit    Vaping Use   • Vaping Use: Never used   Substance and Sexual Activity   • Alcohol use: No   • Drug use: Yes     Types: Marijuana     Comment: medical   • Sexual activity: Never   Other Topics Concern   • Not on file   Social History Narrative   • Not on file     Social Determinants of Health     Financial Resource Strain: Not on file   Food Insecurity: Not on file   Transportation Needs: Not on file    Physical Activity: Not on file   Stress: Not on file   Social Connections: Not on file   Intimate Partner Violence: Not on file   Housing Stability: Not on file     Family History   Problem Relation Age of Onset   • Other Mother    • Heart Attack Father    • Heart Failure Brother        Review of Systems   Constitutional: Negative.  Negative for chills, diaphoresis, fever, malaise/fatigue and weight loss.   Respiratory: Negative.    Cardiovascular: Negative.    Gastrointestinal: Negative.    Musculoskeletal: Positive for back pain, joint pain and neck pain.   Skin: Negative.    Neurological: Negative for sensory change, speech change, focal weakness and weakness.   Psychiatric/Behavioral: Negative for substance abuse.       A comprehensive 14-point review of systems was negative except as described above.     Labs:    Latest Reference Range & Units Most Recent   WBC 4.8 - 10.8 K/uL 3.8 (L)  05/12/22 15:20   RBC 4.70 - 6.10 M/uL 4.83  05/12/22 15:20   Hemoglobin 14.0 - 18.0 g/dL 14.4  05/12/22 15:20   Hematocrit 42.0 - 52.0 % 42.8  05/12/22 15:20   MCV 81.4 - 97.8 fL 88.6  05/12/22 15:20   MCH 27.0 - 33.0 pg 29.8  05/12/22 15:20   MCHC 33.7 - 35.3 g/dL 33.6 (L)  05/12/22 15:20   RDW 35.9 - 50.0 fL 44.9  05/12/22 15:20   Platelet Count 164 - 446 K/uL 81 (L)  05/12/22 15:20   MPV 9.0 - 12.9 fL 9.7  05/12/22 15:20   Neutrophils-Polys 44.00 - 72.00 % 63.00  05/12/22 15:20   Neutrophils (Absolute) 1.82 - 7.42 K/uL 2.38  05/12/22 15:20   Bands-Stabs 0.00 - 10.00 % 0.90  09/12/18 11:12   Lymphocytes 22.00 - 41.00 % 23.50  05/12/22 15:20   Lymphs (Absolute) 1.00 - 4.80 K/uL 0.89 (L)  05/12/22 15:20   Monocytes 0.00 - 13.40 % 9.00  05/12/22 15:20   Monos (Absolute) 0.00 - 0.85 K/uL 0.34  05/12/22 15:20   Eosinophils 0.00 - 6.90 % 3.40  05/12/22 15:20   Eos (Absolute) 0.00 - 0.51 K/uL 0.13  05/12/22 15:20   Basophils 0.00 - 1.80 % 0.80  05/12/22 15:20   Baso (Absolute) 0.00 - 0.12 K/uL 0.03  05/12/22 15:20   Immature  Granulocytes 0.00 - 0.90 % 0.30  05/12/22 15:20   Immature Granulocytes (abs) 0.00 - 0.11 K/uL 0.01  05/12/22 15:20   Nucleated RBC /100 WBC 0.00  05/12/22 15:20   NRBC (Absolute) K/uL 0.00  05/12/22 15:20   (L): Data is abnormally low**   Latest Reference Range & Units Most Recent   Sodium 135 - 145 mmol/L 139  05/12/22 15:20   Potassium 3.6 - 5.5 mmol/L 4.1  05/12/22 15:20   Chloride 96 - 112 mmol/L 103  05/12/22 15:20   Co2 20 - 33 mmol/L 25  05/12/22 15:20   Anion Gap 7.0 - 16.0  11.0  05/12/22 15:20   Glucose 65 - 99 mg/dL 131 (H)  05/12/22 15:20   Bun 8 - 22 mg/dL 16  05/12/22 15:20   Creatinine 0.50 - 1.40 mg/dL 0.84  05/12/22 15:20   GFR (CKD-EPI) >60 mL/min/1.73 m 2 93  05/12/22 15:20   GFR If African American >60 mL/min/1.73 m 2 >60  07/30/21 12:19   GFR If Non African American >60 mL/min/1.73 m 2 >60  07/30/21 12:19   Calcium 8.5 - 10.5 mg/dL 9.2  05/12/22 15:20   AST(SGOT) 12 - 45 U/L 14  05/12/22 15:20   ALT(SGPT) 2 - 50 U/L 10  05/12/22 15:20   Alkaline Phosphatase 30 - 99 U/L 74  05/12/22 15:20   Total Bilirubin 0.1 - 1.5 mg/dL 0.6  05/12/22 15:20   Albumin 3.2 - 4.9 g/dL 4.2  05/12/22 15:20   Total Protein 6.0 - 8.2 g/dL 7.0  05/12/22 15:20   Globulin 1.9 - 3.5 g/dL 2.8  05/12/22 15:20   A-G Ratio g/dL 1.5  05/12/22 15:20   (H): Data is abnormally high   Latest Reference Range & Units Most Recent   PT 12.0 - 14.6 sec 14.0  05/12/22 15:20   INR     0.87 - 1.13  1.11  05/12/22 15:20     [facility]:         Latest Reference Range & Units 07/30/21 12:19   Alpha Fetoprotein 0 - 9 ng/mL 10 (H)   (H): Data is abnormally high    No DCP or AFP L3%    Child Rhodes Class A  ROBERTO Grade A1  NLR 2.67    Pathology:  none    Radiology:   CT chest on 4/5/22 at [facility]:      CT abdomen/ pelvis 4/5/22 at [facility]:          MRI abdomen 3/1/22 at [facility]:        Current Outpatient Medications   Medication Sig Dispense Refill   • ezetimibe (ZETIA) 10 MG Tab Take 1 Tablet by mouth every day.  NEEDS TO BE SEEN FOR FURTHER REFILLS. 90 Tablet 0   • metoprolol SR (TOPROL XL) 25 MG TABLET SR 24 HR Take 1 Tablet by mouth every day. MUST BE SEEN FOR FURTHER REFILLS 90 Tablet 0   • VITAMIN A PO Take 4,800 Units by mouth 2 times a day.     • NON SPECIFIED Take 1 Tablet by mouth 2 times a day. Uric Acid Cleanse     • ginkgo biloba extract (GINKO BILOBA) 60 MG Cap Take 180 mg by mouth every day.     • oxyCODONE immediate-release (ROXICODONE) 5 MG Tab Take 5 mg by mouth every four hours as needed for Severe Pain.     • Saw Palmetto 450 MG Cap Take 450 mg by mouth 2 times a day.     • CHROMIUM PO Take 1 Tablet by mouth every day.     • Sodium Hyaluronate, oral, (HYALURONIC ACID PO) Take 1 Tablet by mouth every day.     • lisinopril (PRINIVIL) 10 MG Tab Take 1 Tablet by mouth every day. (Patient taking differently: Take 10 mg by mouth every evening.) 100 Tablet 1   • Red Yeast Rice 600 MG Tab Take 600 mg by mouth 2 times a day.     • Cyanocobalamin (VITAMIN B-12 PO) Take 1 Tablet by mouth 2 times a day.     • vitamin e (VITAMIN E) 400 UNIT Cap Take 400 Units by mouth every day.     • Cholecalciferol (VITAMIN D3) 3000 UNITS Tab Take 1 Capsule by mouth every day.     • Omega-3 Fatty Acids (FISH OIL) 1000 MG Cap capsule Take 1,000 mg by mouth every day.       No current facility-administered medications for this visit.       Allergies   Allergen Reactions   • Ciprofloxacin Unspecified     convulsions   • Statins [Hmg-Coa-R Inhibitors] Unspecified     Stabbing pains in kidneys   • Acetaminophen      Kidney pain    • Atorvastatin      Patient declines   • Ibuprofen      Kidney pain   • Naproxen      Kidney pain   • Shellfish Allergy      Kidney stones   • Soap &  [Alcohol] Rash     Antibacterial soap- contact dermatitis       Physical Exam  Constitutional:       General: He is not in acute distress.     Appearance: He is not diaphoretic.   HENT:      Head: Normocephalic.   Eyes:      General: No scleral  icterus.  Pulmonary:      Effort: Pulmonary effort is normal. No respiratory distress.   Abdominal:      General: There is no distension.   Skin:     General: Skin is warm and dry.      Coloration: Skin is not pale.      Findings: No erythema or rash.   Neurological:      General: No focal deficit present.      Mental Status: He is alert and oriented to person, place, and time. He is not disoriented.      Cranial Nerves: No cranial nerve deficit or facial asymmetry.      Sensory: Sensation is intact.      Motor: No weakness or tremor.      Coordination: Coordination normal.      Gait: Gait is intact. Gait normal.   Psychiatric:         Mood and Affect: Mood and affect normal.         Behavior: Behavior normal.         Thought Content: Thought content normal.         Cognition and Memory: Memory normal.         Judgment: Judgment normal.       ECOG Performance Status 0    Impression:   1. Unresectable segment 5 hepatocellular carcinoma 2-3 cm in size.  2. Hepatitis C, status post Harvoni therapy.  3. Cirrhosis.  4. Portal hypertension.  5. Esophageal varices.  6. Thrombocytopenia.  7. Essential hypertension.  8. Hyperlipidemia.    Plan:   Nicholas Rizzo MD has reviewed 's history and imaging studies, examined the patient, and discussed treatment options.  has failed resection due to indistinct tumor margins during surgery. He has a single hepatoma on his most recent imaging, but it is a bit old for procedure planning. We explained he is at risk for tumor development due to the cirrhosis and we want to ensure he has not developed any changes since his last MRI on March 1 that would alter his procedure risk or change our recommendations or plan. He is agreeable to a repeat study and we will send the request to [facility] due to his out of network insurance. Because he wishes to preserve his gallbladder, we explained that while our preferred first treatment option is image- guided ablation, it  carries a slightly greater risk of gall bladder injury due to the close proximity of the lesion to the gallbladder over catheter directed methods. He verbalizes understanding that our second intervention choice is y90 segmentectomy and that after consideration, he wishes to pursue catheter directed therapy due to less risk for injury to his gallbladder.     We discussed the method of the procedure at length including angiography and embolization as well as the expected clinical course with the possibility of post-embolization syndrome nausea and pain and hospitalization. We explained that this procedure is palliative and not curative. We discussed the possibility of tumor recurrence and development of future tumors. We additionally discussed the risks, including bleeding and infection, damage to the arteries or adjacent tissue, reaction to any medications given during the procedure, potential side effects of contrast administration including renal damage, non-target embolization, radiation-induced ulcers or liver disease in the setting of radioembolization, liver failure, and death. There is a risk the procedure will not be effective. We discussed alternatives to the procedure including surveillance with no intervention and discussed ablation as detailed above. The patient verbalizes understanding of risks, benefits, and alternatives to IR intervention and elects to proceed. All questions were answered. A referral to the radiation oncology program was sent for assistance with radiation dosing and metrics. Written pre- and post- procedure care instructions were provided. Scheduling is pending the updated IR schedule. We have sent the MRI order to the out of network pool to be forwarded on to [facility]. We explained that the patient will need to have ongoing surveillance after the procedure, which will be managed by the treating team, and that future treatment depends on multiple factors including lab studies,  imaging, and performance status.     Sasika Pavon APRN with Nicholas Rizzo MD  Interventional Radiology   [facility]   [id number]  [place] [address]  [phone]

## 2022-06-14 ENCOUNTER — HOSPITAL ENCOUNTER (OUTPATIENT)
Dept: RADIOLOGY | Facility: MEDICAL CENTER | Age: 72
End: 2022-06-14
Attending: SURGERY
Payer: MEDICARE

## 2022-06-14 DIAGNOSIS — C22.0 HEPATOCELLULAR CARCINOMA (HCC): ICD-10-CM

## 2022-06-14 DIAGNOSIS — K74.60 CIRRHOSIS OF LIVER WITHOUT ASCITES, UNSPECIFIED HEPATIC CIRRHOSIS TYPE (HCC): ICD-10-CM

## 2022-06-14 ASSESSMENT — ENCOUNTER SYMPTOMS
SPEECH CHANGE: 0
SENSORY CHANGE: 0
BACK PAIN: 1
WEIGHT LOSS: 0
GASTROINTESTINAL NEGATIVE: 1
CONSTITUTIONAL NEGATIVE: 1
NECK PAIN: 1
CARDIOVASCULAR NEGATIVE: 1
CHILLS: 0
WEAKNESS: 0
DIAPHORESIS: 0
FEVER: 0
RESPIRATORY NEGATIVE: 1
FOCAL WEAKNESS: 0

## 2022-06-14 ASSESSMENT — LIFESTYLE VARIABLES: SUBSTANCE_ABUSE: 0

## 2022-06-15 ENCOUNTER — HOSPITAL ENCOUNTER (OUTPATIENT)
Facility: MEDICAL CENTER | Age: 72
End: 2022-06-15
Attending: RADIOLOGY | Admitting: RADIOLOGY
Payer: MEDICARE

## 2022-06-20 ENCOUNTER — HOSPITAL ENCOUNTER (OUTPATIENT)
Facility: MEDICAL CENTER | Age: 72
End: 2022-06-20
Attending: RADIOLOGY | Admitting: RADIOLOGY
Payer: MEDICARE

## 2022-07-01 ENCOUNTER — PRE-ADMISSION TESTING (OUTPATIENT)
Dept: ADMISSIONS | Facility: MEDICAL CENTER | Age: 72
End: 2022-07-01
Attending: RADIOLOGY
Payer: MEDICARE

## 2022-07-04 ENCOUNTER — APPOINTMENT (OUTPATIENT)
Dept: RADIOLOGY | Facility: MEDICAL CENTER | Age: 72
DRG: 485 | End: 2022-07-04
Attending: EMERGENCY MEDICINE
Payer: MEDICARE

## 2022-07-04 ENCOUNTER — HOSPITAL ENCOUNTER (INPATIENT)
Facility: MEDICAL CENTER | Age: 72
LOS: 21 days | DRG: 485 | End: 2022-07-25
Attending: EMERGENCY MEDICINE | Admitting: STUDENT IN AN ORGANIZED HEALTH CARE EDUCATION/TRAINING PROGRAM
Payer: MEDICARE

## 2022-07-04 DIAGNOSIS — M19.90 INFLAMMATORY ARTHRITIS: ICD-10-CM

## 2022-07-04 DIAGNOSIS — I10 ESSENTIAL HYPERTENSION, BENIGN: ICD-10-CM

## 2022-07-04 DIAGNOSIS — I48.91 ATRIAL FIBRILLATION WITH RVR (HCC): ICD-10-CM

## 2022-07-04 DIAGNOSIS — B95.61 MSSA BACTEREMIA: ICD-10-CM

## 2022-07-04 DIAGNOSIS — E86.0 DEHYDRATION: ICD-10-CM

## 2022-07-04 DIAGNOSIS — R11.0 NAUSEA: ICD-10-CM

## 2022-07-04 DIAGNOSIS — R73.9 HYPERGLYCEMIA: ICD-10-CM

## 2022-07-04 DIAGNOSIS — K59.00 CONSTIPATION, UNSPECIFIED CONSTIPATION TYPE: ICD-10-CM

## 2022-07-04 DIAGNOSIS — E11.65 TYPE 2 DIABETES MELLITUS WITH HYPERGLYCEMIA, WITHOUT LONG-TERM CURRENT USE OF INSULIN (HCC): ICD-10-CM

## 2022-07-04 DIAGNOSIS — R53.1 GENERALIZED WEAKNESS: ICD-10-CM

## 2022-07-04 DIAGNOSIS — N39.0 ACUTE UTI: ICD-10-CM

## 2022-07-04 DIAGNOSIS — R78.81 MSSA BACTEREMIA: ICD-10-CM

## 2022-07-04 DIAGNOSIS — M62.82 NON-TRAUMATIC RHABDOMYOLYSIS: ICD-10-CM
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
## 2022-07-04 DIAGNOSIS — M00.062 STAPHYLOCOCCAL ARTHRITIS OF LEFT KNEE (HCC): ICD-10-CM

## 2022-07-04 PROBLEM — D69.6 THROMBOCYTOPENIA (HCC): Status: ACTIVE | Noted: 2022-07-04

## 2022-07-04 PROBLEM — E87.1 HYPONATREMIA: Status: ACTIVE | Noted: 2022-07-04

## 2022-07-04 PROBLEM — T79.6XXA TRAUMATIC RHABDOMYOLYSIS (HCC): Status: ACTIVE | Noted: 2022-07-04

## 2022-07-04 LAB
ALBUMIN SERPL BCP-MCNC: 3.6 G/DL (ref 3.2–4.9)
ALBUMIN/GLOB SERPL: 1.1 G/DL
ALP SERPL-CCNC: 78 U/L (ref 30–99)
ALT SERPL-CCNC: 64 U/L (ref 2–50)
ANION GAP SERPL CALC-SCNC: 12 MMOL/L (ref 7–16)
ANION GAP SERPL CALC-SCNC: 16 MMOL/L (ref 7–16)
APPEARANCE FLD: NORMAL
APPEARANCE UR: CLEAR
AST SERPL-CCNC: 107 U/L (ref 12–45)
BACTERIA #/AREA URNS HPF: ABNORMAL /HPF
BASOPHILS # BLD AUTO: 0.2 % (ref 0–1.8)
BASOPHILS # BLD: 0.02 K/UL (ref 0–0.12)
BILIRUB SERPL-MCNC: 1.3 MG/DL (ref 0.1–1.5)
BILIRUB UR QL STRIP.AUTO: NEGATIVE
BODY FLD TYPE: NORMAL
BODY FLD TYPE: NORMAL
BUN SERPL-MCNC: 33 MG/DL (ref 8–22)
BUN SERPL-MCNC: 42 MG/DL (ref 8–22)
CALCIUM SERPL-MCNC: 8.4 MG/DL (ref 8.5–10.5)
CALCIUM SERPL-MCNC: 8.9 MG/DL (ref 8.5–10.5)
CHLORIDE SERPL-SCNC: 90 MMOL/L (ref 96–112)
CHLORIDE SERPL-SCNC: 94 MMOL/L (ref 96–112)
CK SERPL-CCNC: 2411 U/L (ref 0–154)
CO2 SERPL-SCNC: 20 MMOL/L (ref 20–33)
CO2 SERPL-SCNC: 23 MMOL/L (ref 20–33)
COLOR FLD: YELLOW
COLOR UR: YELLOW
CREAT SERPL-MCNC: 0.85 MG/DL (ref 0.5–1.4)
CREAT SERPL-MCNC: 1.24 MG/DL (ref 0.5–1.4)
CRP SERPL HS-MCNC: 29.01 MG/DL (ref 0–0.75)
CRYSTALS FLD MICRO: NORMAL
EKG IMPRESSION: NORMAL
EKG IMPRESSION: NORMAL
EOSINOPHIL # BLD AUTO: 0 K/UL (ref 0–0.51)
EOSINOPHIL NFR BLD: 0 % (ref 0–6.9)
EPI CELLS #/AREA URNS HPF: NEGATIVE /HPF
ERYTHROCYTE [DISTWIDTH] IN BLOOD BY AUTOMATED COUNT: 42.3 FL (ref 35.9–50)
ERYTHROCYTE [SEDIMENTATION RATE] IN BLOOD BY WESTERGREN METHOD: 3 MM/HOUR (ref 0–20)
GFR SERPLBLD CREATININE-BSD FMLA CKD-EPI: 62 ML/MIN/1.73 M 2
GFR SERPLBLD CREATININE-BSD FMLA CKD-EPI: 92 ML/MIN/1.73 M 2
GLOBULIN SER CALC-MCNC: 3.3 G/DL (ref 1.9–3.5)
GLUCOSE BLD STRIP.AUTO-MCNC: 207 MG/DL (ref 65–99)
GLUCOSE FLD-MCNC: 177 MG/DL
GLUCOSE SERPL-MCNC: 242 MG/DL (ref 65–99)
GLUCOSE SERPL-MCNC: 358 MG/DL (ref 65–99)
GLUCOSE UR STRIP.AUTO-MCNC: 250 MG/DL
GRAM STN SPEC: NORMAL
GRAN CASTS #/AREA URNS LPF: ABNORMAL /LPF
HCT VFR BLD AUTO: 45.1 % (ref 42–52)
HGB BLD-MCNC: 15.6 G/DL (ref 14–18)
HYALINE CASTS #/AREA URNS LPF: ABNORMAL /LPF
IMM GRANULOCYTES # BLD AUTO: 0.1 K/UL (ref 0–0.11)
IMM GRANULOCYTES NFR BLD AUTO: 1 % (ref 0–0.9)
KETONES UR STRIP.AUTO-MCNC: NEGATIVE MG/DL
LACTATE SERPL-SCNC: 2.1 MMOL/L (ref 0.5–2)
LACTATE SERPL-SCNC: 4.4 MMOL/L (ref 0.5–2)
LEUKOCYTE ESTERASE UR QL STRIP.AUTO: ABNORMAL
LIPASE SERPL-CCNC: 28 U/L (ref 11–82)
LYMPHOCYTES # BLD AUTO: 0.18 K/UL (ref 1–4.8)
LYMPHOCYTES NFR BLD: 1.7 % (ref 22–41)
MAGNESIUM SERPL-MCNC: 1.8 MG/DL (ref 1.5–2.5)
MCH RBC QN AUTO: 28.6 PG (ref 27–33)
MCHC RBC AUTO-ENTMCNC: 34.6 G/DL (ref 33.7–35.3)
MCV RBC AUTO: 82.6 FL (ref 81.4–97.8)
MICRO URNS: ABNORMAL
MONOCYTES # BLD AUTO: 0.85 K/UL (ref 0–0.85)
MONOCYTES NFR BLD AUTO: 8.2 % (ref 0–13.4)
MONONUC CELLS NFR FLD: 34 %
NEUTROPHILS # BLD AUTO: 9.22 K/UL (ref 1.82–7.42)
NEUTROPHILS NFR BLD: 88.9 % (ref 44–72)
NEUTROPHILS NFR FLD: 66 %
NITRITE UR QL STRIP.AUTO: POSITIVE
NRBC # BLD AUTO: 0 K/UL
NRBC BLD-RTO: 0 /100 WBC
NT-PROBNP SERPL IA-MCNC: 3678 PG/ML (ref 0–125)
PH UR STRIP.AUTO: 5 [PH] (ref 5–8)
PLATELET # BLD AUTO: 88 K/UL (ref 164–446)
PMV BLD AUTO: 11 FL (ref 9–12.9)
POTASSIUM SERPL-SCNC: 3.9 MMOL/L (ref 3.6–5.5)
POTASSIUM SERPL-SCNC: 3.9 MMOL/L (ref 3.6–5.5)
PROT FLD-MCNC: 3.6 G/DL
PROT SERPL-MCNC: 6.9 G/DL (ref 6–8.2)
PROT UR QL STRIP: 100 MG/DL
RBC # BLD AUTO: 5.46 M/UL (ref 4.7–6.1)
RBC # FLD: 0 CELLS/UL
RBC # URNS HPF: ABNORMAL /HPF
RBC UR QL AUTO: ABNORMAL
SIGNIFICANT IND 70042: NORMAL
SITE SITE: NORMAL
SODIUM SERPL-SCNC: 126 MMOL/L (ref 135–145)
SODIUM SERPL-SCNC: 129 MMOL/L (ref 135–145)
SOURCE SOURCE: NORMAL
SP GR UR STRIP.AUTO: 1.02
TROPONIN T SERPL-MCNC: 43 NG/L (ref 6–19)
UROBILINOGEN UR STRIP.AUTO-MCNC: 1 MG/DL
WBC # BLD AUTO: 10.4 K/UL (ref 4.8–10.8)
WBC # FLD: NORMAL CELLS/UL
WBC #/AREA URNS HPF: ABNORMAL /HPF

## 2022-07-04 PROCEDURE — A9270 NON-COVERED ITEM OR SERVICE: HCPCS | Performed by: STUDENT IN AN ORGANIZED HEALTH CARE EDUCATION/TRAINING PROGRAM

## 2022-07-04 PROCEDURE — 83735 ASSAY OF MAGNESIUM: CPT

## 2022-07-04 PROCEDURE — 83690 ASSAY OF LIPASE: CPT

## 2022-07-04 PROCEDURE — 36415 COLL VENOUS BLD VENIPUNCTURE: CPT

## 2022-07-04 PROCEDURE — 700101 HCHG RX REV CODE 250: Performed by: STUDENT IN AN ORGANIZED HEALTH CARE EDUCATION/TRAINING PROGRAM

## 2022-07-04 PROCEDURE — 99223 1ST HOSP IP/OBS HIGH 75: CPT | Performed by: STUDENT IN AN ORGANIZED HEALTH CARE EDUCATION/TRAINING PROGRAM

## 2022-07-04 PROCEDURE — 83605 ASSAY OF LACTIC ACID: CPT | Mod: 91

## 2022-07-04 PROCEDURE — 96375 TX/PRO/DX INJ NEW DRUG ADDON: CPT

## 2022-07-04 PROCEDURE — 700101 HCHG RX REV CODE 250: Performed by: EMERGENCY MEDICINE

## 2022-07-04 PROCEDURE — 93005 ELECTROCARDIOGRAM TRACING: CPT

## 2022-07-04 PROCEDURE — 73562 X-RAY EXAM OF KNEE 3: CPT | Mod: LT

## 2022-07-04 PROCEDURE — 770000 HCHG ROOM/CARE - INTERMEDIATE ICU *

## 2022-07-04 PROCEDURE — 87070 CULTURE OTHR SPECIMN AEROBIC: CPT

## 2022-07-04 PROCEDURE — 87205 SMEAR GRAM STAIN: CPT

## 2022-07-04 PROCEDURE — 84484 ASSAY OF TROPONIN QUANT: CPT

## 2022-07-04 PROCEDURE — 82945 GLUCOSE OTHER FLUID: CPT

## 2022-07-04 PROCEDURE — 83880 ASSAY OF NATRIURETIC PEPTIDE: CPT

## 2022-07-04 PROCEDURE — 96372 THER/PROPH/DIAG INJ SC/IM: CPT

## 2022-07-04 PROCEDURE — 87040 BLOOD CULTURE FOR BACTERIA: CPT

## 2022-07-04 PROCEDURE — 700105 HCHG RX REV CODE 258: Performed by: STUDENT IN AN ORGANIZED HEALTH CARE EDUCATION/TRAINING PROGRAM

## 2022-07-04 PROCEDURE — 20610 DRAIN/INJ JOINT/BURSA W/O US: CPT

## 2022-07-04 PROCEDURE — 89060 EXAM SYNOVIAL FLUID CRYSTALS: CPT

## 2022-07-04 PROCEDURE — 82962 GLUCOSE BLOOD TEST: CPT

## 2022-07-04 PROCEDURE — 84157 ASSAY OF PROTEIN OTHER: CPT

## 2022-07-04 PROCEDURE — 87147 CULTURE TYPE IMMUNOLOGIC: CPT

## 2022-07-04 PROCEDURE — 99285 EMERGENCY DEPT VISIT HI MDM: CPT

## 2022-07-04 PROCEDURE — 0S9D3ZX DRAINAGE OF LEFT KNEE JOINT, PERCUTANEOUS APPROACH, DIAGNOSTIC: ICD-10-PCS | Performed by: EMERGENCY MEDICINE

## 2022-07-04 PROCEDURE — 700102 HCHG RX REV CODE 250 W/ 637 OVERRIDE(OP): Performed by: STUDENT IN AN ORGANIZED HEALTH CARE EDUCATION/TRAINING PROGRAM

## 2022-07-04 PROCEDURE — 89051 BODY FLUID CELL COUNT: CPT

## 2022-07-04 PROCEDURE — 700111 HCHG RX REV CODE 636 W/ 250 OVERRIDE (IP): Performed by: EMERGENCY MEDICINE

## 2022-07-04 PROCEDURE — 96367 TX/PROPH/DG ADDL SEQ IV INF: CPT

## 2022-07-04 PROCEDURE — 96376 TX/PRO/DX INJ SAME DRUG ADON: CPT

## 2022-07-04 PROCEDURE — 87077 CULTURE AEROBIC IDENTIFY: CPT

## 2022-07-04 PROCEDURE — 87150 DNA/RNA AMPLIFIED PROBE: CPT

## 2022-07-04 PROCEDURE — 81001 URINALYSIS AUTO W/SCOPE: CPT

## 2022-07-04 PROCEDURE — 87186 SC STD MICRODIL/AGAR DIL: CPT

## 2022-07-04 PROCEDURE — 86140 C-REACTIVE PROTEIN: CPT

## 2022-07-04 PROCEDURE — 85025 COMPLETE CBC W/AUTO DIFF WBC: CPT

## 2022-07-04 PROCEDURE — 700105 HCHG RX REV CODE 258: Performed by: EMERGENCY MEDICINE

## 2022-07-04 PROCEDURE — 96365 THER/PROPH/DIAG IV INF INIT: CPT

## 2022-07-04 PROCEDURE — 700111 HCHG RX REV CODE 636 W/ 250 OVERRIDE (IP): Performed by: STUDENT IN AN ORGANIZED HEALTH CARE EDUCATION/TRAINING PROGRAM

## 2022-07-04 PROCEDURE — 80053 COMPREHEN METABOLIC PANEL: CPT

## 2022-07-04 PROCEDURE — 71045 X-RAY EXAM CHEST 1 VIEW: CPT

## 2022-07-04 PROCEDURE — 87086 URINE CULTURE/COLONY COUNT: CPT

## 2022-07-04 PROCEDURE — 85652 RBC SED RATE AUTOMATED: CPT

## 2022-07-04 PROCEDURE — 82550 ASSAY OF CK (CPK): CPT

## 2022-07-04 PROCEDURE — 80048 BASIC METABOLIC PNL TOTAL CA: CPT

## 2022-07-04 PROCEDURE — 93005 ELECTROCARDIOGRAM TRACING: CPT | Performed by: EMERGENCY MEDICINE

## 2022-07-04 RX ORDER — METOPROLOL SUCCINATE 25 MG/1
25 TABLET, EXTENDED RELEASE ORAL EVERY EVENING
Status: DISCONTINUED | OUTPATIENT
Start: 2022-07-04 | End: 2022-07-05

## 2022-07-04 RX ORDER — SODIUM CHLORIDE, SODIUM LACTATE, POTASSIUM CHLORIDE, CALCIUM CHLORIDE 600; 310; 30; 20 MG/100ML; MG/100ML; MG/100ML; MG/100ML
1000 INJECTION, SOLUTION INTRAVENOUS ONCE
Status: COMPLETED | OUTPATIENT
Start: 2022-07-04 | End: 2022-07-04

## 2022-07-04 RX ORDER — METOPROLOL TARTRATE 1 MG/ML
2.5 INJECTION, SOLUTION INTRAVENOUS
Status: DISCONTINUED | OUTPATIENT
Start: 2022-07-04 | End: 2022-07-04

## 2022-07-04 RX ORDER — POLYETHYLENE GLYCOL 3350 17 G/17G
1 POWDER, FOR SOLUTION ORAL
Status: DISCONTINUED | OUTPATIENT
Start: 2022-07-04 | End: 2022-07-12

## 2022-07-04 RX ORDER — ENOXAPARIN SODIUM 100 MG/ML
40 INJECTION SUBCUTANEOUS DAILY
Status: DISCONTINUED | OUTPATIENT
Start: 2022-07-04 | End: 2022-07-04

## 2022-07-04 RX ORDER — SODIUM CHLORIDE 9 MG/ML
1000 INJECTION, SOLUTION INTRAVENOUS ONCE
Status: COMPLETED | OUTPATIENT
Start: 2022-07-04 | End: 2022-07-04

## 2022-07-04 RX ORDER — METOPROLOL TARTRATE 1 MG/ML
5 INJECTION, SOLUTION INTRAVENOUS
Status: COMPLETED | OUTPATIENT
Start: 2022-07-04 | End: 2022-07-05

## 2022-07-04 RX ORDER — METOPROLOL TARTRATE 1 MG/ML
2.5 INJECTION, SOLUTION INTRAVENOUS ONCE
Status: COMPLETED | OUTPATIENT
Start: 2022-07-04 | End: 2022-07-04

## 2022-07-04 RX ORDER — ONDANSETRON 4 MG/1
4 TABLET, ORALLY DISINTEGRATING ORAL EVERY 4 HOURS PRN
Status: DISCONTINUED | OUTPATIENT
Start: 2022-07-04 | End: 2022-07-26 | Stop reason: HOSPADM

## 2022-07-04 RX ORDER — LIDOCAINE HYDROCHLORIDE AND EPINEPHRINE 10; 10 MG/ML; UG/ML
10 INJECTION, SOLUTION INFILTRATION; PERINEURAL ONCE
Status: COMPLETED | OUTPATIENT
Start: 2022-07-04 | End: 2022-07-04

## 2022-07-04 RX ORDER — EZETIMIBE 10 MG/1
10 TABLET ORAL EVERY EVENING
Status: DISCONTINUED | OUTPATIENT
Start: 2022-07-04 | End: 2022-07-26 | Stop reason: HOSPADM

## 2022-07-04 RX ORDER — MORPHINE SULFATE 4 MG/ML
2 INJECTION INTRAVENOUS
Status: DISCONTINUED | OUTPATIENT
Start: 2022-07-04 | End: 2022-07-26 | Stop reason: HOSPADM

## 2022-07-04 RX ORDER — SODIUM CHLORIDE, SODIUM LACTATE, POTASSIUM CHLORIDE, CALCIUM CHLORIDE 600; 310; 30; 20 MG/100ML; MG/100ML; MG/100ML; MG/100ML
INJECTION, SOLUTION INTRAVENOUS CONTINUOUS
Status: DISCONTINUED | OUTPATIENT
Start: 2022-07-04 | End: 2022-07-07

## 2022-07-04 RX ORDER — ENOXAPARIN SODIUM 100 MG/ML
1 INJECTION SUBCUTANEOUS 2 TIMES DAILY
Status: DISCONTINUED | OUTPATIENT
Start: 2022-07-05 | End: 2022-07-07

## 2022-07-04 RX ORDER — DEXTROSE MONOHYDRATE 25 G/50ML
25 INJECTION, SOLUTION INTRAVENOUS
Status: DISCONTINUED | OUTPATIENT
Start: 2022-07-04 | End: 2022-07-26 | Stop reason: HOSPADM

## 2022-07-04 RX ORDER — LISINOPRIL 10 MG/1
10 TABLET ORAL EVERY EVENING
Status: DISCONTINUED | OUTPATIENT
Start: 2022-07-04 | End: 2022-07-11

## 2022-07-04 RX ORDER — ONDANSETRON 2 MG/ML
4 INJECTION INTRAMUSCULAR; INTRAVENOUS EVERY 4 HOURS PRN
Status: DISCONTINUED | OUTPATIENT
Start: 2022-07-04 | End: 2022-07-26 | Stop reason: HOSPADM

## 2022-07-04 RX ORDER — AMOXICILLIN 250 MG
2 CAPSULE ORAL 2 TIMES DAILY
Status: DISCONTINUED | OUTPATIENT
Start: 2022-07-04 | End: 2022-07-12

## 2022-07-04 RX ORDER — LABETALOL HYDROCHLORIDE 5 MG/ML
10 INJECTION, SOLUTION INTRAVENOUS EVERY 4 HOURS PRN
Status: DISCONTINUED | OUTPATIENT
Start: 2022-07-04 | End: 2022-07-26 | Stop reason: HOSPADM

## 2022-07-04 RX ORDER — ENOXAPARIN SODIUM 100 MG/ML
40 INJECTION SUBCUTANEOUS ONCE
Status: COMPLETED | OUTPATIENT
Start: 2022-07-04 | End: 2022-07-05

## 2022-07-04 RX ORDER — BISACODYL 10 MG
10 SUPPOSITORY, RECTAL RECTAL
Status: DISCONTINUED | OUTPATIENT
Start: 2022-07-04 | End: 2022-07-12

## 2022-07-04 RX ORDER — OXYCODONE HYDROCHLORIDE 5 MG/1
2.5 TABLET ORAL
Status: DISCONTINUED | OUTPATIENT
Start: 2022-07-04 | End: 2022-07-26 | Stop reason: HOSPADM

## 2022-07-04 RX ORDER — OXYCODONE HYDROCHLORIDE 5 MG/1
5 TABLET ORAL
Status: DISCONTINUED | OUTPATIENT
Start: 2022-07-04 | End: 2022-07-26 | Stop reason: HOSPADM

## 2022-07-04 RX ORDER — CEFTRIAXONE 2 G/1
2 INJECTION, POWDER, FOR SOLUTION INTRAMUSCULAR; INTRAVENOUS ONCE
Status: COMPLETED | OUTPATIENT
Start: 2022-07-04 | End: 2022-07-04

## 2022-07-04 RX ORDER — DEXTROSE MONOHYDRATE 50 MG/ML
INJECTION, SOLUTION INTRAVENOUS CONTINUOUS
Status: DISCONTINUED | OUTPATIENT
Start: 2022-07-04 | End: 2022-07-05

## 2022-07-04 RX ADMIN — INSULIN HUMAN 3 UNITS: 100 INJECTION, SOLUTION PARENTERAL at 22:01

## 2022-07-04 RX ADMIN — SODIUM CHLORIDE, POTASSIUM CHLORIDE, SODIUM LACTATE AND CALCIUM CHLORIDE 1000 ML: 600; 310; 30; 20 INJECTION, SOLUTION INTRAVENOUS at 18:27

## 2022-07-04 RX ADMIN — EZETIMIBE 10 MG: 10 TABLET ORAL at 18:18

## 2022-07-04 RX ADMIN — CEFTRIAXONE SODIUM 2 G: 2 INJECTION, POWDER, FOR SOLUTION INTRAMUSCULAR; INTRAVENOUS at 18:10

## 2022-07-04 RX ADMIN — ENOXAPARIN SODIUM 40 MG: 40 INJECTION SUBCUTANEOUS at 18:18

## 2022-07-04 RX ADMIN — METOPROLOL TARTRATE 2.5 MG: 1 INJECTION, SOLUTION INTRAVENOUS at 16:40

## 2022-07-04 RX ADMIN — SODIUM CHLORIDE, POTASSIUM CHLORIDE, SODIUM LACTATE AND CALCIUM CHLORIDE: 600; 310; 30; 20 INJECTION, SOLUTION INTRAVENOUS at 21:51

## 2022-07-04 RX ADMIN — AMIODARONE HYDROCHLORIDE 150 MG: 1.5 INJECTION, SOLUTION INTRAVENOUS at 21:42

## 2022-07-04 RX ADMIN — OXYCODONE 5 MG: 5 TABLET ORAL at 23:28

## 2022-07-04 RX ADMIN — METOPROLOL SUCCINATE 25 MG: 25 TABLET, EXTENDED RELEASE ORAL at 18:17

## 2022-07-04 RX ADMIN — METOPROLOL TARTRATE 5 MG: 1 INJECTION, SOLUTION INTRAVENOUS at 19:59

## 2022-07-04 RX ADMIN — AMIODARONE HYDROCHLORIDE 1 MG/MIN: 1.8 INJECTION, SOLUTION INTRAVENOUS at 22:00

## 2022-07-04 RX ADMIN — DEXTROSE MONOHYDRATE: 50 INJECTION, SOLUTION INTRAVENOUS at 21:43

## 2022-07-04 RX ADMIN — SODIUM CHLORIDE 1000 ML: 9 INJECTION, SOLUTION INTRAVENOUS at 15:30

## 2022-07-04 RX ADMIN — METOPROLOL TARTRATE 2.5 MG: 1 INJECTION, SOLUTION INTRAVENOUS at 18:20

## 2022-07-04 RX ADMIN — SODIUM CHLORIDE 1000 ML: 9 INJECTION, SOLUTION INTRAVENOUS at 14:28

## 2022-07-04 RX ADMIN — DILTIAZEM HYDROCHLORIDE 5 MG/HR: 5 INJECTION INTRAVENOUS at 23:28

## 2022-07-04 RX ADMIN — LISINOPRIL 10 MG: 10 TABLET ORAL at 18:10

## 2022-07-04 RX ADMIN — LIDOCAINE HYDROCHLORIDE,EPINEPHRINE BITARTRATE 10 ML: 10; .01 INJECTION, SOLUTION INFILTRATION; PERINEURAL at 16:00

## 2022-07-04 ASSESSMENT — FIBROSIS 4 INDEX: FIB4 SCORE: 3.94

## 2022-07-04 ASSESSMENT — ENCOUNTER SYMPTOMS
VOMITING: 0
ABDOMINAL PAIN: 0
WEAKNESS: 1
NAUSEA: 0
CHILLS: 0
FEVER: 0

## 2022-07-04 NOTE — ED NOTES
Tech from Lab called with critical result of LA 4.4 at 1515. Critical lab result read back to tech.   Dr. Kincaid notified of critical lab result at 1520.  Critical lab result read back by Dr. Guadarrama.

## 2022-07-04 NOTE — ED TRIAGE NOTES
Chief Complaint   Patient presents with   • Atrial Fibrillation     Uncontrolled -200   • Weakness     Generalized x3 days   • GLF     Denies LOC, no trauma noted, denies hitting head     Patient BIB EMS from home in [place].  Per EMS patient found on floor since Friday-Saturday, decline ED transfer on Saturday; patient reports conditioned worsened called EMS today brought to ED.     Patient A&O, reports hx of afib, reports previously controlled, denies anticoagulation..     Chart up for ERP.

## 2022-07-04 NOTE — ED NOTES
Patient educated on need for urine collection sample, verbalized understanding, urinal at bedside.  IVF infusing.

## 2022-07-04 NOTE — ED NOTES
1L NS infused.  HR remains 170-190, ERP made aware.  Patient resting on gurney, respirations even and unlabored.

## 2022-07-04 NOTE — ED PROVIDER NOTES
"ED Provider Note    Scribed for Zora Guadarrama M.D. by Sadaf Bowling. 7/4/2022  1:36 PM    Primary care provider: Yayo Beckford II, M.D.  Means of arrival: EMS  History obtained from: Patient  History limited by: None    CHIEF COMPLAINT  Chief Complaint   Patient presents with   • Atrial Fibrillation     Uncontrolled -200   • Weakness     Generalized x3 days   • GLF     Denies LOC, no trauma noted, denies hitting head       HPI  Nitesh Duron is a 72 y.o. male who presents for evaluation of generalized weakness onset four days ago. Patient's legs felt extremely weak on Friday.  As a result, patient fell and was on the ground for three days until he called EMS.  He called EMS yesterday but declined transport.  However, he continued to feel weak and called EMS again.  He agreed to transport today and presents to [facility] for evaluation.  He was unable to access food or water during the time he was on the floor and was forced to urinate and defecate on himself. He did not hit his head when he fell.  No LOC.  Recalls patient landed on his left knee when falling but thinks it might have been swollen before he fell.  It is currently swollen and painful.  He denies any specific injuries from his fall, but he was unable to get up because he felt too weak to get himself up. He complains of dehydration. He last urinated several hours ago but states \"I do not think I have much left in me.\". Patient presents with heart rate but does not feel palpitations.  No chest pains or shortness of breath.  As per nursing staff, patient was found to be in Afib with a heart rate in the 150s-200s. His heart rate dropped to the 70s briefly for five minutes then returned to the 190s. He admits to associated symptoms of left knee swelling onset four days ago, left knee pain, which she noticed 4 days ago prior to lightheadedness feeling weak in bilateral legs and falling.  Patient denies fever, chills, headache, " chest pain, shortness of breath, cough or abdominal pain. No alleviating factors were reported. Patient had left knee surgery due to torn ligaments with no hardware put in. He denies a history of gout. Patient has a history of kidney stones. Patient has radiation scheduled on . He denies drinking alcohol.      Scribe remained outside the patient's room and did not have any contact with the patient for the duration of patient encounter.     REVIEW OF SYSTEMS  Pertinent positives include: weakness, lower extremity weakness, left knee pain, left knee swelling, and dehydration.   Pertinent negatives include no: fever, chills, headache, chest pain, or abdominal pain.    See HPI for further details. All other systems are negative.    PAST MEDICAL HISTORY   has a past medical history of Arrhythmia (2022), Arthritis, Cancer (HCC) (2022), Cataract (), Cirrhosis (HCC), Dental disorder (2022), Diabetes (2022), Hepatitis C, High cholesterol (2022), Hyperlipidemia, Hypertension (2022), and Renal disorder (2018).    FAMILY HISTORY  Family History   Problem Relation Age of Onset   • Other Mother    • Heart Attack Father    • Heart Failure Brother        SOCIAL HISTORY  Social History     Tobacco Use   • Smoking status: Former Smoker     Packs/day: 0.50     Years: 43.00     Pack years: 21.50     Types: Cigarettes     Quit date: 2008     Years since quittin.5   • Smokeless tobacco: Never Used   • Tobacco comment: quit    Vaping Use   • Vaping Use: Never used   Substance Use Topics   • Alcohol use: No   • Drug use: Yes     Types: Marijuana, Inhaled     Comment: marijuana      Social History     Substance and Sexual Activity   Drug Use Yes   • Types: Marijuana, Inhaled    Comment: marijuana       SURGICAL HISTORY  Past Surgical History:   Procedure Laterality Date   • IA ULTRASONIC GUIDANCE, INTRAOPERATIVE N/A 2022    Procedure: ULTRASOUND GUIDANCE - AND OTHER  INDICATED PROEDURES;  Surgeon: Karma Fisher M.D.;  Location: SURGERY [facility];  Service: General   • NM LAP,DIAGNOSTIC ABDOMEN N/A 5/19/2022    Procedure: LAPAROSCOPY;  Surgeon: Karma Fisher M.D.;  Location: SURGERY [facility];  Service: General   • GASTROSCOPY N/A 9/18/2018    Procedure: GASTROSCOPY;  Surgeon: Matt Young M.D.;  Location: SURGERY SAME DAY [facility];  Service: Gastroenterology   • ROTATOR CUFF REPAIR Left 2014   • ORIF, ANKLE Right 1988    spiral fx   • MENISCECTOMY Left 1979   • APPENDECTOMY  1958   • CYSTOSCOPY  2004,2008    stone extraction       CURRENT MEDICATIONS  Current Outpatient Medications   Medication Instructions   • Cholecalciferol (VITAMIN D3) 3000 UNITS Tab 1 Capsule, Oral, DAILY   • CHROMIUM PO 1 Tablet, Oral, DAILY   • Cyanocobalamin (VITAMIN B-12 PO) 1 Tablet, Oral, 2 TIMES DAILY   • ezetimibe (ZETIA) 10 mg, Oral, DAILY, NEEDS TO BE SEEN FOR FURTHER REFILLS.   • fish oil 1,000 mg, Oral, DAILY   • ginkgo biloba extract (GINKO BILOBA) 180 mg, Oral, DAILY   • lisinopril (PRINIVIL) 10 mg, Oral, DAILY   • metoprolol SR (TOPROL XL) 25 mg, Oral, DAILY, MUST BE SEEN FOR FURTHER REFILLS   • NON SPECIFIED 1 Tablet, Oral, 2 TIMES DAILY, Uric Acid Cleanse   • oxyCODONE immediate-release (ROXICODONE) 5 mg, Oral, EVERY 4 HOURS PRN   • Red Yeast Rice 600 mg, Oral, 2 TIMES DAILY   • Saw Palmetto 450 mg, Oral, 2 TIMES DAILY   • Sodium Hyaluronate, oral, (HYALURONIC ACID PO) 1 Tablet, Oral, DAILY   • VITAMIN A PO 4,800 Units, Oral, 2 TIMES DAILY   • vitamin e (VITAMIN E) 400 Units, Oral, DAILY         ALLERGIES  Allergies   Allergen Reactions   • Ciprofloxacin Unspecified     convulsions   • Statins [Hmg-Coa-R Inhibitors] Unspecified     Stabbing pains in kidneys   • Acetaminophen      Kidney pain    • Atorvastatin      Patient declines   • Ibuprofen      Kidney pain   • Naproxen      Kidney pain   • Shellfish Allergy      Kidney stones   • Soap &  [Alcohol] Rash     " Antibacterial soap- contact dermatitis       PHYSICAL EXAM  VITAL SIGNS: /68   Pulse (!) 195   Temp 36.6 °C (97.8 °F) (Oral)   Resp 17   Ht 1.778 m (5' 10\")   Wt 81.6 kg (180 lb)   SpO2 95%   BMI 25.83 kg/m²      Constitutional: Ill-appearing, well nourished; No acute distress; Non-toxic appearance.   HENT: Normocephalic, atraumatic; Bilateral external ears normal; Oropharynx with dry mucous membranes; No erythema or exudates in the posterior oropharynx.  Eyes: PERRL, EOMI, Conjunctiva normal. No discharge.   Neck:  Supple, nontender midline; No stridor; No nuchal rigidity.   Lymphatic: No cervical lymphadenopathy noted.   Cardiovascular: Tachycardic, Irregularly irregular rhythm, Tachycardic without murmurs, rubs, or gallop.   Thorax & Lungs: No respiratory distress, breath sounds clear to auscultation bilaterally without wheezing, rales or rhonchi. Nontender chest wall. No crepitus or subcutaneous air  Abdomen: Soft, nontender, bowel sounds normal. No obvious masses; No pulsatile masses; no rebound, guarding, or peritoneal signs.   Skin: Good color; warm and dry without rash or petechia.  Back: Nontender, No CVA tenderness.   Extremities: Distal radial, dorsalis pedis, posterior tibial pulses are equal bilaterally; No edema; Nontender calves or saphenous, No cyanosis, No clubbing.   Musculoskeletal: Left lower extremity: The left knee is swollen.  There is an obvious effusion.  There is a very small abrasion over the knee.  The knee is warm to the touch.  There is healed surgical scars over the medial and lateral aspect of the knee.  Neurologic: Alert & oriented x 4, clear speech.  Lower extremity strength are 5 out of 5 and equal bilaterally testing of full  as well as with testing of bilateral dorsiflexors and plantar flexors.  However, patient complains of some left knee pain with testing of the left plantar flexor.  Sensation is intact to light touch.  No facial asymmetry.      EKG  12 Lead " EKG interpreted by me as below.     LABS/RADIOLOGY/PROCEDURES  Results for orders placed or performed during the hospital encounter of 07/04/22   CBC With Differential   Result Value Ref Range    WBC 10.4 4.8 - 10.8 K/uL    RBC 5.46 4.70 - 6.10 M/uL    Hemoglobin 15.6 14.0 - 18.0 g/dL    Hematocrit 45.1 42.0 - 52.0 %    MCV 82.6 81.4 - 97.8 fL    MCH 28.6 27.0 - 33.0 pg    MCHC 34.6 33.7 - 35.3 g/dL    RDW 42.3 35.9 - 50.0 fL    Platelet Count 88 (L) 164 - 446 K/uL    MPV 11.0 9.0 - 12.9 fL    Neutrophils-Polys 88.90 (H) 44.00 - 72.00 %    Lymphocytes 1.70 (L) 22.00 - 41.00 %    Monocytes 8.20 0.00 - 13.40 %    Eosinophils 0.00 0.00 - 6.90 %    Basophils 0.20 0.00 - 1.80 %    Immature Granulocytes 1.00 (H) 0.00 - 0.90 %    Nucleated RBC 0.00 /100 WBC    Neutrophils (Absolute) 9.22 (H) 1.82 - 7.42 K/uL    Lymphs (Absolute) 0.18 (L) 1.00 - 4.80 K/uL    Monos (Absolute) 0.85 0.00 - 0.85 K/uL    Eos (Absolute) 0.00 0.00 - 0.51 K/uL    Baso (Absolute) 0.02 0.00 - 0.12 K/uL    Immature Granulocytes (abs) 0.10 0.00 - 0.11 K/uL    NRBC (Absolute) 0.00 K/uL   Lactic Acid   Result Value Ref Range    Lactic Acid 2.1 (H) 0.5 - 2.0 mmol/L   Urinalysis    Specimen: Urine   Result Value Ref Range    Color Yellow     Character Clear     Specific Gravity 1.023 <1.035    Ph 5.0 5.0 - 8.0    Glucose 250 (A) Negative mg/dL    Ketones Negative Negative mg/dL    Protein 100 (A) Negative mg/dL    Bilirubin Negative Negative    Urobilinogen, Urine 1.0 Negative    Nitrite Positive (A) Negative    Leukocyte Esterase Trace (A) Negative    Occult Blood Large (A) Negative    Micro Urine Req Microscopic    Sed Rate   Result Value Ref Range    Sed Rate [facility] 3 0 - 20 mm/hour   LACTIC ACID   Result Value Ref Range    Lactic Acid 4.4 (HH) 0.5 - 2.0 mmol/L   Comp Metabolic Panel   Result Value Ref Range    Sodium 126 (L) 135 - 145 mmol/L    Potassium 3.9 3.6 - 5.5 mmol/L    Chloride 90 (L) 96 - 112 mmol/L    Co2 20 20 - 33 mmol/L    Anion Gap  16.0 7.0 - 16.0    Glucose 358 (H) 65 - 99 mg/dL    Bun 42 (H) 8 - 22 mg/dL    Creatinine 1.24 0.50 - 1.40 mg/dL    Calcium 8.9 8.5 - 10.5 mg/dL    AST(SGOT) 107 (H) 12 - 45 U/L    ALT(SGPT) 64 (H) 2 - 50 U/L    Alkaline Phosphatase 78 30 - 99 U/L    Total Bilirubin 1.3 0.1 - 1.5 mg/dL    Albumin 3.6 3.2 - 4.9 g/dL    Total Protein 6.9 6.0 - 8.2 g/dL    Globulin 3.3 1.9 - 3.5 g/dL    A-G Ratio 1.1 g/dL   CRP QUANTITIVE (NON-CARDIAC)   Result Value Ref Range    Stat C-Reactive Protein 29.01 (H) 0.00 - 0.75 mg/dL   LIPASE   Result Value Ref Range    Lipase 28 11 - 82 U/L   TROPONIN   Result Value Ref Range    Troponin T 43 (H) 6 - 19 ng/L   proBrain Natriuretic Peptide, NT   Result Value Ref Range    NT-proBNP 3678 (H) 0 - 125 pg/mL   ESTIMATED GFR   Result Value Ref Range    GFR (CKD-EPI) 62 >60 mL/min/1.73 m 2   FLUID CELL COUNT   Result Value Ref Range    Fluid Type Synovial     Color-Body Fluid Yellow     Character-Body Fluid Cloudy     Total RBC Count 0 cells/uL    Total WBC 29757 cells/uL    Polys 66 %    Mononuclear Cells - Fluid 34 %   FLUID TOTAL PROTEIN   Result Value Ref Range    Fluid Type Synovial     Total Protein Fluid 3.6 g/dL   FLUID GLUCOSE   Result Value Ref Range    Glucose, Fluid 177 mg/dL   FLUID CULTURE W/GRAM STAIN    Specimen: Other Body Fluid; Synovial   Result Value Ref Range    Significant Indicator NEG     Source SYNO     Site left knee     Culture Result -     Gram Stain Result Rare WBCs.  No organisms seen.      FLUID CRYSTALS   Result Value Ref Range    Crystals, Body Fluid None Seen None Seen   URINE MICROSCOPIC (W/UA)   Result Value Ref Range    WBC 5-10 (A) /hpf    RBC 10-20 (A) /hpf    Bacteria Moderate (A) None /hpf    Epithelial Cells Negative /hpf    Hyaline Cast 3-5 (A) /lpf    Granular Casts 0-2 /lpf   CREATINE KINASE   Result Value Ref Range    CPK Total 2411 (HH) 0 - 154 U/L   GRAM STAIN    Specimen: Synovial   Result Value Ref Range    Significant Indicator .     Source SYNO      Site left knee     Gram Stain Result Rare WBCs.  No organisms seen.      EKG (NOW)   Result Value Ref Range    Report       [facility]    Test Date:  2022  Pt Name:    CECILLE ARAUJO             Department: ER  MRN:        [id number]                      Room:       GR 27  Gender:     Male                         Technician: [id number]  :        1950                   Requested By:ER TRIAGE PROTOCOL  Order #:    [id number]                    Reading MD: Tarsha Guadarrama    Measurements  Intervals                                Axis  Rate:       180                          P:          0  NE:         96                           QRS:        -3  QRSD:       100                          T:          -77  QT:         260  QTc:        451    Interpretive Statements  ATRIAL FIBRILLATION WITH RAPID VENTRICULAR RESPONSE rate 180  Normal axis  T waves flat throughout  No ST elevation or depression  MULTIFORM VENTRICULAR PREMATURE COMPLEXES  LOW VOLTAGE IN FRONTAL LEADS  REPOLARIZATION ABNORMALITY, PROB RATE RELATED  Compared to ECG 2022 15:22:47  Ventricular premature compl ex(es) now present  Early repolarization now present  Sinus rhythm no longer present  Atrial premature complex(es) no longer present  Electronically Signed On 2022 17:46:15 PDT by Tarsha Guadarrama     EKG   Result Value Ref Range    Report       [facility] Emergency [facility].    Test Date:  2022  Pt Name:    CECILLE ARAUJO             Department: ER  MRN:        [id number]                      Room:       GR 27  Gender:     Male                         Technician: [id number]  :        1950                   Requested By:TARSHA GUADARRAMA  Order #:    [id number]                    Reading MD: Tarsha Guadarrama    Measurements  Intervals                                Axis  Rate:       147                          P:          52  NE:         128                          QRS:        8  QRSD:       74                            T:          -17  QT:         304  QTc:        476    Interpretive Statements  ATRIAL FIBRILLATION WITH RAPID VENTRICULAR RESPONSE rate 147  Normal axis  No ST elevation or depression  T waves flat throughout  MULTIPLE PREMATURE COMPLEXES, VENT & SUPRAVEN  LOW VOLTAGE IN FRONTAL LEADS  BORDERLINE T ABNORMALITIES, INFERIOR LEADS  BORDERLINE PROLONGED QT INTERVAL  Compared to ECG 07/04/2022 13:01:52   T-wave abnormality now present  Electronically Signed On 7-4-2022 17:48:47 PDT by Zora Guadarrama     POCT glucose device results   Result Value Ref Range    POC Glucose, Blood 207 (H) 65 - 99 mg/dL         All labs reviewed by me.    DX-KNEE 3 VIEWS LEFT   Final Result      1.  Severe tricompartmental left knee osteoarthritis      2.  osteoarthritis the proximal tibiofibular articulation      3.  Multiple intra-articular ossific body      4.  Diffuse atherosclerotic plaque      DX-CHEST-PORTABLE (1 VIEW)   Final Result      No acute cardiopulmonary abnormality identified.      EC-ECHOCARDIOGRAM COMPLETE W/O CONT    (Results Pending)       The radiologist's interpretation of all radiological studies have been reviewed by me.    Arthrocentesis Procedure Note    Indication: joint swelling and warmth (obtain joint fluid for diagnostic testing)    Consent: The patient was counseled regarding the procedure, it's indications, risks, potential complications and alternatives and any questions were answered. Consent was obtained.    Procedure: The left knee was positioned appropriately and the landmarks were identified using ultrasound. Local anesthesia was obtained by infiltration using 1% Lidocaine with epinephrine. The area was then prepped and draped in the usual sterile fashion. There were two attempts: the first unsuccessful attempt was at the inferior aspect of the lateral knee. The second, successful attempt was at the superior aspect of the lateral knee. An 18 gauge needle was then introduced into the  "joint space at which point 27 cc of cloudy yellow fluid was aspirated. A joint injection was not performed. The aspirated fluid was sent to the lab for appropriate testing. A sterile dressing was then applied to the site.    The patient tolerated the procedure well.    Complications: None    COURSE & MEDICAL DECISION MAKING  Pertinent Labs & Imaging studies reviewed. (See chart for details)    Reviewed patient's old medical records which showed that patient is followed by Dr. Fisher, biliary surgeon, for hepatocellular carcinoma.  Plan is to go to IR for consideration of arterial directed therapy it was decided to abort plan for ablation.  Patient also has history of hypertension, diabetes, alcoholic cirrhosis, esophageal varices, \"irregular heartbeat\", hyperlipidemia.    1:36 PM - Patient seen and examined at bedside. As per nursing staff, patient's heart rate was in the 150s-200s then dropped to the 70s for about five minutes before returning to 190s. Discussed plan of care, including obtaining labs and images. Patient agrees to the plan of care. The patient will be resuscitated with 1L NS IV. Ordered for images and labs to evaluate his symptoms.    3:25 PM Ordered metoprolol 2.5 injection to treat patient.     3:30 PM Patient believes his knee is swollen because he was dragging his knee on the floor. I informed patient that there is concern for infection.    3:49 PM Performed arthrocentesis procedure as noted above.     4:28 PM - I reevaluated the patient at bedside. I informed the patient of my plan to admit today given the patient's current presentation and diagnostic study results. Patient verbalizes understanding and support with my plan for admission.     1745: I discussed the case with Dr. Chaves, hospitalist on-call.  He will kindly evaluate the patient hospitalization.    1925: I spoke with Dr. Jensen, orthopedic surgeon on-call.  He said he will keep an eye out for the synovial fluid results.    1930: " "Discussed with Dr. Chaves.  He is aware that I consulted Dr. Jensen who is aware that patient has a hot and swollen left knee and that synovial fluid results are pending.  He will also keep an eye out for the results.  He is also aware the patient's total CK came back quite high suggesting rhabdomyolysis.    1935: Synovial fluid came back.  I spoke with Dr. Jensen, orthopedic surgeon on-call.  He says numbers are consistent with inflammatory.  Patient does not need to go to the OR.  Patient can eat.  I passed information along to Dr. Chaves.      Patient presents to the ER by ambulance with generalized weakness which began 3 days ago.  3 days ago the patient said that he felt very weak in his legs.  This caused him to fall to the ground.  He did not injure himself when he fell other than a few scrapes on his knees from trying to crawl around on the ground.  He did not hit his head.  No LOC.  No headache.  Patient was unable to get off the ground until yesterday when EMS came and was able to get him up.  He declined transport to the hospital.  For the 2 days that he was on the floor, he urinated and defecated on himself in order to relieve himself.  He was unable to take his medications for 2 days.  He was able to take his medications yesterday.  Due to persistent weakness today he contacted EMS again who transported him to our hospital today.  Patient presents with heart rate in the 190 to 202 range.  He has history of \"irregular heartbeat.\"  He is on metoprolol.  Looks like he had atrial fibrillation with RVR.  He complained of being very dehydrated.  He said he did not have any access to food or water over the last 3 days due to being on the floor for several days and due to us being so weak that he could not get to the kitchen to get food or water.  The patient was given several liters of IV fluid.  Heart rate responded and that it came down into the 140s with IV fluids.  He was given metoprolol since he has " missed several doses of his metoprolol.  That also seem to help a little bit in terms of his heart rate.  However, he is still tachycardic and still has A. fib with RVR.  He says he does not feel any palpitations.  At this time it is really unknown when he went into A. fib with RVR, although I suspect he has been it for at least several days.  For this reason I do not think he should undergo emergent cardioversion without echocardiogram since the patient is not anticoagulated.  Patient looks ill.  He has history of hepatocellular carcinoma.  He is set to undergo radiation soon.  Labs reveal elevated blood sugar without any acidosis.  He also is hyponatremic with a sodium level of 126.  Physical examination reveals a hot, swollen and mildly erythematous left knee.  Patient says that he thinks is because he was crawling around on the ground.  However, the right knee is not warm or swollen.  CRP was markedly elevated.  Since the patient is tachycardic, generally weak, has an elevated CRP, and has a left shift on his CBC, I elected to do an arthrocentesis to rule out septic joint.  There are no crystals seen on fluid analysis.  However, there are lots of WBCs.  At this point this could be septic joint versus inflammatory joint.  Final fluid analysis is pending at the time of this dictation.  Orthopedic surgery will be consulted in the event that this is septic joint.  Patient also has UTI.  He was given 2 g of Rocephin.  Lactic acid level was quite high upon arrival at 4.4.  After fluid administration lactic acid level is downward trending and is now at 2.1.  I think patient is stable for the floor.  He has never been hypotensive.  He is awake and alert.  He is quite dehydrated.  He continues to ask for water.  We will keep him n.p.o. until we know results of the fluid analysis.  I spoke with Dr. Chaves, hospitalist on-call, and he will kindly evaluate the patient for hospitalization for his atrial fibrillation with  RVR, dehydration, hyperglycemia, lactic acidosis, hyponatremia, UTI, and possible septic left knee.    CRITICAL CARE  The very real possibilty of a deterioration of this patient's condition required the highest level of my preparedness for sudden, emergent intervention.  I provided critical care services, which included medication orders, frequent reevaluations of the patient's condition and response to treatment, ordering and reviewing test results, and discussing the case with various consultants.  The critical care time associated with the care of the patient was 40 minutes. Review chart for interventions. This time is exclusive of any other billable procedures.         HYDRATION: Based on the patient's presentation of Dehydration and Tachycardia the patient was given IV fluids. IV Hydration was used because oral hydration was not adequate alone. Upon recheck following hydration, the patient was improved.      DISPOSITION:  Patient will be hospitalized by Dr. Chaves in guarded in  condition.      FINAL IMPRESSION  1. Atrial fibrillation with RVR (HCC) Acute   2. Dehydration Acute   3. Fall, initial encounter Acute   4. Generalized weakness Acute   5. Hyperglycemia Acute   6. Acute UTI Acute   7. Non-traumatic rhabdomyolysis Acute   8. Inflammatory arthritis Acute    The critical care time associated with the care of the patient was 40 minutes  Arthrocentesis procedure performed by ERP.     Sadaf AMES (Scribe), am scribing for, and in the presence of, Zora Guadarrama M.D..    Electronically signed by: Sadaf Bowling (Diaibe), 7/4/2022    Zora AMES M.D. personally performed the services described in this documentation, as scribed by Sadaf Bowling in my presence, and it is both accurate and complete.    This dictation has been created using voice recognition software. The accuracy of the dictation is limited by the abilities of the software. I expect there may be some errors of grammar  and possibly content. I made every attempt to manually correct the errors within my dictation. However, errors related to voice recognition software may still exist and should be interpreted within the appropriate context.    The note accurately reflects work and decisions made by me.  Zora Guadarrama M.D.  7/4/2022  3:19 PM

## 2022-07-05 ENCOUNTER — APPOINTMENT (OUTPATIENT)
Dept: CARDIOLOGY | Facility: MEDICAL CENTER | Age: 72
DRG: 485 | End: 2022-07-05
Attending: INTERNAL MEDICINE
Payer: MEDICARE

## 2022-07-05 ENCOUNTER — APPOINTMENT (OUTPATIENT)
Dept: RADIOLOGY | Facility: MEDICAL CENTER | Age: 72
DRG: 485 | End: 2022-07-05
Attending: HOSPITALIST
Payer: MEDICARE

## 2022-07-05 PROBLEM — I48.91 A-FIB (HCC): Status: ACTIVE | Noted: 2022-07-05

## 2022-07-05 PROBLEM — M00.9 PYOGENIC ARTHRITIS OF LEFT KNEE JOINT (HCC): Status: ACTIVE | Noted: 2022-07-05

## 2022-07-05 PROBLEM — N30.00 ACUTE CYSTITIS WITHOUT HEMATURIA: Status: ACTIVE | Noted: 2022-07-05
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
## 2022-07-05 PROBLEM — B95.61 MSSA BACTEREMIA: Status: ACTIVE | Noted: 2022-07-05

## 2022-07-05 PROBLEM — R78.81 MSSA BACTEREMIA: Status: ACTIVE | Noted: 2022-07-05

## 2022-07-05 LAB
AMMONIA PLAS-SCNC: 15 UMOL/L (ref 11–45)
ANION GAP SERPL CALC-SCNC: 11 MMOL/L (ref 7–16)
BUN SERPL-MCNC: 26 MG/DL (ref 8–22)
CALCIUM SERPL-MCNC: 8.5 MG/DL (ref 8.5–10.5)
CHLORIDE SERPL-SCNC: 94 MMOL/L (ref 96–112)
CK SERPL-CCNC: 1071 U/L (ref 0–154)
CO2 SERPL-SCNC: 22 MMOL/L (ref 20–33)
CREAT SERPL-MCNC: 0.71 MG/DL (ref 0.5–1.4)
ERYTHROCYTE [DISTWIDTH] IN BLOOD BY AUTOMATED COUNT: 42.8 FL (ref 35.9–50)
GFR SERPLBLD CREATININE-BSD FMLA CKD-EPI: 97 ML/MIN/1.73 M 2
GLUCOSE BLD STRIP.AUTO-MCNC: 172 MG/DL (ref 65–99)
GLUCOSE BLD STRIP.AUTO-MCNC: 192 MG/DL (ref 65–99)
GLUCOSE BLD STRIP.AUTO-MCNC: 215 MG/DL (ref 65–99)
GLUCOSE SERPL-MCNC: 222 MG/DL (ref 65–99)
HCT VFR BLD AUTO: 39.4 % (ref 42–52)
HGB BLD-MCNC: 13.7 G/DL (ref 14–18)
LV EJECT FRACT  99904: 55
LV EJECT FRACT MOD 2C 99903: 56.4
LV EJECT FRACT MOD 4C 99902: 63.65
LV EJECT FRACT MOD BP 99901: 62.57
MCH RBC QN AUTO: 28.8 PG (ref 27–33)
MCHC RBC AUTO-ENTMCNC: 34.8 G/DL (ref 33.7–35.3)
MCV RBC AUTO: 82.9 FL (ref 81.4–97.8)
PLATELET # BLD AUTO: 66 K/UL (ref 164–446)
PMV BLD AUTO: 10.7 FL (ref 9–12.9)
POTASSIUM SERPL-SCNC: 3.9 MMOL/L (ref 3.6–5.5)
RBC # BLD AUTO: 4.75 M/UL (ref 4.7–6.1)
SCCMEC + MECA PNL NOSE NAA+PROBE: NEGATIVE
SODIUM SERPL-SCNC: 127 MMOL/L (ref 135–145)
WBC # BLD AUTO: 8.2 K/UL (ref 4.8–10.8)

## 2022-07-05 PROCEDURE — 700102 HCHG RX REV CODE 250 W/ 637 OVERRIDE(OP): Performed by: STUDENT IN AN ORGANIZED HEALTH CARE EDUCATION/TRAINING PROGRAM

## 2022-07-05 PROCEDURE — 82550 ASSAY OF CK (CPK): CPT

## 2022-07-05 PROCEDURE — 96366 THER/PROPH/DIAG IV INF ADDON: CPT

## 2022-07-05 PROCEDURE — A9270 NON-COVERED ITEM OR SERVICE: HCPCS | Performed by: STUDENT IN AN ORGANIZED HEALTH CARE EDUCATION/TRAINING PROGRAM

## 2022-07-05 PROCEDURE — A9270 NON-COVERED ITEM OR SERVICE: HCPCS | Performed by: INTERNAL MEDICINE

## 2022-07-05 PROCEDURE — 93306 TTE W/DOPPLER COMPLETE: CPT

## 2022-07-05 PROCEDURE — 700111 HCHG RX REV CODE 636 W/ 250 OVERRIDE (IP): Performed by: INTERNAL MEDICINE

## 2022-07-05 PROCEDURE — 700117 HCHG RX CONTRAST REV CODE 255: Performed by: INTERNAL MEDICINE

## 2022-07-05 PROCEDURE — 80048 BASIC METABOLIC PNL TOTAL CA: CPT

## 2022-07-05 PROCEDURE — 700105 HCHG RX REV CODE 258: Performed by: INTERNAL MEDICINE

## 2022-07-05 PROCEDURE — 96372 THER/PROPH/DIAG INJ SC/IM: CPT

## 2022-07-05 PROCEDURE — 93306 TTE W/DOPPLER COMPLETE: CPT | Mod: 26 | Performed by: INTERNAL MEDICINE

## 2022-07-05 PROCEDURE — 96367 TX/PROPH/DG ADDL SEQ IV INF: CPT

## 2022-07-05 PROCEDURE — 99222 1ST HOSP IP/OBS MODERATE 55: CPT | Mod: GC | Performed by: INTERNAL MEDICINE

## 2022-07-05 PROCEDURE — 87641 MR-STAPH DNA AMP PROBE: CPT

## 2022-07-05 PROCEDURE — 70450 CT HEAD/BRAIN W/O DYE: CPT

## 2022-07-05 PROCEDURE — 700101 HCHG RX REV CODE 250: Performed by: STUDENT IN AN ORGANIZED HEALTH CARE EDUCATION/TRAINING PROGRAM

## 2022-07-05 PROCEDURE — 770000 HCHG ROOM/CARE - INTERMEDIATE ICU *

## 2022-07-05 PROCEDURE — 99291 CRITICAL CARE FIRST HOUR: CPT | Performed by: INTERNAL MEDICINE

## 2022-07-05 PROCEDURE — 96368 THER/DIAG CONCURRENT INF: CPT

## 2022-07-05 PROCEDURE — 82962 GLUCOSE BLOOD TEST: CPT

## 2022-07-05 PROCEDURE — 700105 HCHG RX REV CODE 258: Performed by: STUDENT IN AN ORGANIZED HEALTH CARE EDUCATION/TRAINING PROGRAM

## 2022-07-05 PROCEDURE — 700102 HCHG RX REV CODE 250 W/ 637 OVERRIDE(OP): Performed by: INTERNAL MEDICINE

## 2022-07-05 PROCEDURE — 96376 TX/PRO/DX INJ SAME DRUG ADON: CPT

## 2022-07-05 PROCEDURE — 700111 HCHG RX REV CODE 636 W/ 250 OVERRIDE (IP): Performed by: STUDENT IN AN ORGANIZED HEALTH CARE EDUCATION/TRAINING PROGRAM

## 2022-07-05 PROCEDURE — 36415 COLL VENOUS BLD VENIPUNCTURE: CPT

## 2022-07-05 PROCEDURE — 85027 COMPLETE CBC AUTOMATED: CPT

## 2022-07-05 PROCEDURE — 82140 ASSAY OF AMMONIA: CPT

## 2022-07-05 RX ORDER — CEFAZOLIN SODIUM 2 G/100ML
2 INJECTION, SOLUTION INTRAVENOUS EVERY 8 HOURS
Status: DISCONTINUED | OUTPATIENT
Start: 2022-07-05 | End: 2022-07-09

## 2022-07-05 RX ORDER — MAGNESIUM SULFATE HEPTAHYDRATE 40 MG/ML
2 INJECTION, SOLUTION INTRAVENOUS ONCE
Status: COMPLETED | OUTPATIENT
Start: 2022-07-05 | End: 2022-07-05

## 2022-07-05 RX ADMIN — ENOXAPARIN SODIUM 80 MG: 80 INJECTION SUBCUTANEOUS at 08:59

## 2022-07-05 RX ADMIN — CEFAZOLIN SODIUM 2 G: 2 INJECTION, SOLUTION INTRAVENOUS at 19:15

## 2022-07-05 RX ADMIN — OXYCODONE 5 MG: 5 TABLET ORAL at 15:05

## 2022-07-05 RX ADMIN — METOPROLOL TARTRATE 5 MG: 1 INJECTION, SOLUTION INTRAVENOUS at 07:42

## 2022-07-05 RX ADMIN — METOPROLOL TARTRATE 25 MG: 25 TABLET, FILM COATED ORAL at 17:38

## 2022-07-05 RX ADMIN — HUMAN ALBUMIN MICROSPHERES AND PERFLUTREN 3 ML: 10; .22 INJECTION, SOLUTION INTRAVENOUS at 15:11

## 2022-07-05 RX ADMIN — SODIUM CHLORIDE, POTASSIUM CHLORIDE, SODIUM LACTATE AND CALCIUM CHLORIDE: 600; 310; 30; 20 INJECTION, SOLUTION INTRAVENOUS at 19:47

## 2022-07-05 RX ADMIN — VANCOMYCIN HYDROCHLORIDE 2000 MG: 500 INJECTION, POWDER, LYOPHILIZED, FOR SOLUTION INTRAVENOUS at 08:59

## 2022-07-05 RX ADMIN — OXYCODONE 5 MG: 5 TABLET ORAL at 03:04

## 2022-07-05 RX ADMIN — EZETIMIBE 10 MG: 10 TABLET ORAL at 17:38

## 2022-07-05 RX ADMIN — INSULIN HUMAN 3 UNITS: 100 INJECTION, SOLUTION PARENTERAL at 10:13

## 2022-07-05 RX ADMIN — ENOXAPARIN SODIUM 40 MG: 40 INJECTION SUBCUTANEOUS at 01:05

## 2022-07-05 RX ADMIN — METOPROLOL TARTRATE 25 MG: 25 TABLET, FILM COATED ORAL at 11:30

## 2022-07-05 RX ADMIN — MAGNESIUM SULFATE HEPTAHYDRATE 2 G: 40 INJECTION, SOLUTION INTRAVENOUS at 03:09

## 2022-07-05 RX ADMIN — METOPROLOL TARTRATE 5 MG: 1 INJECTION, SOLUTION INTRAVENOUS at 08:59

## 2022-07-05 RX ADMIN — DILTIAZEM HYDROCHLORIDE 15 MG/HR: 5 INJECTION INTRAVENOUS at 20:10

## 2022-07-05 RX ADMIN — LISINOPRIL 10 MG: 10 TABLET ORAL at 17:38

## 2022-07-05 RX ADMIN — INSULIN HUMAN 2 UNITS: 100 INJECTION, SOLUTION PARENTERAL at 20:04

## 2022-07-05 RX ADMIN — CEFAZOLIN SODIUM 2 G: 2 INJECTION, SOLUTION INTRAVENOUS at 10:04

## 2022-07-05 RX ADMIN — OXYCODONE 5 MG: 5 TABLET ORAL at 07:41

## 2022-07-05 RX ADMIN — ENOXAPARIN SODIUM 80 MG: 80 INJECTION SUBCUTANEOUS at 20:04

## 2022-07-05 RX ADMIN — OXYCODONE 5 MG: 5 TABLET ORAL at 11:30

## 2022-07-05 RX ADMIN — INSULIN HUMAN 2 UNITS: 100 INJECTION, SOLUTION PARENTERAL at 17:38

## 2022-07-05 ASSESSMENT — ENCOUNTER SYMPTOMS
MYALGIAS: 1
PSYCHIATRIC NEGATIVE: 1
WEAKNESS: 1
NECK PAIN: 0
CHILLS: 1
FALLS: 1
RESPIRATORY NEGATIVE: 1
EYES NEGATIVE: 1
CARDIOVASCULAR NEGATIVE: 1
FEVER: 0
GASTROINTESTINAL NEGATIVE: 1
BACK PAIN: 1
WEIGHT LOSS: 0

## 2022-07-05 ASSESSMENT — PAIN DESCRIPTION - PAIN TYPE: TYPE: ACUTE PAIN

## 2022-07-05 NOTE — ED NOTES
Per CATHERINE Chaves, patient appropriate for cardiac diet as ordered.  Patient given hot meal tray at bedside.

## 2022-07-05 NOTE — ED NOTES
Received report; assumed care of Pt.    Introduced self to Pt; informed him that I am part of his care team.  Rounded on Pt. Updated Pt on plan of care. Pt verbalized understanding.  No acute distress at this time.  Will continue to monitor.

## 2022-07-05 NOTE — H&P
Hospital Medicine History & Physical Note    Date of Service  7/5/2022    Primary Care Physician  Yayo Beckford II, M.D.    Consultants  Ortho    Specialist Names: Dr Jensen    Code Status  Full Code    Chief Complaint  Chief Complaint   Patient presents with   • Atrial Fibrillation     Uncontrolled -200   • Weakness     Generalized x3 days   • GLF     Denies LOC, no trauma noted, denies hitting head       History of Presenting Illness  Nitesh Duron is a 72 y.o. male who presented 7/4/2022 with a history of type 2 diabetes, arthritis, hyperlipidemia, hypertension, hepatitis C, cirrhosis who presents for evaluation for generalized weakness for 4 days and left knee pain and swelling.  Patient reports that he fell approximately 4 days ago landing on his left knee.  Patient reports that the knee has been swelling since then and has become extremely painful to the point where he could not get up and walk around.  He reports that he was found on the ground by his girlfriend and EMS was called yesterday but he declined transport.  Today he continues to feel weak and called EMS again and this time was brought to [facility] to be evaluated.  Patient reports that over the last 4 days he was unable to access food or water when he was on the ground.  He reports feeling very dehydrated.  He denies fevers, chills, nausea, vomiting.  He does report palpitations but denies chest.  Patient reports he has had left knee swelling in the past and reports is due to his occupation.  He reports he had left knee surgery due to torn ligaments in the past.    I discussed the plan of care with patient and bedside RN.    Review of Systems  Review of Systems   Constitutional: Positive for malaise/fatigue. Negative for chills and fever.   Gastrointestinal: Negative for abdominal pain, nausea and vomiting.   Genitourinary: Negative for dysuria.   Musculoskeletal: Positive for joint pain (Left knee).   Neurological: Positive for  weakness.   All other systems reviewed and are negative.      Past Medical History   has a past medical history of Arrhythmia (07/01/2022), Arthritis, Cancer (HCC) (07/01/2022), Cataract (2015), Cirrhosis (HCC), Dental disorder (07/01/2022), Diabetes (05/12/2022), Hepatitis C, High cholesterol (07/01/2022), Hyperlipidemia, Hypertension (07/01/2022), and Renal disorder (09/07/2018).    Surgical History   has a past surgical history that includes rotator cuff repair (Left, 2014); cystoscopy (2004,2008); orif, ankle (Right, 1988); meniscectomy (Left, 1979); appendectomy (1958); gastroscopy (N/A, 9/18/2018); pr ultrasonic guidance, intraoperative (N/A, 5/19/2022); and pr lap,diagnostic abdomen (N/A, 5/19/2022).     Family History  family history includes Heart Attack in his father; Heart Failure in his brother; Other in his mother.   Family history reviewed with patient. There is no family history that is pertinent to the chief complaint.     Social History   reports that he quit smoking about 14 years ago. His smoking use included cigarettes. He has a 21.50 pack-year smoking history. He has never used smokeless tobacco. He reports current drug use. Drugs: Marijuana and Inhaled. He reports that he does not drink alcohol.    Allergies  Allergies   Allergen Reactions   • Ciprofloxacin Unspecified     convulsions   • Statins [Hmg-Coa-R Inhibitors] Unspecified     Stabbing pains in kidneys   • Acetaminophen      Kidney pain    • Atorvastatin      Patient declines   • Ibuprofen      Kidney pain   • Naproxen      Kidney pain   • Shellfish Allergy      Kidney stones   • Soap &  [Alcohol] Rash     Antibacterial soap- contact dermatitis       Medications  Prior to Admission Medications   Prescriptions Last Dose Informant Patient Reported? Taking?   Cholecalciferol (VITAMIN D3 PO) 7/3/2022 at AM Patient Yes No   Sig: Take 1 Capsule by mouth every day.   Cyanocobalamin (VITAMIN B-12 PO) 7/3/2022 at AM Patient Yes No   Sig:  Take 1 Tablet by mouth every day.   GINKGO BILOBA EXTRACT PO 7/3/2022 at AM Patient Yes No   Sig: Take 1 Tablet by mouth every day.   NON SPECIFIED 7/3/2022 at 2200 Patient Yes No   Sig: Take 1 Tablet by mouth 2 times a day. Uric Acid Cleanse   Red Yeast Rice Extract (RED YEAST RICE PO) 7/3/2022 at 2200 Patient Yes No   Sig: Take 1 Tablet by mouth 2 times a day.   Saw Palmetto, Serenoa repens, (SAW PALMETTO PO) 7/3/2022 at 2200 Patient Yes No   Sig: Take 1 Capsule by mouth 2 times a day.   VITAMIN A PO 7/3/2022 at 2200 Patient Yes No   Sig: Take 1 Capsule by mouth 2 times a day.   VITAMIN E PO 7/3/2022 at AM Patient Yes No   Sig: Take 1 Capsule by mouth every day.   ezetimibe (ZETIA) 10 MG Tab 7/3/2022 at 2200 Patient No No   Sig: Take 1 Tablet by mouth every day. NEEDS TO BE SEEN FOR FURTHER REFILLS.   Patient taking differently: Take 10 mg by mouth every evening. NEEDS TO BE SEEN FOR FURTHER REFILLS.   lisinopril (PRINIVIL) 10 MG Tab 7/3/2022 at 2200 Patient No No   Sig: Take 1 Tablet by mouth every day.   Patient taking differently: Take 10 mg by mouth every evening.   metoprolol SR (TOPROL XL) 25 MG TABLET SR 24 HR 7/3/2022 at 2200 Patient No No   Sig: Take 1 Tablet by mouth every day. MUST BE SEEN FOR FURTHER REFILLS   Patient taking differently: Take 25 mg by mouth every evening. MUST BE SEEN FOR FURTHER REFILLS   oxyCODONE immediate-release (ROXICODONE) 5 MG Tab 7/4/2022 at 1000 Patient Yes No   Sig: Take 5 mg by mouth every four hours as needed for Severe Pain.      Facility-Administered Medications: None       Physical Exam  Temp:  [36.6 °C (97.8 °F)] 36.6 °C (97.8 °F)  Pulse:  [] 179  Resp:  [12-23] 23  BP: (103-175)/() 156/96  SpO2:  [90 %-96 %] 91 %  Blood Pressure : 119/83   Temperature: 36.6 °C (97.8 °F)   Pulse: (!) 165   Respiration: 18   Pulse Oximetry: 92 %       Physical Exam  Vitals and nursing note reviewed.   Constitutional:       Appearance: Normal appearance.   HENT:      Head:  Normocephalic and atraumatic.   Eyes:      General: No scleral icterus.        Right eye: No discharge.         Left eye: No discharge.   Cardiovascular:      Rate and Rhythm: Normal rate and regular rhythm.      Heart sounds: Normal heart sounds.   Pulmonary:      Effort: No respiratory distress.      Breath sounds: Normal breath sounds. No wheezing or rales.   Abdominal:      General: Abdomen is flat. There is no distension.      Palpations: Abdomen is soft.      Tenderness: There is no abdominal tenderness.   Musculoskeletal:      Comments: Left knee is swollen and tender to palpation.   Skin:     General: Skin is warm and dry.      Coloration: Skin is not jaundiced.   Neurological:      General: No focal deficit present.      Mental Status: He is alert and oriented to person, place, and time.      Cranial Nerves: No cranial nerve deficit.   Psychiatric:         Behavior: Behavior normal.         Thought Content: Thought content normal.         Judgment: Judgment normal.         Laboratory:  Recent Labs     07/04/22  1335   WBC 10.4   RBC 5.46   HEMOGLOBIN 15.6   HEMATOCRIT 45.1   MCV 82.6   MCH 28.6   MCHC 34.6   RDW 42.3   PLATELETCT 88*   MPV 11.0     Recent Labs     07/04/22  1335 07/04/22  2205   SODIUM 126* 129*   POTASSIUM 3.9 3.9   CHLORIDE 90* 94*   CO2 20 23   GLUCOSE 358* 242*   BUN 42* 33*   CREATININE 1.24 0.85   CALCIUM 8.9 8.4*     Recent Labs     07/04/22  1335 07/04/22  2205   ALTSGPT 64*  --    ASTSGOT 107*  --    ALKPHOSPHAT 78  --    TBILIRUBIN 1.3  --    LIPASE 28  --    GLUCOSE 358* 242*         Recent Labs     07/04/22  1335   NTPROBNP 3678*         Recent Labs     07/04/22  1335   TROPONINT 43*       Imaging:  DX-KNEE 3 VIEWS LEFT   Final Result      1.  Severe tricompartmental left knee osteoarthritis      2.  osteoarthritis the proximal tibiofibular articulation      3.  Multiple intra-articular ossific body      4.  Diffuse atherosclerotic plaque      DX-CHEST-PORTABLE (1 VIEW)   Final  Result      No acute cardiopulmonary abnormality identified.      EC-ECHOCARDIOGRAM COMPLETE W/O CONT    (Results Pending)       EKG:  I have personally reviewed the images and compared with prior images. and My impression is: A. fib with RVR    Assessment/Plan:  Justification for Admission Status  I anticipate this patient will require at least two midnights for appropriate medical management, necessitating inpatient admission because Work-up for suspected left knee infection, A. fib RVR    Atrial fibrillation with RVR (HCC)  Assessment & Plan  Patient presenting with A. fib RVR.  He was very hypovolemic and currently receiving IV fluids.  Also will continue his home medication metoprolol.  There is no chart history of A. Fib.  [id number] 4  Anticoagulation with Lovenox  PRN lopressor    Hyponatremia  Assessment & Plan  Sodium 126.  Multifactorial.  Partial component from pseudohyponatremia due to hyperglycemia.  Corrected sodium would be 130.  Patient also appears dehydrated, leading to hypovolemic hyponatremia  IVF fluids   Trend Na    Cirrhosis of liver without ascites (HCC)- (present on admission)  Assessment & Plan  H/o ETOH/HCV s/p treatment    Acute cystitis without hematuria  Assessment & Plan  Urinalysis positive for UTI  Rocephin x3 days    Traumatic rhabdomyolysis (HCC)  Assessment & Plan  Patient found down milligram.  Labs indicated mild rhabdomyolysis  IV fluids  Trend CPK    Type 2 diabetes mellitus with hyperglycemia, without long-term current use of insulin (HCC)  Assessment & Plan  Last a1c 6.3  Glucose 358  Insulin sliding scale  Diabetic diet    Thrombocytopenia (HCC)  Assessment & Plan  plt 88  At baseline, no active bleeding    Hepatocellular carcinoma (HCC)- (present on admission)  Assessment & Plan  History of hepatocellular carcinoma.  Following with oncology as an outpatient.    Essential hypertension, benign- (present on admission)  Assessment & Plan  Continue home medication lisinopril  metoprolol      VTE prophylaxis: therapeutic anticoagulation with Lovenox

## 2022-07-05 NOTE — ASSESSMENT & PLAN NOTE
"Echo EF 55% no valvular abnormalities  Evaluated by cardiology  status post [facility]LI directed cardioversion on 7/8/2022  Continue metoprolol and amiodarone  On amiodarone taper  Continue Eliquis\"    Vitals:    07/18/22 0732   BP: 132/84   Pulse: 78   Resp: 18   Temp: 36.1 °C (97 °F)   SpO2: 97%     Resume Eliquis as no surgery indicated      "

## 2022-07-05 NOTE — ASSESSMENT & PLAN NOTE
History of hepatocellular carcinoma.   Patient has history of cirrhosis  Following with oncology as an outpatient.

## 2022-07-05 NOTE — ASSESSMENT & PLAN NOTE
"Last a1c 6.3  Stable on Lantus and sliding scale insulin continue to monitor\"    BGs 100-200 stable.  "

## 2022-07-05 NOTE — PROGRESS NOTES
[facility] Medicine Daily Progress Note    Date of Service  7/5/2022    Chief Complaint  Nitesh Duron is a 72 y.o. male admitted 7/4/2022 with weakness and fall    Hospital Course    72-year-old male with history of diabetes, dyslipidemia hepatitis C and cirrhosis with atrial fibrillation presented 7/4 with fall and weakness.  Patient states he fell 4 days ago at home and he landed on his left knee.  He noticed swelling on his left knee and severe back pain with some weakness on the left leg, patient has been on the ground most of the time and not able to get up.  Patient lives by himself.  Patient was found on the ground by his girlfriend and she called EMS.  On admission patient was found to have A. fib with RVR.  Dehydration however blood pressure was stable.  Labs did not show leukocytosis however showed hyponatremia, creatinine 1.2 with CPK 2411 and lactic acid 4.4.  IV fluid was started.  Blood culture came back positive with MSSA, cefazolin was initiated and ID was consulted.  Echo is pending.      Patient underwent arthrocentesis by Ortho on his left knee and showed more than 37,000 white blood cell with no red blood cell high suspicious of septic arthritis specially with positive blood culture, ID on board and patient is on cefazolin.      Interval Problem Update:  -Patient was evaluated examined at bedside, patient has generalized weakness with significant back pain and left leg weakness.  -Patient is alert and oriented x4  -Blood culture was positive for MSSA, cefazolin was started and case was discussed with infectious disease Dr. Jacome.  -Patient still having A. fib with RVR, titrate diltiazem drip and continue metoprolol oral.  Cardiology was consulted  -Continue IV fluid and check echo.  -Case was discussed with intensivist for IMCU admission.      I have discussed this patient's plan of care and discharge plan at IDT rounds today with Case Management, Nursing, Nursing leadership, and  other members of the IDT team.    Consultants/Specialty  cardiology, critical care and orthopedics    Code Status  Full Code    Disposition  Patient is not medically cleared for discharge.   Anticipate discharge to to skilled nursing facility.  I have placed the appropriate orders for post-discharge needs.    Review of Systems  Review of Systems   Constitutional: Positive for chills and malaise/fatigue. Negative for fever and weight loss.   HENT: Negative.    Eyes: Negative.    Respiratory: Negative.    Cardiovascular: Negative.    Gastrointestinal: Negative.    Genitourinary: Negative.    Musculoskeletal: Positive for back pain, falls, joint pain and myalgias. Negative for neck pain.   Skin: Negative.    Neurological: Positive for weakness.   Psychiatric/Behavioral: Negative.         Physical Exam  Temp:  [36.6 °C (97.8 °F)] 36.6 °C (97.8 °F)  Pulse:  [] 149  Resp:  [12-27] 16  BP: (103-175)/() 133/59  SpO2:  [89 %-96 %] 92 %    Physical Exam  Constitutional:       General: He is in acute distress.      Appearance: He is ill-appearing. He is not diaphoretic.   HENT:      Head: Normocephalic and atraumatic.   Eyes:      General: No scleral icterus.  Cardiovascular:      Rate and Rhythm: Tachycardia present.      Heart sounds: No murmur heard.  Pulmonary:      Effort: No respiratory distress.      Breath sounds: No wheezing or rales.   Abdominal:      General: There is no distension.      Palpations: Abdomen is soft.      Tenderness: There is abdominal tenderness. There is no guarding.   Musculoskeletal:         General: Swelling and tenderness present.      Comments: Swelling on his left knee   Skin:     General: Skin is dry.      Findings: Bruising present. No erythema, lesion or rash.   Neurological:      Mental Status: He is oriented to person, place, and time.      Cranial Nerves: No cranial nerve deficit.      Motor: Weakness present.      Gait: Gait abnormal.         Fluids    Intake/Output Summary  (Last 24 hours) at 7/5/2022 1142  Last data filed at 7/4/2022 1806  Gross per 24 hour   Intake 2000 ml   Output --   Net 2000 ml       Laboratory  Recent Labs     07/04/22  1335 07/05/22  0745   WBC 10.4 8.2   RBC 5.46 4.75   HEMOGLOBIN 15.6 13.7*   HEMATOCRIT 45.1 39.4*   MCV 82.6 82.9   MCH 28.6 28.8   MCHC 34.6 34.8   RDW 42.3 42.8   PLATELETCT 88* 66*   MPV 11.0 10.7     Recent Labs     07/04/22  1335 07/04/22  2205 07/05/22  0745   SODIUM 126* 129* 127*   POTASSIUM 3.9 3.9 3.9   CHLORIDE 90* 94* 94*   CO2 20 23 22   GLUCOSE 358* 242* 222*   BUN 42* 33* 26*   CREATININE 1.24 0.85 0.71   CALCIUM 8.9 8.4* 8.5                   Imaging  DX-KNEE 3 VIEWS LEFT   Final Result      1.  Severe tricompartmental left knee osteoarthritis      2.  osteoarthritis the proximal tibiofibular articulation      3.  Multiple intra-articular ossific body      4.  Diffuse atherosclerotic plaque      DX-CHEST-PORTABLE (1 VIEW)   Final Result      No acute cardiopulmonary abnormality identified.      EC-ECHOCARDIOGRAM COMPLETE W/O CONT    (Results Pending)   MR-LUMBAR SPINE-WITH & W/O    (Results Pending)        Assessment/Plan  * MSSA bacteremia  Assessment & Plan  Blood culture on admission 7/4 positive for MSSA 2/2  Repeat blood culture on 7/6  ID was consulted and continue cefazolin  Echo is pending  Patient has significant back pain and left leg weakness, MRI was ordered to rule out epidural abscess  Also patient has new swelling and likely septic arthritis    Pyogenic arthritis of left knee joint (HCC)  Assessment & Plan  With the swelling and pain  Fluid showed white blood cell more than 37,000 and no red blood cell  Fluid culture is pending  Blood culture is positive for MSSA  ID on board and Ortho.  Continue cefazolin at this time.    Falls  Assessment & Plan  Possible mechanical fall and generalized weakness   Stayed on the floor 3 days   rhabdomyolysis and history of diarrhea  IV fluid  PT and OT likely patient needs  SNF.  Check vitamin D and TSH    Atrial fibrillation with RVR (HCC)  Assessment & Plan  Came with A. fib and RVR more than 150  he was very hypovolemic and currently receiving IV fluids.    Also will continue his home medication metoprolol.   There is no chart history of A. Fib.  However he states he has history of A. fib  [id number] 4  Anticoagulation with Lovenox  Diltiazem drip was started  Cardiology was consulted and echo is pending  [facility]    Traumatic rhabdomyolysis (HCC)  Assessment & Plan  Patient found down on the floor  Elevated CPK more than 4000 and mild SILVIA  IV fluids  CPK trending down  PT and OT    Hyponatremia  Assessment & Plan  Likely dehydration   some improving,   IV fluid and labs daily      Type 2 diabetes mellitus with hyperglycemia, without long-term current use of insulin (HCC)  Assessment & Plan  Last a1c 6.3  Glucose 358  Insulin sliding scale  Diabetic diet    Thrombocytopenia (HCC)  Assessment & Plan  plt 88  At baseline, no active bleeding    Hepatocellular carcinoma (HCC)- (present on admission)  Assessment & Plan  History of hepatocellular carcinoma.   Patient has history of cirrhosis  Following with oncology as an outpatient.    Acute cystitis without hematuria  Assessment & Plan  No urinary symptoms  However patient on Ancef    Cirrhosis of liver without ascites (HCC)- (present on admission)  Assessment & Plan  History of hepatitis C virus and received treatment   No significant decompensation  Patient is a dry  Continue IV fluid  Labs daily and check his liver function daily.    Essential hypertension, benign- (present on admission)  Assessment & Plan  Uncontrolled possible for pain  Metoprolol 25 mg twice daily and continue lisinopril         VTE prophylaxis: SCDs/TEDs and enoxaparin ppx    I have performed a physical exam and reviewed and updated ROS and Plan today (7/5/2022). In review of yesterday's note (7/4/2022), there are no changes except as  documented above.    Patient is critically ill.   The patient continues to have: Bacteremia and A. fib with RVR  The vital organ system that is affected is the: Blood and heart  If untreated there is a high chance of deterioration into: Septic shock  And eventually death.   The critical care that I am providing today is: Titrating diltiazem, IV fluid and IV antibiotics consulted cardiologist and infectious disease and discussed the case with intensivist  The critical that has been undertaken is medically complex.   There has been no overlap in critical care time.   Critical Care Time not including procedures: 40 minutes

## 2022-07-05 NOTE — ASSESSMENT & PLAN NOTE
"Blood culture on admission 7/4 positive for MSSA 2/2  Source likely septic arthritis  Status post left knee I&D on 7/6/2022 Ortho service following    MRI lumbar spine with spinal stenosis and concern for possible developing epidural abscess evaluated by spine surgery with recommendation for medical treatment and outpatient follow-up     Blood culture 7/10 negative to date continue to monitor  ID following c continue nafcillin repeat MRI per ID recommendations    Will need PICC line when blood cultures negative for 48 hours\"    ID plans PICC placement and antibiotic course  Follow up with Ortho SPine, currently no indication for surgery and patient reluctant  7/18. SNF/rehab planned.  "

## 2022-07-05 NOTE — ASSESSMENT & PLAN NOTE
"Continue free fluid restriction  Sodium 128 overall improved continue to monitor\"    Mild category  "

## 2022-07-05 NOTE — CONSULTS
Cardiology Initial Consult Note    Date of note:    7/5/2022      Consulting Physician: Judy Crowell M.D.    Name:   Nitesh Duron   YOB: 1950  Age:   72 y.o.  male   MRN:   [id number]      Reason for Consultation: Atrial fibrillation with RVR    HPI:  Nitesh Duron is a 72 y.o. male with a history of HTN, HLD, atrial fibrillation, controlled Type 2 DM with diet and exercise and HCV cirrhosis  who presented to the ED on 7/4/2022 for weakness and left knee pain and swelling for the past 4 days. Patient states that he got out of bed, turned and fell on the floor and couldn't get up. Denies any syncopal episodes or hitting his head. Patient was transported via EMS to the hospital for treatment. While in the ED, patient was found to be in Afib with HR in 150-200s, no symptoms. Patient given fluids for dehydration and started on diltiazem drip 15 mg/hr and his home metoprolol dose continued.        Review of Systems   Musculoskeletal: Positive for falls, joint pain and muscle weakness.     A complete ROS was performed and is negative except for pertinent positives mentioned in HPI.      Past Medical History:   Diagnosis Date   • Arrhythmia 07/01/2022    medicated   • Arthritis     OA- hands and L knee   • Cancer (HCC) 07/01/2022    liver   • Cataract 2015    bilateral IOL   • Cirrhosis (HCC)    • Dental disorder 07/01/2022    upper partial   • Diabetes 05/12/2022    history of medications, pt has not been on diabetic meds since weight loss   • Hepatitis C     treated in 2017   • High cholesterol 07/01/2022    medicated   • Hyperlipidemia    • Hypertension 07/01/2022    medicated   • Renal disorder 09/07/2018    kidney stones       Past Surgical History:   Procedure Laterality Date   • MA ULTRASONIC GUIDANCE, INTRAOPERATIVE N/A 5/19/2022    Procedure: ULTRASOUND GUIDANCE - AND OTHER INDICATED PROEDURES;  Surgeon: Karma Fisher M.D.;  Location: SURGERY [facility];  Service:  General   • DC LAP,DIAGNOSTIC ABDOMEN N/A 5/19/2022    Procedure: LAPAROSCOPY;  Surgeon: Karma Fisher M.D.;  Location: SURGERY [facility];  Service: General   • GASTROSCOPY N/A 9/18/2018    Procedure: GASTROSCOPY;  Surgeon: Matt Young M.D.;  Location: SURGERY SAME DAY [facility];  Service: Gastroenterology   • ROTATOR CUFF REPAIR Left 2014   • ORIF, ANKLE Right 1988    spiral fx   • MENISCECTOMY Left 1979   • APPENDECTOMY  1958   • CYSTOSCOPY  2004,2008    stone extraction         (Not in a hospital admission)    Current Facility-Administered Medications   Medication Dose Route Frequency Provider Last Rate Last Admin   • ceFAZolin in dextrose (Ancef) IVPB premix 2 g  2 g Intravenous Q8HRS Mago Jacome M.D.   Stopped at 07/05/22 1034   • metoprolol tartrate (LOPRESSOR) tablet 25 mg  25 mg Oral TWICE DAILY Kimberly Jacobs M.D.   25 mg at 07/05/22 1130   • senna-docusate (PERICOLACE or SENOKOT S) 8.6-50 MG per tablet 2 Tablet  2 Tablet Oral BID Yayo Chaves D.O.        And   • polyethylene glycol/lytes (MIRALAX) PACKET 1 Packet  1 Packet Oral QDAY PRN Yayo Chaves D.O.        And   • magnesium hydroxide (MILK OF MAGNESIA) suspension 30 mL  30 mL Oral QDAY PRN Yayo Chaves D.O.        And   • bisacodyl (DULCOLAX) suppository 10 mg  10 mg Rectal QDAY PRN Yayo Chaves D.O.       • labetalol (NORMODYNE/TRANDATE) injection 10 mg  10 mg Intravenous Q4HRS PRN MARK ColvinO.       • ondansetron (ZOFRAN) syringe/vial injection 4 mg  4 mg Intravenous Q4HRS PRN Yayo Chaves D.O.       • ondansetron (ZOFRAN ODT) dispertab 4 mg  4 mg Oral Q4HRS PRN MARK ColvinO.       • insulin regular (HumuLIN R,NovoLIN R) injection  2-9 Units Subcutaneous 4X/DAY ACHS MARK ColvinO.   3 Units at 07/05/22 1013    And   • dextrose 50% (D50W) injection 25 g  25 g Intravenous Q15 MIN PRN Yyao Chaves D.O.       • Pharmacy Consult Request ...Pain Management Review 1 Each  1 Each Other  PHARMACY TO DOSE Yayo Chaves D.O.       • oxyCODONE immediate-release (ROXICODONE) tablet 2.5 mg  2.5 mg Oral Q3HRS PRN Yayo Chaves D.O.        Or   • oxyCODONE immediate-release (ROXICODONE) tablet 5 mg  5 mg Oral Q3HRS PRN MARK ColvinORobert   5 mg at 07/05/22 1130    Or   • morphine 4 MG/ML injection 2 mg  2 mg Intravenous Q3HRS PRN Yayo Chaves D.O.       • lactated ringers infusion   Intravenous Continuous Yayo Chaves D.O. 125 mL/hr at 07/04/22 2151 New Bag at 07/04/22 2151   • lisinopril (PRINIVIL) tablet 10 mg  10 mg Oral Q EVENING MARK ColvinO.   10 mg at 07/04/22 1810   • ezetimibe (ZETIA) tablet 10 mg  10 mg Oral Q EVENING MARK ColvinO.   10 mg at 07/04/22 1818   • enoxaparin (Lovenox) inj 80 mg  1 mg/kg Subcutaneous BID MARK ColvinO.   80 mg at 07/05/22 0859   • DILTIAZem (CARDIZEM) 100 mg in dextrose 5% 100 mL Infusion  15 mg/hr Intravenous Continuous Kimberly Jacobs M.D. 15 mL/hr at 07/05/22 1056 15 mg/hr at 07/05/22 1056     Current Outpatient Medications   Medication Sig Dispense Refill   • ezetimibe (ZETIA) 10 MG Tab Take 1 Tablet by mouth every day. NEEDS TO BE SEEN FOR FURTHER REFILLS. (Patient taking differently: Take 10 mg by mouth every evening. NEEDS TO BE SEEN FOR FURTHER REFILLS.) 90 Tablet 0   • metoprolol SR (TOPROL XL) 25 MG TABLET SR 24 HR Take 1 Tablet by mouth every day. MUST BE SEEN FOR FURTHER REFILLS (Patient taking differently: Take 25 mg by mouth every evening. MUST BE SEEN FOR FURTHER REFILLS) 90 Tablet 0   • VITAMIN A PO Take 1 Capsule by mouth 2 times a day.     • NON SPECIFIED Take 1 Tablet by mouth 2 times a day. Uric Acid Cleanse     • GINKGO BILOBA EXTRACT PO Take 1 Tablet by mouth every day.     • oxyCODONE immediate-release (ROXICODONE) 5 MG Tab Take 5 mg by mouth every four hours as needed for Severe Pain.     • Saw Palmetto, Serenoa repens, (SAW PALMETTO PO) Take 1 Capsule by mouth 2 times a day.     • lisinopril (PRINIVIL) 10 MG  Tab Take 1 Tablet by mouth every day. (Patient taking differently: Take 10 mg by mouth every evening.) 100 Tablet 1   • Red Yeast Rice Extract (RED YEAST RICE PO) Take 1 Tablet by mouth 2 times a day.     • Cyanocobalamin (VITAMIN B-12 PO) Take 1 Tablet by mouth every day.     • VITAMIN E PO Take 1 Capsule by mouth every day.     • Cholecalciferol (VITAMIN D3 PO) Take 1 Capsule by mouth every day.           Allergies   Allergen Reactions   • Ciprofloxacin Unspecified     convulsions   • Statins [Hmg-Coa-R Inhibitors] Unspecified     Stabbing pains in kidneys   • Acetaminophen      Kidney pain    • Atorvastatin      Patient declines   • Ibuprofen      Kidney pain   • Naproxen      Kidney pain   • Shellfish Allergy      Kidney stones   • Soap &  [Alcohol] Rash     Antibacterial soap- contact dermatitis         Family History   Problem Relation Age of Onset   • Other Mother    • Heart Attack Father    • Heart Failure Brother    Father had 3 heart attacks, 1st at age 46, 2nd at age 48 and 3rd at 49.  from MI.  Oldest brother CHF in 60s.  Youngest brother  from MI at 64.       Social History     Socioeconomic History   • Marital status:      Spouse name: Not on file   • Number of children: Not on file   • Years of education: Not on file   • Highest education level: Not on file   Occupational History   • Not on file   Tobacco Use   • Smoking status: Former Smoker     Packs/day: 0.50     Years: 43.00     Pack years: 21.50     Types: Cigarettes     Quit date: 2008     Years since quittin.5   • Smokeless tobacco: Never Used   • Tobacco comment: quit    Vaping Use   • Vaping Use: Never used   Substance and Sexual Activity   • Alcohol use: No   • Drug use: Yes     Types: Marijuana, Inhaled     Comment: marijuana   • Sexual activity: Never   Other Topics Concern   • Not on file   Social History Narrative   • Not on file     Social Determinants of Health     Financial Resource Strain: Not on  "file   Food Insecurity: Not on file   Transportation Needs: Not on file   Physical Activity: Not on file   Stress: Not on file   Social Connections: Not on file   Intimate Partner Violence: Not on file   Housing Stability: Not on file           Intake/Output Summary (Last 24 hours) at 7/5/2022 1231  Last data filed at 7/4/2022 1806  Gross per 24 hour   Intake 2000 ml   Output --   Net 2000 ml        Physical Exam  Body mass index is 25.83 kg/m².  /88   Pulse (!) 141   Temp 36.6 °C (97.8 °F) (Oral)   Resp 18   Ht 1.778 m (5' 10\")   Wt 81.6 kg (180 lb)   SpO2 96%   Vitals:    07/05/22 1130 07/05/22 1133 07/05/22 1203 07/05/22 1213   BP:  (!) 124/94 (!) 147/117 125/88   Pulse: (!) 149 (!) 139 (!) 146 (!) 141   Resp:  18 20 18   Temp:       TempSrc:       SpO2:  93% 92% 96%   Weight:       Height:         Oxygen Therapy:  Pulse Oximetry: 96 %, O2 (LPM): 0, O2 Delivery Device: None - Room Air    General: Well appearing and in no apparent distress  Eyes: nl conjunctiva  ENT: OP clear  Neck: JVP not elevated, no carotid bruits  Lungs: normal respiratory effort, CTAB  Heart: Tachycardic, no murmurs, no rubs or gallops, no edema bilateral lower extremities. No LV/RV heave on cardiac palpatation. 2+ bilateral radial pulses.  2+ bilateral dp pulses.   Abdomen: soft, tender, non distended, no masses, normal bowel sounds.  No HSM.  Extremities/MSK: no clubbing, no cyanosis, swelling on left knee  Neurological: No focal sensory deficits  Psychiatric: Appropriate affect, A/O x 3  Skin: Warm extremities, hemosiderin skin changes bilaterally on lower extremities      Labs (personally reviewed and notable for):   Lab Results   Component Value Date/Time    SODIUM 127 (L) 07/05/2022 07:45 AM    POTASSIUM 3.9 07/05/2022 07:45 AM    CHLORIDE 94 (L) 07/05/2022 07:45 AM    CO2 22 07/05/2022 07:45 AM    GLUCOSE 222 (H) 07/05/2022 07:45 AM    BUN 26 (H) 07/05/2022 07:45 AM    CREATININE 0.71 07/05/2022 07:45 AM      Lab Results "   Component Value Date/Time    WBC 8.2 07/05/2022 07:45 AM    RBC 4.75 07/05/2022 07:45 AM    HEMOGLOBIN 13.7 (L) 07/05/2022 07:45 AM    HEMATOCRIT 39.4 (L) 07/05/2022 07:45 AM    MCV 82.9 07/05/2022 07:45 AM    MCH 28.8 07/05/2022 07:45 AM    MCHC 34.8 07/05/2022 07:45 AM    MPV 10.7 07/05/2022 07:45 AM    NEUTSPOLYS 88.90 (H) 07/04/2022 01:35 PM    LYMPHOCYTES 1.70 (L) 07/04/2022 01:35 PM    MONOCYTES 8.20 07/04/2022 01:35 PM    EOSINOPHILS 0.00 07/04/2022 01:35 PM    BASOPHILS 0.20 07/04/2022 01:35 PM    HYPOCHROMIA 1+ 01/29/2019 10:32 AM    ANISOCYTOSIS 1+ 01/29/2019 10:32 AM      Lab Results   Component Value Date/Time    CHOLSTRLTOT 179 07/06/2021 10:02 AM     (H) 07/06/2021 10:02 AM    HDL 43 07/06/2021 10:02 AM    TRIGLYCERIDE 88 07/06/2021 10:02 AM       Lab Results   Component Value Date/Time    TROPONINT 43 (H) 07/04/2022 1335     Lab Results   Component Value Date/Time    NTPROBNP 3678 (H) 07/04/2022 1335         Cardiac Imaging and Procedures Review:    EKG dated 7/4/2022: My personal interpretation is Afib w/RVR with rate 147.      Radiology test Review:  DX-KNEE 3 VIEWS LEFT   Final Result      1.  Severe tricompartmental left knee osteoarthritis      2.  osteoarthritis the proximal tibiofibular articulation      3.  Multiple intra-articular ossific body      4.  Diffuse atherosclerotic plaque      DX-CHEST-PORTABLE (1 VIEW)   Final Result      No acute cardiopulmonary abnormality identified.      MR-LUMBAR SPINE-WITH & W/O    (Results Pending)   EC-ECHOCARDIOGRAM COMPLETE W/O CONT    (Results Pending)       Problem list:  Principal Problem:    MSSA bacteremia POA: Unknown  Active Problems:    Essential hypertension, benign POA: Yes    Cirrhosis of liver without ascites (HCC) POA: Yes    Hepatocellular carcinoma (HCC) POA: Yes    Thrombocytopenia (HCC) POA: Unknown    Atrial fibrillation with RVR (HCC) POA: Unknown    Type 2 diabetes mellitus with hyperglycemia, without long-term current use of  insulin (HCC) POA: Unknown    Hyponatremia POA: Unknown    Traumatic rhabdomyolysis (HCC) POA: Unknown    Acute cystitis without hematuria POA: Unknown    Falls POA: Unknown    A-fib (HCC) POA: Yes    Pyogenic arthritis of left knee joint (HCC) POA: Unknown  Resolved Problems:    * No resolved hospital problems. *      Impression and Medical Decision Making:  #Atrial fibrillation with RVR     Recommendations:  --Pending STAT ECHO  --Recommend digoxin loading.   --Stop diltiazem and up titrate PO metoprolol.

## 2022-07-05 NOTE — ASSESSMENT & PLAN NOTE
"Improved continue lisinopril and metoprolol and monitor blood pressure\"    Vitals:    07/18/22 0732   BP: 132/84   Pulse: 78   Resp: 18   Temp: 36.1 °C (97 °F)   SpO2: 97%   Stable BPs  Increased lisinopril    "

## 2022-07-05 NOTE — ED NOTES
Report given to PRUDENCE Orozco at bedside. Pt transported to floor with all personal belongings.

## 2022-07-05 NOTE — ASSESSMENT & PLAN NOTE
"History of hepatitis C virus and received treatment     Outpatient follow-up\"    Follow up with GI/hepatology  "

## 2022-07-05 NOTE — ASSESSMENT & PLAN NOTE
Synovial fluid culture MSSA  Status post I&D by Ortho on 7/6/2022    Reviewed importance of mobilization  Continue IV nafcillin  Discussed with Ortho no plans for further interventions

## 2022-07-05 NOTE — CONSULTS
Reviewed chart including vitals and labs.  Blood cultures have resulted with MSSA.    --- Discussed with pharmacy and they will place orders to stop vancomycin and ceftriaxone and start cefazolin  --- Repeat blood cultures ordered for a.m.    ID will see the patient when they reach the floor.    Mago Jacome MD

## 2022-07-06 ENCOUNTER — ANESTHESIA EVENT (OUTPATIENT)
Dept: SURGERY | Facility: MEDICAL CENTER | Age: 72
DRG: 485 | End: 2022-07-06
Payer: MEDICARE

## 2022-07-06 ENCOUNTER — APPOINTMENT (OUTPATIENT)
Dept: RADIOLOGY | Facility: MEDICAL CENTER | Age: 72
DRG: 485 | End: 2022-07-06
Attending: INTERNAL MEDICINE
Payer: MEDICARE

## 2022-07-06 ENCOUNTER — ANESTHESIA (OUTPATIENT)
Dept: SURGERY | Facility: MEDICAL CENTER | Age: 72
DRG: 485 | End: 2022-07-06
Payer: MEDICARE

## 2022-07-06 LAB
BACTERIA UR CULT: ABNORMAL
BACTERIA UR CULT: ABNORMAL
BASOPHILS # BLD AUTO: 0.3 % (ref 0–1.8)
BASOPHILS # BLD: 0.02 K/UL (ref 0–0.12)
EOSINOPHIL # BLD AUTO: 0 K/UL (ref 0–0.51)
EOSINOPHIL NFR BLD: 0 % (ref 0–6.9)
ERYTHROCYTE [DISTWIDTH] IN BLOOD BY AUTOMATED COUNT: 42.9 FL (ref 35.9–50)
GLUCOSE BLD STRIP.AUTO-MCNC: 186 MG/DL (ref 65–99)
GLUCOSE BLD STRIP.AUTO-MCNC: 216 MG/DL (ref 65–99)
GLUCOSE BLD STRIP.AUTO-MCNC: 227 MG/DL (ref 65–99)
HCT VFR BLD AUTO: 38.4 % (ref 42–52)
HGB BLD-MCNC: 13.5 G/DL (ref 14–18)
IMM GRANULOCYTES # BLD AUTO: 0.06 K/UL (ref 0–0.11)
IMM GRANULOCYTES NFR BLD AUTO: 0.8 % (ref 0–0.9)
LYMPHOCYTES # BLD AUTO: 0.23 K/UL (ref 1–4.8)
LYMPHOCYTES NFR BLD: 3.2 % (ref 22–41)
MCH RBC QN AUTO: 29.3 PG (ref 27–33)
MCHC RBC AUTO-ENTMCNC: 35.2 G/DL (ref 33.7–35.3)
MCV RBC AUTO: 83.3 FL (ref 81.4–97.8)
MONOCYTES # BLD AUTO: 0.5 K/UL (ref 0–0.85)
MONOCYTES NFR BLD AUTO: 7 % (ref 0–13.4)
NEUTROPHILS # BLD AUTO: 6.34 K/UL (ref 1.82–7.42)
NEUTROPHILS NFR BLD: 88.7 % (ref 44–72)
NRBC # BLD AUTO: 0 K/UL
NRBC BLD-RTO: 0 /100 WBC
PLATELET # BLD AUTO: 61 K/UL (ref 164–446)
PMV BLD AUTO: 10.9 FL (ref 9–12.9)
RBC # BLD AUTO: 4.61 M/UL (ref 4.7–6.1)
SIGNIFICANT IND 70042: ABNORMAL
SITE SITE: ABNORMAL
SOURCE SOURCE: ABNORMAL
WBC # BLD AUTO: 7.2 K/UL (ref 4.8–10.8)

## 2022-07-06 PROCEDURE — 700102 HCHG RX REV CODE 250 W/ 637 OVERRIDE(OP): Performed by: HOSPITALIST

## 2022-07-06 PROCEDURE — 700111 HCHG RX REV CODE 636 W/ 250 OVERRIDE (IP): Performed by: ANESTHESIOLOGY

## 2022-07-06 PROCEDURE — 700101 HCHG RX REV CODE 250: Performed by: ANESTHESIOLOGY

## 2022-07-06 PROCEDURE — 700101 HCHG RX REV CODE 250: Performed by: STUDENT IN AN ORGANIZED HEALTH CARE EDUCATION/TRAINING PROGRAM

## 2022-07-06 PROCEDURE — 700111 HCHG RX REV CODE 636 W/ 250 OVERRIDE (IP): Performed by: STUDENT IN AN ORGANIZED HEALTH CARE EDUCATION/TRAINING PROGRAM

## 2022-07-06 PROCEDURE — 700105 HCHG RX REV CODE 258: Performed by: HOSPITALIST

## 2022-07-06 PROCEDURE — 700111 HCHG RX REV CODE 636 W/ 250 OVERRIDE (IP): Performed by: INTERNAL MEDICINE

## 2022-07-06 PROCEDURE — 72158 MRI LUMBAR SPINE W/O & W/DYE: CPT | Mod: MG

## 2022-07-06 PROCEDURE — 87205 SMEAR GRAM STAIN: CPT

## 2022-07-06 PROCEDURE — A9270 NON-COVERED ITEM OR SERVICE: HCPCS | Performed by: INTERNAL MEDICINE

## 2022-07-06 PROCEDURE — 700102 HCHG RX REV CODE 250 W/ 637 OVERRIDE(OP): Performed by: STUDENT IN AN ORGANIZED HEALTH CARE EDUCATION/TRAINING PROGRAM

## 2022-07-06 PROCEDURE — 87070 CULTURE OTHR SPECIMN AEROBIC: CPT

## 2022-07-06 PROCEDURE — 700117 HCHG RX CONTRAST REV CODE 255: Performed by: INTERNAL MEDICINE

## 2022-07-06 PROCEDURE — 700102 HCHG RX REV CODE 250 W/ 637 OVERRIDE(OP): Performed by: INTERNAL MEDICINE

## 2022-07-06 PROCEDURE — 700111 HCHG RX REV CODE 636 W/ 250 OVERRIDE (IP)

## 2022-07-06 PROCEDURE — 770000 HCHG ROOM/CARE - INTERMEDIATE ICU *

## 2022-07-06 PROCEDURE — 700105 HCHG RX REV CODE 258: Performed by: STUDENT IN AN ORGANIZED HEALTH CARE EDUCATION/TRAINING PROGRAM

## 2022-07-06 PROCEDURE — 85025 COMPLETE CBC W/AUTO DIFF WBC: CPT

## 2022-07-06 PROCEDURE — A9270 NON-COVERED ITEM OR SERVICE: HCPCS | Performed by: HOSPITALIST

## 2022-07-06 PROCEDURE — 82962 GLUCOSE BLOOD TEST: CPT | Mod: 91

## 2022-07-06 PROCEDURE — 01400 ANES OPN/ARTHRS KNEE JT NOS: CPT | Performed by: ANESTHESIOLOGY

## 2022-07-06 PROCEDURE — 160002 HCHG RECOVERY MINUTES (STAT): Performed by: ORTHOPAEDIC SURGERY

## 2022-07-06 PROCEDURE — 160009 HCHG ANES TIME/MIN: Performed by: ORTHOPAEDIC SURGERY

## 2022-07-06 PROCEDURE — 160027 HCHG SURGERY MINUTES - 1ST 30 MINS LEVEL 2: Performed by: ORTHOPAEDIC SURGERY

## 2022-07-06 PROCEDURE — A9576 INJ PROHANCE MULTIPACK: HCPCS | Performed by: INTERNAL MEDICINE

## 2022-07-06 PROCEDURE — 99100 ANES PT EXTEME AGE<1 YR&>70: CPT | Performed by: ANESTHESIOLOGY

## 2022-07-06 PROCEDURE — 27310 EXPLORATION OF KNEE JOINT: CPT | Mod: LT | Performed by: ORTHOPAEDIC SURGERY

## 2022-07-06 PROCEDURE — 87147 CULTURE TYPE IMMUNOLOGIC: CPT

## 2022-07-06 PROCEDURE — 160035 HCHG PACU - 1ST 60 MINS PHASE I: Performed by: ORTHOPAEDIC SURGERY

## 2022-07-06 PROCEDURE — 0S9D0ZZ DRAINAGE OF LEFT KNEE JOINT, OPEN APPROACH: ICD-10-PCS | Performed by: ORTHOPAEDIC SURGERY

## 2022-07-06 PROCEDURE — 99232 SBSQ HOSP IP/OBS MODERATE 35: CPT | Performed by: INTERNAL MEDICINE

## 2022-07-06 PROCEDURE — 87077 CULTURE AEROBIC IDENTIFY: CPT

## 2022-07-06 PROCEDURE — A9270 NON-COVERED ITEM OR SERVICE: HCPCS | Performed by: STUDENT IN AN ORGANIZED HEALTH CARE EDUCATION/TRAINING PROGRAM

## 2022-07-06 PROCEDURE — 99223 1ST HOSP IP/OBS HIGH 75: CPT | Performed by: INTERNAL MEDICINE

## 2022-07-06 PROCEDURE — 87186 SC STD MICRODIL/AGAR DIL: CPT

## 2022-07-06 PROCEDURE — 99232 SBSQ HOSP IP/OBS MODERATE 35: CPT | Performed by: HOSPITALIST

## 2022-07-06 PROCEDURE — 87075 CULTR BACTERIA EXCEPT BLOOD: CPT

## 2022-07-06 PROCEDURE — 160048 HCHG OR STATISTICAL LEVEL 1-5: Performed by: ORTHOPAEDIC SURGERY

## 2022-07-06 RX ORDER — DIGOXIN 125 MCG
125 TABLET ORAL DAILY
Status: DISCONTINUED | OUTPATIENT
Start: 2022-07-07 | End: 2022-07-10

## 2022-07-06 RX ORDER — DIGOXIN 0.25 MG/ML
125 INJECTION INTRAMUSCULAR; INTRAVENOUS EVERY 4 HOURS
Status: COMPLETED | OUTPATIENT
Start: 2022-07-06 | End: 2022-07-07

## 2022-07-06 RX ORDER — POTASSIUM CHLORIDE 20 MEQ/1
20 TABLET, EXTENDED RELEASE ORAL ONCE
Status: COMPLETED | OUTPATIENT
Start: 2022-07-06 | End: 2022-07-06

## 2022-07-06 RX ORDER — ONDANSETRON 2 MG/ML
INJECTION INTRAMUSCULAR; INTRAVENOUS PRN
Status: DISCONTINUED | OUTPATIENT
Start: 2022-07-06 | End: 2022-07-06 | Stop reason: SURG

## 2022-07-06 RX ORDER — DIGOXIN 0.25 MG/ML
250 INJECTION INTRAMUSCULAR; INTRAVENOUS ONCE
Status: COMPLETED | OUTPATIENT
Start: 2022-07-06 | End: 2022-07-06

## 2022-07-06 RX ORDER — PHENYLEPHRINE HCL IN 0.9% NACL 0.5 MG/5ML
SYRINGE (ML) INTRAVENOUS PRN
Status: DISCONTINUED | OUTPATIENT
Start: 2022-07-06 | End: 2022-07-06 | Stop reason: SURG

## 2022-07-06 RX ORDER — LIDOCAINE HYDROCHLORIDE 20 MG/ML
JELLY TOPICAL PRN
Status: DISCONTINUED | OUTPATIENT
Start: 2022-07-06 | End: 2022-07-06 | Stop reason: SURG

## 2022-07-06 RX ORDER — METOPROLOL TARTRATE 1 MG/ML
5 INJECTION, SOLUTION INTRAVENOUS
Status: COMPLETED | OUTPATIENT
Start: 2022-07-06 | End: 2022-07-06

## 2022-07-06 RX ORDER — METOPROLOL TARTRATE 1 MG/ML
INJECTION, SOLUTION INTRAVENOUS PRN
Status: DISCONTINUED | OUTPATIENT
Start: 2022-07-06 | End: 2022-07-06 | Stop reason: SURG

## 2022-07-06 RX ORDER — CEFAZOLIN SODIUM 1 G/3ML
INJECTION, POWDER, FOR SOLUTION INTRAMUSCULAR; INTRAVENOUS PRN
Status: DISCONTINUED | OUTPATIENT
Start: 2022-07-06 | End: 2022-07-06 | Stop reason: SURG

## 2022-07-06 RX ADMIN — SODIUM CHLORIDE, POTASSIUM CHLORIDE, SODIUM LACTATE AND CALCIUM CHLORIDE: 600; 310; 30; 20 INJECTION, SOLUTION INTRAVENOUS at 10:54

## 2022-07-06 RX ADMIN — GADOTERIDOL 18 ML: 279.3 INJECTION, SOLUTION INTRAVENOUS at 15:40

## 2022-07-06 RX ADMIN — METOPROLOL TARTRATE 25 MG: 25 TABLET, FILM COATED ORAL at 16:55

## 2022-07-06 RX ADMIN — ENOXAPARIN SODIUM 80 MG: 80 INJECTION SUBCUTANEOUS at 08:06

## 2022-07-06 RX ADMIN — DIGOXIN 250 MCG: 0.25 INJECTION INTRAMUSCULAR; INTRAVENOUS at 16:55

## 2022-07-06 RX ADMIN — OXYCODONE 5 MG: 5 TABLET ORAL at 16:58

## 2022-07-06 RX ADMIN — ONDANSETRON 4 MG: 2 INJECTION INTRAMUSCULAR; INTRAVENOUS at 19:32

## 2022-07-06 RX ADMIN — DILTIAZEM HYDROCHLORIDE 17.5 MG/HR: 5 INJECTION INTRAVENOUS at 01:15

## 2022-07-06 RX ADMIN — METOPROLOL TARTRATE 25 MG: 25 TABLET, FILM COATED ORAL at 04:53

## 2022-07-06 RX ADMIN — EZETIMIBE 10 MG: 10 TABLET ORAL at 16:55

## 2022-07-06 RX ADMIN — INSULIN HUMAN 2 UNITS: 100 INJECTION, SOLUTION PARENTERAL at 19:51

## 2022-07-06 RX ADMIN — METOPROLOL TARTRATE 5 MG: 5 INJECTION, SOLUTION INTRAVENOUS at 00:05

## 2022-07-06 RX ADMIN — DILTIAZEM HYDROCHLORIDE 20 MG/HR: 5 INJECTION INTRAVENOUS at 13:25

## 2022-07-06 RX ADMIN — CEFAZOLIN SODIUM 2 G: 2 INJECTION, SOLUTION INTRAVENOUS at 01:59

## 2022-07-06 RX ADMIN — DILTIAZEM HYDROCHLORIDE 20 MG/HR: 5 INJECTION INTRAVENOUS at 02:15

## 2022-07-06 RX ADMIN — SODIUM CHLORIDE, POTASSIUM CHLORIDE, SODIUM LACTATE AND CALCIUM CHLORIDE: 600; 310; 30; 20 INJECTION, SOLUTION INTRAVENOUS at 20:45

## 2022-07-06 RX ADMIN — SODIUM CHLORIDE, POTASSIUM CHLORIDE, SODIUM LACTATE AND CALCIUM CHLORIDE: 600; 310; 30; 20 INJECTION, SOLUTION INTRAVENOUS at 02:20

## 2022-07-06 RX ADMIN — OXYCODONE 5 MG: 5 TABLET ORAL at 20:45

## 2022-07-06 RX ADMIN — Medication 100 MCG: at 19:16

## 2022-07-06 RX ADMIN — PROPOFOL 100 MG: 10 INJECTION, EMULSION INTRAVENOUS at 19:15

## 2022-07-06 RX ADMIN — METOPROLOL TARTRATE 5 MG: 5 INJECTION, SOLUTION INTRAVENOUS at 00:12

## 2022-07-06 RX ADMIN — OXYCODONE 5 MG: 5 TABLET ORAL at 10:51

## 2022-07-06 RX ADMIN — CEFAZOLIN 2 G: 330 INJECTION, POWDER, FOR SOLUTION INTRAMUSCULAR; INTRAVENOUS at 19:13

## 2022-07-06 RX ADMIN — METOPROLOL TARTRATE 2.5 MG: 5 INJECTION, SOLUTION INTRAVENOUS at 19:31

## 2022-07-06 RX ADMIN — LIDOCAINE HYDROCHLORIDE 3 ML: 20 JELLY TOPICAL at 19:16

## 2022-07-06 RX ADMIN — INSULIN HUMAN 3 UNITS: 100 INJECTION, SOLUTION PARENTERAL at 08:06

## 2022-07-06 RX ADMIN — FENTANYL CITRATE 100 MCG: 50 INJECTION, SOLUTION INTRAMUSCULAR; INTRAVENOUS at 19:13

## 2022-07-06 RX ADMIN — CEFAZOLIN SODIUM 2 G: 2 INJECTION, SOLUTION INTRAVENOUS at 16:55

## 2022-07-06 RX ADMIN — METOPROLOL TARTRATE 5 MG: 5 INJECTION, SOLUTION INTRAVENOUS at 02:01

## 2022-07-06 RX ADMIN — POTASSIUM CHLORIDE 20 MEQ: 1500 TABLET, EXTENDED RELEASE ORAL at 11:47

## 2022-07-06 RX ADMIN — INSULIN GLARGINE-YFGN 10 UNITS: 100 INJECTION, SOLUTION SUBCUTANEOUS at 18:27

## 2022-07-06 RX ADMIN — DIGOXIN 125 MCG: 0.25 INJECTION INTRAMUSCULAR; INTRAVENOUS at 21:03

## 2022-07-06 RX ADMIN — CEFAZOLIN SODIUM 2 G: 2 INJECTION, SOLUTION INTRAVENOUS at 12:45

## 2022-07-06 RX ADMIN — LISINOPRIL 10 MG: 10 TABLET ORAL at 16:55

## 2022-07-06 ASSESSMENT — ENCOUNTER SYMPTOMS
FEVER: 0
CHILLS: 0
CONSTIPATION: 0
DIARRHEA: 0
BACK PAIN: 1
SHORTNESS OF BREATH: 0
SPUTUM PRODUCTION: 0
NAUSEA: 0
COUGH: 0
VOMITING: 0
NERVOUS/ANXIOUS: 0
ABDOMINAL PAIN: 0
WEAKNESS: 1
BLURRED VISION: 0
PALPITATIONS: 0
MYALGIAS: 1

## 2022-07-06 ASSESSMENT — PAIN DESCRIPTION - PAIN TYPE
TYPE: ACUTE PAIN
TYPE: SURGICAL PAIN
TYPE: ACUTE PAIN
TYPE: SURGICAL PAIN
TYPE: ACUTE PAIN

## 2022-07-06 ASSESSMENT — FIBROSIS 4 INDEX: FIB4 SCORE: 14.59

## 2022-07-06 NOTE — PROGRESS NOTES
[facility] Medicine Daily Progress Note    Date of Service  7/6/2022    Chief Complaint  Nitesh Duron is a 72 y.o. male admitted 7/4/2022 with weakness and fall    Hospital Course    72-year-old male with history of diabetes, dyslipidemia hepatitis C and cirrhosis with atrial fibrillation presented 7/4 with fall and weakness.  Patient states he fell 4 days ago at home and he landed on his left knee.  He noticed swelling on his left knee and severe back pain with some weakness on the left leg, patient has been on the ground most of the time and not able to get up.  Patient lives by himself.  Patient was found on the ground by his girlfriend and she called EMS.  On admission patient was found to have A. fib with RVR.  Dehydration however blood pressure was stable.  Labs did not show leukocytosis however showed hyponatremia, creatinine 1.2 with CPK 2411 and lactic acid 4.4.  IV fluid was started.  Blood culture came back positive with MSSA, cefazolin was initiated and ID was consulted.  Echo is pending.      Patient underwent arthrocentesis by Ortho on his left knee and showed more than 37,000 white blood cell with no red blood cell high suspicious of septic arthritis specially with positive blood culture, ID on board and patient is on cefazolin.      Interval Problem Update:    Left knee synovial fluid culture growing staph aureus  Blood cultures from 7 4 positive for staph aureus  Patient is afebrile  Complains of left knee pain  In A. fib pulse 110 130 on Cardizem drip at 20 mg discussed with cardiology        I have discussed this patient's plan of care and discharge plan at IDT rounds today with Case Management, Nursing, Nursing leadership, and other members of the IDT team.    Consultants/Specialty  cardiology, critical care and orthopedics    Code Status  Full Code    Disposition  Patient is not medically cleared for discharge.   Anticipate discharge to to skilled nursing facility.  I have placed the  appropriate orders for post-discharge needs.    Review of Systems  Review of Systems   Constitutional: Negative for chills and fever.   Respiratory: Negative for shortness of breath.    Cardiovascular: Negative for chest pain and palpitations.   Gastrointestinal: Negative for abdominal pain, nausea and vomiting.   Musculoskeletal: Positive for back pain and joint pain.   All other systems reviewed and are negative.       Physical Exam  Temp:  [36.3 °C (97.3 °F)-37.6 °C (99.6 °F)] 37.5 °C (99.5 °F)  Pulse:  [] 116  Resp:  [14-36] 16  BP: ()/(55-90) 118/62  SpO2:  [90 %-97 %] 93 %    Physical Exam  Vitals and nursing note reviewed.   Constitutional:       Appearance: He is well-developed. He is not diaphoretic.   HENT:      Head: Normocephalic and atraumatic.      Mouth/Throat:      Pharynx: No oropharyngeal exudate.   Eyes:      General: No scleral icterus.        Right eye: No discharge.         Left eye: No discharge.      Conjunctiva/sclera: Conjunctivae normal.      Pupils: Pupils are equal, round, and reactive to light.   Neck:      Vascular: No JVD.      Trachea: No tracheal deviation.   Cardiovascular:      Rate and Rhythm: Tachycardia present. Rhythm irregular.      Heart sounds: No murmur heard.    No friction rub. No gallop.   Pulmonary:      Effort: Pulmonary effort is normal. No respiratory distress.      Breath sounds: Normal breath sounds. No stridor. No wheezing.   Chest:      Chest wall: No tenderness.   Abdominal:      General: Bowel sounds are normal. There is no distension.      Palpations: Abdomen is soft.      Tenderness: There is no abdominal tenderness. There is no rebound.   Musculoskeletal:         General: Swelling and tenderness (Left knee) present.      Cervical back: Neck supple.   Skin:     General: Skin is warm and dry.      Nails: There is no clubbing.   Neurological:      Mental Status: He is alert and oriented to person, place, and time.      Cranial Nerves: No cranial  nerve deficit.      Motor: Weakness (Left lower extremity mainly limited by left knee pain) present. No abnormal muscle tone.   Psychiatric:         Behavior: Behavior normal.         Fluids    Intake/Output Summary (Last 24 hours) at 7/6/2022 1452  Last data filed at 7/6/2022 1200  Gross per 24 hour   Intake 6136.67 ml   Output 1410 ml   Net 4726.67 ml       Laboratory  Recent Labs     07/04/22  1335 07/05/22  0745 07/06/22  0140   WBC 10.4 8.2 7.2   RBC 5.46 4.75 4.61*   HEMOGLOBIN 15.6 13.7* 13.5*   HEMATOCRIT 45.1 39.4* 38.4*   MCV 82.6 82.9 83.3   MCH 28.6 28.8 29.3   MCHC 34.6 34.8 35.2   RDW 42.3 42.8 42.9   PLATELETCT 88* 66* 61*   MPV 11.0 10.7 10.9     Recent Labs     07/04/22  1335 07/04/22  2205 07/05/22  0745   SODIUM 126* 129* 127*   POTASSIUM 3.9 3.9 3.9   CHLORIDE 90* 94* 94*   CO2 20 23 22   GLUCOSE 358* 242* 222*   BUN 42* 33* 26*   CREATININE 1.24 0.85 0.71   CALCIUM 8.9 8.4* 8.5                   Imaging  CT-HEAD W/O   Final Result      1. No CT evidence of acute infarct, hemorrhage or mass.   2. Mild global parenchymal atrophy. Chronic small vessel ischemic changes.      EC-ECHOCARDIOGRAM COMPLETE W/ CONT   Final Result      DX-KNEE 3 VIEWS LEFT   Final Result      1.  Severe tricompartmental left knee osteoarthritis      2.  osteoarthritis the proximal tibiofibular articulation      3.  Multiple intra-articular ossific body      4.  Diffuse atherosclerotic plaque      DX-CHEST-PORTABLE (1 VIEW)   Final Result      No acute cardiopulmonary abnormality identified.      MR-LUMBAR SPINE-WITH & W/O    (Results Pending)        Assessment/Plan  * MSSA bacteremia  Assessment & Plan  Blood culture on admission 7/4 positive for MSSA 2/2  Source likely septic arthritis  Call placed to Dr. Jensen to update him on culture results as a suspect the patient will likely need washout  We will keep patient n.p.o.  Continue IV cefazolin  ID following  Given back pain check MRI of lumbar spine    Pyogenic  arthritis of left knee joint (HCC)  Assessment & Plan  Synovial fluid culture MSSA  Reconsulted Ortho with plans for washout later today    Falls  Assessment & Plan  Possible mechanical fall and generalized weakness   Stayed on the floor 3 days   rhabdomyolysis and history of diarrhea  IV fluid  PT OT eval's when more clinically stable    Acute cystitis without hematuria  Assessment & Plan  Urine culture MSSA continue Ancef    Traumatic rhabdomyolysis (HCC)  Assessment & Plan  Improved continue IV fluids and monitor    Hyponatremia  Assessment & Plan  Overall stable  Continue hydration and monitor      Type 2 diabetes mellitus with hyperglycemia, without long-term current use of insulin (HCC)  Assessment & Plan  Last a1c 6.3  Continue sliding-scale insulin  Given hyperglycemia start Lantus and monitor CBGs    Atrial fibrillation with RVR (HCC)  Assessment & Plan  Echo EF 55% no valvular abnormalities  Continue metoprolol and Cardizem drip  Cardiology following  Hold Lovenox given likely plan for knee washout for septic arthritis    Thrombocytopenia (HCC)  Assessment & Plan  Monitor CBC no clinical signs of bleeding    Hepatocellular carcinoma (HCC)- (present on admission)  Assessment & Plan  History of hepatocellular carcinoma.   Patient has history of cirrhosis  Following with oncology as an outpatient.    Cirrhosis of liver without ascites (HCC)- (present on admission)  Assessment & Plan  History of hepatitis C virus and received treatment     Outpatient follow-up    Essential hypertension, benign- (present on admission)  Assessment & Plan  Improved control continue to monitor         VTE prophylaxis: SCDs/TEDs and enoxaparin ppx    I have performed a physical exam and reviewed and updated ROS and Plan today (7/6/2022). In review of yesterday's note (7/5/2022), there are no changes except as documented above.

## 2022-07-06 NOTE — PROGRESS NOTES
Cardiology Follow-up Consult Note    Date of Service:    7/6/2022      Consulting Physician: Terry Vences M.D.    Name:   Nitesh Duron   YOB: 1950  Age:   72 y.o.  male   MRN:   [id number]      HPI:    Nitesh Duron is a 72 y.o. male with a history of HTN, HLD, atrial fibrillation, controlled Type 2 DM with diet and exercise and HCV cirrhosis  who presented to the ED on 7/4/2022 for weakness and left knee pain and swelling for the past 4 days. Patient states that he got out of bed, turned and fell on the floor and couldn't get up. Denies any syncopal episodes or hitting his head. Patient was transported via EMS to the hospital for treatment. While in the ED, patient was found to be in Afib with HR in 150-200s, no symptoms. Patient given fluids for dehydration and started on diltiazem drip 15 mg/hr and his home metoprolol dose continued.    Subjective:  Patient states he is doing fine and only complains about his left knee pain. Heart rate elevated and patient states he has no symptoms. Denies any lightheadedness, dizziness, chest pain, palpitations, shortness of breath, nausea, vomiting, blurry vision or edema.     All other review of systems reviewed and negative.      Past medical, surgical, social, and family history reviewed and unchanged from admission except as noted in assessment and plan.    Medications: Reviewed in MAR      Allergies   Allergen Reactions   • Ciprofloxacin Unspecified     convulsions   • Statins [Hmg-Coa-R Inhibitors] Unspecified     Stabbing pains in kidneys   • Acetaminophen      Kidney pain    • Atorvastatin      Patient declines   • Ibuprofen      Kidney pain   • Naproxen      Kidney pain   • Shellfish Allergy      Kidney stones   • Soap &  [Alcohol] Rash     Antibacterial soap- contact dermatitis         Intake/Output Summary (Last 24 hours) at 7/6/2022 1521  Last data filed at 7/6/2022 1200  Gross per 24 hour   Intake 6121.67 ml  "  Output 1410 ml   Net 4711.67 ml        Physical Exam  Body mass index is 27.43 kg/m².  /62   Pulse (!) 116   Temp 37.5 °C (99.5 °F) (Temporal)   Resp 16   Ht 1.778 m (5' 10\")   Wt 86.7 kg (191 lb 2.2 oz)   SpO2 93%   Vitals:    07/06/22 0900 07/06/22 1000 07/06/22 1100 07/06/22 1200   BP: 121/58 109/74 123/63 118/62   Pulse: (!) 133 (!) 115 (!) 114 (!) 116   Resp: (!) 30 20 20 16   Temp:       TempSrc:       SpO2: 94% 91% 95% 93%   Weight:       Height:         Oxygen Therapy:  Pulse Oximetry: 93 %, O2 (LPM): 0, O2 Delivery Device: None - Room Air    General: Well appearing and in no apparent distress  Eyes: nl conjunctiva  ENT: OP clear  Neck: JVP not elevated, no carotid bruits  Lungs: normal respiratory effort, CTAB  Heart: Tachycardic, no murmurs, no rubs or gallops, no edema bilateral lower extremities. No LV/RV heave on cardiac palpatation. 2+ bilateral radial pulses.  2+ bilateral dp pulses.   Abdomen: soft, mildly tender, non distended, no masses, normal bowel sounds.  No HSM.  Extremities/MSK: no clubbing, no cyanosis, swelling left knee  Neurological: No focal sensory deficits  Psychiatric: Appropriate affect, A/O x 3  Skin: Warm extremities, hemosiderin skin changes bilaterally on lower extremities    Labs (personally reviewed and notable for):   Lab Results   Component Value Date/Time    SODIUM 127 (L) 07/05/2022 07:45 AM    POTASSIUM 3.9 07/05/2022 07:45 AM    CHLORIDE 94 (L) 07/05/2022 07:45 AM    CO2 22 07/05/2022 07:45 AM    GLUCOSE 222 (H) 07/05/2022 07:45 AM    BUN 26 (H) 07/05/2022 07:45 AM    CREATININE 0.71 07/05/2022 07:45 AM      Lab Results   Component Value Date/Time    WBC 7.2 07/06/2022 01:40 AM    RBC 4.61 (L) 07/06/2022 01:40 AM    HEMOGLOBIN 13.5 (L) 07/06/2022 01:40 AM    HEMATOCRIT 38.4 (L) 07/06/2022 01:40 AM    MCV 83.3 07/06/2022 01:40 AM    MCH 29.3 07/06/2022 01:40 AM    MCHC 35.2 07/06/2022 01:40 AM    MPV 10.9 07/06/2022 01:40 AM    NEUTSPOLYS 88.70 (H) 07/06/2022 " 01:40 AM    LYMPHOCYTES 3.20 (L) 07/06/2022 01:40 AM    MONOCYTES 7.00 07/06/2022 01:40 AM    EOSINOPHILS 0.00 07/06/2022 01:40 AM    BASOPHILS 0.30 07/06/2022 01:40 AM    HYPOCHROMIA 1+ 01/29/2019 10:32 AM    ANISOCYTOSIS 1+ 01/29/2019 10:32 AM      Lab Results   Component Value Date/Time    CHOLSTRLTOT 179 07/06/2021 10:02 AM     (H) 07/06/2021 10:02 AM    HDL 43 07/06/2021 10:02 AM    TRIGLYCERIDE 88 07/06/2021 10:02 AM       Lab Results   Component Value Date/Time    TROPONINT 43 (H) 07/04/2022 1335     Lab Results   Component Value Date/Time    NTPROBNP 3678 (H) 07/04/2022 1335       Cardiac Imaging and Procedures Review:    ECHO 7/5/2022: EF 55%, diastolic function unable to be assessed due to arrhythmia and reduced right ventricular systolic function .    Radiology test Review:  CT-HEAD W/O   Final Result      1. No CT evidence of acute infarct, hemorrhage or mass.   2. Mild global parenchymal atrophy. Chronic small vessel ischemic changes.      EC-ECHOCARDIOGRAM COMPLETE W/ CONT   Final Result      DX-KNEE 3 VIEWS LEFT   Final Result      1.  Severe tricompartmental left knee osteoarthritis      2.  osteoarthritis the proximal tibiofibular articulation      3.  Multiple intra-articular ossific body      4.  Diffuse atherosclerotic plaque      DX-CHEST-PORTABLE (1 VIEW)   Final Result      No acute cardiopulmonary abnormality identified.      MR-LUMBAR SPINE-WITH & W/O    (Results Pending)       Problem list:  Principal Problem:    MSSA bacteremia POA: Unknown  Active Problems:    Essential hypertension, benign POA: Yes    Cirrhosis of liver without ascites (HCC) POA: Yes    Hepatocellular carcinoma (HCC) POA: Yes    Thrombocytopenia (HCC) POA: Unknown    Atrial fibrillation with RVR (HCC) POA: Unknown    Type 2 diabetes mellitus with hyperglycemia, without long-term current use of insulin (HCC) POA: Unknown    Hyponatremia POA: Unknown    Traumatic rhabdomyolysis (HCC) POA: Unknown    Acute cystitis  without hematuria POA: Unknown    Falls POA: Unknown    A-fib (HCC) POA: Yes    Pyogenic arthritis of left knee joint (HCC) POA: Unknown  Resolved Problems:    * No resolved hospital problems. *      Impression and Medical Decision Making:  #Atrial fibrillation with RVR    Recommendations:  --Recommend CARLI as patient with MSSA bacteremia  --Stop diltiazem drip.   --Will start IV digoxin loading with 1st dose 250 mcg followed by 125 mcg x 4 doses for total loading dose of 750 mcg   -Maintenance PO digoxin dose 0.125 mg daily.  -Uptitrate PO metoprolol as needed.

## 2022-07-06 NOTE — CONSULTS
Consults   INFECTIOUS DISEASES INPATIENT CONSULT NOTE     Date of Service: 7/6/2022    Consult Requested By: Terry Vences M.D.    Reason for Consultation: MSSA bacteremia and septic joint    History of Present Illness:   Nitesh Duron is a 72 y.o.  admitted 7/4/2022. Pt has a past medical history of HTN, HLD, A fib, DMT2 and hepatitis C cirrhosis.  He presented complaining of weakness and left knee pain.  He had a fall and was down on the floor for several days.   Patient also with chronic back pain and states he had 4 epidural injections in his back approximately 1 week ago    Hospital Course:   In the ER is found to be in A. fib with RVR.  Blood cultures positive for MSSA.  Concern for septic joint and fluid is aspirated which is also positive for MSSA.  Plan is to go to the OR on 7/6 for washout of septic left knee.    Review Of Systems:  Review of Systems   Constitutional: Positive for malaise/fatigue. Negative for chills and fever.   HENT: Negative for hearing loss.    Eyes: Negative for blurred vision.   Respiratory: Negative for cough, sputum production and shortness of breath.    Cardiovascular: Negative for chest pain and leg swelling.   Gastrointestinal: Negative for abdominal pain, constipation, diarrhea, nausea and vomiting.   Genitourinary: Negative for dysuria.   Musculoskeletal: Positive for back pain, joint pain and myalgias.   Skin: Negative for rash.   Neurological: Positive for weakness.   Psychiatric/Behavioral: The patient is not nervous/anxious.        PMH:   Past Medical History:   Diagnosis Date   • Arrhythmia 07/01/2022    medicated   • Arthritis     OA- hands and L knee   • Cancer (HCC) 07/01/2022    liver   • Cataract 2015    bilateral IOL   • Cirrhosis (HCC)    • Dental disorder 07/01/2022    upper partial   • Diabetes 05/12/2022    history of medications, pt has not been on diabetic meds since weight loss   • Hepatitis C     treated in 2017   • High cholesterol  2022    medicated   • Hyperlipidemia    • Hypertension 2022    medicated   • Renal disorder 2018    kidney stones       PSH:  Past Surgical History:   Procedure Laterality Date   • SD ULTRASONIC GUIDANCE, INTRAOPERATIVE N/A 2022    Procedure: ULTRASOUND GUIDANCE - AND OTHER INDICATED PROEDURES;  Surgeon: Karma Fisher M.D.;  Location: SURGERY [facility];  Service: General   • SD LAP,DIAGNOSTIC ABDOMEN N/A 2022    Procedure: LAPAROSCOPY;  Surgeon: Karma Fisher M.D.;  Location: SURGERY [facility];  Service: General   • GASTROSCOPY N/A 2018    Procedure: GASTROSCOPY;  Surgeon: Matt Young M.D.;  Location: SURGERY SAME DAY [facility];  Service: Gastroenterology   • ROTATOR CUFF REPAIR Left    • ORIF, ANKLE Right     spiral fx   • MENISCECTOMY Left    • APPENDECTOMY     • CYSTOSCOPY  ,    stone extraction       FAMILY HX:  Family History   Problem Relation Age of Onset   • Other Mother    • Heart Attack Father    • Heart Failure Brother      Reviewed family history. No pertinent family history.     SOCIAL HX:  Social History     Socioeconomic History   • Marital status:      Spouse name: Not on file   • Number of children: Not on file   • Years of education: Not on file   • Highest education level: Not on file   Occupational History   • Not on file   Tobacco Use   • Smoking status: Former Smoker     Packs/day: 0.50     Years: 43.00     Pack years: 21.50     Types: Cigarettes     Quit date: 2008     Years since quittin.5   • Smokeless tobacco: Never Used   • Tobacco comment: quit    Vaping Use   • Vaping Use: Never used   Substance and Sexual Activity   • Alcohol use: No   • Drug use: Yes     Types: Marijuana, Inhaled     Comment: marijuana   • Sexual activity: Never   Other Topics Concern   • Not on file   Social History Narrative   • Not on file     Social Determinants of Health     Financial Resource Strain: Not on file    Food Insecurity: Not on file   Transportation Needs: Not on file   Physical Activity: Not on file   Stress: Not on file   Social Connections: Not on file   Intimate Partner Violence: Not on file   Housing Stability: Not on file     Social History     Tobacco Use   Smoking Status Former Smoker   • Packs/day: 0.50   • Years: 43.00   • Pack years: 21.50   • Types: Cigarettes   • Quit date: 2008   • Years since quittin.5   Smokeless Tobacco Never Used   Tobacco Comment    quit      Social History     Substance and Sexual Activity   Alcohol Use No       Allergies/Intolerances:  Allergies   Allergen Reactions   • Ciprofloxacin Unspecified     convulsions   • Statins [Hmg-Coa-R Inhibitors] Unspecified     Stabbing pains in kidneys   • Acetaminophen      Kidney pain    • Atorvastatin      Patient declines   • Ibuprofen      Kidney pain   • Naproxen      Kidney pain   • Shellfish Allergy      Kidney stones   • Soap &  [Alcohol] Rash     Antibacterial soap- contact dermatitis       History reviewed with the patient     Other Current Medications:    Current Facility-Administered Medications:   •  DILTIAZem (CARDIZEM) 100 mg in dextrose 5% 100 mL Infusion, 0-20 mg/hr, Intravenous, Continuous, Amor Pitt D.O., Last Rate: 20 mL/hr at 22 1325, 20 mg/hr at 22 1325  •  ceFAZolin in dextrose (Ancef) IVPB premix 2 g, 2 g, Intravenous, Q8HRS, Mago Jacome M.D., Stopped at 22 1315  •  metoprolol tartrate (LOPRESSOR) tablet 25 mg, 25 mg, Oral, TWICE DAILY, Kimberly Jacobs M.D., 25 mg at 22 0453  •  senna-docusate (PERICOLACE or SENOKOT S) 8.6-50 MG per tablet 2 Tablet, 2 Tablet, Oral, BID **AND** polyethylene glycol/lytes (MIRALAX) PACKET 1 Packet, 1 Packet, Oral, QDAY PRN **AND** magnesium hydroxide (MILK OF MAGNESIA) suspension 30 mL, 30 mL, Oral, QDAY PRN **AND** bisacodyl (DULCOLAX) suppository 10 mg, 10 mg, Rectal, QDAY PRN, Yayo Chaves D.O.  •  labetalol  (NORMODYNE/TRANDATE) injection 10 mg, 10 mg, Intravenous, Q4HRS PRN, Yayo Chaves D.O.  •  ondansetron (ZOFRAN) syringe/vial injection 4 mg, 4 mg, Intravenous, Q4HRS PRN, Yayo Chaves D.O.  •  ondansetron (ZOFRAN ODT) dispertab 4 mg, 4 mg, Oral, Q4HRS PRN, Yayo Chaves D.O.  •  insulin regular (HumuLIN R,NovoLIN R) injection, 2-9 Units, Subcutaneous, 4X/DAY ACHS, 3 Units at 07/06/22 0806 **AND** POC blood glucose manual result, , , Q AC AND BEDTIME(S) **AND** NOTIFY MD and PharmD, , , Once **AND** Administer 20 grams of glucose (approximately 8 ounces of fruit juice) every 15 minutes PRN FSBG less than 70 mg/dL, , , PRN **AND** dextrose 50% (D50W) injection 25 g, 25 g, Intravenous, Q15 MIN PRN, Yayo Chaves D.O.  •  Notify provider if pain remains uncontrolled, , , CONTINUOUS **AND** Use the Numeric Rating Scale (NRS), Yusuf-Baker Faces (WBF), or FLACC on regular floors and Critical-Care Pain Observation Tool (CPOT) on ICUs/Trauma to assess pain, , , CONTINUOUS **AND** Pulse Ox, , , CONTINUOUS **AND** Pharmacy Consult Request ...Pain Management Review 1 Each, 1 Each, Other, PHARMACY TO DOSE **AND** If patient difficult to arouse and/or has respiratory depression (respiratory rate of 10 or less), stop any opiates that are currently infusing and call a Rapid Response., , , CONTINUOUS, Yayo Chaves D.O.  •  oxyCODONE immediate-release (ROXICODONE) tablet 2.5 mg, 2.5 mg, Oral, Q3HRS PRN **OR** oxyCODONE immediate-release (ROXICODONE) tablet 5 mg, 5 mg, Oral, Q3HRS PRN, 5 mg at 07/06/22 1051 **OR** morphine 4 MG/ML injection 2 mg, 2 mg, Intravenous, Q3HRS PRN, Yayo Chaves D.O.  •  lactated ringers infusion, , Intravenous, Continuous, Terry Vences M.D., Last Rate: 50 mL/hr at 07/06/22 1054, New Bag at 07/06/22 1054  •  lisinopril (PRINIVIL) tablet 10 mg, 10 mg, Oral, Q EVENING, Yayo Chaves D.O., 10 mg at 07/05/22 2938  •  ezetimibe (ZETIA) tablet 10 mg, 10 mg, Oral, Q EVENING, Yayo Chaves,  "D.O., 10 mg at 22 1738  •  enoxaparin (Lovenox) inj 80 mg, 1 mg/kg, Subcutaneous, BID, Yayo BARNES DAYANARA Chaves.ORobert, 80 mg at 22 0806  [unfilled]    Most Recent Vital Signs:  /62   Pulse (!) 116   Temp 37.5 °C (99.5 °F) (Temporal)   Resp 16   Ht 1.778 m (5' 10\")   Wt 86.7 kg (191 lb 2.2 oz)   SpO2 93%   BMI 27.43 kg/m²   Temp  Av.2 °C (98.9 °F)  Min: 36.3 °C (97.3 °F)  Max: 37.6 °C (99.6 °F)    Physical Exam:  Physical Exam  Constitutional:       Appearance: Normal appearance.   HENT:      Head: Normocephalic and atraumatic.      Right Ear: External ear normal.      Left Ear: External ear normal.      Nose: Nose normal.      Mouth/Throat:      Mouth: Mucous membranes are moist.      Pharynx: Oropharynx is clear.   Eyes:      Extraocular Movements: Extraocular movements intact.      Conjunctiva/sclera: Conjunctivae normal.      Pupils: Pupils are equal, round, and reactive to light.   Cardiovascular:      Rate and Rhythm: Normal rate and regular rhythm.      Heart sounds: Normal heart sounds.   Pulmonary:      Effort: Pulmonary effort is normal.      Breath sounds: Normal breath sounds.   Abdominal:      General: Abdomen is flat. Bowel sounds are normal.      Palpations: Abdomen is soft.   Musculoskeletal:         General: Normal range of motion.      Cervical back: Normal range of motion and neck supple.      Comments: Left knee with edema and tenderness   Skin:     General: Skin is warm and dry.   Neurological:      General: No focal deficit present.      Mental Status: He is alert.      Comments: Mild confusion   Psychiatric:         Mood and Affect: Mood normal.         Behavior: Behavior normal.             Pertinent Lab Results:  Recent Labs     22  1335 22  0745 22  0140   WBC 10.4 8.2 7.2      Recent Labs     22  1335 22  0745 22  0140   HEMOGLOBIN 15.6 13.7* 13.5*   HEMATOCRIT 45.1 39.4* 38.4*   MCV 82.6 82.9 83.3   MCH 28.6 28.8 29.3 " "  PLATELETCT 88* 66* 61*         Recent Labs     07/04/22  1335 07/04/22  2205 07/05/22  0745   SODIUM 126* 129* 127*   POTASSIUM 3.9 3.9 3.9   CHLORIDE 90* 94* 94*   CO2 20 23 22   CREATININE 1.24 0.85 0.71        Recent Labs     07/04/22  1335   ALBUMIN 3.6        Pertinent Micro:  Results     Procedure Component Value Units Date/Time    Blood Culture [[id number]]  (Abnormal) Collected: 07/04/22 1425    Order Status: Completed Specimen: Blood from Peripheral Updated: 07/06/22 1102     Significant Indicator POS     Source BLD     Site PERIPHERAL     Culture Result Growth detected by Bactec instrument. 07/05/2022  04:46  Staphylococcus aureus (methicillin sensitive)  detected by PCR.  Susceptibility to follow.        Staphylococcus aureus  Susceptibilities in progress      Narrative:      CALL  Quarles  ER tel. ,  CALLED  ER tel.  07/05/2022, 09:31, RB PERF. RESULTS CALLED TO: Corina FOSS  [phone]  1 of 2 for Blood Culture x 2 sites order. Per Hospital  Policy: Only change Specimen Src: to \"Line\" if specified by  physician order.  No site indicated    Blood Culture [[id number]]  (Abnormal) Collected: 07/04/22 1438    Order Status: Completed Specimen: Blood from Peripheral Updated: 07/06/22 1102     Significant Indicator POS     Source BLD     Site PERIPHERAL     Culture Result Growth detected by Bactec instrument. 07/05/2022  09:29      Staphylococcus aureus    Narrative:      CALL  Quarles  ER tel. ,  CALLED  ER tel.  07/05/2022, 09:32, RB PERF. RESULTS CALLED TO:PRUDENCE Arriaga  [phone]  2 of 2 blood culture x2  Sites order. Per Hospital Policy:  Only change Specimen Src: to \"Line\" if specified by physician  order.  Left Hand    FLUID CULTURE W/GRAM STAIN [[id number]]  (Abnormal)  (Susceptibility) Collected: 07/04/22 1620    Order Status: Completed Specimen: Synovial from Other Body Fluid Updated: 07/06/22 0925     Significant Indicator POS     Source SYNO     Site left knee     Culture Result -     Gram Stain Result Rare WBCs.  No " organisms seen.       Culture Result Staphylococcus aureus  Light growth      Narrative:      CALL  Quarles  ER tel. ,  CALLED  ER tel.  07/05/2022, 13:22, RB PERF. RESULTS CALLED TO:Ernesto [phone] Vlad  L. knee fluid.    Susceptibility     Staphylococcus aureus (1)     Antibiotic Interpretation Microscan   Method Status    Azithromycin Sensitive <=2 mcg/mL DEVEN Preliminary    Clindamycin Sensitive <=0.25 mcg/mL DEVEN Preliminary    Cefazolin Sensitive <=8 mcg/mL DEVEN Preliminary    Cefepime Sensitive <=4 mcg/mL DEVEN Preliminary    Ceftaroline Sensitive <=0.5 mcg/mL DEVEN Preliminary    Daptomycin Sensitive 1 mcg/mL DEVEN Preliminary    Erythromycin Sensitive <=0.25 mcg/mL DEVEN Preliminary    Ampicillin/sulbactam Sensitive <=8/4 mcg/mL DEVEN Preliminary    Vancomycin Sensitive 1 mcg/mL DEVEN Preliminary    Oxacillin Sensitive <=0.25 mcg/mL DEVEN Preliminary    Pip/Tazobactam Sensitive <=8 mcg/mL DEVEN Preliminary    Trimeth/Sulfa Sensitive <=0.5/9.5 mcg/mL DEVEN Preliminary    Tetracycline Sensitive <=4 mcg/mL DEVEN Preliminary                   Urine Culture (NEW) [[id number]]  (Abnormal)  (Susceptibility) Collected: 07/04/22 1630    Order Status: Completed Specimen: Urine, Clean Catch Updated: 07/06/22 0733     Significant Indicator POS     Source UR     Site URINE, CLEAN CATCH     Culture Result -      Staphylococcus aureus  >100,000 cfu/mL  Methicillin sensitive by screening method.      Narrative:      Indication for culture:->Evaluation for sepsis without a  clear source of infection  Indication for culture:->Evaluation for sepsis without a    Susceptibility     Staphylococcus aureus (1)     Antibiotic Interpretation Microscan   Method Status    Cefazolin Sensitive <=8 mcg/mL DEVEN Final    Cefepime Sensitive <=4 mcg/mL DEVEN Final    Ceftaroline Sensitive <=0.5 mcg/mL DEVEN Final    Daptomycin Sensitive <=0.5 mcg/mL DEVEN Final    Nitrofurantoin Sensitive <=32 mcg/mL DEVEN Final    Pip/Tazobactam Sensitive <=8 mcg/mL DEVEN Final    Trimeth/Sulfa  Sensitive <=0.5/9.5 mcg/mL DEVEN Final    Tetracycline Sensitive <=4 mcg/mL DEVEN Final                   BLOOD CULTURE [[id number]]     Order Status: Sent Specimen: Blood from Peripheral     BLOOD CULTURE [[id number]]     Order Status: Sent Specimen: Blood from Peripheral     BLOOD CULTURE [[id number]]     Order Status: Sent Specimen: Blood from Peripheral     BLOOD CULTURE [[id number]]     Order Status: Sent Specimen: Blood from Peripheral     MRSA By PCR (Amp) [[id number]] Collected: 07/05/22 1215    Order Status: Completed Specimen: Respirate from Nares Updated: 07/05/22 1608     MRSA by PCR Negative    GRAM STAIN [[id number]] Collected: 07/04/22 1620    Order Status: Completed Specimen: Synovial Updated: 07/04/22 1927     Significant Indicator .     Source SYNO     Site left knee     Gram Stain Result Rare WBCs.  No organisms seen.      Narrative:      L. knee fluid.    Urinalysis [[id number]]  (Abnormal) Collected: 07/04/22 1630    Order Status: Completed Specimen: Urine Updated: 07/04/22 1739     Color Yellow     Character Clear     Specific Gravity 1.023     Ph 5.0     Glucose 250 mg/dL      Ketones Negative mg/dL      Protein 100 mg/dL      Bilirubin Negative     Urobilinogen, Urine 1.0     Nitrite Positive     Leukocyte Esterase Trace     Occult Blood Large     Micro Urine Req Microscopic    Narrative:      Indication for culture:->Evaluation for sepsis without a  clear source of infection        No results found for: BLOODCULTU, BLDCULT, BCHOLD     Studies:  CT-HEAD W/O    Result Date: 7/5/2022   CT HEAD WITHOUT CONTRAST 7/5/2022 6:07 PM HISTORY/REASON FOR EXAM:  Pain following trauma TECHNIQUE/EXAM DESCRIPTION AND NUMBER OF VIEWS: CT of the head without contrast. The study was performed on a helical multidetector CT scanner. Contiguous 2.5 mm axial sections were obtained from the skull base through the vertex. Up to date radiation dose reduction adjustments have been utilized to meet ALARA standards for radiation  dose reduction. COMPARISON:  None available FINDINGS: Lateral ventricles are normal in size and symmetric. Mild global parenchymal atrophy. Chronic small vessel ischemic changes. No significant mass effect or midline shift. Basal cisterns are patent. No evidence for intracranial hemorrhage. Calvaria are intact. Atherosclerosis. Visualized orbits are unremarkable. Visualized mastoid air cells are clear. No significant sinus disease in the visualized paranasal sinuses.     1. No CT evidence of acute infarct, hemorrhage or mass. 2. Mild global parenchymal atrophy. Chronic small vessel ischemic changes.    DX-CHEST-PORTABLE (1 VIEW)    Result Date: 7/4/2022 7/4/2022 2:18 PM HISTORY/REASON FOR EXAM:  Sepsis. TECHNIQUE/EXAM DESCRIPTION AND NUMBER OF VIEWS: Single portable view of the chest. COMPARISON: None FINDINGS: LUNGS: The lungs are clear. HEART and MEDIASTINUM: normal in size. Pleura: There are no pleural effusion or pneumothoraces. Osseous structures: There are bilateral chronic rotator cuff tear with the humeral head abutting and eroding the acromion. There is bilateral glenohumeral joint osteoarthritis.     No acute cardiopulmonary abnormality identified.    DX-KNEE 3 VIEWS LEFT    Result Date: 7/4/2022 7/4/2022 2:18 PM HISTORY/REASON FOR EXAM:  Atraumatic Pain/Swelling/Deformity. Left knee pain TECHNIQUE/EXAM DESCRIPTION AND NUMBER OF VIEWS:  3 views of the LEFT knee. COMPARISON: None FINDINGS: There is no fracture. Alignment is normal. Severe tricompartmental degenerative changes are present. There is also severe osteoarthritis of the proximal tibiofibular articulation with multiple subchondral cyst present. There is extensive atherosclerotic plaque. There are multiple intra-articular ossific body.     1.  Severe tricompartmental left knee osteoarthritis 2.  osteoarthritis the proximal tibiofibular articulation 3.  Multiple intra-articular ossific body 4.  Diffuse atherosclerotic plaque    IR-CONSULT  ONLY-OUTPATIENT    Result Date: 2022  This exam has been resulted under the NOTES tab, and the signed report has been auto faxed to the ordering physician on the date/time of that signature.    EC-ECHOCARDIOGRAM COMPLETE W/ CONT    Result Date: 2022  Transthoracic Echo Report Echocardiography Laboratory CONCLUSIONS Normal left ventricular size and systolic function. The left ventricular ejection fraction is estimated to be 55%. No evidence of valvular abnormality based on Doppler evaluation. Unable to estimate right ventricular systolic pressure due to an inadequate tricuspid regurgitant jet. CECILLE ARAUJO Exam Date:         2022                    14:12 Exam Location:     Inpatient Priority:          Routine Ordering Physician:        ELSY REARDON Referring Physician: Sonographer:               Lily Lynch RDCS, RVT Age:    72     Gender:    M MRN:    [id number] :    1950 BSA:    2      Ht (in):    70     Wt (lb):    180 Exam Type:     Complete Indications:     Arrhythmia ICD Codes:       427.9 CPT Codes:       24721 BP:   0      /   88     HR:   113 Technical Quality:       Fair MEASUREMENTS  (Male / Female) Normal Values 2D ECHO LV Diastolic Diameter PLAX        4.5 cm                4.2 - 5.9 / 3.9 - 5.3 cm LV Systolic Diameter PLAX         3.4 cm                2.1 - 4.0 cm IVS Diastolic Thickness           0.97 cm               LVPW Diastolic Thickness          1 cm                  LVOT Diameter                     2 cm                  Estimated LV Ejection Fraction    55 %                  LV Ejection Fraction MOD BP       62.6 %                >= 55  % LV Ejection Fraction MOD 4C       63.6 %                LV Ejection Fraction MOD 2C       56.4 %                DOPPLER AV Peak Velocity                  1.2 m/s               AV Peak Gradient                  5.5 mmHg              AV Mean Gradient                  4 mmHg                LVOT Peak  Velocity                0.77 m/s              AV Area Cont Eq vti               2.4 cm2               PV Peak Velocity                  0.81 m/s              PV Peak Gradient                  2.6 mmHg              * Indicates values subject to auto-interpretation LV EF:  55    % FINDINGS Left Ventricle Contrast was used to enhance visualization of the endocardial border. Normal left ventricular chamber size. Normal left ventricular wall thickness. Grossly normal left ventricular systolic function. The left ventricular ejection fraction is visually estimated to be 55%. Diastolic function is difficult to assess with arrhythmia. Right Ventricle Normal right ventricular size. Reduced right ventricular systolic function. Right Atrium Normal right atrial size. Normal inferior vena cava size and inspiratory collapse. Left Atrium Normal left atrial size. Mitral Valve Structurally normal mitral valve without significant stenosis or regurgitation. Aortic Valve Structurally normal aortic valve without significant stenosis or regurgitation. Tricuspid Valve Structurally normal tricuspid valve without significant stenosis or regurgitation. Unable to estimate right ventricular systolic pressure due to an inadequate tricuspid regurgitant jet. Pulmonic Valve The pulmonic valve is not well visualized. No stenosis or regurgitation seen. Pericardium Fat pat present. Aorta The ascending aorta is mildly dilated by BSA diameter is 3.7 cm. The aortic root diameter is 3.6 cm. Mukul Phillips MD (Electronically Signed) Final Date:     05 July 2022                 16:13      ASSESSMENT/PLAN:     72 y.o.  admitted 7/4/2022. Pt has a past medical history of HTN, HLD, A fib, DMT2 and hepatitis C cirrhosis.  He presented complaining of weakness and left knee pain.  He had a fall and was down on the floor for several days.   Patient also with chronic back pain and states he had 4 injections in his back approximately 1 week ago    Hospital Course:    In the ER is found to be in A. fib with RVR.  Blood cultures positive for MSSA.  Concern for septic joint and fluid is aspirated which is also positive for MSSA.  Plan is to go to the OR on 7/6 for washout of septic left knee.    Problem List    Bacteremia  -Blood cultures on 7/4 +MSSA  -TTE on 7/5 with no vegetations, no significant valvular disease, EF 55%  Left knee septic arthritis  -Aspirated synovial fluid with 30 7K WBCs, 66% polys, cultures +MSSA   UTI, possibly source of the bacteremia or resulted from it  -Urine culture on 7/4 +MSSA   Rhabdomyolysis  -Improving CPK  A. fib, RVR, improved  Cirrhosis  Hepatitis C infection, unknown if treated   Acute on chronic back pain  Antibiotic allergies: Ciprofloxacin reported as convulsions    Plan     --- Continue cefazolin 2 g every 8 hours  --- Follow-up repeat blood cultures  --- Plan is to go to the OR today for left knee I&D  --- Follow-up results of MRI lumbar spine today  --- Monitor labs    Dispo: TBD  PICC: Will eventually need either midline or PICC      Plan of care discussed with CARLOS Vences M.D.. Will continue to follow    Mago Jacome M.D.

## 2022-07-06 NOTE — CARE PLAN
The patient is Watcher - Medium risk of patient condition declining or worsening    Shift Goals  Clinical Goals: control HR, improve neuro  Patient Goals: go home  Family Goals: get better    Progress made toward(s) clinical / shift goals:    Problem: Pain - Standard  Goal: Alleviation of pain or a reduction in pain to the patient’s comfort goal  Outcome: Progressing     Problem: Knowledge Deficit - Standard  Goal: Patient and family/care givers will demonstrate understanding of plan of care, disease process/condition, diagnostic tests and medications  Outcome: Progressing     Problem: Fall Risk  Goal: Patient will remain free from falls  Outcome: Progressing     Problem: Communication  Goal: The ability to communicate needs accurately and effectively will improve  Outcome: Progressing     Problem: Hemodynamics  Goal: Patient's hemodynamics, fluid balance and neurologic status will be stable or improve  Outcome: Progressing     Problem: Respiratory  Goal: Patient will achieve/maintain optimum respiratory ventilation and gas exchange  Outcome: Progressing     Problem: Urinary Elimination  Goal: Establish and maintain regular urinary output  Outcome: Progressing     Problem: Bowel Elimination  Goal: Establish and maintain regular bowel function  Outcome: Progressing       Patient is not progressing towards the following goals:

## 2022-07-06 NOTE — CARE PLAN
The patient is Watcher - Medium risk of patient condition declining or worsening    Shift Goals  Clinical Goals: control HR and pain  Patient Goals: go home  Family Goals: get better      Problem: Pain - Standard  Goal: Alleviation of pain or a reduction in pain to the patient’s comfort goal  Outcome: Progressing     Problem: Fall Risk  Goal: Patient will remain free from falls  Outcome: Progressing     Problem: Hemodynamics  Goal: Patient's hemodynamics, fluid balance and neurologic status will be stable or improve  Outcome: Progressing     Problem: Respiratory  Goal: Patient will achieve/maintain optimum respiratory ventilation and gas exchange  Outcome: Progressing     Problem: Urinary Elimination  Goal: Establish and maintain regular urinary output  Outcome: Progressing

## 2022-07-06 NOTE — PROGRESS NOTES
Bedside report received and patient care assumed. Pt is resting in bed, A&O2-3, with no complaints of pain, and is on RA.All fall precautions are in place, belongings at bedside table.  Pt was updated on POC, no questions or concerns. Pt educated on use of call light for assistance.

## 2022-07-07 LAB
ALBUMIN SERPL BCP-MCNC: 2.5 G/DL (ref 3.2–4.9)
ALBUMIN SERPL BCP-MCNC: 2.6 G/DL (ref 3.2–4.9)
ALBUMIN/GLOB SERPL: 0.8 G/DL
ALBUMIN/GLOB SERPL: 0.8 G/DL
ALP SERPL-CCNC: 90 U/L (ref 30–99)
ALP SERPL-CCNC: 99 U/L (ref 30–99)
ALT SERPL-CCNC: 25 U/L (ref 2–50)
ALT SERPL-CCNC: 27 U/L (ref 2–50)
ANION GAP SERPL CALC-SCNC: 11 MMOL/L (ref 7–16)
ANION GAP SERPL CALC-SCNC: 11 MMOL/L (ref 7–16)
AST SERPL-CCNC: 33 U/L (ref 12–45)
AST SERPL-CCNC: 42 U/L (ref 12–45)
BACTERIA BLD CULT: ABNORMAL
BACTERIA FLD AEROBE CULT: ABNORMAL
BACTERIA FLD AEROBE CULT: ABNORMAL
BASOPHILS # BLD AUTO: 0.2 % (ref 0–1.8)
BASOPHILS # BLD: 0.01 K/UL (ref 0–0.12)
BILIRUB SERPL-MCNC: 0.6 MG/DL (ref 0.1–1.5)
BILIRUB SERPL-MCNC: 0.7 MG/DL (ref 0.1–1.5)
BUN SERPL-MCNC: 17 MG/DL (ref 8–22)
BUN SERPL-MCNC: 19 MG/DL (ref 8–22)
CALCIUM SERPL-MCNC: 8 MG/DL (ref 8.5–10.5)
CALCIUM SERPL-MCNC: 8.4 MG/DL (ref 8.5–10.5)
CHLORIDE SERPL-SCNC: 92 MMOL/L (ref 96–112)
CHLORIDE SERPL-SCNC: 93 MMOL/L (ref 96–112)
CO2 SERPL-SCNC: 22 MMOL/L (ref 20–33)
CO2 SERPL-SCNC: 23 MMOL/L (ref 20–33)
CREAT SERPL-MCNC: 0.55 MG/DL (ref 0.5–1.4)
CREAT SERPL-MCNC: 0.55 MG/DL (ref 0.5–1.4)
EOSINOPHIL # BLD AUTO: 0 K/UL (ref 0–0.51)
EOSINOPHIL NFR BLD: 0 % (ref 0–6.9)
ERYTHROCYTE [DISTWIDTH] IN BLOOD BY AUTOMATED COUNT: 42.3 FL (ref 35.9–50)
GFR SERPLBLD CREATININE-BSD FMLA CKD-EPI: 105 ML/MIN/1.73 M 2
GFR SERPLBLD CREATININE-BSD FMLA CKD-EPI: 105 ML/MIN/1.73 M 2
GLOBULIN SER CALC-MCNC: 3.1 G/DL (ref 1.9–3.5)
GLOBULIN SER CALC-MCNC: 3.3 G/DL (ref 1.9–3.5)
GLUCOSE BLD STRIP.AUTO-MCNC: 149 MG/DL (ref 65–99)
GLUCOSE BLD STRIP.AUTO-MCNC: 165 MG/DL (ref 65–99)
GLUCOSE BLD STRIP.AUTO-MCNC: 181 MG/DL (ref 65–99)
GLUCOSE BLD STRIP.AUTO-MCNC: 202 MG/DL (ref 65–99)
GLUCOSE SERPL-MCNC: 166 MG/DL (ref 65–99)
GLUCOSE SERPL-MCNC: 174 MG/DL (ref 65–99)
GRAM STN SPEC: ABNORMAL
GRAM STN SPEC: NORMAL
HCT VFR BLD AUTO: 37.7 % (ref 42–52)
HGB BLD-MCNC: 13 G/DL (ref 14–18)
IMM GRANULOCYTES # BLD AUTO: 0.07 K/UL (ref 0–0.11)
IMM GRANULOCYTES NFR BLD AUTO: 1.1 % (ref 0–0.9)
LYMPHOCYTES # BLD AUTO: 0.23 K/UL (ref 1–4.8)
LYMPHOCYTES NFR BLD: 3.6 % (ref 22–41)
MAGNESIUM SERPL-MCNC: 1.6 MG/DL (ref 1.5–2.5)
MCH RBC QN AUTO: 28.7 PG (ref 27–33)
MCHC RBC AUTO-ENTMCNC: 34.5 G/DL (ref 33.7–35.3)
MCV RBC AUTO: 83.2 FL (ref 81.4–97.8)
MONOCYTES # BLD AUTO: 0.39 K/UL (ref 0–0.85)
MONOCYTES NFR BLD AUTO: 6.1 % (ref 0–13.4)
NEUTROPHILS # BLD AUTO: 5.66 K/UL (ref 1.82–7.42)
NEUTROPHILS NFR BLD: 89 % (ref 44–72)
NRBC # BLD AUTO: 0 K/UL
NRBC BLD-RTO: 0 /100 WBC
PHOSPHATE SERPL-MCNC: 1.8 MG/DL (ref 2.5–4.5)
PLATELET # BLD AUTO: 81 K/UL (ref 164–446)
PMV BLD AUTO: 10.2 FL (ref 9–12.9)
POTASSIUM SERPL-SCNC: 4.4 MMOL/L (ref 3.6–5.5)
POTASSIUM SERPL-SCNC: 4.4 MMOL/L (ref 3.6–5.5)
PROT SERPL-MCNC: 5.7 G/DL (ref 6–8.2)
PROT SERPL-MCNC: 5.8 G/DL (ref 6–8.2)
RBC # BLD AUTO: 4.53 M/UL (ref 4.7–6.1)
SIGNIFICANT IND 70042: ABNORMAL
SIGNIFICANT IND 70042: NORMAL
SITE SITE: ABNORMAL
SITE SITE: NORMAL
SODIUM SERPL-SCNC: 125 MMOL/L (ref 135–145)
SODIUM SERPL-SCNC: 127 MMOL/L (ref 135–145)
SOURCE SOURCE: ABNORMAL
SOURCE SOURCE: NORMAL
WBC # BLD AUTO: 6.4 K/UL (ref 4.8–10.8)

## 2022-07-07 PROCEDURE — 700102 HCHG RX REV CODE 250 W/ 637 OVERRIDE(OP): Performed by: INTERNAL MEDICINE

## 2022-07-07 PROCEDURE — 85025 COMPLETE CBC W/AUTO DIFF WBC: CPT

## 2022-07-07 PROCEDURE — 99233 SBSQ HOSP IP/OBS HIGH 50: CPT | Performed by: HOSPITALIST

## 2022-07-07 PROCEDURE — 770000 HCHG ROOM/CARE - INTERMEDIATE ICU *

## 2022-07-07 PROCEDURE — 99024 POSTOP FOLLOW-UP VISIT: CPT | Performed by: ORTHOPAEDIC SURGERY

## 2022-07-07 PROCEDURE — 87147 CULTURE TYPE IMMUNOLOGIC: CPT

## 2022-07-07 PROCEDURE — 99233 SBSQ HOSP IP/OBS HIGH 50: CPT | Performed by: INTERNAL MEDICINE

## 2022-07-07 PROCEDURE — 700105 HCHG RX REV CODE 258: Performed by: HOSPITALIST

## 2022-07-07 PROCEDURE — 80053 COMPREHEN METABOLIC PANEL: CPT

## 2022-07-07 PROCEDURE — 700111 HCHG RX REV CODE 636 W/ 250 OVERRIDE (IP): Performed by: STUDENT IN AN ORGANIZED HEALTH CARE EDUCATION/TRAINING PROGRAM

## 2022-07-07 PROCEDURE — 700102 HCHG RX REV CODE 250 W/ 637 OVERRIDE(OP): Performed by: STUDENT IN AN ORGANIZED HEALTH CARE EDUCATION/TRAINING PROGRAM

## 2022-07-07 PROCEDURE — 87040 BLOOD CULTURE FOR BACTERIA: CPT

## 2022-07-07 PROCEDURE — 700111 HCHG RX REV CODE 636 W/ 250 OVERRIDE (IP): Performed by: INTERNAL MEDICINE

## 2022-07-07 PROCEDURE — 82962 GLUCOSE BLOOD TEST: CPT | Mod: 91

## 2022-07-07 PROCEDURE — A9270 NON-COVERED ITEM OR SERVICE: HCPCS | Performed by: INTERNAL MEDICINE

## 2022-07-07 PROCEDURE — 83735 ASSAY OF MAGNESIUM: CPT

## 2022-07-07 PROCEDURE — A9270 NON-COVERED ITEM OR SERVICE: HCPCS | Performed by: HOSPITALIST

## 2022-07-07 PROCEDURE — 700102 HCHG RX REV CODE 250 W/ 637 OVERRIDE(OP)

## 2022-07-07 PROCEDURE — A9270 NON-COVERED ITEM OR SERVICE: HCPCS | Performed by: STUDENT IN AN ORGANIZED HEALTH CARE EDUCATION/TRAINING PROGRAM

## 2022-07-07 PROCEDURE — 99232 SBSQ HOSP IP/OBS MODERATE 35: CPT | Performed by: INTERNAL MEDICINE

## 2022-07-07 PROCEDURE — 84100 ASSAY OF PHOSPHORUS: CPT

## 2022-07-07 PROCEDURE — 700111 HCHG RX REV CODE 636 W/ 250 OVERRIDE (IP)

## 2022-07-07 PROCEDURE — 700111 HCHG RX REV CODE 636 W/ 250 OVERRIDE (IP): Performed by: HOSPITALIST

## 2022-07-07 PROCEDURE — A9270 NON-COVERED ITEM OR SERVICE: HCPCS

## 2022-07-07 PROCEDURE — 700102 HCHG RX REV CODE 250 W/ 637 OVERRIDE(OP): Performed by: HOSPITALIST

## 2022-07-07 PROCEDURE — 700101 HCHG RX REV CODE 250: Performed by: HOSPITALIST

## 2022-07-07 RX ORDER — MAGNESIUM SULFATE HEPTAHYDRATE 40 MG/ML
2 INJECTION, SOLUTION INTRAVENOUS ONCE
Status: COMPLETED | OUTPATIENT
Start: 2022-07-07 | End: 2022-07-07

## 2022-07-07 RX ORDER — METOPROLOL TARTRATE 50 MG/1
50 TABLET, FILM COATED ORAL ONCE
Status: COMPLETED | OUTPATIENT
Start: 2022-07-07 | End: 2022-07-07

## 2022-07-07 RX ORDER — DILTIAZEM HYDROCHLORIDE 5 MG/ML
0.25 INJECTION INTRAVENOUS ONCE
Status: COMPLETED | OUTPATIENT
Start: 2022-07-07 | End: 2022-07-07

## 2022-07-07 RX ORDER — ENOXAPARIN SODIUM 100 MG/ML
1 INJECTION SUBCUTANEOUS 2 TIMES DAILY
Status: DISCONTINUED | OUTPATIENT
Start: 2022-07-07 | End: 2022-07-10

## 2022-07-07 RX ORDER — METOPROLOL TARTRATE 50 MG/1
50 TABLET, FILM COATED ORAL TWICE DAILY
Status: DISCONTINUED | OUTPATIENT
Start: 2022-07-07 | End: 2022-07-07

## 2022-07-07 RX ORDER — AMLODIPINE BESYLATE 10 MG/1
5 TABLET ORAL
Status: DISCONTINUED | OUTPATIENT
Start: 2022-07-07 | End: 2022-07-07

## 2022-07-07 RX ADMIN — CEFAZOLIN SODIUM 2 G: 2 INJECTION, SOLUTION INTRAVENOUS at 09:52

## 2022-07-07 RX ADMIN — INSULIN HUMAN 2 UNITS: 100 INJECTION, SOLUTION PARENTERAL at 08:30

## 2022-07-07 RX ADMIN — OXYCODONE 5 MG: 5 TABLET ORAL at 12:56

## 2022-07-07 RX ADMIN — INSULIN HUMAN 2 UNITS: 100 INJECTION, SOLUTION PARENTERAL at 21:11

## 2022-07-07 RX ADMIN — MAGNESIUM SULFATE HEPTAHYDRATE 2 G: 40 INJECTION, SOLUTION INTRAVENOUS at 15:17

## 2022-07-07 RX ADMIN — CEFAZOLIN SODIUM 2 G: 2 INJECTION, SOLUTION INTRAVENOUS at 01:24

## 2022-07-07 RX ADMIN — METOPROLOL TARTRATE 50 MG: 50 TABLET, FILM COATED ORAL at 17:03

## 2022-07-07 RX ADMIN — METOPROLOL TARTRATE 25 MG: 25 TABLET, FILM COATED ORAL at 21:06

## 2022-07-07 RX ADMIN — EZETIMIBE 10 MG: 10 TABLET ORAL at 17:02

## 2022-07-07 RX ADMIN — OXYCODONE 2.5 MG: 5 TABLET ORAL at 09:52

## 2022-07-07 RX ADMIN — DIGOXIN 125 MCG: 0.25 INJECTION INTRAMUSCULAR; INTRAVENOUS at 09:56

## 2022-07-07 RX ADMIN — AMLODIPINE BESYLATE 5 MG: 10 TABLET ORAL at 12:54

## 2022-07-07 RX ADMIN — INSULIN GLARGINE-YFGN 10 UNITS: 100 INJECTION, SOLUTION SUBCUTANEOUS at 18:00

## 2022-07-07 RX ADMIN — DIGOXIN 125 MCG: 0.25 INJECTION INTRAMUSCULAR; INTRAVENOUS at 01:24

## 2022-07-07 RX ADMIN — DIGOXIN 125 MCG: 0.12 TABLET ORAL at 17:02

## 2022-07-07 RX ADMIN — SODIUM PHOSPHATE, MONOBASIC, MONOHYDRATE AND SODIUM PHOSPHATE, DIBASIC, ANHYDROUS 30 MMOL: 276; 142 INJECTION, SOLUTION INTRAVENOUS at 16:00

## 2022-07-07 RX ADMIN — OXYCODONE 5 MG: 5 TABLET ORAL at 17:11

## 2022-07-07 RX ADMIN — OXYCODONE 5 MG: 5 TABLET ORAL at 21:06

## 2022-07-07 RX ADMIN — SODIUM CHLORIDE, POTASSIUM CHLORIDE, SODIUM LACTATE AND CALCIUM CHLORIDE: 600; 310; 30; 20 INJECTION, SOLUTION INTRAVENOUS at 10:05

## 2022-07-07 RX ADMIN — DIGOXIN 125 MCG: 0.25 INJECTION INTRAMUSCULAR; INTRAVENOUS at 04:53

## 2022-07-07 RX ADMIN — INSULIN HUMAN 3 UNITS: 100 INJECTION, SOLUTION PARENTERAL at 11:00

## 2022-07-07 RX ADMIN — METOPROLOL TARTRATE 25 MG: 25 TABLET, FILM COATED ORAL at 04:53

## 2022-07-07 RX ADMIN — DILTIAZEM HYDROCHLORIDE 21.9 MG: 5 INJECTION INTRAVENOUS at 02:59

## 2022-07-07 RX ADMIN — DILTIAZEM HYDROCHLORIDE 30 MG: 30 TABLET, FILM COATED ORAL at 21:06

## 2022-07-07 RX ADMIN — ENOXAPARIN SODIUM 80 MG: 80 INJECTION SUBCUTANEOUS at 21:06

## 2022-07-07 RX ADMIN — CEFAZOLIN SODIUM 2 G: 2 INJECTION, SOLUTION INTRAVENOUS at 17:03

## 2022-07-07 RX ADMIN — METOPROLOL TARTRATE 50 MG: 50 TABLET, FILM COATED ORAL at 11:18

## 2022-07-07 RX ADMIN — SENNOSIDES AND DOCUSATE SODIUM 2 TABLET: 50; 8.6 TABLET ORAL at 04:53

## 2022-07-07 RX ADMIN — MORPHINE SULFATE 2 MG: 4 INJECTION INTRAVENOUS at 00:21

## 2022-07-07 RX ADMIN — LISINOPRIL 10 MG: 10 TABLET ORAL at 17:03

## 2022-07-07 ASSESSMENT — PAIN DESCRIPTION - PAIN TYPE
TYPE: ACUTE PAIN

## 2022-07-07 ASSESSMENT — ENCOUNTER SYMPTOMS
SHORTNESS OF BREATH: 0
PALPITATIONS: 0
CONSTIPATION: 0
CHILLS: 0
VOMITING: 0
NERVOUS/ANXIOUS: 0
FEVER: 0
NAUSEA: 0
ABDOMINAL PAIN: 0
MYALGIAS: 1
SPUTUM PRODUCTION: 0
BACK PAIN: 1
COUGH: 0
DIARRHEA: 0

## 2022-07-07 ASSESSMENT — PATIENT HEALTH QUESTIONNAIRE - PHQ9
SUM OF ALL RESPONSES TO PHQ9 QUESTIONS 1 AND 2: 0
1. LITTLE INTEREST OR PLEASURE IN DOING THINGS: NOT AT ALL
2. FEELING DOWN, DEPRESSED, IRRITABLE, OR HOPELESS: NOT AT ALL

## 2022-07-07 ASSESSMENT — FIBROSIS 4 INDEX: FIB4 SCORE: 15.79

## 2022-07-07 NOTE — PROGRESS NOTES
[facility] Medicine Daily Progress Note    Date of Service  7/7/2022    Chief Complaint  Nitesh Duron is a 72 y.o. male admitted 7/4/2022 with weakness and fall    Hospital Course    72-year-old male with history of diabetes, dyslipidemia hepatitis C and cirrhosis with atrial fibrillation presented 7/4 with fall and weakness.  Patient states he fell 4 days ago at home and he landed on his left knee.  He noticed swelling on his left knee and severe back pain with some weakness on the left leg, patient has been on the ground most of the time and not able to get up.  Patient lives by himself.  Patient was found on the ground by his girlfriend and she called EMS.  On admission patient was found to have A. fib with RVR.  Dehydration however blood pressure was stable.  Labs did not show leukocytosis however showed hyponatremia, creatinine 1.2 with CPK 2411 and lactic acid 4.4.  IV fluid was started.  Blood culture came back positive with MSSA, cefazolin was initiated and ID was consulted.  Echo is pending.      Patient underwent arthrocentesis by Ortho on his left knee and showed more than 37,000 white blood cell with no red blood cell high suspicious of septic arthritis specially with positive blood culture, ID on board and patient is on cefazolin.      Interval Problem Update:    Patient underwent left knee washout yesterday  Remains in A. fib with RVR  Still complaining of low back pain moderate worse with movement  MRI lumbar spine reveals spinal stenosis and concern for developing epidural abscess  Repeat blood cultures not completed yesterday discussed with nursing staff  Discussed with ID        I have discussed this patient's plan of care and discharge plan at IDT rounds today with Case Management, Nursing, Nursing leadership, and other members of the IDT team.    Consultants/Specialty  cardiology, critical care and orthopedics   Spine surgery      Code Status  Full Code    Disposition  Patient is not  medically cleared for discharge.   Anticipate discharge to to skilled nursing facility.  I have placed the appropriate orders for post-discharge needs.    Review of Systems  Review of Systems   Constitutional: Positive for malaise/fatigue. Negative for chills and fever.   Respiratory: Negative for cough and shortness of breath.    Cardiovascular: Negative for chest pain and palpitations.   Musculoskeletal: Positive for back pain and joint pain.   All other systems reviewed and are negative.       Physical Exam  Temp:  [36.8 °C (98.2 °F)-37.2 °C (98.9 °F)] 36.8 °C (98.3 °F)  Pulse:  [] 102  Resp:  [13-44] 14  BP: ()/(60-93) 153/81  SpO2:  [91 %-99 %] 93 %    Physical Exam  Vitals and nursing note reviewed.   Constitutional:       Appearance: He is well-developed. He is obese. He is not diaphoretic.   HENT:      Head: Normocephalic and atraumatic.      Mouth/Throat:      Pharynx: No oropharyngeal exudate.   Eyes:      General: No scleral icterus.        Right eye: No discharge.         Left eye: No discharge.      Conjunctiva/sclera: Conjunctivae normal.      Pupils: Pupils are equal, round, and reactive to light.   Neck:      Vascular: No JVD.      Trachea: No tracheal deviation.   Cardiovascular:      Rate and Rhythm: Tachycardia present. Rhythm irregular.      Heart sounds: No murmur heard.    No friction rub. No gallop.   Pulmonary:      Effort: Pulmonary effort is normal. No respiratory distress.      Breath sounds: Normal breath sounds. No stridor. No wheezing.   Chest:      Chest wall: No tenderness.   Abdominal:      General: Bowel sounds are normal. There is no distension.      Palpations: Abdomen is soft.      Tenderness: There is no abdominal tenderness. There is no rebound.   Musculoskeletal:         General: Swelling and tenderness present.      Cervical back: Neck supple.      Comments: Left knee with compressive dressing no surrounding erythema or drainage   Skin:     General: Skin is warm  and dry.      Nails: There is no clubbing.   Neurological:      Mental Status: He is alert and oriented to person, place, and time.      Cranial Nerves: No cranial nerve deficit.      Motor: Weakness (Bilateral lower extremities left greater than right per patient mainly limited by pain) present. No abnormal muscle tone.   Psychiatric:         Behavior: Behavior normal.         Fluids    Intake/Output Summary (Last 24 hours) at 7/7/2022 1052  Last data filed at 7/7/2022 0400  Gross per 24 hour   Intake 1626.67 ml   Output 1720 ml   Net -93.33 ml       Laboratory  Recent Labs     07/05/22  0745 07/06/22  0140 07/07/22  0140   WBC 8.2 7.2 6.4   RBC 4.75 4.61* 4.53*   HEMOGLOBIN 13.7* 13.5* 13.0*   HEMATOCRIT 39.4* 38.4* 37.7*   MCV 82.9 83.3 83.2   MCH 28.8 29.3 28.7   MCHC 34.8 35.2 34.5   RDW 42.8 42.9 42.3   PLATELETCT 66* 61* 81*   MPV 10.7 10.9 10.2     Recent Labs     07/05/22  0745 07/07/22  0140 07/07/22  0940   SODIUM 127* 127* 125*   POTASSIUM 3.9 4.4 4.4   CHLORIDE 94* 93* 92*   CO2 22 23 22   GLUCOSE 222* 166* 174*   BUN 26* 19 17   CREATININE 0.71 0.55 0.55   CALCIUM 8.5 8.0* 8.4*                   Imaging  MR-LUMBAR SPINE-WITH & W/O   Final Result         Multilevel degenerative changes in lumbar spine as described above. There is severe canal stenosis at the L3-4, L4-5 level with cauda equina compression.      At L2-3 there is moderate canal stenosis with cauda equina compression.      Disc bulge extends into the right extra foraminal zone at the L3-4 and L4-5 level with impingement upon exiting nerves in the extraforaminal zone.      Abnormal edema is identified in the bilateral paraspinal soft tissues at the L3-4, L4-5 and L5-S1 levels with mild enhancement identified in the right-sided paraspinous soft tissues. These findings are concerning for an ongoing infectious inflammatory    process involving the facet joints.      Minimal epidural thickening is identified dorsally in the spinal canal behind  L3 and L4. There is also a hypoenhancing area along the left posterolateral spinal canal that impinges upon the thecal sac. This could represent a developing epidural abscess.    Recommend follow-up examination in 2-3 days.      CT-HEAD W/O   Final Result      1. No CT evidence of acute infarct, hemorrhage or mass.   2. Mild global parenchymal atrophy. Chronic small vessel ischemic changes.      EC-ECHOCARDIOGRAM COMPLETE W/ CONT   Final Result      DX-KNEE 3 VIEWS LEFT   Final Result      1.  Severe tricompartmental left knee osteoarthritis      2.  osteoarthritis the proximal tibiofibular articulation      3.  Multiple intra-articular ossific body      4.  Diffuse atherosclerotic plaque      DX-CHEST-PORTABLE (1 VIEW)   Final Result      No acute cardiopulmonary abnormality identified.           Assessment/Plan  * MSSA bacteremia  Assessment & Plan  Blood culture on admission 7/4 positive for MSSA 2/2  Source likely septic arthritis  Status post left knee I&D on 7/6/2022 Ortho service following  Continue IV cefazolin  MRI lumbar spine with spinal stenosis and concern for possible developing epidural abscess call placed to Dr. Hernández who evaluated the patient and does not recommend any surgical intervention at this time   repeat blood cultures discussed with Dr. Jacome from ID      Pyogenic arthritis of left knee joint (HCC)  Assessment & Plan  Synovial fluid culture MSSA  Reconsulted Ortho with plans for washout later today    Falls  Assessment & Plan  Possible mechanical fall and generalized weakness   Stayed on the floor 3 days   rhabdomyolysis and history of diarrhea  IV fluid  PT OT eval's when more clinically stable    Acute cystitis without hematuria  Assessment & Plan  Urine culture MSSA continue Ancef    Traumatic rhabdomyolysis (HCC)  Assessment & Plan  Improved continue IV fluids and monitor    Hyponatremia  Assessment & Plan  Sodium 125  Restrict free fluids and monitor      Type 2 diabetes mellitus with  hyperglycemia, without long-term current use of insulin (HCC)  Assessment & Plan  Last a1c 6.3  Continue sliding-scale insulin  Will increase Lantus  Monitor CBGs    Atrial fibrillation with RVR (HCC)  Assessment & Plan  Echo EF 55% no valvular abnormalities  Discussed with cardiology metoprolol will be titrated patient also started on digoxin  Resume anticoagulation discussed with Dr. Matias and Ortho  Plan is for CARLI cardioversion      Thrombocytopenia (HCC)  Assessment & Plan  Platelets 81K   monitor CBC no clinical signs of bleeding    Hepatocellular carcinoma (HCC)- (present on admission)  Assessment & Plan  History of hepatocellular carcinoma.   Patient has history of cirrhosis  Following with oncology as an outpatient.    Cirrhosis of liver without ascites (HCC)- (present on admission)  Assessment & Plan  History of hepatitis C virus and received treatment     Outpatient follow-up    Essential hypertension, benign- (present on admission)  Assessment & Plan  Discussed with cardiology metoprolol being titrated and amlodipine added         VTE prophylaxis: SCDs/TEDs and enoxaparin ppx    I have performed a physical exam and reviewed and updated ROS and Plan today (7/7/2022). In review of yesterday's note (7/6/2022), there are no changes except as documented above.

## 2022-07-07 NOTE — PROGRESS NOTES
Bedside report received from night shift RN. Assumed patient care. No signs of distress at this time. Tele monitor on, rhythm and monitor summary verified. Safety precautions in place. Call light and personal belongings within reach. Educated patient to use call light if assistance is needed. Will continue to monitor.

## 2022-07-07 NOTE — OR NURSING
VSS, pt denies any pain or nausea, no acute distress noted at this time.  Pt transported with RN, and transport monitor via Bed [place] [id number].

## 2022-07-07 NOTE — PROGRESS NOTES
12 hour chart check complete.     Monitoring Summary:  Rhythm: atrial fibrillation  bpm  Ectopy: PVC(r)

## 2022-07-07 NOTE — CONSULTS
Spine Surgery Consult Note      7/7/2022    CC: Back pain  HPI: 72 y.o. male history of hypertension, hyperlipidemia, A. fib, diabetes, hep C cirrhosis that was admitted for weakness left knee pain found to have an intra-articular left knee infection status post irrigation debridement.  He has had chronic back pain for 30+ years with 4 epidural steroid injections.  He has significant left lower extremity pain primarily about his left knee denies any new weakness.  Does have pain limited function of his left lower extremity.  Denies groin numbness or incontinence.  Denies any new neurologic deficits.  Past Medical History:   Diagnosis Date   • Arrhythmia 07/01/2022    medicated   • Arthritis     OA- hands and L knee   • Cancer (HCC) 07/01/2022    liver   • Cataract 2015    bilateral IOL   • Cirrhosis (HCC)    • Dental disorder 07/01/2022    upper partial   • Diabetes 05/12/2022    history of medications, pt has not been on diabetic meds since weight loss   • Hepatitis C     treated in 2017   • High cholesterol 07/01/2022    medicated   • Hyperlipidemia    • Hypertension 07/01/2022    medicated   • Renal disorder 09/07/2018    kidney stones     Past Surgical History:   Procedure Laterality Date   • IRRIGATION & DEBRIDEMENT GENERAL Left 7/6/2022    Procedure: IRRIGATION AND DEBRIDEMENT, WOUND;  Surgeon: Miah Wilkins M.D.;  Location: SURGERY [facility];  Service: Orthopedics   • OR ULTRASONIC GUIDANCE, INTRAOPERATIVE N/A 5/19/2022    Procedure: ULTRASOUND GUIDANCE - AND OTHER INDICATED PROEDURES;  Surgeon: Karma Fisher M.D.;  Location: SURGERY [facility];  Service: General   • OR LAP,DIAGNOSTIC ABDOMEN N/A 5/19/2022    Procedure: LAPAROSCOPY;  Surgeon: Karma Fisher M.D.;  Location: SURGERY [facility];  Service: General   • GASTROSCOPY N/A 9/18/2018    Procedure: GASTROSCOPY;  Surgeon: Matt Young M.D.;  Location: SURGERY SAME DAY [facility];  Service: Gastroenterology   • ROTATOR CUFF REPAIR  Left 2014   • ORIF, ANKLE Right 1988    spiral fx   • MENISCECTOMY Left 1979   • APPENDECTOMY  1958   • CYSTOSCOPY  2004,2008    stone extraction       Medications  No current facility-administered medications on file prior to encounter.     Current Outpatient Medications on File Prior to Encounter   Medication Sig Dispense Refill   • ezetimibe (ZETIA) 10 MG Tab Take 1 Tablet by mouth every day. NEEDS TO BE SEEN FOR FURTHER REFILLS. (Patient taking differently: Take 10 mg by mouth every evening. NEEDS TO BE SEEN FOR FURTHER REFILLS.) 90 Tablet 0   • metoprolol SR (TOPROL XL) 25 MG TABLET SR 24 HR Take 1 Tablet by mouth every day. MUST BE SEEN FOR FURTHER REFILLS (Patient taking differently: Take 25 mg by mouth every evening. MUST BE SEEN FOR FURTHER REFILLS) 90 Tablet 0   • VITAMIN A PO Take 1 Capsule by mouth 2 times a day.     • NON SPECIFIED Take 1 Tablet by mouth 2 times a day. Uric Acid Cleanse     • GINKGO BILOBA EXTRACT PO Take 1 Tablet by mouth every day.     • oxyCODONE immediate-release (ROXICODONE) 5 MG Tab Take 5 mg by mouth every four hours as needed for Severe Pain.     • Saw Palmetto, Serenoa repens, (SAW PALMETTO PO) Take 1 Capsule by mouth 2 times a day.     • lisinopril (PRINIVIL) 10 MG Tab Take 1 Tablet by mouth every day. (Patient taking differently: Take 10 mg by mouth every evening.) 100 Tablet 1   • Red Yeast Rice Extract (RED YEAST RICE PO) Take 1 Tablet by mouth 2 times a day.     • Cyanocobalamin (VITAMIN B-12 PO) Take 1 Tablet by mouth every day.     • VITAMIN E PO Take 1 Capsule by mouth every day.     • Cholecalciferol (VITAMIN D3 PO) Take 1 Capsule by mouth every day.         Allergies  Ciprofloxacin, Statins [hmg-coa-r inhibitors], Acetaminophen, Atorvastatin, Ibuprofen, Naproxen, Shellfish allergy, and Soap &  [alcohol]    ROS   All other systems were reviewed and found to be negative    Family History   Problem Relation Age of Onset   • Other Mother    • Heart Attack Father   "  • Heart Failure Brother        Social History     Socioeconomic History   • Marital status:    Tobacco Use   • Smoking status: Former Smoker     Packs/day: 0.50     Years: 43.00     Pack years: 21.50     Types: Cigarettes     Quit date: 2008     Years since quittin.5   • Smokeless tobacco: Never Used   • Tobacco comment: quit    Vaping Use   • Vaping Use: Never used   Substance and Sexual Activity   • Alcohol use: No   • Drug use: Yes     Types: Marijuana, Inhaled     Comment: marijuana   • Sexual activity: Never       Physical Exam  Vitals  BP (!) 145/102   Pulse (!) 108   Temp 37.5 °C (99.5 °F) (Temporal)   Resp (!) 26   Ht 1.778 m (5' 10\")   Wt 87.6 kg (193 lb 2 oz)   SpO2 93%   General: Well Developed, Well Nourished, no acute distress  HEENT: Normocephalic, atraumatic  Eyes: Anicteric  Skin: Intact, no open wounds  Neuro: Affect appropriate  Vascular: Capillary refill <2 seconds        HF  KE  TA  Peroneals  EHL  PF  Right  5  5  5        5     5   5  Left  3  3  3        3     3   3    *Left lower extremity function significantly limited by left knee pain    SILT L2-S1 bilaterally except: None  2+ Achilles and patellar reflexes  <3 beats of clonus BLE      Radiographs:  I independently reviewed the imaging below agree with the results    MR-LUMBAR SPINE-WITH & W/O   Final Result         Multilevel degenerative changes in lumbar spine as described above. There is severe canal stenosis at the L3-4, L4-5 level with cauda equina compression.      At L2-3 there is moderate canal stenosis with cauda equina compression.      Disc bulge extends into the right extra foraminal zone at the L3-4 and L4-5 level with impingement upon exiting nerves in the extraforaminal zone.      Abnormal edema is identified in the bilateral paraspinal soft tissues at the L3-4, L4-5 and L5-S1 levels with mild enhancement identified in the right-sided paraspinous soft tissues. These findings are concerning for " an ongoing infectious inflammatory    process involving the facet joints.      Minimal epidural thickening is identified dorsally in the spinal canal behind L3 and L4. There is also a hypoenhancing area along the left posterolateral spinal canal that impinges upon the thecal sac. This could represent a developing epidural abscess.    Recommend follow-up examination in 2-3 days.      CT-HEAD W/O   Final Result      1. No CT evidence of acute infarct, hemorrhage or mass.   2. Mild global parenchymal atrophy. Chronic small vessel ischemic changes.      EC-ECHOCARDIOGRAM COMPLETE W/ CONT   Final Result      DX-KNEE 3 VIEWS LEFT   Final Result      1.  Severe tricompartmental left knee osteoarthritis      2.  osteoarthritis the proximal tibiofibular articulation      3.  Multiple intra-articular ossific body      4.  Diffuse atherosclerotic plaque      DX-CHEST-PORTABLE (1 VIEW)   Final Result      No acute cardiopulmonary abnormality identified.            Laboratory Values  Lab Results   Component Value Date/Time    WBC 6.4 07/07/2022 01:40 AM    RBC 4.53 (L) 07/07/2022 01:40 AM    HEMOGLOBIN 13.0 (L) 07/07/2022 01:40 AM    HEMATOCRIT 37.7 (L) 07/07/2022 01:40 AM    MCV 83.2 07/07/2022 01:40 AM    MCH 28.7 07/07/2022 01:40 AM    MCHC 34.5 07/07/2022 01:40 AM    MPV 10.2 07/07/2022 01:40 AM    NEUTSPOLYS 89.00 (H) 07/07/2022 01:40 AM    LYMPHOCYTES 3.60 (L) 07/07/2022 01:40 AM    MONOCYTES 6.10 07/07/2022 01:40 AM    EOSINOPHILS 0.00 07/07/2022 01:40 AM    BASOPHILS 0.20 07/07/2022 01:40 AM    HYPOCHROMIA 1+ 01/29/2019 10:32 AM    ANISOCYTOSIS 1+ 01/29/2019 10:32 AM        Lab Results   Component Value Date/Time    SODIUM 125 (L) 07/07/2022 09:40 AM    POTASSIUM 4.4 07/07/2022 09:40 AM    CHLORIDE 92 (L) 07/07/2022 09:40 AM    CO2 22 07/07/2022 09:40 AM    GLUCOSE 174 (H) 07/07/2022 09:40 AM    BUN 17 07/07/2022 09:40 AM    CREATININE 0.55 07/07/2022 09:40 AM             Impression: 72-year-old male being treated for a  left intra-articular knee infection MRI lumbar spine positive for enhancement in the paraspinal soft tissues no clear epidural abscess no obvious new neurologic deficits.  I recommend against any surgical intervention at this time, recommend continued medical management with antibiotics.  While he does have severe stenosis this appears to be chronic in nature and can be treated on an outpatient basis after he clears the infection.    Plan: Patient will follow-up with me on an outpatient basis  Please reconsult if patient has any new neurologic deficits or signs of failure of medical management with regards to lumbar spine    Adolph Hernández MD  Orthopedic Spine Surgeon

## 2022-07-07 NOTE — PROGRESS NOTES
Infectious Disease Progress Note    Author: Mago Jacome M.D. Date & Time of service: 2022  7:23 AM    Chief Complaint:  MSSA bacteremia and septic joint    Interval History:    Review of Systems:  Review of Systems   Respiratory: Negative for cough, sputum production and shortness of breath.    Gastrointestinal: Negative for abdominal pain, constipation, diarrhea, nausea and vomiting.   Musculoskeletal: Positive for joint pain and myalgias.   Psychiatric/Behavioral: The patient is not nervous/anxious.        Hemodynamics:  Temp (24hrs), Av °C (98.6 °F), Min:36.8 °C (98.2 °F), Max:37.5 °C (99.5 °F)  Temperature: 36.8 °C (98.3 °F)  Pulse  Av.6  Min: 72  Max: 195   Blood Pressure : (!) 153/81       Physical Exam:  Physical Exam  Cardiovascular:      Rate and Rhythm: Tachycardia present. Rhythm irregular.   Pulmonary:      Effort: Pulmonary effort is normal.      Breath sounds: Normal breath sounds.   Abdominal:      General: Abdomen is flat. Bowel sounds are normal.      Palpations: Abdomen is soft.   Musculoskeletal:         General: Tenderness and signs of injury present.      Left lower leg: Edema present.   Skin:     General: Skin is warm and dry.   Neurological:      General: No focal deficit present.      Mental Status: He is oriented to person, place, and time.   Psychiatric:         Mood and Affect: Mood normal.         Behavior: Behavior normal.         Meds:    Current Facility-Administered Medications:   •  insulin GLARGINE  •  [COMPLETED] digoxin **FOLLOWED BY** digoxin  •  digoxin  •  ceFAZolin  •  metoprolol tartrate  •  senna-docusate **AND** polyethylene glycol/lytes **AND** magnesium hydroxide **AND** bisacodyl  •  labetalol  •  ondansetron  •  ondansetron  •  insulin regular **AND** POC blood glucose manual result **AND** NOTIFY MD and PharmD **AND** Administer 20 grams of glucose (approximately 8 ounces of fruit juice) every 15 minutes PRN FSBG less than 70 mg/dL **AND**  dextrose bolus  •  Notify provider if pain remains uncontrolled **AND** Use the Numeric Rating Scale (NRS), Yusuf-Baker Faces (WBF), or FLACC on regular floors and Critical-Care Pain Observation Tool (CPOT) on ICUs/Trauma to assess pain **AND** Pulse Ox **AND** Pharmacy Consult Request **AND** If patient difficult to arouse and/or has respiratory depression (respiratory rate of 10 or less), stop any opiates that are currently infusing and call a Rapid Response.  •  oxyCODONE immediate-release **OR** oxyCODONE immediate-release **OR** morphine injection  •  LR  •  lisinopril  •  ezetimibe  •  [Held by provider] enoxaparin (LOVENOX) injection    Labs:  Recent Labs     07/04/22 1335 07/05/22 0745 07/06/22 0140 07/07/22  0140   WBC 10.4 8.2 7.2 6.4   RBC 5.46 4.75 4.61* 4.53*   HEMOGLOBIN 15.6 13.7* 13.5* 13.0*   HEMATOCRIT 45.1 39.4* 38.4* 37.7*   MCV 82.6 82.9 83.3 83.2   MCH 28.6 28.8 29.3 28.7   RDW 42.3 42.8 42.9 42.3   PLATELETCT 88* 66* 61* 81*   MPV 11.0 10.7 10.9 10.2   NEUTSPOLYS 88.90*  --  88.70* 89.00*   LYMPHOCYTES 1.70*  --  3.20* 3.60*   MONOCYTES 8.20  --  7.00 6.10   EOSINOPHILS 0.00  --  0.00 0.00   BASOPHILS 0.20  --  0.30 0.20     Recent Labs     07/04/22  1650 07/04/22 2205 07/05/22  0745 07/07/22  0140   SODIUM  --  129* 127* 127*   POTASSIUM  --  3.9 3.9 4.4   CHLORIDE  --  94* 94* 93*   CO2  --  23 22 23   GLUCOSE  --  242* 222* 166*   BUN  --  33* 26* 19   CPKTOTAL 2411*  --  1071*  --      Recent Labs     07/04/22 1335 07/04/22 2205 07/05/22  0745 07/07/22  0140   ALBUMIN 3.6  --   --  2.6*   TBILIRUBIN 1.3  --   --  0.6   ALKPHOSPHAT 78  --   --  90   TOTPROTEIN 6.9  --   --  5.7*   ALTSGPT 64*  --   --  27   ASTSGOT 107*  --   --  33   CREATININE 1.24 0.85 0.71 0.55       Imaging:  CT-HEAD W/O    Result Date: 7/5/2022   CT HEAD WITHOUT CONTRAST 7/5/2022 6:07 PM HISTORY/REASON FOR EXAM:  Pain following trauma TECHNIQUE/EXAM DESCRIPTION AND NUMBER OF VIEWS: CT of the head without  contrast. The study was performed on a helical multidetector CT scanner. Contiguous 2.5 mm axial sections were obtained from the skull base through the vertex. Up to date radiation dose reduction adjustments have been utilized to meet ALARA standards for radiation dose reduction. COMPARISON:  None available FINDINGS: Lateral ventricles are normal in size and symmetric. Mild global parenchymal atrophy. Chronic small vessel ischemic changes. No significant mass effect or midline shift. Basal cisterns are patent. No evidence for intracranial hemorrhage. Calvaria are intact. Atherosclerosis. Visualized orbits are unremarkable. Visualized mastoid air cells are clear. No significant sinus disease in the visualized paranasal sinuses.     1. No CT evidence of acute infarct, hemorrhage or mass. 2. Mild global parenchymal atrophy. Chronic small vessel ischemic changes.    DX-CHEST-PORTABLE (1 VIEW)    Result Date: 7/4/2022 7/4/2022 2:18 PM HISTORY/REASON FOR EXAM:  Sepsis. TECHNIQUE/EXAM DESCRIPTION AND NUMBER OF VIEWS: Single portable view of the chest. COMPARISON: None FINDINGS: LUNGS: The lungs are clear. HEART and MEDIASTINUM: normal in size. Pleura: There are no pleural effusion or pneumothoraces. Osseous structures: There are bilateral chronic rotator cuff tear with the humeral head abutting and eroding the acromion. There is bilateral glenohumeral joint osteoarthritis.     No acute cardiopulmonary abnormality identified.    DX-KNEE 3 VIEWS LEFT    Result Date: 7/4/2022 7/4/2022 2:18 PM HISTORY/REASON FOR EXAM:  Atraumatic Pain/Swelling/Deformity. Left knee pain TECHNIQUE/EXAM DESCRIPTION AND NUMBER OF VIEWS:  3 views of the LEFT knee. COMPARISON: None FINDINGS: There is no fracture. Alignment is normal. Severe tricompartmental degenerative changes are present. There is also severe osteoarthritis of the proximal tibiofibular articulation with multiple subchondral cyst present. There is extensive atherosclerotic  plaque. There are multiple intra-articular ossific body.     1.  Severe tricompartmental left knee osteoarthritis 2.  osteoarthritis the proximal tibiofibular articulation 3.  Multiple intra-articular ossific body 4.  Diffuse atherosclerotic plaque    IR-CONSULT ONLY-OUTPATIENT    Result Date: 2022  This exam has been resulted under the NOTES tab, and the signed report has been auto faxed to the ordering physician on the date/time of that signature.    EC-ECHOCARDIOGRAM COMPLETE W/ CONT    Result Date: 2022  Transthoracic Echo Report Echocardiography Laboratory CONCLUSIONS Normal left ventricular size and systolic function. The left ventricular ejection fraction is estimated to be 55%. No evidence of valvular abnormality based on Doppler evaluation. Unable to estimate right ventricular systolic pressure due to an inadequate tricuspid regurgitant jet. CECILLE ARAUJO Exam Date:         2022                    14:12 Exam Location:     Inpatient Priority:          Routine Ordering Physician:        ELSY REARDON Referring Physician: Sonographer:               Lily Lynch                            RDGONZALES, RVT Age:    72     Gender:    M MRN:    [id number] :    1950 BSA:    2      Ht (in):    70     Wt (lb):    180 Exam Type:     Complete Indications:     Arrhythmia ICD Codes:       427.9 CPT Codes:       04711 BP:   0      /   88     HR:   113 Technical Quality:       Fair MEASUREMENTS  (Male / Female) Normal Values 2D ECHO LV Diastolic Diameter PLAX        4.5 cm                4.2 - 5.9 / 3.9 - 5.3 cm LV Systolic Diameter PLAX         3.4 cm                2.1 - 4.0 cm IVS Diastolic Thickness           0.97 cm               LVPW Diastolic Thickness          1 cm                  LVOT Diameter                     2 cm                  Estimated LV Ejection Fraction    55 %                  LV Ejection Fraction MOD BP       62.6 %                >= 55  % LV Ejection Fraction MOD 4C        63.6 %                LV Ejection Fraction MOD 2C       56.4 %                DOPPLER AV Peak Velocity                  1.2 m/s               AV Peak Gradient                  5.5 mmHg              AV Mean Gradient                  4 mmHg                LVOT Peak Velocity                0.77 m/s              AV Area Cont Eq vti               2.4 cm2               PV Peak Velocity                  0.81 m/s              PV Peak Gradient                  2.6 mmHg              * Indicates values subject to auto-interpretation LV EF:  55    % FINDINGS Left Ventricle Contrast was used to enhance visualization of the endocardial border. Normal left ventricular chamber size. Normal left ventricular wall thickness. Grossly normal left ventricular systolic function. The left ventricular ejection fraction is visually estimated to be 55%. Diastolic function is difficult to assess with arrhythmia. Right Ventricle Normal right ventricular size. Reduced right ventricular systolic function. Right Atrium Normal right atrial size. Normal inferior vena cava size and inspiratory collapse. Left Atrium Normal left atrial size. Mitral Valve Structurally normal mitral valve without significant stenosis or regurgitation. Aortic Valve Structurally normal aortic valve without significant stenosis or regurgitation. Tricuspid Valve Structurally normal tricuspid valve without significant stenosis or regurgitation. Unable to estimate right ventricular systolic pressure due to an inadequate tricuspid regurgitant jet. Pulmonic Valve The pulmonic valve is not well visualized. No stenosis or regurgitation seen. Pericardium Fat pat present. Aorta The ascending aorta is mildly dilated by BSA diameter is 3.7 cm. The aortic root diameter is 3.6 cm. Mukul Phillips MD (Electronically Signed) Final Date:     05 July 2022                 16:13      Micro:  Results     Procedure Component Value Units Date/Time    CULTURE WOUND W/ GRAM STAIN [[id number]]  "Collected: 07/06/22 1925    Order Status: No result Specimen: Wound Updated: 07/07/22 0721     Significant Indicator NEG     Source WND     Site Left Knee     Culture Result -     Gram Stain Result Few WBCs.  No organisms seen.      Narrative:      Surgery - swabs received    Anaerobic Culture [[id number]] Collected: 07/06/22 1925    Order Status: Completed Specimen: Wound Updated: 07/07/22 0721     Significant Indicator NEG     Source WND     Site Left Knee     Culture Result Culture in progress.    Narrative:      Surgery - swabs received    GRAM STAIN [[id number]] Collected: 07/06/22 1925    Order Status: Completed Specimen: Wound Updated: 07/07/22 0249     Significant Indicator .     Source WND     Site Left Knee     Gram Stain Result Few WBCs.  No organisms seen.      Narrative:      Surgery - swabs received    Blood Culture [[id number]]  (Abnormal) Collected: 07/04/22 1425    Order Status: Completed Specimen: Blood from Peripheral Updated: 07/06/22 1102     Significant Indicator POS     Source BLD     Site PERIPHERAL     Culture Result Growth detected by Bactec instrument. 07/05/2022  04:46  Staphylococcus aureus (methicillin sensitive)  detected by PCR.  Susceptibility to follow.        Staphylococcus aureus  Susceptibilities in progress      Narrative:      CALL  Quarles  ER tel. ,  CALLED  ER tel.  07/05/2022, 09:31, RB PERF. RESULTS CALLED TO: Corina FOSS  [phone]  1 of 2 for Blood Culture x 2 sites order. Per Hospital  Policy: Only change Specimen Src: to \"Line\" if specified by  physician order.  No site indicated    Blood Culture [[id number]]  (Abnormal) Collected: 07/04/22 1438    Order Status: Completed Specimen: Blood from Peripheral Updated: 07/06/22 1102     Significant Indicator POS     Source BLD     Site PERIPHERAL     Culture Result Growth detected by Bactec instrument. 07/05/2022  09:29      Staphylococcus aureus    Narrative:      CALL  Quarles  ER tel. ,  CALLED  ER tel.  07/05/2022, 09:32, RB PERF. " "RESULTS CALLED TO:PRUDENCE Arriaga  [phone]  2 of 2 blood culture x2  Sites order. Per Hospital Policy:  Only change Specimen Src: to \"Line\" if specified by physician  order.  Left Hand    FLUID CULTURE W/GRAM STAIN [[id number]]  (Abnormal)  (Susceptibility) Collected: 07/04/22 1620    Order Status: Completed Specimen: Synovial from Other Body Fluid Updated: 07/06/22 0925     Significant Indicator POS     Source SYNO     Site left knee     Culture Result -     Gram Stain Result Rare WBCs.  No organisms seen.       Culture Result Staphylococcus aureus  Light growth      Narrative:      CALL  Quarles  ER tel. ,  CALLED  ER tel.  07/05/2022, 13:22, RB PERF. RESULTS CALLED TO:Ernesto [phone] Prudence  LRobert knee fluid.    Susceptibility     Staphylococcus aureus (1)     Antibiotic Interpretation Microscan   Method Status    Azithromycin Sensitive <=2 mcg/mL DEVEN Preliminary    Clindamycin Sensitive <=0.25 mcg/mL DEVEN Preliminary    Cefazolin Sensitive <=8 mcg/mL DEVEN Preliminary    Cefepime Sensitive <=4 mcg/mL DEVEN Preliminary    Ceftaroline Sensitive <=0.5 mcg/mL DEVEN Preliminary    Daptomycin Sensitive 1 mcg/mL DEVEN Preliminary    Erythromycin Sensitive <=0.25 mcg/mL DEVEN Preliminary    Ampicillin/sulbactam Sensitive <=8/4 mcg/mL DEVEN Preliminary    Vancomycin Sensitive 1 mcg/mL DEVEN Preliminary    Oxacillin Sensitive <=0.25 mcg/mL DEVEN Preliminary    Pip/Tazobactam Sensitive <=8 mcg/mL DEVEN Preliminary    Trimeth/Sulfa Sensitive <=0.5/9.5 mcg/mL DEVEN Preliminary    Tetracycline Sensitive <=4 mcg/mL DEVEN Preliminary                   Urine Culture (NEW) [[id number]]  (Abnormal)  (Susceptibility) Collected: 07/04/22 1630    Order Status: Completed Specimen: Urine, Clean Catch Updated: 07/06/22 0733     Significant Indicator POS     Source UR     Site URINE, CLEAN CATCH     Culture Result -      Staphylococcus aureus  >100,000 cfu/mL  Methicillin sensitive by screening method.      Narrative:      Indication for culture:->Evaluation for sepsis without " a  clear source of infection  Indication for culture:->Evaluation for sepsis without a    Susceptibility     Staphylococcus aureus (1)     Antibiotic Interpretation Microscan   Method Status    Cefazolin Sensitive <=8 mcg/mL DEVEN Final    Cefepime Sensitive <=4 mcg/mL DEVEN Final    Ceftaroline Sensitive <=0.5 mcg/mL DEVEN Final    Daptomycin Sensitive <=0.5 mcg/mL DEVEN Final    Nitrofurantoin Sensitive <=32 mcg/mL DEVEN Final    Pip/Tazobactam Sensitive <=8 mcg/mL DEVEN Final    Trimeth/Sulfa Sensitive <=0.5/9.5 mcg/mL DEVEN Final    Tetracycline Sensitive <=4 mcg/mL DEVEN Final                   BLOOD CULTURE [[id number]]     Order Status: Sent Specimen: Blood from Peripheral     BLOOD CULTURE [[id number]]     Order Status: Sent Specimen: Blood from Peripheral     BLOOD CULTURE [[id number]]     Order Status: Sent Specimen: Blood from Peripheral     BLOOD CULTURE [[id number]]     Order Status: Sent Specimen: Blood from Peripheral     MRSA By PCR (Amp) [[id number]] Collected: 07/05/22 1215    Order Status: Completed Specimen: Respirate from Nares Updated: 07/05/22 1608     MRSA by PCR Negative    GRAM STAIN [[id number]] Collected: 07/04/22 1620    Order Status: Completed Specimen: Synovial Updated: 07/04/22 1927     Significant Indicator .     Source SYNO     Site left knee     Gram Stain Result Rare WBCs.  No organisms seen.      Narrative:      L. knee fluid.    Urinalysis [[id number]]  (Abnormal) Collected: 07/04/22 1630    Order Status: Completed Specimen: Urine Updated: 07/04/22 1739     Color Yellow     Character Clear     Specific Gravity 1.023     Ph 5.0     Glucose 250 mg/dL      Ketones Negative mg/dL      Protein 100 mg/dL      Bilirubin Negative     Urobilinogen, Urine 1.0     Nitrite Positive     Leukocyte Esterase Trace     Occult Blood Large     Micro Urine Req Microscopic    Narrative:      Indication for culture:->Evaluation for sepsis without a  clear source of infection          Assessment:  Active Hospital Problems     Diagnosis    • *MSSA bacteremia [R78.81, B95.61]    • Acute cystitis without hematuria [N30.00]    • Falls [W19.XXXA]    • A-fib (HCC) [I48.91]    • Pyogenic arthritis of left knee joint (HCC) [M00.9]    • Thrombocytopenia (HCC) [D69.6]    • Atrial fibrillation with RVR (HCC) [I48.91]    • Type 2 diabetes mellitus with hyperglycemia, without long-term current use of insulin (HCC) [E11.65]    • Hyponatremia [E87.1]    • Traumatic rhabdomyolysis (HCC) [T79.6XXA]    • Hepatocellular carcinoma (HCC) [C22.0]    • Cirrhosis of liver without ascites (HCC) [K74.60]    • Essential hypertension, benign [I10]      Interval 24 hours:      AF, O2 RA  Labs reviewed  Imaging personally reviewed both images and report.   Micro reviewed    Patient complaining of left knee and leg pain.  In A. fib with RVR.  Continued on antibiotics as below     ASSESSMENT/PLAN:      72 y.o.  admitted 7/4/2022. Pt has a past medical history of HTN, HLD, A fib, DMT2 and hepatitis C cirrhosis.  He presented complaining of weakness and left knee pain.  He had a fall and was down on the floor for several days.   Patient also with chronic back pain and states he had 4 injections in his back approximately 1 week ago     Hospital Course:   In the ER is found to be in A. fib with RVR.  Blood cultures positive for MSSA.  Concern for septic joint and fluid is aspirated which is also positive for MSSA.  Plan is to go to the OR on 7/6 for washout of septic left knee.     Problem List     Bacteremia  -Blood cultures on 7/4 +MSSA  -TTE on 7/5 with no vegetations, no significant valvular disease, EF 55%  Left knee septic arthritis  -Aspirated synovial fluid with 30 7K WBCs, 66% polys, cultures +MSSA   -OR with orthopedics on 7/6 for I&D of the left knee, per op note significant mount of purulent material was encountered and sent for culture  UTI, possibly source of the bacteremia or resulted from it  -Urine culture on 7/4 +MSSA   Rhabdomyolysis  -Improving  CPK  A. fib, RVR, improved but now recurred   Cirrhosis  Hepatitis C infection, unknown if treated   Acute on chronic back pain  Antibiotic allergies: Ciprofloxacin reported as convulsions     Plan      --- Continue cefazolin 2 g every 8 hours  --- Follow-up repeat blood cultures- drawn today   --- Follow-up results of MRI lumbar spine -Per verbal there is infection in the spine, no abscess  --- Recommend CARLI given multiple sites of infection, no clear source and unsure if we have clearance of the blood culture  --- Monitor labs     Dispo: TBD  PICC: Will eventually need either midline or PICC        Plan of care discussed with Dr. Romero Vences and Dr. Crawford. ID will continue to follow.

## 2022-07-07 NOTE — PROGRESS NOTES
Cardiology Follow-up Consult Note    Date of Service:    7/7/2022      Consulting Physician: Terry Vences M.D.    Name:   Nitesh Duron   YOB: 1950  Age:   72 y.o.  male   MRN:   [id number]      HPI:    Nitesh Duron is a 72 y.o. male with a history of HTN, HLD, atrial fibrillation, controlled Type 2 DM with diet and exercise and HCV cirrhosis  who presented to the ED on 7/4/2022 for weakness and left knee pain and swelling for the past 4 days. Patient states that he got out of bed, turned and fell on the floor and couldn't get up. Denies any syncopal episodes or hitting his head. Patient was transported via EMS to the hospital for treatment. While in the ED, patient was found to be in Afib with HR in 150-200s, no symptoms. Patient given fluids for dehydration and started on diltiazem drip 15 mg/hr and his home metoprolol dose continued.    Interval Update:  7/5: Patient on IV diltiazem drip and home dose metoprolol. ECHO EF 55%, no wall motion abnormality.  7/6: Patient loaded with IV digoxin. Underwent I&D.       Subjective:  No acute events overnight. Patient underwent I&D yesterday and states his left knee hurts. Patient complains of 6/10 left knee pain at rest and 7-8/10 knee pain with movement. States the pain medication controls his pain well. Has no other complaints. Denies dizziness, chest pain, palpitations, blurry vision, shortness of breath or fatigue.     All other review of systems reviewed and negative.      Past medical, surgical, social, and family history reviewed and unchanged from admission except as noted in assessment and plan.    Medications: Reviewed in MAR      Allergies   Allergen Reactions   • Ciprofloxacin Unspecified     convulsions   • Statins [Hmg-Coa-R Inhibitors] Unspecified     Stabbing pains in kidneys   • Acetaminophen      Kidney pain    • Atorvastatin      Patient declines   • Ibuprofen      Kidney pain   • Naproxen      Kidney pain   •  "Shellfish Allergy      Kidney stones   • Soap &  [Alcohol] Rash     Antibacterial soap- contact dermatitis         Intake/Output Summary (Last 24 hours) at 7/7/2022 0955  Last data filed at 7/7/2022 0400  Gross per 24 hour   Intake 1894.17 ml   Output 1720 ml   Net 174.17 ml        Physical Exam  Body mass index is 27.71 kg/m².  BP (!) 153/81   Pulse (!) 102   Temp 36.8 °C (98.3 °F) (Temporal)   Resp 14   Ht 1.778 m (5' 10\")   Wt 87.6 kg (193 lb 2 oz)   SpO2 93%   Vitals:    07/07/22 0200 07/07/22 0400 07/07/22 0500 07/07/22 0600   BP: (!) 128/93 (!) 150/73  (!) 153/81   Pulse: (!) 136 (!) 116  (!) 102   Resp: 14 15 14 14   Temp: 36.9 °C (98.4 °F) 36.8 °C (98.2 °F)  36.8 °C (98.3 °F)   TempSrc: Temporal Temporal  Temporal   SpO2: 94% 91%  93%   Weight:       Height:         Oxygen Therapy:  Pulse Oximetry: 93 %, O2 (LPM): 0, O2 Delivery Device: None - Room Air    General: Well appearing and in no apparent distress  Eyes: nl conjunctiva  ENT: OP clear  Neck: JVP elevated, no carotid bruits  Lungs: normal respiratory effort, CTAB  Heart: Tachycardic, no murmurs, no rubs or gallops, no edema bilateral lower extremities. No LV/RV heave on cardiac palpatation. 2+ bilateral radial pulses.  2+ bilateral dp pulses.   Abdomen: soft, mildly tender, non distended, no masses, normal bowel sounds.  No HSM.  Extremities/MSK: no clubbing, no cyanosis, swelling left knee  Neurological: No focal sensory deficits  Psychiatric: Appropriate affect, A/O x 3  Skin: Warm extremities, hemosiderin skin changes bilaterally on lower extremities    Labs (personally reviewed and notable for):   Lab Results   Component Value Date/Time    SODIUM 127 (L) 07/07/2022 01:40 AM    POTASSIUM 4.4 07/07/2022 01:40 AM    CHLORIDE 93 (L) 07/07/2022 01:40 AM    CO2 23 07/07/2022 01:40 AM    GLUCOSE 166 (H) 07/07/2022 01:40 AM    BUN 19 07/07/2022 01:40 AM    CREATININE 0.55 07/07/2022 01:40 AM      Lab Results   Component Value Date/Time    " WBC 6.4 07/07/2022 01:40 AM    RBC 4.53 (L) 07/07/2022 01:40 AM    HEMOGLOBIN 13.0 (L) 07/07/2022 01:40 AM    HEMATOCRIT 37.7 (L) 07/07/2022 01:40 AM    MCV 83.2 07/07/2022 01:40 AM    MCH 28.7 07/07/2022 01:40 AM    MCHC 34.5 07/07/2022 01:40 AM    MPV 10.2 07/07/2022 01:40 AM    NEUTSPOLYS 89.00 (H) 07/07/2022 01:40 AM    LYMPHOCYTES 3.60 (L) 07/07/2022 01:40 AM    MONOCYTES 6.10 07/07/2022 01:40 AM    EOSINOPHILS 0.00 07/07/2022 01:40 AM    BASOPHILS 0.20 07/07/2022 01:40 AM    HYPOCHROMIA 1+ 01/29/2019 10:32 AM    ANISOCYTOSIS 1+ 01/29/2019 10:32 AM      Lab Results   Component Value Date/Time    CHOLSTRLTOT 179 07/06/2021 10:02 AM     (H) 07/06/2021 10:02 AM    HDL 43 07/06/2021 10:02 AM    TRIGLYCERIDE 88 07/06/2021 10:02 AM       Lab Results   Component Value Date/Time    TROPONINT 43 (H) 07/04/2022 1335     Lab Results   Component Value Date/Time    NTPROBNP 3678 (H) 07/04/2022 1335       Cardiac Imaging and Procedures Review:    ECHO 7/5/2022: EF 55%, diastolic function unable to be assessed due to arrhythmia and reduced right ventricular systolic function .    Radiology test Review:  CT-HEAD W/O   Final Result      1. No CT evidence of acute infarct, hemorrhage or mass.   2. Mild global parenchymal atrophy. Chronic small vessel ischemic changes.      EC-ECHOCARDIOGRAM COMPLETE W/ CONT   Final Result      DX-KNEE 3 VIEWS LEFT   Final Result      1.  Severe tricompartmental left knee osteoarthritis      2.  osteoarthritis the proximal tibiofibular articulation      3.  Multiple intra-articular ossific body      4.  Diffuse atherosclerotic plaque      DX-CHEST-PORTABLE (1 VIEW)   Final Result      No acute cardiopulmonary abnormality identified.      MR-LUMBAR SPINE-WITH & W/O    (Results Pending)       Problem list:  Principal Problem:    MSSA bacteremia POA: Unknown  Active Problems:    Essential hypertension, benign POA: Yes    Cirrhosis of liver without ascites (HCC) POA: Yes    Hepatocellular  carcinoma (HCC) POA: Yes    Thrombocytopenia (HCC) POA: Unknown    Atrial fibrillation with RVR (HCC) POA: Unknown    Type 2 diabetes mellitus with hyperglycemia, without long-term current use of insulin (HCC) POA: Unknown    Hyponatremia POA: Unknown    Traumatic rhabdomyolysis (HCC) POA: Unknown    Acute cystitis without hematuria POA: Unknown    Falls POA: Unknown    A-fib (HCC) POA: Yes    Pyogenic arthritis of left knee joint (HCC) POA: Unknown  Resolved Problems:    * No resolved hospital problems. *      Impression and Medical Decision Making:  #Atrial fibrillation with RVR  #HTN, uncontrolled     Recommendations:  --Will consider CARLI and CV to rule out endocarditis and intracardiac thrombus  --Restart anticoagulation when ok per Dr. Wilkins.   --Patient underwent I&D yesterday. Pain could play a role in high blood pressure. Will start 5 mg amlodipine for better blood pressure control. Can increase dose to 10 mg if BP still elevated.   -Completed IV digoxin this morning. On maintenance PO digoxin dose 0.125 mg daily.  -Uptitrated PO metoprolol to 50 mg. If heart rate still elevated and blood pressure allows, can increase to 75 mg.   --Recheck digoxin level after 5 PO doses

## 2022-07-07 NOTE — CARE PLAN
The patient is Stable - Low risk of patient condition declining or worsening    Shift Goals  Clinical Goals: control pain, IV ABX  Patient Goals: get better  Family Goals: comfort, discharge    Progress made toward(s) clinical / shift goals:    Problem: Pain - Standard  Goal: Alleviation of pain or a reduction in pain to the patient’s comfort goal  Outcome: Progressing     Problem: Knowledge Deficit - Standard  Goal: Patient and family/care givers will demonstrate understanding of plan of care, disease process/condition, diagnostic tests and medications  Outcome: Progressing     Problem: Fall Risk  Goal: Patient will remain free from falls  Outcome: Progressing     Problem: Communication  Goal: The ability to communicate needs accurately and effectively will improve  Outcome: Progressing     Problem: Urinary Elimination  Goal: Establish and maintain regular urinary output  Outcome: Progressing     Problem: Skin Integrity  Goal: Skin integrity is maintained or improved  Outcome: Progressing       Patient is not progressing towards the following goals:

## 2022-07-07 NOTE — OP REPORT
DATE OF OPERATION: 7/6/2022     PREOPERATIVE DIAGNOSIS: Left septic knee    POSTOPERATIVE DIAGNOSIS: Same    PROCEDURE PERFORMED:  1. Arthrotomy left knee with exploration and drainage                                                      SURGEON: Miah Wilkins M.D.     ASSISTANT: None    ANESTHESIA: General    ESTIMATED BLOOD LOSS: 10 mL    INDICATIONS: The patient is a 72 y.o. male with a left septic knee.  I discussed the risks and benefits of the procedure, including the risks of infection, wound healing complication, neurovascular injury, need for repeat irrigation and debridement, post infectious arthritis and the medical risks of anesthesia including DVT, PE, MI, stroke, and death.  Benefits include eradication of intraarticular infection and resolution of sepsis.  Alternatives to surgery were also discussed, including non-operative management.  The patient signed the informed consent and the operative extremity was marked.      PROCEDURE:  The patient underwent anesthesia, and was positioned supine on the operating room table and all bony prominences were well padded.  Preoperative antibiotics were held until cultures could be obtained. Sequential compression devices were employed. The correct operative site was prepped and draped into a sterile field. A procedural pause was conducted to verify correct patient, correct extremity, presence of the surgeons initials on the operative extremity.    A well padded tourniquet was inflated to 250mmHg and a medial parapatellar incision was performed. The arthrotomy was made leaving a good cuff for repair. A large amount of purulent fluid was encountered and sent to the lab for culture analysis. The joint was irrigated with 3 L of pulsatile lavage. Wounds was closed in layers, with 1-0 PDS for the retinaculum, 2-0 PDS in the subcutaneous tissue, and staples in the skin.  Sterile dressings were applied.     The patient tolerated the procedure well. There were no  apparent complications. All sponge, needle, and instrument counts were correct on two separate occasions. He was awakened, extubated, and transferred to the recovery room in satisfactory condition.     Post-Operative Plan:    1.  The patient should remain full weightbearing on their operative extremity.  Gait aids (crutch or crutches, cane, walker) may be used as needed, and may be discontinued when no longer required.  2.  IV antibiotics - will be given postoperatively and adjusted by the Infectious Disease service as dictated by culture results.  3.  DVT prophylaxis - SCD's and Lovenox 40 mg SQ daily while inpatient.  The patient may transition to Aspirin 325 mg PO BID as an outpatient  4.  Discharge planning  ____________________________________   Miah Wilkins M.D.   DD: 7/6/2022  7:38 PM

## 2022-07-07 NOTE — ANESTHESIA PROCEDURE NOTES
Airway    Date/Time: 7/6/2022 7:16 PM  Performed by: Yayo Covington M.D.  Authorized by: Yayo Covington M.D.     Location:  OR  Urgency:  Elective  Difficult Airway: No    Indications for Airway Management:  Anesthesia      Spontaneous Ventilation: absent    Sedation Level:  Deep  Preoxygenated: Yes    Mask Difficulty Assessment:  0 - not attempted  Final Airway Type:  Supraglottic airway  Final Supraglottic Airway:  Standard LMA    SGA Size:  4  Number of Attempts at Approach:  1

## 2022-07-07 NOTE — ANESTHESIA PREPROCEDURE EVALUATION
Case: [id number] Date/Time: 07/06/22 2038    Procedure: IRRIGATION AND DEBRIDEMENT, WOUND (Knee)    Location: [facility] OR 16 / SURGERY [facility]    Surgeons: Miah Wilkins M.D.      73 Y/O with septic knee, in afib with RVR.  Being followed by cardiology, was on diltiazem drip and PO metoprolol    Relevant Problems   CARDIAC   (positive) A-fib (HCC)   (positive) Atrial fibrillation with RVR (HCC)   (positive) Coronary artery disease involving native coronary artery without angina pectoris   (positive) Elevated coronary artery calcium score   (positive) Essential hypertension, benign   (positive) Undiagnosed cardiac murmurs         (positive) Calculus of both kidneys   (positive) Cirrhosis of liver without ascites (HCC)   (positive) Hepatocellular carcinoma (HCC)      ENDO   (positive) Type 2 diabetes mellitus with hyperglycemia, without long-term current use of insulin (HCC)      Other   (positive) Hyponatremia   (positive) MSSA bacteremia   (positive) Pyogenic arthritis of left knee joint (HCC)   (positive) Splenomegaly   (positive) Thrombocytopenia (HCC)       Physical Exam    Airway   Mallampati: II  TM distance: >3 FB  Neck ROM: full       Cardiovascular - normal exam  Rhythm: irregular  Rate: normal  (-) murmur     Dental - normal exam           Pulmonary - normal exam  Breath sounds clear to auscultation     Abdominal    Neurological - normal exam                 Anesthesia Plan    ASA 3- EMERGENT       Plan - general       Airway plan will be LMA          Induction: intravenous    Postoperative Plan: Postoperative administration of opioids is intended.    Pertinent diagnostic labs and testing reviewed    Informed Consent:    Anesthetic plan and risks discussed with patient.    Use of blood products discussed with: patient whom consented to blood products.

## 2022-07-07 NOTE — PROGRESS NOTES
"OR cultures pending    BP (!) 153/81   Pulse (!) 102   Temp 36.8 °C (98.3 °F) (Temporal)   Resp 14   Ht 1.778 m (5' 10\")   Wt 87.6 kg (193 lb 2 oz)   SpO2 93%     Recent Labs     07/05/22  0745 07/06/22  0140 07/07/22  0140   WBC 8.2 7.2 6.4   RBC 4.75 4.61* 4.53*   HEMOGLOBIN 13.7* 13.5* 13.0*   HEMATOCRIT 39.4* 38.4* 37.7*   MCV 82.9 83.3 83.2   MCH 28.8 29.3 28.7   MCHC 34.8 35.2 34.5   RDW 42.8 42.9 42.3   PLATELETCT 66* 61* 81*   MPV 10.7 10.9 10.2       No acute distress  Dressing clean dry and intact  Neurovascularly intact    POD#1  S/P I&D knee    Plan:  DVT Prophylaxis- TEDS/SCDs, lovenox while in hospital, ASA 81 mg PO BID as outpatient  Weight Bearing Status-wbat  PT/OT  Antibiotics: per ID  Case Coordination          "

## 2022-07-07 NOTE — CARE PLAN
The patient is Watcher - Medium risk of patient condition declining or worsening    Shift Goals  Clinical Goals: control pain, IV ABX  Patient Goals: get better  Family Goals: comfort, discharge    Problem: Knowledge Deficit - Standard  Goal: Patient and family/care givers will demonstrate understanding of plan of care, disease process/condition, diagnostic tests and medications  Outcome: Progressing     Problem: Fall Risk  Goal: Patient will remain free from falls  Outcome: Progressing     Problem: Communication  Goal: The ability to communicate needs accurately and effectively will improve  Outcome: Progressing     Problem: Hemodynamics  Goal: Patient's hemodynamics, fluid balance and neurologic status will be stable or improve  Outcome: Progressing     Problem: Respiratory  Goal: Patient will achieve/maintain optimum respiratory ventilation and gas exchange  Outcome: Progressing     Problem: Urinary Elimination  Goal: Establish and maintain regular urinary output  Outcome: Progressing     Problem: Skin Integrity  Goal: Skin integrity is maintained or improved  Outcome: Progressing

## 2022-07-07 NOTE — ANESTHESIA TIME REPORT
Anesthesia Start and Stop Event Times     Date Time Event    7/6/2022 1905 Ready for Procedure     1911 Anesthesia Start     1946 Anesthesia Stop        Responsible Staff  07/06/22    Name Role Begin End    Yayo Covington M.D. Anesth 1911 1946        Overtime Reason:  no overtime (within assigned shift)    Comments:

## 2022-07-08 ENCOUNTER — APPOINTMENT (OUTPATIENT)
Dept: CARDIOLOGY | Facility: MEDICAL CENTER | Age: 72
DRG: 485 | End: 2022-07-08
Attending: INTERNAL MEDICINE
Payer: MEDICARE

## 2022-07-08 ENCOUNTER — ANESTHESIA (OUTPATIENT)
Dept: CARDIOLOGY | Facility: MEDICAL CENTER | Age: 72
DRG: 485 | End: 2022-07-08
Payer: MEDICARE

## 2022-07-08 ENCOUNTER — ANESTHESIA EVENT (OUTPATIENT)
Dept: CARDIOLOGY | Facility: MEDICAL CENTER | Age: 72
DRG: 485 | End: 2022-07-08
Payer: MEDICARE

## 2022-07-08 PROBLEM — E83.42 HYPOMAGNESEMIA: Status: ACTIVE | Noted: 2022-07-08

## 2022-07-08 LAB
ALBUMIN SERPL BCP-MCNC: 2.9 G/DL (ref 3.2–4.9)
ALBUMIN/GLOB SERPL: 0.9 G/DL
ALP SERPL-CCNC: 104 U/L (ref 30–99)
ALT SERPL-CCNC: 19 U/L (ref 2–50)
ANION GAP SERPL CALC-SCNC: 10 MMOL/L (ref 7–16)
AST SERPL-CCNC: 27 U/L (ref 12–45)
BASOPHILS # BLD AUTO: 0.2 % (ref 0–1.8)
BASOPHILS # BLD: 0.01 K/UL (ref 0–0.12)
BILIRUB SERPL-MCNC: 0.6 MG/DL (ref 0.1–1.5)
BUN SERPL-MCNC: 16 MG/DL (ref 8–22)
CALCIUM SERPL-MCNC: 8 MG/DL (ref 8.5–10.5)
CHLORIDE SERPL-SCNC: 93 MMOL/L (ref 96–112)
CO2 SERPL-SCNC: 24 MMOL/L (ref 20–33)
CREAT SERPL-MCNC: 0.6 MG/DL (ref 0.5–1.4)
EKG IMPRESSION: NORMAL
EOSINOPHIL # BLD AUTO: 0.02 K/UL (ref 0–0.51)
EOSINOPHIL NFR BLD: 0.4 % (ref 0–6.9)
ERYTHROCYTE [DISTWIDTH] IN BLOOD BY AUTOMATED COUNT: 41.7 FL (ref 35.9–50)
GFR SERPLBLD CREATININE-BSD FMLA CKD-EPI: 103 ML/MIN/1.73 M 2
GLOBULIN SER CALC-MCNC: 3.3 G/DL (ref 1.9–3.5)
GLUCOSE BLD STRIP.AUTO-MCNC: 138 MG/DL (ref 65–99)
GLUCOSE BLD STRIP.AUTO-MCNC: 168 MG/DL (ref 65–99)
GLUCOSE BLD STRIP.AUTO-MCNC: 215 MG/DL (ref 65–99)
GLUCOSE BLD STRIP.AUTO-MCNC: 256 MG/DL (ref 65–99)
GLUCOSE SERPL-MCNC: 144 MG/DL (ref 65–99)
HCT VFR BLD AUTO: 40.3 % (ref 42–52)
HGB BLD-MCNC: 14 G/DL (ref 14–18)
IMM GRANULOCYTES # BLD AUTO: 0.07 K/UL (ref 0–0.11)
IMM GRANULOCYTES NFR BLD AUTO: 1.3 % (ref 0–0.9)
LYMPHOCYTES # BLD AUTO: 0.31 K/UL (ref 1–4.8)
LYMPHOCYTES NFR BLD: 5.6 % (ref 22–41)
MAGNESIUM SERPL-MCNC: 1.9 MG/DL (ref 1.5–2.5)
MCH RBC QN AUTO: 28.8 PG (ref 27–33)
MCHC RBC AUTO-ENTMCNC: 34.7 G/DL (ref 33.7–35.3)
MCV RBC AUTO: 82.9 FL (ref 81.4–97.8)
MONOCYTES # BLD AUTO: 0.44 K/UL (ref 0–0.85)
MONOCYTES NFR BLD AUTO: 7.9 % (ref 0–13.4)
NEUTROPHILS # BLD AUTO: 4.73 K/UL (ref 1.82–7.42)
NEUTROPHILS NFR BLD: 84.6 % (ref 44–72)
NRBC # BLD AUTO: 0 K/UL
NRBC BLD-RTO: 0 /100 WBC
PHOSPHATE SERPL-MCNC: 2.7 MG/DL (ref 2.5–4.5)
PLATELET # BLD AUTO: 109 K/UL (ref 164–446)
PMV BLD AUTO: 9.9 FL (ref 9–12.9)
POTASSIUM SERPL-SCNC: 4.1 MMOL/L (ref 3.6–5.5)
PROT SERPL-MCNC: 6.2 G/DL (ref 6–8.2)
RBC # BLD AUTO: 4.86 M/UL (ref 4.7–6.1)
SODIUM SERPL-SCNC: 127 MMOL/L (ref 135–145)
WBC # BLD AUTO: 5.6 K/UL (ref 4.8–10.8)

## 2022-07-08 PROCEDURE — 99100 ANES PT EXTEME AGE<1 YR&>70: CPT | Performed by: STUDENT IN AN ORGANIZED HEALTH CARE EDUCATION/TRAINING PROGRAM

## 2022-07-08 PROCEDURE — 93312 ECHO TRANSESOPHAGEAL: CPT

## 2022-07-08 PROCEDURE — 99232 SBSQ HOSP IP/OBS MODERATE 35: CPT | Performed by: HOSPITALIST

## 2022-07-08 PROCEDURE — 700111 HCHG RX REV CODE 636 W/ 250 OVERRIDE (IP): Performed by: INTERNAL MEDICINE

## 2022-07-08 PROCEDURE — B245ZZ4 ULTRASONOGRAPHY OF LEFT HEART, TRANSESOPHAGEAL: ICD-10-PCS | Performed by: INTERNAL MEDICINE

## 2022-07-08 PROCEDURE — 93312 ECHO TRANSESOPHAGEAL: CPT | Mod: 26 | Performed by: INTERNAL MEDICINE

## 2022-07-08 PROCEDURE — 84100 ASSAY OF PHOSPHORUS: CPT

## 2022-07-08 PROCEDURE — 83735 ASSAY OF MAGNESIUM: CPT

## 2022-07-08 PROCEDURE — A9270 NON-COVERED ITEM OR SERVICE: HCPCS | Performed by: STUDENT IN AN ORGANIZED HEALTH CARE EDUCATION/TRAINING PROGRAM

## 2022-07-08 PROCEDURE — 700105 HCHG RX REV CODE 258: Performed by: INTERNAL MEDICINE

## 2022-07-08 PROCEDURE — 93005 ELECTROCARDIOGRAM TRACING: CPT | Performed by: INTERNAL MEDICINE

## 2022-07-08 PROCEDURE — 700102 HCHG RX REV CODE 250 W/ 637 OVERRIDE(OP): Performed by: STUDENT IN AN ORGANIZED HEALTH CARE EDUCATION/TRAINING PROGRAM

## 2022-07-08 PROCEDURE — 700102 HCHG RX REV CODE 250 W/ 637 OVERRIDE(OP): Performed by: INTERNAL MEDICINE

## 2022-07-08 PROCEDURE — 160002 HCHG RECOVERY MINUTES (STAT)

## 2022-07-08 PROCEDURE — 82962 GLUCOSE BLOOD TEST: CPT | Mod: 91

## 2022-07-08 PROCEDURE — 700111 HCHG RX REV CODE 636 W/ 250 OVERRIDE (IP): Performed by: HOSPITALIST

## 2022-07-08 PROCEDURE — 80053 COMPREHEN METABOLIC PANEL: CPT

## 2022-07-08 PROCEDURE — 700102 HCHG RX REV CODE 250 W/ 637 OVERRIDE(OP)

## 2022-07-08 PROCEDURE — 770000 HCHG ROOM/CARE - INTERMEDIATE ICU *

## 2022-07-08 PROCEDURE — 85025 COMPLETE CBC W/AUTO DIFF WBC: CPT

## 2022-07-08 PROCEDURE — A9270 NON-COVERED ITEM OR SERVICE: HCPCS | Performed by: INTERNAL MEDICINE

## 2022-07-08 PROCEDURE — 700111 HCHG RX REV CODE 636 W/ 250 OVERRIDE (IP): Performed by: STUDENT IN AN ORGANIZED HEALTH CARE EDUCATION/TRAINING PROGRAM

## 2022-07-08 PROCEDURE — 160036 HCHG PACU - EA ADDL 30 MINS PHASE I

## 2022-07-08 PROCEDURE — A9270 NON-COVERED ITEM OR SERVICE: HCPCS

## 2022-07-08 PROCEDURE — 99233 SBSQ HOSP IP/OBS HIGH 50: CPT | Mod: 25,GC | Performed by: INTERNAL MEDICINE

## 2022-07-08 PROCEDURE — 5A2204Z RESTORATION OF CARDIAC RHYTHM, SINGLE: ICD-10-PCS | Performed by: INTERNAL MEDICINE

## 2022-07-08 PROCEDURE — 99140 ANES COMP EMERGENCY COND: CPT | Performed by: STUDENT IN AN ORGANIZED HEALTH CARE EDUCATION/TRAINING PROGRAM

## 2022-07-08 PROCEDURE — 92960 CARDIOVERSION ELECTRIC EXT: CPT | Performed by: INTERNAL MEDICINE

## 2022-07-08 PROCEDURE — 700105 HCHG RX REV CODE 258: Performed by: STUDENT IN AN ORGANIZED HEALTH CARE EDUCATION/TRAINING PROGRAM

## 2022-07-08 PROCEDURE — 01922 ANES N-INVAS IMG/RADJ THER: CPT | Performed by: STUDENT IN AN ORGANIZED HEALTH CARE EDUCATION/TRAINING PROGRAM

## 2022-07-08 PROCEDURE — 160035 HCHG PACU - 1ST 60 MINS PHASE I

## 2022-07-08 PROCEDURE — 92960 CARDIOVERSION ELECTRIC EXT: CPT

## 2022-07-08 PROCEDURE — 93010 ELECTROCARDIOGRAM REPORT: CPT | Mod: 59 | Performed by: INTERNAL MEDICINE

## 2022-07-08 RX ORDER — IPRATROPIUM BROMIDE AND ALBUTEROL SULFATE 2.5; .5 MG/3ML; MG/3ML
3 SOLUTION RESPIRATORY (INHALATION)
Status: DISCONTINUED | OUTPATIENT
Start: 2022-07-08 | End: 2022-07-08 | Stop reason: HOSPADM

## 2022-07-08 RX ORDER — HYDROMORPHONE HYDROCHLORIDE 1 MG/ML
0.2 INJECTION, SOLUTION INTRAMUSCULAR; INTRAVENOUS; SUBCUTANEOUS
Status: DISCONTINUED | OUTPATIENT
Start: 2022-07-08 | End: 2022-07-08 | Stop reason: HOSPADM

## 2022-07-08 RX ORDER — DEXTROSE MONOHYDRATE 50 MG/ML
INJECTION, SOLUTION INTRAVENOUS CONTINUOUS
Status: DISCONTINUED | OUTPATIENT
Start: 2022-07-08 | End: 2022-07-09

## 2022-07-08 RX ORDER — OXYCODONE HCL 5 MG/5 ML
5 SOLUTION, ORAL ORAL
Status: DISCONTINUED | OUTPATIENT
Start: 2022-07-08 | End: 2022-07-08 | Stop reason: HOSPADM

## 2022-07-08 RX ORDER — PHENYLEPHRINE HYDROCHLORIDE 10 MG/ML
INJECTION, SOLUTION INTRAMUSCULAR; INTRAVENOUS; SUBCUTANEOUS PRN
Status: DISCONTINUED | OUTPATIENT
Start: 2022-07-08 | End: 2022-07-08 | Stop reason: SURG

## 2022-07-08 RX ORDER — METOPROLOL TARTRATE 50 MG/1
50 TABLET, FILM COATED ORAL TWICE DAILY
Status: DISCONTINUED | OUTPATIENT
Start: 2022-07-08 | End: 2022-07-26 | Stop reason: HOSPADM

## 2022-07-08 RX ORDER — DIPHENHYDRAMINE HYDROCHLORIDE 50 MG/ML
12.5 INJECTION INTRAMUSCULAR; INTRAVENOUS
Status: DISCONTINUED | OUTPATIENT
Start: 2022-07-08 | End: 2022-07-08 | Stop reason: HOSPADM

## 2022-07-08 RX ORDER — OXYCODONE HCL 5 MG/5 ML
10 SOLUTION, ORAL ORAL
Status: DISCONTINUED | OUTPATIENT
Start: 2022-07-08 | End: 2022-07-08 | Stop reason: HOSPADM

## 2022-07-08 RX ORDER — HYDROMORPHONE HYDROCHLORIDE 1 MG/ML
0.1 INJECTION, SOLUTION INTRAMUSCULAR; INTRAVENOUS; SUBCUTANEOUS
Status: DISCONTINUED | OUTPATIENT
Start: 2022-07-08 | End: 2022-07-08 | Stop reason: HOSPADM

## 2022-07-08 RX ORDER — METOPROLOL TARTRATE 1 MG/ML
1 INJECTION, SOLUTION INTRAVENOUS
Status: DISCONTINUED | OUTPATIENT
Start: 2022-07-08 | End: 2022-07-08 | Stop reason: HOSPADM

## 2022-07-08 RX ORDER — MAGNESIUM SULFATE HEPTAHYDRATE 40 MG/ML
2 INJECTION, SOLUTION INTRAVENOUS ONCE
Status: COMPLETED | OUTPATIENT
Start: 2022-07-08 | End: 2022-07-08

## 2022-07-08 RX ORDER — HYDROMORPHONE HYDROCHLORIDE 1 MG/ML
0.4 INJECTION, SOLUTION INTRAMUSCULAR; INTRAVENOUS; SUBCUTANEOUS
Status: DISCONTINUED | OUTPATIENT
Start: 2022-07-08 | End: 2022-07-08 | Stop reason: HOSPADM

## 2022-07-08 RX ORDER — HALOPERIDOL 5 MG/ML
1 INJECTION INTRAMUSCULAR
Status: DISCONTINUED | OUTPATIENT
Start: 2022-07-08 | End: 2022-07-08 | Stop reason: HOSPADM

## 2022-07-08 RX ORDER — DEXTROSE MONOHYDRATE 25 G/50ML
12.5 INJECTION, SOLUTION INTRAVENOUS
Status: DISCONTINUED | OUTPATIENT
Start: 2022-07-08 | End: 2022-07-08 | Stop reason: HOSPADM

## 2022-07-08 RX ORDER — MEPERIDINE HYDROCHLORIDE 25 MG/ML
12.5 INJECTION INTRAMUSCULAR; INTRAVENOUS; SUBCUTANEOUS
Status: DISCONTINUED | OUTPATIENT
Start: 2022-07-08 | End: 2022-07-08 | Stop reason: HOSPADM

## 2022-07-08 RX ORDER — ONDANSETRON 2 MG/ML
4 INJECTION INTRAMUSCULAR; INTRAVENOUS
Status: DISCONTINUED | OUTPATIENT
Start: 2022-07-08 | End: 2022-07-08 | Stop reason: HOSPADM

## 2022-07-08 RX ORDER — HYDRALAZINE HYDROCHLORIDE 20 MG/ML
5 INJECTION INTRAMUSCULAR; INTRAVENOUS
Status: DISCONTINUED | OUTPATIENT
Start: 2022-07-08 | End: 2022-07-08 | Stop reason: HOSPADM

## 2022-07-08 RX ORDER — SODIUM CHLORIDE, SODIUM LACTATE, POTASSIUM CHLORIDE, CALCIUM CHLORIDE 600; 310; 30; 20 MG/100ML; MG/100ML; MG/100ML; MG/100ML
INJECTION, SOLUTION INTRAVENOUS
Status: DISCONTINUED | OUTPATIENT
Start: 2022-07-08 | End: 2022-07-08 | Stop reason: SURG

## 2022-07-08 RX ORDER — LABETALOL HYDROCHLORIDE 5 MG/ML
5 INJECTION, SOLUTION INTRAVENOUS
Status: DISCONTINUED | OUTPATIENT
Start: 2022-07-08 | End: 2022-07-08 | Stop reason: HOSPADM

## 2022-07-08 RX ADMIN — AMIODARONE HYDROCHLORIDE 0.5 MG/MIN: 1.8 INJECTION, SOLUTION INTRAVENOUS at 17:38

## 2022-07-08 RX ADMIN — DIGOXIN 125 MCG: 0.12 TABLET ORAL at 17:18

## 2022-07-08 RX ADMIN — OXYCODONE 5 MG: 5 TABLET ORAL at 13:19

## 2022-07-08 RX ADMIN — PHENYLEPHRINE HYDROCHLORIDE 100 MCG: 10 INJECTION INTRAVENOUS at 10:17

## 2022-07-08 RX ADMIN — OXYCODONE 5 MG: 5 TABLET ORAL at 03:36

## 2022-07-08 RX ADMIN — DEXTROSE MONOHYDRATE: 50 INJECTION, SOLUTION INTRAVENOUS at 11:19

## 2022-07-08 RX ADMIN — EZETIMIBE 10 MG: 10 TABLET ORAL at 17:18

## 2022-07-08 RX ADMIN — METOPROLOL TARTRATE 50 MG: 50 TABLET, FILM COATED ORAL at 13:18

## 2022-07-08 RX ADMIN — LISINOPRIL 10 MG: 10 TABLET ORAL at 17:18

## 2022-07-08 RX ADMIN — ENOXAPARIN SODIUM 80 MG: 80 INJECTION SUBCUTANEOUS at 08:21

## 2022-07-08 RX ADMIN — SODIUM CHLORIDE, POTASSIUM CHLORIDE, SODIUM LACTATE AND CALCIUM CHLORIDE: 600; 310; 30; 20 INJECTION, SOLUTION INTRAVENOUS at 09:53

## 2022-07-08 RX ADMIN — PHENYLEPHRINE HYDROCHLORIDE 100 MCG: 10 INJECTION INTRAVENOUS at 10:14

## 2022-07-08 RX ADMIN — ENOXAPARIN SODIUM 80 MG: 80 INJECTION SUBCUTANEOUS at 20:35

## 2022-07-08 RX ADMIN — PROPOFOL 50 MG: 10 INJECTION, EMULSION INTRAVENOUS at 10:03

## 2022-07-08 RX ADMIN — CEFAZOLIN SODIUM 2 G: 2 INJECTION, SOLUTION INTRAVENOUS at 13:19

## 2022-07-08 RX ADMIN — INSULIN HUMAN 5 UNITS: 100 INJECTION, SOLUTION PARENTERAL at 17:19

## 2022-07-08 RX ADMIN — INSULIN HUMAN 2 UNITS: 100 INJECTION, SOLUTION PARENTERAL at 13:19

## 2022-07-08 RX ADMIN — CEFAZOLIN SODIUM 2 G: 2 INJECTION, SOLUTION INTRAVENOUS at 17:18

## 2022-07-08 RX ADMIN — DILTIAZEM HYDROCHLORIDE 30 MG: 30 TABLET, FILM COATED ORAL at 00:59

## 2022-07-08 RX ADMIN — PHENYLEPHRINE HYDROCHLORIDE 100 MCG: 10 INJECTION INTRAVENOUS at 10:26

## 2022-07-08 RX ADMIN — MAGNESIUM SULFATE HEPTAHYDRATE 2 G: 40 INJECTION, SOLUTION INTRAVENOUS at 09:15

## 2022-07-08 RX ADMIN — DILTIAZEM HYDROCHLORIDE 30 MG: 30 TABLET, FILM COATED ORAL at 13:19

## 2022-07-08 RX ADMIN — OXYCODONE 5 MG: 5 TABLET ORAL at 20:42

## 2022-07-08 RX ADMIN — AMIODARONE HYDROCHLORIDE 150 MG: 1.5 INJECTION, SOLUTION INTRAVENOUS at 11:11

## 2022-07-08 RX ADMIN — SENNOSIDES AND DOCUSATE SODIUM 2 TABLET: 50; 8.6 TABLET ORAL at 17:32

## 2022-07-08 RX ADMIN — INSULIN HUMAN 3 UNITS: 100 INJECTION, SOLUTION PARENTERAL at 20:37

## 2022-07-08 RX ADMIN — AMIODARONE HYDROCHLORIDE 1 MG/MIN: 1.8 INJECTION, SOLUTION INTRAVENOUS at 11:41

## 2022-07-08 RX ADMIN — CEFAZOLIN SODIUM 2 G: 2 INJECTION, SOLUTION INTRAVENOUS at 02:20

## 2022-07-08 RX ADMIN — METOPROLOL TARTRATE 50 MG: 50 TABLET, FILM COATED ORAL at 17:32

## 2022-07-08 RX ADMIN — DILTIAZEM HYDROCHLORIDE 30 MG: 30 TABLET, FILM COATED ORAL at 05:08

## 2022-07-08 RX ADMIN — INSULIN GLARGINE-YFGN 10 UNITS: 100 INJECTION, SOLUTION SUBCUTANEOUS at 17:19

## 2022-07-08 ASSESSMENT — PAIN DESCRIPTION - PAIN TYPE
TYPE: ACUTE PAIN

## 2022-07-08 ASSESSMENT — ENCOUNTER SYMPTOMS
SHORTNESS OF BREATH: 0
CHILLS: 0
COUGH: 0
FEVER: 0
BACK PAIN: 1

## 2022-07-08 ASSESSMENT — LIFESTYLE VARIABLES
AVERAGE NUMBER OF DAYS PER WEEK YOU HAVE A DRINK CONTAINING ALCOHOL: 0
TOTAL SCORE: 0
TOTAL SCORE: 0
EVER FELT BAD OR GUILTY ABOUT YOUR DRINKING: NO
CONSUMPTION TOTAL: NEGATIVE
TOTAL SCORE: 0
EVER HAD A DRINK FIRST THING IN THE MORNING TO STEADY YOUR NERVES TO GET RID OF A HANGOVER: NO
HAVE PEOPLE ANNOYED YOU BY CRITICIZING YOUR DRINKING: NO
HAVE YOU EVER FELT YOU SHOULD CUT DOWN ON YOUR DRINKING: NO
HOW MANY TIMES IN THE PAST YEAR HAVE YOU HAD 5 OR MORE DRINKS IN A DAY: 0
ON A TYPICAL DAY WHEN YOU DRINK ALCOHOL HOW MANY DRINKS DO YOU HAVE: 0
ALCOHOL_USE: NO

## 2022-07-08 ASSESSMENT — PAIN SCALES - GENERAL: PAIN_LEVEL: 0

## 2022-07-08 ASSESSMENT — FIBROSIS 4 INDEX: FIB4 SCORE: 4.09

## 2022-07-08 NOTE — OR NURSING
1035-Patient arrival to PACU post CARLI cardioversion. HR sinus 155-165 with frequent PACs. Dr. Phillips aware. Order for amiodarone bolus and infusion received.     1050-Patient awake, alert and oriented x3, calm, denies pain. Normotensive, -140. 3L nasal cannula 96% breathing even and unlabored.    1115-Amiodarone bolus started. Patient resting comfortably, denies SOB and discomfort. HR  with frequent PACs, normotensive.     1130-Amiodarone gtt started at 1mg/min, hemodynamically stable. Patient resting comfortably. Patients friend Skyla updated on status and plan of care.

## 2022-07-08 NOTE — CARE PLAN
The patient is Stable - Low risk of patient condition declining or worsening    Shift Goals  Clinical Goals: control pain, IV ABX  Patient Goals: get better  Family Goals: comfort, discharge    Progress made toward(s) clinical / shift goals:    Problem: Pain - Standard  Goal: Alleviation of pain or a reduction in pain to the patient’s comfort goal  Outcome: Progressing     Problem: Knowledge Deficit - Standard  Goal: Patient and family/care givers will demonstrate understanding of plan of care, disease process/condition, diagnostic tests and medications  Outcome: Progressing     Problem: Fall Risk  Goal: Patient will remain free from falls  Outcome: Progressing     Problem: Communication  Goal: The ability to communicate needs accurately and effectively will improve  Outcome: Progressing       Patient is not progressing towards the following goals:

## 2022-07-08 NOTE — CARE PLAN
The patient is Stable - Low risk of patient condition declining or worsening    Shift Goals  Clinical Goals: control pain, IV ABX  Patient Goals: get better  Family Goals: comfort, discharge    Progress made toward(s) clinical / shift goals:    Problem: Pain - Standard  Goal: Alleviation of pain or a reduction in pain to the patient’s comfort goal  Outcome: Progressing     Problem: Knowledge Deficit - Standard  Goal: Patient and family/care givers will demonstrate understanding of plan of care, disease process/condition, diagnostic tests and medications  Outcome: Progressing     Problem: Fall Risk  Goal: Patient will remain free from falls  Outcome: Progressing     Problem: Skin Integrity  Goal: Skin integrity is maintained or improved  Outcome: Progressing       Patient is not progressing towards the following goals:

## 2022-07-08 NOTE — PROGRESS NOTES
Cardiology Follow-up Consult Note    Date of Service:    7/8/2022      Consulting Physician: Terry Vences M.D.    Name:   Nitesh Duron   YOB: 1950  Age:   72 y.o.  male   MRN:   [id number]      HPI:    Nitesh Duron is a 72 y.o. male with a history of HTN, HLD, atrial fibrillation, controlled Type 2 DM with diet and exercise and HCV cirrhosis  who presented to the ED on 7/4/2022 for weakness and left knee pain and swelling for the past 4 days. Patient states that he got out of bed, turned and fell on the floor and couldn't get up. Denies any syncopal episodes or hitting his head. Patient was transported via EMS to the hospital for treatment. While in the ED, patient was found to be in Afib with HR in 150-200s, no symptoms. Patient given fluids for dehydration and started on diltiazem drip 15 mg/hr and his home metoprolol dose continued.    Interval Update:  7/5: Patient on IV diltiazem drip and home dose metoprolol. ECHO EF 55%, no wall motion abnormality.  7/6: Patient loaded with IV digoxin. Underwent I&D.   7/7: Anticoagulation restarted. Metoprolol increased to 50 mg BID. Started 5 mg amlodipine for HTN. Amlodipine stopped. Cardizem 30 mg Q6H started in pm.       Subjective:  No acute events overnight. Patient still complains of knee pain. States it's the same as yesterday. Patient has no other acute complaints. Patient scheduled for CARLI and CV this am. Patient had some mild anxiety about these procedures.  After risks and benefits discussion, patient states he is ready to get procedure done. Denies weakness, dizziness, chest pain, palpitations, blurry vision, shortness of breath or fatigue.     All other review of systems reviewed and negative.      Past medical, surgical, social, and family history reviewed and unchanged from admission except as noted in assessment and plan.    Medications: Reviewed in MAR      Allergies   Allergen Reactions   • Ciprofloxacin  "Unspecified     convulsions   • Statins [Hmg-Coa-R Inhibitors] Unspecified     Stabbing pains in kidneys   • Acetaminophen      Kidney pain    • Atorvastatin      Patient declines   • Ibuprofen      Kidney pain   • Naproxen      Kidney pain   • Shellfish Allergy      Kidney stones   • Soap &  [Alcohol] Rash     Antibacterial soap- contact dermatitis         Intake/Output Summary (Last 24 hours) at 7/8/2022 1207  Last data filed at 7/8/2022 1023  Gross per 24 hour   Intake 1562.72 ml   Output 1795 ml   Net -232.28 ml        Physical Exam  Body mass index is 27.71 kg/m².  /86   Pulse (!) 125   Temp 37 °C (98.6 °F) (Temporal)   Resp 16   Ht 1.778 m (5' 10\")   Wt 87.6 kg (193 lb 2 oz)   SpO2 97%   Vitals:    07/08/22 1115 07/08/22 1130 07/08/22 1145 07/08/22 1200   BP: 105/77 131/69 138/75 133/86   Pulse: (!) 130 (!) 130 (!) 117 (!) 125   Resp: 18 15 14 16   Temp:    37 °C (98.6 °F)   TempSrc:    Temporal   SpO2: 95% 96% 96% 97%   Weight:       Height:         Oxygen Therapy:  Pulse Oximetry: 97 %, O2 (LPM): 3, O2 Delivery Device: Nasal Cannula    General: Well appearing and in no apparent distress  Eyes: nl conjunctiva  ENT: OP clear  Neck: JVP elevated, no carotid bruits  Lungs: normal respiratory effort, CTAB  Heart: Tachycardic, no murmurs, no rubs or gallops, no edema bilateral lower extremities. No LV/RV heave on cardiac palpatation. 2+ bilateral radial pulses.  2+ bilateral dp pulses.   Abdomen: soft, mildly tender, non distended, no masses, normal bowel sounds.  No HSM.  Extremities/MSK: no clubbing, no cyanosis, swelling left knee  Neurological: No focal sensory deficits  Psychiatric: Appropriate affect, A/O x 3  Skin: Warm extremities, hemosiderin skin changes bilaterally on lower extremities    Labs (personally reviewed and notable for):   Lab Results   Component Value Date/Time    SODIUM 127 (L) 07/08/2022 04:00 AM    POTASSIUM 4.1 07/08/2022 04:00 AM    CHLORIDE 93 (L) 07/08/2022 04:00 " AM    CO2 24 07/08/2022 04:00 AM    GLUCOSE 144 (H) 07/08/2022 04:00 AM    BUN 16 07/08/2022 04:00 AM    CREATININE 0.60 07/08/2022 04:00 AM      Lab Results   Component Value Date/Time    WBC 5.6 07/08/2022 04:00 AM    RBC 4.86 07/08/2022 04:00 AM    HEMOGLOBIN 14.0 07/08/2022 04:00 AM    HEMATOCRIT 40.3 (L) 07/08/2022 04:00 AM    MCV 82.9 07/08/2022 04:00 AM    MCH 28.8 07/08/2022 04:00 AM    MCHC 34.7 07/08/2022 04:00 AM    MPV 9.9 07/08/2022 04:00 AM    NEUTSPOLYS 84.60 (H) 07/08/2022 04:00 AM    LYMPHOCYTES 5.60 (L) 07/08/2022 04:00 AM    MONOCYTES 7.90 07/08/2022 04:00 AM    EOSINOPHILS 0.40 07/08/2022 04:00 AM    BASOPHILS 0.20 07/08/2022 04:00 AM    HYPOCHROMIA 1+ 01/29/2019 10:32 AM    ANISOCYTOSIS 1+ 01/29/2019 10:32 AM      Lab Results   Component Value Date/Time    CHOLSTRLTOT 179 07/06/2021 10:02 AM     (H) 07/06/2021 10:02 AM    HDL 43 07/06/2021 10:02 AM    TRIGLYCERIDE 88 07/06/2021 10:02 AM       Lab Results   Component Value Date/Time    TROPONINT 43 (H) 07/04/2022 1335     Lab Results   Component Value Date/Time    NTPROBNP 3678 (H) 07/04/2022 1335       Cardiac Imaging and Procedures Review:    ECHO 7/5/2022: EF 55%, diastolic function unable to be assessed due to arrhythmia and reduced right ventricular systolic function .    EKG 7/8/2022: Sinus Tachycardia with atrial tachycardic runs, rate in 140s.     Radiology test Review:  EC-CARLI W/O CONT         MR-LUMBAR SPINE-WITH & W/O   Final Result         Multilevel degenerative changes in lumbar spine as described above. There is severe canal stenosis at the L3-4, L4-5 level with cauda equina compression.      At L2-3 there is moderate canal stenosis with cauda equina compression.      Disc bulge extends into the right extra foraminal zone at the L3-4 and L4-5 level with impingement upon exiting nerves in the extraforaminal zone.      Abnormal edema is identified in the bilateral paraspinal soft tissues at the L3-4, L4-5 and L5-S1 levels with  mild enhancement identified in the right-sided paraspinous soft tissues. These findings are concerning for an ongoing infectious inflammatory    process involving the facet joints.      Minimal epidural thickening is identified dorsally in the spinal canal behind L3 and L4. There is also a hypoenhancing area along the left posterolateral spinal canal that impinges upon the thecal sac. This could represent a developing epidural abscess.    Recommend follow-up examination in 2-3 days.      CT-HEAD W/O   Final Result      1. No CT evidence of acute infarct, hemorrhage or mass.   2. Mild global parenchymal atrophy. Chronic small vessel ischemic changes.      EC-ECHOCARDIOGRAM COMPLETE W/ CONT   Final Result      DX-KNEE 3 VIEWS LEFT   Final Result      1.  Severe tricompartmental left knee osteoarthritis      2.  osteoarthritis the proximal tibiofibular articulation      3.  Multiple intra-articular ossific body      4.  Diffuse atherosclerotic plaque      DX-CHEST-PORTABLE (1 VIEW)   Final Result      No acute cardiopulmonary abnormality identified.      CL-CARDIOVERSION    (Results Pending)       Problem list:  Principal Problem:    MSSA bacteremia POA: Unknown  Active Problems:    Essential hypertension, benign POA: Yes    Cirrhosis of liver without ascites (HCC) POA: Yes    Hepatocellular carcinoma (HCC) POA: Yes    Thrombocytopenia (HCC) POA: Unknown    Atrial fibrillation with RVR (HCC) POA: Unknown    Type 2 diabetes mellitus with hyperglycemia, without long-term current use of insulin (HCC) POA: Unknown    Hyponatremia POA: Unknown    Traumatic rhabdomyolysis (HCC) POA: Unknown    Acute cystitis without hematuria POA: Unknown    Falls POA: Unknown    A-fib (HCC) POA: Yes    Pyogenic arthritis of left knee joint (HCC) POA: Unknown  Resolved Problems:    * No resolved hospital problems. *      Impression and Medical Decision Making:  #Atrial fibrillation with RVR  #HTN, uncontrolled   #MSSA bacteremia  #Septic  arthritis of left knee    Recommendations:  --Scheduled and underwent CARLI to rule out endocarditis and intracardiac thrombus and CV. No signs of endocarditis or intracardiac thrombus on CARLI. Patient cardioverted to sinus rhythm. EKG showed sinus tachycardia with atrial tachycardic runs, rate in 140s.   --Started on IV amiodarone.   --Continue metoprolol 50 mg BID and cardizem 30 mg Q6H. If heart rate still elevated and blood pressure allows, can increase to 75 mg metoprolol.   --Continue lovenox, can transition to NOAC at time of discharge.  --Recheck digoxin level after 5 PO doses (check 7/12/2022)

## 2022-07-08 NOTE — PROGRESS NOTES
"Patient seen and examined  Complains of  Pain as expected  Improved    /87   Pulse 100   Temp 36.6 °C (97.8 °F) (Temporal)   Resp 19   Ht 1.778 m (5' 10\")   Wt 87.6 kg (193 lb 2 oz)   SpO2 93%     Recent Labs     07/06/22  0140 07/07/22  0140 07/08/22  0400   WBC 7.2 6.4 5.6   RBC 4.61* 4.53* 4.86   HEMOGLOBIN 13.5* 13.0* 14.0   HEMATOCRIT 38.4* 37.7* 40.3*   MCV 83.3 83.2 82.9   MCH 29.3 28.7 28.8   MCHC 35.2 34.5 34.7   RDW 42.9 42.3 41.7   PLATELETCT 61* 81* 109*   MPV 10.9 10.2 9.9       No acute distress  Dressing clean dry and intact  Neurovascularly intact    POD# 2 I&D septic knee   S/P      Plan:  WBAT  ROM encouraged  PT  ABX per ID  ABDOUL 2 weeks post-op on DC          "

## 2022-07-08 NOTE — PROGRESS NOTES
Cardiology Follow Up Progress Note    Date of Service  7/9/22  Attending Physician  Terry Vences M.D.    Chief Complaint   Presented with weakness, knee pain, joint pain and myalgia    Cardiology consultation for evaluation of A. fib RVR.    HPI  Massimo Duron is a 72 y.o. male admitted 7/4/2022 with sepsis and septic joint found MSSA bacteremia.  In A. fib RVR.    PMH: Hepatocellular carcinoma, HCV liver cirrhosis, EtOH/HCV s/p treatment, type 2 diabetes, positive family history of premature CAD in father and brothers, former smoker.    Interim Events  No overnight cardiac events.  Maintaining SR on IV amiodarone.  Transition to p.o. today.  Amiodarone 400 mg p.o. twice daily x2 weeks then 200 mg daily.  Underwent successful CARLI cardioversion 7/8/2022, SVT post cardioversion heart rate 1 .  Received bolus of IV amiodarone.  Currently in sinus rhythm.  Labs reviewed  Hyponatremia, sodium 123  K, creatinine stable  Phosphorus 2.2  Review of Systems  Review of Systems   Respiratory: Negative for apnea, cough, choking, chest tightness, shortness of breath, wheezing and stridor.    Cardiovascular: Negative for chest pain.       Vital signs in last 24 hours  Temp:  [36.6 °C (97.8 °F)-37 °C (98.6 °F)] 37 °C (98.6 °F)  Pulse:  [] 125  Resp:  [11-23] 16  BP: ()/(56-87) 133/86  SpO2:  [90 %-98 %] 97 %    Physical Exam  Physical Exam  Cardiovascular:      Rate and Rhythm: Normal rate and regular rhythm.      Pulses: Normal pulses.   Pulmonary:      Effort: Pulmonary effort is normal.   Skin:     General: Skin is warm.   Psychiatric:         Mood and Affect: Mood normal.         Lab Review  Lab Results   Component Value Date/Time    WBC 5.6 07/08/2022 04:00 AM    RBC 4.86 07/08/2022 04:00 AM    HEMOGLOBIN 14.0 07/08/2022 04:00 AM    HEMATOCRIT 40.3 (L) 07/08/2022 04:00 AM    MCV 82.9 07/08/2022 04:00 AM    MCH 28.8 07/08/2022 04:00 AM    MCHC 34.7 07/08/2022 04:00 AM    MPV 9.9 07/08/2022  04:00 AM      Lab Results   Component Value Date/Time    SODIUM 127 (L) 07/08/2022 04:00 AM    POTASSIUM 4.1 07/08/2022 04:00 AM    CHLORIDE 93 (L) 07/08/2022 04:00 AM    CO2 24 07/08/2022 04:00 AM    GLUCOSE 144 (H) 07/08/2022 04:00 AM    BUN 16 07/08/2022 04:00 AM    CREATININE 0.60 07/08/2022 04:00 AM      Lab Results   Component Value Date/Time    ASTSGOT 27 07/08/2022 04:00 AM    ALTSGPT 19 07/08/2022 04:00 AM     Lab Results   Component Value Date/Time    CHOLSTRLTOT 179 07/06/2021 10:02 AM     (H) 07/06/2021 10:02 AM    HDL 43 07/06/2021 10:02 AM    TRIGLYCERIDE 88 07/06/2021 10:02 AM    TROPONINT 43 (H) 07/04/2022 01:35 PM       No results for input(s): NTPROBNP in the last 72 hours.    Cardiac Imaging and Procedures Review  EKG: A. fib RVR on admit, rate 180    Echocardiogram:    7/5/22  Normal left ventricular size and systolic function.   The left ventricular ejection fraction is estimated to be 55%.  No evidence of valvular abnormality based on Doppler evaluation.   Unable to estimate right ventricular systolic pressure due to an   inadequate tricuspid regurgitant jet.           Cardiac Catheterization: n/a    Imaging  Chest X-Ray:  No acute cardiopulmonary abnormality identified.    Stress Test:  n/a    Assessment/Plan    #A. fib RVR, rate 180 likely from underlying sepsis.  #MSSA bacteremia.  BLD cx + 7/7/22 transition Ancef to nafcillin.  ID on board.  #Septic arthritis of the left knee.  Status post exploration and drainage 7/6/2022  #Hep C virus, liver cirrhosis. s /p treatment.  #LVEF 55%, no evidence of valvular abnormality.    Recommendations    # s/p successful CARLI/DCCV 7/8/2022.  No evidence of endocarditis.  No evidence of intracardiac thrombus.  #Transition IV amiodarone to p.o. amiodarone 400 twice daily x2 weeks, then 200 mg daily.  #Metoprolol 50 mg BID.  #Continue with low-dose digoxin 125 MCG daily.  Avoid increasing the dose given initiation of amiodarone and potential for drug  drug interaction.  #Transition from Lovenox to Eliquis 5 mg twice daily either now or at time of discharge.  #Cardiology will sign off.  #Patient is to follow-up cardiology outpatient, will arrange.    I personally spent a total of 20 minutes which includes face-to-face time and non-face-to-face time spent on preparing to see the patient, reviewing hospital notes and tests, obtaining history from the patient, performing a medically appropriate exam, counseling and educating the patient, ordering medications/tests/procedures/referrals as clinically indicated, and documenting information in the electronic medical record.        Thank you for allowing me to participate in the care of this patient.      Please contact me with any questions.    BRO Gomez.   Cardiologist, [facility]  [phone]

## 2022-07-08 NOTE — PROCEDURES
Electrical Cardioversion    Date/Time: 7/8/2022 10:37 AM  Performed by: Mukul Phillips M.D.  Authorized by: Mukul Phillips M.D.     Consent:     Consent obtained:  Written    Consent given by:  Patient    Risks discussed:  Cutaneous burn, death, induced arrhythmia and pain    Alternatives discussed:  No treatment, anti-coagulation medication and observation  Sedation:     Patient sedated: Yes      Sedation type:  Per anesthesia  Pre-procedure details:     Cardioversion basis:  Elective    Rhythm:  Atrial fibrillation    Anticoagulation status:  Other  Attempt one:     Cardioversion mode:  Synchronous    Waveform:  Biphasic    Shock (Joules):  200    Shock outcome:  Conversion to normal sinus rhythm  Post-procedure details:     Patient status:  Awake    Patient tolerance of procedure:  Tolerated well, no immediate complications

## 2022-07-08 NOTE — PROGRESS NOTES
NO HARD CHART:     Patient to procedure Room for CARLI/CV with no hard chart, only loose papers. Per Floor RN, no hard chart available. Procedure RN completed Pre-Procedure Consent paperwork packet, including: WHO Yellow Sheet signed by RN and MD, Informed consent for CARLI/CV procedure signed by RN, patient, and Cardiology, and Informed consent for Anesthesia signed by Anesthesia MD and patient. Procedure Packet secured to loose papers and hand delivered to receiving PACU RN who acknowledged receipt and no hard chart.

## 2022-07-08 NOTE — PROGRESS NOTES
Received report from PRUDENCE Quintana. Patient arrived back to room from PACU. Patient transported on monitor with LEYLAS RN. Patient appears to still be in atrial fibrillation at ~145bpm. Patient is awake and alert. Patient repositioned and reoriented to room. Call light and belongings near by. Will continue to monitor.     1330: Dr. Phillips and Dr. Brooks notified of patient's HR. No new orders received.     1530: Noticed rate control of patient's HR, also noticing a p wave for every QRS complex. Patient converted to NSR with frequent PACs. Dr. Phillips, Dr. Brooks and Dr. Romero Vences notified. Will continue to monitor.

## 2022-07-08 NOTE — ANESTHESIA TIME REPORT
Anesthesia Start and Stop Event Times     Date Time Event    7/8/2022 0953 Ready for Procedure     0953 Anesthesia Start     1037 Anesthesia Stop        Responsible Staff  07/08/22    Name Role Begin End    Doroteo Isaacs M.D. Anesth 0953 1037        Overtime Reason:  overtime with call-back    Comments:

## 2022-07-08 NOTE — ANESTHESIA PREPROCEDURE EVALUATION
Date/Time: 07/08/22 1000    Scheduled providers: Roel Crawford M.D.; Shankar Watson M.D.    Procedures:       EC-CARLI W/O CONT      CL-CARDIOVERSION    Diagnosis:       Atrial fibrillation with RVR (HCC) [I48.91]      A-fib (HCC) [I48.91]      Atrial fibrillation with RVR (HCC) [I48.91]      A-fib (HCC) [I48.91]      Atrial fibrillation with RVR (HCC) [I48.91]      A-fib (HCC) [I48.91]    Indications: See Assoicated Dx    Location: [facility] IMAGING - ECHOCARDIOLOGY [facility]          Relevant Problems   CARDIAC   (positive) A-fib (HCC)   (positive) Atrial fibrillation with RVR (HCC)   (positive) Coronary artery disease involving native coronary artery without angina pectoris   (positive) Elevated coronary artery calcium score   (positive) Essential hypertension, benign   (positive) Undiagnosed cardiac murmurs         (positive) Calculus of both kidneys   (positive) Cirrhosis of liver without ascites (HCC)   (positive) Hepatocellular carcinoma (HCC)      ENDO   (positive) Type 2 diabetes mellitus with hyperglycemia, without long-term current use of insulin (HCC)      Other   (positive) Pyogenic arthritis of left knee joint (HCC)   (positive) Splenomegaly       Physical Exam    Airway   Mallampati: II  TM distance: >3 FB  Neck ROM: full       Cardiovascular - normal exam  Rhythm: regular  Rate: normal  (-) murmur     Dental - normal exam           Pulmonary - normal exam  Breath sounds clear to auscultation     Abdominal    Neurological - normal exam                 Anesthesia Plan    ASA 3- EMERGENT   ASA physical status 3 criteria: hypertension - poorly controlledASA physical status emergent criteria: cardiac compromise requiring immediate intervention    Plan - MAC               Induction: intravenous    Postoperative Plan: Postoperative administration of opioids is intended.    Pertinent diagnostic labs and testing reviewed    Informed Consent:    Anesthetic plan and risks discussed with  patient.    Use of blood products discussed with: patient whom consented to blood products.

## 2022-07-08 NOTE — PROGRESS NOTES
Hospital Medicine Daily Progress Note    Date of Service  7/8/2022    Chief Complaint  Nitesh Duron is a 72 y.o. male admitted 7/4/2022 with weakness and fall    Hospital Course    72-year-old male with history of diabetes, dyslipidemia hepatitis C and cirrhosis with atrial fibrillation presented 7/4 with fall and weakness.  Patient states he fell 4 days ago at home and he landed on his left knee.  He noticed swelling on his left knee and severe back pain with some weakness on the left leg, patient has been on the ground most of the time and not able to get up.  Patient lives by himself.  Patient was found on the ground by his girlfriend and she called EMS.  On admission patient was found to have A. fib with RVR.  Dehydration however blood pressure was stable.  Labs did not show leukocytosis however showed hyponatremia, creatinine 1.2 with CPK 2411 and lactic acid 4.4.  IV fluid was started.  Blood culture came back positive with MSSA, cefazolin was initiated and ID was consulted.  Echo is pending.      Patient underwent arthrocentesis by Ortho on his left knee and showed more than 37,000 white blood cell with no red blood cell high suspicious of septic arthritis specially with positive blood culture, ID on board and patient is on cefazolin.      Interval Problem Update:    Underwent CARLI cardioversion  No evidence of endocarditis on CARLI  On amiodarone drip  Afebrile  Blood cultures from 7/6 growing staph aureus  Cultures from 7 7 pending        I have discussed this patient's plan of care and discharge plan at IDT rounds today with Case Management, Nursing, Nursing leadership, and other members of the IDT team.    Consultants/Specialty  cardiology, critical care and orthopedics   Spine surgery      Code Status  Full Code    Disposition  Patient is not medically cleared for discharge.   Anticipate discharge to to skilled nursing facility.  I have placed the appropriate orders for post-discharge  needs.    Review of Systems  Review of Systems   Constitutional: Negative for chills and fever.   Respiratory: Negative for cough and shortness of breath.    Cardiovascular: Negative for chest pain.   Musculoskeletal: Positive for back pain and joint pain.   All other systems reviewed and are negative.       Physical Exam  Temp:  [36.6 °C (97.8 °F)-37 °C (98.6 °F)] 36.9 °C (98.4 °F)  Pulse:  [] 68  Resp:  [11-23] 15  BP: ()/(56-88) 114/63  SpO2:  [90 %-99 %] 99 %    Physical Exam  Vitals and nursing note reviewed.   Constitutional:       Appearance: He is well-developed. He is not diaphoretic.   HENT:      Head: Normocephalic and atraumatic.      Mouth/Throat:      Pharynx: No oropharyngeal exudate.   Eyes:      General: No scleral icterus.        Right eye: No discharge.         Left eye: No discharge.      Conjunctiva/sclera: Conjunctivae normal.      Pupils: Pupils are equal, round, and reactive to light.   Neck:      Vascular: No JVD.      Trachea: No tracheal deviation.   Cardiovascular:      Rate and Rhythm: Tachycardia present. Rhythm irregular.      Heart sounds: No murmur heard.    No friction rub. No gallop.   Pulmonary:      Effort: Pulmonary effort is normal. No respiratory distress.      Breath sounds: Normal breath sounds. No stridor. No wheezing.   Chest:      Chest wall: No tenderness.   Abdominal:      General: Bowel sounds are normal. There is no distension.      Palpations: Abdomen is soft.      Tenderness: There is no abdominal tenderness. There is no rebound.   Musculoskeletal:         General: Swelling and tenderness (Left knee) present.      Cervical back: Neck supple.   Skin:     General: Skin is warm and dry.      Nails: There is no clubbing.   Neurological:      Mental Status: He is alert and oriented to person, place, and time.      Cranial Nerves: No cranial nerve deficit.      Motor: Weakness (Lower extremities left greater than right mainly limited by pain) present. No  abnormal muscle tone.   Psychiatric:         Behavior: Behavior normal.         Fluids    Intake/Output Summary (Last 24 hours) at 7/8/2022 1618  Last data filed at 7/8/2022 1600  Gross per 24 hour   Intake 1455.3 ml   Output 2020 ml   Net -564.7 ml       Laboratory  Recent Labs     07/06/22  0140 07/07/22  0140 07/08/22  0400   WBC 7.2 6.4 5.6   RBC 4.61* 4.53* 4.86   HEMOGLOBIN 13.5* 13.0* 14.0   HEMATOCRIT 38.4* 37.7* 40.3*   MCV 83.3 83.2 82.9   MCH 29.3 28.7 28.8   MCHC 35.2 34.5 34.7   RDW 42.9 42.3 41.7   PLATELETCT 61* 81* 109*   MPV 10.9 10.2 9.9     Recent Labs     07/07/22  0140 07/07/22  0940 07/08/22  0400   SODIUM 127* 125* 127*   POTASSIUM 4.4 4.4 4.1   CHLORIDE 93* 92* 93*   CO2 23 22 24   GLUCOSE 166* 174* 144*   BUN 19 17 16   CREATININE 0.55 0.55 0.60   CALCIUM 8.0* 8.4* 8.0*                   Imaging  EC-CARLI W/O CONT   Final Result      MR-LUMBAR SPINE-WITH & W/O   Final Result         Multilevel degenerative changes in lumbar spine as described above. There is severe canal stenosis at the L3-4, L4-5 level with cauda equina compression.      At L2-3 there is moderate canal stenosis with cauda equina compression.      Disc bulge extends into the right extra foraminal zone at the L3-4 and L4-5 level with impingement upon exiting nerves in the extraforaminal zone.      Abnormal edema is identified in the bilateral paraspinal soft tissues at the L3-4, L4-5 and L5-S1 levels with mild enhancement identified in the right-sided paraspinous soft tissues. These findings are concerning for an ongoing infectious inflammatory    process involving the facet joints.      Minimal epidural thickening is identified dorsally in the spinal canal behind L3 and L4. There is also a hypoenhancing area along the left posterolateral spinal canal that impinges upon the thecal sac. This could represent a developing epidural abscess.    Recommend follow-up examination in 2-3 days.      CT-HEAD W/O   Final Result      1. No  CT evidence of acute infarct, hemorrhage or mass.   2. Mild global parenchymal atrophy. Chronic small vessel ischemic changes.      EC-ECHOCARDIOGRAM COMPLETE W/ CONT   Final Result      DX-KNEE 3 VIEWS LEFT   Final Result      1.  Severe tricompartmental left knee osteoarthritis      2.  osteoarthritis the proximal tibiofibular articulation      3.  Multiple intra-articular ossific body      4.  Diffuse atherosclerotic plaque      DX-CHEST-PORTABLE (1 VIEW)   Final Result      No acute cardiopulmonary abnormality identified.      CL-CARDIOVERSION    (Results Pending)        Assessment/Plan  * MSSA bacteremia  Assessment & Plan  Blood culture on admission 7/4 positive for MSSA 2/2  Source likely septic arthritis  Status post left knee I&D on 7/6/2022 Ortho service following  Continue IV cefazolin  MRI lumbar spine with spinal stenosis and concern for possible developing epidural abscess evaluated by spine surgery with recommendation for medical treatment and outpatient follow-up     Continue IV cefazolin ID following follow-up on repeat blood cultures from 7/7/2022      Hypomagnesemia  Assessment & Plan  Replete and monitor    Pyogenic arthritis of left knee joint (HCC)  Assessment & Plan  Synovial fluid culture MSSA  Status post I&D by Ortho on 7/6/2022  Continue IV Ancef    Falls  Assessment & Plan  PT OT eval's    Acute cystitis without hematuria  Assessment & Plan  Urine culture MSSA continue Ancef    Traumatic rhabdomyolysis ([facility])  Assessment & Plan  DC IV fluids    Hyponatremia  Assessment & Plan  Improved continue free fluid restrictions and monitor      Type 2 diabetes mellitus with hyperglycemia, without long-term current use of insulin ([facility])  Assessment & Plan  Last a1c 6.3  Continue Lantus and sliding scale insulin monitor CBGs    Atrial fibrillation with RVR ([facility])  Assessment & Plan  Echo EF 55% no valvular abnormalities  Cardiology following status post CARLI directed cardioversion  Continue metoprolol  digoxin and currently on amiodarone drip  Continue telemetry monitoring  Continue anticoagulation with Lovenox with plans to transition to Eliquis      Thrombocytopenia (HCC)  Assessment & Plan  Improved platelets 109 continue to monitor CBC with anticoagulation    Hepatocellular carcinoma (HCC)- (present on admission)  Assessment & Plan  History of hepatocellular carcinoma.   Patient has history of cirrhosis  Following with oncology as an outpatient.    Cirrhosis of liver without ascites (HCC)- (present on admission)  Assessment & Plan  History of hepatitis C virus and received treatment     Outpatient follow-up    Essential hypertension, benign- (present on admission)  Assessment & Plan  Continue metoprolol  Monitor blood pressure       VTE prophylaxis: SCDs/TEDs and enoxaparin ppx    I have performed a physical exam and reviewed and updated ROS and Plan today (7/8/2022). In review of yesterday's note (7/7/2022), there are no changes except as documented above.

## 2022-07-08 NOTE — THERAPY
Physical Therapy Contact Note    Patient Name: Nitesh Duron  Age:  72 y.o., Sex:  male  Medical Record #: [id number]  Today's Date: 7/8/2022    PT consult received.  Per EMR, pt is pending cardioversion today.  Will hold formal evaluation until post-op and pt medically appropriate for acute PT assessment.     Alex Mar, PT, DPT [phone]

## 2022-07-09 PROBLEM — T79.6XXA TRAUMATIC RHABDOMYOLYSIS (HCC): Status: RESOLVED | Noted: 2022-07-04 | Resolved: 2022-07-09

## 2022-07-09 LAB
ANION GAP SERPL CALC-SCNC: 8 MMOL/L (ref 7–16)
BACTERIA BLD CULT: ABNORMAL
BACTERIA BLD CULT: ABNORMAL
BACTERIA WND AEROBE CULT: ABNORMAL
BACTERIA WND AEROBE CULT: ABNORMAL
BASOPHILS # BLD AUTO: 0.9 % (ref 0–1.8)
BASOPHILS # BLD: 0.04 K/UL (ref 0–0.12)
BUN SERPL-MCNC: 21 MG/DL (ref 8–22)
CALCIUM SERPL-MCNC: 7.7 MG/DL (ref 8.5–10.5)
CHLORIDE SERPL-SCNC: 91 MMOL/L (ref 96–112)
CO2 SERPL-SCNC: 24 MMOL/L (ref 20–33)
CREAT SERPL-MCNC: 0.66 MG/DL (ref 0.5–1.4)
EOSINOPHIL # BLD AUTO: 0 K/UL (ref 0–0.51)
EOSINOPHIL NFR BLD: 0 % (ref 0–6.9)
ERYTHROCYTE [DISTWIDTH] IN BLOOD BY AUTOMATED COUNT: 42.6 FL (ref 35.9–50)
GFR SERPLBLD CREATININE-BSD FMLA CKD-EPI: 100 ML/MIN/1.73 M 2
GLUCOSE BLD STRIP.AUTO-MCNC: 139 MG/DL (ref 65–99)
GLUCOSE BLD STRIP.AUTO-MCNC: 160 MG/DL (ref 65–99)
GLUCOSE BLD STRIP.AUTO-MCNC: 181 MG/DL (ref 65–99)
GLUCOSE BLD STRIP.AUTO-MCNC: 200 MG/DL (ref 65–99)
GLUCOSE SERPL-MCNC: 168 MG/DL (ref 65–99)
GRAM STN SPEC: ABNORMAL
HCT VFR BLD AUTO: 36.7 % (ref 42–52)
HGB BLD-MCNC: 12.7 G/DL (ref 14–18)
LYMPHOCYTES # BLD AUTO: 0.27 K/UL (ref 1–4.8)
LYMPHOCYTES NFR BLD: 6.1 % (ref 22–41)
MAGNESIUM SERPL-MCNC: 1.9 MG/DL (ref 1.5–2.5)
MANUAL DIFF BLD: NORMAL
MCH RBC QN AUTO: 28.9 PG (ref 27–33)
MCHC RBC AUTO-ENTMCNC: 34.6 G/DL (ref 33.7–35.3)
MCV RBC AUTO: 83.4 FL (ref 81.4–97.8)
MONOCYTES # BLD AUTO: 0.15 K/UL (ref 0–0.85)
MONOCYTES NFR BLD AUTO: 3.5 % (ref 0–13.4)
MORPHOLOGY BLD-IMP: NORMAL
NEUTROPHILS # BLD AUTO: 3.94 K/UL (ref 1.82–7.42)
NEUTROPHILS NFR BLD: 85.2 % (ref 44–72)
NEUTS BAND NFR BLD MANUAL: 4.3 % (ref 0–10)
NRBC # BLD AUTO: 0 K/UL
NRBC BLD-RTO: 0 /100 WBC
OSMOLALITY UR: 498 MOSM/KG H2O (ref 300–900)
PHOSPHATE SERPL-MCNC: 2.2 MG/DL (ref 2.5–4.5)
PLATELET # BLD AUTO: 118 K/UL (ref 164–446)
PLATELET BLD QL SMEAR: NORMAL
PMV BLD AUTO: 11.1 FL (ref 9–12.9)
POIKILOCYTOSIS BLD QL SMEAR: NORMAL
POTASSIUM SERPL-SCNC: 4.3 MMOL/L (ref 3.6–5.5)
RBC # BLD AUTO: 4.4 M/UL (ref 4.7–6.1)
RBC BLD AUTO: PRESENT
SIGNIFICANT IND 70042: ABNORMAL
SIGNIFICANT IND 70042: ABNORMAL
SITE SITE: ABNORMAL
SITE SITE: ABNORMAL
SODIUM SERPL-SCNC: 123 MMOL/L (ref 135–145)
SODIUM UR-SCNC: 37 MMOL/L
SOURCE SOURCE: ABNORMAL
SOURCE SOURCE: ABNORMAL
SPHEROCYTES BLD QL SMEAR: NORMAL
WBC # BLD AUTO: 4.4 K/UL (ref 4.8–10.8)

## 2022-07-09 PROCEDURE — 85007 BL SMEAR W/DIFF WBC COUNT: CPT

## 2022-07-09 PROCEDURE — 700102 HCHG RX REV CODE 250 W/ 637 OVERRIDE(OP): Performed by: STUDENT IN AN ORGANIZED HEALTH CARE EDUCATION/TRAINING PROGRAM

## 2022-07-09 PROCEDURE — 700101 HCHG RX REV CODE 250: Performed by: INTERNAL MEDICINE

## 2022-07-09 PROCEDURE — 770000 HCHG ROOM/CARE - INTERMEDIATE ICU *

## 2022-07-09 PROCEDURE — 700111 HCHG RX REV CODE 636 W/ 250 OVERRIDE (IP): Performed by: HOSPITALIST

## 2022-07-09 PROCEDURE — A9270 NON-COVERED ITEM OR SERVICE: HCPCS

## 2022-07-09 PROCEDURE — 700102 HCHG RX REV CODE 250 W/ 637 OVERRIDE(OP)

## 2022-07-09 PROCEDURE — A9270 NON-COVERED ITEM OR SERVICE: HCPCS | Performed by: NURSE PRACTITIONER

## 2022-07-09 PROCEDURE — 82962 GLUCOSE BLOOD TEST: CPT

## 2022-07-09 PROCEDURE — 700102 HCHG RX REV CODE 250 W/ 637 OVERRIDE(OP): Performed by: NURSE PRACTITIONER

## 2022-07-09 PROCEDURE — 83735 ASSAY OF MAGNESIUM: CPT

## 2022-07-09 PROCEDURE — 80048 BASIC METABOLIC PNL TOTAL CA: CPT

## 2022-07-09 PROCEDURE — 700101 HCHG RX REV CODE 250: Performed by: HOSPITALIST

## 2022-07-09 PROCEDURE — 700105 HCHG RX REV CODE 258: Performed by: HOSPITALIST

## 2022-07-09 PROCEDURE — 85025 COMPLETE CBC W/AUTO DIFF WBC: CPT

## 2022-07-09 PROCEDURE — 99233 SBSQ HOSP IP/OBS HIGH 50: CPT | Mod: FS | Performed by: INTERNAL MEDICINE

## 2022-07-09 PROCEDURE — 99233 SBSQ HOSP IP/OBS HIGH 50: CPT | Performed by: INTERNAL MEDICINE

## 2022-07-09 PROCEDURE — 700111 HCHG RX REV CODE 636 W/ 250 OVERRIDE (IP): Performed by: INTERNAL MEDICINE

## 2022-07-09 PROCEDURE — 99232 SBSQ HOSP IP/OBS MODERATE 35: CPT | Performed by: NURSE PRACTITIONER

## 2022-07-09 PROCEDURE — A9270 NON-COVERED ITEM OR SERVICE: HCPCS | Performed by: STUDENT IN AN ORGANIZED HEALTH CARE EDUCATION/TRAINING PROGRAM

## 2022-07-09 PROCEDURE — 84300 ASSAY OF URINE SODIUM: CPT

## 2022-07-09 PROCEDURE — 83935 ASSAY OF URINE OSMOLALITY: CPT

## 2022-07-09 PROCEDURE — 700102 HCHG RX REV CODE 250 W/ 637 OVERRIDE(OP): Performed by: INTERNAL MEDICINE

## 2022-07-09 PROCEDURE — 84100 ASSAY OF PHOSPHORUS: CPT

## 2022-07-09 PROCEDURE — 99232 SBSQ HOSP IP/OBS MODERATE 35: CPT | Performed by: HOSPITALIST

## 2022-07-09 PROCEDURE — A9270 NON-COVERED ITEM OR SERVICE: HCPCS | Performed by: INTERNAL MEDICINE

## 2022-07-09 PROCEDURE — 700105 HCHG RX REV CODE 258: Performed by: INTERNAL MEDICINE

## 2022-07-09 RX ORDER — AMIODARONE HYDROCHLORIDE 200 MG/1
400 TABLET ORAL TWICE DAILY
Status: COMPLETED | OUTPATIENT
Start: 2022-07-09 | End: 2022-07-23

## 2022-07-09 RX ORDER — AMIODARONE HYDROCHLORIDE 200 MG/1
200 TABLET ORAL DAILY
Status: DISCONTINUED | OUTPATIENT
Start: 2022-07-24 | End: 2022-07-26 | Stop reason: HOSPADM

## 2022-07-09 RX ADMIN — OXYCODONE 5 MG: 5 TABLET ORAL at 23:44

## 2022-07-09 RX ADMIN — INSULIN HUMAN 2 UNITS: 100 INJECTION, SOLUTION PARENTERAL at 10:29

## 2022-07-09 RX ADMIN — METOPROLOL TARTRATE 50 MG: 50 TABLET, FILM COATED ORAL at 05:32

## 2022-07-09 RX ADMIN — ENOXAPARIN SODIUM 80 MG: 80 INJECTION SUBCUTANEOUS at 08:05

## 2022-07-09 RX ADMIN — OXYCODONE 5 MG: 5 TABLET ORAL at 15:06

## 2022-07-09 RX ADMIN — DIGOXIN 125 MCG: 0.12 TABLET ORAL at 17:23

## 2022-07-09 RX ADMIN — SENNOSIDES AND DOCUSATE SODIUM 2 TABLET: 50; 8.6 TABLET ORAL at 05:31

## 2022-07-09 RX ADMIN — CEFAZOLIN SODIUM 2 G: 2 INJECTION, SOLUTION INTRAVENOUS at 02:21

## 2022-07-09 RX ADMIN — OXYCODONE 5 MG: 5 TABLET ORAL at 19:23

## 2022-07-09 RX ADMIN — OXYCODONE 5 MG: 5 TABLET ORAL at 05:32

## 2022-07-09 RX ADMIN — AMIODARONE HYDROCHLORIDE 400 MG: 200 TABLET ORAL at 10:29

## 2022-07-09 RX ADMIN — NAFCILLIN SODIUM 12 G: 2 INJECTION, POWDER, FOR SOLUTION INTRAMUSCULAR; INTRAVENOUS at 10:28

## 2022-07-09 RX ADMIN — SODIUM PHOSPHATE, MONOBASIC, MONOHYDRATE AND SODIUM PHOSPHATE, DIBASIC, ANHYDROUS 30 MMOL: 276; 142 INJECTION, SOLUTION INTRAVENOUS at 08:19

## 2022-07-09 RX ADMIN — INSULIN HUMAN 2 UNITS: 100 INJECTION, SOLUTION PARENTERAL at 06:35

## 2022-07-09 RX ADMIN — METOPROLOL TARTRATE 50 MG: 50 TABLET, FILM COATED ORAL at 17:22

## 2022-07-09 RX ADMIN — AMIODARONE HYDROCHLORIDE 400 MG: 200 TABLET ORAL at 17:28

## 2022-07-09 RX ADMIN — INSULIN HUMAN 2 UNITS: 100 INJECTION, SOLUTION PARENTERAL at 19:58

## 2022-07-09 RX ADMIN — OXYCODONE 5 MG: 5 TABLET ORAL at 12:24

## 2022-07-09 RX ADMIN — ENOXAPARIN SODIUM 80 MG: 80 INJECTION SUBCUTANEOUS at 19:59

## 2022-07-09 RX ADMIN — INSULIN GLARGINE-YFGN 13 UNITS: 100 INJECTION, SOLUTION SUBCUTANEOUS at 17:23

## 2022-07-09 RX ADMIN — AMIODARONE HYDROCHLORIDE 0.5 MG/MIN: 1.8 INJECTION, SOLUTION INTRAVENOUS at 04:15

## 2022-07-09 RX ADMIN — OXYCODONE 5 MG: 5 TABLET ORAL at 02:21

## 2022-07-09 RX ADMIN — EZETIMIBE 10 MG: 10 TABLET ORAL at 17:23

## 2022-07-09 RX ADMIN — LISINOPRIL 10 MG: 10 TABLET ORAL at 17:23

## 2022-07-09 RX ADMIN — SENNOSIDES AND DOCUSATE SODIUM 2 TABLET: 50; 8.6 TABLET ORAL at 17:23

## 2022-07-09 ASSESSMENT — ENCOUNTER SYMPTOMS
BACK PAIN: 1
WHEEZING: 0
CHILLS: 0
SHORTNESS OF BREATH: 0
STRIDOR: 0
CHOKING: 0
CONSTIPATION: 0
COUGH: 0
NAUSEA: 0
MYALGIAS: 1
FEVER: 0
APNEA: 0
PALPITATIONS: 0
CHEST TIGHTNESS: 0
DIARRHEA: 0
ABDOMINAL PAIN: 0
VOMITING: 0
NERVOUS/ANXIOUS: 0

## 2022-07-09 ASSESSMENT — PAIN DESCRIPTION - PAIN TYPE
TYPE: ACUTE PAIN

## 2022-07-09 NOTE — PROGRESS NOTES
Infectious Disease Progress Note    Author: Mago Jacome M.D. Date & Time of service: 2022  8:22 AM    Chief Complaint:  MSSA bacteremia and septic joint     Interval History:    AF, O2 RA, complaining of left knee and leg pain.  In A. fib with RVR.      Review of Systems:  Review of Systems   Respiratory: Negative for cough and shortness of breath.    Gastrointestinal: Negative for abdominal pain, constipation, diarrhea, nausea and vomiting.   Musculoskeletal: Positive for back pain, joint pain and myalgias.   Psychiatric/Behavioral: The patient is not nervous/anxious.        Hemodynamics:  Temp (24hrs), Av.8 °C (98.2 °F), Min:36.5 °C (97.7 °F), Max:37 °C (98.6 °F)  Temperature: 36.8 °C (98.2 °F)  Pulse  Av.1  Min: 66  Max: 195   Blood Pressure : 131/75       Physical Exam:  Physical Exam  Cardiovascular:      Rate and Rhythm: Normal rate. Rhythm irregular.   Pulmonary:      Effort: Pulmonary effort is normal.      Breath sounds: Normal breath sounds.   Abdominal:      General: Abdomen is flat. Bowel sounds are normal.      Palpations: Abdomen is soft.   Musculoskeletal:         General: Tenderness and signs of injury present.      Left lower leg: Edema present.   Skin:     General: Skin is warm and dry.   Neurological:      General: No focal deficit present.      Mental Status: He is oriented to person, place, and time.   Psychiatric:         Mood and Affect: Mood normal.         Behavior: Behavior normal.         Meds:    Current Facility-Administered Medications:   •  sodium phosphate ivpb  •  metoprolol tartrate  •  dextrose 5%  •  amiodarone infusion  •  enoxaparin (LOVENOX) injection  •  insulin GLARGINE  •  digoxin  •  ceFAZolin  •  senna-docusate **AND** polyethylene glycol/lytes **AND** magnesium hydroxide **AND** bisacodyl  •  labetalol  •  ondansetron  •  ondansetron  •  insulin regular **AND** POC blood glucose manual result **AND** NOTIFY MD and PharmD **AND** Administer 20  grams of glucose (approximately 8 ounces of fruit juice) every 15 minutes PRN FSBG less than 70 mg/dL **AND** dextrose bolus  •  Notify provider if pain remains uncontrolled **AND** Use the Numeric Rating Scale (NRS), Yusuf-Baker Faces (WBF), or FLACC on regular floors and Critical-Care Pain Observation Tool (CPOT) on ICUs/Trauma to assess pain **AND** Pulse Ox **AND** Pharmacy Consult Request **AND** If patient difficult to arouse and/or has respiratory depression (respiratory rate of 10 or less), stop any opiates that are currently infusing and call a Rapid Response.  •  oxyCODONE immediate-release **OR** oxyCODONE immediate-release **OR** morphine injection  •  lisinopril  •  ezetimibe    Labs:  Recent Labs     07/07/22 0140 07/08/22 0400 07/09/22  0336   WBC 6.4 5.6 4.4*   RBC 4.53* 4.86 4.40*   HEMOGLOBIN 13.0* 14.0 12.7*   HEMATOCRIT 37.7* 40.3* 36.7*   MCV 83.2 82.9 83.4   MCH 28.7 28.8 28.9   RDW 42.3 41.7 42.6   PLATELETCT 81* 109* 118*   MPV 10.2 9.9 11.1   NEUTSPOLYS 89.00* 84.60*  --    LYMPHOCYTES 3.60* 5.60*  --    MONOCYTES 6.10 7.90  --    EOSINOPHILS 0.00 0.40  --    BASOPHILS 0.20 0.20  --      Recent Labs     07/07/22 0940 07/08/22 0400 07/09/22  0336   SODIUM 125* 127* 123*   POTASSIUM 4.4 4.1 4.3   CHLORIDE 92* 93* 91*   CO2 22 24 24   GLUCOSE 174* 144* 168*   BUN 17 16 21     Recent Labs     07/07/22 0140 07/07/22 0940 07/08/22 0400 07/09/22  0336   ALBUMIN 2.6* 2.5* 2.9*  --    TBILIRUBIN 0.6 0.7 0.6  --    ALKPHOSPHAT 90 99 104*  --    TOTPROTEIN 5.7* 5.8* 6.2  --    ALTSGPT 27 25 19  --    ASTSGOT 33 42 27  --    CREATININE 0.55 0.55 0.60 0.66       Imaging:  CT-HEAD W/O    Result Date: 7/5/2022   CT HEAD WITHOUT CONTRAST 7/5/2022 6:07 PM HISTORY/REASON FOR EXAM:  Pain following trauma TECHNIQUE/EXAM DESCRIPTION AND NUMBER OF VIEWS: CT of the head without contrast. The study was performed on a helical multidetector CT scanner. Contiguous 2.5 mm axial sections were obtained from the  skull base through the vertex. Up to date radiation dose reduction adjustments have been utilized to meet ALARA standards for radiation dose reduction. COMPARISON:  None available FINDINGS: Lateral ventricles are normal in size and symmetric. Mild global parenchymal atrophy. Chronic small vessel ischemic changes. No significant mass effect or midline shift. Basal cisterns are patent. No evidence for intracranial hemorrhage. Calvaria are intact. Atherosclerosis. Visualized orbits are unremarkable. Visualized mastoid air cells are clear. No significant sinus disease in the visualized paranasal sinuses.     1. No CT evidence of acute infarct, hemorrhage or mass. 2. Mild global parenchymal atrophy. Chronic small vessel ischemic changes.    DX-CHEST-PORTABLE (1 VIEW)    Result Date: 7/4/2022 7/4/2022 2:18 PM HISTORY/REASON FOR EXAM:  Sepsis. TECHNIQUE/EXAM DESCRIPTION AND NUMBER OF VIEWS: Single portable view of the chest. COMPARISON: None FINDINGS: LUNGS: The lungs are clear. HEART and MEDIASTINUM: normal in size. Pleura: There are no pleural effusion or pneumothoraces. Osseous structures: There are bilateral chronic rotator cuff tear with the humeral head abutting and eroding the acromion. There is bilateral glenohumeral joint osteoarthritis.     No acute cardiopulmonary abnormality identified.    DX-KNEE 3 VIEWS LEFT    Result Date: 7/4/2022 7/4/2022 2:18 PM HISTORY/REASON FOR EXAM:  Atraumatic Pain/Swelling/Deformity. Left knee pain TECHNIQUE/EXAM DESCRIPTION AND NUMBER OF VIEWS:  3 views of the LEFT knee. COMPARISON: None FINDINGS: There is no fracture. Alignment is normal. Severe tricompartmental degenerative changes are present. There is also severe osteoarthritis of the proximal tibiofibular articulation with multiple subchondral cyst present. There is extensive atherosclerotic plaque. There are multiple intra-articular ossific body.     1.  Severe tricompartmental left knee osteoarthritis 2.  osteoarthritis  the proximal tibiofibular articulation 3.  Multiple intra-articular ossific body 4.  Diffuse atherosclerotic plaque    MR-LUMBAR SPINE-WITH & W/O    Result Date: 7/7/2022 7/6/2022 3:22 PM HISTORY/REASON FOR EXAM:  Low back pain, infection suspected; Epidural abscess suspected; Epidural abscess TECHNIQUE/EXAM DESCRIPTION: MRI of the lumbar spine without and with contrast. The study was performed on a [employer] 1.5 [employer] MRI scanner. T1 sagittal, T2 fast spin-echo sagittal, and T2 axial images were obtained of the lumbar spine. T1 postcontrast fat-suppressed sagittal images were obtained. 18 mL ProHance contrast was administered intravenously. COMPARISON:  7/9/2021 FINDINGS: Conus terminates at the T12-L1 level. Type I marrow changes are noted of the adjacent L2-L3 endplates. There is decreased disc height throughout the lumbar spine with disc desiccation noted throughout. Reversal of lumbar lordosis noted with a mild kyphotic angulation centered at the L1-L2 level. T11-12: Mild facet degeneration and ligamentum flavum hypertrophy. There is no significant canal stenosis or foraminal narrowing. T12-L1: Bilateral mild facet degeneration. Mild disc bulge. There is no significant canal stenosis or foraminal narrowing. L1-2: Broad-based disc bulge and bilateral facet degeneration and ligament of flavum hypertrophy. There is mild canal narrowing. There is mild left foraminal narrowing. L2-3: Broad-based disc bulge and bilateral advanced facet degeneration and ligamentum flavum hypertrophy. There is mild left foraminal narrowing. There is moderate canal stenosis with thecal sac and cauda equina compression at this level. L3-4: Broad-based disc bulge and bilateral facet degeneration and ligamentum flavum hypertrophy. Disc bulge extends into both neural foramina and extra foraminal zones. This is worse in the right side. There is severe canal stenosis with cauda equina compression. There is impingement upon the exiting right  L3 nerve and extra foraminal zone. There is moderate bilateral foraminal narrowing. L4-5: Broad-based disc bulge and bilateral advanced facet degeneration with ligamentum flavum hypertrophy. There is moderate to severe canal stenosis with cauda equina compression. There is moderate bilateral foraminal narrowing. There is extension of the disc bulge into the right extraforaminal zone causing significant impingement upon the exiting right L4 nerve. L5-S1: Broad-based disc bulge and bilateral advanced facet degeneration with intervertebral hypertrophy. There is no significant foraminal narrowing. There is moderate severe right lateral recess narrowing with impingement upon the descending right S1 nerve. STIR images demonstrate edema of the bilateral posterior paraspinous soft tissues at the L3, L4, L5 levels worse on the right side. Postcontrast images through the lumbar spine demonstrate mild enhancement along the right paraspinous soft tissues at the L2-3, L3-4, L4-5 level adjacent to the facet joints. Mild epidural enhancement is identified dorsally at the L3 and L4 segments, there is a small area of hypoenhancement along the posterolateral spinal canal at the L4-5 level on the left side. Small renal cortical cysts noted in the right kidney.     Multilevel degenerative changes in lumbar spine as described above. There is severe canal stenosis at the L3-4, L4-5 level with cauda equina compression. At L2-3 there is moderate canal stenosis with cauda equina compression. Disc bulge extends into the right extra foraminal zone at the L3-4 and L4-5 level with impingement upon exiting nerves in the extraforaminal zone. Abnormal edema is identified in the bilateral paraspinal soft tissues at the L3-4, L4-5 and L5-S1 levels with mild enhancement identified in the right-sided paraspinous soft tissues. These findings are concerning for an ongoing infectious inflammatory process involving the facet joints. Minimal epidural  thickening is identified dorsally in the spinal canal behind L3 and L4. There is also a hypoenhancing area along the left posterolateral spinal canal that impinges upon the thecal sac. This could represent a developing epidural abscess. Recommend follow-up examination in 2-3 days.    IR-CONSULT ONLY-OUTPATIENT    Result Date: 2022  This exam has been resulted under the NOTES tab, and the signed report has been auto faxed to the ordering physician on the date/time of that signature.    EC-ECHOCARDIOGRAM COMPLETE W/ CONT    Result Date: 2022  Transthoracic Echo Report Echocardiography Laboratory CONCLUSIONS Normal left ventricular size and systolic function. The left ventricular ejection fraction is estimated to be 55%. No evidence of valvular abnormality based on Doppler evaluation. Unable to estimate right ventricular systolic pressure due to an inadequate tricuspid regurgitant jet. CECILLE ARAUJO Exam Date:         2022                    14:12 Exam Location:     Inpatient Priority:          Routine Ordering Physician:        ELSY REARDON Referring Physician: Sonographer:               Lily Lynch RDCS, RVT Age:    72     Gender:    M MRN:    [id number] :    1950 BSA:    2      Ht (in):    70     Wt (lb):    180 Exam Type:     Complete Indications:     Arrhythmia ICD Codes:       427.9 CPT Codes:       03356 BP:   0      /   88     HR:   113 Technical Quality:       Fair MEASUREMENTS  (Male / Female) Normal Values 2D ECHO LV Diastolic Diameter PLAX        4.5 cm                4.2 - 5.9 / 3.9 - 5.3 cm LV Systolic Diameter PLAX         3.4 cm                2.1 - 4.0 cm IVS Diastolic Thickness           0.97 cm               LVPW Diastolic Thickness          1 cm                  LVOT Diameter                     2 cm                  Estimated LV Ejection Fraction    55 %                  LV Ejection Fraction MOD BP       62.6 %                >= 55  % LV  Ejection Fraction MOD 4C       63.6 %                LV Ejection Fraction MOD 2C       56.4 %                DOPPLER AV Peak Velocity                  1.2 m/s               AV Peak Gradient                  5.5 mmHg              AV Mean Gradient                  4 mmHg                LVOT Peak Velocity                0.77 m/s              AV Area Cont Eq vti               2.4 cm2               PV Peak Velocity                  0.81 m/s              PV Peak Gradient                  2.6 mmHg              * Indicates values subject to auto-interpretation LV EF:  55    % FINDINGS Left Ventricle Contrast was used to enhance visualization of the endocardial border. Normal left ventricular chamber size. Normal left ventricular wall thickness. Grossly normal left ventricular systolic function. The left ventricular ejection fraction is visually estimated to be 55%. Diastolic function is difficult to assess with arrhythmia. Right Ventricle Normal right ventricular size. Reduced right ventricular systolic function. Right Atrium Normal right atrial size. Normal inferior vena cava size and inspiratory collapse. Left Atrium Normal left atrial size. Mitral Valve Structurally normal mitral valve without significant stenosis or regurgitation. Aortic Valve Structurally normal aortic valve without significant stenosis or regurgitation. Tricuspid Valve Structurally normal tricuspid valve without significant stenosis or regurgitation. Unable to estimate right ventricular systolic pressure due to an inadequate tricuspid regurgitant jet. Pulmonic Valve The pulmonic valve is not well visualized. No stenosis or regurgitation seen. Pericardium Fat pat present. Aorta The ascending aorta is mildly dilated by BSA diameter is 3.7 cm. The aortic root diameter is 3.6 cm. Mukul Phillips MD (Electronically Signed) Final Date:     05 July 2022                 16:13    EC-CARLI W/O CONT    Result Date: 7/8/2022  Transesophageal Echo Report  Echocardiography Laboratory CONCLUSIONS No evidence of endocarditis. Normal left atrial appendage. No thrombus detected in the left atrial appendage. CECILLE ARAUJO Exam Date:          2022                     09:47 Exam Location:      Inpatient Priority:            Routine Ordering Physician:        VINH PRATT                             ([id number]) Referring Physician:       [id number] Felton Sonographer:               Lily Lynch                            RDCS, RVT Age:    72     Gender:    M MRN:    [id number] :    1950 BSA:           Ht (in):           Wt (lb): Report Type:      Complete Indications:     Arrhythmia, Endocarditis ICD Codes:       427.9  421 CPT Codes:       60552 BP:          /          HR: Technical Quality:       Good MEASUREMENTS  (Male / Female) Normal Values * Indicates values subject to auto-interpretation LV EF:        % Medications Limitations Complications Proc. Components The probe was inserted and manipulated by Dr Phillips. Epiq probe #2  was used for this procedure. 2D, color Doppler, spectral Doppler, and 3D imaging were used as part of the evaluation as clinically indicated. FINDINGS Left Ventricle Right Ventricle Right Atrium Left Atrium LA Appendage Normal left atrial appendage. No thrombus detected in the left atrial appendage. IA Septum IV Septum Mitral Valve Aortic Valve Tricuspid Valve Pulmonic Valve Pericardium Aorta Mukul Phillips MD (Electronically Signed) Final Date:     2022                 13:41      Micro:  Results     Procedure Component Value Units Date/Time    BLOOD CULTURE [[id number]]     Order Status: Sent Specimen: Blood from Peripheral     BLOOD CULTURE [[id number]]     Order Status: Sent Specimen: Blood from Peripheral     BLOOD CULTURE [[id number]]  (Abnormal) Collected: 22 0940    Order Status: Completed Specimen: Blood from Peripheral Updated: 22     Significant Indicator POS     Source BLD     Site PERIPHERAL      "Culture Result Growth detected by Bactec instrument. @Gram Stain: Gram  positive cocci: Possible Staphylococcus sp.      Narrative:      Per Hospital Policy: Only change Specimen Src: to \"Line\" if  specified by physician order.  Right Hand    CULTURE WOUND W/ GRAM STAIN [[id number]]  (Abnormal) Collected: 07/06/22 1925    Order Status: Completed Specimen: Wound Updated: 07/08/22 1708     Significant Indicator POS     Source WND     Site Left Knee     Culture Result -     Gram Stain Result Few WBCs.  No organisms seen.       Culture Result Staphylococcus aureus  Rare growth  Methicillin sensitive by screening method.      Narrative:      Surgery - swabs received    Anaerobic Culture [[id number]] Collected: 07/06/22 1925    Order Status: Completed Specimen: Wound Updated: 07/08/22 1708     Significant Indicator NEG     Source WND     Site Left Knee     Culture Result Culture in progress.    Narrative:      Surgery - swabs received    BLOOD CULTURE [[id number]] Collected: 07/07/22 1130    Order Status: Completed Specimen: Blood from Peripheral Updated: 07/08/22 0724     Significant Indicator NEG     Source BLD     Site PERIPHERAL     Culture Result No Growth  Note: Blood cultures are incubated for 5 days and  are monitored continuously.Positive blood cultures  are called to the RN and reported as soon as  they are identified.      Narrative:      Per Hospital Policy: Only change Specimen Src: to \"Line\" if  specified by physician order.  Right Hand    FLUID CULTURE W/GRAM STAIN [[id number]]  (Abnormal)  (Susceptibility) Collected: 07/04/22 1620    Order Status: Completed Specimen: Synovial from Other Body Fluid Updated: 07/07/22 0838     Significant Indicator POS     Source SYNO     Site left knee     Culture Result -     Gram Stain Result Rare WBCs.  No organisms seen.       Culture Result Staphylococcus aureus  Light growth      Narrative:      CALL  Quarles  ER tel. ,  CALLED  ER tel.  07/05/2022, 13:22, RB PERF. RESULTS " "CALLED TO:Ernesto [id number] Prudence  L. knee fluid.    Susceptibility     Staphylococcus aureus (1)     Antibiotic Interpretation Microscan   Method Status    Azithromycin Sensitive <=2 mcg/mL DEVEN Final    Clindamycin Sensitive <=0.25 mcg/mL DEVEN Final    Cefazolin Sensitive <=8 mcg/mL DEVEN Final    Cefepime Sensitive <=4 mcg/mL DEVEN Final    Ceftaroline Sensitive <=0.5 mcg/mL DEVEN Final    Daptomycin Sensitive 1 mcg/mL DEVEN Final    Erythromycin Sensitive <=0.25 mcg/mL DEVEN Final    Ampicillin/sulbactam Sensitive <=8/4 mcg/mL DEVEN Final    Vancomycin Sensitive 1 mcg/mL DEVEN Final    Oxacillin Sensitive <=0.25 mcg/mL DEVEN Final    Pip/Tazobactam Sensitive <=8 mcg/mL DEEVN Final    Trimeth/Sulfa Sensitive <=0.5/9.5 mcg/mL DEVEN Final    Tetracycline Sensitive <=4 mcg/mL DEVEN Final                   Blood Culture [[id number]]  (Abnormal) Collected: 07/04/22 1438    Order Status: Completed Specimen: Blood from Peripheral Updated: 07/07/22 0825     Significant Indicator POS     Source BLD     Site PERIPHERAL     Culture Result Growth detected by Bactec instrument. 07/05/2022  09:29      Staphylococcus aureus  See previous culture for sensitivity report.      Narrative:      CALL  Quarles  ER tel. ,  CALLED  ER tel.  07/05/2022, 09:32, RB PERF. RESULTS CALLED TO:PRUDENCE Arriaga  [id number]  2 of 2 blood culture x2  Sites order. Per Hospital Policy:  Only change Specimen Src: to \"Line\" if specified by physician  order.  Left Hand    Blood Culture [[id number]]  (Abnormal)  (Susceptibility) Collected: 07/04/22 1425    Order Status: Completed Specimen: Blood from Peripheral Updated: 07/07/22 0825     Significant Indicator POS     Source BLD     Site PERIPHERAL     Culture Result Growth detected by Bactec instrument. 07/05/2022  04:46  Staphylococcus aureus (methicillin sensitive)  detected by PCR.        Staphylococcus aureus    Narrative:      CALL  Quarles  ER tel. ,  CALLED  ER tel.  07/05/2022, 09:31, RB PERF. RESULTS CALLED TO: Corina FOSS  [id number]  1 of 2 for " "Blood Culture x 2 sites order. Per Hospital  Policy: Only change Specimen Src: to \"Line\" if specified by  physician order.  No site indicated    Susceptibility     Staphylococcus aureus (1)     Antibiotic Interpretation Microscan   Method Status    Azithromycin Sensitive <=2 mcg/mL DEVEN Final    Clindamycin Sensitive <=0.25 mcg/mL DEVEN Final    Cefazolin Sensitive <=8 mcg/mL DEVEN Final    Cefepime Sensitive <=4 mcg/mL DEVEN Final    Ceftaroline Sensitive <=0.5 mcg/mL DEVEN Final    Daptomycin Sensitive 1 mcg/mL DEVEN Final    Erythromycin Sensitive <=0.25 mcg/mL DEVEN Final    Ampicillin/sulbactam Sensitive <=8/4 mcg/mL DEVEN Final    Vancomycin Sensitive 1 mcg/mL DEVEN Final    Oxacillin Sensitive <=0.25 mcg/mL DEVEN Final    Pip/Tazobactam Sensitive <=8 mcg/mL DEVEN Final    Trimeth/Sulfa Sensitive <=0.5/9.5 mcg/mL DEVEN Final    Tetracycline Sensitive <=4 mcg/mL DEVEN Final                   BLOOD CULTURE [[id number]]     Order Status: Canceled Specimen: Blood from Peripheral     GRAM STAIN [[id number]] Collected: 07/06/22 1925    Order Status: Completed Specimen: Wound Updated: 07/07/22 0249     Significant Indicator .     Source WND     Site Left Knee     Gram Stain Result Few WBCs.  No organisms seen.      Narrative:      Surgery - swabs received    Urine Culture (NEW) [[id number]]  (Abnormal)  (Susceptibility) Collected: 07/04/22 1630    Order Status: Completed Specimen: Urine, Clean Catch Updated: 07/06/22 0733     Significant Indicator POS     Source UR     Site URINE, CLEAN CATCH     Culture Result -      Staphylococcus aureus  >100,000 cfu/mL  Methicillin sensitive by screening method.      Narrative:      Indication for culture:->Evaluation for sepsis without a  clear source of infection  Indication for culture:->Evaluation for sepsis without a    Susceptibility     Staphylococcus aureus (1)     Antibiotic Interpretation Microscan   Method Status    Cefazolin Sensitive <=8 mcg/mL DEVEN Final    Cefepime Sensitive <=4 mcg/mL DEVEN " Final    Ceftaroline Sensitive <=0.5 mcg/mL DEVEN Final    Daptomycin Sensitive <=0.5 mcg/mL DEVEN Final    Nitrofurantoin Sensitive <=32 mcg/mL DEVEN Final    Pip/Tazobactam Sensitive <=8 mcg/mL DEVEN Final    Trimeth/Sulfa Sensitive <=0.5/9.5 mcg/mL DEVEN Final    Tetracycline Sensitive <=4 mcg/mL DEVEN Final                   BLOOD CULTURE [[id number]]     Order Status: Canceled Specimen: Blood from Peripheral     BLOOD CULTURE [[id number]]     Order Status: Canceled Specimen: Blood from Peripheral     BLOOD CULTURE [[id number]]     Order Status: Canceled Specimen: Blood from Peripheral     MRSA By PCR (Amp) [[id number]] Collected: 07/05/22 1215    Order Status: Completed Specimen: Respirate from Nares Updated: 07/05/22 1608     MRSA by PCR Negative    GRAM STAIN [[id number]] Collected: 07/04/22 1620    Order Status: Completed Specimen: Synovial Updated: 07/04/22 1927     Significant Indicator .     Source SYNO     Site left knee     Gram Stain Result Rare WBCs.  No organisms seen.      Narrative:      L. knee fluid.    Urinalysis [[id number]]  (Abnormal) Collected: 07/04/22 1630    Order Status: Completed Specimen: Urine Updated: 07/04/22 1739     Color Yellow     Character Clear     Specific Gravity 1.023     Ph 5.0     Glucose 250 mg/dL      Ketones Negative mg/dL      Protein 100 mg/dL      Bilirubin Negative     Urobilinogen, Urine 1.0     Nitrite Positive     Leukocyte Esterase Trace     Occult Blood Large     Micro Urine Req Microscopic    Narrative:      Indication for culture:->Evaluation for sepsis without a  clear source of infection          Assessment:  Active Hospital Problems    Diagnosis    • *MSSA bacteremia [R78.81, B95.61]    • Hypomagnesemia [E83.42]    • Acute cystitis without hematuria [N30.00]    • Falls [W19.XXXA]    • A-fib ([facility]) [I48.91]    • Pyogenic arthritis of left knee joint ([facility]) [M00.9]    • Thrombocytopenia ([facility]) [D69.6]    • Atrial fibrillation with RVR ([facility]) [I48.91]    • Type 2 diabetes  mellitus with hyperglycemia, without long-term current use of insulin ([facility]) [E11.65]    • Hyponatremia [E87.1]    • Traumatic rhabdomyolysis ([facility]) [T79.6XXA]    • Hepatocellular carcinoma ([facility]) [C22.0]    • Cirrhosis of liver without ascites (HCC) [K74.60]    • Essential hypertension, benign [I10]      Interval 24 hours:      AF, O2 RA  Labs reviewed  Imaging personally reviewed both images and report.   Micro reviewed    Patient complaining of knee pain and some ongoing back pain.  Yet to clear blood cultures.  Antibiotics transition to nafcillin as below.     ASSESSMENT/PLAN:      72 y.o.  admitted 7/4/2022. Pt has a past medical history of HTN, HLD, A fib, DMT2 and hepatitis C cirrhosis.  He presented complaining of weakness and left knee pain.  He had a fall and was down on the floor for several days.   Patient also with chronic back pain and states he had 4 injections in his back approximately 1 week ago     Hospital Course:   In the ER is found to be in A. fib with RVR.  Blood cultures positive for MSSA.  Concern for septic joint and fluid is aspirated which is also positive for MSSA.  Plan is to go to the OR on 7/6 for washout of septic left knee.     Problem List  Leukopenia, new, likely reactive and also with cirrhosis  Thrombocytopenia, improving  Bacteremia  -Blood cultures on 7/4 & 7/7 +MSSA  -TTE on 7/5 with no vegetations, no significant valvular disease, EF 55%  -CARLI on 7/8, no evidence of endocarditis, no specific details reported on valves  Native left knee septic arthritis  -Aspirated synovial fluid with 30 7K WBCs, 66% polys, cultures +MSSA   -OR with orthopedics on 7/6 for I&D of the left knee, per op note significant mount of purulent material was encountered and sent for culture  UTI, possibly source of the bacteremia or resulted from   -Urine culture on 7/4 +MSSA   Rhabdomyolysis  -Improving CPK  A. fib, RVR, improved but now recurred   Cirrhosis  Chronic HCV infection, per chart  - NAAT  pending   Acute on chronic back pain  Paraspinal infection  Lumbar spine with enhancement in the right-sided paraspinous soft tissues at L3-S1 concerning for infection potential developing epidural abscess at L3-L4 per MRI  Antibiotic allergies: Ciprofloxacin reported as convulsions     Plan      --- Patient with high burden infection, multiple sites he is yet to clear blood cultures, will transition cefazolin to nafcillin for now   --- Radiologist is recommending repeating imaging MRI of the spine in 2 to 3 days given possibly developing epidural abscess  --- Follow-up repeat blood cultures   --- Monitor labs  --- F/up HCV NAAT      Dispo: TBD, may need SNF  PICC: Okay to place PICC once blood cultures are clear for 48 hours         Plan of care discussed with Dr. Romero Vences. ID will continue to follow.

## 2022-07-09 NOTE — PROGRESS NOTES
[facility] Medicine Daily Progress Note    Date of Service  7/9/2022    Chief Complaint  Nitesh Duron is a 72 y.o. male admitted 7/4/2022 with weakness and fall    Hospital Course    72-year-old male with history of diabetes, dyslipidemia hepatitis C and cirrhosis with atrial fibrillation presented 7/4 with fall and weakness.  Patient states he fell 4 days ago at home and he landed on his left knee.  He noticed swelling on his left knee and severe back pain with some weakness on the left leg, patient has been on the ground most of the time and not able to get up.  Patient lives by himself.  Patient was found on the ground by his girlfriend and she called EMS.  On admission patient was found to have A. fib with RVR.  Dehydration however blood pressure was stable.  Labs did not show leukocytosis however showed hyponatremia, creatinine 1.2 with CPK 2411 and lactic acid 4.4.  IV fluid was started.  Blood culture came back positive with MSSA, cefazolin was initiated and ID was consulted.  Echo is pending.      Patient underwent arthrocentesis by Ortho on his left knee and showed more than 37,000 white blood cell with no red blood cell high suspicious of septic arthritis specially with positive blood culture, ID on board and patient is on cefazolin.      Interval Problem Update:    On amiodarone drip currently in sinus rhythm  Blood cultures from 7/7/2022 positive for staph aureus  Sodium 123  Phosphorus 2.2  Patient is afebrile complains of left knee pain    Discussed with cardiology and ID          I have discussed this patient's plan of care and discharge plan at IDT rounds today with Case Management, Nursing, Nursing leadership, and other members of the IDT team.    Consultants/Specialty  cardiology, critical care and orthopedics   Spine surgery      Code Status  Full Code    Disposition  Patient is not medically cleared for discharge.   Anticipate discharge to to skilled nursing facility.  I have placed the  appropriate orders for post-discharge needs.    Review of Systems  Review of Systems   Constitutional: Negative for chills and fever.   Respiratory: Negative for cough and shortness of breath.    Cardiovascular: Negative for chest pain and palpitations.   Musculoskeletal: Positive for back pain and joint pain.   All other systems reviewed and are negative.       Physical Exam  Temp:  [36.5 °C (97.7 °F)-36.9 °C (98.4 °F)] 36.6 °C (97.9 °F)  Pulse:  [] 92  Resp:  [13-30] 27  BP: (103-151)/(54-88) 103/61  SpO2:  [90 %-99 %] 97 %    Physical Exam  Vitals and nursing note reviewed.   Constitutional:       Appearance: He is well-developed. He is not diaphoretic.   HENT:      Head: Normocephalic and atraumatic.      Mouth/Throat:      Pharynx: No oropharyngeal exudate.   Eyes:      General: No scleral icterus.        Right eye: No discharge.         Left eye: No discharge.      Conjunctiva/sclera: Conjunctivae normal.      Pupils: Pupils are equal, round, and reactive to light.   Neck:      Vascular: No JVD.      Trachea: No tracheal deviation.   Cardiovascular:      Rate and Rhythm: Normal rate and regular rhythm.      Heart sounds: No murmur heard.    No friction rub. No gallop.   Pulmonary:      Effort: Pulmonary effort is normal. No respiratory distress.      Breath sounds: No stridor. Decreased breath sounds present. No wheezing.   Chest:      Chest wall: No tenderness.   Abdominal:      General: Bowel sounds are normal. There is no distension.      Palpations: Abdomen is soft.      Tenderness: There is no abdominal tenderness. There is no rebound.   Musculoskeletal:         General: Swelling present. No tenderness.      Cervical back: Neck supple.   Skin:     General: Skin is warm and dry.      Nails: There is no clubbing.   Neurological:      Mental Status: He is alert and oriented to person, place, and time.      Cranial Nerves: No cranial nerve deficit.      Motor: Weakness (Lower extremities left greater  than right limited by knee pain) present. No abnormal muscle tone.   Psychiatric:         Behavior: Behavior normal.         Fluids    Intake/Output Summary (Last 24 hours) at 7/9/2022 1335  Last data filed at 7/9/2022 0600  Gross per 24 hour   Intake 1004.5 ml   Output 1475 ml   Net -470.5 ml       Laboratory  Recent Labs     07/07/22  0140 07/08/22  0400 07/09/22  0336   WBC 6.4 5.6 4.4*   RBC 4.53* 4.86 4.40*   HEMOGLOBIN 13.0* 14.0 12.7*   HEMATOCRIT 37.7* 40.3* 36.7*   MCV 83.2 82.9 83.4   MCH 28.7 28.8 28.9   MCHC 34.5 34.7 34.6   RDW 42.3 41.7 42.6   PLATELETCT 81* 109* 118*   MPV 10.2 9.9 11.1     Recent Labs     07/07/22  0940 07/08/22  0400 07/09/22  0336   SODIUM 125* 127* 123*   POTASSIUM 4.4 4.1 4.3   CHLORIDE 92* 93* 91*   CO2 22 24 24   GLUCOSE 174* 144* 168*   BUN 17 16 21   CREATININE 0.55 0.60 0.66   CALCIUM 8.4* 8.0* 7.7*                   Imaging  EC-CARLI W/O CONT   Final Result      MR-LUMBAR SPINE-WITH & W/O   Final Result         Multilevel degenerative changes in lumbar spine as described above. There is severe canal stenosis at the L3-4, L4-5 level with cauda equina compression.      At L2-3 there is moderate canal stenosis with cauda equina compression.      Disc bulge extends into the right extra foraminal zone at the L3-4 and L4-5 level with impingement upon exiting nerves in the extraforaminal zone.      Abnormal edema is identified in the bilateral paraspinal soft tissues at the L3-4, L4-5 and L5-S1 levels with mild enhancement identified in the right-sided paraspinous soft tissues. These findings are concerning for an ongoing infectious inflammatory    process involving the facet joints.      Minimal epidural thickening is identified dorsally in the spinal canal behind L3 and L4. There is also a hypoenhancing area along the left posterolateral spinal canal that impinges upon the thecal sac. This could represent a developing epidural abscess.    Recommend follow-up examination in 2-3  days.      CT-HEAD W/O   Final Result      1. No CT evidence of acute infarct, hemorrhage or mass.   2. Mild global parenchymal atrophy. Chronic small vessel ischemic changes.      EC-ECHOCARDIOGRAM COMPLETE W/ CONT   Final Result      DX-KNEE 3 VIEWS LEFT   Final Result      1.  Severe tricompartmental left knee osteoarthritis      2.  osteoarthritis the proximal tibiofibular articulation      3.  Multiple intra-articular ossific body      4.  Diffuse atherosclerotic plaque      DX-CHEST-PORTABLE (1 VIEW)   Final Result      No acute cardiopulmonary abnormality identified.      CL-CARDIOVERSION    (Results Pending)        Assessment/Plan  * MSSA bacteremia  Assessment & Plan  Blood culture on admission 7/4 positive for MSSA 2/2  Source likely septic arthritis  Status post left knee I&D on 7/6/2022 Ortho service following    MRI lumbar spine with spinal stenosis and concern for possible developing epidural abscess evaluated by spine surgery with recommendation for medical treatment and outpatient follow-up     Blood culture 7/7 remains positive discussed with ID antibiotics changed to nafcillin  Repeat blood cultures today      Hypomagnesemia  Assessment & Plan  Hypophosphatemia    Replete and monitor    Pyogenic arthritis of left knee joint (HCC)  Assessment & Plan  Synovial fluid culture MSSA  Status post I&D by Ortho on 7/6/2022    Reviewed importance of mobilization  Continue IV antibiotics discussed with ID    Falls  Assessment & Plan  PT OT eval's    Acute cystitis without hematuria  Assessment & Plan  Urine culture MSSA continue Ancef    Hyponatremia  Assessment & Plan  Continue free fluid restriction  Patient is asymptomatic  We will check urine sodium and osmolality      Type 2 diabetes mellitus with hyperglycemia, without long-term current use of insulin ([facility])  Assessment & Plan  Last a1c 6.3  Continue increase Lantus and continue sliding scale insulin    Atrial fibrillation with RVR ([facility])  Assessment &  Plan  Echo EF 55% no valvular abnormalities  Cardiology following status post CARLI directed cardioversion  Continue metoprolol digoxin   Discussed with cardiology amiodarone being switched to p.o.  Continue telemetry monitoring  Continue anticoagulation with Lovenox with plans to transition to Eliquis      Thrombocytopenia (HCC)  Assessment & Plan  Improved monitor CBC    Hepatocellular carcinoma (HCC)- (present on admission)  Assessment & Plan  History of hepatocellular carcinoma.   Patient has history of cirrhosis  Following with oncology as an outpatient.    Cirrhosis of liver without ascites (HCC)- (present on admission)  Assessment & Plan  History of hepatitis C virus and received treatment     Outpatient follow-up    Essential hypertension, benign- (present on admission)  Assessment & Plan  Stable on metoprolol continue to monitor       VTE prophylaxis: therapeutic anticoagulation with Lovenox    I have performed a physical exam and reviewed and updated ROS and Plan today (7/9/2022). In review of yesterday's note (7/8/2022), there are no changes except as documented above.

## 2022-07-09 NOTE — PROGRESS NOTES
Monitor Summary:   NSR to Sinus Tach (80-120s) with frequent PVC/PAC  No Afib noted-consistent P waves seen

## 2022-07-09 NOTE — PROGRESS NOTES
12 hour chart check complete.     Monitoring Summary:  Rhythm: atrial fibrillation  bpm  Ectopy: PVC(r); converted to NSR with PAC(f) at 1530

## 2022-07-09 NOTE — CARE PLAN
Problem: Pain - Standard  Goal: Alleviation of pain or a reduction in pain to the patient’s comfort goal  Outcome: Progressing     Problem: Knowledge Deficit - Standard  Goal: Patient and family/care givers will demonstrate understanding of plan of care, disease process/condition, diagnostic tests and medications  Outcome: Progressing     Problem: Fall Risk  Goal: Patient will remain free from falls  Outcome: Progressing     Problem: Communication  Goal: The ability to communicate needs accurately and effectively will improve  Outcome: Progressing     Problem: Hemodynamics  Goal: Patient's hemodynamics, fluid balance and neurologic status will be stable or improve  Outcome: Progressing     Problem: Respiratory  Goal: Patient will achieve/maintain optimum respiratory ventilation and gas exchange  Outcome: Progressing     Problem: Urinary Elimination  Goal: Establish and maintain regular urinary output  Outcome: Progressing   The patient is Watcher - Medium risk of patient condition declining or worsening    Shift Goals  Clinical Goals: control pain, IV ABX  Patient Goals: get better  Family Goals: comfort, discharge

## 2022-07-10 LAB
ALBUMIN SERPL BCP-MCNC: 2.7 G/DL (ref 3.2–4.9)
ALBUMIN/GLOB SERPL: 0.8 G/DL
ALP SERPL-CCNC: 107 U/L (ref 30–99)
ALT SERPL-CCNC: 12 U/L (ref 2–50)
ANION GAP SERPL CALC-SCNC: 9 MMOL/L (ref 7–16)
AST SERPL-CCNC: 26 U/L (ref 12–45)
BACTERIA SPEC ANAEROBE CULT: NORMAL
BASOPHILS # BLD AUTO: 0.2 % (ref 0–1.8)
BASOPHILS # BLD: 0.01 K/UL (ref 0–0.12)
BILIRUB SERPL-MCNC: 0.8 MG/DL (ref 0.1–1.5)
BUN SERPL-MCNC: 18 MG/DL (ref 8–22)
CALCIUM SERPL-MCNC: 7.7 MG/DL (ref 8.5–10.5)
CHLORIDE SERPL-SCNC: 92 MMOL/L (ref 96–112)
CO2 SERPL-SCNC: 25 MMOL/L (ref 20–33)
CREAT SERPL-MCNC: 0.69 MG/DL (ref 0.5–1.4)
EOSINOPHIL # BLD AUTO: 0.02 K/UL (ref 0–0.51)
EOSINOPHIL NFR BLD: 0.5 % (ref 0–6.9)
ERYTHROCYTE [DISTWIDTH] IN BLOOD BY AUTOMATED COUNT: 42.9 FL (ref 35.9–50)
GFR SERPLBLD CREATININE-BSD FMLA CKD-EPI: 98 ML/MIN/1.73 M 2
GLOBULIN SER CALC-MCNC: 3.5 G/DL (ref 1.9–3.5)
GLUCOSE BLD STRIP.AUTO-MCNC: 129 MG/DL (ref 65–99)
GLUCOSE BLD STRIP.AUTO-MCNC: 141 MG/DL (ref 65–99)
GLUCOSE BLD STRIP.AUTO-MCNC: 156 MG/DL (ref 65–99)
GLUCOSE BLD STRIP.AUTO-MCNC: 91 MG/DL (ref 65–99)
GLUCOSE SERPL-MCNC: 132 MG/DL (ref 65–99)
HCT VFR BLD AUTO: 39.9 % (ref 42–52)
HGB BLD-MCNC: 13.8 G/DL (ref 14–18)
IMM GRANULOCYTES # BLD AUTO: 0.1 K/UL (ref 0–0.11)
IMM GRANULOCYTES NFR BLD AUTO: 2.4 % (ref 0–0.9)
LYMPHOCYTES # BLD AUTO: 0.41 K/UL (ref 1–4.8)
LYMPHOCYTES NFR BLD: 9.8 % (ref 22–41)
MAGNESIUM SERPL-MCNC: 1.9 MG/DL (ref 1.5–2.5)
MCH RBC QN AUTO: 29.1 PG (ref 27–33)
MCHC RBC AUTO-ENTMCNC: 34.6 G/DL (ref 33.7–35.3)
MCV RBC AUTO: 84.2 FL (ref 81.4–97.8)
MONOCYTES # BLD AUTO: 0.35 K/UL (ref 0–0.85)
MONOCYTES NFR BLD AUTO: 8.3 % (ref 0–13.4)
NEUTROPHILS # BLD AUTO: 3.31 K/UL (ref 1.82–7.42)
NEUTROPHILS NFR BLD: 78.8 % (ref 44–72)
NRBC # BLD AUTO: 0 K/UL
NRBC BLD-RTO: 0 /100 WBC
PHOSPHATE SERPL-MCNC: 3 MG/DL (ref 2.5–4.5)
PLATELET # BLD AUTO: 136 K/UL (ref 164–446)
PMV BLD AUTO: 9.8 FL (ref 9–12.9)
POTASSIUM SERPL-SCNC: 4.1 MMOL/L (ref 3.6–5.5)
PROT SERPL-MCNC: 6.2 G/DL (ref 6–8.2)
RBC # BLD AUTO: 4.74 M/UL (ref 4.7–6.1)
SIGNIFICANT IND 70042: NORMAL
SITE SITE: NORMAL
SODIUM SERPL-SCNC: 126 MMOL/L (ref 135–145)
SOURCE SOURCE: NORMAL
WBC # BLD AUTO: 4.2 K/UL (ref 4.8–10.8)

## 2022-07-10 PROCEDURE — 99232 SBSQ HOSP IP/OBS MODERATE 35: CPT | Performed by: INTERNAL MEDICINE

## 2022-07-10 PROCEDURE — 87522 HEPATITIS C REVRS TRNSCRPJ: CPT

## 2022-07-10 PROCEDURE — 80053 COMPREHEN METABOLIC PANEL: CPT

## 2022-07-10 PROCEDURE — A9270 NON-COVERED ITEM OR SERVICE: HCPCS | Performed by: NURSE PRACTITIONER

## 2022-07-10 PROCEDURE — 87040 BLOOD CULTURE FOR BACTERIA: CPT

## 2022-07-10 PROCEDURE — 82962 GLUCOSE BLOOD TEST: CPT

## 2022-07-10 PROCEDURE — 700102 HCHG RX REV CODE 250 W/ 637 OVERRIDE(OP): Performed by: INTERNAL MEDICINE

## 2022-07-10 PROCEDURE — 307059 PAD,EAR PROTECTOR: Performed by: HOSPITALIST

## 2022-07-10 PROCEDURE — 700102 HCHG RX REV CODE 250 W/ 637 OVERRIDE(OP): Performed by: STUDENT IN AN ORGANIZED HEALTH CARE EDUCATION/TRAINING PROGRAM

## 2022-07-10 PROCEDURE — A9270 NON-COVERED ITEM OR SERVICE: HCPCS | Performed by: STUDENT IN AN ORGANIZED HEALTH CARE EDUCATION/TRAINING PROGRAM

## 2022-07-10 PROCEDURE — 36415 COLL VENOUS BLD VENIPUNCTURE: CPT

## 2022-07-10 PROCEDURE — A9270 NON-COVERED ITEM OR SERVICE: HCPCS | Performed by: INTERNAL MEDICINE

## 2022-07-10 PROCEDURE — 700101 HCHG RX REV CODE 250: Performed by: INTERNAL MEDICINE

## 2022-07-10 PROCEDURE — 85025 COMPLETE CBC W/AUTO DIFF WBC: CPT

## 2022-07-10 PROCEDURE — 84100 ASSAY OF PHOSPHORUS: CPT

## 2022-07-10 PROCEDURE — 99232 SBSQ HOSP IP/OBS MODERATE 35: CPT | Performed by: HOSPITALIST

## 2022-07-10 PROCEDURE — 770020 HCHG ROOM/CARE - TELE (206)

## 2022-07-10 PROCEDURE — 700111 HCHG RX REV CODE 636 W/ 250 OVERRIDE (IP): Performed by: HOSPITALIST

## 2022-07-10 PROCEDURE — 83735 ASSAY OF MAGNESIUM: CPT

## 2022-07-10 PROCEDURE — 700102 HCHG RX REV CODE 250 W/ 637 OVERRIDE(OP): Performed by: NURSE PRACTITIONER

## 2022-07-10 PROCEDURE — 700105 HCHG RX REV CODE 258: Performed by: INTERNAL MEDICINE

## 2022-07-10 RX ORDER — MAGNESIUM SULFATE HEPTAHYDRATE 40 MG/ML
2 INJECTION, SOLUTION INTRAVENOUS ONCE
Status: COMPLETED | OUTPATIENT
Start: 2022-07-10 | End: 2022-07-10

## 2022-07-10 RX ADMIN — OXYCODONE 5 MG: 5 TABLET ORAL at 04:33

## 2022-07-10 RX ADMIN — ENOXAPARIN SODIUM 80 MG: 80 INJECTION SUBCUTANEOUS at 08:35

## 2022-07-10 RX ADMIN — METOPROLOL TARTRATE 50 MG: 50 TABLET, FILM COATED ORAL at 05:56

## 2022-07-10 RX ADMIN — OXYCODONE 5 MG: 5 TABLET ORAL at 22:27

## 2022-07-10 RX ADMIN — NAFCILLIN SODIUM 12 G: 2 INJECTION, POWDER, FOR SOLUTION INTRAMUSCULAR; INTRAVENOUS at 12:22

## 2022-07-10 RX ADMIN — MAGNESIUM SULFATE IN WATER 2 G: 40 INJECTION, SOLUTION INTRAVENOUS at 08:35

## 2022-07-10 RX ADMIN — METOPROLOL TARTRATE 50 MG: 50 TABLET, FILM COATED ORAL at 17:18

## 2022-07-10 RX ADMIN — OXYCODONE 5 MG: 5 TABLET ORAL at 18:23

## 2022-07-10 RX ADMIN — INSULIN HUMAN 2 UNITS: 100 INJECTION, SOLUTION PARENTERAL at 12:22

## 2022-07-10 RX ADMIN — INSULIN GLARGINE-YFGN 13 UNITS: 100 INJECTION, SOLUTION SUBCUTANEOUS at 17:23

## 2022-07-10 RX ADMIN — AMIODARONE HYDROCHLORIDE 400 MG: 200 TABLET ORAL at 05:56

## 2022-07-10 RX ADMIN — AMIODARONE HYDROCHLORIDE 400 MG: 200 TABLET ORAL at 17:19

## 2022-07-10 RX ADMIN — LISINOPRIL 10 MG: 10 TABLET ORAL at 17:19

## 2022-07-10 RX ADMIN — POLYETHYLENE GLYCOL 3350 1 PACKET: 17 POWDER, FOR SOLUTION ORAL at 17:19

## 2022-07-10 RX ADMIN — SENNOSIDES AND DOCUSATE SODIUM 2 TABLET: 50; 8.6 TABLET ORAL at 05:56

## 2022-07-10 RX ADMIN — EZETIMIBE 10 MG: 10 TABLET ORAL at 17:19

## 2022-07-10 RX ADMIN — SENNOSIDES AND DOCUSATE SODIUM 2 TABLET: 50; 8.6 TABLET ORAL at 17:18

## 2022-07-10 ASSESSMENT — PAIN DESCRIPTION - PAIN TYPE
TYPE: ACUTE PAIN
TYPE: ACUTE PAIN;SURGICAL PAIN
TYPE: ACUTE PAIN
TYPE: ACUTE PAIN;SURGICAL PAIN
TYPE: ACUTE PAIN
TYPE: ACUTE PAIN

## 2022-07-10 ASSESSMENT — ENCOUNTER SYMPTOMS
FEVER: 0
ABDOMINAL PAIN: 0
CHILLS: 0
NAUSEA: 0
CONSTIPATION: 1
DIARRHEA: 0
BACK PAIN: 1
MYALGIAS: 1
VOMITING: 0
CONSTIPATION: 0
PALPITATIONS: 0
SHORTNESS OF BREATH: 0
NERVOUS/ANXIOUS: 0
COUGH: 0

## 2022-07-10 ASSESSMENT — FIBROSIS 4 INDEX: FIB4 SCORE: 3.97

## 2022-07-10 NOTE — CARE PLAN
Problem: Pain - Standard  Goal: Alleviation of pain or a reduction in pain to the patient’s comfort goal  Outcome: Progressing     Problem: Knowledge Deficit - Standard  Goal: Patient and family/care givers will demonstrate understanding of plan of care, disease process/condition, diagnostic tests and medications  Outcome: Progressing     Problem: Fall Risk  Goal: Patient will remain free from falls  Outcome: Progressing     The patient is Stable - Low risk of patient condition declining or worsening    Shift Goals  Clinical Goals: Pain control and mobilize  Patient Goals: sleep  Family Goals: not present    Progress made toward(s) clinical / shift goals: Pain managed with PRN pain medications, see MAR. No acute issues overnight, safety and comfort maintained throughout shift.      Patient is not progressing towards the following goals:      Problem: Bowel Elimination  Goal: Establish and maintain regular bowel function  Outcome: Not Progressing

## 2022-07-10 NOTE — PROGRESS NOTES
[facility] Medicine Daily Progress Note    Date of Service  7/10/2022    Chief Complaint  Nitesh Duron is a 72 y.o. male admitted 7/4/2022 with weakness and fall    Hospital Course    72-year-old male with history of diabetes, dyslipidemia hepatitis C and cirrhosis with atrial fibrillation presented 7/4 with fall and weakness.  Patient states he fell 4 days ago at home and he landed on his left knee.  He noticed swelling on his left knee and severe back pain with some weakness on the left leg, patient has been on the ground most of the time and not able to get up.  Patient lives by himself.  Patient was found on the ground by his girlfriend and she called EMS.  On admission patient was found to have A. fib with RVR.  Dehydration however blood pressure was stable.  Labs did not show leukocytosis however showed hyponatremia, creatinine 1.2 with CPK 2411 and lactic acid 4.4.  IV fluid was started.  Blood culture came back positive with MSSA, cefazolin was initiated and ID was consulted.  Echo is pending.      Patient underwent arthrocentesis by Ortho on his left knee and showed more than 37,000 white blood cell with no red blood cell high suspicious of septic arthritis specially with positive blood culture, ID on board and patient is on cefazolin.      Interval Problem Update:    On 1 L nasal cannula  Left knee and back pain uncontrolled  Afebrile  Sodium 126  Transitioned to p.o. amiodarone digoxin discontinued  Hemoglobin stable          I have discussed this patient's plan of care and discharge plan at IDT rounds today with Case Management, Nursing, Nursing leadership, and other members of the IDT team.    Consultants/Specialty  cardiology, critical care and orthopedics   Spine surgery      Code Status  Full Code    Disposition  Patient is not medically cleared for discharge.   Anticipate discharge to to skilled nursing facility.  I have placed the appropriate orders for post-discharge needs.    Review of  Systems  Review of Systems   Constitutional: Negative for chills and fever.   Respiratory: Negative for cough and shortness of breath.    Cardiovascular: Negative for chest pain and palpitations.   Gastrointestinal: Positive for constipation. Negative for diarrhea, nausea and vomiting.   Musculoskeletal: Positive for back pain and joint pain.   All other systems reviewed and are negative.       Physical Exam  Temp:  [36.7 °C (98.1 °F)-36.9 °C (98.4 °F)] 36.7 °C (98.1 °F)  Pulse:  [] 103  Resp:  [13-25] 20  BP: (119-151)/(59-96) 125/71  SpO2:  [95 %-98 %] 95 %    Physical Exam  Vitals and nursing note reviewed.   Constitutional:       Appearance: He is well-developed. He is not diaphoretic.   HENT:      Head: Normocephalic and atraumatic.      Mouth/Throat:      Pharynx: No oropharyngeal exudate.   Eyes:      General: No scleral icterus.        Right eye: No discharge.         Left eye: No discharge.      Conjunctiva/sclera: Conjunctivae normal.      Pupils: Pupils are equal, round, and reactive to light.   Neck:      Vascular: No JVD.      Trachea: No tracheal deviation.   Cardiovascular:      Rate and Rhythm: Normal rate and regular rhythm.      Heart sounds: No murmur heard.    No friction rub. No gallop.   Pulmonary:      Effort: Pulmonary effort is normal. No respiratory distress.      Breath sounds: No stridor. Decreased breath sounds present. No wheezing.   Chest:      Chest wall: No tenderness.   Abdominal:      General: Bowel sounds are normal. There is distension.      Palpations: Abdomen is soft.      Tenderness: There is no abdominal tenderness. There is no rebound.   Musculoskeletal:         General: Swelling present. No tenderness.      Cervical back: Neck supple.   Skin:     General: Skin is warm and dry.      Nails: There is no clubbing.   Neurological:      Mental Status: He is alert and oriented to person, place, and time.      Cranial Nerves: No cranial nerve deficit.      Motor: Weakness  (Lower extremities left greater than right limited by weakness) present. No abnormal muscle tone.   Psychiatric:         Behavior: Behavior normal.         Fluids    Intake/Output Summary (Last 24 hours) at 7/10/2022 1317  Last data filed at 7/10/2022 0600  Gross per 24 hour   Intake 898.33 ml   Output 2100 ml   Net -1201.67 ml       Laboratory  Recent Labs     07/08/22  0400 07/09/22  0336 07/10/22  0442   WBC 5.6 4.4* 4.2*   RBC 4.86 4.40* 4.74   HEMOGLOBIN 14.0 12.7* 13.8*   HEMATOCRIT 40.3* 36.7* 39.9*   MCV 82.9 83.4 84.2   MCH 28.8 28.9 29.1   MCHC 34.7 34.6 34.6   RDW 41.7 42.6 42.9   PLATELETCT 109* 118* 136*   MPV 9.9 11.1 9.8     Recent Labs     07/08/22  0400 07/09/22  0336 07/10/22  0442   SODIUM 127* 123* 126*   POTASSIUM 4.1 4.3 4.1   CHLORIDE 93* 91* 92*   CO2 24 24 25   GLUCOSE 144* 168* 132*   BUN 16 21 18   CREATININE 0.60 0.66 0.69   CALCIUM 8.0* 7.7* 7.7*                   Imaging  EC-CARLI W/O CONT   Final Result      MR-LUMBAR SPINE-WITH & W/O   Final Result         Multilevel degenerative changes in lumbar spine as described above. There is severe canal stenosis at the L3-4, L4-5 level with cauda equina compression.      At L2-3 there is moderate canal stenosis with cauda equina compression.      Disc bulge extends into the right extra foraminal zone at the L3-4 and L4-5 level with impingement upon exiting nerves in the extraforaminal zone.      Abnormal edema is identified in the bilateral paraspinal soft tissues at the L3-4, L4-5 and L5-S1 levels with mild enhancement identified in the right-sided paraspinous soft tissues. These findings are concerning for an ongoing infectious inflammatory    process involving the facet joints.      Minimal epidural thickening is identified dorsally in the spinal canal behind L3 and L4. There is also a hypoenhancing area along the left posterolateral spinal canal that impinges upon the thecal sac. This could represent a developing epidural abscess.     Recommend follow-up examination in 2-3 days.      CT-HEAD W/O   Final Result      1. No CT evidence of acute infarct, hemorrhage or mass.   2. Mild global parenchymal atrophy. Chronic small vessel ischemic changes.      EC-ECHOCARDIOGRAM COMPLETE W/ CONT   Final Result      DX-KNEE 3 VIEWS LEFT   Final Result      1.  Severe tricompartmental left knee osteoarthritis      2.  osteoarthritis the proximal tibiofibular articulation      3.  Multiple intra-articular ossific body      4.  Diffuse atherosclerotic plaque      DX-CHEST-PORTABLE (1 VIEW)   Final Result      No acute cardiopulmonary abnormality identified.      CL-CARDIOVERSION    (Results Pending)        Assessment/Plan  * MSSA bacteremia  Assessment & Plan  Blood culture on admission 7/4 positive for MSSA 2/2  Source likely septic arthritis  Status post left knee I&D on 7/6/2022 Ortho service following    MRI lumbar spine with spinal stenosis and concern for possible developing epidural abscess evaluated by spine surgery with recommendation for medical treatment and outpatient follow-up     Blood culture 7/7 was positive discussed with nursing staff obtaining repeat blood cultures were ordered   ID following on IV nafcillin        Hypomagnesemia  Assessment & Plan  Replete with IV magnesium sulfate and monitor    Pyogenic arthritis of left knee joint (HCC)  Assessment & Plan  Synovial fluid culture MSSA  Status post I&D by Ortho on 7/6/2022    Reviewed importance of mobilization  Continue IV nafcillin    Falls  Assessment & Plan  PT OT   Encourage mobilization reviewed with patient    Acute cystitis without hematuria  Assessment & Plan  Urine culture MSSA   On nafcillin    Hyponatremia  Assessment & Plan  Continue free fluid restriction  Improving continue to monitor      Type 2 diabetes mellitus with hyperglycemia, without long-term current use of insulin (HCC)  Assessment & Plan  Last a1c 6.3  Improved control on Lantus continue sliding scale monitor  CBGs    Atrial fibrillation with RVR (HCC)  Assessment & Plan  Echo EF 55% no valvular abnormalities  Cardiology following status post CARLI directed cardioversion on 7/8/2022  Continue metoprolol and amiodarone  Lovenox transition to Eliquis         Thrombocytopenia (HCC)  Assessment & Plan  Platelet counts 136 improved    Hepatocellular carcinoma (HCC)- (present on admission)  Assessment & Plan  History of hepatocellular carcinoma.   Patient has history of cirrhosis  Following with oncology as an outpatient.    Cirrhosis of liver without ascites (HCC)- (present on admission)  Assessment & Plan  History of hepatitis C virus and received treatment     Outpatient follow-up    Essential hypertension, benign- (present on admission)  Assessment & Plan  Acceptable control on metoprolol and lisinopril continue to monitor       VTE prophylaxis: therapeutic anticoagulation with Lovenox    I have performed a physical exam and reviewed and updated ROS and Plan today (7/10/2022). In review of yesterday's note (7/9/2022), there are no changes except as documented above.

## 2022-07-10 NOTE — PROGRESS NOTES
Infectious Disease Progress Note    Author: Shailesh Ford M.D. Date & Time of service: 7/10/2022  11:55 AM    Chief Complaint:  MSSA bacteremia and septic joint     Interval History:    AF, O2 RA, complaining of left knee and leg pain.  In A. fib with RVR.    7/10 patient remains afebrile, white count is down to 4.2, tolerating nafcillin    Review of Systems:  Review of Systems   Respiratory: Negative for cough and shortness of breath.    Gastrointestinal: Negative for abdominal pain, constipation, diarrhea, nausea and vomiting.   Musculoskeletal: Positive for back pain, joint pain and myalgias.   Psychiatric/Behavioral: The patient is not nervous/anxious.        Hemodynamics:  Temp (24hrs), Av.8 °C (98.2 °F), Min:36.7 °C (98.1 °F), Max:36.9 °C (98.4 °F)  Temperature: 36.7 °C (98.1 °F)  Pulse  Av.3  Min: 66  Max: 195   Blood Pressure : (!) 144/71       Physical Exam:  Physical Exam  Vitals and nursing note reviewed.   Constitutional:       General: He is not in acute distress.     Appearance: Normal appearance. He is ill-appearing.   HENT:      Mouth/Throat:      Pharynx: No oropharyngeal exudate.   Eyes:      General: No scleral icterus.        Right eye: No discharge.         Left eye: No discharge.   Cardiovascular:      Rate and Rhythm: Normal rate. Rhythm irregular.   Pulmonary:      Effort: Pulmonary effort is normal.      Breath sounds: Normal breath sounds.   Abdominal:      General: Abdomen is flat. Bowel sounds are normal.      Palpations: Abdomen is soft.   Musculoskeletal:         General: Tenderness and signs of injury present.      Left lower leg: Edema present.      Comments: Left knee with surgical dressing in place   Skin:     General: Skin is warm and dry.      Comments: Multiple tattoos   Neurological:      General: No focal deficit present.      Mental Status: He is alert and oriented to person, place, and time.   Psychiatric:         Mood and Affect: Mood normal.          Behavior: Behavior normal.         Meds:    Current Facility-Administered Medications:   •  apixaban  •  nafcillin infusion  •  amiodarone  •  [START ON 7/24/2022] amiodarone  •  insulin GLARGINE  •  metoprolol tartrate  •  senna-docusate **AND** polyethylene glycol/lytes **AND** magnesium hydroxide **AND** bisacodyl  •  labetalol  •  ondansetron  •  ondansetron  •  insulin regular **AND** POC blood glucose manual result **AND** NOTIFY MD and PharmD **AND** Administer 20 grams of glucose (approximately 8 ounces of fruit juice) every 15 minutes PRN FSBG less than 70 mg/dL **AND** dextrose bolus  •  Notify provider if pain remains uncontrolled **AND** Use the Numeric Rating Scale (NRS), Yusuf-Baker Faces (WBF), or FLACC on regular floors and Critical-Care Pain Observation Tool (CPOT) on ICUs/Trauma to assess pain **AND** Pulse Ox **AND** Pharmacy Consult Request **AND** If patient difficult to arouse and/or has respiratory depression (respiratory rate of 10 or less), stop any opiates that are currently infusing and call a Rapid Response.  •  oxyCODONE immediate-release **OR** oxyCODONE immediate-release **OR** morphine injection  •  lisinopril  •  ezetimibe    Labs:  Recent Labs     07/08/22  0400 07/09/22  0336 07/10/22  0442   WBC 5.6 4.4* 4.2*   RBC 4.86 4.40* 4.74   HEMOGLOBIN 14.0 12.7* 13.8*   HEMATOCRIT 40.3* 36.7* 39.9*   MCV 82.9 83.4 84.2   MCH 28.8 28.9 29.1   RDW 41.7 42.6 42.9   PLATELETCT 109* 118* 136*   MPV 9.9 11.1 9.8   NEUTSPOLYS 84.60* 85.20* 78.80*   LYMPHOCYTES 5.60* 6.10* 9.80*   MONOCYTES 7.90 3.50 8.30   EOSINOPHILS 0.40 0.00 0.50   BASOPHILS 0.20 0.90 0.20   RBCMORPHOLO  --  Present  --      Recent Labs     07/08/22  0400 07/09/22  0336 07/10/22  0442   SODIUM 127* 123* 126*   POTASSIUM 4.1 4.3 4.1   CHLORIDE 93* 91* 92*   CO2 24 24 25   GLUCOSE 144* 168* 132*   BUN 16 21 18     Recent Labs     07/08/22  0400 07/09/22  0336 07/10/22  0442   ALBUMIN 2.9*  --  2.7*   TBILIRUBIN 0.6  --  0.8    ALKPHOSPHAT 104*  --  107*   TOTPROTEIN 6.2  --  6.2   ALTSGPT 19  --  12   ASTSGOT 27  --  26   CREATININE 0.60 0.66 0.69       Imaging:  CT-HEAD W/O    Result Date: 7/5/2022   CT HEAD WITHOUT CONTRAST 7/5/2022 6:07 PM HISTORY/REASON FOR EXAM:  Pain following trauma TECHNIQUE/EXAM DESCRIPTION AND NUMBER OF VIEWS: CT of the head without contrast. The study was performed on a helical multidetector CT scanner. Contiguous 2.5 mm axial sections were obtained from the skull base through the vertex. Up to date radiation dose reduction adjustments have been utilized to meet ALARA standards for radiation dose reduction. COMPARISON:  None available FINDINGS: Lateral ventricles are normal in size and symmetric. Mild global parenchymal atrophy. Chronic small vessel ischemic changes. No significant mass effect or midline shift. Basal cisterns are patent. No evidence for intracranial hemorrhage. Calvaria are intact. Atherosclerosis. Visualized orbits are unremarkable. Visualized mastoid air cells are clear. No significant sinus disease in the visualized paranasal sinuses.     1. No CT evidence of acute infarct, hemorrhage or mass. 2. Mild global parenchymal atrophy. Chronic small vessel ischemic changes.    DX-CHEST-PORTABLE (1 VIEW)    Result Date: 7/4/2022 7/4/2022 2:18 PM HISTORY/REASON FOR EXAM:  Sepsis. TECHNIQUE/EXAM DESCRIPTION AND NUMBER OF VIEWS: Single portable view of the chest. COMPARISON: None FINDINGS: LUNGS: The lungs are clear. HEART and MEDIASTINUM: normal in size. Pleura: There are no pleural effusion or pneumothoraces. Osseous structures: There are bilateral chronic rotator cuff tear with the humeral head abutting and eroding the acromion. There is bilateral glenohumeral joint osteoarthritis.     No acute cardiopulmonary abnormality identified.    DX-KNEE 3 VIEWS LEFT    Result Date: 7/4/2022 7/4/2022 2:18 PM HISTORY/REASON FOR EXAM:  Atraumatic Pain/Swelling/Deformity. Left knee pain TECHNIQUE/EXAM  DESCRIPTION AND NUMBER OF VIEWS:  3 views of the LEFT knee. COMPARISON: None FINDINGS: There is no fracture. Alignment is normal. Severe tricompartmental degenerative changes are present. There is also severe osteoarthritis of the proximal tibiofibular articulation with multiple subchondral cyst present. There is extensive atherosclerotic plaque. There are multiple intra-articular ossific body.     1.  Severe tricompartmental left knee osteoarthritis 2.  osteoarthritis the proximal tibiofibular articulation 3.  Multiple intra-articular ossific body 4.  Diffuse atherosclerotic plaque    MR-LUMBAR SPINE-WITH & W/O    Result Date: 7/7/2022 7/6/2022 3:22 PM HISTORY/REASON FOR EXAM:  Low back pain, infection suspected; Epidural abscess suspected; Epidural abscess TECHNIQUE/EXAM DESCRIPTION: MRI of the lumbar spine without and with contrast. The study was performed on a [employer] Signa 1.5 [place] MRI scanner. T1 sagittal, T2 fast spin-echo sagittal, and T2 axial images were obtained of the lumbar spine. T1 postcontrast fat-suppressed sagittal images were obtained. 18 mL ProHance contrast was administered intravenously. COMPARISON:  7/9/2021 FINDINGS: Conus terminates at the T12-L1 level. Type I marrow changes are noted of the adjacent L2-L3 endplates. There is decreased disc height throughout the lumbar spine with disc desiccation noted throughout. Reversal of lumbar lordosis noted with a mild kyphotic angulation centered at the L1-L2 level. T11-12: Mild facet degeneration and ligamentum flavum hypertrophy. There is no significant canal stenosis or foraminal narrowing. T12-L1: Bilateral mild facet degeneration. Mild disc bulge. There is no significant canal stenosis or foraminal narrowing. L1-2: Broad-based disc bulge and bilateral facet degeneration and ligament of flavum hypertrophy. There is mild canal narrowing. There is mild left foraminal narrowing. L2-3: Broad-based disc bulge and bilateral advanced facet degeneration  and ligamentum flavum hypertrophy. There is mild left foraminal narrowing. There is moderate canal stenosis with thecal sac and cauda equina compression at this level. L3-4: Broad-based disc bulge and bilateral facet degeneration and ligamentum flavum hypertrophy. Disc bulge extends into both neural foramina and extra foraminal zones. This is worse in the right side. There is severe canal stenosis with cauda equina compression. There is impingement upon the exiting right L3 nerve and extra foraminal zone. There is moderate bilateral foraminal narrowing. L4-5: Broad-based disc bulge and bilateral advanced facet degeneration with ligamentum flavum hypertrophy. There is moderate to severe canal stenosis with cauda equina compression. There is moderate bilateral foraminal narrowing. There is extension of the disc bulge into the right extraforaminal zone causing significant impingement upon the exiting right L4 nerve. L5-S1: Broad-based disc bulge and bilateral advanced facet degeneration with intervertebral hypertrophy. There is no significant foraminal narrowing. There is moderate severe right lateral recess narrowing with impingement upon the descending right S1 nerve. STIR images demonstrate edema of the bilateral posterior paraspinous soft tissues at the L3, L4, L5 levels worse on the right side. Postcontrast images through the lumbar spine demonstrate mild enhancement along the right paraspinous soft tissues at the L2-3, L3-4, L4-5 level adjacent to the facet joints. Mild epidural enhancement is identified dorsally at the L3 and L4 segments, there is a small area of hypoenhancement along the posterolateral spinal canal at the L4-5 level on the left side. Small renal cortical cysts noted in the right kidney.     Multilevel degenerative changes in lumbar spine as described above. There is severe canal stenosis at the L3-4, L4-5 level with cauda equina compression. At L2-3 there is moderate canal stenosis with cauda  equina compression. Disc bulge extends into the right extra foraminal zone at the L3-4 and L4-5 level with impingement upon exiting nerves in the extraforaminal zone. Abnormal edema is identified in the bilateral paraspinal soft tissues at the L3-4, L4-5 and L5-S1 levels with mild enhancement identified in the right-sided paraspinous soft tissues. These findings are concerning for an ongoing infectious inflammatory process involving the facet joints. Minimal epidural thickening is identified dorsally in the spinal canal behind L3 and L4. There is also a hypoenhancing area along the left posterolateral spinal canal that impinges upon the thecal sac. This could represent a developing epidural abscess. Recommend follow-up examination in 2-3 days.    IR-CONSULT ONLY-OUTPATIENT    Result Date: 2022  This exam has been resulted under the NOTES tab, and the signed report has been auto faxed to the ordering physician on the date/time of that signature.    EC-ECHOCARDIOGRAM COMPLETE W/ CONT    Result Date: 2022  Transthoracic Echo Report Echocardiography Laboratory CONCLUSIONS Normal left ventricular size and systolic function. The left ventricular ejection fraction is estimated to be 55%. No evidence of valvular abnormality based on Doppler evaluation. Unable to estimate right ventricular systolic pressure due to an inadequate tricuspid regurgitant jet. ARAUJO CECILLE Exam Date:         2022                    14:12 Exam Location:     Inpatient Priority:          Routine Ordering Physician:        ELSY REARDON Referring Physician: Sonographer:               Lily Lynch RD, T Age:    72     Gender:    M MRN:    [id number] :    1950 BSA:    2      Ht (in):    70     Wt (lb):    180 Exam Type:     Complete Indications:     Arrhythmia ICD Codes:       427.9 CPT Codes:       78174 BP:   0      /   88     HR:   113 Technical Quality:       Fair MEASUREMENTS  (Male /  Female) Normal Values 2D ECHO LV Diastolic Diameter PLAX        4.5 cm                4.2 - 5.9 / 3.9 - 5.3 cm LV Systolic Diameter PLAX         3.4 cm                2.1 - 4.0 cm IVS Diastolic Thickness           0.97 cm               LVPW Diastolic Thickness          1 cm                  LVOT Diameter                     2 cm                  Estimated LV Ejection Fraction    55 %                  LV Ejection Fraction MOD BP       62.6 %                >= 55  % LV Ejection Fraction MOD 4C       63.6 %                LV Ejection Fraction MOD 2C       56.4 %                DOPPLER AV Peak Velocity                  1.2 m/s               AV Peak Gradient                  5.5 mmHg              AV Mean Gradient                  4 mmHg                LVOT Peak Velocity                0.77 m/s              AV Area Cont Eq vti               2.4 cm2               PV Peak Velocity                  0.81 m/s              PV Peak Gradient                  2.6 mmHg              * Indicates values subject to auto-interpretation LV EF:  55    % FINDINGS Left Ventricle Contrast was used to enhance visualization of the endocardial border. Normal left ventricular chamber size. Normal left ventricular wall thickness. Grossly normal left ventricular systolic function. The left ventricular ejection fraction is visually estimated to be 55%. Diastolic function is difficult to assess with arrhythmia. Right Ventricle Normal right ventricular size. Reduced right ventricular systolic function. Right Atrium Normal right atrial size. Normal inferior vena cava size and inspiratory collapse. Left Atrium Normal left atrial size. Mitral Valve Structurally normal mitral valve without significant stenosis or regurgitation. Aortic Valve Structurally normal aortic valve without significant stenosis or regurgitation. Tricuspid Valve Structurally normal tricuspid valve without significant stenosis or regurgitation. Unable to estimate right ventricular  systolic pressure due to an inadequate tricuspid regurgitant jet. Pulmonic Valve The pulmonic valve is not well visualized. No stenosis or regurgitation seen. Pericardium Fat pat present. Aorta The ascending aorta is mildly dilated by BSA diameter is 3.7 cm. The aortic root diameter is 3.6 cm. Mukul Phillips MD (Electronically Signed) Final Date:     2022                 16:13    EC-CARLI W/O CONT    Result Date: 2022  Transesophageal Echo Report Echocardiography Laboratory CONCLUSIONS No evidence of endocarditis. Normal left atrial appendage. No thrombus detected in the left atrial appendage. CECILLE ARAUJO Exam Date:          2022                     09:47 Exam Location:      Inpatient Priority:            Routine Ordering Physician:        VINH PRATT                             ([id number]) Referring Physician:       SANTA Diop Sonographer:               Lily Lynch RDCS, RVT Age:    72     Gender:    M MRN:    [id number] :    1950 BSA:           Ht (in):           Wt (lb): Report Type:      Complete Indications:     Arrhythmia, Endocarditis ICD Codes:       427.9  421 CPT Codes:       64987 BP:          /          HR: Technical Quality:       Good MEASUREMENTS  (Male / Female) Normal Values * Indicates values subject to auto-interpretation LV EF:        % Medications Limitations Complications Proc. Components The probe was inserted and manipulated by Dr Phillips. Epiq probe #2  was used for this procedure. 2D, color Doppler, spectral Doppler, and 3D imaging were used as part of the evaluation as clinically indicated. FINDINGS Left Ventricle Right Ventricle Right Atrium Left Atrium LA Appendage Normal left atrial appendage. No thrombus detected in the left atrial appendage. IA Septum IV Septum Mitral Valve Aortic Valve Tricuspid Valve Pulmonic Valve Pericardium Aorta Mukul Phillips MD (Electronically Signed) Final Date:     2022                  13:41      Micro:  Results     Procedure Component Value Units Date/Time    CULTURE WOUND W/ GRAM STAIN [[id number]]  (Abnormal)  (Susceptibility) Collected: 07/06/22 1925    Order Status: Completed Specimen: Wound Updated: 07/09/22 1625     Significant Indicator POS     Source WND     Site Left Knee     Culture Result -     Gram Stain Result Few WBCs.  No organisms seen.       Culture Result Staphylococcus aureus  Rare growth      Narrative:      Surgery - swabs received    Susceptibility     Staphylococcus aureus (1)     Antibiotic Interpretation Microscan   Method Status    Azithromycin Sensitive <=2 mcg/mL DEVEN Final    Clindamycin Sensitive <=0.25 mcg/mL DEVEN Final    Cefazolin Sensitive <=8 mcg/mL DEVEN Final    Cefepime Sensitive <=4 mcg/mL DVEEN Final    Ceftaroline Sensitive <=0.5 mcg/mL DEVEN Final    Daptomycin Sensitive 1 mcg/mL DEVEN Final    Erythromycin Sensitive <=0.25 mcg/mL DEVEN Final    Ampicillin/sulbactam Sensitive <=8/4 mcg/mL DEVEN Final    Vancomycin Sensitive 1 mcg/mL DEVEN Final    Oxacillin Sensitive <=0.25 mcg/mL DEVEN Final    Pip/Tazobactam Sensitive <=8 mcg/mL DEVEN Final    Trimeth/Sulfa Sensitive <=0.5/9.5 mcg/mL DEVEN Final    Tetracycline Sensitive <=4 mcg/mL DEVEN Final                   Anaerobic Culture [[id number]] Collected: 07/06/22 1925    Order Status: Completed Specimen: Wound Updated: 07/09/22 1625     Significant Indicator NEG     Source WND     Site Left Knee     Culture Result Culture in progress.    Narrative:      Surgery - swabs received    BLOOD CULTURE [[id number]]  (Abnormal) Collected: 07/07/22 0940    Order Status: Completed Specimen: Blood from Peripheral Updated: 07/09/22 0843     Significant Indicator POS     Source BLD     Site PERIPHERAL     Culture Result Growth detected by Bactec instrument.  07/08/2022  18:41      Staphylococcus aureus  Methicillin sensitive by screening method.  See previous culture for sensitivity report.      Narrative:      CALL  [facility]  [facility] tel.  "[phone],  CALLED  [facility] tel. [phone] 07/08/2022, 19:29, RB PERF. RESULTS CALLED TO:  [id number]  Per Hospital Policy: Only change Specimen Src: to \"Line\" if  specified by physician order.  Right Hand    BLOOD CULTURE [[id number]]     Order Status: Sent Specimen: Blood from Peripheral     BLOOD CULTURE [[id number]]     Order Status: Sent Specimen: Blood from Peripheral     BLOOD CULTURE [[id number]] Collected: 07/07/22 1130    Order Status: Completed Specimen: Blood from Peripheral Updated: 07/08/22 0724     Significant Indicator NEG     Source BLD     Site PERIPHERAL     Culture Result No Growth  Note: Blood cultures are incubated for 5 days and  are monitored continuously.Positive blood cultures  are called to the RN and reported as soon as  they are identified.      Narrative:      Per Hospital Policy: Only change Specimen Src: to \"Line\" if  specified by physician order.  Right Hand    FLUID CULTURE W/GRAM STAIN [[id number]]  (Abnormal)  (Susceptibility) Collected: 07/04/22 1620    Order Status: Completed Specimen: Synovial from Other Body Fluid Updated: 07/07/22 0838     Significant Indicator POS     Source SYNO     Site left knee     Culture Result -     Gram Stain Result Rare WBCs.  No organisms seen.       Culture Result Staphylococcus aureus  Light growth      Narrative:      CALL  Quarles  [facility] tel. ,  CALLED  ER tel.  07/05/2022, 13:22, RB PERF. RESULTS CALLED TO:Ernesto [phone] Vlad  L. knee fluid.    Susceptibility     Staphylococcus aureus (1)     Antibiotic Interpretation Microscan   Method Status    Azithromycin Sensitive <=2 mcg/mL DEVEN Final    Clindamycin Sensitive <=0.25 mcg/mL DEVEN Final    Cefazolin Sensitive <=8 mcg/mL DEVEN Final    Cefepime Sensitive <=4 mcg/mL DEVEN Final    Ceftaroline Sensitive <=0.5 mcg/mL DEVEN Final    Daptomycin Sensitive 1 mcg/mL DEVEN Final    Erythromycin Sensitive <=0.25 mcg/mL DEVEN Final    Ampicillin/sulbactam Sensitive <=8/4 mcg/mL DEVEN Final    Vancomycin Sensitive 1 mcg/mL DEVEN Final " "   Oxacillin Sensitive <=0.25 mcg/mL DEVEN Final    Pip/Tazobactam Sensitive <=8 mcg/mL DEVEN Final    Trimeth/Sulfa Sensitive <=0.5/9.5 mcg/mL DEVEN Final    Tetracycline Sensitive <=4 mcg/mL DEVEN Final                   Blood Culture [[id number]]  (Abnormal) Collected: 07/04/22 1438    Order Status: Completed Specimen: Blood from Peripheral Updated: 07/07/22 0825     Significant Indicator POS     Source BLD     Site PERIPHERAL     Culture Result Growth detected by Bactec instrument. 07/05/2022  09:29      Staphylococcus aureus  See previous culture for sensitivity report.      Narrative:      CALL  Quarles  ER tel. ,  CALLED  ER tel.  07/05/2022, 09:32, RB PERF. RESULTS CALLED TO:PRUDENCE Arriaga  [id number]  2 of 2 blood culture x2  Sites order. Per Hospital Policy:  Only change Specimen Src: to \"Line\" if specified by physician  order.  Left Hand    Blood Culture [[id number]]  (Abnormal)  (Susceptibility) Collected: 07/04/22 1425    Order Status: Completed Specimen: Blood from Peripheral Updated: 07/07/22 0825     Significant Indicator POS     Source BLD     Site PERIPHERAL     Culture Result Growth detected by Bactec instrument. 07/05/2022  04:46  Staphylococcus aureus (methicillin sensitive)  detected by PCR.        Staphylococcus aureus    Narrative:      CALL  Quarles  ER tel. ,  CALLED  ER tel.  07/05/2022, 09:31, RB PERF. RESULTS CALLED TO: Corina FOSS  [id number]  1 of 2 for Blood Culture x 2 sites order. Per Hospital  Policy: Only change Specimen Src: to \"Line\" if specified by  physician order.  No site indicated    Susceptibility     Staphylococcus aureus (1)     Antibiotic Interpretation Microscan   Method Status    Azithromycin Sensitive <=2 mcg/mL DEVEN Final    Clindamycin Sensitive <=0.25 mcg/mL DEVEN Final    Cefazolin Sensitive <=8 mcg/mL DEVEN Final    Cefepime Sensitive <=4 mcg/mL DEVEN Final    Ceftaroline Sensitive <=0.5 mcg/mL DEVEN Final    Daptomycin Sensitive 1 mcg/mL DEVEN Final    Erythromycin Sensitive <=0.25 mcg/mL DEVEN Final    " Ampicillin/sulbactam Sensitive <=8/4 mcg/mL DEVEN Final    Vancomycin Sensitive 1 mcg/mL DEVEN Final    Oxacillin Sensitive <=0.25 mcg/mL DEVEN Final    Pip/Tazobactam Sensitive <=8 mcg/mL DEVEN Final    Trimeth/Sulfa Sensitive <=0.5/9.5 mcg/mL DEVEN Final    Tetracycline Sensitive <=4 mcg/mL DEVEN Final                   BLOOD CULTURE [[id number]]     Order Status: Canceled Specimen: Blood from Peripheral     GRAM STAIN [[id number]] Collected: 07/06/22 1925    Order Status: Completed Specimen: Wound Updated: 07/07/22 0249     Significant Indicator .     Source WND     Site Left Knee     Gram Stain Result Few WBCs.  No organisms seen.      Narrative:      Surgery - swabs received    Urine Culture (NEW) [[id number]]  (Abnormal)  (Susceptibility) Collected: 07/04/22 1630    Order Status: Completed Specimen: Urine, Clean Catch Updated: 07/06/22 0796     Significant Indicator POS     Source UR     Site URINE, CLEAN CATCH     Culture Result -      Staphylococcus aureus  >100,000 cfu/mL  Methicillin sensitive by screening method.      Narrative:      Indication for culture:->Evaluation for sepsis without a  clear source of infection  Indication for culture:->Evaluation for sepsis without a    Susceptibility     Staphylococcus aureus (1)     Antibiotic Interpretation Microscan   Method Status    Cefazolin Sensitive <=8 mcg/mL DEVEN Final    Cefepime Sensitive <=4 mcg/mL DEVEN Final    Ceftaroline Sensitive <=0.5 mcg/mL DEVEN Final    Daptomycin Sensitive <=0.5 mcg/mL DEVEN Final    Nitrofurantoin Sensitive <=32 mcg/mL DEVEN Final    Pip/Tazobactam Sensitive <=8 mcg/mL DEVEN Final    Trimeth/Sulfa Sensitive <=0.5/9.5 mcg/mL DEVEN Final    Tetracycline Sensitive <=4 mcg/mL DEVEN Final                   BLOOD CULTURE [[id number]]     Order Status: Canceled Specimen: Blood from Peripheral     BLOOD CULTURE [[id number]]     Order Status: Canceled Specimen: Blood from Peripheral     BLOOD CULTURE [[id number]]     Order Status: Canceled Specimen: Blood from  Peripheral     MRSA By PCR (Amp) [[id number]] Collected: 07/05/22 1215    Order Status: Completed Specimen: Respirate from Nares Updated: 07/05/22 1608     MRSA by PCR Negative    GRAM STAIN [[id number]] Collected: 07/04/22 1620    Order Status: Completed Specimen: Synovial Updated: 07/04/22 1927     Significant Indicator .     Source SYNO     Site left knee     Gram Stain Result Rare WBCs.  No organisms seen.      Narrative:      L. knee fluid.    Urinalysis [[id number]]  (Abnormal) Collected: 07/04/22 1630    Order Status: Completed Specimen: Urine Updated: 07/04/22 1739     Color Yellow     Character Clear     Specific Gravity 1.023     Ph 5.0     Glucose 250 mg/dL      Ketones Negative mg/dL      Protein 100 mg/dL      Bilirubin Negative     Urobilinogen, Urine 1.0     Nitrite Positive     Leukocyte Esterase Trace     Occult Blood Large     Micro Urine Req Microscopic    Narrative:      Indication for culture:->Evaluation for sepsis without a  clear source of infection           ASSESSMENT/PLAN:   72 y.o. male patient admitted 7/4/2022. Pt has a past medical history of HTN, HLD, A fib, DMT2 and chronic hepatitis C cirrhosis. He presented complaining of weakness and left knee pain. Patient also with chronic back pain and states he had 4 injections in his back approximately 1 week ago. Blood cultures positive for MSSA.  The fluid aspirated which is also positive for MSSA.  Patient went to the OR on 7/6 for washout of septic left knee.     Problem List  Leukopenia, new, likely reactive and also with cirrhosis  Thrombocytopenia, improving  Bacteremia  -Blood cultures on 7/4 & 7/7 +MSSA  -TTE on 7/5 with no vegetations, no significant valvular disease, EF 55%  -CARLI on 7/8, no evidence of endocarditis  Native left knee septic arthritis  -Aspirated synovial fluid with 30 7K WBCs, 66% polys, cultures +MSSA   -OR with orthopedics on 7/6 for I&D of the left knee, per op note significant amount of purulent material was  encountered and sent for culture  UTI, possibly source of the bacteremia or resulted from   -Urine culture on 7/4 +MSSA   Rhabdomyolysis  -Improving CPK  A. fib, RVR, improved but now recurred   Cirrhosis  Chronic HCV infection, per chart  - NAAT pending   Acute on chronic back pain  Paraspinal infection  Lumbar spine with enhancement in the right-sided paraspinous soft tissues at L3-S1 concerning for infection potential developing epidural abscess at L3-L4 per MRI  Antibiotic allergies: Ciprofloxacin reported as convulsions     Plan   --- Patient with high burden of infection, multiple sites he is yet to clear blood cultures, cefazolin was transitioned to nafcillin on 7/9.  Continue for now  --- Radiologist is recommending repeating imaging MRI of the spine in 2 to 3 days given possibly developing epidural abscess  --- Follow-up repeat blood cultures from 7/7, 1 out of 2 is positive  --- Repeat blood cultures x2 in a.m.  --- Monitor labs  --- F/up HCV NAAT. Also noted positive hepatitis B core antibody with negative surface antibody. Will repeat hepatitis panel including hepatitis B surface antigen, HIV antibody     Dispo: TBD, may need SNF, currently appears to have poor insight  PICC: Eventually yes, will need to ensure clearance of blood cultures first.  Cannot place at the moment.      Plan of care discussed with PharmD. ID will continue to follow.

## 2022-07-10 NOTE — PROGRESS NOTES
"Patient seen and examined  Complains of  Pain as expected  Loosening ACE wrap helped    BP (!) 144/71   Pulse 66   Temp 36.7 °C (98.1 °F) (Temporal)   Resp 13   Ht 1.778 m (5' 10\")   Wt 89.8 kg (197 lb 15.6 oz)   SpO2 98%     Recent Labs     07/08/22  0400 07/09/22  0336 07/10/22  0442   WBC 5.6 4.4* 4.2*   RBC 4.86 4.40* 4.74   HEMOGLOBIN 14.0 12.7* 13.8*   HEMATOCRIT 40.3* 36.7* 39.9*   MCV 82.9 83.4 84.2   MCH 28.8 28.9 29.1   MCHC 34.7 34.6 34.6   RDW 41.7 42.6 42.9   PLATELETCT 109* 118* 136*   MPV 9.9 11.1 9.8       No acute distress  Dressing clean dry and intact  Neurovascularly intact    POD# 4 I&D septic knee   S/P      Plan:  WBAT  ROM encouraged  PT  ABX per ID  ABDOUL 2 weeks post-op on DC          "

## 2022-07-11 LAB
ANION GAP SERPL CALC-SCNC: 9 MMOL/L (ref 7–16)
BASOPHILS # BLD AUTO: 0.4 % (ref 0–1.8)
BASOPHILS # BLD: 0.02 K/UL (ref 0–0.12)
BUN SERPL-MCNC: 19 MG/DL (ref 8–22)
CALCIUM SERPL-MCNC: 7.6 MG/DL (ref 8.5–10.5)
CHLORIDE SERPL-SCNC: 94 MMOL/L (ref 96–112)
CO2 SERPL-SCNC: 25 MMOL/L (ref 20–33)
CREAT SERPL-MCNC: 0.73 MG/DL (ref 0.5–1.4)
EOSINOPHIL # BLD AUTO: 0.01 K/UL (ref 0–0.51)
EOSINOPHIL NFR BLD: 0.2 % (ref 0–6.9)
ERYTHROCYTE [DISTWIDTH] IN BLOOD BY AUTOMATED COUNT: 43.5 FL (ref 35.9–50)
GFR SERPLBLD CREATININE-BSD FMLA CKD-EPI: 97 ML/MIN/1.73 M 2
GLUCOSE BLD STRIP.AUTO-MCNC: 104 MG/DL (ref 65–99)
GLUCOSE BLD STRIP.AUTO-MCNC: 121 MG/DL (ref 65–99)
GLUCOSE BLD STRIP.AUTO-MCNC: 171 MG/DL (ref 65–99)
GLUCOSE BLD STRIP.AUTO-MCNC: 198 MG/DL (ref 65–99)
GLUCOSE SERPL-MCNC: 141 MG/DL (ref 65–99)
HBV CORE AB SERPL QL IA: REACTIVE
HBV SURFACE AB SERPL IA-ACNC: <3.5 MIU/ML (ref 0–10)
HBV SURFACE AG SER QL: NORMAL
HCT VFR BLD AUTO: 38.9 % (ref 42–52)
HGB BLD-MCNC: 13 G/DL (ref 14–18)
HIV 1+2 AB+HIV1 P24 AG SERPL QL IA: NORMAL
IMM GRANULOCYTES # BLD AUTO: 0.09 K/UL (ref 0–0.11)
IMM GRANULOCYTES NFR BLD AUTO: 1.7 % (ref 0–0.9)
LYMPHOCYTES # BLD AUTO: 0.38 K/UL (ref 1–4.8)
LYMPHOCYTES NFR BLD: 7.1 % (ref 22–41)
MCH RBC QN AUTO: 28.3 PG (ref 27–33)
MCHC RBC AUTO-ENTMCNC: 33.4 G/DL (ref 33.7–35.3)
MCV RBC AUTO: 84.7 FL (ref 81.4–97.8)
MONOCYTES # BLD AUTO: 0.4 K/UL (ref 0–0.85)
MONOCYTES NFR BLD AUTO: 7.5 % (ref 0–13.4)
NEUTROPHILS # BLD AUTO: 4.45 K/UL (ref 1.82–7.42)
NEUTROPHILS NFR BLD: 83.1 % (ref 44–72)
NRBC # BLD AUTO: 0 K/UL
NRBC BLD-RTO: 0 /100 WBC
PLATELET # BLD AUTO: 135 K/UL (ref 164–446)
PMV BLD AUTO: 9.9 FL (ref 9–12.9)
POTASSIUM SERPL-SCNC: 3.8 MMOL/L (ref 3.6–5.5)
RBC # BLD AUTO: 4.59 M/UL (ref 4.7–6.1)
SODIUM SERPL-SCNC: 128 MMOL/L (ref 135–145)
WBC # BLD AUTO: 5.4 K/UL (ref 4.8–10.8)

## 2022-07-11 PROCEDURE — 700102 HCHG RX REV CODE 250 W/ 637 OVERRIDE(OP): Performed by: STUDENT IN AN ORGANIZED HEALTH CARE EDUCATION/TRAINING PROGRAM

## 2022-07-11 PROCEDURE — 700105 HCHG RX REV CODE 258: Performed by: HOSPITALIST

## 2022-07-11 PROCEDURE — 700101 HCHG RX REV CODE 250: Performed by: HOSPITALIST

## 2022-07-11 PROCEDURE — A9270 NON-COVERED ITEM OR SERVICE: HCPCS | Performed by: INTERNAL MEDICINE

## 2022-07-11 PROCEDURE — A9270 NON-COVERED ITEM OR SERVICE: HCPCS | Performed by: STUDENT IN AN ORGANIZED HEALTH CARE EDUCATION/TRAINING PROGRAM

## 2022-07-11 PROCEDURE — 87389 HIV-1 AG W/HIV-1&-2 AB AG IA: CPT

## 2022-07-11 PROCEDURE — 86706 HEP B SURFACE ANTIBODY: CPT

## 2022-07-11 PROCEDURE — A9270 NON-COVERED ITEM OR SERVICE: HCPCS | Performed by: HOSPITALIST

## 2022-07-11 PROCEDURE — 99232 SBSQ HOSP IP/OBS MODERATE 35: CPT | Performed by: INTERNAL MEDICINE

## 2022-07-11 PROCEDURE — 700102 HCHG RX REV CODE 250 W/ 637 OVERRIDE(OP): Performed by: NURSE PRACTITIONER

## 2022-07-11 PROCEDURE — 82962 GLUCOSE BLOOD TEST: CPT | Mod: 91

## 2022-07-11 PROCEDURE — 87340 HEPATITIS B SURFACE AG IA: CPT

## 2022-07-11 PROCEDURE — 700102 HCHG RX REV CODE 250 W/ 637 OVERRIDE(OP): Performed by: HOSPITALIST

## 2022-07-11 PROCEDURE — A9270 NON-COVERED ITEM OR SERVICE: HCPCS | Performed by: NURSE PRACTITIONER

## 2022-07-11 PROCEDURE — 99232 SBSQ HOSP IP/OBS MODERATE 35: CPT | Mod: GC | Performed by: HOSPITALIST

## 2022-07-11 PROCEDURE — 700102 HCHG RX REV CODE 250 W/ 637 OVERRIDE(OP): Performed by: INTERNAL MEDICINE

## 2022-07-11 PROCEDURE — 97163 PT EVAL HIGH COMPLEX 45 MIN: CPT

## 2022-07-11 PROCEDURE — 86704 HEP B CORE ANTIBODY TOTAL: CPT

## 2022-07-11 PROCEDURE — 770020 HCHG ROOM/CARE - TELE (206)

## 2022-07-11 PROCEDURE — 85025 COMPLETE CBC W/AUTO DIFF WBC: CPT

## 2022-07-11 PROCEDURE — 80048 BASIC METABOLIC PNL TOTAL CA: CPT

## 2022-07-11 RX ORDER — POTASSIUM CHLORIDE 20 MEQ/1
20 TABLET, EXTENDED RELEASE ORAL ONCE
Status: COMPLETED | OUTPATIENT
Start: 2022-07-11 | End: 2022-07-11

## 2022-07-11 RX ORDER — LISINOPRIL 20 MG/1
20 TABLET ORAL EVERY EVENING
Status: DISCONTINUED | OUTPATIENT
Start: 2022-07-11 | End: 2022-07-15

## 2022-07-11 RX ADMIN — NAFCILLIN SODIUM 2 G: 2 INJECTION, POWDER, FOR SOLUTION INTRAMUSCULAR; INTRAVENOUS at 14:56

## 2022-07-11 RX ADMIN — AMIODARONE HYDROCHLORIDE 400 MG: 200 TABLET ORAL at 06:14

## 2022-07-11 RX ADMIN — METOPROLOL TARTRATE 50 MG: 50 TABLET, FILM COATED ORAL at 18:06

## 2022-07-11 RX ADMIN — AMIODARONE HYDROCHLORIDE 400 MG: 200 TABLET ORAL at 18:06

## 2022-07-11 RX ADMIN — LISINOPRIL 20 MG: 20 TABLET ORAL at 18:05

## 2022-07-11 RX ADMIN — NAFCILLIN SODIUM 2 G: 2 INJECTION, POWDER, FOR SOLUTION INTRAMUSCULAR; INTRAVENOUS at 21:51

## 2022-07-11 RX ADMIN — APIXABAN 5 MG: 5 TABLET, FILM COATED ORAL at 18:05

## 2022-07-11 RX ADMIN — INSULIN GLARGINE-YFGN 13 UNITS: 100 INJECTION, SOLUTION SUBCUTANEOUS at 18:10

## 2022-07-11 RX ADMIN — INSULIN HUMAN 2 UNITS: 100 INJECTION, SOLUTION PARENTERAL at 22:11

## 2022-07-11 RX ADMIN — OXYCODONE 2.5 MG: 5 TABLET ORAL at 03:08

## 2022-07-11 RX ADMIN — APIXABAN 5 MG: 5 TABLET, FILM COATED ORAL at 06:14

## 2022-07-11 RX ADMIN — INSULIN HUMAN 2 UNITS: 100 INJECTION, SOLUTION PARENTERAL at 11:36

## 2022-07-11 RX ADMIN — EZETIMIBE 10 MG: 10 TABLET ORAL at 18:06

## 2022-07-11 RX ADMIN — OXYCODONE 5 MG: 5 TABLET ORAL at 16:21

## 2022-07-11 RX ADMIN — SENNOSIDES AND DOCUSATE SODIUM 2 TABLET: 50; 8.6 TABLET ORAL at 06:14

## 2022-07-11 RX ADMIN — METOPROLOL TARTRATE 50 MG: 50 TABLET, FILM COATED ORAL at 06:14

## 2022-07-11 RX ADMIN — OXYCODONE 5 MG: 5 TABLET ORAL at 21:52

## 2022-07-11 RX ADMIN — POTASSIUM CHLORIDE 20 MEQ: 1500 TABLET, EXTENDED RELEASE ORAL at 14:56

## 2022-07-11 RX ADMIN — OXYCODONE 5 MG: 5 TABLET ORAL at 08:28

## 2022-07-11 RX ADMIN — NAFCILLIN SODIUM 2 G: 2 INJECTION, POWDER, FOR SOLUTION INTRAMUSCULAR; INTRAVENOUS at 18:07

## 2022-07-11 ASSESSMENT — PATIENT HEALTH QUESTIONNAIRE - PHQ9
2. FEELING DOWN, DEPRESSED, IRRITABLE, OR HOPELESS: NOT AT ALL
1. LITTLE INTEREST OR PLEASURE IN DOING THINGS: NOT AT ALL
SUM OF ALL RESPONSES TO PHQ9 QUESTIONS 1 AND 2: 0

## 2022-07-11 ASSESSMENT — ENCOUNTER SYMPTOMS
ABDOMINAL PAIN: 0
MYALGIAS: 1
VOMITING: 0
CONSTIPATION: 0
BACK PAIN: 1
NERVOUS/ANXIOUS: 0
SHORTNESS OF BREATH: 0
DIARRHEA: 0
CHILLS: 0
COUGH: 0
FEVER: 0
NAUSEA: 0

## 2022-07-11 ASSESSMENT — COGNITIVE AND FUNCTIONAL STATUS - GENERAL
CLIMB 3 TO 5 STEPS WITH RAILING: TOTAL
SUGGESTED CMS G CODE MODIFIER MOBILITY: CM
STANDING UP FROM CHAIR USING ARMS: A LOT
MOVING FROM LYING ON BACK TO SITTING ON SIDE OF FLAT BED: UNABLE
TURNING FROM BACK TO SIDE WHILE IN FLAT BAD: UNABLE
MOBILITY SCORE: 7
MOVING TO AND FROM BED TO CHAIR: UNABLE
WALKING IN HOSPITAL ROOM: TOTAL

## 2022-07-11 ASSESSMENT — CHA2DS2 SCORE
DIABETES: YES
SEX: MALE
PRIOR STROKE OR TIA OR THROMBOEMBOLISM: NO
VASCULAR DISEASE: NO
AGE 75 OR GREATER: NO
HYPERTENSION: YES
AGE 65 TO 74: YES
CHA2DS2 VASC SCORE: 3
CHF OR LEFT VENTRICULAR DYSFUNCTION: NO

## 2022-07-11 ASSESSMENT — PAIN DESCRIPTION - PAIN TYPE
TYPE: ACUTE PAIN;SURGICAL PAIN
TYPE: ACUTE PAIN
TYPE: ACUTE PAIN;SURGICAL PAIN
TYPE: ACUTE PAIN
TYPE: ACUTE PAIN;SURGICAL PAIN
TYPE: ACUTE PAIN;SURGICAL PAIN
TYPE: ACUTE PAIN

## 2022-07-11 ASSESSMENT — FIBROSIS 4 INDEX: FIB4 SCORE: 4

## 2022-07-11 ASSESSMENT — GAIT ASSESSMENTS: GAIT LEVEL OF ASSIST: UNABLE TO PARTICIPATE

## 2022-07-11 NOTE — CARE PLAN
Problem: Pain - Standard   Goal: Alleviation of pain or a reduction in pain to the patient’s comfort goal  Outcome: Progressing     Problem: Knowledge Deficit - Standard  Goal: Patient and family/care givers will demonstrate understanding of plan of care, disease process/condition, diagnostic tests and medications  Outcome: Progressing     Problem: Fall Risk  Goal: Patient will remain free from falls  Outcome: Progressing   The patient is Stable - Low risk of patient condition declining or worsening    Shift Goals  Clinical Goals: pain control, stable VS and HR  Patient Goals: rest and get better  Family Goals: no family present at this time    Progress made toward(s) clinical / shift goals:  Frequent repositioning, pain management, IV ABX. Continuous monitoring.     Patient is not progressing towards the following goals:

## 2022-07-11 NOTE — CARE PLAN
Problem: Pain - Standard  Goal: Alleviation of pain or a reduction in pain to the patient’s comfort goal  Outcome: Progressing     Problem: Knowledge Deficit - Standard  Goal: Patient and family/care givers will demonstrate understanding of plan of care, disease process/condition, diagnostic tests and medications  Outcome: Progressing     Problem: Fall Risk  Goal: Patient will remain free from falls  Outcome: Progressing     Problem: Communication  Goal: The ability to communicate needs accurately and effectively will improve  Outcome: Progressing   The patient is Stable - Low risk of patient condition declining or worsening    Shift Goals  Clinical Goals: pain control, stable VS and HR  Patient Goals: rest and get better  Family Goals: no family present at this time

## 2022-07-11 NOTE — PROGRESS NOTES
Hospital Medicine Daily Progress Note    Date of Service  7/11/2022    Chief Complaint  Nitesh Duron is a 72 y.o. male admitted 7/4/2022 with weakness and fall    Hospital Course    72-year-old male with history of diabetes, dyslipidemia hepatitis C and cirrhosis with atrial fibrillation presented 7/4 with fall and weakness.  Patient states he fell 4 days ago at home and he landed on his left knee.  He noticed swelling on his left knee and severe back pain with some weakness on the left leg, patient has been on the ground most of the time and not able to get up.  Patient lives by himself.  Patient was found on the ground by his girlfriend and she called EMS.  On admission patient was found to have A. fib with RVR.  Dehydration however blood pressure was stable.  Labs did not show leukocytosis however showed hyponatremia, creatinine 1.2 with CPK 2411 and lactic acid 4.4.  IV fluid was started.  Blood culture came back positive with MSSA, cefazolin was initiated and ID was consulted.  Echo is pending.      Patient underwent arthrocentesis by Ortho on his left knee and showed more than 37,000 white blood cell with no red blood cell high suspicious of septic arthritis specially with positive blood culture, ID on board patient was recently on cefazolin transition to nafcillin.    Interval Problem Update:      Afebrile  Blood Cx from 7/10 is are neg  Had multiple bowel movements  Left  knee pain and back pain controlled.      I have discussed this patient's plan of care and discharge plan at IDT rounds today with Case Management, Nursing, Nursing leadership, and other members of the IDT team.    Consultants/Specialty  cardiology, critical care and orthopedics   Spine surgery  ID      Code Status  Full Code    Disposition  Patient is not medically cleared for discharge.   Anticipate discharge to to skilled nursing facility.  I have placed the appropriate orders for post-discharge needs.    Review of  Systems  Review of Systems   Constitutional: Negative for chills and fever.   Respiratory: Negative for cough and shortness of breath.    Cardiovascular: Negative for chest pain.   Gastrointestinal: Negative for abdominal pain, constipation, nausea and vomiting.   Musculoskeletal: Positive for back pain and joint pain.   All other systems reviewed and are negative.       Physical Exam  Temp:  [36.5 °C (97.7 °F)] 36.5 °C (97.7 °F)  Pulse:  [70-92] 81  Resp:  [13-36] 21  BP: (114-153)/(61-94) 126/73  SpO2:  [95 %-100 %] 97 %    Physical Exam  Vitals and nursing note reviewed.   Constitutional:       Appearance: He is well-developed. He is not diaphoretic.   HENT:      Head: Normocephalic and atraumatic.      Mouth/Throat:      Pharynx: No oropharyngeal exudate.   Eyes:      General: No scleral icterus.        Right eye: No discharge.         Left eye: No discharge.      Conjunctiva/sclera: Conjunctivae normal.      Pupils: Pupils are equal, round, and reactive to light.   Neck:      Vascular: No JVD.      Trachea: No tracheal deviation.   Cardiovascular:      Rate and Rhythm: Normal rate and regular rhythm.      Heart sounds: No murmur heard.    No friction rub. No gallop.   Pulmonary:      Effort: Pulmonary effort is normal. No respiratory distress.      Breath sounds: No stridor. Decreased breath sounds present. No wheezing.   Chest:      Chest wall: No tenderness.   Abdominal:      General: Bowel sounds are normal. There is no distension.      Palpations: Abdomen is soft.      Tenderness: There is no abdominal tenderness. There is no rebound.   Musculoskeletal:         General: Swelling present. No tenderness.      Cervical back: Neck supple.   Skin:     General: Skin is warm and dry.      Nails: There is no clubbing.   Neurological:      Mental Status: He is alert and oriented to person, place, and time.      Cranial Nerves: No cranial nerve deficit.      Motor: Weakness (Lower extremities mainly limited by pain)  present. No abnormal muscle tone.   Psychiatric:         Behavior: Behavior normal.         Fluids    Intake/Output Summary (Last 24 hours) at 7/11/2022 1405  Last data filed at 7/11/2022 1128  Gross per 24 hour   Intake 1290 ml   Output 2675 ml   Net -1385 ml       Laboratory  Recent Labs     07/09/22  0336 07/10/22  0442 07/11/22  0439   WBC 4.4* 4.2* 5.4   RBC 4.40* 4.74 4.59*   HEMOGLOBIN 12.7* 13.8* 13.0*   HEMATOCRIT 36.7* 39.9* 38.9*   MCV 83.4 84.2 84.7   MCH 28.9 29.1 28.3   MCHC 34.6 34.6 33.4*   RDW 42.6 42.9 43.5   PLATELETCT 118* 136* 135*   MPV 11.1 9.8 9.9     Recent Labs     07/09/22 0336 07/10/22  0442 07/11/22  0439   SODIUM 123* 126* 128*   POTASSIUM 4.3 4.1 3.8   CHLORIDE 91* 92* 94*   CO2 24 25 25   GLUCOSE 168* 132* 141*   BUN 21 18 19   CREATININE 0.66 0.69 0.73   CALCIUM 7.7* 7.7* 7.6*                   Imaging  EC-CARLI W/O CONT   Final Result      MR-LUMBAR SPINE-WITH & W/O   Final Result         Multilevel degenerative changes in lumbar spine as described above. There is severe canal stenosis at the L3-4, L4-5 level with cauda equina compression.      At L2-3 there is moderate canal stenosis with cauda equina compression.      Disc bulge extends into the right extra foraminal zone at the L3-4 and L4-5 level with impingement upon exiting nerves in the extraforaminal zone.      Abnormal edema is identified in the bilateral paraspinal soft tissues at the L3-4, L4-5 and L5-S1 levels with mild enhancement identified in the right-sided paraspinous soft tissues. These findings are concerning for an ongoing infectious inflammatory    process involving the facet joints.      Minimal epidural thickening is identified dorsally in the spinal canal behind L3 and L4. There is also a hypoenhancing area along the left posterolateral spinal canal that impinges upon the thecal sac. This could represent a developing epidural abscess.    Recommend follow-up examination in 2-3 days.      CT-HEAD W/O   Final  Result      1. No CT evidence of acute infarct, hemorrhage or mass.   2. Mild global parenchymal atrophy. Chronic small vessel ischemic changes.      EC-ECHOCARDIOGRAM COMPLETE W/ CONT   Final Result      DX-KNEE 3 VIEWS LEFT   Final Result      1.  Severe tricompartmental left knee osteoarthritis      2.  osteoarthritis the proximal tibiofibular articulation      3.  Multiple intra-articular ossific body      4.  Diffuse atherosclerotic plaque      DX-CHEST-PORTABLE (1 VIEW)   Final Result      No acute cardiopulmonary abnormality identified.      CL-CARDIOVERSION    (Results Pending)        Assessment/Plan  * MSSA bacteremia  Assessment & Plan  Blood culture on admission 7/4 positive for MSSA 2/2  Source likely septic arthritis  Status post left knee I&D on 7/6/2022 Ortho service following    MRI lumbar spine with spinal stenosis and concern for possible developing epidural abscess evaluated by spine surgery with recommendation for medical treatment and outpatient follow-up     Blood culture 7/10 negative to date continue to monitor  ID following currently on    Will need PICC line when blood cultures negative for 48 hours     Hypomagnesemia  Assessment & Plan  Replete and monitor    Pyogenic arthritis of left knee joint (HCC)  Assessment & Plan  Synovial fluid culture MSSA  Status post I&D by Ortho on 7/6/2022    Reviewed importance of mobilization  Continue IV nafcillin    Falls  Assessment & Plan  PT OT   Encourage mobilization reviewed with patient    Acute cystitis without hematuria  Assessment & Plan  Urine culture MSSA   On nafcillin    Hyponatremia  Assessment & Plan  Continue free fluid restriction  Sodium 128 overall improved continue to monitor      Type 2 diabetes mellitus with hyperglycemia, without long-term current use of insulin ([facility])  Assessment & Plan  Last a1c 6.3  Stable on Lantus and sliding scale insulin continue to monitor    Atrial fibrillation with RVR ([facility])  Assessment & Plan  Echo EF 55%  no valvular abnormalities  Evaluated by cardiology  status post CARLI directed cardioversion on 7/8/2022  Continue metoprolol and amiodarone  Discussed with pharmacist for amiodarone taper  Continue anticoagulation with Eliquis        Thrombocytopenia (HCC)  Assessment & Plan  Overall improved and platelet count stable continue to monitor      Hepatocellular carcinoma (HCC)- (present on admission)  Assessment & Plan  History of hepatocellular carcinoma.   Patient has history of cirrhosis  Following with oncology as an outpatient.    Cirrhosis of liver without ascites (HCC)- (present on admission)  Assessment & Plan  History of hepatitis C virus and received treatment     Outpatient follow-up    Essential hypertension, benign- (present on admission)  Assessment & Plan  Increase lisinopril continue metoprolol   Monitor blood pressure and adjust        VTE prophylaxis: eliquis    I have performed a physical exam and reviewed and updated ROS and Plan today (7/11/2022). In review of yesterday's note (7/10/2022), there are no changes except as documented above.

## 2022-07-11 NOTE — PROGRESS NOTES
Infectious Disease Progress Note    Author: Shailesh Ford M.D. Date & Time of service: 2022  8:03 AM    Chief Complaint:  MSSA bacteremia and septic joint     Interval History:    AF, O2 RA, complaining of left knee and leg pain.  In A. fib with RVR.    7/10 patient remains afebrile, white count is down to 4.2, tolerating nafcillin   patient remains afebrile, white count is 5.4, tolerating nafcillin.    Review of Systems:  Review of Systems   Respiratory: Negative for cough and shortness of breath.    Gastrointestinal: Negative for abdominal pain, constipation, diarrhea, nausea and vomiting.   Musculoskeletal: Positive for back pain, joint pain and myalgias.   Psychiatric/Behavioral: The patient is not nervous/anxious.        Hemodynamics:  Temp (24hrs), Av.6 °C (97.9 °F), Min:36.5 °C (97.7 °F), Max:36.7 °C (98 °F)  Temperature: 36.5 °C (97.7 °F)  Pulse  Av.5  Min: 66  Max: 195   Blood Pressure : (!) 149/74       Physical Exam:  Physical Exam  Vitals and nursing note reviewed.   Constitutional:       General: He is not in acute distress.     Appearance: Normal appearance. He is ill-appearing.   HENT:      Mouth/Throat:      Pharynx: No oropharyngeal exudate.   Eyes:      General: No scleral icterus.        Right eye: No discharge.         Left eye: No discharge.   Cardiovascular:      Rate and Rhythm: Normal rate. Rhythm irregular.   Pulmonary:      Effort: Pulmonary effort is normal.      Breath sounds: Normal breath sounds.   Abdominal:      General: Abdomen is flat. Bowel sounds are normal.      Palpations: Abdomen is soft.   Musculoskeletal:         General: Tenderness and signs of injury present.      Left lower leg: Edema present.      Comments: Left knee with surgical dressing in place   Skin:     General: Skin is warm and dry.      Comments: Multiple tattoos   Neurological:      General: No focal deficit present.      Mental Status: He is alert and oriented to person, place, and  time.   Psychiatric:         Mood and Affect: Mood normal.         Behavior: Behavior normal.         Meds:    Current Facility-Administered Medications:   •  apixaban  •  nafcillin infusion  •  amiodarone  •  [START ON 7/24/2022] amiodarone  •  insulin GLARGINE  •  metoprolol tartrate  •  senna-docusate **AND** polyethylene glycol/lytes **AND** magnesium hydroxide **AND** bisacodyl  •  labetalol  •  ondansetron  •  ondansetron  •  insulin regular **AND** POC blood glucose manual result **AND** NOTIFY MD and PharmD **AND** Administer 20 grams of glucose (approximately 8 ounces of fruit juice) every 15 minutes PRN FSBG less than 70 mg/dL **AND** dextrose bolus  •  Notify provider if pain remains uncontrolled **AND** Use the Numeric Rating Scale (NRS), Yusuf-Baker Faces (WBF), or FLACC on regular floors and Critical-Care Pain Observation Tool (CPOT) on ICUs/Trauma to assess pain **AND** Pulse Ox **AND** Pharmacy Consult Request **AND** If patient difficult to arouse and/or has respiratory depression (respiratory rate of 10 or less), stop any opiates that are currently infusing and call a Rapid Response.  •  oxyCODONE immediate-release **OR** oxyCODONE immediate-release **OR** morphine injection  •  lisinopril  •  ezetimibe    Labs:  Recent Labs     07/09/22  0336 07/10/22  0442 07/11/22  0439   WBC 4.4* 4.2* 5.4   RBC 4.40* 4.74 4.59*   HEMOGLOBIN 12.7* 13.8* 13.0*   HEMATOCRIT 36.7* 39.9* 38.9*   MCV 83.4 84.2 84.7   MCH 28.9 29.1 28.3   RDW 42.6 42.9 43.5   PLATELETCT 118* 136* 135*   MPV 11.1 9.8 9.9   NEUTSPOLYS 85.20* 78.80* 83.10*   LYMPHOCYTES 6.10* 9.80* 7.10*   MONOCYTES 3.50 8.30 7.50   EOSINOPHILS 0.00 0.50 0.20   BASOPHILS 0.90 0.20 0.40   RBCMORPHOLO Present  --   --      Recent Labs     07/09/22  0336 07/10/22  0442 07/11/22  0439   SODIUM 123* 126* 128*   POTASSIUM 4.3 4.1 3.8   CHLORIDE 91* 92* 94*   CO2 24 25 25   GLUCOSE 168* 132* 141*   BUN 21 18 19     Recent Labs     07/09/22  0336 07/10/22  0442  07/11/22  0439   ALBUMIN  --  2.7*  --    TBILIRUBIN  --  0.8  --    ALKPHOSPHAT  --  107*  --    TOTPROTEIN  --  6.2  --    ALTSGPT  --  12  --    ASTSGOT  --  26  --    CREATININE 0.66 0.69 0.73       Imaging:  CT-HEAD W/O    Result Date: 7/5/2022   CT HEAD WITHOUT CONTRAST 7/5/2022 6:07 PM HISTORY/REASON FOR EXAM:  Pain following trauma TECHNIQUE/EXAM DESCRIPTION AND NUMBER OF VIEWS: CT of the head without contrast. The study was performed on a helical multidetector CT scanner. Contiguous 2.5 mm axial sections were obtained from the skull base through the vertex. Up to date radiation dose reduction adjustments have been utilized to meet ALARA standards for radiation dose reduction. COMPARISON:  None available FINDINGS: Lateral ventricles are normal in size and symmetric. Mild global parenchymal atrophy. Chronic small vessel ischemic changes. No significant mass effect or midline shift. Basal cisterns are patent. No evidence for intracranial hemorrhage. Calvaria are intact. Atherosclerosis. Visualized orbits are unremarkable. Visualized mastoid air cells are clear. No significant sinus disease in the visualized paranasal sinuses.     1. No CT evidence of acute infarct, hemorrhage or mass. 2. Mild global parenchymal atrophy. Chronic small vessel ischemic changes.    DX-CHEST-PORTABLE (1 VIEW)    Result Date: 7/4/2022 7/4/2022 2:18 PM HISTORY/REASON FOR EXAM:  Sepsis. TECHNIQUE/EXAM DESCRIPTION AND NUMBER OF VIEWS: Single portable view of the chest. COMPARISON: None FINDINGS: LUNGS: The lungs are clear. HEART and MEDIASTINUM: normal in size. Pleura: There are no pleural effusion or pneumothoraces. Osseous structures: There are bilateral chronic rotator cuff tear with the humeral head abutting and eroding the acromion. There is bilateral glenohumeral joint osteoarthritis.     No acute cardiopulmonary abnormality identified.    DX-KNEE 3 VIEWS LEFT    Result Date: 7/4/2022 7/4/2022 2:18 PM HISTORY/REASON FOR EXAM:   Atraumatic Pain/Swelling/Deformity. Left knee pain TECHNIQUE/EXAM DESCRIPTION AND NUMBER OF VIEWS:  3 views of the LEFT knee. COMPARISON: None FINDINGS: There is no fracture. Alignment is normal. Severe tricompartmental degenerative changes are present. There is also severe osteoarthritis of the proximal tibiofibular articulation with multiple subchondral cyst present. There is extensive atherosclerotic plaque. There are multiple intra-articular ossific body.     1.  Severe tricompartmental left knee osteoarthritis 2.  osteoarthritis the proximal tibiofibular articulation 3.  Multiple intra-articular ossific body 4.  Diffuse atherosclerotic plaque    MR-LUMBAR SPINE-WITH & W/O    Result Date: 7/7/2022 7/6/2022 3:22 PM HISTORY/REASON FOR EXAM:  Low back pain, infection suspected; Epidural abscess suspected; Epidural abscess TECHNIQUE/EXAM DESCRIPTION: MRI of the lumbar spine without and with contrast. The study was performed on a [employer] Signa 1.5 [place] MRI scanner. T1 sagittal, T2 fast spin-echo sagittal, and T2 axial images were obtained of the lumbar spine. T1 postcontrast fat-suppressed sagittal images were obtained. 18 mL ProHance contrast was administered intravenously. COMPARISON:  7/9/2021 FINDINGS: Conus terminates at the T12-L1 level. Type I marrow changes are noted of the adjacent L2-L3 endplates. There is decreased disc height throughout the lumbar spine with disc desiccation noted throughout. Reversal of lumbar lordosis noted with a mild kyphotic angulation centered at the L1-L2 level. T11-12: Mild facet degeneration and ligamentum flavum hypertrophy. There is no significant canal stenosis or foraminal narrowing. T12-L1: Bilateral mild facet degeneration. Mild disc bulge. There is no significant canal stenosis or foraminal narrowing. L1-2: Broad-based disc bulge and bilateral facet degeneration and ligament of flavum hypertrophy. There is mild canal narrowing. There is mild left foraminal narrowing. L2-3:  Broad-based disc bulge and bilateral advanced facet degeneration and ligamentum flavum hypertrophy. There is mild left foraminal narrowing. There is moderate canal stenosis with thecal sac and cauda equina compression at this level. L3-4: Broad-based disc bulge and bilateral facet degeneration and ligamentum flavum hypertrophy. Disc bulge extends into both neural foramina and extra foraminal zones. This is worse in the right side. There is severe canal stenosis with cauda equina compression. There is impingement upon the exiting right L3 nerve and extra foraminal zone. There is moderate bilateral foraminal narrowing. L4-5: Broad-based disc bulge and bilateral advanced facet degeneration with ligamentum flavum hypertrophy. There is moderate to severe canal stenosis with cauda equina compression. There is moderate bilateral foraminal narrowing. There is extension of the disc bulge into the right extraforaminal zone causing significant impingement upon the exiting right L4 nerve. L5-S1: Broad-based disc bulge and bilateral advanced facet degeneration with intervertebral hypertrophy. There is no significant foraminal narrowing. There is moderate severe right lateral recess narrowing with impingement upon the descending right S1 nerve. STIR images demonstrate edema of the bilateral posterior paraspinous soft tissues at the L3, L4, L5 levels worse on the right side. Postcontrast images through the lumbar spine demonstrate mild enhancement along the right paraspinous soft tissues at the L2-3, L3-4, L4-5 level adjacent to the facet joints. Mild epidural enhancement is identified dorsally at the L3 and L4 segments, there is a small area of hypoenhancement along the posterolateral spinal canal at the L4-5 level on the left side. Small renal cortical cysts noted in the right kidney.     Multilevel degenerative changes in lumbar spine as described above. There is severe canal stenosis at the L3-4, L4-5 level with cauda equina  compression. At L2-3 there is moderate canal stenosis with cauda equina compression. Disc bulge extends into the right extra foraminal zone at the L3-4 and L4-5 level with impingement upon exiting nerves in the extraforaminal zone. Abnormal edema is identified in the bilateral paraspinal soft tissues at the L3-4, L4-5 and L5-S1 levels with mild enhancement identified in the right-sided paraspinous soft tissues. These findings are concerning for an ongoing infectious inflammatory process involving the facet joints. Minimal epidural thickening is identified dorsally in the spinal canal behind L3 and L4. There is also a hypoenhancing area along the left posterolateral spinal canal that impinges upon the thecal sac. This could represent a developing epidural abscess. Recommend follow-up examination in 2-3 days.    IR-CONSULT ONLY-OUTPATIENT    Result Date: 2022  This exam has been resulted under the NOTES tab, and the signed report has been auto faxed to the ordering physician on the date/time of that signature.    EC-ECHOCARDIOGRAM COMPLETE W/ CONT    Result Date: 2022  Transthoracic Echo Report Echocardiography Laboratory CONCLUSIONS Normal left ventricular size and systolic function. The left ventricular ejection fraction is estimated to be 55%. No evidence of valvular abnormality based on Doppler evaluation. Unable to estimate right ventricular systolic pressure due to an inadequate tricuspid regurgitant jet. CECILLE ARAUJO Exam Date:         2022                    14:12 Exam Location:     Inpatient Priority:          Routine Ordering Physician:        ELSY REARDON Referring Physician: Sonographer:               Lily Lynch RDCS, RVT Age:    72     Gender:    M MRN:    [id number] :    1950 BSA:    2      Ht (in):    70     Wt (lb):    180 Exam Type:     Complete Indications:     Arrhythmia ICD Codes:       427.9 CPT Codes:       98641 BP:   0      /   88      HR:   113 Technical Quality:       Fair MEASUREMENTS  (Male / Female) Normal Values 2D ECHO LV Diastolic Diameter PLAX        4.5 cm                4.2 - 5.9 / 3.9 - 5.3 cm LV Systolic Diameter PLAX         3.4 cm                2.1 - 4.0 cm IVS Diastolic Thickness           0.97 cm               LVPW Diastolic Thickness          1 cm                  LVOT Diameter                     2 cm                  Estimated LV Ejection Fraction    55 %                  LV Ejection Fraction MOD BP       62.6 %                >= 55  % LV Ejection Fraction MOD 4C       63.6 %                LV Ejection Fraction MOD 2C       56.4 %                DOPPLER AV Peak Velocity                  1.2 m/s               AV Peak Gradient                  5.5 mmHg              AV Mean Gradient                  4 mmHg                LVOT Peak Velocity                0.77 m/s              AV Area Cont Eq vti               2.4 cm2               PV Peak Velocity                  0.81 m/s              PV Peak Gradient                  2.6 mmHg              * Indicates values subject to auto-interpretation LV EF:  55    % FINDINGS Left Ventricle Contrast was used to enhance visualization of the endocardial border. Normal left ventricular chamber size. Normal left ventricular wall thickness. Grossly normal left ventricular systolic function. The left ventricular ejection fraction is visually estimated to be 55%. Diastolic function is difficult to assess with arrhythmia. Right Ventricle Normal right ventricular size. Reduced right ventricular systolic function. Right Atrium Normal right atrial size. Normal inferior vena cava size and inspiratory collapse. Left Atrium Normal left atrial size. Mitral Valve Structurally normal mitral valve without significant stenosis or regurgitation. Aortic Valve Structurally normal aortic valve without significant stenosis or regurgitation. Tricuspid Valve Structurally normal tricuspid valve without significant  stenosis or regurgitation. Unable to estimate right ventricular systolic pressure due to an inadequate tricuspid regurgitant jet. Pulmonic Valve The pulmonic valve is not well visualized. No stenosis or regurgitation seen. Pericardium Fat pat present. Aorta The ascending aorta is mildly dilated by BSA diameter is 3.7 cm. The aortic root diameter is 3.6 cm. Mukul Phillips MD (Electronically Signed) Final Date:     2022                 16:13    EC-CARLI W/O CONT    Result Date: 2022  Transesophageal Echo Report Echocardiography Laboratory CONCLUSIONS No evidence of endocarditis. Normal left atrial appendage. No thrombus detected in the left atrial appendage. CECILLE ARAUJO Exam Date:          2022                     09:47 Exam Location:      Inpatient Priority:            Routine Ordering Physician:        VINH PRATT                             ([id number]) Referring Physician:       SANTA Diop Sonographer:               Lily Lynch RDCS, RVT Age:    72     Gender:    M MRN:    [id number] :    1950 BSA:           Ht (in):           Wt (lb): Report Type:      Complete Indications:     Arrhythmia, Endocarditis ICD Codes:       427.9  421 CPT Codes:       30980 BP:          /          HR: Technical Quality:       Good MEASUREMENTS  (Male / Female) Normal Values * Indicates values subject to auto-interpretation LV EF:        % Medications Limitations Complications Proc. Components The probe was inserted and manipulated by Dr Phillips. Epiq probe #2  was used for this procedure. 2D, color Doppler, spectral Doppler, and 3D imaging were used as part of the evaluation as clinically indicated. FINDINGS Left Ventricle Right Ventricle Right Atrium Left Atrium LA Appendage Normal left atrial appendage. No thrombus detected in the left atrial appendage. IA Septum IV Septum Mitral Valve Aortic Valve Tricuspid Valve Pulmonic Valve Pericardium Aorta Mukul Phillips MD  "(Electronically Signed) Final Date:     08 July 2022                 13:41      Micro:  Results     Procedure Component Value Units Date/Time    BLOOD CULTURE [[id number]] Collected: 07/10/22 1259    Order Status: Completed Specimen: Blood from Peripheral Updated: 07/11/22 0748     Significant Indicator NEG     Source BLD     Site PERIPHERAL     Culture Result No Growth  Note: Blood cultures are incubated for 5 days and  are monitored continuously.Positive blood cultures  are called to the RN and reported as soon as  they are identified.      Narrative:      Per Hospital Policy: Only change Specimen Src: to \"Line\" if  specified by physician order.  Right Hand    BLOOD CULTURE [[id number]] Collected: 07/10/22 1145    Order Status: Completed Specimen: Blood from Peripheral Updated: 07/11/22 0748     Significant Indicator NEG     Source BLD     Site PERIPHERAL     Culture Result No Growth  Note: Blood cultures are incubated for 5 days and  are monitored continuously.Positive blood cultures  are called to the RN and reported as soon as  they are identified.      Narrative:      Per Hospital Policy: Only change Specimen Src: to \"Line\" if  specified by physician order.  Right Forearm/Arm    CULTURE WOUND W/ GRAM STAIN [[id number]]  (Abnormal)  (Susceptibility) Collected: 07/06/22 1925    Order Status: Completed Specimen: Wound Updated: 07/10/22 1741     Significant Indicator POS     Source WND     Site Left Knee     Culture Result -     Gram Stain Result Few WBCs.  No organisms seen.       Culture Result Staphylococcus aureus  Rare growth      Narrative:      Surgery - swabs received    Susceptibility     Staphylococcus aureus (1)     Antibiotic Interpretation Microscan   Method Status    Azithromycin Sensitive <=2 mcg/mL DEVEN Final    Clindamycin Sensitive <=0.25 mcg/mL DEVEN Final    Cefazolin Sensitive <=8 mcg/mL DEVEN Final    Cefepime Sensitive <=4 mcg/mL DEVEN Final    Ceftaroline Sensitive <=0.5 mcg/mL DEVEN Final    " "Daptomycin Sensitive 1 mcg/mL DEVEN Final    Erythromycin Sensitive <=0.25 mcg/mL DEVEN Final    Ampicillin/sulbactam Sensitive <=8/4 mcg/mL DEVEN Final    Vancomycin Sensitive 1 mcg/mL DEVEN Final    Oxacillin Sensitive <=0.25 mcg/mL DEVEN Final    Pip/Tazobactam Sensitive <=8 mcg/mL DEVEN Final    Trimeth/Sulfa Sensitive <=0.5/9.5 mcg/mL DEVEN Final    Tetracycline Sensitive <=4 mcg/mL DEVEN Final                   Anaerobic Culture [[id number]] Collected: 07/06/22 1925    Order Status: Completed Specimen: Wound Updated: 07/10/22 1741     Significant Indicator NEG     Source WND     Site Left Knee     Culture Result No Anaerobes isolated.    Narrative:      Surgery - swabs received    BLOOD CULTURE [[id number]]  (Abnormal) Collected: 07/07/22 0940    Order Status: Completed Specimen: Blood from Peripheral Updated: 07/09/22 0843     Significant Indicator POS     Source BLD     Site PERIPHERAL     Culture Result Growth detected by Bactec instrument.  07/08/2022  18:41      Staphylococcus aureus  Methicillin sensitive by screening method.  See previous culture for sensitivity report.      Narrative:      CALL  [facility]  [facility] tel. [phone],  CALLED  [facility] tel. [phone] 07/08/2022, 19:29, RB PERF. RESULTS CALLED TO:  RN:[id number]  Per Hospital Policy: Only change Specimen Src: to \"Line\" if  specified by physician order.  Right Hand    BLOOD CULTURE [[id number]]     Order Status: Canceled Specimen: Blood from Peripheral     BLOOD CULTURE [[id number]]     Order Status: Canceled Specimen: Blood from Peripheral     BLOOD CULTURE [[id number]] Collected: 07/07/22 1130    Order Status: Completed Specimen: Blood from Peripheral Updated: 07/08/22 0724     Significant Indicator NEG     Source BLD     Site PERIPHERAL     Culture Result No Growth  Note: Blood cultures are incubated for 5 days and  are monitored continuously.Positive blood cultures  are called to the RN and reported as soon as  they are identified.      Narrative:      Per Hospital " "Policy: Only change Specimen Src: to \"Line\" if  specified by physician order.  Right Hand    FLUID CULTURE W/GRAM STAIN [[id number]]  (Abnormal)  (Susceptibility) Collected: 07/04/22 1620    Order Status: Completed Specimen: Synovial from Other Body Fluid Updated: 07/07/22 0838     Significant Indicator POS     Source SYNO     Site left knee     Culture Result -     Gram Stain Result Rare WBCs.  No organisms seen.       Culture Result Staphylococcus aureus  Light growth      Narrative:      CALL  [facility]  [facility] tel. ,  CALLED  ER tel.  07/05/2022, 13:22, RB PERF. RESULTS CALLED TO:[place] [id number] Vlad BRAVO knee fluid.    Susceptibility     Staphylococcus aureus (1)     Antibiotic Interpretation Microscan   Method Status    Azithromycin Sensitive <=2 mcg/mL DEVEN Final    Clindamycin Sensitive <=0.25 mcg/mL DEVEN Final    Cefazolin Sensitive <=8 mcg/mL DEVEN Final    Cefepime Sensitive <=4 mcg/mL DEVEN Final    Ceftaroline Sensitive <=0.5 mcg/mL DEVEN Final    Daptomycin Sensitive 1 mcg/mL DEVEN Final    Erythromycin Sensitive <=0.25 mcg/mL DEVEN Final    Ampicillin/sulbactam Sensitive <=8/4 mcg/mL DEVEN Final    Vancomycin Sensitive 1 mcg/mL DEVEN Final    Oxacillin Sensitive <=0.25 mcg/mL DEVEN Final    Pip/Tazobactam Sensitive <=8 mcg/mL DEVEN Final    Trimeth/Sulfa Sensitive <=0.5/9.5 mcg/mL DEVEN Final    Tetracycline Sensitive <=4 mcg/mL DEVEN Final                   Blood Culture [[id number]]  (Abnormal) Collected: 07/04/22 1438    Order Status: Completed Specimen: Blood from Peripheral Updated: 07/07/22 0825     Significant Indicator POS     Source BLD     Site PERIPHERAL     Culture Result Growth detected by Bactec instrument. 07/05/2022  09:29      Staphylococcus aureus  See previous culture for sensitivity report.      Narrative:      CALL  [facility]  ER tel. ,  CALLED  ER tel.  07/05/2022, 09:32, RB PERF. RESULTS CALLED TO:VLAD Arriaga  [id number]  2 of 2 blood culture x2  Sites order. Per Hospital Policy:  Only change Specimen Src: to \"Line\" if specified " "by physician  order.  Left Hand    Blood Culture [[id number]]  (Abnormal)  (Susceptibility) Collected: 07/04/22 1425    Order Status: Completed Specimen: Blood from Peripheral Updated: 07/07/22 0825     Significant Indicator POS     Source BLD     Site PERIPHERAL     Culture Result Growth detected by Bactec instrument. 07/05/2022  04:46  Staphylococcus aureus (methicillin sensitive)  detected by PCR.        Staphylococcus aureus    Narrative:      CALL  [facility]  ER tel. ,  CALLED  ER tel.  07/05/2022, 09:31, RB PERF. RESULTS CALLED TO: Corina FOSS  [id number]  1 of 2 for Blood Culture x 2 sites order. Per Hospital  Policy: Only change Specimen Src: to \"Line\" if specified by  physician order.  No site indicated    Susceptibility     Staphylococcus aureus (1)     Antibiotic Interpretation Microscan   Method Status    Azithromycin Sensitive <=2 mcg/mL DEVEN Final    Clindamycin Sensitive <=0.25 mcg/mL DEVEN Final    Cefazolin Sensitive <=8 mcg/mL DEVEN Final    Cefepime Sensitive <=4 mcg/mL DEVEN Final    Ceftaroline Sensitive <=0.5 mcg/mL DEVEN Final    Daptomycin Sensitive 1 mcg/mL DEVEN Final    Erythromycin Sensitive <=0.25 mcg/mL DEVEN Final    Ampicillin/sulbactam Sensitive <=8/4 mcg/mL DEVEN Final    Vancomycin Sensitive 1 mcg/mL DEVEN Final    Oxacillin Sensitive <=0.25 mcg/mL DEVEN Final    Pip/Tazobactam Sensitive <=8 mcg/mL DEVEN Final    Trimeth/Sulfa Sensitive <=0.5/9.5 mcg/mL DEVEN Final    Tetracycline Sensitive <=4 mcg/mL DEVEN Final                   BLOOD CULTURE [[id number]]     Order Status: Canceled Specimen: Blood from Peripheral     GRAM STAIN [[id number]] Collected: 07/06/22 1925    Order Status: Completed Specimen: Wound Updated: 07/07/22 0249     Significant Indicator .     Source WND     Site Left Knee     Gram Stain Result Few WBCs.  No organisms seen.      Narrative:      Surgery - swabs received    Urine Culture (NEW) [[id number]]  (Abnormal)  (Susceptibility) Collected: 07/04/22 1630    Order Status: Completed Specimen: Urine, " Clean Catch Updated: 07/06/22 0733     Significant Indicator POS     Source UR     Site URINE, CLEAN CATCH     Culture Result -      Staphylococcus aureus  >100,000 cfu/mL  Methicillin sensitive by screening method.      Narrative:      Indication for culture:->Evaluation for sepsis without a  clear source of infection  Indication for culture:->Evaluation for sepsis without a    Susceptibility     Staphylococcus aureus (1)     Antibiotic Interpretation Microscan   Method Status    Cefazolin Sensitive <=8 mcg/mL DEVEN Final    Cefepime Sensitive <=4 mcg/mL DEVEN Final    Ceftaroline Sensitive <=0.5 mcg/mL DEVEN Final    Daptomycin Sensitive <=0.5 mcg/mL DEVEN Final    Nitrofurantoin Sensitive <=32 mcg/mL DEVEN Final    Pip/Tazobactam Sensitive <=8 mcg/mL DEVEN Final    Trimeth/Sulfa Sensitive <=0.5/9.5 mcg/mL DEVEN Final    Tetracycline Sensitive <=4 mcg/mL DEVEN Final                   BLOOD CULTURE [[id number]]     Order Status: Canceled Specimen: Blood from Peripheral     BLOOD CULTURE [[id number]]     Order Status: Canceled Specimen: Blood from Peripheral     BLOOD CULTURE [[id number]]     Order Status: Canceled Specimen: Blood from Peripheral     MRSA By PCR (Amp) [[id number]] Collected: 07/05/22 1215    Order Status: Completed Specimen: Respirate from Nares Updated: 07/05/22 1608     MRSA by PCR Negative    GRAM STAIN [[id number]] Collected: 07/04/22 1620    Order Status: Completed Specimen: Synovial Updated: 07/04/22 1927     Significant Indicator .     Source SYNO     Site left knee     Gram Stain Result Rare WBCs.  No organisms seen.      Narrative:      L. knee fluid.    Urinalysis [[id number]]  (Abnormal) Collected: 07/04/22 1630    Order Status: Completed Specimen: Urine Updated: 07/04/22 1739     Color Yellow     Character Clear     Specific Gravity 1.023     Ph 5.0     Glucose 250 mg/dL      Ketones Negative mg/dL      Protein 100 mg/dL      Bilirubin Negative     Urobilinogen, Urine 1.0     Nitrite Positive     Leukocyte  Esterase Trace     Occult Blood Large     Micro Urine Req Microscopic    Narrative:      Indication for culture:->Evaluation for sepsis without a  clear source of infection           ASSESSMENT/PLAN:   72 y.o. male patient admitted 7/4/2022. Pt has a past medical history of HTN, HLD, A fib, DMT2 and chronic hepatitis C cirrhosis. He presented complaining of weakness and left knee pain. Patient also with chronic back pain and states he had 4 injections in his back approximately 1 week ago. Blood cultures positive for MSSA.  The fluid aspirated which is also positive for MSSA.  Patient went to the OR on 7/6 for washout of septic left knee.     Problem List  Leukopenia, resolved  Thrombocytopenia, improving  Bacteremia  -Blood cultures on 7/4 & 7/7 +MSSA  -TTE on 7/5 with no vegetations, no significant valvular disease, EF 55%  -CARLI on 7/8, no evidence of endocarditis  Native left knee septic arthritis  -Aspirated synovial fluid with 30 7K WBCs, 66% polys, cultures +MSSA   -OR with orthopedics on 7/6 for I&D of the left knee, per op note significant amount of purulent material was encountered and sent for culture  UTI, possibly source of the bacteremia or resulted from   -Urine culture on 7/4 +MSSA   Rhabdomyolysis  -Improving CPK  A. fib, RVR, improved but now recurred   Cirrhosis  Chronic HCV infection, per chart  - NAAT pending   Acute on chronic back pain  Paraspinal infection  Lumbar spine with enhancement in the right-sided paraspinous soft tissues at L3-S1 concerning for infection potential developing epidural abscess at L3-L4 per MRI  Antibiotic allergies: Ciprofloxacin reported as convulsions     Plan   --- Patient with high burden of infection, multiple sites he is yet to clear blood cultures, cefazolin was transitioned to nafcillin on 7/9.  Continue for now  --- Radiologist is recommending repeating imaging MRI of the spine in 2 to 3 days given possibly developing epidural abscess  --- Follow-up repeat blood  cultures from 7/10, pending  --- F/up HCV NAAT. Also noted positive hepatitis B core antibody with negative surface antibody. Will repeat hepatitis panel including hepatitis B surface antigen, HIV antibody     Dispo: TBD, may need SNF, currently appears to have poor insight  PICC: Eventually yes, will need to ensure clearance of blood cultures first.  Cannot place at the moment.      Plan of care discussed with PharmD. ID will continue to follow.

## 2022-07-12 ENCOUNTER — APPOINTMENT (OUTPATIENT)
Dept: RADIOLOGY | Facility: MEDICAL CENTER | Age: 72
DRG: 485 | End: 2022-07-12
Attending: HOSPITALIST
Payer: MEDICARE

## 2022-07-12 LAB
ALBUMIN SERPL BCP-MCNC: 2.5 G/DL (ref 3.2–4.9)
ALBUMIN/GLOB SERPL: 0.7 G/DL
ALP SERPL-CCNC: 98 U/L (ref 30–99)
ALT SERPL-CCNC: 14 U/L (ref 2–50)
ANION GAP SERPL CALC-SCNC: 8 MMOL/L (ref 7–16)
AST SERPL-CCNC: 27 U/L (ref 12–45)
BACTERIA BLD CULT: NORMAL
BASOPHILS # BLD AUTO: 0.3 % (ref 0–1.8)
BASOPHILS # BLD: 0.02 K/UL (ref 0–0.12)
BILIRUB SERPL-MCNC: 0.5 MG/DL (ref 0.1–1.5)
BUN SERPL-MCNC: 19 MG/DL (ref 8–22)
CALCIUM SERPL-MCNC: 7.9 MG/DL (ref 8.5–10.5)
CHLORIDE SERPL-SCNC: 96 MMOL/L (ref 96–112)
CO2 SERPL-SCNC: 26 MMOL/L (ref 20–33)
CREAT SERPL-MCNC: 0.71 MG/DL (ref 0.5–1.4)
EOSINOPHIL # BLD AUTO: 0.02 K/UL (ref 0–0.51)
EOSINOPHIL NFR BLD: 0.3 % (ref 0–6.9)
ERYTHROCYTE [DISTWIDTH] IN BLOOD BY AUTOMATED COUNT: 43.4 FL (ref 35.9–50)
GFR SERPLBLD CREATININE-BSD FMLA CKD-EPI: 97 ML/MIN/1.73 M 2
GLOBULIN SER CALC-MCNC: 3.8 G/DL (ref 1.9–3.5)
GLUCOSE BLD STRIP.AUTO-MCNC: 137 MG/DL (ref 65–99)
GLUCOSE BLD STRIP.AUTO-MCNC: 140 MG/DL (ref 65–99)
GLUCOSE BLD STRIP.AUTO-MCNC: 163 MG/DL (ref 65–99)
GLUCOSE BLD STRIP.AUTO-MCNC: 167 MG/DL (ref 65–99)
GLUCOSE SERPL-MCNC: 119 MG/DL (ref 65–99)
HCT VFR BLD AUTO: 39.4 % (ref 42–52)
HGB BLD-MCNC: 13.3 G/DL (ref 14–18)
IMM GRANULOCYTES # BLD AUTO: 0.1 K/UL (ref 0–0.11)
IMM GRANULOCYTES NFR BLD AUTO: 1.5 % (ref 0–0.9)
LYMPHOCYTES # BLD AUTO: 0.79 K/UL (ref 1–4.8)
LYMPHOCYTES NFR BLD: 12 % (ref 22–41)
MAGNESIUM SERPL-MCNC: 1.9 MG/DL (ref 1.5–2.5)
MCH RBC QN AUTO: 28.4 PG (ref 27–33)
MCHC RBC AUTO-ENTMCNC: 33.8 G/DL (ref 33.7–35.3)
MCV RBC AUTO: 84.2 FL (ref 81.4–97.8)
MONOCYTES # BLD AUTO: 0.55 K/UL (ref 0–0.85)
MONOCYTES NFR BLD AUTO: 8.3 % (ref 0–13.4)
NEUTROPHILS # BLD AUTO: 5.13 K/UL (ref 1.82–7.42)
NEUTROPHILS NFR BLD: 77.6 % (ref 44–72)
NRBC # BLD AUTO: 0 K/UL
NRBC BLD-RTO: 0 /100 WBC
PHOSPHATE SERPL-MCNC: 2.7 MG/DL (ref 2.5–4.5)
PLATELET # BLD AUTO: 148 K/UL (ref 164–446)
PMV BLD AUTO: 9.2 FL (ref 9–12.9)
POTASSIUM SERPL-SCNC: 4 MMOL/L (ref 3.6–5.5)
PROT SERPL-MCNC: 6.3 G/DL (ref 6–8.2)
RBC # BLD AUTO: 4.68 M/UL (ref 4.7–6.1)
SIGNIFICANT IND 70042: NORMAL
SITE SITE: NORMAL
SODIUM SERPL-SCNC: 130 MMOL/L (ref 135–145)
SOURCE SOURCE: NORMAL
WBC # BLD AUTO: 6.6 K/UL (ref 4.8–10.8)

## 2022-07-12 PROCEDURE — 99232 SBSQ HOSP IP/OBS MODERATE 35: CPT | Performed by: HOSPITALIST

## 2022-07-12 PROCEDURE — 700101 HCHG RX REV CODE 250: Performed by: HOSPITALIST

## 2022-07-12 PROCEDURE — A9576 INJ PROHANCE MULTIPACK: HCPCS | Performed by: HOSPITALIST

## 2022-07-12 PROCEDURE — A9270 NON-COVERED ITEM OR SERVICE: HCPCS | Performed by: INTERNAL MEDICINE

## 2022-07-12 PROCEDURE — 83735 ASSAY OF MAGNESIUM: CPT

## 2022-07-12 PROCEDURE — 72158 MRI LUMBAR SPINE W/O & W/DYE: CPT

## 2022-07-12 PROCEDURE — 700105 HCHG RX REV CODE 258: Performed by: HOSPITALIST

## 2022-07-12 PROCEDURE — 84100 ASSAY OF PHOSPHORUS: CPT

## 2022-07-12 PROCEDURE — 700102 HCHG RX REV CODE 250 W/ 637 OVERRIDE(OP): Performed by: STUDENT IN AN ORGANIZED HEALTH CARE EDUCATION/TRAINING PROGRAM

## 2022-07-12 PROCEDURE — 700102 HCHG RX REV CODE 250 W/ 637 OVERRIDE(OP): Performed by: HOSPITALIST

## 2022-07-12 PROCEDURE — 82962 GLUCOSE BLOOD TEST: CPT

## 2022-07-12 PROCEDURE — 770006 HCHG ROOM/CARE - MED/SURG/GYN SEMI*

## 2022-07-12 PROCEDURE — 700102 HCHG RX REV CODE 250 W/ 637 OVERRIDE(OP): Performed by: NURSE PRACTITIONER

## 2022-07-12 PROCEDURE — 700117 HCHG RX CONTRAST REV CODE 255: Performed by: HOSPITALIST

## 2022-07-12 PROCEDURE — 700102 HCHG RX REV CODE 250 W/ 637 OVERRIDE(OP): Performed by: INTERNAL MEDICINE

## 2022-07-12 PROCEDURE — 99232 SBSQ HOSP IP/OBS MODERATE 35: CPT | Performed by: INTERNAL MEDICINE

## 2022-07-12 PROCEDURE — A9270 NON-COVERED ITEM OR SERVICE: HCPCS | Performed by: NURSE PRACTITIONER

## 2022-07-12 PROCEDURE — 700111 HCHG RX REV CODE 636 W/ 250 OVERRIDE (IP): Performed by: STUDENT IN AN ORGANIZED HEALTH CARE EDUCATION/TRAINING PROGRAM

## 2022-07-12 PROCEDURE — 80053 COMPREHEN METABOLIC PANEL: CPT

## 2022-07-12 PROCEDURE — A9270 NON-COVERED ITEM OR SERVICE: HCPCS | Performed by: HOSPITALIST

## 2022-07-12 PROCEDURE — A9270 NON-COVERED ITEM OR SERVICE: HCPCS | Performed by: STUDENT IN AN ORGANIZED HEALTH CARE EDUCATION/TRAINING PROGRAM

## 2022-07-12 PROCEDURE — 85025 COMPLETE CBC W/AUTO DIFF WBC: CPT

## 2022-07-12 RX ADMIN — METOPROLOL TARTRATE 50 MG: 50 TABLET, FILM COATED ORAL at 05:05

## 2022-07-12 RX ADMIN — INSULIN HUMAN 2 UNITS: 100 INJECTION, SOLUTION PARENTERAL at 17:35

## 2022-07-12 RX ADMIN — OXYCODONE 5 MG: 5 TABLET ORAL at 16:13

## 2022-07-12 RX ADMIN — MORPHINE SULFATE 2 MG: 4 INJECTION INTRAVENOUS at 21:13

## 2022-07-12 RX ADMIN — APIXABAN 5 MG: 5 TABLET, FILM COATED ORAL at 17:31

## 2022-07-12 RX ADMIN — INSULIN HUMAN 2 UNITS: 100 INJECTION, SOLUTION PARENTERAL at 20:52

## 2022-07-12 RX ADMIN — AMIODARONE HYDROCHLORIDE 400 MG: 200 TABLET ORAL at 05:05

## 2022-07-12 RX ADMIN — LISINOPRIL 20 MG: 20 TABLET ORAL at 17:30

## 2022-07-12 RX ADMIN — NAFCILLIN SODIUM 2 G: 2 INJECTION, POWDER, FOR SOLUTION INTRAMUSCULAR; INTRAVENOUS at 05:41

## 2022-07-12 RX ADMIN — APIXABAN 5 MG: 5 TABLET, FILM COATED ORAL at 05:05

## 2022-07-12 RX ADMIN — INSULIN GLARGINE-YFGN 13 UNITS: 100 INJECTION, SOLUTION SUBCUTANEOUS at 17:36

## 2022-07-12 RX ADMIN — NAFCILLIN SODIUM 2 G: 2 INJECTION, POWDER, FOR SOLUTION INTRAMUSCULAR; INTRAVENOUS at 03:15

## 2022-07-12 RX ADMIN — EZETIMIBE 10 MG: 10 TABLET ORAL at 17:30

## 2022-07-12 RX ADMIN — NAFCILLIN SODIUM 2 G: 2 INJECTION, POWDER, FOR SOLUTION INTRAMUSCULAR; INTRAVENOUS at 21:04

## 2022-07-12 RX ADMIN — AMIODARONE HYDROCHLORIDE 400 MG: 200 TABLET ORAL at 17:31

## 2022-07-12 RX ADMIN — SENNOSIDES AND DOCUSATE SODIUM 2 TABLET: 50; 8.6 TABLET ORAL at 05:04

## 2022-07-12 RX ADMIN — NAFCILLIN SODIUM 2 G: 2 INJECTION, POWDER, FOR SOLUTION INTRAMUSCULAR; INTRAVENOUS at 17:32

## 2022-07-12 RX ADMIN — METOPROLOL TARTRATE 50 MG: 50 TABLET, FILM COATED ORAL at 17:30

## 2022-07-12 RX ADMIN — GADOTERIDOL 20 ML: 279.3 INJECTION, SOLUTION INTRAVENOUS at 13:34

## 2022-07-12 RX ADMIN — OXYCODONE 5 MG: 5 TABLET ORAL at 09:06

## 2022-07-12 RX ADMIN — OXYCODONE 5 MG: 5 TABLET ORAL at 12:50

## 2022-07-12 RX ADMIN — NAFCILLIN SODIUM 2 G: 2 INJECTION, POWDER, FOR SOLUTION INTRAMUSCULAR; INTRAVENOUS at 14:48

## 2022-07-12 RX ADMIN — NAFCILLIN SODIUM 2 G: 2 INJECTION, POWDER, FOR SOLUTION INTRAMUSCULAR; INTRAVENOUS at 10:31

## 2022-07-12 RX ADMIN — MORPHINE SULFATE 2 MG: 4 INJECTION INTRAVENOUS at 17:31

## 2022-07-12 ASSESSMENT — ENCOUNTER SYMPTOMS
ABDOMINAL PAIN: 0
DIARRHEA: 0
NERVOUS/ANXIOUS: 0
MYALGIAS: 1
COUGH: 0
FEVER: 0
SHORTNESS OF BREATH: 0
NAUSEA: 0
CONSTIPATION: 0
VOMITING: 0
CHILLS: 0
BACK PAIN: 1

## 2022-07-12 ASSESSMENT — PAIN DESCRIPTION - PAIN TYPE
TYPE: ACUTE PAIN
TYPE: ACUTE PAIN

## 2022-07-12 NOTE — PROGRESS NOTES
"   Orthopaedic PA Progress Note    Interval changes:did well overnight  Dressing removed  Wound may be left open to air    ROS - Patient denies any new issues. No chest pain, dyspnea, or fever.  Pain well controlled.    /83   Pulse 68   Temp 36.3 °C (97.3 °F)   Resp 15   Ht 1.778 m (5' 10\")   Wt 87.2 kg (192 lb 3.9 oz)   SpO2 95%     Patient seen and examined  No acute distress  Breathing non labored  RRR  Surgical incicion is clean, dry, and well-approximated.Surrounding tissues appear healthy.. Patient clearly fires tibialis anterior, EHL, and gastrocnemius/soleus. Sensation is intact to light touch throughout superficial peroneal, deep peroneal, tibial, saphenous, and sural nerve distributions. Strong and palpable 2+ dorsalis pedis and posterior tibial pulses with capillary refill less than 2 seconds. No lower leg tenderness or discomfort.        Recent Labs     07/10/22  0442 07/11/22  0439 07/12/22  0330   WBC 4.2* 5.4 6.6   RBC 4.74 4.59* 4.68*   HEMOGLOBIN 13.8* 13.0* 13.3*   HEMATOCRIT 39.9* 38.9* 39.4*   MCV 84.2 84.7 84.2   MCH 29.1 28.3 28.4   MCHC 34.6 33.4* 33.8   RDW 42.9 43.5 43.4   PLATELETCT 136* 135* 148*   MPV 9.8 9.9 9.2       Active Hospital Problems    Diagnosis    • Hypomagnesemia [E83.42]    • Acute cystitis without hematuria [N30.00]    • MSSA bacteremia [R78.81, B95.61]    • Falls [W19.XXXA]    • Pyogenic arthritis of left knee joint (HCC) [M00.9]    • Thrombocytopenia (HCC) [D69.6]    • Atrial fibrillation with RVR (HCC) [I48.91]    • Type 2 diabetes mellitus with hyperglycemia, without long-term current use of insulin (HCC) [E11.65]    • Hyponatremia [E87.1]    • Hepatocellular carcinoma (HCC) [C22.0]    • Cirrhosis of liver without ascites (HCC) [K74.60]    • Essential hypertension, benign [I10]        Assessment/Plan:  POD#4 S/P I+D L knee  Wt bearing status - as tolerated - encourage mobility  IMCU status  Case Coordination for Discharge Planning - Disposition Clear for " disposition (home/SNF/IRF) from Orthopaedic team standpoint.  Follow-Up: needs appointment with Dr. Wilkins at [facility] at 10-14 days post-op for re-evaluation, staple removal and radiographs.

## 2022-07-12 NOTE — THERAPY
Physical Therapy   Initial Evaluation     Patient Name: Nitesh Duron  Age:  72 y.o., Sex:  male  Medical Record #: [id number]  Today's Date: 7/11/2022     Precautions  Precautions: Fall Risk    Assessment  Patient is 72 y.o. male that presented to acute 7/4 with complaints of weakness and fall on L knee. PMHx significant for DM, DLD, hepC, cirrhosis, Afib. He presented to PT with pain, impaired balance and coordination, functional weakness, and decreased activity tolerance which are limiting his ability to safely perform mobility at OF. He mobilized as detailed below and was primarily limited by pain in L knee. He reported pain with PROM while sitting EOB and was unable to achieve full stand despite mod A x2. He reported dizziness following attempt at standing and attributed this to pain. Recommend patient mobilize to EOB 3x/day at meal times with RN staff as able. Will follow.    Plan    Recommend Physical Therapy 4 times per week until therapy goals are met for the following treatments:  Bed Mobility, Community Re-integration, Equipment, Gait Training, Manual Therapy, Neuro Re-Education / Balance, Self Care/Home Evaluation, Stair Training, Therapeutic Activities, and Therapeutic Exercises    DC Equipment Recommendations: Unable to determine at this time  Discharge Recommendations: Recommend post-acute placement for additional physical therapy services prior to discharge home     Objective       07/11/22 1602   Charge Group   PT Evaluation PT Evaluation High  (32 min)   Initial Contact Note    Initial Contact Note Order Received and Verified, Physical Therapy Evaluation in Progress with Full Report to Follow.   Precautions   Precautions Fall Risk   Vitals   O2 (LPM) 0   O2 Delivery Device None - Room Air   Vitals Comments NC removed during session but SpO2 maintained > 90%   Pain 0 - 10 Group   Location Knee   Location Orientation Left   Therapist Pain Assessment During Activity;Nurse Notified   Prior  "Living Situation   Prior Services None   Housing / Facility 1 Story House   Steps Into Home 2   Steps In Home 0   Equipment Owned None   Lives with - Patient's Self Care Capacity Alone and Able to Care For Self   Comments Patient reported friend \"Skyla\" is able to assist with IADLs remotely (bill paying, etc)   Prior Level of Functional Mobility   Bed Mobility Independent   Transfer Status Independent   Ambulation Independent   Distance Ambulation (Feet)   (community)   Assistive Devices Used None   Stairs Independent   Comments Patient reported he enjoys working on his motorcycles   History of Falls   History of Falls Yes  (reason for admit)   Cognition    Cognition / Consciousness X   Level of Consciousness Alert  (initially lethargic, improved with OOB activity)   Attention Impaired   Comments cooperative, somewhat self limiting due to pain, eager to progress to PLOF   Passive ROM Lower Body   Passive ROM Lower Body X   Comments L knee flexion/extension limited by pain; others WFL for mobility   Active ROM Lower Body    Active ROM Lower Body  X   Comments as above; patient unable to achieve full extension/stand due to pain in L knee   Strength Lower Body   Lower Body Strength  X   Comments as above, pain limiting   Sensation Lower Body   Lower Extremity Sensation   Not Tested   Lower Body Muscle Tone   Lower Body Muscle Tone  WDL   Coordination Lower Body    Coordination Lower Body  X   Comments increased time, effort, facilitation with LLE due to pain   Balance Assessment   Sitting Balance (Static) Fair -   Sitting Balance (Dynamic) Fair -   Weight Shift Sitting Poor   Comments initially R lateral lean in sitting for pain management; improved once B feet on floor. attempted standing with HHA x2   Gait Analysis   Gait Level Of Assist Unable to Participate   Weight Bearing Status no restrictions   Vision Deficits Impacting Mobility NT   Bed Mobility    Supine to Sit Moderate Assist  (assist with LLE off bed and " trunk to upright)   Sit to Supine Moderate Assist   Scooting Moderate Assist  (seated)   Functional Mobility   Sit to Stand Maximal Assist  (for attempt, unable to achieve full extension)   Bed, Chair, Wheelchair Transfer Unable to Participate   How much difficulty does the patient currently have...   Turning over in bed (including adjusting bedclothes, sheets and blankets)? 1   Sitting down on and standing up from a chair with arms (e.g., wheelchair, bedside commode, etc.) 1   Moving from lying on back to sitting on the side of the bed? 1   How much help from another person does the patient currently need...   Moving to and from a bed to a chair (including a wheelchair)? 2   Need to walk in a hospital room? 1   Climbing 3-5 steps with a railing? 1   6 clicks Mobility Score 7   Activity Tolerance   Sitting in Chair unable   Sitting Edge of Bed 10 min   Standing 1 min of attempt   Comments limited by L knee pain   Edema / Skin Assessment   Edema / Skin  Not Assessed   Comments dressing on L knee CDI   Patient / Family Goals    Patient / Family Goal #1 return to PLOF   Short Term Goals    Short Term Goal # 1 Patient will move supine<>sitting EOB without bed features with min A within 6tx in order to get in/out of bed   Short Term Goal # 2 Patient will move sitting<>standing with min A within 6tx in order to initiate transfers and gait   Short Term Goal # 3 Patient will ambulate 50ft with min A within 6tx in order to access environment   Short Term Goal # 4 Patient will ascend/descend 2 steps with min A within 6tx in order to prepare for entering home at [place]   Education Group   Education Provided Role of Physical Therapist   Role of Physical Therapist Patient Response Patient;Acceptance;Explanation;Verbal Demonstration   Problem List    Problems Pain;Impaired Bed Mobility;Impaired Transfers;Impaired Ambulation;Functional ROM Deficit;Functional Strength Deficit;Impaired Balance;Impaired Coordination;Decreased Activity  Tolerance;Limited Knowledge of Post-Op Precautions   Anticipated Discharge Equipment and Recommendations   DC Equipment Recommendations Unable to determine at this time   Discharge Recommendations Recommend post-acute placement for additional physical therapy services prior to discharge home   Interdisciplinary Plan of Care Collaboration   IDT Collaboration with  Nursing;Certified Nursing Assistant   Patient Position at End of Therapy In Bed;Call Light within Reach  (CNA at bedside)   Collaboration Comments RN aware of visit, response   Session Information   Date / Session Number  7/11-1 (1/4, 7/17)

## 2022-07-12 NOTE — ANESTHESIA POSTPROCEDURE EVALUATION
Patient: Nitesh Duron    Procedure Summary     Date: 07/06/22 Room / Location: [facility] OR 03 / SURGERY [facility]    Anesthesia Start: 1911 Anesthesia Stop: 1946    Procedure: IRRIGATION AND DEBRIDEMENT, WOUND (Left Knee) Diagnosis: (SEPTIC LEFT KNEE)    Surgeons: Miah Wilkins M.D. Responsible Provider: Yayo Covington M.D.    Anesthesia Type: general ASA Status: 3 - Emergent          Final Anesthesia Type: general  Last vitals  BP   Blood Pressure : (!) 165/75    Temp   36.4 °C (97.5 °F)    Pulse   (!) 101   Resp   (!) 25    SpO2   98 %      Anesthesia Post Evaluation    Patient location during evaluation: PACU  Patient participation: complete - patient participated  Level of consciousness: awake and alert    Airway patency: patent  Anesthetic complications: no  Cardiovascular status: hemodynamically stable  Respiratory status: acceptable  Hydration status: euvolemic    PONV: none          No complications documented.     Nurse Pain Score: 4 (NPRS)

## 2022-07-12 NOTE — PROGRESS NOTES
Hospital Medicine Daily Progress Note    Date of Service  7/12/2022    Chief Complaint  Nitesh Duron is a 72 y.o. male admitted 7/4/2022 with weakness and fall    Hospital Course    72-year-old male with history of diabetes, dyslipidemia hepatitis C and cirrhosis with atrial fibrillation presented 7/4 with fall and weakness.  Patient states he fell 4 days ago at home and he landed on his left knee.  He noticed swelling on his left knee and severe back pain with some weakness on the left leg, patient has been on the ground most of the time and not able to get up.  Patient lives by himself.  Patient was found on the ground by his girlfriend and she called EMS.  On admission patient was found to have A. fib with RVR.  Dehydration however blood pressure was stable.  Labs did not show leukocytosis however showed hyponatremia, creatinine 1.2 with CPK 2411 and lactic acid 4.4.  IV fluid was started.  Blood culture came back positive with MSSA, cefazolin was initiated and ID was consulted.  Echo is pending.      Patient underwent arthrocentesis by Ortho on his left knee and showed more than 37,000 white blood cell with no red blood cell high suspicious of septic arthritis specially with positive blood culture, ID on board patient was recently on cefazolin transition to nafcillin.    Interval Problem Update:    Left knee pain is improved  Blood cultures from 7/10/2022 are negative to date  Sodium 130 improved  Having loose stool  Discussed with ID Dr. Ford is recommending repeat MRI  Discussed with Ortho      I have discussed this patient's plan of care and discharge plan at IDT rounds today with Case Management, Nursing, Nursing leadership, and other members of the IDT team.    Consultants/Specialty  cardiology, critical care and orthopedics   Spine surgery  ID      Code Status  Full Code    Disposition  Patient is not medically cleared for discharge.   Anticipate discharge to to skilled nursing facility.  I  have placed the appropriate orders for post-discharge needs.    Review of Systems  Review of Systems   Constitutional: Negative for chills and fever.   Respiratory: Negative for cough and shortness of breath.    Cardiovascular: Negative for chest pain.   Gastrointestinal: Negative for abdominal pain, nausea and vomiting.   Musculoskeletal: Positive for back pain and joint pain.   All other systems reviewed and are negative.       Physical Exam  Temp:  [36.3 °C (97.3 °F)-37.3 °C (99.1 °F)] 37.3 °C (99.1 °F)  Pulse:  [] 90  Resp:  [12-25] 16  BP: (121-167)/(68-94) 125/79  SpO2:  [93 %-98 %] 95 %    Physical Exam  Vitals and nursing note reviewed.   Constitutional:       Appearance: He is well-developed. He is not diaphoretic.   HENT:      Head: Normocephalic and atraumatic.      Mouth/Throat:      Pharynx: No oropharyngeal exudate.   Eyes:      General: No scleral icterus.        Right eye: No discharge.         Left eye: No discharge.      Conjunctiva/sclera: Conjunctivae normal.      Pupils: Pupils are equal, round, and reactive to light.   Neck:      Vascular: No JVD.      Trachea: No tracheal deviation.   Cardiovascular:      Rate and Rhythm: Normal rate and regular rhythm.      Heart sounds: No murmur heard.    No friction rub. No gallop.   Pulmonary:      Effort: Pulmonary effort is normal. No respiratory distress.      Breath sounds: Normal breath sounds. No stridor. No wheezing.   Chest:      Chest wall: No tenderness.   Abdominal:      General: There is distension.      Palpations: Abdomen is soft.      Tenderness: There is no abdominal tenderness. There is no rebound.   Musculoskeletal:         General: Swelling and tenderness present.      Cervical back: Neck supple.      Comments: Left knee compressive dressing in place   Skin:     General: Skin is warm and dry.      Nails: There is no clubbing.   Neurological:      Mental Status: He is alert and oriented to person, place, and time.      Cranial  Nerves: No cranial nerve deficit.      Motor: Weakness (Both lower extremities left greater than right mainly limited by pain) present. No abnormal muscle tone.   Psychiatric:         Behavior: Behavior normal.         Fluids    Intake/Output Summary (Last 24 hours) at 7/12/2022 1450  Last data filed at 7/12/2022 1031  Gross per 24 hour   Intake 100 ml   Output 2235 ml   Net -2135 ml       Laboratory  Recent Labs     07/10/22  0442 07/11/22  0439 07/12/22  0330   WBC 4.2* 5.4 6.6   RBC 4.74 4.59* 4.68*   HEMOGLOBIN 13.8* 13.0* 13.3*   HEMATOCRIT 39.9* 38.9* 39.4*   MCV 84.2 84.7 84.2   MCH 29.1 28.3 28.4   MCHC 34.6 33.4* 33.8   RDW 42.9 43.5 43.4   PLATELETCT 136* 135* 148*   MPV 9.8 9.9 9.2     Recent Labs     07/10/22  0442 07/11/22  0439 07/12/22  0330   SODIUM 126* 128* 130*   POTASSIUM 4.1 3.8 4.0   CHLORIDE 92* 94* 96   CO2 25 25 26   GLUCOSE 132* 141* 119*   BUN 18 19 19   CREATININE 0.69 0.73 0.71   CALCIUM 7.7* 7.6* 7.9*                   Imaging  MR-LUMBAR SPINE-WITH & W/O   Final Result      1.  Left L4-5 septic facet joint arthritis with associated left lateral epidural phlegmon/abscess and paravertebral cellulitis. There is severe central canal stenosis at this level secondary to the epidural phlegmon/abscess and degenerative disease.    There has been no significant interval change.   2.  Severe central canal stenosis at L3-4 secondary to the degeneration.   3.  Multifocal degenerative disease as described above.      EC-CARLI W/O CONT   Final Result      MR-LUMBAR SPINE-WITH & W/O   Final Result         Multilevel degenerative changes in lumbar spine as described above. There is severe canal stenosis at the L3-4, L4-5 level with cauda equina compression.      At L2-3 there is moderate canal stenosis with cauda equina compression.      Disc bulge extends into the right extra foraminal zone at the L3-4 and L4-5 level with impingement upon exiting nerves in the extraforaminal zone.      Abnormal edema is  identified in the bilateral paraspinal soft tissues at the L3-4, L4-5 and L5-S1 levels with mild enhancement identified in the right-sided paraspinous soft tissues. These findings are concerning for an ongoing infectious inflammatory    process involving the facet joints.      Minimal epidural thickening is identified dorsally in the spinal canal behind L3 and L4. There is also a hypoenhancing area along the left posterolateral spinal canal that impinges upon the thecal sac. This could represent a developing epidural abscess.    Recommend follow-up examination in 2-3 days.      CT-HEAD W/O   Final Result      1. No CT evidence of acute infarct, hemorrhage or mass.   2. Mild global parenchymal atrophy. Chronic small vessel ischemic changes.      EC-ECHOCARDIOGRAM COMPLETE W/ CONT   Final Result      DX-KNEE 3 VIEWS LEFT   Final Result      1.  Severe tricompartmental left knee osteoarthritis      2.  osteoarthritis the proximal tibiofibular articulation      3.  Multiple intra-articular ossific body      4.  Diffuse atherosclerotic plaque      DX-CHEST-PORTABLE (1 VIEW)   Final Result      No acute cardiopulmonary abnormality identified.      CL-CARDIOVERSION    (Results Pending)        Assessment/Plan  * MSSA bacteremia  Assessment & Plan  Blood culture on admission 7/4 positive for MSSA 2/2  Source likely septic arthritis  Status post left knee I&D on 7/6/2022 Ortho service following    MRI lumbar spine with spinal stenosis and concern for possible developing epidural abscess evaluated by spine surgery with recommendation for medical treatment and outpatient follow-up     Blood culture 7/10 negative to date continue to monitor  ID following c continue nafcillin repeat MRI per ID recommendations    Will need PICC line when blood cultures negative for 48 hours     Hypomagnesemia  Assessment & Plan  Replete and monitor    Pyogenic arthritis of left knee joint (HCC)  Assessment & Plan  Synovial fluid culture  MSSA  Status post I&D by Ortho on 7/6/2022    Reviewed importance of mobilization  Continue IV nafcillin  Discussed with Ortho no plans for further interventions    Falls  Assessment & Plan  PT OT   Encourage mobilization reviewed with patient    Acute cystitis without hematuria  Assessment & Plan  Urine culture MSSA   On nafcillin    Hyponatremia  Assessment & Plan  Continue free fluid restriction  Sodium 128 overall improved continue to monitor      Type 2 diabetes mellitus with hyperglycemia, without long-term current use of insulin (HCC)  Assessment & Plan  Last a1c 6.3  Stable on Lantus and sliding scale insulin continue to monitor    Atrial fibrillation with RVR (HCC)  Assessment & Plan  Echo EF 55% no valvular abnormalities  Evaluated by cardiology  status post CARLI directed cardioversion on 7/8/2022  Continue metoprolol and amiodarone  On amiodarone taper  Continue Eliquis        Thrombocytopenia (HCC)  Assessment & Plan  Improved platelets 148      Hepatocellular carcinoma (HCC)- (present on admission)  Assessment & Plan  History of hepatocellular carcinoma.   Patient has history of cirrhosis  Following with oncology as an outpatient.    Cirrhosis of liver without ascites (HCC)- (present on admission)  Assessment & Plan  History of hepatitis C virus and received treatment     Outpatient follow-up    Essential hypertension, benign- (present on admission)  Assessment & Plan  Improved continue lisinopril and metoprolol and monitor blood pressure         VTE prophylaxis: therapeutic anticoagulation with eliquis    I have performed a physical exam and reviewed and updated ROS and Plan today (7/12/2022). In review of yesterday's note (7/11/2022), there are no changes except as documented above.

## 2022-07-12 NOTE — CARE PLAN
The patient is Watcher - Medium risk of patient condition declining or worsening    Shift Goals  Clinical Goals: pain control, stable VS and HR  Patient Goals: rest and get better  Family Goals: no family present at this time    Progress made toward(s) clinical / shift goals:    Problem: Knowledge Deficit - Standard  Goal: Patient and family/care givers will demonstrate understanding of plan of care, disease process/condition, diagnostic tests and medications  Outcome: Progressing     Problem: Fall Risk  Goal: Patient will remain free from falls  Outcome: Progressing       Patient is not progressing towards the following goals:

## 2022-07-12 NOTE — PROGRESS NOTES
Infectious Disease Progress Note    Author: Shailesh Ford M.D. Date & Time of service: 2022  8:46 AM    Chief Complaint:  MSSA bacteremia and septic joint     Interval History:    AF, O2 RA, complaining of left knee and leg pain.  In A. fib with RVR.    7/10 patient remains afebrile, white count is down to 4.2, tolerating nafcillin   patient remains afebrile, white count is 5.4, tolerating nafcillin.   patient remains afebrile, white count is 6.6, tolerating nafcillin.  Cultures as below    Review of Systems:  Review of Systems   Respiratory: Negative for cough and shortness of breath.    Gastrointestinal: Negative for abdominal pain, constipation, diarrhea, nausea and vomiting.   Musculoskeletal: Positive for back pain, joint pain and myalgias.   Psychiatric/Behavioral: The patient is not nervous/anxious.        Hemodynamics:  Temp (24hrs), Av.3 °C (97.4 °F), Min:36.3 °C (97.3 °F), Max:36.4 °C (97.5 °F)  Temperature: 36.3 °C (97.3 °F)  Pulse  Av  Min: 66  Max: 195   Blood Pressure : (!) 151/72       Physical Exam:  Physical Exam  Vitals and nursing note reviewed.   Constitutional:       General: He is not in acute distress.     Appearance: Normal appearance. He is ill-appearing.   HENT:      Mouth/Throat:      Pharynx: No oropharyngeal exudate.   Eyes:      General: No scleral icterus.        Right eye: No discharge.         Left eye: No discharge.   Cardiovascular:      Rate and Rhythm: Normal rate. Rhythm irregular.   Pulmonary:      Effort: Pulmonary effort is normal.      Breath sounds: Normal breath sounds.   Abdominal:      General: Abdomen is flat. Bowel sounds are normal.      Palpations: Abdomen is soft.   Musculoskeletal:         General: Tenderness and signs of injury present.      Left lower leg: Edema present.      Comments: Left knee with surgical dressing in place   Skin:     General: Skin is warm and dry.      Comments: Multiple tattoos   Neurological:      General: No  focal deficit present.      Mental Status: He is alert and oriented to person, place, and time.   Psychiatric:         Mood and Affect: Mood normal.         Behavior: Behavior normal.         Meds:    Current Facility-Administered Medications:   •  nafcillin  •  lisinopril  •  apixaban  •  amiodarone  •  [START ON 7/24/2022] amiodarone  •  insulin GLARGINE  •  metoprolol tartrate  •  senna-docusate **AND** polyethylene glycol/lytes **AND** magnesium hydroxide **AND** bisacodyl  •  labetalol  •  ondansetron  •  ondansetron  •  insulin regular **AND** POC blood glucose manual result **AND** NOTIFY MD and PharmD **AND** Administer 20 grams of glucose (approximately 8 ounces of fruit juice) every 15 minutes PRN FSBG less than 70 mg/dL **AND** dextrose bolus  •  Notify provider if pain remains uncontrolled **AND** Use the Numeric Rating Scale (NRS), Yusuf-Baker Faces (WBF), or FLACC on regular floors and Critical-Care Pain Observation Tool (CPOT) on ICUs/Trauma to assess pain **AND** Pulse Ox **AND** Pharmacy Consult Request **AND** If patient difficult to arouse and/or has respiratory depression (respiratory rate of 10 or less), stop any opiates that are currently infusing and call a Rapid Response.  •  oxyCODONE immediate-release **OR** oxyCODONE immediate-release **OR** morphine injection  •  ezetimibe    Labs:  Recent Labs     07/10/22  0442 07/11/22  0439 07/12/22  0330   WBC 4.2* 5.4 6.6   RBC 4.74 4.59* 4.68*   HEMOGLOBIN 13.8* 13.0* 13.3*   HEMATOCRIT 39.9* 38.9* 39.4*   MCV 84.2 84.7 84.2   MCH 29.1 28.3 28.4   RDW 42.9 43.5 43.4   PLATELETCT 136* 135* 148*   MPV 9.8 9.9 9.2   NEUTSPOLYS 78.80* 83.10* 77.60*   LYMPHOCYTES 9.80* 7.10* 12.00*   MONOCYTES 8.30 7.50 8.30   EOSINOPHILS 0.50 0.20 0.30   BASOPHILS 0.20 0.40 0.30     Recent Labs     07/10/22  0442 07/11/22  0439 07/12/22  0330   SODIUM 126* 128* 130*   POTASSIUM 4.1 3.8 4.0   CHLORIDE 92* 94* 96   CO2 25 25 26   GLUCOSE 132* 141* 119*   BUN 18 19 19      Recent Labs     07/10/22  0442 07/11/22  0439 07/12/22  0330   ALBUMIN 2.7*  --  2.5*   TBILIRUBIN 0.8  --  0.5   ALKPHOSPHAT 107*  --  98   TOTPROTEIN 6.2  --  6.3   ALTSGPT 12  --  14   ASTSGOT 26  --  27   CREATININE 0.69 0.73 0.71       Imaging:  CT-HEAD W/O    Result Date: 7/5/2022   CT HEAD WITHOUT CONTRAST 7/5/2022 6:07 PM HISTORY/REASON FOR EXAM:  Pain following trauma TECHNIQUE/EXAM DESCRIPTION AND NUMBER OF VIEWS: CT of the head without contrast. The study was performed on a helical multidetector CT scanner. Contiguous 2.5 mm axial sections were obtained from the skull base through the vertex. Up to date radiation dose reduction adjustments have been utilized to meet ALARA standards for radiation dose reduction. COMPARISON:  None available FINDINGS: Lateral ventricles are normal in size and symmetric. Mild global parenchymal atrophy. Chronic small vessel ischemic changes. No significant mass effect or midline shift. Basal cisterns are patent. No evidence for intracranial hemorrhage. Calvaria are intact. Atherosclerosis. Visualized orbits are unremarkable. Visualized mastoid air cells are clear. No significant sinus disease in the visualized paranasal sinuses.     1. No CT evidence of acute infarct, hemorrhage or mass. 2. Mild global parenchymal atrophy. Chronic small vessel ischemic changes.    DX-CHEST-PORTABLE (1 VIEW)    Result Date: 7/4/2022 7/4/2022 2:18 PM HISTORY/REASON FOR EXAM:  Sepsis. TECHNIQUE/EXAM DESCRIPTION AND NUMBER OF VIEWS: Single portable view of the chest. COMPARISON: None FINDINGS: LUNGS: The lungs are clear. HEART and MEDIASTINUM: normal in size. Pleura: There are no pleural effusion or pneumothoraces. Osseous structures: There are bilateral chronic rotator cuff tear with the humeral head abutting and eroding the acromion. There is bilateral glenohumeral joint osteoarthritis.     No acute cardiopulmonary abnormality identified.    DX-KNEE 3 VIEWS LEFT    Result Date:  7/4/2022 7/4/2022 2:18 PM HISTORY/REASON FOR EXAM:  Atraumatic Pain/Swelling/Deformity. Left knee pain TECHNIQUE/EXAM DESCRIPTION AND NUMBER OF VIEWS:  3 views of the LEFT knee. COMPARISON: None FINDINGS: There is no fracture. Alignment is normal. Severe tricompartmental degenerative changes are present. There is also severe osteoarthritis of the proximal tibiofibular articulation with multiple subchondral cyst present. There is extensive atherosclerotic plaque. There are multiple intra-articular ossific body.     1.  Severe tricompartmental left knee osteoarthritis 2.  osteoarthritis the proximal tibiofibular articulation 3.  Multiple intra-articular ossific body 4.  Diffuse atherosclerotic plaque    MR-LUMBAR SPINE-WITH & W/O    Result Date: 7/7/2022 7/6/2022 3:22 PM HISTORY/REASON FOR EXAM:  Low back pain, infection suspected; Epidural abscess suspected; Epidural abscess TECHNIQUE/EXAM DESCRIPTION: MRI of the lumbar spine without and with contrast. The study was performed on a [employer] Signa 1.5 [employer] MRI scanner. T1 sagittal, T2 fast spin-echo sagittal, and T2 axial images were obtained of the lumbar spine. T1 postcontrast fat-suppressed sagittal images were obtained. 18 mL ProHance contrast was administered intravenously. COMPARISON:  7/9/2021 FINDINGS: Conus terminates at the T12-L1 level. Type I marrow changes are noted of the adjacent L2-L3 endplates. There is decreased disc height throughout the lumbar spine with disc desiccation noted throughout. Reversal of lumbar lordosis noted with a mild kyphotic angulation centered at the L1-L2 level. T11-12: Mild facet degeneration and ligamentum flavum hypertrophy. There is no significant canal stenosis or foraminal narrowing. T12-L1: Bilateral mild facet degeneration. Mild disc bulge. There is no significant canal stenosis or foraminal narrowing. L1-2: Broad-based disc bulge and bilateral facet degeneration and ligament of flavum hypertrophy. There is mild canal  narrowing. There is mild left foraminal narrowing. L2-3: Broad-based disc bulge and bilateral advanced facet degeneration and ligamentum flavum hypertrophy. There is mild left foraminal narrowing. There is moderate canal stenosis with thecal sac and cauda equina compression at this level. L3-4: Broad-based disc bulge and bilateral facet degeneration and ligamentum flavum hypertrophy. Disc bulge extends into both neural foramina and extra foraminal zones. This is worse in the right side. There is severe canal stenosis with cauda equina compression. There is impingement upon the exiting right L3 nerve and extra foraminal zone. There is moderate bilateral foraminal narrowing. L4-5: Broad-based disc bulge and bilateral advanced facet degeneration with ligamentum flavum hypertrophy. There is moderate to severe canal stenosis with cauda equina compression. There is moderate bilateral foraminal narrowing. There is extension of the disc bulge into the right extraforaminal zone causing significant impingement upon the exiting right L4 nerve. L5-S1: Broad-based disc bulge and bilateral advanced facet degeneration with intervertebral hypertrophy. There is no significant foraminal narrowing. There is moderate severe right lateral recess narrowing with impingement upon the descending right S1 nerve. STIR images demonstrate edema of the bilateral posterior paraspinous soft tissues at the L3, L4, L5 levels worse on the right side. Postcontrast images through the lumbar spine demonstrate mild enhancement along the right paraspinous soft tissues at the L2-3, L3-4, L4-5 level adjacent to the facet joints. Mild epidural enhancement is identified dorsally at the L3 and L4 segments, there is a small area of hypoenhancement along the posterolateral spinal canal at the L4-5 level on the left side. Small renal cortical cysts noted in the right kidney.     Multilevel degenerative changes in lumbar spine as described above. There is severe  canal stenosis at the L3-4, L4-5 level with cauda equina compression. At L2-3 there is moderate canal stenosis with cauda equina compression. Disc bulge extends into the right extra foraminal zone at the L3-4 and L4-5 level with impingement upon exiting nerves in the extraforaminal zone. Abnormal edema is identified in the bilateral paraspinal soft tissues at the L3-4, L4-5 and L5-S1 levels with mild enhancement identified in the right-sided paraspinous soft tissues. These findings are concerning for an ongoing infectious inflammatory process involving the facet joints. Minimal epidural thickening is identified dorsally in the spinal canal behind L3 and L4. There is also a hypoenhancing area along the left posterolateral spinal canal that impinges upon the thecal sac. This could represent a developing epidural abscess. Recommend follow-up examination in 2-3 days.    IR-CONSULT ONLY-OUTPATIENT    Result Date: 2022  This exam has been resulted under the NOTES tab, and the signed report has been auto faxed to the ordering physician on the date/time of that signature.    EC-ECHOCARDIOGRAM COMPLETE W/ CONT    Result Date: 2022  Transthoracic Echo Report Echocardiography Laboratory CONCLUSIONS Normal left ventricular size and systolic function. The left ventricular ejection fraction is estimated to be 55%. No evidence of valvular abnormality based on Doppler evaluation. Unable to estimate right ventricular systolic pressure due to an inadequate tricuspid regurgitant jet. ARAUJO CECILLE Exam Date:         2022                    14:12 Exam Location:     Inpatient Priority:          Routine Ordering Physician:        ELSY REARDON Referring Physician: Sonographer:               Lily Lynch RDCS, RVT Age:    72     Gender:    M MRN:    [id number] :    1950 BSA:    2      Ht (in):    70     Wt (lb):    180 Exam Type:     Complete Indications:     Arrhythmia ICD Codes:        427.9 CPT Codes:       82156 BP:   0      /   88     HR:   113 Technical Quality:       Fair MEASUREMENTS  (Male / Female) Normal Values 2D ECHO LV Diastolic Diameter PLAX        4.5 cm                4.2 - 5.9 / 3.9 - 5.3 cm LV Systolic Diameter PLAX         3.4 cm                2.1 - 4.0 cm IVS Diastolic Thickness           0.97 cm               LVPW Diastolic Thickness          1 cm                  LVOT Diameter                     2 cm                  Estimated LV Ejection Fraction    55 %                  LV Ejection Fraction MOD BP       62.6 %                >= 55  % LV Ejection Fraction MOD 4C       63.6 %                LV Ejection Fraction MOD 2C       56.4 %                DOPPLER AV Peak Velocity                  1.2 m/s               AV Peak Gradient                  5.5 mmHg              AV Mean Gradient                  4 mmHg                LVOT Peak Velocity                0.77 m/s              AV Area Cont Eq vti               2.4 cm2               PV Peak Velocity                  0.81 m/s              PV Peak Gradient                  2.6 mmHg              * Indicates values subject to auto-interpretation LV EF:  55    % FINDINGS Left Ventricle Contrast was used to enhance visualization of the endocardial border. Normal left ventricular chamber size. Normal left ventricular wall thickness. Grossly normal left ventricular systolic function. The left ventricular ejection fraction is visually estimated to be 55%. Diastolic function is difficult to assess with arrhythmia. Right Ventricle Normal right ventricular size. Reduced right ventricular systolic function. Right Atrium Normal right atrial size. Normal inferior vena cava size and inspiratory collapse. Left Atrium Normal left atrial size. Mitral Valve Structurally normal mitral valve without significant stenosis or regurgitation. Aortic Valve Structurally normal aortic valve without significant stenosis or regurgitation. Tricuspid Valve  Structurally normal tricuspid valve without significant stenosis or regurgitation. Unable to estimate right ventricular systolic pressure due to an inadequate tricuspid regurgitant jet. Pulmonic Valve The pulmonic valve is not well visualized. No stenosis or regurgitation seen. Pericardium Fat pat present. Aorta The ascending aorta is mildly dilated by BSA diameter is 3.7 cm. The aortic root diameter is 3.6 cm. Mukul Phillips MD (Electronically Signed) Final Date:     2022                 16:13    EC-CARLI W/O CONT    Result Date: 2022  Transesophageal Echo Report Echocardiography Laboratory CONCLUSIONS No evidence of endocarditis. Normal left atrial appendage. No thrombus detected in the left atrial appendage. CECILLE ARAUJO Exam Date:          2022                     09:47 Exam Location:      Inpatient Priority:            Routine Ordering Physician:        VINH PRATT                             ([id number]) Referring Physician:       SANTA Diop Sonographer:               Lily Lynch                            RDCS, RVT Age:    72     Gender:    M MRN:    [id number] :    1950 BSA:           Ht (in):           Wt (lb): Report Type:      Complete Indications:     Arrhythmia, Endocarditis ICD Codes:       427.9  421 CPT Codes:       68410 BP:          /          HR: Technical Quality:       Good MEASUREMENTS  (Male / Female) Normal Values * Indicates values subject to auto-interpretation LV EF:        % Medications Limitations Complications Proc. Components The probe was inserted and manipulated by Dr Phillips. Epiq probe #2  was used for this procedure. 2D, color Doppler, spectral Doppler, and 3D imaging were used as part of the evaluation as clinically indicated. FINDINGS Left Ventricle Right Ventricle Right Atrium Left Atrium LA Appendage Normal left atrial appendage. No thrombus detected in the left atrial appendage. IA Septum IV Septum Mitral Valve Aortic Valve Tricuspid Valve  "Pulmonic Valve Pericardium Aorta Mukul Phillips MD (Electronically Signed) Final Date:     08 July 2022                 13:41      Micro:  Results     Procedure Component Value Units Date/Time    BLOOD CULTURE [[id number]] Collected: 07/10/22 1259    Order Status: Completed Specimen: Blood from Peripheral Updated: 07/11/22 0748     Significant Indicator NEG     Source BLD     Site PERIPHERAL     Culture Result No Growth  Note: Blood cultures are incubated for 5 days and  are monitored continuously.Positive blood cultures  are called to the RN and reported as soon as  they are identified.      Narrative:      Per Hospital Policy: Only change Specimen Src: to \"Line\" if  specified by physician order.  Right Hand    BLOOD CULTURE [[id number]] Collected: 07/10/22 1145    Order Status: Completed Specimen: Blood from Peripheral Updated: 07/11/22 0748     Significant Indicator NEG     Source BLD     Site PERIPHERAL     Culture Result No Growth  Note: Blood cultures are incubated for 5 days and  are monitored continuously.Positive blood cultures  are called to the RN and reported as soon as  they are identified.      Narrative:      Per Hospital Policy: Only change Specimen Src: to \"Line\" if  specified by physician order.  Right Forearm/Arm    CULTURE WOUND W/ GRAM STAIN [[id number]]  (Abnormal)  (Susceptibility) Collected: 07/06/22 1925    Order Status: Completed Specimen: Wound Updated: 07/10/22 1741     Significant Indicator POS     Source WND     Site Left Knee     Culture Result -     Gram Stain Result Few WBCs.  No organisms seen.       Culture Result Staphylococcus aureus  Rare growth      Narrative:      Surgery - swabs received    Susceptibility     Staphylococcus aureus (1)     Antibiotic Interpretation Microscan   Method Status    Azithromycin Sensitive <=2 mcg/mL DEVEN Final    Clindamycin Sensitive <=0.25 mcg/mL DEVEN Final    Cefazolin Sensitive <=8 mcg/mL DEVEN Final    Cefepime Sensitive <=4 mcg/mL DEVEN Final    " "Ceftaroline Sensitive <=0.5 mcg/mL DEVEN Final    Daptomycin Sensitive 1 mcg/mL DEVEN Final    Erythromycin Sensitive <=0.25 mcg/mL DEVEN Final    Ampicillin/sulbactam Sensitive <=8/4 mcg/mL DEVEN Final    Vancomycin Sensitive 1 mcg/mL DEVEN Final    Oxacillin Sensitive <=0.25 mcg/mL DEVEN Final    Pip/Tazobactam Sensitive <=8 mcg/mL DEVEN Final    Trimeth/Sulfa Sensitive <=0.5/9.5 mcg/mL DEVEN Final    Tetracycline Sensitive <=4 mcg/mL DEVEN Final                   Anaerobic Culture [[id number]] Collected: 07/06/22 1925    Order Status: Completed Specimen: Wound Updated: 07/10/22 1741     Significant Indicator NEG     Source WND     Site Left Knee     Culture Result No Anaerobes isolated.    Narrative:      Surgery - swabs received    BLOOD CULTURE [[id number]]  (Abnormal) Collected: 07/07/22 0940    Order Status: Completed Specimen: Blood from Peripheral Updated: 07/09/22 0843     Significant Indicator POS     Source BLD     Site PERIPHERAL     Culture Result Growth detected by Bactec instrument.  07/08/2022  18:41      Staphylococcus aureus  Methicillin sensitive by screening method.  See previous culture for sensitivity report.      Narrative:      CALL  [facility]  [facility] tel. [phone],  CALLED  [facility] tel. [phone] 07/08/2022, 19:29, RB PERF. RESULTS CALLED TO:  RN:[id number]  Per Hospital Policy: Only change Specimen Src: to \"Line\" if  specified by physician order.  Right Hand    BLOOD CULTURE [[id number]]     Order Status: Canceled Specimen: Blood from Peripheral     BLOOD CULTURE [[id number]]     Order Status: Canceled Specimen: Blood from Peripheral     BLOOD CULTURE [[id number]] Collected: 07/07/22 1130    Order Status: Completed Specimen: Blood from Peripheral Updated: 07/08/22 0724     Significant Indicator NEG     Source BLD     Site PERIPHERAL     Culture Result No Growth  Note: Blood cultures are incubated for 5 days and  are monitored continuously.Positive blood cultures  are called to the RN and reported as soon as  they are " "identified.      Narrative:      Per Hospital Policy: Only change Specimen Src: to \"Line\" if  specified by physician order.  Right Hand    FLUID CULTURE W/GRAM STAIN [[id number]]  (Abnormal)  (Susceptibility) Collected: 07/04/22 1620    Order Status: Completed Specimen: Synovial from Other Body Fluid Updated: 07/07/22 0838     Significant Indicator POS     Source SYNO     Site left knee     Culture Result -     Gram Stain Result Rare WBCs.  No organisms seen.       Culture Result Staphylococcus aureus  Light growth      Narrative:      CALL  [facility]  ER tel. ,  CALLED  ER tel.  07/05/2022, 13:22, RB PERF. RESULTS CALLED TO:Ernesto [id number] Vlad BRAVO knee fluid.    Susceptibility     Staphylococcus aureus (1)     Antibiotic Interpretation Microscan   Method Status    Azithromycin Sensitive <=2 mcg/mL DEVEN Final    Clindamycin Sensitive <=0.25 mcg/mL DEVEN Final    Cefazolin Sensitive <=8 mcg/mL DEVEN Final    Cefepime Sensitive <=4 mcg/mL DEVEN Final    Ceftaroline Sensitive <=0.5 mcg/mL DEVEN Final    Daptomycin Sensitive 1 mcg/mL DEVEN Final    Erythromycin Sensitive <=0.25 mcg/mL DEVEN Final    Ampicillin/sulbactam Sensitive <=8/4 mcg/mL DEVEN Final    Vancomycin Sensitive 1 mcg/mL DEVEN Final    Oxacillin Sensitive <=0.25 mcg/mL DEVEN Final    Pip/Tazobactam Sensitive <=8 mcg/mL DEVEN Final    Trimeth/Sulfa Sensitive <=0.5/9.5 mcg/mL DEVEN Final    Tetracycline Sensitive <=4 mcg/mL DEVEN Final                   Blood Culture [[id number]]  (Abnormal) Collected: 07/04/22 1438    Order Status: Completed Specimen: Blood from Peripheral Updated: 07/07/22 0825     Significant Indicator POS     Source BLD     Site PERIPHERAL     Culture Result Growth detected by Bactec instrument. 07/05/2022  09:29      Staphylococcus aureus  See previous culture for sensitivity report.      Narrative:      CALL  [facility]  ER tel. ,  CALLED  ER tel.  07/05/2022, 09:32, RB PERF. RESULTS CALLED TO:VLAD Arriaga  [id number]  2 of 2 blood culture x2  Sites order. Per Hospital Policy:  Only " "change Specimen Src: to \"Line\" if specified by physician  order.  Left Hand    Blood Culture [[id number]]  (Abnormal)  (Susceptibility) Collected: 07/04/22 1425    Order Status: Completed Specimen: Blood from Peripheral Updated: 07/07/22 0825     Significant Indicator POS     Source BLD     Site PERIPHERAL     Culture Result Growth detected by Bactec instrument. 07/05/2022  04:46  Staphylococcus aureus (methicillin sensitive)  detected by PCR.        Staphylococcus aureus    Narrative:      CALL  [facility]  ER tel. ,  CALLED  ER tel.  07/05/2022, 09:31, RB PERF. RESULTS CALLED TO: Corina FOSS  [id number]  1 of 2 for Blood Culture x 2 sites order. Per Hospital  Policy: Only change Specimen Src: to \"Line\" if specified by  physician order.  No site indicated    Susceptibility     Staphylococcus aureus (1)     Antibiotic Interpretation Microscan   Method Status    Azithromycin Sensitive <=2 mcg/mL DEVEN Final    Clindamycin Sensitive <=0.25 mcg/mL DEVEN Final    Cefazolin Sensitive <=8 mcg/mL DEVEN Final    Cefepime Sensitive <=4 mcg/mL DEVEN Final    Ceftaroline Sensitive <=0.5 mcg/mL DEVEN Final    Daptomycin Sensitive 1 mcg/mL DEVEN Final    Erythromycin Sensitive <=0.25 mcg/mL DEVEN Final    Ampicillin/sulbactam Sensitive <=8/4 mcg/mL DEVEN Final    Vancomycin Sensitive 1 mcg/mL DEVEN Final    Oxacillin Sensitive <=0.25 mcg/mL DEVEN Final    Pip/Tazobactam Sensitive <=8 mcg/mL DEVEN Final    Trimeth/Sulfa Sensitive <=0.5/9.5 mcg/mL DEVEN Final    Tetracycline Sensitive <=4 mcg/mL DEVEN Final                   BLOOD CULTURE [[id number]]     Order Status: Canceled Specimen: Blood from Peripheral     GRAM STAIN [[id number]] Collected: 07/06/22 1925    Order Status: Completed Specimen: Wound Updated: 07/07/22 0249     Significant Indicator .     Source WND     Site Left Knee     Gram Stain Result Few WBCs.  No organisms seen.      Narrative:      Surgery - swabs received    Urine Culture (NEW) [[id number]]  (Abnormal)  (Susceptibility) Collected: 07/04/22 1630 "    Order Status: Completed Specimen: Urine, Clean Catch Updated: 07/06/22 0733     Significant Indicator POS     Source UR     Site URINE, CLEAN CATCH     Culture Result -      Staphylococcus aureus  >100,000 cfu/mL  Methicillin sensitive by screening method.      Narrative:      Indication for culture:->Evaluation for sepsis without a  clear source of infection  Indication for culture:->Evaluation for sepsis without a    Susceptibility     Staphylococcus aureus (1)     Antibiotic Interpretation Microscan   Method Status    Cefazolin Sensitive <=8 mcg/mL DEVEN Final    Cefepime Sensitive <=4 mcg/mL DEVEN Final    Ceftaroline Sensitive <=0.5 mcg/mL DEVEN Final    Daptomycin Sensitive <=0.5 mcg/mL DEVEN Final    Nitrofurantoin Sensitive <=32 mcg/mL DEVEN Final    Pip/Tazobactam Sensitive <=8 mcg/mL DEVEN Final    Trimeth/Sulfa Sensitive <=0.5/9.5 mcg/mL DEVEN Final    Tetracycline Sensitive <=4 mcg/mL DEVEN Final                   BLOOD CULTURE [[id number]]     Order Status: Canceled Specimen: Blood from Peripheral     BLOOD CULTURE [[id number]]     Order Status: Canceled Specimen: Blood from Peripheral     BLOOD CULTURE [[id number]]     Order Status: Canceled Specimen: Blood from Peripheral     MRSA By PCR (Amp) [[id number]] Collected: 07/05/22 1215    Order Status: Completed Specimen: Respirate from Nares Updated: 07/05/22 1608     MRSA by PCR Negative           ASSESSMENT/PLAN:   72 y.o. male patient admitted 7/4/2022. Pt has a past medical history of HTN, HLD, A fib, DMT2 and chronic hepatitis C cirrhosis. He presented complaining of weakness and left knee pain. Patient also with chronic back pain and states he had 4 injections in his back approximately 1 week ago. Blood cultures positive for MSSA.  The fluid aspirated which is also positive for MSSA.  Patient went to the OR on 7/6 for washout of septic left knee.     Problem List  MSSA bacteremia  -Blood cultures on 7/4 & 7/7 +MSSA  -TTE on 7/5 with no vegetations, no significant  valvular disease, EF 55%  -CARLI on 7/8, no evidence of endocarditis  Native left knee septic arthritis  -Aspirated synovial fluid with 30 7K WBCs, 66% polys, cultures +MSSA   -OR with orthopedics on 7/6 for I&D of the left knee, per op note significant amount of purulent material was encountered and sent for culture  UTI, possibly source of the bacteremia or resulted from   -Urine culture on 7/4 +MSSA   Rhabdomyolysis  -Improving CPK  A. fib  Cirrhosis  Chronic HCV infection, per chart  - NAAT pending   Acute on chronic back pain  Paraspinal infection  Lumbar spine with enhancement in the right-sided paraspinous soft tissues at L3-S1 concerning for infection potential developing epidural abscess at L3-L4 per MRI  Antibiotic allergies: Ciprofloxacin reported as convulsions  Isolated hepatitis B core antibody positive    Plan   --- Patient with high burden of infection, multiple sites he is yet to clear blood cultures, cefazolin was transitioned to nafcillin on 7/9.  Continue for now  --- Radiologist is recommending repeating imaging MRI of the spine in 2 to 3 days given possibly developing epidural abscess  --- Follow-up repeat blood cultures from 7/10, no growth to date  --- F/up HCV NAAT  --- Patient also with isolated hepatitis B core antibody positive with negative surface antigen and surface antibody.  Most likely scenario is natural immunity with antibody levels below level of detection.  A rare possibility is ongoing chronic infection with surface antigen levels below level of detection.  Will check HBV DNA PCR     Dispo: TBD, may need SNF, currently appears to have poor insight  PICC: Eventually yes, will need to ensure clearance of blood cultures first.  Cannot place at the moment.      Plan of care discussed with Dr. Romero Garcia. ID will continue to follow.

## 2022-07-13 ENCOUNTER — HOSPITAL ENCOUNTER (OUTPATIENT)
Dept: RADIATION ONCOLOGY | Facility: MEDICAL CENTER | Age: 72
End: 2022-07-31
Attending: RADIOLOGY
Payer: MEDICARE

## 2022-07-13 DIAGNOSIS — R78.81 MSSA BACTEREMIA: ICD-10-CM

## 2022-07-13 DIAGNOSIS — B95.61 MSSA BACTEREMIA: ICD-10-CM

## 2022-07-13 LAB
ANION GAP SERPL CALC-SCNC: 7 MMOL/L (ref 7–16)
BASOPHILS # BLD AUTO: 0.2 % (ref 0–1.8)
BASOPHILS # BLD: 0.01 K/UL (ref 0–0.12)
BUN SERPL-MCNC: 15 MG/DL (ref 8–22)
CALCIUM SERPL-MCNC: 7.8 MG/DL (ref 8.5–10.5)
CHLORIDE SERPL-SCNC: 99 MMOL/L (ref 96–112)
CO2 SERPL-SCNC: 25 MMOL/L (ref 20–33)
CREAT SERPL-MCNC: 0.57 MG/DL (ref 0.5–1.4)
EOSINOPHIL # BLD AUTO: 0.04 K/UL (ref 0–0.51)
EOSINOPHIL NFR BLD: 0.6 % (ref 0–6.9)
ERYTHROCYTE [DISTWIDTH] IN BLOOD BY AUTOMATED COUNT: 43.6 FL (ref 35.9–50)
GFR SERPLBLD CREATININE-BSD FMLA CKD-EPI: 104 ML/MIN/1.73 M 2
GLUCOSE BLD STRIP.AUTO-MCNC: 102 MG/DL (ref 65–99)
GLUCOSE BLD STRIP.AUTO-MCNC: 186 MG/DL (ref 65–99)
GLUCOSE BLD STRIP.AUTO-MCNC: 188 MG/DL (ref 65–99)
GLUCOSE BLD STRIP.AUTO-MCNC: 98 MG/DL (ref 65–99)
GLUCOSE SERPL-MCNC: 102 MG/DL (ref 65–99)
HCT VFR BLD AUTO: 37.6 % (ref 42–52)
HCV RNA SERPL NAA+PROBE-ACNC: NOT DETECTED IU/ML
HCV RNA SERPL NAA+PROBE-LOG IU: NOT DETECTED LOG IU/ML
HCV RNA SERPL QL NAA+PROBE: NOT DETECTED
HGB BLD-MCNC: 12.9 G/DL (ref 14–18)
IMM GRANULOCYTES # BLD AUTO: 0.09 K/UL (ref 0–0.11)
IMM GRANULOCYTES NFR BLD AUTO: 1.4 % (ref 0–0.9)
LYMPHOCYTES # BLD AUTO: 0.96 K/UL (ref 1–4.8)
LYMPHOCYTES NFR BLD: 15.2 % (ref 22–41)
MCH RBC QN AUTO: 28.9 PG (ref 27–33)
MCHC RBC AUTO-ENTMCNC: 34.3 G/DL (ref 33.7–35.3)
MCV RBC AUTO: 84.1 FL (ref 81.4–97.8)
MONOCYTES # BLD AUTO: 0.32 K/UL (ref 0–0.85)
MONOCYTES NFR BLD AUTO: 5.1 % (ref 0–13.4)
NEUTROPHILS # BLD AUTO: 4.88 K/UL (ref 1.82–7.42)
NEUTROPHILS NFR BLD: 77.5 % (ref 44–72)
NRBC # BLD AUTO: 0 K/UL
NRBC BLD-RTO: 0 /100 WBC
PLATELET # BLD AUTO: 155 K/UL (ref 164–446)
PMV BLD AUTO: 9.3 FL (ref 9–12.9)
POTASSIUM SERPL-SCNC: 3.9 MMOL/L (ref 3.6–5.5)
RBC # BLD AUTO: 4.47 M/UL (ref 4.7–6.1)
SODIUM SERPL-SCNC: 131 MMOL/L (ref 135–145)
WBC # BLD AUTO: 6.3 K/UL (ref 4.8–10.8)

## 2022-07-13 PROCEDURE — 80048 BASIC METABOLIC PNL TOTAL CA: CPT

## 2022-07-13 PROCEDURE — 700102 HCHG RX REV CODE 250 W/ 637 OVERRIDE(OP): Performed by: NURSE PRACTITIONER

## 2022-07-13 PROCEDURE — 700105 HCHG RX REV CODE 258: Performed by: HOSPITALIST

## 2022-07-13 PROCEDURE — A9270 NON-COVERED ITEM OR SERVICE: HCPCS | Performed by: INTERNAL MEDICINE

## 2022-07-13 PROCEDURE — 700101 HCHG RX REV CODE 250: Performed by: INTERNAL MEDICINE

## 2022-07-13 PROCEDURE — 36415 COLL VENOUS BLD VENIPUNCTURE: CPT

## 2022-07-13 PROCEDURE — 77263 THER RADIOLOGY TX PLNG CPLX: CPT | Performed by: RADIOLOGY

## 2022-07-13 PROCEDURE — A9270 NON-COVERED ITEM OR SERVICE: HCPCS | Performed by: NURSE PRACTITIONER

## 2022-07-13 PROCEDURE — 700102 HCHG RX REV CODE 250 W/ 637 OVERRIDE(OP): Performed by: HOSPITALIST

## 2022-07-13 PROCEDURE — 700105 HCHG RX REV CODE 258: Performed by: INTERNAL MEDICINE

## 2022-07-13 PROCEDURE — 97530 THERAPEUTIC ACTIVITIES: CPT | Mod: CQ

## 2022-07-13 PROCEDURE — 99233 SBSQ HOSP IP/OBS HIGH 50: CPT | Performed by: INTERNAL MEDICINE

## 2022-07-13 PROCEDURE — 700111 HCHG RX REV CODE 636 W/ 250 OVERRIDE (IP): Performed by: STUDENT IN AN ORGANIZED HEALTH CARE EDUCATION/TRAINING PROGRAM

## 2022-07-13 PROCEDURE — 700102 HCHG RX REV CODE 250 W/ 637 OVERRIDE(OP): Performed by: STUDENT IN AN ORGANIZED HEALTH CARE EDUCATION/TRAINING PROGRAM

## 2022-07-13 PROCEDURE — 700102 HCHG RX REV CODE 250 W/ 637 OVERRIDE(OP): Performed by: INTERNAL MEDICINE

## 2022-07-13 PROCEDURE — 77470 SPECIAL RADIATION TREATMENT: CPT | Mod: 26 | Performed by: RADIOLOGY

## 2022-07-13 PROCEDURE — A9270 NON-COVERED ITEM OR SERVICE: HCPCS | Performed by: STUDENT IN AN ORGANIZED HEALTH CARE EDUCATION/TRAINING PROGRAM

## 2022-07-13 PROCEDURE — 85025 COMPLETE CBC W/AUTO DIFF WBC: CPT

## 2022-07-13 PROCEDURE — 700101 HCHG RX REV CODE 250: Performed by: HOSPITALIST

## 2022-07-13 PROCEDURE — 87517 HEPATITIS B DNA QUANT: CPT

## 2022-07-13 PROCEDURE — 77470 SPECIAL RADIATION TREATMENT: CPT | Performed by: RADIOLOGY

## 2022-07-13 PROCEDURE — 82962 GLUCOSE BLOOD TEST: CPT | Mod: 91

## 2022-07-13 PROCEDURE — 770006 HCHG ROOM/CARE - MED/SURG/GYN SEMI*

## 2022-07-13 PROCEDURE — A9270 NON-COVERED ITEM OR SERVICE: HCPCS | Performed by: HOSPITALIST

## 2022-07-13 RX ADMIN — MORPHINE SULFATE 2 MG: 4 INJECTION INTRAVENOUS at 09:41

## 2022-07-13 RX ADMIN — METOPROLOL TARTRATE 50 MG: 50 TABLET, FILM COATED ORAL at 05:18

## 2022-07-13 RX ADMIN — APIXABAN 5 MG: 5 TABLET, FILM COATED ORAL at 19:49

## 2022-07-13 RX ADMIN — AMIODARONE HYDROCHLORIDE 400 MG: 200 TABLET ORAL at 05:20

## 2022-07-13 RX ADMIN — OXYCODONE 5 MG: 5 TABLET ORAL at 05:19

## 2022-07-13 RX ADMIN — INSULIN GLARGINE-YFGN 13 UNITS: 100 INJECTION, SOLUTION SUBCUTANEOUS at 19:36

## 2022-07-13 RX ADMIN — AMIODARONE HYDROCHLORIDE 400 MG: 200 TABLET ORAL at 19:49

## 2022-07-13 RX ADMIN — LISINOPRIL 20 MG: 20 TABLET ORAL at 19:48

## 2022-07-13 RX ADMIN — NAFCILLIN SODIUM 2 G: 2 INJECTION, POWDER, FOR SOLUTION INTRAMUSCULAR; INTRAVENOUS at 10:49

## 2022-07-13 RX ADMIN — NAFCILLIN SODIUM 2 G: 2 INJECTION, POWDER, FOR SOLUTION INTRAMUSCULAR; INTRAVENOUS at 02:59

## 2022-07-13 RX ADMIN — NAFCILLIN SODIUM 2 G: 2 INJECTION, POWDER, FOR SOLUTION INTRAMUSCULAR; INTRAVENOUS at 05:20

## 2022-07-13 RX ADMIN — NAFCILLIN SODIUM 2 G: 2 INJECTION, POWDER, FOR SOLUTION INTRAMUSCULAR; INTRAVENOUS at 19:53

## 2022-07-13 RX ADMIN — INSULIN HUMAN 2 UNITS: 100 INJECTION, SOLUTION PARENTERAL at 19:40

## 2022-07-13 RX ADMIN — METOPROLOL TARTRATE 50 MG: 50 TABLET, FILM COATED ORAL at 19:49

## 2022-07-13 RX ADMIN — OXYCODONE 5 MG: 5 TABLET ORAL at 16:16

## 2022-07-13 RX ADMIN — OXYCODONE 5 MG: 5 TABLET ORAL at 21:12

## 2022-07-13 RX ADMIN — APIXABAN 5 MG: 5 TABLET, FILM COATED ORAL at 05:18

## 2022-07-13 RX ADMIN — NAFCILLIN SODIUM 2 G: 2 INJECTION, POWDER, FOR SOLUTION INTRAMUSCULAR; INTRAVENOUS at 21:13

## 2022-07-13 RX ADMIN — NAFCILLIN SODIUM 2 G: 2 INJECTION, POWDER, FOR SOLUTION INTRAMUSCULAR; INTRAVENOUS at 15:22

## 2022-07-13 RX ADMIN — INSULIN HUMAN 2 UNITS: 100 INJECTION, SOLUTION PARENTERAL at 21:14

## 2022-07-13 RX ADMIN — EZETIMIBE 10 MG: 10 TABLET ORAL at 19:49

## 2022-07-13 ASSESSMENT — ENCOUNTER SYMPTOMS
COUGH: 0
DIARRHEA: 0
VOMITING: 0
CONSTIPATION: 0
SHORTNESS OF BREATH: 0
CHILLS: 0
NERVOUS/ANXIOUS: 0
NAUSEA: 0
MYALGIAS: 1
ABDOMINAL PAIN: 0
FEVER: 0
BACK PAIN: 1

## 2022-07-13 ASSESSMENT — PAIN DESCRIPTION - PAIN TYPE: TYPE: ACUTE PAIN

## 2022-07-13 NOTE — DISCHARGE PLANNING
Received Choice form at [phone]  Agency/Facility Name: [facility]  Referral sent per Choice form @ [phone]

## 2022-07-13 NOTE — PROGRESS NOTES
"Spine Surgery Progress Note    72 y.o. male with diabetes dyslipidemia hepatitis C cirrhosis atrial fibrillation that presented with a left knee infection, had moderate back pain, was found to have mild enhancement of paraspinous soft tissues involving facet joints on MRI 7/5/2022 no identifiable abscess.  Recently had a repeat MRI.    S: Doing well, states that he has experienced significant clinical improvement since starting antibiotics.  No significant increase in back pain.  No new neurologic deficits.  Overall reports significant improvement since starting antibiotics.  No groin numbness or incontinence  O:  BP (!) 142/90 Comment: RN NOTIFIED  Pulse 76   Temp 36.4 °C (97.5 °F) (Temporal)   Resp 17   Ht 1.778 m (5' 10\")   Wt 87.2 kg (192 lb 3.9 oz)   SpO2 98%   BMI 27.58 kg/m²     Gen: WNWD NAD  Breathing comfortably    Dressing CDI    Lumbar     HF  KE  TA  Peroneals  EHL  PF  Right  5  5  5        5     5   5  Left  4+*  4+*  5        5     5   5    *Pain limited    SILT L2-S1 bilaterally except: None  <3 beats of clonus BLE    Lab Results   Component Value Date/Time    WBC 6.3 07/13/2022 08:49 AM    RBC 4.47 (L) 07/13/2022 08:49 AM    HEMOGLOBIN 12.9 (L) 07/13/2022 08:49 AM    HEMATOCRIT 37.6 (L) 07/13/2022 08:49 AM    MCV 84.1 07/13/2022 08:49 AM    MCH 28.9 07/13/2022 08:49 AM    MCHC 34.3 07/13/2022 08:49 AM    MPV 9.3 07/13/2022 08:49 AM    NEUTSPOLYS 77.50 (H) 07/13/2022 08:49 AM    LYMPHOCYTES 15.20 (L) 07/13/2022 08:49 AM    MONOCYTES 5.10 07/13/2022 08:49 AM    EOSINOPHILS 0.60 07/13/2022 08:49 AM    BASOPHILS 0.20 07/13/2022 08:49 AM    HYPOCHROMIA 1+ 01/29/2019 10:32 AM    ANISOCYTOSIS 1+ 01/29/2019 10:32 AM      Lab Results   Component Value Date/Time    SODIUM 131 (L) 07/13/2022 08:49 AM    POTASSIUM 3.9 07/13/2022 08:49 AM    CHLORIDE 99 07/13/2022 08:49 AM    CO2 25 07/13/2022 08:49 AM    GLUCOSE 102 (H) 07/13/2022 08:49 AM    BUN 15 07/13/2022 08:49 AM    CREATININE 0.57 07/13/2022 " 08:49 AM      MRI of the lumbar spine performed on 7/12/2022 was independently reviewed demonstrates left L4-5 facet joint increased signal consistent with likely septic facet arthritis with associated left epidural phlegmon.  No obvious abscess noted.  Does have soft tissue enhancement.    AP: 72 y.o. male with left facet septic arthritis L4-5, clinically responding well to medical management.  I recommend continued medical management.  Do not recommend surgical intervention at this time.    Patient will follow-up with me as an outpatient.

## 2022-07-13 NOTE — PROGRESS NOTES
4 Eyes Skin Assessment Completed by Jersey SCHWARZ RN and PRUDENCE Madrigal.    Head WDL  Ears WDL  Nose WDL  Mouth Bleeding  Neck WDL  Breast/Chest WDL  Shoulder Blades WDL  Spine WDL  (R) Arm/Elbow/Hand Scab  (L) Arm/Elbow/Hand Scab  Abdomen WDL  Groin WDL  Scrotum/Coccyx/Buttocks WDL  (R) Leg Scab  (L) Leg Incision  (R) Heel/Foot/Toe WDL  (L) Heel/Foot/Toe WDL                Interventions In Place N/A    Possible Skin Injury No    Pictures Uploaded Into Epic N/A  Wound Consult Placed N/A  RN Wound Prevention Protocol Ordered No    stated

## 2022-07-13 NOTE — PROGRESS NOTES
"   Orthopaedic Progress Note    Interval changes:  Patient doing well  Dressing CDI  Cleared for DC by ortho pending medicine clearance    ROS - Patient denies any new issues.  Pain well controlled.    BP (!) 142/90   Pulse 76   Temp 36.4 °C (97.5 °F) (Temporal)   Resp 17   Ht 1.778 m (5' 10\")   Wt 87.2 kg (192 lb 3.9 oz)   SpO2 98%       Patient seen and examined  No acute distress  Breathing non labored  RRR  LLE incision open to air, incision CDI without issue, DNVI, moves all toes, cap refill <2 sec.     Recent Labs     07/11/22  0439 07/12/22  0330 07/13/22  0849   WBC 5.4 6.6 6.3   RBC 4.59* 4.68* 4.47*   HEMOGLOBIN 13.0* 13.3* 12.9*   HEMATOCRIT 38.9* 39.4* 37.6*   MCV 84.7 84.2 84.1   MCH 28.3 28.4 28.9   MCHC 33.4* 33.8 34.3   RDW 43.5 43.4 43.6   PLATELETCT 135* 148* 155*   MPV 9.9 9.2 9.3       Active Hospital Problems    Diagnosis    • Hypomagnesemia [E83.42]    • Acute cystitis without hematuria [N30.00]    • MSSA bacteremia, epidural abscess/septic arthritis of L knee/UTI [R78.81, B95.61]    • Falls [W19.XXXA]    • Pyogenic arthritis of left knee joint (HCC) [M00.9]    • Thrombocytopenia (HCC) [D69.6]    • Atrial fibrillation with RVR (HCC) [I48.91]    • Type 2 diabetes mellitus with hyperglycemia, without long-term current use of insulin (HCC) [E11.65]    • Hyponatremia [E87.1]    • Hepatocellular carcinoma (HCC) [C22.0]    • Cirrhosis of liver without ascites (HCC) [K74.60]    • Essential hypertension, benign [I10]        Assessment/Plan:  Patient doing well  Dressing CDI  Cleared for DC by ortho pending medicine clearance  POD#7 S/P Arthrotomy left knee with exploration and drainage   Wt bearing status - WBAT LLE  Wound care/Drains - open to air  Future Procedures - none planned   Sutures/Staples out- 14 days post operatively  PT/OT-initiated  Antibiotics: naficillin 2g IV q4  DVT Prophylaxis- TEDS/SCDs/Foot pumps/eliquis  Zamorano-none  Case Coordination for Discharge Planning - Disposition " pending abx needs

## 2022-07-13 NOTE — CARE PLAN
The patient is Stable - Low risk of patient condition declining or worsening    Shift Goals  Clinical Goals: Pain control  Patient Goals: Rest  Family Goals: no family present at this time    Progress made toward(s) clinical / shift goals:  Patient alert oriented x 4 on room air. Patient complaint with scheduled medication, prn medication administered for see mar. Patient call light within reach.      Patient is not progressing towards the following goals:

## 2022-07-13 NOTE — PROGRESS NOTES
[facility] Medicine Daily Progress Note    Date of Service  7/13/2022    Chief Complaint  Nitesh Duron is a 72 y.o. male admitted 7/4/2022 with weakness and fall    Hospital Course    72-year-old male with history of diabetes, dyslipidemia hepatitis C and cirrhosis with atrial fibrillation presented 7/4 with fall and weakness.  Patient states he fell 4 days ago at home and he landed on his left knee.  He noticed swelling on his left knee and severe back pain with some weakness on the left leg, patient has been on the ground most of the time and not able to get up.  Patient lives by himself.  Patient was found on the ground by his girlfriend and she called EMS.  On admission patient was found to have A. fib with RVR.  Dehydration however blood pressure was stable.  Labs did not show leukocytosis however showed hyponatremia, creatinine 1.2 with CPK 2411 and lactic acid 4.4.  IV fluid was started.  Blood culture came back positive with MSSA, cefazolin was initiated and ID was consulted.  Echo is pending.      Patient underwent arthrocentesis by Ortho on his left knee and showed more than 37,000 white blood cell with no red blood cell high suspicious of septic arthritis specially with positive blood culture, ID on board patient was recently on cefazolin transition to nafcillin.  Left knee pain is improved  Blood cultures from 7/10/2022 are negative to date  Sodium 130 improved  Having loose stool  Discussed with ID Dr. Ford is recommending repeat MRI  Discussed with Ortho    Interval Problem Update:  7/13. MRI resulted yesterday with epidural abscess now seen compared to previous MRI.  Updated Dr. Hernández who will evaluate.  Discussed with ID.  Patient himself is reluctant to have surgery. Currently no cauda equina or compressive symptoms.  Reviewed MRI, because he is doing well clinically and he resisted surgery, plan is to continue antibiotic course as before per Dr. Hernández.     I have discussed this patient's  plan of care and discharge plan at IDT rounds today with Case Management, Nursing, Nursing leadership, and other members of the IDT team.    Consultants/Specialty  cardiology, critical care and orthopedics   Spine surgery  ID      Code Status  Full Code    Disposition  Patient is not medically cleared for discharge.   Anticipate discharge to to skilled nursing facility.  I have placed the appropriate orders for post-discharge needs.    Review of Systems  Review of Systems   Constitutional: Negative for chills and fever.   Respiratory: Negative for cough and shortness of breath.    Cardiovascular: Negative for chest pain.   Gastrointestinal: Negative for abdominal pain, nausea and vomiting.   Musculoskeletal: Positive for back pain and joint pain.   All other systems reviewed and are negative.       Physical Exam  Temp:  [36.4 °C (97.5 °F)-37.3 °C (99.1 °F)] 36.4 °C (97.5 °F)  Pulse:  [70-90] 76  Resp:  [16-18] 17  BP: (125-145)/(64-90) 142/90  SpO2:  [95 %-100 %] 98 %    Physical Exam  Vitals and nursing note reviewed.   Constitutional:       Appearance: He is well-developed. He is not diaphoretic.   HENT:      Head: Normocephalic and atraumatic.      Mouth/Throat:      Pharynx: No oropharyngeal exudate.   Eyes:      General: No scleral icterus.        Right eye: No discharge.         Left eye: No discharge.      Conjunctiva/sclera: Conjunctivae normal.      Pupils: Pupils are equal, round, and reactive to light.   Neck:      Vascular: No JVD.      Trachea: No tracheal deviation.   Cardiovascular:      Rate and Rhythm: Normal rate and regular rhythm.      Heart sounds: No murmur heard.    No friction rub. No gallop.   Pulmonary:      Effort: Pulmonary effort is normal. No respiratory distress.      Breath sounds: Normal breath sounds. No stridor. No wheezing.   Chest:      Chest wall: No tenderness.   Abdominal:      General: There is distension.      Palpations: Abdomen is soft.      Tenderness: There is no  abdominal tenderness. There is no rebound.   Musculoskeletal:         General: Swelling and tenderness present.      Cervical back: Neck supple.      Comments: Left knee compressive dressing in place   Skin:     General: Skin is warm and dry.      Nails: There is no clubbing.   Neurological:      Mental Status: He is alert and oriented to person, place, and time.      Cranial Nerves: No cranial nerve deficit.      Motor: Weakness (Both lower extremities left greater than right mainly limited by pain) present. No abnormal muscle tone.   Psychiatric:         Behavior: Behavior normal.      Comments: SLightly anxious         Fluids    Intake/Output Summary (Last 24 hours) at 7/13/2022 0823  Last data filed at 7/12/2022 1448  Gross per 24 hour   Intake --   Output 1050 ml   Net -1050 ml       Laboratory  Recent Labs     07/11/22  0439 07/12/22  0330   WBC 5.4 6.6   RBC 4.59* 4.68*   HEMOGLOBIN 13.0* 13.3*   HEMATOCRIT 38.9* 39.4*   MCV 84.7 84.2   MCH 28.3 28.4   MCHC 33.4* 33.8   RDW 43.5 43.4   PLATELETCT 135* 148*   MPV 9.9 9.2     Recent Labs     07/11/22 0439 07/12/22  0330   SODIUM 128* 130*   POTASSIUM 3.8 4.0   CHLORIDE 94* 96   CO2 25 26   GLUCOSE 141* 119*   BUN 19 19   CREATININE 0.73 0.71   CALCIUM 7.6* 7.9*                   Imaging  MR-LUMBAR SPINE-WITH & W/O   Final Result      1.  Left L4-5 septic facet joint arthritis with associated left lateral epidural phlegmon/abscess and paravertebral cellulitis. There is severe central canal stenosis at this level secondary to the epidural phlegmon/abscess and degenerative disease.    There has been no significant interval change.   2.  Severe central canal stenosis at L3-4 secondary to the degeneration.   3.  Multifocal degenerative disease as described above.      EC-CARLI W/O CONT   Final Result      MR-LUMBAR SPINE-WITH & W/O   Final Result         Multilevel degenerative changes in lumbar spine as described above. There is severe canal stenosis at the L3-4,  L4-5 level with cauda equina compression.      At L2-3 there is moderate canal stenosis with cauda equina compression.      Disc bulge extends into the right extra foraminal zone at the L3-4 and L4-5 level with impingement upon exiting nerves in the extraforaminal zone.      Abnormal edema is identified in the bilateral paraspinal soft tissues at the L3-4, L4-5 and L5-S1 levels with mild enhancement identified in the right-sided paraspinous soft tissues. These findings are concerning for an ongoing infectious inflammatory    process involving the facet joints.      Minimal epidural thickening is identified dorsally in the spinal canal behind L3 and L4. There is also a hypoenhancing area along the left posterolateral spinal canal that impinges upon the thecal sac. This could represent a developing epidural abscess.    Recommend follow-up examination in 2-3 days.      CT-HEAD W/O   Final Result      1. No CT evidence of acute infarct, hemorrhage or mass.   2. Mild global parenchymal atrophy. Chronic small vessel ischemic changes.      EC-ECHOCARDIOGRAM COMPLETE W/ CONT   Final Result      DX-KNEE 3 VIEWS LEFT   Final Result      1.  Severe tricompartmental left knee osteoarthritis      2.  osteoarthritis the proximal tibiofibular articulation      3.  Multiple intra-articular ossific body      4.  Diffuse atherosclerotic plaque      DX-CHEST-PORTABLE (1 VIEW)   Final Result      No acute cardiopulmonary abnormality identified.      CL-CARDIOVERSION    (Results Pending)        Assessment/Plan  * MSSA bacteremia, epidural abscess/septic arthritis of L knee/UTI  Assessment & Plan  Blood culture on admission 7/4 positive for MSSA 2/2  Source likely septic arthritis  Status post left knee I&D on 7/6/2022 Ortho service following    MRI lumbar spine with spinal stenosis and concern for possible developing epidural abscess evaluated by spine surgery with recommendation for medical treatment and outpatient follow-up     Blood  "culture 7/10 negative to date continue to monitor  ID following c continue nafcillin repeat MRI per ID recommendations    Will need PICC line when blood cultures negative for 48 hours\"    ID plans PICC placement and antibiotic course  Follow up with Ortho SPine, currently no indication for surgery and patient reluctant  SNF/rehab planned.    Hypomagnesemia  Assessment & Plan  Replete and monitor    Pyogenic arthritis of left knee joint (HCC)  Assessment & Plan  Synovial fluid culture MSSA  Status post I&D by Ortho on 7/6/2022    Reviewed importance of mobilization  Continue IV nafcillin  Discussed with Ortho no plans for further interventions    Falls  Assessment & Plan  PT OT   Encourage mobilization reviewed with patient    Acute cystitis without hematuria  Assessment & Plan  Urine culture MSSA   On nafcillin    Hyponatremia  Assessment & Plan  Continue free fluid restriction  Sodium 128 overall improved continue to monitor\"    Na 131    Type 2 diabetes mellitus with hyperglycemia, without long-term current use of insulin (HCC)  Assessment & Plan  Last a1c 6.3  Stable on Lantus and sliding scale insulin continue to monitor    Atrial fibrillation with RVR (HCC)  Assessment & Plan  Echo EF 55% no valvular abnormalities  Evaluated by cardiology  status post CARLI directed cardioversion on 7/8/2022  Continue metoprolol and amiodarone  On amiodarone taper  Continue Eliquis\"    Vitals:    07/13/22 0720   BP: (!) 142/90   Pulse: 76   Resp: 17   Temp:    SpO2: 98%     Resume Eliquis as no surgery indicated        Thrombocytopenia (HCC)  Assessment & Plan  Improved platelets 148      Hepatocellular carcinoma (HCC)- (present on admission)  Assessment & Plan  History of hepatocellular carcinoma.   Patient has history of cirrhosis  Following with oncology as an outpatient.    Cirrhosis of liver without ascites (HCC)- (present on admission)  Assessment & Plan  History of hepatitis C virus and received treatment     Outpatient " "follow-up\"    Follow up with GI/hepatology    Essential hypertension, benign- (present on admission)  Assessment & Plan  Improved continue lisinopril and metoprolol and monitor blood pressure\"    Vitals:    07/13/22 0720   BP: (!) 142/90   Pulse: 76   Resp: 17   Temp:    SpO2: 98%     Stable.         VTE prophylaxis: therapeutic anticoagulation with eliquis    I have performed a physical exam and reviewed and updated ROS and Plan today (7/13/2022). In review of yesterday's note (7/12/2022), there are no changes except as documented above.          "

## 2022-07-13 NOTE — PROGRESS NOTES
Infectious Disease Progress Note    Author: Val Weber M.D. Date & Time of service: 2022  8:14 AM    Chief Complaint:  MSSA bacteremia and septic joint     Interval History:    AF, O2 RA, complaining of left knee and leg pain.  In A. fib with RVR.    7/10 patient remains afebrile, white count is down to 4.2, tolerating nafcillin   patient remains afebrile, white count is 5.4, tolerating nafcillin.   patient remains afebrile, white count is 6.6, tolerating nafcillin.  Cultures as below   afebrile, no CBC done today.  Repeat MRI of the lumbar spine shows left L4-5 septic facet joint arthritis with associated left lateral epidural phlegmon/abscess but no significant interval change..  Patient denies any worsening back pain or left knee pain.  Results of repeat MRI discussed with patient    Review of Systems:  Review of Systems   Respiratory: Negative for cough and shortness of breath.    Gastrointestinal: Negative for abdominal pain, constipation, diarrhea, nausea and vomiting.   Musculoskeletal: Positive for back pain, joint pain and myalgias.   Psychiatric/Behavioral: The patient is not nervous/anxious.        Hemodynamics:  Temp (24hrs), Av.7 °C (98.1 °F), Min:36.4 °C (97.5 °F), Max:37.3 °C (99.1 °F)  Temperature: 36.4 °C (97.5 °F)  Pulse  Av  Min: 66  Max: 195   Blood Pressure : (!) 142/90 (RN NOTIFIED)       Physical Exam:  Physical Exam  Vitals and nursing note reviewed.   Constitutional:       General: He is not in acute distress.     Appearance: Normal appearance. He is ill-appearing.   HENT:      Mouth/Throat:      Mouth: Mucous membranes are moist.      Pharynx: No oropharyngeal exudate.   Eyes:      General: No scleral icterus.        Right eye: No discharge.         Left eye: No discharge.      Extraocular Movements: Extraocular movements intact.      Pupils: Pupils are equal, round, and reactive to light.   Cardiovascular:      Rate and Rhythm: Normal rate. Rhythm  irregular.   Pulmonary:      Effort: Pulmonary effort is normal.      Breath sounds: Normal breath sounds.   Abdominal:      General: Abdomen is flat. Bowel sounds are normal.      Palpations: Abdomen is soft.   Musculoskeletal:         General: No tenderness or signs of injury.      Left lower leg: Edema present.      Comments: Left knee surgical site with staples in place, well approximated.  No active drainage nor surrounding erythema   Skin:     General: Skin is warm and dry.      Comments: Multiple tattoos   Neurological:      General: No focal deficit present.      Mental Status: He is alert and oriented to person, place, and time.   Psychiatric:         Mood and Affect: Mood normal.         Behavior: Behavior normal.         Meds:    Current Facility-Administered Medications:   •  nafcillin  •  lisinopril  •  apixaban  •  amiodarone  •  [START ON 7/24/2022] amiodarone  •  insulin GLARGINE  •  metoprolol tartrate  •  labetalol  •  ondansetron  •  ondansetron  •  insulin regular **AND** POC blood glucose manual result **AND** NOTIFY MD and PharmD **AND** Administer 20 grams of glucose (approximately 8 ounces of fruit juice) every 15 minutes PRN FSBG less than 70 mg/dL **AND** dextrose bolus  •  Notify provider if pain remains uncontrolled **AND** Use the Numeric Rating Scale (NRS), Yusuf-Baker Faces (WBF), or FLACC on regular floors and Critical-Care Pain Observation Tool (CPOT) on ICUs/Trauma to assess pain **AND** Pulse Ox **AND** Pharmacy Consult Request **AND** If patient difficult to arouse and/or has respiratory depression (respiratory rate of 10 or less), stop any opiates that are currently infusing and call a Rapid Response.  •  oxyCODONE immediate-release **OR** oxyCODONE immediate-release **OR** morphine injection  •  ezetimibe    Labs:  Recent Labs     07/11/22  0439 07/12/22  0330   WBC 5.4 6.6   RBC 4.59* 4.68*   HEMOGLOBIN 13.0* 13.3*   HEMATOCRIT 38.9* 39.4*   MCV 84.7 84.2   MCH 28.3 28.4   RDW  43.5 43.4   PLATELETCT 135* 148*   MPV 9.9 9.2   NEUTSPOLYS 83.10* 77.60*   LYMPHOCYTES 7.10* 12.00*   MONOCYTES 7.50 8.30   EOSINOPHILS 0.20 0.30   BASOPHILS 0.40 0.30     Recent Labs     07/11/22  0439 07/12/22  0330   SODIUM 128* 130*   POTASSIUM 3.8 4.0   CHLORIDE 94* 96   CO2 25 26   GLUCOSE 141* 119*   BUN 19 19     Recent Labs     07/11/22 0439 07/12/22  0330   ALBUMIN  --  2.5*   TBILIRUBIN  --  0.5   ALKPHOSPHAT  --  98   TOTPROTEIN  --  6.3   ALTSGPT  --  14   ASTSGOT  --  27   CREATININE 0.73 0.71       Imaging:  CT-HEAD W/O    Result Date: 7/5/2022   CT HEAD WITHOUT CONTRAST 7/5/2022 6:07 PM HISTORY/REASON FOR EXAM:  Pain following trauma TECHNIQUE/EXAM DESCRIPTION AND NUMBER OF VIEWS: CT of the head without contrast. The study was performed on a helical multidetector CT scanner. Contiguous 2.5 mm axial sections were obtained from the skull base through the vertex. Up to date radiation dose reduction adjustments have been utilized to meet ALARA standards for radiation dose reduction. COMPARISON:  None available FINDINGS: Lateral ventricles are normal in size and symmetric. Mild global parenchymal atrophy. Chronic small vessel ischemic changes. No significant mass effect or midline shift. Basal cisterns are patent. No evidence for intracranial hemorrhage. Calvaria are intact. Atherosclerosis. Visualized orbits are unremarkable. Visualized mastoid air cells are clear. No significant sinus disease in the visualized paranasal sinuses.     1. No CT evidence of acute infarct, hemorrhage or mass. 2. Mild global parenchymal atrophy. Chronic small vessel ischemic changes.    DX-CHEST-PORTABLE (1 VIEW)    Result Date: 7/4/2022 7/4/2022 2:18 PM HISTORY/REASON FOR EXAM:  Sepsis. TECHNIQUE/EXAM DESCRIPTION AND NUMBER OF VIEWS: Single portable view of the chest. COMPARISON: None FINDINGS: LUNGS: The lungs are clear. HEART and MEDIASTINUM: normal in size. Pleura: There are no pleural effusion or pneumothoraces.  Osseous structures: There are bilateral chronic rotator cuff tear with the humeral head abutting and eroding the acromion. There is bilateral glenohumeral joint osteoarthritis.     No acute cardiopulmonary abnormality identified.    DX-KNEE 3 VIEWS LEFT    Result Date: 7/4/2022 7/4/2022 2:18 PM HISTORY/REASON FOR EXAM:  Atraumatic Pain/Swelling/Deformity. Left knee pain TECHNIQUE/EXAM DESCRIPTION AND NUMBER OF VIEWS:  3 views of the LEFT knee. COMPARISON: None FINDINGS: There is no fracture. Alignment is normal. Severe tricompartmental degenerative changes are present. There is also severe osteoarthritis of the proximal tibiofibular articulation with multiple subchondral cyst present. There is extensive atherosclerotic plaque. There are multiple intra-articular ossific body.     1.  Severe tricompartmental left knee osteoarthritis 2.  osteoarthritis the proximal tibiofibular articulation 3.  Multiple intra-articular ossific body 4.  Diffuse atherosclerotic plaque    MR-LUMBAR SPINE-WITH & W/O    Result Date: 7/7/2022 7/6/2022 3:22 PM HISTORY/REASON FOR EXAM:  Low back pain, infection suspected; Epidural abscess suspected; Epidural abscess TECHNIQUE/EXAM DESCRIPTION: MRI of the lumbar spine without and with contrast. The study was performed on a [employer] Signa 1.5 [place] MRI scanner. T1 sagittal, T2 fast spin-echo sagittal, and T2 axial images were obtained of the lumbar spine. T1 postcontrast fat-suppressed sagittal images were obtained. 18 mL ProHance contrast was administered intravenously. COMPARISON:  7/9/2021 FINDINGS: Conus terminates at the T12-L1 level. Type I marrow changes are noted of the adjacent L2-L3 endplates. There is decreased disc height throughout the lumbar spine with disc desiccation noted throughout. Reversal of lumbar lordosis noted with a mild kyphotic angulation centered at the L1-L2 level. T11-12: Mild facet degeneration and ligamentum flavum hypertrophy. There is no significant canal stenosis  or foraminal narrowing. T12-L1: Bilateral mild facet degeneration. Mild disc bulge. There is no significant canal stenosis or foraminal narrowing. L1-2: Broad-based disc bulge and bilateral facet degeneration and ligament of flavum hypertrophy. There is mild canal narrowing. There is mild left foraminal narrowing. L2-3: Broad-based disc bulge and bilateral advanced facet degeneration and ligamentum flavum hypertrophy. There is mild left foraminal narrowing. There is moderate canal stenosis with thecal sac and cauda equina compression at this level. L3-4: Broad-based disc bulge and bilateral facet degeneration and ligamentum flavum hypertrophy. Disc bulge extends into both neural foramina and extra foraminal zones. This is worse in the right side. There is severe canal stenosis with cauda equina compression. There is impingement upon the exiting right L3 nerve and extra foraminal zone. There is moderate bilateral foraminal narrowing. L4-5: Broad-based disc bulge and bilateral advanced facet degeneration with ligamentum flavum hypertrophy. There is moderate to severe canal stenosis with cauda equina compression. There is moderate bilateral foraminal narrowing. There is extension of the disc bulge into the right extraforaminal zone causing significant impingement upon the exiting right L4 nerve. L5-S1: Broad-based disc bulge and bilateral advanced facet degeneration with intervertebral hypertrophy. There is no significant foraminal narrowing. There is moderate severe right lateral recess narrowing with impingement upon the descending right S1 nerve. STIR images demonstrate edema of the bilateral posterior paraspinous soft tissues at the L3, L4, L5 levels worse on the right side. Postcontrast images through the lumbar spine demonstrate mild enhancement along the right paraspinous soft tissues at the L2-3, L3-4, L4-5 level adjacent to the facet joints. Mild epidural enhancement is identified dorsally at the L3 and L4  segments, there is a small area of hypoenhancement along the posterolateral spinal canal at the L4-5 level on the left side. Small renal cortical cysts noted in the right kidney.     Multilevel degenerative changes in lumbar spine as described above. There is severe canal stenosis at the L3-4, L4-5 level with cauda equina compression. At L2-3 there is moderate canal stenosis with cauda equina compression. Disc bulge extends into the right extra foraminal zone at the L3-4 and L4-5 level with impingement upon exiting nerves in the extraforaminal zone. Abnormal edema is identified in the bilateral paraspinal soft tissues at the L3-4, L4-5 and L5-S1 levels with mild enhancement identified in the right-sided paraspinous soft tissues. These findings are concerning for an ongoing infectious inflammatory process involving the facet joints. Minimal epidural thickening is identified dorsally in the spinal canal behind L3 and L4. There is also a hypoenhancing area along the left posterolateral spinal canal that impinges upon the thecal sac. This could represent a developing epidural abscess. Recommend follow-up examination in 2-3 days.    IR-CONSULT ONLY-OUTPATIENT    Result Date: 6/14/2022  This exam has been resulted under the NOTES tab, and the signed report has been auto faxed to the ordering physician on the date/time of that signature.    EC-ECHOCARDIOGRAM COMPLETE W/ CONT    Result Date: 7/5/2022  Transthoracic Echo Report Echocardiography Laboratory CONCLUSIONS Normal left ventricular size and systolic function. The left ventricular ejection fraction is estimated to be 55%. No evidence of valvular abnormality based on Doppler evaluation. Unable to estimate right ventricular systolic pressure due to an inadequate tricuspid regurgitant jet. CECILLE ARAUJO Exam Date:         07/05/2022                    14:12 Exam Location:     Inpatient Priority:          Routine Ordering Physician:        ELSY REARDON Referring  Physician: Sonographer:               Lily Lynch                            RDCS, RVT Age:    72     Gender:    M MRN:    [id number] :    1950 BSA:    2      Ht (in):    70     Wt (lb):    180 Exam Type:     Complete Indications:     Arrhythmia ICD Codes:       427.9 CPT Codes:       41919 BP:   0      /   88     HR:   113 Technical Quality:       Fair MEASUREMENTS  (Male / Female) Normal Values 2D ECHO LV Diastolic Diameter PLAX        4.5 cm                4.2 - 5.9 / 3.9 - 5.3 cm LV Systolic Diameter PLAX         3.4 cm                2.1 - 4.0 cm IVS Diastolic Thickness           0.97 cm               LVPW Diastolic Thickness          1 cm                  LVOT Diameter                     2 cm                  Estimated LV Ejection Fraction    55 %                  LV Ejection Fraction MOD BP       62.6 %                >= 55  % LV Ejection Fraction MOD 4C       63.6 %                LV Ejection Fraction MOD 2C       56.4 %                DOPPLER AV Peak Velocity                  1.2 m/s               AV Peak Gradient                  5.5 mmHg              AV Mean Gradient                  4 mmHg                LVOT Peak Velocity                0.77 m/s              AV Area Cont Eq vti               2.4 cm2               PV Peak Velocity                  0.81 m/s              PV Peak Gradient                  2.6 mmHg              * Indicates values subject to auto-interpretation LV EF:  55    % FINDINGS Left Ventricle Contrast was used to enhance visualization of the endocardial border. Normal left ventricular chamber size. Normal left ventricular wall thickness. Grossly normal left ventricular systolic function. The left ventricular ejection fraction is visually estimated to be 55%. Diastolic function is difficult to assess with arrhythmia. Right Ventricle Normal right ventricular size. Reduced right ventricular systolic function. Right Atrium Normal right atrial size. Normal inferior vena cava  size and inspiratory collapse. Left Atrium Normal left atrial size. Mitral Valve Structurally normal mitral valve without significant stenosis or regurgitation. Aortic Valve Structurally normal aortic valve without significant stenosis or regurgitation. Tricuspid Valve Structurally normal tricuspid valve without significant stenosis or regurgitation. Unable to estimate right ventricular systolic pressure due to an inadequate tricuspid regurgitant jet. Pulmonic Valve The pulmonic valve is not well visualized. No stenosis or regurgitation seen. Pericardium Fat pat present. Aorta The ascending aorta is mildly dilated by BSA diameter is 3.7 cm. The aortic root diameter is 3.6 cm. Mukul Phillips MD (Electronically Signed) Final Date:     2022                 16:13    EC-CARLI W/O CONT    Result Date: 2022  Transesophageal Echo Report Echocardiography Laboratory CONCLUSIONS No evidence of endocarditis. Normal left atrial appendage. No thrombus detected in the left atrial appendage. CECILLE ARAUJO Exam Date:          2022                     09:47 Exam Location:      Inpatient Priority:            Routine Ordering Physician:        VINH PRATT                             ([id number]) Referring Physician:       SANTA Diop Sonographer:               Lily Lynch                            [facility] Age:    72     Gender:    M MRN:    [id number] :    1950 BSA:           Ht (in):           Wt (lb): Report Type:      Complete Indications:     Arrhythmia, Endocarditis ICD Codes:       427.9  421 CPT Codes:       04435 BP:          /          HR: Technical Quality:       Good MEASUREMENTS  (Male / Female) Normal Values * Indicates values subject to auto-interpretation LV EF:        % Medications Limitations Complications Proc. Components The probe was inserted and manipulated by Dr Phillips. Epiq probe #2  was used for this procedure. 2D, color Doppler, spectral Doppler, and 3D imaging were used as  "part of the evaluation as clinically indicated. FINDINGS Left Ventricle Right Ventricle Right Atrium Left Atrium LA Appendage Normal left atrial appendage. No thrombus detected in the left atrial appendage. IA Septum IV Septum Mitral Valve Aortic Valve Tricuspid Valve Pulmonic Valve Pericardium Aorta Mukul Phillips MD (Electronically Signed) Final Date:     08 July 2022                 13:41      Micro:  Results     Procedure Component Value Units Date/Time    BLOOD CULTURE [[id number]] Collected: 07/07/22 1130    Order Status: Completed Specimen: Blood from Peripheral Updated: 07/12/22 1300     Significant Indicator NEG     Source BLD     Site PERIPHERAL     Culture Result No growth after 5 days of incubation.    Narrative:      Per Hospital Policy: Only change Specimen Src: to \"Line\" if  specified by physician order.  Right Hand    BLOOD CULTURE [[id number]] Collected: 07/10/22 1259    Order Status: Completed Specimen: Blood from Peripheral Updated: 07/11/22 0748     Significant Indicator NEG     Source BLD     Site PERIPHERAL     Culture Result No Growth  Note: Blood cultures are incubated for 5 days and  are monitored continuously.Positive blood cultures  are called to the RN and reported as soon as  they are identified.      Narrative:      Per Hospital Policy: Only change Specimen Src: to \"Line\" if  specified by physician order.  Right Hand    BLOOD CULTURE [[id number]] Collected: 07/10/22 1145    Order Status: Completed Specimen: Blood from Peripheral Updated: 07/11/22 0748     Significant Indicator NEG     Source BLD     Site PERIPHERAL     Culture Result No Growth  Note: Blood cultures are incubated for 5 days and  are monitored continuously.Positive blood cultures  are called to the RN and reported as soon as  they are identified.      Narrative:      Per Hospital Policy: Only change Specimen Src: to \"Line\" if  specified by physician order.  Right Forearm/Arm    CULTURE WOUND W/ GRAM STAIN [[id number]]  " (Abnormal)  (Susceptibility) Collected: 07/06/22 1925    Order Status: Completed Specimen: Wound Updated: 07/10/22 1741     Significant Indicator POS     Source WND     Site Left Knee     Culture Result -     Gram Stain Result Few WBCs.  No organisms seen.       Culture Result Staphylococcus aureus  Rare growth      Narrative:      Surgery - swabs received    Susceptibility     Staphylococcus aureus (1)     Antibiotic Interpretation Microscan   Method Status    Azithromycin Sensitive <=2 mcg/mL DEVEN Final    Clindamycin Sensitive <=0.25 mcg/mL DEVEN Final    Cefazolin Sensitive <=8 mcg/mL DEVEN Final    Cefepime Sensitive <=4 mcg/mL DEVEN Final    Ceftaroline Sensitive <=0.5 mcg/mL DEVEN Final    Daptomycin Sensitive 1 mcg/mL DEVEN Final    Erythromycin Sensitive <=0.25 mcg/mL DEVEN Final    Ampicillin/sulbactam Sensitive <=8/4 mcg/mL DEVEN Final    Vancomycin Sensitive 1 mcg/mL DEVEN Final    Oxacillin Sensitive <=0.25 mcg/mL DEVEN Final    Pip/Tazobactam Sensitive <=8 mcg/mL DEVEN Final    Trimeth/Sulfa Sensitive <=0.5/9.5 mcg/mL DEVEN Final    Tetracycline Sensitive <=4 mcg/mL DEVEN Final                   Anaerobic Culture [[id number]] Collected: 07/06/22 1925    Order Status: Completed Specimen: Wound Updated: 07/10/22 1741     Significant Indicator NEG     Source WND     Site Left Knee     Culture Result No Anaerobes isolated.    Narrative:      Surgery - swabs received    BLOOD CULTURE [[id number]]  (Abnormal) Collected: 07/07/22 0940    Order Status: Completed Specimen: Blood from Peripheral Updated: 07/09/22 0843     Significant Indicator POS     Source BLD     Site PERIPHERAL     Culture Result Growth detected by Bactec instrument.  07/08/2022  18:41      Staphylococcus aureus  Methicillin sensitive by screening method.  See previous culture for sensitivity report.      Narrative:      CALL  Quarles  [facility] tel. [phone],  CALLED  [facility] tel. [phone] 07/08/2022, 19:29, RB PERF. RESULTS CALLED TO:  RN:[id number]  Per Hospital Policy: Only  "change Specimen Src: to \"Line\" if  specified by physician order.  Right Hand    BLOOD CULTURE [[id number]]     Order Status: Canceled Specimen: Blood from Peripheral     BLOOD CULTURE [[id number]]     Order Status: Canceled Specimen: Blood from Peripheral     FLUID CULTURE W/GRAM STAIN [[id number]]  (Abnormal)  (Susceptibility) Collected: 07/04/22 1620    Order Status: Completed Specimen: Synovial from Other Body Fluid Updated: 07/07/22 0838     Significant Indicator POS     Source SYNO     Site left knee     Culture Result -     Gram Stain Result Rare WBCs.  No organisms seen.       Culture Result Staphylococcus aureus  Light growth      Narrative:      CALL  [facility]  ER tel. ,  CALLED  ER tel.  07/05/2022, 13:22, RB PERF. RESULTS CALLED TO:Ernesto [id number] Vlad BRAVO knee fluid.    Susceptibility     Staphylococcus aureus (1)     Antibiotic Interpretation Microscan   Method Status    Azithromycin Sensitive <=2 mcg/mL DEVEN Final    Clindamycin Sensitive <=0.25 mcg/mL DEVEN Final    Cefazolin Sensitive <=8 mcg/mL DEVEN Final    Cefepime Sensitive <=4 mcg/mL DEVEN Final    Ceftaroline Sensitive <=0.5 mcg/mL DEVEN Final    Daptomycin Sensitive 1 mcg/mL DEVEN Final    Erythromycin Sensitive <=0.25 mcg/mL DEVEN Final    Ampicillin/sulbactam Sensitive <=8/4 mcg/mL DEVEN Final    Vancomycin Sensitive 1 mcg/mL DEVEN Final    Oxacillin Sensitive <=0.25 mcg/mL DEVEN Final    Pip/Tazobactam Sensitive <=8 mcg/mL DEVEN Final    Trimeth/Sulfa Sensitive <=0.5/9.5 mcg/mL DEVEN Final    Tetracycline Sensitive <=4 mcg/mL DEVEN Final                   Blood Culture [[id number]]  (Abnormal) Collected: 07/04/22 1438    Order Status: Completed Specimen: Blood from Peripheral Updated: 07/07/22 0825     Significant Indicator POS     Source BLD     Site PERIPHERAL     Culture Result Growth detected by Bactec instrument. 07/05/2022  09:29      Staphylococcus aureus  See previous culture for sensitivity report.      Narrative:      CALL  [facility]les  ER tel. ,  CALLED  ER tel.  " "07/05/2022, 09:32, RB PERF. RESULTS CALLED TO:PRUDENCE Arriaga  [id number]  2 of 2 blood culture x2  Sites order. Per Hospital Policy:  Only change Specimen Src: to \"Line\" if specified by physician  order.  Left Hand    Blood Culture [[id number]]  (Abnormal)  (Susceptibility) Collected: 07/04/22 1425    Order Status: Completed Specimen: Blood from Peripheral Updated: 07/07/22 0825     Significant Indicator POS     Source BLD     Site PERIPHERAL     Culture Result Growth detected by Bactec instrument. 07/05/2022  04:46  Staphylococcus aureus (methicillin sensitive)  detected by PCR.        Staphylococcus aureus    Narrative:      CALL  [facility]  ER tel. ,  CALLED  ER tel.  07/05/2022, 09:31, RB PERF. RESULTS CALLED TO: Corina FOSS  [id number]  1 of 2 for Blood Culture x 2 sites order. Per Hospital  Policy: Only change Specimen Src: to \"Line\" if specified by  physician order.  No site indicated    Susceptibility     Staphylococcus aureus (1)     Antibiotic Interpretation Microscan   Method Status    Azithromycin Sensitive <=2 mcg/mL DEVEN Final    Clindamycin Sensitive <=0.25 mcg/mL DEVEN Final    Cefazolin Sensitive <=8 mcg/mL DEVEN Final    Cefepime Sensitive <=4 mcg/mL DEVEN Final    Ceftaroline Sensitive <=0.5 mcg/mL DEVEN Final    Daptomycin Sensitive 1 mcg/mL DEVEN Final    Erythromycin Sensitive <=0.25 mcg/mL DEVEN Final    Ampicillin/sulbactam Sensitive <=8/4 mcg/mL DEVEN Final    Vancomycin Sensitive 1 mcg/mL DEVEN Final    Oxacillin Sensitive <=0.25 mcg/mL DEVEN Final    Pip/Tazobactam Sensitive <=8 mcg/mL DEVEN Final    Trimeth/Sulfa Sensitive <=0.5/9.5 mcg/mL DEVEN Final    Tetracycline Sensitive <=4 mcg/mL DEVEN Final                   BLOOD CULTURE [[id number]]     Order Status: Canceled Specimen: Blood from Peripheral     GRAM STAIN [[id number]] Collected: 07/06/22 1925    Order Status: Completed Specimen: Wound Updated: 07/07/22 0249     Significant Indicator .     Source WND     Site Left Knee     Gram Stain Result Few WBCs.  No organisms seen.      " Narrative:      Surgery - swabs received           ASSESSMENT/PLAN:   72 y.o. male patient admitted 7/4/2022. Pt has a past medical history of HTN, HLD, A fib, DMT2 and chronic hepatitis C cirrhosis. He presented complaining of weakness and left knee pain. Patient also with chronic back pain and states he had 4 injections in his back approximately 1 week ago. Blood cultures positive for MSSA.  The fluid aspirated which is also positive for MSSA.  Patient went to the OR on 7/6 for washout of septic left knee.     Problem List  MSSA bacteremia  -Blood cultures on 7/4 & 7/7 +MSSA  -TTE on 7/5 with no vegetations, no significant valvular disease, EF 55%  -CARLI on 7/8, no evidence of endocarditis  Native left knee septic arthritis  -Aspirated synovial fluid with 30 7K WBCs, 66% polys, cultures +MSSA   -OR with orthopedics on 7/6 for I&D of the left knee, per op note significant amount of purulent material was encountered and sent for culture  UTI, possibly source of the bacteremia or resulted from   -Urine culture on 7/4 +MSSA   Rhabdomyolysis  -Improving CPK  A. fib  Cirrhosis  Chronic HCV infection, per chart  - NAAT-not detectable  Acute on chronic back pain  Paraspinal infection, left L4-5 septic facet joint arthritis with left lateral epidural abscess  Lumbar spine with enhancement in the right-sided paraspinous soft tissues at L3-S1 concerning for infection potential developing epidural abscess at L3-L4 per MRI  Antibiotic allergies: Ciprofloxacin reported as convulsions  Isolated hepatitis B core antibody positive    Plan   --- Patient with high burden of infection, multiple sites he is yet to clear blood cultures, cefazolin was transitioned to nafcillin on 7/9.  Continue IV nafcillin  -Repeat blood cultures on 7/10-negative to date  -Repeat MRI of the lumbar spine on 7/12-left L4-5 septic facet joint arthritis with associated left lateral epidural phlegmon/abscess with paravertebral cellulitis and severe central  canal stenosis  -Agree with neurosurgery re-evaluation given repeat MRI findings  --- F/up HCV NAAT-not detectable  --- Patient also with isolated hepatitis B core antibody positive with negative surface antigen and surface antibody.  Most likely scenario is natural immunity with antibody levels below level of detection.  A rare possibility is ongoing chronic infection with surface antigen levels below level of detection.  Check HBV DNA PCR-pending  -PICC line ordered today but to be placed tomorrow  -Anticipate a 6-week course of IV antibiotics from date of first negative blood culture.  Stop date 08/21/2022  -Patient will need a repeat MRI of the lumbar spine with contrast prior to completion of IV antibiotic course.  Outpatient MRI ordered today    Dispo: TBD, may need SNF, currently appears to have poor insight  PICC: Eventually yes, will need to ensure clearance of blood cultures first.  Cannot place at the moment.      Plan of care discussed with hospitalist, Dr. Hawkins.    Addendum:  Patient evaluated by neurosurgery and not recommending any surgical invention at this point as patient's symptoms have improved overall  Continue plan per above  SNF placement    Follow-up in the ID clinic prior to completion of IV antibiotic course    ID signing off.  Please reconsult if needed

## 2022-07-13 NOTE — DISCHARGE PLANNING
Case Management Discharge Planning    Admission Date: 7/4/2022  GMLOS: 8  ALOS: 9    6-Clicks ADL Score:    6-Clicks Mobility Score: 7  PT and/or OT Eval ordered: Yes  Post-acute Referrals Ordered: Yes  Post-acute Choice Obtained: Yes  Has referral(s) been sent to post-acute provider:  Yes      Anticipated Discharge Dispo:      DME Needed: No    Action(s) Taken: Patient discussed in IDT rounds. Per MD patient will be seen by surgeon today to determine if surgery is needed. Patient is on IV antibiotics. PT recommended SNF.     RN LUCIO spoke with patient at bedside about SNF choice. Per patient he has not previously been to a SNF and was independent at home. SNF choice received for: [facility], [facility], and [facility]. Choice form faxed to [facility]    Per patient he lives in a single story home. He denies any DME or home oxygen. The patient has the support of his SO. The patient does not have any children.     Escalations Completed: None    Medically Clear: No    Next Steps: Follow up on SNF referrals.     Barriers to Discharge: Medical clearance, SNF acceptance    Is the patient up for discharge tomorrow: No     Care Transition Team Assessment    Information Source  Orientation Level: Oriented X4  Information Given By: Patient  Who is responsible for making decisions for patient? : Patient    Elopement Risk  Legal Hold: No  Ambulatory or Self Mobile in Wheelchair: No-Not an Elopement Risk  Elopement Risk: Not at Risk for Elopement    Interdisciplinary Discharge Planning  Primary Care Physician: Yayo Beckford  Lives with - Patient's Self Care Capacity: Alone and Able to Care For Self  Patient or legal guardian wants to designate a caregiver: No  Support Systems: Spouse / Significant Other  Housing / Facility: 1 Story House  Able to Return to Previous ADL's: Future Time w/Therapy  Prior Services: None  Patient Prefers to be Discharged to:: SNF  Assistance Needed: Unknown at this Time  Durable Medical Equipment: Not  Applicable    Discharge Preparedness  What is your plan after discharge?: Skilled nursing facility  What are your discharge supports?: Other (comment)  Prior Functional Level: Ambulatory, Independent with Activities of Daily Living  Difficulity with ADLs: None  Difficulity with IADLs: None    Functional Assesment  Prior Functional Level: Ambulatory, Independent with Activities of Daily Living    Finances  Financial Barriers to Discharge: No  Prescription Coverage: Yes    Advance Directive  Advance Directive?: None    Domestic Abuse  Have you ever been the victim of abuse or violence?: No  Physical Abuse or Sexual Abuse: No  Verbal Abuse or Emotional Abuse: No  Possible Abuse/Neglect Reported to:: Not Applicable    Psychological Assessment  History of Substance Abuse: None  History of Psychiatric Problems: No    Discharge Risks or Barriers  Discharge risks or barriers?: Complex medical needs, Post-acute placement / services  Patient risk factors: Complex medical needs    Anticipated Discharge Information  Discharge Disposition: D/T to SNF with Medicare cert in anticipation of skilled care (03)

## 2022-07-13 NOTE — THERAPY
"Physical Therapy   Daily Treatment     Patient Name: Nitesh Duron  Age:  72 y.o., Sex:  male  Medical Record #: [id number]  Today's Date: 7/13/2022     Precautions  Precautions: Fall Risk       07/13/22 1531   Other Treatments   Other Treatments Provided Extensive education on importance of mobility, and supine exercises. Pt with poor attention and unclear if receptive to new learning as he continued to repeat \" I move around alot and do all my exercises \". However than pt reporting he hasn't moved with nursing at all. Educated on quad sets, ankle pumps, knee flexion and glute sets while supine. He may require reinforcement. Pt asking therapy to return tomorrow as he reports he may be getting his staples removed and thats when he starts therapy. Despite encouragement pt declining mobility at this time mainly due to pain. Will follow up while in house.       Plan    Continue current treatment plan.    DC Equipment Recommendations: Unable to determine at this time  Discharge Recommendations: Recommend post-acute placement for additional physical therapy services prior to discharge home      "

## 2022-07-14 ENCOUNTER — APPOINTMENT (OUTPATIENT)
Dept: RADIOLOGY | Facility: MEDICAL CENTER | Age: 72
DRG: 485 | End: 2022-07-14
Attending: INTERNAL MEDICINE
Payer: MEDICARE

## 2022-07-14 LAB
ALBUMIN SERPL BCP-MCNC: 2.2 G/DL (ref 3.2–4.9)
BUN SERPL-MCNC: 20 MG/DL (ref 8–22)
CALCIUM SERPL-MCNC: 7.6 MG/DL (ref 8.5–10.5)
CHLORIDE SERPL-SCNC: 100 MMOL/L (ref 96–112)
CO2 SERPL-SCNC: 24 MMOL/L (ref 20–33)
CREAT SERPL-MCNC: 0.61 MG/DL (ref 0.5–1.4)
GFR SERPLBLD CREATININE-BSD FMLA CKD-EPI: 102 ML/MIN/1.73 M 2
GLUCOSE BLD STRIP.AUTO-MCNC: 100 MG/DL (ref 65–99)
GLUCOSE BLD STRIP.AUTO-MCNC: 120 MG/DL (ref 65–99)
GLUCOSE BLD STRIP.AUTO-MCNC: 134 MG/DL (ref 65–99)
GLUCOSE BLD STRIP.AUTO-MCNC: 173 MG/DL (ref 65–99)
GLUCOSE SERPL-MCNC: 123 MG/DL (ref 65–99)
PHOSPHATE SERPL-MCNC: 2.9 MG/DL (ref 2.5–4.5)
POTASSIUM SERPL-SCNC: 4 MMOL/L (ref 3.6–5.5)
SODIUM SERPL-SCNC: 132 MMOL/L (ref 135–145)

## 2022-07-14 PROCEDURE — A9270 NON-COVERED ITEM OR SERVICE: HCPCS | Performed by: NURSE PRACTITIONER

## 2022-07-14 PROCEDURE — 36415 COLL VENOUS BLD VENIPUNCTURE: CPT

## 2022-07-14 PROCEDURE — 700102 HCHG RX REV CODE 250 W/ 637 OVERRIDE(OP): Performed by: INTERNAL MEDICINE

## 2022-07-14 PROCEDURE — 700102 HCHG RX REV CODE 250 W/ 637 OVERRIDE(OP): Performed by: NURSE PRACTITIONER

## 2022-07-14 PROCEDURE — 02HV33Z INSERTION OF INFUSION DEVICE INTO SUPERIOR VENA CAVA, PERCUTANEOUS APPROACH: ICD-10-PCS | Performed by: INTERNAL MEDICINE

## 2022-07-14 PROCEDURE — 80069 RENAL FUNCTION PANEL: CPT

## 2022-07-14 PROCEDURE — 700101 HCHG RX REV CODE 250: Performed by: INTERNAL MEDICINE

## 2022-07-14 PROCEDURE — 700102 HCHG RX REV CODE 250 W/ 637 OVERRIDE(OP): Performed by: HOSPITALIST

## 2022-07-14 PROCEDURE — 770006 HCHG ROOM/CARE - MED/SURG/GYN SEMI*

## 2022-07-14 PROCEDURE — A9270 NON-COVERED ITEM OR SERVICE: HCPCS | Performed by: STUDENT IN AN ORGANIZED HEALTH CARE EDUCATION/TRAINING PROGRAM

## 2022-07-14 PROCEDURE — 700102 HCHG RX REV CODE 250 W/ 637 OVERRIDE(OP): Performed by: STUDENT IN AN ORGANIZED HEALTH CARE EDUCATION/TRAINING PROGRAM

## 2022-07-14 PROCEDURE — 97166 OT EVAL MOD COMPLEX 45 MIN: CPT

## 2022-07-14 PROCEDURE — A9270 NON-COVERED ITEM OR SERVICE: HCPCS | Performed by: INTERNAL MEDICINE

## 2022-07-14 PROCEDURE — 97110 THERAPEUTIC EXERCISES: CPT

## 2022-07-14 PROCEDURE — 99232 SBSQ HOSP IP/OBS MODERATE 35: CPT | Performed by: INTERNAL MEDICINE

## 2022-07-14 PROCEDURE — A9270 NON-COVERED ITEM OR SERVICE: HCPCS | Performed by: HOSPITALIST

## 2022-07-14 PROCEDURE — 700105 HCHG RX REV CODE 258: Performed by: INTERNAL MEDICINE

## 2022-07-14 PROCEDURE — 36573 INSJ PICC RS&I 5 YR+: CPT

## 2022-07-14 PROCEDURE — 82962 GLUCOSE BLOOD TEST: CPT

## 2022-07-14 RX ORDER — SENNA AND DOCUSATE SODIUM 50; 8.6 MG/1; MG/1
1 TABLET, FILM COATED ORAL DAILY
Qty: 30 TABLET | Refills: 11 | Status: SHIPPED
Start: 2022-07-14 | End: 2022-07-14

## 2022-07-14 RX ORDER — AMIODARONE HYDROCHLORIDE 200 MG/1
200 TABLET ORAL DAILY
Qty: 30 TABLET | Status: SHIPPED
Start: 2022-07-24

## 2022-07-14 RX ORDER — ONDANSETRON 4 MG/1
4 TABLET, ORALLY DISINTEGRATING ORAL EVERY 4 HOURS PRN
Qty: 10 TABLET | Refills: 0 | Status: SHIPPED
Start: 2022-07-14

## 2022-07-14 RX ORDER — AMIODARONE HYDROCHLORIDE 400 MG/1
400 TABLET ORAL 2 TIMES DAILY
Qty: 18 TABLET | Refills: 0 | Status: SHIPPED
Start: 2022-07-14 | End: 2022-07-23

## 2022-07-14 RX ORDER — POLYETHYLENE GLYCOL 3350 17 G/17G
17 POWDER, FOR SOLUTION ORAL DAILY
Refills: 0 | Status: SHIPPED
Start: 2022-07-14 | End: 2022-07-14

## 2022-07-14 RX ORDER — METOPROLOL TARTRATE 50 MG/1
50 TABLET, FILM COATED ORAL 2 TIMES DAILY
Qty: 60 TABLET | Status: SHIPPED
Start: 2022-07-14

## 2022-07-14 RX ADMIN — APIXABAN 5 MG: 5 TABLET, FILM COATED ORAL at 04:20

## 2022-07-14 RX ADMIN — NAFCILLIN SODIUM 2 G: 2 INJECTION, POWDER, FOR SOLUTION INTRAMUSCULAR; INTRAVENOUS at 09:52

## 2022-07-14 RX ADMIN — NAFCILLIN SODIUM 2 G: 2 INJECTION, POWDER, FOR SOLUTION INTRAMUSCULAR; INTRAVENOUS at 01:11

## 2022-07-14 RX ADMIN — LISINOPRIL 20 MG: 20 TABLET ORAL at 17:27

## 2022-07-14 RX ADMIN — NAFCILLIN SODIUM 2 G: 2 INJECTION, POWDER, FOR SOLUTION INTRAMUSCULAR; INTRAVENOUS at 17:27

## 2022-07-14 RX ADMIN — AMIODARONE HYDROCHLORIDE 400 MG: 200 TABLET ORAL at 17:27

## 2022-07-14 RX ADMIN — NAFCILLIN SODIUM 2 G: 2 INJECTION, POWDER, FOR SOLUTION INTRAMUSCULAR; INTRAVENOUS at 21:40

## 2022-07-14 RX ADMIN — NAFCILLIN SODIUM 2 G: 2 INJECTION, POWDER, FOR SOLUTION INTRAMUSCULAR; INTRAVENOUS at 04:21

## 2022-07-14 RX ADMIN — OXYCODONE 5 MG: 5 TABLET ORAL at 04:18

## 2022-07-14 RX ADMIN — INSULIN HUMAN 2 UNITS: 100 INJECTION, SOLUTION PARENTERAL at 21:42

## 2022-07-14 RX ADMIN — OXYCODONE 5 MG: 5 TABLET ORAL at 01:10

## 2022-07-14 RX ADMIN — OXYCODONE 2.5 MG: 5 TABLET ORAL at 21:56

## 2022-07-14 RX ADMIN — METOPROLOL TARTRATE 50 MG: 50 TABLET, FILM COATED ORAL at 04:19

## 2022-07-14 RX ADMIN — NAFCILLIN SODIUM 2 G: 2 INJECTION, POWDER, FOR SOLUTION INTRAMUSCULAR; INTRAVENOUS at 13:35

## 2022-07-14 RX ADMIN — INSULIN GLARGINE-YFGN 13 UNITS: 100 INJECTION, SOLUTION SUBCUTANEOUS at 17:34

## 2022-07-14 RX ADMIN — APIXABAN 5 MG: 5 TABLET, FILM COATED ORAL at 17:27

## 2022-07-14 RX ADMIN — OXYCODONE 5 MG: 5 TABLET ORAL at 12:33

## 2022-07-14 RX ADMIN — AMIODARONE HYDROCHLORIDE 400 MG: 200 TABLET ORAL at 04:19

## 2022-07-14 RX ADMIN — OXYCODONE 5 MG: 5 TABLET ORAL at 08:40

## 2022-07-14 RX ADMIN — METOPROLOL TARTRATE 50 MG: 50 TABLET, FILM COATED ORAL at 17:26

## 2022-07-14 RX ADMIN — OXYCODONE 5 MG: 5 TABLET ORAL at 16:31

## 2022-07-14 RX ADMIN — EZETIMIBE 10 MG: 10 TABLET ORAL at 17:27

## 2022-07-14 ASSESSMENT — COGNITIVE AND FUNCTIONAL STATUS - GENERAL
DRESSING REGULAR UPPER BODY CLOTHING: A LITTLE
TOILETING: A LOT
HELP NEEDED FOR BATHING: A LOT
DRESSING REGULAR LOWER BODY CLOTHING: A LOT
DAILY ACTIVITIY SCORE: 17
SUGGESTED CMS G CODE MODIFIER DAILY ACTIVITY: CK

## 2022-07-14 ASSESSMENT — ENCOUNTER SYMPTOMS
COUGH: 0
SHORTNESS OF BREATH: 0
ABDOMINAL PAIN: 0
VOMITING: 0
FEVER: 0
NAUSEA: 0
BACK PAIN: 1
CHILLS: 0

## 2022-07-14 ASSESSMENT — PAIN DESCRIPTION - PAIN TYPE
TYPE: ACUTE PAIN;SURGICAL PAIN
TYPE: ACUTE PAIN

## 2022-07-14 ASSESSMENT — GAIT ASSESSMENTS: GAIT LEVEL OF ASSIST: REFUSED

## 2022-07-14 ASSESSMENT — ACTIVITIES OF DAILY LIVING (ADL): TOILETING: INDEPENDENT

## 2022-07-14 NOTE — CARE PLAN
The patient is Stable - Low risk of patient condition declining or worsening    Shift Goals  Clinical Goals: pain management. safety, increased acitivity, comfort  Patient Goals: Rest  Family Goals: no family present at this time    Progress made toward(s) clinical / shift goals:  patient's pain is alleviated with appropriate ordered PRN pain medicines. Patient were able to have some rest without interruption.    Patient is not progressing towards the following goals:

## 2022-07-14 NOTE — PROGRESS NOTES
Pt is A&O x4, on room air. L knee s/p I&D with staples. Pain medication given per MAR. Continued on blood glucose monitoring and IV abx therapy. Condom cath in place for incontinence. Pillows for positioning and support. All needs attended.

## 2022-07-14 NOTE — CARE PLAN
The patient is Stable - Low risk of patient condition declining or worsening    Shift Goals  Clinical Goals: Pain control  Patient Goals: Rest  Family Goals: no family present at this time    Progress made toward(s) clinical / shift goals:  Pain medication prn, work with PT/OT, elevate leg, monitor pain    Patient is not progressing towards the following goals:      Problem: Fall Risk  Goal: Patient will remain free from falls  Outcome: Progressing     Problem: Bowel Elimination  Goal: Establish and maintain regular bowel function  Outcome: Progressing     Problem: Skin Integrity  Goal: Skin integrity is maintained or improved  Outcome: Progressing

## 2022-07-14 NOTE — PROGRESS NOTES
"   Orthopaedic Progress Note    Interval changes:  Patient doing well  Dressing CDI  Cleared for DC by ortho pending medicine clearance    ROS - Patient denies any new issues.  Pain well controlled.    BP (!) 162/79   Pulse 70   Temp 36.1 °C (97 °F) (Temporal)   Resp 18   Ht 1.778 m (5' 10\")   Wt 87.2 kg (192 lb 3.9 oz)   SpO2 99%       Patient seen and examined  No acute distress  Breathing non labored  RRR  LLE incision open to air, incision CDI without issue, DNVI, moves all toes, cap refill <2 sec.     Recent Labs     07/12/22  0330 07/13/22  0849   WBC 6.6 6.3   RBC 4.68* 4.47*   HEMOGLOBIN 13.3* 12.9*   HEMATOCRIT 39.4* 37.6*   MCV 84.2 84.1   MCH 28.4 28.9   MCHC 33.8 34.3   RDW 43.4 43.6   PLATELETCT 148* 155*   MPV 9.2 9.3       Active Hospital Problems    Diagnosis    • Hypomagnesemia [E83.42]    • Acute cystitis without hematuria [N30.00]    • MSSA bacteremia, epidural abscess/septic arthritis of L knee/UTI [R78.81, B95.61]    • Falls [W19.XXXA]    • Pyogenic arthritis of left knee joint (HCC) [M00.9]    • Thrombocytopenia (HCC) [D69.6]    • Atrial fibrillation with RVR (HCC) [I48.91]    • Type 2 diabetes mellitus with hyperglycemia, without long-term current use of insulin (HCC) [E11.65]    • Hyponatremia [E87.1]    • Hepatocellular carcinoma (HCC) [C22.0]    • Cirrhosis of liver without ascites (HCC) [K74.60]    • Essential hypertension, benign [I10]        Assessment/Plan:  Patient doing well  Dressing CDI  Cleared for DC by ortho pending medicine clearance  POD#8 S/P Arthrotomy left knee with exploration and drainage   Wt bearing status - WBAT LLE  Wound care/Drains - open to air  Future Procedures - none planned   Sutures/Staples out- 14 days post operatively  PT/OT-initiated  Antibiotics: naficillin 2g IV q4  DVT Prophylaxis- TEDS/SCDs/Foot pumps/eliquis  Zamorano-none  Case Coordination for Discharge Planning - Disposition pending abx needs   "

## 2022-07-14 NOTE — PROCEDURES
Vascular Access Team     Date of Insertion: 7/14/22  Arm Circumference: 30  Internal length: 39  External Length: 0  Vein Occupancy %: 31   Reason for PICC: antibiotic therapy   Labs: on 7/13/22 WBC 6.3, , on 7/14/22 BUN 20, Cr 0.61, , INR na     Consents confirmed, vessel patency confirmed with ultrasound. Risks and benefits of procedure explained to patient and education regarding central line associated bloodstream infections provided. Questions answered.      PICC placed in RUE per licensed provider order with ultrasound guidance.  4 Fr, single lumen PICC placed in basilic vein after 1 attempt(s). 2 mL of 1% lidocaine injected intradermally at the insertion site. A 21 gauge microintroducer needle was visualized entering the vein and modified Seldinger technique was used to obtain access to the vein. 39 cm catheter inserted and brisk blood return was observed from each lumen upon aspiration. Line secured at the 0 cm marker. TCS stylet removed and observed to be fully intact. Each lumen flushed using pulsatile method without resistance with 10 mL 0.9% normal saline. PICC line secured with Biopatch and Tegaderm.     PICC tip placement location is confirmed by nurse to be in the Superior Vena Cava (SVC) utilizing 3CG technology. PICC line is appropriate for use at this time. Patient tolerated procedure well, without complications.  Patient condition relayed to primary RN or ordering physician via this post procedure note in the EMR.      Ultrasound images uploaded to PACS and viewable in the EMR - yes  Ultrasound imaged printed and placed in paper chart - no     [employer] PICC ref # [id number], Lot # [id number], Expiration Date 03/31/2023

## 2022-07-14 NOTE — THERAPY
"Occupational Therapy   Initial Evaluation     Patient Name: Nitesh Duron  Age:  72 y.o., Sex:  male  Medical Record #: [id number]  Today's Date: 7/14/2022     Precautions  Precautions: Fall Risk  Comments: Staples in left knee    Assessment    Patient is 72 y.o. male that presented to acute with complaint of weakness and fall on L knee getting out of bed. PMHx significant for DM, DLD, hepC, cirrhosis, Afib. Pt presented to OT pleasant and willing to participate although concerned about pain in his LLE. Pt shares that he previously lived alone and was independent in all ADLs and IADLs. He did add that d/t old shoulder injuries, his ROM in BUE is limited at baseline. Pt was able to complete bed mobility and sitting grooming with SPV-Ervin. Although hesitant pt was able to stand w/ FWW and HHA x2. Pt is limited by decreased activity tolerence, weakness and pain in LLE & back. Recommend patient sit up w/ nursing daily. Will follow in house.      Plan    Recommend Occupational Therapy 3 times per week until therapy goals are met for the following treatments:  Adaptive Equipment, Self Care/Activities of Daily Living, and Therapeutic Activities.    DC Equipment Recommendations: Raised Toilet Seat with Arms, Grab Bar(s) in Tub / Shower, Tub / Shower Seat  Discharge Recommendations: Recommend home health for continued occupational therapy services     Subjective    \"As of this morning I can move my left leg!\"    Objective       07/14/22 0901   Initial Contact Note    Initial Contact Note Order Received and Verified, Occupational Therapy Evaluation in Progress with Full Report to Follow.   Prior Living Situation   Prior Services Home-Independent   Housing / Facility 1 Story House   Steps Into Home 2   Steps In Home 0   Bathroom Set up Walk In Shower   Equipment Owned None   Lives with - Patient's Self Care Capacity Alone and Able to Care For Self   Comments Patient reported girlfriend is healthy and able to assist him " as needed   Prior Level of ADL Function   Self Feeding Independent   Grooming / Hygiene Independent   Bathing Independent   Dressing Independent   Toileting Independent   Prior Level of IADL Function   Medication Management Independent   Laundry Independent   Kitchen Mobility Independent   Finances Independent   Home Management Independent   Shopping Independent   Prior Level Of Mobility Independent Without Device in Community   Driving / Transportation Driving Independent   Occupation (Pre-Hospital Vocational) Retired Due To Age   History of Falls   History of Falls Yes  (Reason for admit)   Precautions   Precautions Fall Risk   Comments Staples in left knee   Vitals   O2 Delivery Device None - Room Air   Pain   Pain Scales 0 to 10 Scale    Pain 0 - 10 Group   Location Leg; Back    Location Orientation Left   Description Aching   Therapist Pain Assessment During Activity   Non Verbal Descriptors   Non Verbal Scale  Grimacing;Calm  (calm but grimaced when in pain)   Cognition    Cognition / Consciousness X   Level of Consciousness Alert   Attention Impaired   Initiation Impaired   Active ROM Upper Body   Active ROM Upper Body  X   Dominant Hand Right   Shoulder Elevation Symmetric  (but limited. pt notes pain)   Rt Shoulder Flexion Degrees 80   Lt Shoulder Flexion Degrees 80   Comments pt notes tore B rotator cuffs years ago at work. limited d/t pain   Balance Assessment   Sitting Balance (Static) Fair   Sitting Balance (Dynamic) Fair -   Standing Balance (Static) Trace +   Standing Balance (Dynamic) Dependent   Weight Shift Sitting Fair   Weight Shift Standing Poor   Comments jannette > 1 min w/ fww and maxAx2   Bed Mobility    Supine to Sit Minimal Assist   Sit to Supine Minimal Assist   Scooting Supervised   Rolling Supervised   ADL Assessment   Eating Independent   Grooming Seated;Supervision   Lower Body Dressing Maximal Assist   How much help from another person does the patient currently need...   Putting on and  taking off regular lower body clothing? 2   Bathing (including washing, rinsing, and drying)? 2   Toileting, which includes using a toilet, bedpan, or urinal? 2   Putting on and taking off regular upper body clothing? 3   Taking care of personal grooming such as brushing teeth? 4   Eating meals? 4   6 Clicks Daily Activity Score 17   Functional Mobility   Sit to Stand Maximal Assist  (x2 & w/ fww)   Bed, Chair, Wheelchair Transfer Unable to Participate   Activity Tolerance   Sitting Edge of Bed 10 min   Standing 1 min   Comments L knee pain   Patient / Family Goals   Patient / Family Goal #1 Be independent   Short Term Goals   Short Term Goal # 1 Pt will LB dress w/ Rashid   Short Term Goal # 2 Pt will demo functional txf w/ SPV   Short Term Goal # 3 Pt will demo standing grooming > 5min w/ SPV   Education Group   Education Provided Role of Occupational Therapist;Activities of Daily Living;Home Safety;Pathology of bedrest   Role of Occupational Therapist Patient Response Patient;Acceptance;Explanation;Verbal Demonstration   Home Safety Patient Response Patient;Acceptance;Explanation;Verbal Demonstration;Action Demonstration   ADL Patient Response Patient;Acceptance;Explanation;Verbal Demonstration;Action Demonstration   Pathology of Bedrest Patient Response Patient;Acceptance;Explanation   Problem List   Problem List Decreased Active Daily Living Skills;Decreased Homemaking Skills;Impaired Postural Control / Balance;Decreased Activity Tolerance   Anticipated Discharge Equipment and Recommendations   DC Equipment Recommendations Raised Toilet Seat with Arms;Grab Bar(s) in Tub / Shower;Tub / Shower Seat   Discharge Recommendations Recommend home health for continued occupational therapy services   Interdisciplinary Plan of Care Collaboration   IDT Collaboration with  Nursing   Patient Position at End of Therapy In Bed;Bed Alarm On;Tray Table within Reach;Phone within Reach   Collaboration Comments Rn updated   Session  Information   Date / Session Number  7/14, 1 (1/3, 7/20)   Priority 3

## 2022-07-14 NOTE — CARE PLAN
The patient is Stable - Low risk of patient condition declining or worsening    Shift Goals  Clinical Goals: pain management  Patient Goals:     Progress made toward(s) clinical / shift goals:  Pt is s/p I&D on L knee, pain is 8/10, prn pain medication given with good effects.    Problem: Pain - Standard  Goal: Alleviation of pain or a reduction in pain to the patient’s comfort goal  Outcome: Progressing

## 2022-07-14 NOTE — PROGRESS NOTES
[facility] Medicine Daily Progress Note    Date of Service  7/14/2022    Chief Complaint  Nitesh Duron is a 72 y.o. male admitted 7/4/2022 with weakness and fall    Hospital Course    72-year-old male with history of diabetes, dyslipidemia hepatitis C and cirrhosis with atrial fibrillation presented 7/4 with fall and weakness.  Patient states he fell 4 days ago at home and he landed on his left knee.  He noticed swelling on his left knee and severe back pain with some weakness on the left leg, patient has been on the ground most of the time and not able to get up.  Patient lives by himself.  Patient was found on the ground by his girlfriend and she called EMS.  On admission patient was found to have A. fib with RVR.  Dehydration however blood pressure was stable.  Labs did not show leukocytosis however showed hyponatremia, creatinine 1.2 with CPK 2411 and lactic acid 4.4.  IV fluid was started.  Blood culture came back positive with MSSA, cefazolin was initiated and ID was consulted.  Echo is pending.      Patient underwent arthrocentesis by Ortho on his left knee and showed more than 37,000 white blood cell with no red blood cell high suspicious of septic arthritis specially with positive blood culture, ID on board patient was recently on cefazolin transition to nafcillin.  Left knee pain is improved  Blood cultures from 7/10/2022 are negative to date  Sodium 130 improved  Having loose stool  Discussed with ID Dr. Ford is recommending repeat MRI  Discussed with Ortho    Interval Problem Update:  7/13. MRI resulted yesterday with epidural abscess now seen compared to previous MRI.  Updated Dr. Hernández who will evaluate.  Discussed with ID.  Patient himself is reluctant to have surgery. Currently no cauda equina or compressive symptoms.  Reviewed MRI, because he is doing well clinically and he resisted surgery, plan is to continue antibiotic course as before per Dr. Hernández.  7/14. SNF recommended. 6 week  course IV antibiotics STOP date 8/21. Outpatient MRI after course with follow-up to [facility].     I have discussed this patient's plan of care and discharge plan at IDT rounds today with Case Management, Nursing, Nursing leadership, and other members of the IDT team.    Consultants/Specialty  cardiology, critical care and orthopedics   Spine surgery  ID      Code Status  Full Code    Disposition  Patient is not medically cleared for discharge.   Anticipate discharge to to skilled nursing facility.  I have placed the appropriate orders for post-discharge needs.    Review of Systems  Review of Systems   Constitutional: Negative for chills and fever.   Respiratory: Negative for cough and shortness of breath.    Cardiovascular: Negative for chest pain.   Gastrointestinal: Negative for abdominal pain, nausea and vomiting.   Musculoskeletal: Positive for back pain and joint pain.   All other systems reviewed and are negative.       Physical Exam  Temp:  [36.7 °C (98 °F)-36.9 °C (98.4 °F)] 36.7 °C (98 °F)  Pulse:  [74-92] 87  Resp:  [16-18] 18  BP: (136-150)/(69-86) 137/86  SpO2:  [96 %-97 %] 97 %    Physical Exam  Vitals and nursing note reviewed.   Constitutional:       Appearance: He is well-developed. He is not diaphoretic.   HENT:      Head: Normocephalic and atraumatic.      Mouth/Throat:      Pharynx: No oropharyngeal exudate.   Eyes:      General: No scleral icterus.        Right eye: No discharge.         Left eye: No discharge.      Conjunctiva/sclera: Conjunctivae normal.      Pupils: Pupils are equal, round, and reactive to light.   Neck:      Vascular: No JVD.      Trachea: No tracheal deviation.   Cardiovascular:      Rate and Rhythm: Normal rate and regular rhythm.      Heart sounds: No murmur heard.    No friction rub. No gallop.   Pulmonary:      Effort: Pulmonary effort is normal. No respiratory distress.      Breath sounds: Normal breath sounds. No stridor. No wheezing.   Chest:       Chest wall: No tenderness.   Abdominal:      General: There is distension.      Palpations: Abdomen is soft.      Tenderness: There is no abdominal tenderness. There is no rebound.   Musculoskeletal:         General: Swelling and tenderness present.      Cervical back: Neck supple.      Comments: Left knee compressive dressing in place   Skin:     General: Skin is warm and dry.      Nails: There is no clubbing.   Neurological:      Mental Status: He is alert and oriented to person, place, and time.      Cranial Nerves: No cranial nerve deficit.      Motor: Weakness (Both lower extremities left greater than right mainly limited by pain) present. No abnormal muscle tone.   Psychiatric:         Behavior: Behavior normal.      Comments: SLightly anxious         Fluids    Intake/Output Summary (Last 24 hours) at 7/14/2022 0800  Last data filed at 7/14/2022 0500  Gross per 24 hour   Intake 954 ml   Output 600 ml   Net 354 ml       Laboratory  Recent Labs     07/12/22  0330 07/13/22  0849   WBC 6.6 6.3   RBC 4.68* 4.47*   HEMOGLOBIN 13.3* 12.9*   HEMATOCRIT 39.4* 37.6*   MCV 84.2 84.1   MCH 28.4 28.9   MCHC 33.8 34.3   RDW 43.4 43.6   PLATELETCT 148* 155*   MPV 9.2 9.3     Recent Labs     07/12/22  0330 07/13/22  0849 07/14/22  0328   SODIUM 130* 131* 132*   POTASSIUM 4.0 3.9 4.0   CHLORIDE 96 99 100   CO2 26 25 24   GLUCOSE 119* 102* 123*   BUN 19 15 20   CREATININE 0.71 0.57 0.61   CALCIUM 7.9* 7.8* 7.6*                   Imaging  MR-LUMBAR SPINE-WITH & W/O   Final Result      1.  Left L4-5 septic facet joint arthritis with associated left lateral epidural phlegmon/abscess and paravertebral cellulitis. There is severe central canal stenosis at this level secondary to the epidural phlegmon/abscess and degenerative disease.    There has been no significant interval change.   2.  Severe central canal stenosis at L3-4 secondary to the degeneration.   3.  Multifocal degenerative disease as described above.      EC-CARLI W/O  CONT   Final Result      MR-LUMBAR SPINE-WITH & W/O   Final Result         Multilevel degenerative changes in lumbar spine as described above. There is severe canal stenosis at the L3-4, L4-5 level with cauda equina compression.      At L2-3 there is moderate canal stenosis with cauda equina compression.      Disc bulge extends into the right extra foraminal zone at the L3-4 and L4-5 level with impingement upon exiting nerves in the extraforaminal zone.      Abnormal edema is identified in the bilateral paraspinal soft tissues at the L3-4, L4-5 and L5-S1 levels with mild enhancement identified in the right-sided paraspinous soft tissues. These findings are concerning for an ongoing infectious inflammatory    process involving the facet joints.      Minimal epidural thickening is identified dorsally in the spinal canal behind L3 and L4. There is also a hypoenhancing area along the left posterolateral spinal canal that impinges upon the thecal sac. This could represent a developing epidural abscess.    Recommend follow-up examination in 2-3 days.      CT-HEAD W/O   Final Result      1. No CT evidence of acute infarct, hemorrhage or mass.   2. Mild global parenchymal atrophy. Chronic small vessel ischemic changes.      EC-ECHOCARDIOGRAM COMPLETE W/ CONT   Final Result      DX-KNEE 3 VIEWS LEFT   Final Result      1.  Severe tricompartmental left knee osteoarthritis      2.  osteoarthritis the proximal tibiofibular articulation      3.  Multiple intra-articular ossific body      4.  Diffuse atherosclerotic plaque      DX-CHEST-PORTABLE (1 VIEW)   Final Result      No acute cardiopulmonary abnormality identified.      CL-CARDIOVERSION    (Results Pending)   IR-PICC LINE PLACEMENT W/ GUIDANCE > AGE 5    (Results Pending)        Assessment/Plan  * MSSA bacteremia, epidural abscess/septic arthritis of L knee/UTI  Assessment & Plan  Blood culture on admission 7/4 positive for MSSA 2/2  Source likely septic arthritis  Status  "post left knee I&D on 7/6/2022 Ortho service following    MRI lumbar spine with spinal stenosis and concern for possible developing epidural abscess evaluated by spine surgery with recommendation for medical treatment and outpatient follow-up     Blood culture 7/10 negative to date continue to monitor  ID following c continue nafcillin repeat MRI per ID recommendations    Will need PICC line when blood cultures negative for 48 hours\"    ID plans PICC placement and antibiotic course  Follow up with Ortho SPine, currently no indication for surgery and patient reluctant  SNF/rehab planned.    Hypomagnesemia  Assessment & Plan  Replete and monitor    Pyogenic arthritis of left knee joint (HCC)  Assessment & Plan  Synovial fluid culture MSSA  Status post I&D by Ortho on 7/6/2022    Reviewed importance of mobilization  Continue IV nafcillin  Discussed with Ortho no plans for further interventions    Falls  Assessment & Plan  PT OT   Encourage mobilization reviewed with patient    Acute cystitis without hematuria  Assessment & Plan  Urine culture MSSA   On nafcillin    Hyponatremia  Assessment & Plan  Continue free fluid restriction  Sodium 128 overall improved continue to monitor\"    Na 131    Type 2 diabetes mellitus with hyperglycemia, without long-term current use of insulin (HCC)  Assessment & Plan  Last a1c 6.3  Stable on Lantus and sliding scale insulin continue to monitor    Atrial fibrillation with RVR (HCC)  Assessment & Plan  Echo EF 55% no valvular abnormalities  Evaluated by cardiology  status post CARLI directed cardioversion on 7/8/2022  Continue metoprolol and amiodarone  On amiodarone taper  Continue Eliquis\"    Vitals:    07/14/22 0800   BP: (!) 162/79   Pulse: 70   Resp: 18   Temp: 36.1 °C (97 °F)   SpO2: 99%     Resume Eliquis as no surgery indicated        Thrombocytopenia (HCC)  Assessment & Plan  Improved platelets 148      Hepatocellular carcinoma (HCC)- (present on admission)  Assessment & " "Plan  History of hepatocellular carcinoma.   Patient has history of cirrhosis  Following with oncology as an outpatient.    Cirrhosis of liver without ascites (HCC)- (present on admission)  Assessment & Plan  History of hepatitis C virus and received treatment     Outpatient follow-up\"    Follow up with GI/hepatology    Essential hypertension, benign- (present on admission)  Assessment & Plan  Improved continue lisinopril and metoprolol and monitor blood pressure\"    Vitals:    07/14/22 0800   BP: (!) 162/79   Pulse: 70   Resp: 18   Temp: 36.1 °C (97 °F)   SpO2: 99%     Monitor, if persiustently high may increase lisinopril or add amlodipine         VTE prophylaxis: therapeutic anticoagulation with eliquis    I have performed a physical exam and reviewed and updated ROS and Plan today (7/14/2022). In review of yesterday's note (7/13/2022), there are no changes except as documented above.          "

## 2022-07-14 NOTE — THERAPY
"Physical Therapy   Daily Treatment     Patient Name: Nitesh Duron  Age:  72 y.o., Sex:  male  Medical Record #: [id number]  Today's Date: 7/14/2022     Precautions  Precautions: Fall Risk;Weight Bearing As Tolerated Left Lower Extremity  Comments: Staples in left knee    Assessment    Pt with unrealistic expectations of d/c planning. Pt stating he will be just fine going home, however, when asked to perform functional mobility tasks that he will need to complete alone daily pt refusing stating \"I can't\". Pt continually stating he will just need a HH nurse or PT/OT to assist him and he will be fine. Edu pt on role of HH RN/PT/OT and frequency of visits, limited learning evidence as pt using circular reasoning. Pt refused all OOB mobility, required Ervin to supine scoot higher in bed 2/2 volition. Pt agreeable to supine L knee therex with max encouragement. Extensive edu provided regarding importance of continued LLE ROM and therex, pt fearful of damaging L knee anatomical structures. Recommend placement. Will continue to follow.     Plan    Continue current treatment plan. May reduce pt frequency if continues to refuse OOB mobility    DC Equipment Recommendations: Unable to determine at this time  Discharge Recommendations: Recommend post-acute placement for additional physical therapy services prior to discharge home      Subjective    \"I was told not to straighten my knee because it will break the ligaments.\"      Objective     07/14/22 1609   Vitals   O2 Delivery Device None - Room Air   Pain 0 - 10 Group   Therapist Pain Assessment During Activity;Post Activity;Nurse Notified  (c/o L knee pain with therex, agreeble however)   Cognition    Cognition / Consciousness X   Level of Consciousness Alert   Attention Impaired   Initiation Impaired   Comments Limited learning noted. Circular reasoning. Pt perseverating on L knee pain and tearing the ligagments with extension despite edu   Passive ROM Lower Body "   Passive ROM Lower Body X   Comments L knee flexion 60 deg, extension lacking 5 degrees- empty end feel   Active ROM Lower Body    Active ROM Lower Body  X   Comments same as above   Strength Lower Body   Lower Body Strength  X   Comments LLE grossly 3/5. RLE not tested   Supine Lower Body Exercise   Supine Lower Body Exercises Yes   Straight Leg Raises Left;1 set of 10   Heel Slide Left;1 set of 10   Quadriceps Isometrics Left;1 set of 10   Comments Pt declining further sets 2/2 pain   Balance   Comments refused OOB mobility   Gait Analysis   Gait Level Of Assist Refused   Weight Bearing Status WBAT LLE   Bed Mobility    Supine to Sit Refused   Sit to Supine Refused   Scooting Minimal Assist   Rolling Refused   Skilled Intervention Verbal Cuing;Tactile Cuing;Sequencing;Facilitation;Compensatory Strategies   Comments Pt refusing EOB mobility with multiple reasons, circular reasoning despite edu. Pt requesting therapist to boost him up in bed, provided compensatory strategies to complete independently, ultimately requiring Ervin   Functional Mobility   Sit to Stand Refused   Bed, Chair, Wheelchair Transfer Refused   Activity Tolerance   Comments limited 2/2 L knee pain and volition   Patient / Family Goals    Patient / Family Goal #1 return to PLOF   Goal #1 Outcome Goal not met   Short Term Goals    Short Term Goal # 1 Patient will move supine<>sitting EOB without bed features with min A within 6tx in order to get in/out of bed   Goal Outcome # 1 goal not met   Short Term Goal # 2 Patient will move sitting<>standing with min A within 6tx in order to initiate transfers and gait   Goal Outcome # 2 Goal not met   Short Term Goal # 3 Patient will ambulate 50ft with min A within 6tx in order to access environment   Goal Outcome # 3 Goal not met   Short Term Goal # 4 Patient will ascend/descend 2 steps with min A within 6tx in order to prepare for entering home at [place]   Goal Outcome # 4 Goal not met   Education Group    Education Provided Role of Physical Therapist;Exercises - Supine   Role of Physical Therapist Patient Response Patient;Acceptance;Explanation;Verbal Demonstration;Reinforcement Needed   Exercises - Supine Patient Response Patient;Acceptance;Explanation;Demonstration;Verbal Demonstration;Action Demonstration;Reinforcement Needed   Anticipated Discharge Equipment and Recommendations   DC Equipment Recommendations Unable to determine at this time   Discharge Recommendations Recommend post-acute placement for additional physical therapy services prior to discharge home   Interdisciplinary Plan of Care Collaboration   IDT Collaboration with  Nursing   Patient Position at End of Therapy In Bed;Bed Alarm On;Tray Table within Reach;Phone within Reach;Call Light within Reach   Collaboration Comments RN updated   Session Information   Date / Session Number  7/14- 3 (3/4, 7/170

## 2022-07-15 ENCOUNTER — APPOINTMENT (OUTPATIENT)
Dept: RADIOLOGY | Facility: MEDICAL CENTER | Age: 72
End: 2022-07-15
Attending: RADIOLOGY
Payer: MEDICARE

## 2022-07-15 LAB
ALBUMIN SERPL BCP-MCNC: 2.4 G/DL (ref 3.2–4.9)
BACTERIA BLD CULT: NORMAL
BACTERIA BLD CULT: NORMAL
BUN SERPL-MCNC: 17 MG/DL (ref 8–22)
CALCIUM SERPL-MCNC: 7.8 MG/DL (ref 8.5–10.5)
CHLORIDE SERPL-SCNC: 99 MMOL/L (ref 96–112)
CO2 SERPL-SCNC: 26 MMOL/L (ref 20–33)
CREAT SERPL-MCNC: 0.7 MG/DL (ref 0.5–1.4)
ERYTHROCYTE [DISTWIDTH] IN BLOOD BY AUTOMATED COUNT: 43.6 FL (ref 35.9–50)
GFR SERPLBLD CREATININE-BSD FMLA CKD-EPI: 98 ML/MIN/1.73 M 2
GLUCOSE BLD STRIP.AUTO-MCNC: 118 MG/DL (ref 65–99)
GLUCOSE BLD STRIP.AUTO-MCNC: 148 MG/DL (ref 65–99)
GLUCOSE BLD STRIP.AUTO-MCNC: 93 MG/DL (ref 65–99)
GLUCOSE SERPL-MCNC: 106 MG/DL (ref 65–99)
HBV DNA SERPL NAA+PROBE-ACNC: NOT DETECTED IU/ML
HBV DNA SERPL NAA+PROBE-LOG IU: NOT DETECTED LOG IU/ML
HBV DNA SERPL QL NAA+PROBE: NOT DETECTED
HCT VFR BLD AUTO: 34.3 % (ref 42–52)
HGB BLD-MCNC: 11.6 G/DL (ref 14–18)
MCH RBC QN AUTO: 28.4 PG (ref 27–33)
MCHC RBC AUTO-ENTMCNC: 33.8 G/DL (ref 33.7–35.3)
MCV RBC AUTO: 84.1 FL (ref 81.4–97.8)
PHOSPHATE SERPL-MCNC: 2.8 MG/DL (ref 2.5–4.5)
PLATELET # BLD AUTO: 159 K/UL (ref 164–446)
PMV BLD AUTO: 8.6 FL (ref 9–12.9)
POTASSIUM SERPL-SCNC: 4 MMOL/L (ref 3.6–5.5)
RBC # BLD AUTO: 4.08 M/UL (ref 4.7–6.1)
SIGNIFICANT IND 70042: NORMAL
SIGNIFICANT IND 70042: NORMAL
SITE SITE: NORMAL
SITE SITE: NORMAL
SODIUM SERPL-SCNC: 132 MMOL/L (ref 135–145)
SOURCE SOURCE: NORMAL
SOURCE SOURCE: NORMAL
WBC # BLD AUTO: 6.3 K/UL (ref 4.8–10.8)

## 2022-07-15 PROCEDURE — 85027 COMPLETE CBC AUTOMATED: CPT

## 2022-07-15 PROCEDURE — A9270 NON-COVERED ITEM OR SERVICE: HCPCS | Performed by: HOSPITALIST

## 2022-07-15 PROCEDURE — 770006 HCHG ROOM/CARE - MED/SURG/GYN SEMI*

## 2022-07-15 PROCEDURE — 700102 HCHG RX REV CODE 250 W/ 637 OVERRIDE(OP): Performed by: INTERNAL MEDICINE

## 2022-07-15 PROCEDURE — 700101 HCHG RX REV CODE 250: Performed by: INTERNAL MEDICINE

## 2022-07-15 PROCEDURE — A9270 NON-COVERED ITEM OR SERVICE: HCPCS | Performed by: INTERNAL MEDICINE

## 2022-07-15 PROCEDURE — 82962 GLUCOSE BLOOD TEST: CPT

## 2022-07-15 PROCEDURE — 36415 COLL VENOUS BLD VENIPUNCTURE: CPT

## 2022-07-15 PROCEDURE — 80069 RENAL FUNCTION PANEL: CPT

## 2022-07-15 PROCEDURE — 700105 HCHG RX REV CODE 258: Performed by: INTERNAL MEDICINE

## 2022-07-15 PROCEDURE — A9270 NON-COVERED ITEM OR SERVICE: HCPCS | Performed by: STUDENT IN AN ORGANIZED HEALTH CARE EDUCATION/TRAINING PROGRAM

## 2022-07-15 PROCEDURE — 700102 HCHG RX REV CODE 250 W/ 637 OVERRIDE(OP): Performed by: STUDENT IN AN ORGANIZED HEALTH CARE EDUCATION/TRAINING PROGRAM

## 2022-07-15 PROCEDURE — A9270 NON-COVERED ITEM OR SERVICE: HCPCS | Performed by: NURSE PRACTITIONER

## 2022-07-15 PROCEDURE — 700102 HCHG RX REV CODE 250 W/ 637 OVERRIDE(OP): Performed by: HOSPITALIST

## 2022-07-15 PROCEDURE — 700102 HCHG RX REV CODE 250 W/ 637 OVERRIDE(OP): Performed by: NURSE PRACTITIONER

## 2022-07-15 PROCEDURE — 99232 SBSQ HOSP IP/OBS MODERATE 35: CPT | Performed by: INTERNAL MEDICINE

## 2022-07-15 RX ORDER — LISINOPRIL 20 MG/1
40 TABLET ORAL EVERY EVENING
Status: DISCONTINUED | OUTPATIENT
Start: 2022-07-15 | End: 2022-07-26 | Stop reason: HOSPADM

## 2022-07-15 RX ADMIN — APIXABAN 5 MG: 5 TABLET, FILM COATED ORAL at 05:06

## 2022-07-15 RX ADMIN — OXYCODONE 5 MG: 5 TABLET ORAL at 03:04

## 2022-07-15 RX ADMIN — METOPROLOL TARTRATE 50 MG: 50 TABLET, FILM COATED ORAL at 17:45

## 2022-07-15 RX ADMIN — OXYCODONE 5 MG: 5 TABLET ORAL at 17:45

## 2022-07-15 RX ADMIN — OXYCODONE 5 MG: 5 TABLET ORAL at 21:57

## 2022-07-15 RX ADMIN — AMIODARONE HYDROCHLORIDE 400 MG: 200 TABLET ORAL at 17:45

## 2022-07-15 RX ADMIN — INSULIN HUMAN 3 UNITS: 100 INJECTION, SOLUTION PARENTERAL at 21:53

## 2022-07-15 RX ADMIN — NAFCILLIN SODIUM 2 G: 2 INJECTION, POWDER, FOR SOLUTION INTRAMUSCULAR; INTRAVENOUS at 02:29

## 2022-07-15 RX ADMIN — AMIODARONE HYDROCHLORIDE 400 MG: 200 TABLET ORAL at 05:08

## 2022-07-15 RX ADMIN — NAFCILLIN SODIUM 2 G: 2 INJECTION, POWDER, FOR SOLUTION INTRAMUSCULAR; INTRAVENOUS at 17:44

## 2022-07-15 RX ADMIN — NAFCILLIN SODIUM 2 G: 2 INJECTION, POWDER, FOR SOLUTION INTRAMUSCULAR; INTRAVENOUS at 21:42

## 2022-07-15 RX ADMIN — NAFCILLIN SODIUM 2 G: 2 INJECTION, POWDER, FOR SOLUTION INTRAMUSCULAR; INTRAVENOUS at 11:27

## 2022-07-15 RX ADMIN — NAFCILLIN SODIUM 2 G: 2 INJECTION, POWDER, FOR SOLUTION INTRAMUSCULAR; INTRAVENOUS at 13:33

## 2022-07-15 RX ADMIN — METOPROLOL TARTRATE 50 MG: 50 TABLET, FILM COATED ORAL at 05:06

## 2022-07-15 RX ADMIN — OXYCODONE 5 MG: 5 TABLET ORAL at 11:27

## 2022-07-15 RX ADMIN — NAFCILLIN SODIUM 2 G: 2 INJECTION, POWDER, FOR SOLUTION INTRAMUSCULAR; INTRAVENOUS at 05:05

## 2022-07-15 RX ADMIN — LISINOPRIL 40 MG: 20 TABLET ORAL at 17:45

## 2022-07-15 RX ADMIN — INSULIN GLARGINE-YFGN 13 UNITS: 100 INJECTION, SOLUTION SUBCUTANEOUS at 17:53

## 2022-07-15 RX ADMIN — EZETIMIBE 10 MG: 10 TABLET ORAL at 17:44

## 2022-07-15 RX ADMIN — APIXABAN 5 MG: 5 TABLET, FILM COATED ORAL at 17:45

## 2022-07-15 ASSESSMENT — ENCOUNTER SYMPTOMS
NAUSEA: 0
COUGH: 0
CHILLS: 0
FEVER: 0
VOMITING: 0
BACK PAIN: 1
ABDOMINAL PAIN: 0
SHORTNESS OF BREATH: 0

## 2022-07-15 ASSESSMENT — PAIN DESCRIPTION - PAIN TYPE
TYPE: ACUTE PAIN;SURGICAL PAIN
TYPE: ACUTE PAIN;SURGICAL PAIN

## 2022-07-15 NOTE — PROGRESS NOTES
"   Orthopaedic Progress Note    Interval changes:  Patient doing well  Dressing CDI  Cleared for DC by ortho pending medicine clearance    ROS - Patient denies any new issues.  Pain well controlled.    BP (!) 154/85   Pulse 71   Temp 36.7 °C (98 °F) (Temporal)   Resp 17   Ht 1.778 m (5' 10\")   Wt 87.2 kg (192 lb 3.9 oz)   SpO2 97%       Patient seen and examined  No acute distress  Breathing non labored  RRR  LLE incision open to air, incision CDI without issue, DNVI, moves all toes, cap refill <2 sec.     Recent Labs     07/13/22  0849 07/15/22  0220   WBC 6.3 6.3   RBC 4.47* 4.08*   HEMOGLOBIN 12.9* 11.6*   HEMATOCRIT 37.6* 34.3*   MCV 84.1 84.1   MCH 28.9 28.4   MCHC 34.3 33.8   RDW 43.6 43.6   PLATELETCT 155* 159*   MPV 9.3 8.6*       Active Hospital Problems    Diagnosis    • Hypomagnesemia [E83.42]    • Acute cystitis without hematuria [N30.00]    • MSSA bacteremia, epidural abscess/septic arthritis of L knee/UTI [R78.81, B95.61]    • Falls [W19.XXXA]    • Pyogenic arthritis of left knee joint (HCC) [M00.9]    • Thrombocytopenia (HCC) [D69.6]    • Atrial fibrillation with RVR (HCC) [I48.91]    • Type 2 diabetes mellitus with hyperglycemia, without long-term current use of insulin (HCC) [E11.65]    • Hyponatremia [E87.1]    • Hepatocellular carcinoma (HCC) [C22.0]    • Cirrhosis of liver without ascites (HCC) [K74.60]    • Essential hypertension, benign [I10]        Assessment/Plan:  Patient doing well  Dressing CDI  Cleared for DC by ortho pending medicine clearance  POD#9 S/P Arthrotomy left knee with exploration and drainage   Wt bearing status - WBAT LLE  Wound care/Drains - open to air  Future Procedures - none planned   Sutures/Staples out- 14 days post operatively  PT/OT-initiated  Antibiotics: naficillin 2g IV q4  DVT Prophylaxis- TEDS/SCDs/Foot pumps/eliquis  Zamorano-none  Case Coordination for Discharge Planning - Disposition pending abx needs   "

## 2022-07-15 NOTE — PROGRESS NOTES
[facility] Medicine Daily Progress Note    Date of Service  7/15/2022    Chief Complaint  Nitesh Duron is a 72 y.o. male admitted 7/4/2022 with weakness and fall    Hospital Course    72-year-old male with history of diabetes, dyslipidemia hepatitis C and cirrhosis with atrial fibrillation presented 7/4 with fall and weakness.  Patient states he fell 4 days ago at home and he landed on his left knee.  He noticed swelling on his left knee and severe back pain with some weakness on the left leg, patient has been on the ground most of the time and not able to get up.  Patient lives by himself.  Patient was found on the ground by his girlfriend and she called EMS.  On admission patient was found to have A. fib with RVR.  Dehydration however blood pressure was stable.  Labs did not show leukocytosis however showed hyponatremia, creatinine 1.2 with CPK 2411 and lactic acid 4.4.  IV fluid was started.  Blood culture came back positive with MSSA, cefazolin was initiated and ID was consulted.  Echo is pending.      Patient underwent arthrocentesis by Ortho on his left knee and showed more than 37,000 white blood cell with no red blood cell high suspicious of septic arthritis specially with positive blood culture, ID on board patient was recently on cefazolin transition to nafcillin.  Left knee pain is improved  Blood cultures from 7/10/2022 are negative to date  Sodium 130 improved  Having loose stool  Discussed with ID Dr. Ford is recommending repeat MRI  Discussed with Ortho    Interval Problem Update:  7/13. MRI resulted yesterday with epidural abscess now seen compared to previous MRI.  Updated Dr. Hernández who will evaluate.  Discussed with ID.  Patient himself is reluctant to have surgery. Currently no cauda equina or compressive symptoms.  Reviewed MRI, because he is doing well clinically and he resisted surgery, plan is to continue antibiotic course as before per Dr. Hernández.  7/14. SNF recommended. 6 week  course IV antibiotics STOP date 8/21. Outpatient MRI after course with follow-up to [facility].   7/15. Awaiting SNF. No compression or cauda equina symptoms at this time.    I have discussed this patient's plan of care and discharge plan at IDT rounds today with Case Management, Nursing, Nursing leadership, and other members of the IDT team.    Consultants/Specialty  cardiology, critical care and orthopedics   Spine surgery  ID      Code Status  Full Code    Disposition  Patient is medically cleared for discharge.   Anticipate discharge to to skilled nursing facility.  I have placed the appropriate orders for post-discharge needs.    Review of Systems  Review of Systems   Constitutional: Negative for chills and fever.   Respiratory: Negative for cough and shortness of breath.    Cardiovascular: Negative for chest pain.   Gastrointestinal: Negative for abdominal pain, nausea and vomiting.   Musculoskeletal: Positive for back pain and joint pain.   All other systems reviewed and are negative.       Physical Exam  Temp:  [36.1 °C (96.9 °F)-36.7 °C (98 °F)] 36.7 °C (98 °F)  Pulse:  [71-87] 71  Resp:  [17-20] 17  BP: (143-159)/(72-90) 154/85  SpO2:  [97 %-98 %] 97 %    Physical Exam  Vitals and nursing note reviewed.   Constitutional:       Appearance: He is well-developed. He is not diaphoretic.   HENT:      Head: Normocephalic and atraumatic.      Mouth/Throat:      Pharynx: No oropharyngeal exudate.   Eyes:      General: No scleral icterus.        Right eye: No discharge.         Left eye: No discharge.      Conjunctiva/sclera: Conjunctivae normal.      Pupils: Pupils are equal, round, and reactive to light.   Neck:      Vascular: No JVD.      Trachea: No tracheal deviation.   Cardiovascular:      Rate and Rhythm: Normal rate and regular rhythm.      Heart sounds: No murmur heard.    No friction rub. No gallop.   Pulmonary:      Effort: Pulmonary effort is normal. No respiratory distress.      Breath  sounds: Normal breath sounds. No stridor. No wheezing.   Chest:      Chest wall: No tenderness.   Abdominal:      General: There is distension.      Palpations: Abdomen is soft.      Tenderness: There is no abdominal tenderness. There is no rebound.   Musculoskeletal:         General: Swelling and tenderness present.      Cervical back: Neck supple.      Comments: Left knee compressive dressing in place   Skin:     General: Skin is warm and dry.      Nails: There is no clubbing.   Neurological:      Mental Status: He is alert and oriented to person, place, and time.      Cranial Nerves: No cranial nerve deficit.      Motor: Weakness (Both lower extremities left greater than right mainly limited by pain) present. No abnormal muscle tone.   Psychiatric:         Behavior: Behavior normal.      Comments: Cooperative         Fluids    Intake/Output Summary (Last 24 hours) at 7/15/2022 0815  Last data filed at 7/15/2022 0442  Gross per 24 hour   Intake 856 ml   Output 2600 ml   Net -1744 ml       Laboratory  Recent Labs     07/13/22  0849 07/15/22  0220   WBC 6.3 6.3   RBC 4.47* 4.08*   HEMOGLOBIN 12.9* 11.6*   HEMATOCRIT 37.6* 34.3*   MCV 84.1 84.1   MCH 28.9 28.4   MCHC 34.3 33.8   RDW 43.6 43.6   PLATELETCT 155* 159*   MPV 9.3 8.6*     Recent Labs     07/13/22  0849 07/14/22  0328 07/15/22  0220   SODIUM 131* 132* 132*   POTASSIUM 3.9 4.0 4.0   CHLORIDE 99 100 99   CO2 25 24 26   GLUCOSE 102* 123* 106*   BUN 15 20 17   CREATININE 0.57 0.61 0.70   CALCIUM 7.8* 7.6* 7.8*                   Imaging  IR-PICC LINE PLACEMENT W/ GUIDANCE > AGE 5   Final Result                  Ultrasound-guided PICC placement performed by qualified nursing staff as    above.          MR-LUMBAR SPINE-WITH & W/O   Final Result      1.  Left L4-5 septic facet joint arthritis with associated left lateral epidural phlegmon/abscess and paravertebral cellulitis. There is severe central canal stenosis at this level secondary to the epidural  phlegmon/abscess and degenerative disease.    There has been no significant interval change.   2.  Severe central canal stenosis at L3-4 secondary to the degeneration.   3.  Multifocal degenerative disease as described above.      EC-CARLI W/O CONT   Final Result      MR-LUMBAR SPINE-WITH & W/O   Final Result         Multilevel degenerative changes in lumbar spine as described above. There is severe canal stenosis at the L3-4, L4-5 level with cauda equina compression.      At L2-3 there is moderate canal stenosis with cauda equina compression.      Disc bulge extends into the right extra foraminal zone at the L3-4 and L4-5 level with impingement upon exiting nerves in the extraforaminal zone.      Abnormal edema is identified in the bilateral paraspinal soft tissues at the L3-4, L4-5 and L5-S1 levels with mild enhancement identified in the right-sided paraspinous soft tissues. These findings are concerning for an ongoing infectious inflammatory    process involving the facet joints.      Minimal epidural thickening is identified dorsally in the spinal canal behind L3 and L4. There is also a hypoenhancing area along the left posterolateral spinal canal that impinges upon the thecal sac. This could represent a developing epidural abscess.    Recommend follow-up examination in 2-3 days.      CT-HEAD W/O   Final Result      1. No CT evidence of acute infarct, hemorrhage or mass.   2. Mild global parenchymal atrophy. Chronic small vessel ischemic changes.      EC-ECHOCARDIOGRAM COMPLETE W/ CONT   Final Result      DX-KNEE 3 VIEWS LEFT   Final Result      1.  Severe tricompartmental left knee osteoarthritis      2.  osteoarthritis the proximal tibiofibular articulation      3.  Multiple intra-articular ossific body      4.  Diffuse atherosclerotic plaque      DX-CHEST-PORTABLE (1 VIEW)   Final Result      No acute cardiopulmonary abnormality identified.      CL-CARDIOVERSION    (Results Pending)        Assessment/Plan  *  "MSSA bacteremia, epidural abscess/septic arthritis of L knee/UTI  Assessment & Plan  Blood culture on admission 7/4 positive for MSSA 2/2  Source likely septic arthritis  Status post left knee I&D on 7/6/2022 Ortho service following    MRI lumbar spine with spinal stenosis and concern for possible developing epidural abscess evaluated by spine surgery with recommendation for medical treatment and outpatient follow-up     Blood culture 7/10 negative to date continue to monitor  ID following c continue nafcillin repeat MRI per ID recommendations    Will need PICC line when blood cultures negative for 48 hours\"    ID plans PICC placement and antibiotic course  Follow up with Ortho SPine, currently no indication for surgery and patient reluctant  SNF/rehab planned.    Hypomagnesemia  Assessment & Plan  Replete and monitor    Pyogenic arthritis of left knee joint (HCC)  Assessment & Plan  Synovial fluid culture MSSA  Status post I&D by Ortho on 7/6/2022    Reviewed importance of mobilization  Continue IV nafcillin  Discussed with Ortho no plans for further interventions    Falls  Assessment & Plan  PT OT   Encourage mobilization reviewed with patient    Acute cystitis without hematuria  Assessment & Plan  Urine culture MSSA   On nafcillin    Hyponatremia  Assessment & Plan  Continue free fluid restriction  Sodium 128 overall improved continue to monitor\"    Na 131    Type 2 diabetes mellitus with hyperglycemia, without long-term current use of insulin ([facility])  Assessment & Plan  Last a1c 6.3  Stable on Lantus and sliding scale insulin continue to monitor    Atrial fibrillation with RVR ([facility])  Assessment & Plan  Echo EF 55% no valvular abnormalities  Evaluated by cardiology  status post CARLI directed cardioversion on 7/8/2022  Continue metoprolol and amiodarone  On amiodarone taper  Continue Eliquis\"    Vitals:    07/15/22 0700   BP: (!) 154/85   Pulse: 71   Resp: 17   Temp: 36.7 °C (98 °F)   SpO2: 97%     Resume Eliquis as " "no surgery indicated        Thrombocytopenia (HCC)  Assessment & Plan  Improved platelets 148      Hepatocellular carcinoma (HCC)- (present on admission)  Assessment & Plan  History of hepatocellular carcinoma.   Patient has history of cirrhosis  Following with oncology as an outpatient.    Cirrhosis of liver without ascites (HCC)- (present on admission)  Assessment & Plan  History of hepatitis C virus and received treatment     Outpatient follow-up\"    Follow up with GI/hepatology    Essential hypertension, benign- (present on admission)  Assessment & Plan  Improved continue lisinopril and metoprolol and monitor blood pressure\"    Vitals:    07/15/22 0700   BP: (!) 154/85   Pulse: 71   Resp: 17   Temp: 36.7 °C (98 °F)   SpO2: 97%     Increased lisinopril         VTE prophylaxis: therapeutic anticoagulation with eliquis    I have performed a physical exam and reviewed and updated ROS and Plan today (7/15/2022). In review of yesterday's note (7/14/2022), there are no changes except as documented above.          "

## 2022-07-15 NOTE — CARE PLAN
The patient is Stable - Low risk of patient condition declining or worsening    Shift Goals  Clinical Goals: pain control  Patient Goals: rest  Family Goals: no family present at this time    Progress made toward(s) clinical / shift goals:     Pt pain managed by rest, comfort, and pain meds. Pt was able to rest during the night.    Problem: Pain - Standard  Goal: Alleviation of pain or a reduction in pain to the patient’s comfort goal  Outcome: Progressing     Problem: Fall Risk  Goal: Patient will remain free from falls  Outcome: Progressing     Problem: Bowel Elimination  Goal: Establish and maintain regular bowel function  Outcome: Progressing     Problem: Knowledge Deficit - Standard  Goal: Patient and family/care givers will demonstrate understanding of plan of care, disease process/condition, diagnostic tests and medications  Outcome: Progressing       Patient is not progressing towards the following goals:

## 2022-07-15 NOTE — DISCHARGE PLANNING
Agency[facility] Name: [facility]  Outcome: DPA left a voicemail regarding acceptance status and bed availability. DPA requesting a call back.     1142:  Agency/Facility Name: [facility]  Spoke To: Luis  Outcome: DPA was notified SNF does not have any beds available until Monday, 7/18. DPA to follow up then.     RN CM notified.     1313:  Agency[facility]ility Name: [facility]  Spoke To: Alice  Outcome: DPA was notified referral is still under review. DPA to follow up at a later time.     1330:  Agency[facility]Facility Name: [facility]  Spoke To: Alice  Outcome: DPA was notified referral is still under review, Alice to look over chart and follow up with this DPA regarding Pt preference since SNF would have to apply for auth. DPA to check with RN CM and follow up.     RN CM notified.

## 2022-07-15 NOTE — PROGRESS NOTES
Received report from dayshift RN, assumed care of patient at change of shift. Patient is A&Ox4, on RA, not in respiratory distress. Complains of surg pain in L knee. Assessment completed and POC discussed. Bed in lowest position, bed alarm on and call light within reach.

## 2022-07-16 LAB
GLUCOSE BLD STRIP.AUTO-MCNC: 108 MG/DL (ref 65–99)
GLUCOSE BLD STRIP.AUTO-MCNC: 144 MG/DL (ref 65–99)
GLUCOSE BLD STRIP.AUTO-MCNC: 171 MG/DL (ref 65–99)
GLUCOSE BLD STRIP.AUTO-MCNC: 186 MG/DL (ref 65–99)
GLUCOSE BLD STRIP.AUTO-MCNC: 234 MG/DL (ref 65–99)

## 2022-07-16 PROCEDURE — A9270 NON-COVERED ITEM OR SERVICE: HCPCS | Performed by: NURSE PRACTITIONER

## 2022-07-16 PROCEDURE — 700102 HCHG RX REV CODE 250 W/ 637 OVERRIDE(OP): Performed by: STUDENT IN AN ORGANIZED HEALTH CARE EDUCATION/TRAINING PROGRAM

## 2022-07-16 PROCEDURE — A9270 NON-COVERED ITEM OR SERVICE: HCPCS | Performed by: STUDENT IN AN ORGANIZED HEALTH CARE EDUCATION/TRAINING PROGRAM

## 2022-07-16 PROCEDURE — 700102 HCHG RX REV CODE 250 W/ 637 OVERRIDE(OP): Performed by: INTERNAL MEDICINE

## 2022-07-16 PROCEDURE — 700102 HCHG RX REV CODE 250 W/ 637 OVERRIDE(OP): Performed by: NURSE PRACTITIONER

## 2022-07-16 PROCEDURE — A9270 NON-COVERED ITEM OR SERVICE: HCPCS | Performed by: HOSPITALIST

## 2022-07-16 PROCEDURE — 82962 GLUCOSE BLOOD TEST: CPT

## 2022-07-16 PROCEDURE — A9270 NON-COVERED ITEM OR SERVICE: HCPCS | Performed by: INTERNAL MEDICINE

## 2022-07-16 PROCEDURE — 700105 HCHG RX REV CODE 258: Performed by: INTERNAL MEDICINE

## 2022-07-16 PROCEDURE — 700101 HCHG RX REV CODE 250: Performed by: INTERNAL MEDICINE

## 2022-07-16 PROCEDURE — 770006 HCHG ROOM/CARE - MED/SURG/GYN SEMI*

## 2022-07-16 PROCEDURE — 700102 HCHG RX REV CODE 250 W/ 637 OVERRIDE(OP): Performed by: HOSPITALIST

## 2022-07-16 PROCEDURE — 99231 SBSQ HOSP IP/OBS SF/LOW 25: CPT | Performed by: INTERNAL MEDICINE

## 2022-07-16 RX ADMIN — NAFCILLIN SODIUM 2 G: 2 INJECTION, POWDER, FOR SOLUTION INTRAMUSCULAR; INTRAVENOUS at 12:04

## 2022-07-16 RX ADMIN — NAFCILLIN SODIUM 2 G: 2 INJECTION, POWDER, FOR SOLUTION INTRAMUSCULAR; INTRAVENOUS at 17:43

## 2022-07-16 RX ADMIN — OXYCODONE 5 MG: 5 TABLET ORAL at 16:49

## 2022-07-16 RX ADMIN — INSULIN HUMAN 2 UNITS: 100 INJECTION, SOLUTION PARENTERAL at 17:52

## 2022-07-16 RX ADMIN — NAFCILLIN SODIUM 2 G: 2 INJECTION, POWDER, FOR SOLUTION INTRAMUSCULAR; INTRAVENOUS at 14:54

## 2022-07-16 RX ADMIN — APIXABAN 5 MG: 5 TABLET, FILM COATED ORAL at 05:04

## 2022-07-16 RX ADMIN — OXYCODONE 5 MG: 5 TABLET ORAL at 02:24

## 2022-07-16 RX ADMIN — INSULIN HUMAN 2 UNITS: 100 INJECTION, SOLUTION PARENTERAL at 21:05

## 2022-07-16 RX ADMIN — OXYCODONE 5 MG: 5 TABLET ORAL at 08:17

## 2022-07-16 RX ADMIN — LISINOPRIL 40 MG: 20 TABLET ORAL at 16:40

## 2022-07-16 RX ADMIN — NAFCILLIN SODIUM 2 G: 2 INJECTION, POWDER, FOR SOLUTION INTRAMUSCULAR; INTRAVENOUS at 05:04

## 2022-07-16 RX ADMIN — AMIODARONE HYDROCHLORIDE 400 MG: 200 TABLET ORAL at 16:39

## 2022-07-16 RX ADMIN — NAFCILLIN SODIUM 2 G: 2 INJECTION, POWDER, FOR SOLUTION INTRAMUSCULAR; INTRAVENOUS at 02:22

## 2022-07-16 RX ADMIN — AMIODARONE HYDROCHLORIDE 400 MG: 200 TABLET ORAL at 05:04

## 2022-07-16 RX ADMIN — EZETIMIBE 10 MG: 10 TABLET ORAL at 16:40

## 2022-07-16 RX ADMIN — OXYCODONE 5 MG: 5 TABLET ORAL at 14:11

## 2022-07-16 RX ADMIN — NAFCILLIN SODIUM 2 G: 2 INJECTION, POWDER, FOR SOLUTION INTRAMUSCULAR; INTRAVENOUS at 21:22

## 2022-07-16 RX ADMIN — INSULIN GLARGINE-YFGN 13 UNITS: 100 INJECTION, SOLUTION SUBCUTANEOUS at 17:51

## 2022-07-16 RX ADMIN — OXYCODONE 5 MG: 5 TABLET ORAL at 21:07

## 2022-07-16 RX ADMIN — APIXABAN 5 MG: 5 TABLET, FILM COATED ORAL at 16:39

## 2022-07-16 RX ADMIN — METOPROLOL TARTRATE 50 MG: 50 TABLET, FILM COATED ORAL at 05:04

## 2022-07-16 RX ADMIN — METOPROLOL TARTRATE 50 MG: 50 TABLET, FILM COATED ORAL at 16:39

## 2022-07-16 ASSESSMENT — ENCOUNTER SYMPTOMS
VOMITING: 0
NAUSEA: 0
COUGH: 0
BACK PAIN: 1
ABDOMINAL PAIN: 0
CHILLS: 0
SHORTNESS OF BREATH: 0
FEVER: 0

## 2022-07-16 ASSESSMENT — PAIN DESCRIPTION - PAIN TYPE
TYPE: SURGICAL PAIN
TYPE: ACUTE PAIN;SURGICAL PAIN
TYPE: ACUTE PAIN;SURGICAL PAIN

## 2022-07-16 ASSESSMENT — FIBROSIS 4 INDEX: FIB4 SCORE: 3.27

## 2022-07-16 NOTE — PROGRESS NOTES
Received report from dayshift RN, assumed care of patient at change of shift. Patient is A&Ox4, on RA, not in respiratory distress. Complains of surg pain in L knee, 7/10. Assessment completed and POC discussed. Bed in lowest position, bed alarm on and call light within reach.

## 2022-07-16 NOTE — CARE PLAN
The patient is Stable - Low risk of patient condition declining or worsening    Shift Goals  Clinical Goals: Pain management  Patient Goals: rest  Family Goals: no family present at this time    Progress made toward(s) clinical / shift goals:  Pain medication prn, monitor pain, repositioning, PT/OT, ice to affected area    Patient is not progressing towards the following goals:      Problem: Pain - Standard  Goal: Alleviation of pain or a reduction in pain to the patient’s comfort goal  Outcome: Progressing     Problem: Fall Risk  Goal: Patient will remain free from falls  Outcome: Progressing     Problem: Skin Integrity  Goal: Skin integrity is maintained or improved  Outcome: Progressing

## 2022-07-16 NOTE — PROGRESS NOTES
[facility] Medicine Daily Progress Note    Date of Service  7/16/2022    Chief Complaint  Nitesh Duron is a 72 y.o. male admitted 7/4/2022 with weakness and fall    Hospital Course    72-year-old male with history of diabetes, dyslipidemia hepatitis C and cirrhosis with atrial fibrillation presented 7/4 with fall and weakness.  Patient states he fell 4 days ago at home and he landed on his left knee.  He noticed swelling on his left knee and severe back pain with some weakness on the left leg, patient has been on the ground most of the time and not able to get up.  Patient lives by himself.  Patient was found on the ground by his girlfriend and she called EMS.  On admission patient was found to have A. fib with RVR.  Dehydration however blood pressure was stable.  Labs did not show leukocytosis however showed hyponatremia, creatinine 1.2 with CPK 2411 and lactic acid 4.4.  IV fluid was started.  Blood culture came back positive with MSSA, cefazolin was initiated and ID was consulted.  Echo is pending.      Patient underwent arthrocentesis by Ortho on his left knee and showed more than 37,000 white blood cell with no red blood cell high suspicious of septic arthritis specially with positive blood culture, ID on board patient was recently on cefazolin transition to nafcillin.  Left knee pain is improved  Blood cultures from 7/10/2022 are negative to date  Sodium 130 improved  Having loose stool  Discussed with ID Dr. Ford is recommending repeat MRI  Discussed with Ortho    Interval Problem Update:  7/13. MRI resulted yesterday with epidural abscess now seen compared to previous MRI.  Updated Dr. Hernández who will evaluate.  Discussed with ID.  Patient himself is reluctant to have surgery. Currently no cauda equina or compressive symptoms.  Reviewed MRI, because he is doing well clinically and he resisted surgery, plan is to continue antibiotic course as before per Dr. Hernández.  7/14. SNF recommended. 6 week  course IV antibiotics STOP date 8/21. Outpatient MRI after course with follow-up to [facility].   7/15. Awaiting SNF. No compression or cauda equina symptoms at this time.  7/16. Awaiting SNF    I have discussed this patient's plan of care and discharge plan at IDT rounds today with Case Management, Nursing, Nursing leadership, and other members of the IDT team.    Consultants/Specialty  cardiology, critical care and orthopedics   Spine surgery  ID      Code Status  Full Code    Disposition  Patient is medically cleared for discharge.   Anticipate discharge to to skilled nursing facility.  I have placed the appropriate orders for post-discharge needs.    Review of Systems  Review of Systems   Constitutional: Negative for chills and fever.   Respiratory: Negative for cough and shortness of breath.    Cardiovascular: Negative for chest pain.   Gastrointestinal: Negative for abdominal pain, nausea and vomiting.   Musculoskeletal: Positive for back pain and joint pain.   All other systems reviewed and are negative.       Physical Exam  Temp:  [36.6 °C (97.8 °F)-36.7 °C (98 °F)] 36.7 °C (98 °F)  Pulse:  [75-84] 75  Resp:  [17-18] 17  BP: (135-142)/(78-84) 135/84  SpO2:  [93 %-98 %] 98 %    Physical Exam  Vitals and nursing note reviewed.   Constitutional:       Appearance: He is well-developed. He is not diaphoretic.   HENT:      Head: Normocephalic and atraumatic.      Mouth/Throat:      Pharynx: No oropharyngeal exudate.   Eyes:      General: No scleral icterus.        Right eye: No discharge.         Left eye: No discharge.      Conjunctiva/sclera: Conjunctivae normal.      Pupils: Pupils are equal, round, and reactive to light.   Neck:      Vascular: No JVD.      Trachea: No tracheal deviation.   Cardiovascular:      Rate and Rhythm: Normal rate and regular rhythm.      Heart sounds: No murmur heard.    No friction rub. No gallop.   Pulmonary:      Effort: Pulmonary effort is normal. No respiratory  distress.      Breath sounds: Normal breath sounds. No stridor. No wheezing.   Chest:      Chest wall: No tenderness.   Abdominal:      General: There is distension.      Palpations: Abdomen is soft.      Tenderness: There is no abdominal tenderness. There is no rebound.   Musculoskeletal:         General: Swelling and tenderness present.      Cervical back: Neck supple.      Comments: Left knee compressive dressing in place   Skin:     General: Skin is warm and dry.      Nails: There is no clubbing.   Neurological:      Mental Status: He is alert and oriented to person, place, and time.      Cranial Nerves: No cranial nerve deficit.      Motor: Weakness (Both lower extremities left greater than right mainly limited by pain) present. No abnormal muscle tone.   Psychiatric:         Behavior: Behavior normal.      Comments: Calm         Fluids    Intake/Output Summary (Last 24 hours) at 7/16/2022 0820  Last data filed at 7/16/2022 0600  Gross per 24 hour   Intake 840 ml   Output 2200 ml   Net -1360 ml       Laboratory  Recent Labs     07/13/22  0849 07/15/22  0220   WBC 6.3 6.3   RBC 4.47* 4.08*   HEMOGLOBIN 12.9* 11.6*   HEMATOCRIT 37.6* 34.3*   MCV 84.1 84.1   MCH 28.9 28.4   MCHC 34.3 33.8   RDW 43.6 43.6   PLATELETCT 155* 159*   MPV 9.3 8.6*     Recent Labs     07/13/22  0849 07/14/22  0328 07/15/22  0220   SODIUM 131* 132* 132*   POTASSIUM 3.9 4.0 4.0   CHLORIDE 99 100 99   CO2 25 24 26   GLUCOSE 102* 123* 106*   BUN 15 20 17   CREATININE 0.57 0.61 0.70   CALCIUM 7.8* 7.6* 7.8*                   Imaging  IR-PICC LINE PLACEMENT W/ GUIDANCE > AGE 5   Final Result                  Ultrasound-guided PICC placement performed by qualified nursing staff as    above.          MR-LUMBAR SPINE-WITH & W/O   Final Result      1.  Left L4-5 septic facet joint arthritis with associated left lateral epidural phlegmon/abscess and paravertebral cellulitis. There is severe central canal stenosis at this level secondary to the  epidural phlegmon/abscess and degenerative disease.    There has been no significant interval change.   2.  Severe central canal stenosis at L3-4 secondary to the degeneration.   3.  Multifocal degenerative disease as described above.      EC-CARLI W/O CONT   Final Result      MR-LUMBAR SPINE-WITH & W/O   Final Result         Multilevel degenerative changes in lumbar spine as described above. There is severe canal stenosis at the L3-4, L4-5 level with cauda equina compression.      At L2-3 there is moderate canal stenosis with cauda equina compression.      Disc bulge extends into the right extra foraminal zone at the L3-4 and L4-5 level with impingement upon exiting nerves in the extraforaminal zone.      Abnormal edema is identified in the bilateral paraspinal soft tissues at the L3-4, L4-5 and L5-S1 levels with mild enhancement identified in the right-sided paraspinous soft tissues. These findings are concerning for an ongoing infectious inflammatory    process involving the facet joints.      Minimal epidural thickening is identified dorsally in the spinal canal behind L3 and L4. There is also a hypoenhancing area along the left posterolateral spinal canal that impinges upon the thecal sac. This could represent a developing epidural abscess.    Recommend follow-up examination in 2-3 days.      CT-HEAD W/O   Final Result      1. No CT evidence of acute infarct, hemorrhage or mass.   2. Mild global parenchymal atrophy. Chronic small vessel ischemic changes.      EC-ECHOCARDIOGRAM COMPLETE W/ CONT   Final Result      DX-KNEE 3 VIEWS LEFT   Final Result      1.  Severe tricompartmental left knee osteoarthritis      2.  osteoarthritis the proximal tibiofibular articulation      3.  Multiple intra-articular ossific body      4.  Diffuse atherosclerotic plaque      DX-CHEST-PORTABLE (1 VIEW)   Final Result      No acute cardiopulmonary abnormality identified.      CL-CARDIOVERSION    (Results Pending)     "    Assessment/Plan  * MSSA bacteremia, epidural abscess/septic arthritis of L knee/UTI  Assessment & Plan  Blood culture on admission 7/4 positive for MSSA 2/2  Source likely septic arthritis  Status post left knee I&D on 7/6/2022 Ortho service following    MRI lumbar spine with spinal stenosis and concern for possible developing epidural abscess evaluated by spine surgery with recommendation for medical treatment and outpatient follow-up     Blood culture 7/10 negative to date continue to monitor  ID following c continue nafcillin repeat MRI per ID recommendations    Will need PICC line when blood cultures negative for 48 hours\"    ID plans PICC placement and antibiotic course  Follow up with Ortho SPine, currently no indication for surgery and patient reluctant  7/16. SNF/rehab planned.    Hypomagnesemia  Assessment & Plan  Replete and monitor    Pyogenic arthritis of left knee joint (HCC)  Assessment & Plan  Synovial fluid culture MSSA  Status post I&D by Ortho on 7/6/2022    Reviewed importance of mobilization  Continue IV nafcillin  Discussed with Ortho no plans for further interventions    Falls  Assessment & Plan  PT OT   Encourage mobilization reviewed with patient    Acute cystitis without hematuria  Assessment & Plan  Urine culture MSSA   On nafcillin    Hyponatremia  Assessment & Plan  Continue free fluid restriction  Sodium 128 overall improved continue to monitor\"    Na 131    Type 2 diabetes mellitus with hyperglycemia, without long-term current use of insulin ([facility])  Assessment & Plan  Last a1c 6.3  Stable on Lantus and sliding scale insulin continue to monitor    Atrial fibrillation with RVR ([facility])  Assessment & Plan  Echo EF 55% no valvular abnormalities  Evaluated by cardiology  status post CARLI directed cardioversion on 7/8/2022  Continue metoprolol and amiodarone  On amiodarone taper  Continue Eliquis\"    Vitals:    07/16/22 0817   BP:    Pulse:    Resp: 17   Temp:    SpO2:      Resume Eliquis as " "no surgery indicated        Thrombocytopenia (HCC)  Assessment & Plan  Improved platelets 148      Hepatocellular carcinoma (HCC)- (present on admission)  Assessment & Plan  History of hepatocellular carcinoma.   Patient has history of cirrhosis  Following with oncology as an outpatient.    Cirrhosis of liver without ascites (HCC)- (present on admission)  Assessment & Plan  History of hepatitis C virus and received treatment     Outpatient follow-up\"    Follow up with GI/hepatology    Essential hypertension, benign- (present on admission)  Assessment & Plan  Improved continue lisinopril and metoprolol and monitor blood pressure\"    Vitals:    07/16/22 0817   BP:    Pulse:    Resp: 17   Temp:    SpO2:     Stable BPs  Increased lisinopril         VTE prophylaxis: therapeutic anticoagulation with eliquis    I have performed a physical exam and reviewed and updated ROS and Plan today (7/16/2022). In review of yesterday's note (7/15/2022), there are no changes except as documented above.          "

## 2022-07-16 NOTE — CARE PLAN
The patient is Stable - Low risk of patient condition declining or worsening    Shift Goals  Clinical Goals: Pain management  Patient Goals: rest, pain management  Family Goals: no family present at this time    Progress made toward(s) clinical / shift goals:  Pain medication prn, monitor pain, repositioning, OOB mobility, abdominal binder    Patient is not progressing towards the following goals:      Problem: Pain - Standard  Goal: Alleviation of pain or a reduction in pain to the patient’s comfort goal  Outcome: Progressing     Problem: Urinary Elimination  Goal: Establish and maintain regular urinary output  Outcome: Progressing     Problem: Skin Integrity  Goal: Skin integrity is maintained or improved  Outcome: Progressing

## 2022-07-16 NOTE — CARE PLAN
The patient is Stable - Low risk of patient condition declining or worsening    Shift Goals  Clinical Goals: pain management  Patient Goals: rest, pain management  Family Goals: no family present at this time    Progress made toward(s) clinical / shift goals:      Pt remained free from injury, pain managed by giving medication, was repositioned, and comfort provided. Pt was able to rest during the shift.      Problem: Pain - Standard  Goal: Alleviation of pain or a reduction in pain to the patient’s comfort goal  Outcome: Progressing     Problem: Knowledge Deficit - Standard  Goal: Patient and family/care givers will demonstrate understanding of plan of care, disease process/condition, diagnostic tests and medications  Outcome: Progressing     Problem: Fall Risk  Goal: Patient will remain free from falls  Outcome: Progressing     Problem: Skin Integrity  Goal: Skin integrity is maintained or improved  Outcome: Progressing

## 2022-07-17 LAB
ALBUMIN SERPL BCP-MCNC: 2.6 G/DL (ref 3.2–4.9)
BUN SERPL-MCNC: 15 MG/DL (ref 8–22)
CALCIUM SERPL-MCNC: 8 MG/DL (ref 8.5–10.5)
CHLORIDE SERPL-SCNC: 100 MMOL/L (ref 96–112)
CO2 SERPL-SCNC: 25 MMOL/L (ref 20–33)
CREAT SERPL-MCNC: 0.67 MG/DL (ref 0.5–1.4)
ERYTHROCYTE [DISTWIDTH] IN BLOOD BY AUTOMATED COUNT: 44.3 FL (ref 35.9–50)
GFR SERPLBLD CREATININE-BSD FMLA CKD-EPI: 99 ML/MIN/1.73 M 2
GLUCOSE BLD STRIP.AUTO-MCNC: 110 MG/DL (ref 65–99)
GLUCOSE BLD STRIP.AUTO-MCNC: 162 MG/DL (ref 65–99)
GLUCOSE BLD STRIP.AUTO-MCNC: 181 MG/DL (ref 65–99)
GLUCOSE BLD STRIP.AUTO-MCNC: 224 MG/DL (ref 65–99)
GLUCOSE SERPL-MCNC: 119 MG/DL (ref 65–99)
HCT VFR BLD AUTO: 33.9 % (ref 42–52)
HGB BLD-MCNC: 11.6 G/DL (ref 14–18)
MCH RBC QN AUTO: 28.9 PG (ref 27–33)
MCHC RBC AUTO-ENTMCNC: 34.2 G/DL (ref 33.7–35.3)
MCV RBC AUTO: 84.3 FL (ref 81.4–97.8)
PHOSPHATE SERPL-MCNC: 2.8 MG/DL (ref 2.5–4.5)
PLATELET # BLD AUTO: 174 K/UL (ref 164–446)
PMV BLD AUTO: 8.4 FL (ref 9–12.9)
POTASSIUM SERPL-SCNC: 4.1 MMOL/L (ref 3.6–5.5)
RBC # BLD AUTO: 4.02 M/UL (ref 4.7–6.1)
SODIUM SERPL-SCNC: 131 MMOL/L (ref 135–145)
WBC # BLD AUTO: 7.1 K/UL (ref 4.8–10.8)

## 2022-07-17 PROCEDURE — 700102 HCHG RX REV CODE 250 W/ 637 OVERRIDE(OP): Performed by: STUDENT IN AN ORGANIZED HEALTH CARE EDUCATION/TRAINING PROGRAM

## 2022-07-17 PROCEDURE — A9270 NON-COVERED ITEM OR SERVICE: HCPCS | Performed by: STUDENT IN AN ORGANIZED HEALTH CARE EDUCATION/TRAINING PROGRAM

## 2022-07-17 PROCEDURE — 700102 HCHG RX REV CODE 250 W/ 637 OVERRIDE(OP): Performed by: INTERNAL MEDICINE

## 2022-07-17 PROCEDURE — 700102 HCHG RX REV CODE 250 W/ 637 OVERRIDE(OP): Performed by: HOSPITALIST

## 2022-07-17 PROCEDURE — 700101 HCHG RX REV CODE 250: Performed by: INTERNAL MEDICINE

## 2022-07-17 PROCEDURE — A9270 NON-COVERED ITEM OR SERVICE: HCPCS | Performed by: INTERNAL MEDICINE

## 2022-07-17 PROCEDURE — 85027 COMPLETE CBC AUTOMATED: CPT

## 2022-07-17 PROCEDURE — A9270 NON-COVERED ITEM OR SERVICE: HCPCS | Performed by: HOSPITALIST

## 2022-07-17 PROCEDURE — 99231 SBSQ HOSP IP/OBS SF/LOW 25: CPT | Performed by: INTERNAL MEDICINE

## 2022-07-17 PROCEDURE — 700105 HCHG RX REV CODE 258: Performed by: INTERNAL MEDICINE

## 2022-07-17 PROCEDURE — A9270 NON-COVERED ITEM OR SERVICE: HCPCS | Performed by: NURSE PRACTITIONER

## 2022-07-17 PROCEDURE — 770006 HCHG ROOM/CARE - MED/SURG/GYN SEMI*

## 2022-07-17 PROCEDURE — 82962 GLUCOSE BLOOD TEST: CPT | Mod: 91

## 2022-07-17 PROCEDURE — 80069 RENAL FUNCTION PANEL: CPT

## 2022-07-17 PROCEDURE — 700102 HCHG RX REV CODE 250 W/ 637 OVERRIDE(OP): Performed by: NURSE PRACTITIONER

## 2022-07-17 RX ADMIN — NAFCILLIN SODIUM 2 G: 2 INJECTION, POWDER, FOR SOLUTION INTRAMUSCULAR; INTRAVENOUS at 09:56

## 2022-07-17 RX ADMIN — EZETIMIBE 10 MG: 10 TABLET ORAL at 17:41

## 2022-07-17 RX ADMIN — METOPROLOL TARTRATE 50 MG: 50 TABLET, FILM COATED ORAL at 17:42

## 2022-07-17 RX ADMIN — OXYCODONE 5 MG: 5 TABLET ORAL at 13:50

## 2022-07-17 RX ADMIN — OXYCODONE 5 MG: 5 TABLET ORAL at 01:50

## 2022-07-17 RX ADMIN — INSULIN HUMAN 3 UNITS: 100 INJECTION, SOLUTION PARENTERAL at 21:56

## 2022-07-17 RX ADMIN — OXYCODONE 5 MG: 5 TABLET ORAL at 05:26

## 2022-07-17 RX ADMIN — LISINOPRIL 40 MG: 20 TABLET ORAL at 17:41

## 2022-07-17 RX ADMIN — OXYCODONE 5 MG: 5 TABLET ORAL at 21:59

## 2022-07-17 RX ADMIN — NAFCILLIN SODIUM 2 G: 2 INJECTION, POWDER, FOR SOLUTION INTRAMUSCULAR; INTRAVENOUS at 05:33

## 2022-07-17 RX ADMIN — APIXABAN 5 MG: 5 TABLET, FILM COATED ORAL at 17:41

## 2022-07-17 RX ADMIN — NAFCILLIN SODIUM 2 G: 2 INJECTION, POWDER, FOR SOLUTION INTRAMUSCULAR; INTRAVENOUS at 17:49

## 2022-07-17 RX ADMIN — METOPROLOL TARTRATE 50 MG: 50 TABLET, FILM COATED ORAL at 05:26

## 2022-07-17 RX ADMIN — OXYCODONE 5 MG: 5 TABLET ORAL at 17:42

## 2022-07-17 RX ADMIN — NAFCILLIN SODIUM 2 G: 2 INJECTION, POWDER, FOR SOLUTION INTRAMUSCULAR; INTRAVENOUS at 01:43

## 2022-07-17 RX ADMIN — APIXABAN 5 MG: 5 TABLET, FILM COATED ORAL at 05:26

## 2022-07-17 RX ADMIN — AMIODARONE HYDROCHLORIDE 400 MG: 200 TABLET ORAL at 05:26

## 2022-07-17 RX ADMIN — INSULIN HUMAN 2 UNITS: 100 INJECTION, SOLUTION PARENTERAL at 13:40

## 2022-07-17 RX ADMIN — NAFCILLIN SODIUM 2 G: 2 INJECTION, POWDER, FOR SOLUTION INTRAMUSCULAR; INTRAVENOUS at 13:40

## 2022-07-17 RX ADMIN — NAFCILLIN SODIUM 2 G: 2 INJECTION, POWDER, FOR SOLUTION INTRAMUSCULAR; INTRAVENOUS at 21:52

## 2022-07-17 RX ADMIN — INSULIN GLARGINE-YFGN 13 UNITS: 100 INJECTION, SOLUTION SUBCUTANEOUS at 17:48

## 2022-07-17 RX ADMIN — OXYCODONE 5 MG: 5 TABLET ORAL at 09:56

## 2022-07-17 RX ADMIN — AMIODARONE HYDROCHLORIDE 400 MG: 200 TABLET ORAL at 17:41

## 2022-07-17 RX ADMIN — INSULIN HUMAN 2 UNITS: 100 INJECTION, SOLUTION PARENTERAL at 17:46

## 2022-07-17 ASSESSMENT — ENCOUNTER SYMPTOMS
VOMITING: 0
FEVER: 0
CHILLS: 0
ABDOMINAL PAIN: 0
SHORTNESS OF BREATH: 0
NAUSEA: 0
BACK PAIN: 1
COUGH: 0

## 2022-07-17 ASSESSMENT — PAIN DESCRIPTION - PAIN TYPE
TYPE: SURGICAL PAIN
TYPE: SURGICAL PAIN
TYPE: CHRONIC PAIN;ACUTE PAIN

## 2022-07-17 NOTE — PROGRESS NOTES
[facility] Medicine Daily Progress Note    Date of Service  7/17/2022    Chief Complaint  Nitesh Duron is a 72 y.o. male admitted 7/4/2022 with weakness and fall    Hospital Course    72-year-old male with history of diabetes, dyslipidemia hepatitis C and cirrhosis with atrial fibrillation presented 7/4 with fall and weakness.  Patient states he fell 4 days ago at home and he landed on his left knee.  He noticed swelling on his left knee and severe back pain with some weakness on the left leg, patient has been on the ground most of the time and not able to get up.  Patient lives by himself.  Patient was found on the ground by his girlfriend and she called EMS.  On admission patient was found to have A. fib with RVR.  Dehydration however blood pressure was stable.  Labs did not show leukocytosis however showed hyponatremia, creatinine 1.2 with CPK 2411 and lactic acid 4.4.  IV fluid was started.  Blood culture came back positive with MSSA, cefazolin was initiated and ID was consulted.  Echo is pending.      Patient underwent arthrocentesis by Ortho on his left knee and showed more than 37,000 white blood cell with no red blood cell high suspicious of septic arthritis specially with positive blood culture, ID on board patient was recently on cefazolin transition to nafcillin.  Left knee pain is improved  Blood cultures from 7/10/2022 are negative to date  Sodium 130 improved  Having loose stool  Discussed with ID Dr. Ford is recommending repeat MRI  Discussed with Ortho    Interval Problem Update:  7/13. MRI resulted yesterday with epidural abscess now seen compared to previous MRI.  Updated Dr. Hernández who will evaluate.  Discussed with ID.  Patient himself is reluctant to have surgery. Currently no cauda equina or compressive symptoms.  Reviewed MRI, because he is doing well clinically and he resisted surgery, plan is to continue antibiotic course as before per Dr. Hernández.  7/14. SNF recommended. 6 week  course IV antibiotics STOP date 8/21. Outpatient MRI after course with follow-up to [facility].   7/15. Awaiting SNF. No compression or cauda equina symptoms at this time.  7/16. Awaiting SNF  7/17. Watching TV.     I have discussed this patient's plan of care and discharge plan at IDT rounds today with Case Management, Nursing, Nursing leadership, and other members of the IDT team.    Consultants/Specialty  cardiology, critical care and orthopedics   Spine surgery  ID      Code Status  Full Code    Disposition  Patient is medically cleared for discharge.   Anticipate discharge to to skilled nursing facility.  I have placed the appropriate orders for post-discharge needs.    Review of Systems  Review of Systems   Constitutional: Negative for chills and fever.   Respiratory: Negative for cough and shortness of breath.    Cardiovascular: Negative for chest pain.   Gastrointestinal: Negative for abdominal pain, nausea and vomiting.   Musculoskeletal: Positive for back pain and joint pain.   All other systems reviewed and are negative.       Physical Exam  Temp:  [36.4 °C (97.5 °F)-36.7 °C (98 °F)] 36.4 °C (97.5 °F)  Pulse:  [75-91] 87  Resp:  [17-20] 20  BP: (139-152)/(48-85) 152/85  SpO2:  [96 %-98 %] 96 %    Physical Exam  Vitals and nursing note reviewed.   Constitutional:       Appearance: He is well-developed. He is not diaphoretic.   HENT:      Head: Normocephalic and atraumatic.      Mouth/Throat:      Pharynx: No oropharyngeal exudate.   Eyes:      General: No scleral icterus.        Right eye: No discharge.         Left eye: No discharge.      Conjunctiva/sclera: Conjunctivae normal.      Pupils: Pupils are equal, round, and reactive to light.   Neck:      Vascular: No JVD.      Trachea: No tracheal deviation.   Cardiovascular:      Rate and Rhythm: Normal rate and regular rhythm.      Heart sounds: No murmur heard.    No friction rub. No gallop.   Pulmonary:      Effort: Pulmonary effort is  normal. No respiratory distress.      Breath sounds: Normal breath sounds. No stridor. No wheezing.   Chest:      Chest wall: No tenderness.   Abdominal:      General: There is distension.      Palpations: Abdomen is soft.      Tenderness: There is no abdominal tenderness. There is no rebound.   Musculoskeletal:         General: Swelling and tenderness present.      Cervical back: Neck supple.      Comments: Left knee compressive dressing in place   Skin:     General: Skin is warm and dry.      Nails: There is no clubbing.   Neurological:      Mental Status: He is alert and oriented to person, place, and time.      Cranial Nerves: No cranial nerve deficit.      Motor: Weakness (Both lower extremities left greater than right mainly limited by pain) present. No abnormal muscle tone.   Psychiatric:         Behavior: Behavior normal.      Comments: Cooperative         Fluids    Intake/Output Summary (Last 24 hours) at 7/17/2022 0753  Last data filed at 7/17/2022 0603  Gross per 24 hour   Intake 1102 ml   Output 3175 ml   Net -2073 ml       Laboratory  Recent Labs     07/15/22  0220 07/17/22  0530   WBC 6.3 7.1   RBC 4.08* 4.02*   HEMOGLOBIN 11.6* 11.6*   HEMATOCRIT 34.3* 33.9*   MCV 84.1 84.3   MCH 28.4 28.9   MCHC 33.8 34.2   RDW 43.6 44.3   PLATELETCT 159* 174   MPV 8.6* 8.4*     Recent Labs     07/15/22  0220 07/17/22  0530   SODIUM 132* 131*   POTASSIUM 4.0 4.1   CHLORIDE 99 100   CO2 26 25   GLUCOSE 106* 119*   BUN 17 15   CREATININE 0.70 0.67   CALCIUM 7.8* 8.0*                   Imaging  IR-PICC LINE PLACEMENT W/ GUIDANCE > AGE 5   Final Result                  Ultrasound-guided PICC placement performed by qualified nursing staff as    above.          MR-LUMBAR SPINE-WITH & W/O   Final Result      1.  Left L4-5 septic facet joint arthritis with associated left lateral epidural phlegmon/abscess and paravertebral cellulitis. There is severe central canal stenosis at this level secondary to the epidural  phlegmon/abscess and degenerative disease.    There has been no significant interval change.   2.  Severe central canal stenosis at L3-4 secondary to the degeneration.   3.  Multifocal degenerative disease as described above.      EC-CARLI W/O CONT   Final Result      MR-LUMBAR SPINE-WITH & W/O   Final Result         Multilevel degenerative changes in lumbar spine as described above. There is severe canal stenosis at the L3-4, L4-5 level with cauda equina compression.      At L2-3 there is moderate canal stenosis with cauda equina compression.      Disc bulge extends into the right extra foraminal zone at the L3-4 and L4-5 level with impingement upon exiting nerves in the extraforaminal zone.      Abnormal edema is identified in the bilateral paraspinal soft tissues at the L3-4, L4-5 and L5-S1 levels with mild enhancement identified in the right-sided paraspinous soft tissues. These findings are concerning for an ongoing infectious inflammatory    process involving the facet joints.      Minimal epidural thickening is identified dorsally in the spinal canal behind L3 and L4. There is also a hypoenhancing area along the left posterolateral spinal canal that impinges upon the thecal sac. This could represent a developing epidural abscess.    Recommend follow-up examination in 2-3 days.      CT-HEAD W/O   Final Result      1. No CT evidence of acute infarct, hemorrhage or mass.   2. Mild global parenchymal atrophy. Chronic small vessel ischemic changes.      EC-ECHOCARDIOGRAM COMPLETE W/ CONT   Final Result      DX-KNEE 3 VIEWS LEFT   Final Result      1.  Severe tricompartmental left knee osteoarthritis      2.  osteoarthritis the proximal tibiofibular articulation      3.  Multiple intra-articular ossific body      4.  Diffuse atherosclerotic plaque      DX-CHEST-PORTABLE (1 VIEW)   Final Result      No acute cardiopulmonary abnormality identified.      CL-CARDIOVERSION    (Results Pending)        Assessment/Plan  *  "MSSA bacteremia, epidural abscess/septic arthritis of L knee/UTI  Assessment & Plan  Blood culture on admission 7/4 positive for MSSA 2/2  Source likely septic arthritis  Status post left knee I&D on 7/6/2022 Ortho service following    MRI lumbar spine with spinal stenosis and concern for possible developing epidural abscess evaluated by spine surgery with recommendation for medical treatment and outpatient follow-up     Blood culture 7/10 negative to date continue to monitor  ID following c continue nafcillin repeat MRI per ID recommendations    Will need PICC line when blood cultures negative for 48 hours\"    ID plans PICC placement and antibiotic course  Follow up with Ortho SPine, currently no indication for surgery and patient reluctant  7/16. SNF/rehab planned.    Hypomagnesemia  Assessment & Plan  Replete and monitor    Pyogenic arthritis of left knee joint (HCC)  Assessment & Plan  Synovial fluid culture MSSA  Status post I&D by Ortho on 7/6/2022    Reviewed importance of mobilization  Continue IV nafcillin  Discussed with Ortho no plans for further interventions    Falls  Assessment & Plan  PT OT   Encourage mobilization reviewed with patient    Acute cystitis without hematuria  Assessment & Plan  Urine culture MSSA   On nafcillin    Hyponatremia  Assessment & Plan  Continue free fluid restriction  Sodium 128 overall improved continue to monitor\"    Na 131    Type 2 diabetes mellitus with hyperglycemia, without long-term current use of insulin ([facility])  Assessment & Plan  Last a1c 6.3  Stable on Lantus and sliding scale insulin continue to monitor    Atrial fibrillation with RVR ([facility])  Assessment & Plan  Echo EF 55% no valvular abnormalities  Evaluated by cardiology  status post CARLI directed cardioversion on 7/8/2022  Continue metoprolol and amiodarone  On amiodarone taper  Continue Eliquis\"    Vitals:    07/17/22 0900   BP:    Pulse:    Resp: 18   Temp:    SpO2:      Resume Eliquis as no surgery " "indicated        Thrombocytopenia (HCC)  Assessment & Plan  Improved platelets 148      Hepatocellular carcinoma (HCC)- (present on admission)  Assessment & Plan  History of hepatocellular carcinoma.   Patient has history of cirrhosis  Following with oncology as an outpatient.    Cirrhosis of liver without ascites (HCC)- (present on admission)  Assessment & Plan  History of hepatitis C virus and received treatment     Outpatient follow-up\"    Follow up with GI/hepatology    Essential hypertension, benign- (present on admission)  Assessment & Plan  Improved continue lisinopril and metoprolol and monitor blood pressure\"    Vitals:    07/17/22 0900   BP:    Pulse:    Resp: 18   Temp:    SpO2:    Stable BPs  Increased lisinopril         VTE prophylaxis: therapeutic anticoagulation with eliquis    I have performed a physical exam and reviewed and updated ROS and Plan today (7/17/2022). In review of yesterday's note (7/16/2022), there are no changes except as documented above.          "

## 2022-07-17 NOTE — PROGRESS NOTES
C/o left leg pain.  Pain med given.  Condom cath in place.  Incontinent of stools.  PICC dressing changed.  Continues to have bed alarm.

## 2022-07-17 NOTE — CARE PLAN
The patient is Stable - Low risk of patient condition declining or worsening    Shift Goals  Clinical Goals: pain management  Patient Goals: rest, pain management  Family Goals: no family present at this time    Progress made toward(s) clinical / shift goals:  Prn Oxy given for left knee pain.      Problem: Pain - Standard  Goal: Alleviation of pain or a reduction in pain to the patient’s comfort goal  Outcome: Progressing       Patient is not progressing towards the following goals:

## 2022-07-17 NOTE — PROGRESS NOTES
"   Orthopaedic Progress Note    Interval changes:  Patient doing well  Dressing CDI  Cleared for DC by ortho pending medicine clearance    ROS - Patient denies any new issues.  Pain well controlled.    BP (!) 156/86   Pulse 73   Temp 36.6 °C (97.8 °F) (Temporal)   Resp 18   Ht 1.778 m (5' 10\")   Wt 83.9 kg (184 lb 15.5 oz)   SpO2 97%       Patient seen and examined  No acute distress  Breathing non labored  RRR  LLE incision open to air, incision CDI without issue, DNVI, moves all toes, cap refill <2 sec.     Recent Labs     07/15/22  0220 07/17/22  0530   WBC 6.3 7.1   RBC 4.08* 4.02*   HEMOGLOBIN 11.6* 11.6*   HEMATOCRIT 34.3* 33.9*   MCV 84.1 84.3   MCH 28.4 28.9   MCHC 33.8 34.2   RDW 43.6 44.3   PLATELETCT 159* 174   MPV 8.6* 8.4*       Active Hospital Problems    Diagnosis    • Hypomagnesemia [E83.42]    • Acute cystitis without hematuria [N30.00]    • MSSA bacteremia, epidural abscess/septic arthritis of L knee/UTI [R78.81, B95.61]    • Falls [W19.XXXA]    • Pyogenic arthritis of left knee joint (HCC) [M00.9]    • Thrombocytopenia (HCC) [D69.6]    • Atrial fibrillation with RVR (HCC) [I48.91]    • Type 2 diabetes mellitus with hyperglycemia, without long-term current use of insulin (HCC) [E11.65]    • Hyponatremia [E87.1]    • Hepatocellular carcinoma (HCC) [C22.0]    • Cirrhosis of liver without ascites (HCC) [K74.60]    • Essential hypertension, benign [I10]        Assessment/Plan:  Patient doing well  Dressing CDI  Cleared for DC by ortho pending medicine clearance  POD#11 S/P Arthrotomy left knee with exploration and drainage   Wt bearing status - WBAT LLE  Wound care/Drains - open to air  Future Procedures - none planned   Sutures/Staples out- 14 days post operatively  PT/OT-initiated  Antibiotics: naficillin 2g IV q4  DVT Prophylaxis- TEDS/SCDs/Foot pumps/eliquis  Zamorano-none  Case Coordination for Discharge Planning - Disposition pending abx needs   "

## 2022-07-18 LAB
ALBUMIN SERPL BCP-MCNC: 2.9 G/DL (ref 3.2–4.9)
BUN SERPL-MCNC: 17 MG/DL (ref 8–22)
CALCIUM SERPL-MCNC: 8.2 MG/DL (ref 8.5–10.5)
CHLORIDE SERPL-SCNC: 99 MMOL/L (ref 96–112)
CO2 SERPL-SCNC: 25 MMOL/L (ref 20–33)
CREAT SERPL-MCNC: 0.78 MG/DL (ref 0.5–1.4)
GFR SERPLBLD CREATININE-BSD FMLA CKD-EPI: 95 ML/MIN/1.73 M 2
GLUCOSE BLD STRIP.AUTO-MCNC: 136 MG/DL (ref 65–99)
GLUCOSE BLD STRIP.AUTO-MCNC: 146 MG/DL (ref 65–99)
GLUCOSE BLD STRIP.AUTO-MCNC: 176 MG/DL (ref 65–99)
GLUCOSE BLD STRIP.AUTO-MCNC: 213 MG/DL (ref 65–99)
GLUCOSE SERPL-MCNC: 111 MG/DL (ref 65–99)
PHOSPHATE SERPL-MCNC: 2.9 MG/DL (ref 2.5–4.5)
POTASSIUM SERPL-SCNC: 4.4 MMOL/L (ref 3.6–5.5)
SODIUM SERPL-SCNC: 133 MMOL/L (ref 135–145)

## 2022-07-18 PROCEDURE — 700101 HCHG RX REV CODE 250: Performed by: INTERNAL MEDICINE

## 2022-07-18 PROCEDURE — 700102 HCHG RX REV CODE 250 W/ 637 OVERRIDE(OP): Performed by: NURSE PRACTITIONER

## 2022-07-18 PROCEDURE — A9270 NON-COVERED ITEM OR SERVICE: HCPCS | Performed by: INTERNAL MEDICINE

## 2022-07-18 PROCEDURE — A9270 NON-COVERED ITEM OR SERVICE: HCPCS | Performed by: STUDENT IN AN ORGANIZED HEALTH CARE EDUCATION/TRAINING PROGRAM

## 2022-07-18 PROCEDURE — 99231 SBSQ HOSP IP/OBS SF/LOW 25: CPT | Performed by: INTERNAL MEDICINE

## 2022-07-18 PROCEDURE — 700102 HCHG RX REV CODE 250 W/ 637 OVERRIDE(OP): Performed by: INTERNAL MEDICINE

## 2022-07-18 PROCEDURE — 700105 HCHG RX REV CODE 258: Performed by: INTERNAL MEDICINE

## 2022-07-18 PROCEDURE — 82962 GLUCOSE BLOOD TEST: CPT | Mod: 91

## 2022-07-18 PROCEDURE — 770006 HCHG ROOM/CARE - MED/SURG/GYN SEMI*

## 2022-07-18 PROCEDURE — 80069 RENAL FUNCTION PANEL: CPT

## 2022-07-18 PROCEDURE — 700102 HCHG RX REV CODE 250 W/ 637 OVERRIDE(OP): Performed by: STUDENT IN AN ORGANIZED HEALTH CARE EDUCATION/TRAINING PROGRAM

## 2022-07-18 PROCEDURE — 700102 HCHG RX REV CODE 250 W/ 637 OVERRIDE(OP): Performed by: HOSPITALIST

## 2022-07-18 PROCEDURE — A9270 NON-COVERED ITEM OR SERVICE: HCPCS | Performed by: NURSE PRACTITIONER

## 2022-07-18 PROCEDURE — A9270 NON-COVERED ITEM OR SERVICE: HCPCS | Performed by: HOSPITALIST

## 2022-07-18 RX ADMIN — OXYCODONE 5 MG: 5 TABLET ORAL at 10:16

## 2022-07-18 RX ADMIN — NAFCILLIN SODIUM 2 G: 2 INJECTION, POWDER, FOR SOLUTION INTRAMUSCULAR; INTRAVENOUS at 05:34

## 2022-07-18 RX ADMIN — NAFCILLIN SODIUM 2 G: 2 INJECTION, POWDER, FOR SOLUTION INTRAMUSCULAR; INTRAVENOUS at 21:06

## 2022-07-18 RX ADMIN — INSULIN HUMAN 3 UNITS: 100 INJECTION, SOLUTION PARENTERAL at 21:05

## 2022-07-18 RX ADMIN — EZETIMIBE 10 MG: 10 TABLET ORAL at 17:04

## 2022-07-18 RX ADMIN — OXYCODONE 5 MG: 5 TABLET ORAL at 13:44

## 2022-07-18 RX ADMIN — NAFCILLIN SODIUM 2 G: 2 INJECTION, POWDER, FOR SOLUTION INTRAMUSCULAR; INTRAVENOUS at 17:04

## 2022-07-18 RX ADMIN — APIXABAN 5 MG: 5 TABLET, FILM COATED ORAL at 05:34

## 2022-07-18 RX ADMIN — NAFCILLIN SODIUM 2 G: 2 INJECTION, POWDER, FOR SOLUTION INTRAMUSCULAR; INTRAVENOUS at 10:17

## 2022-07-18 RX ADMIN — NAFCILLIN SODIUM 2 G: 2 INJECTION, POWDER, FOR SOLUTION INTRAMUSCULAR; INTRAVENOUS at 13:45

## 2022-07-18 RX ADMIN — NAFCILLIN SODIUM 2 G: 2 INJECTION, POWDER, FOR SOLUTION INTRAMUSCULAR; INTRAVENOUS at 01:23

## 2022-07-18 RX ADMIN — APIXABAN 5 MG: 5 TABLET, FILM COATED ORAL at 17:04

## 2022-07-18 RX ADMIN — OXYCODONE 5 MG: 5 TABLET ORAL at 05:35

## 2022-07-18 RX ADMIN — INSULIN GLARGINE-YFGN 13 UNITS: 100 INJECTION, SOLUTION SUBCUTANEOUS at 17:11

## 2022-07-18 RX ADMIN — METOPROLOL TARTRATE 50 MG: 50 TABLET, FILM COATED ORAL at 05:34

## 2022-07-18 RX ADMIN — AMIODARONE HYDROCHLORIDE 400 MG: 200 TABLET ORAL at 05:34

## 2022-07-18 RX ADMIN — OXYCODONE 5 MG: 5 TABLET ORAL at 21:13

## 2022-07-18 RX ADMIN — LISINOPRIL 40 MG: 20 TABLET ORAL at 17:03

## 2022-07-18 RX ADMIN — INSULIN HUMAN 2 UNITS: 100 INJECTION, SOLUTION PARENTERAL at 17:12

## 2022-07-18 RX ADMIN — OXYCODONE 5 MG: 5 TABLET ORAL at 17:02

## 2022-07-18 RX ADMIN — AMIODARONE HYDROCHLORIDE 400 MG: 200 TABLET ORAL at 17:03

## 2022-07-18 RX ADMIN — OXYCODONE 5 MG: 5 TABLET ORAL at 02:08

## 2022-07-18 RX ADMIN — METOPROLOL TARTRATE 50 MG: 50 TABLET, FILM COATED ORAL at 17:02

## 2022-07-18 ASSESSMENT — PAIN DESCRIPTION - PAIN TYPE
TYPE: CHRONIC PAIN;SURGICAL PAIN
TYPE: ACUTE PAIN;CHRONIC PAIN
TYPE: ACUTE PAIN;CHRONIC PAIN

## 2022-07-18 ASSESSMENT — PATIENT HEALTH QUESTIONNAIRE - PHQ9
SUM OF ALL RESPONSES TO PHQ9 QUESTIONS 1 AND 2: 0
2. FEELING DOWN, DEPRESSED, IRRITABLE, OR HOPELESS: NOT AT ALL
1. LITTLE INTEREST OR PLEASURE IN DOING THINGS: NOT AT ALL

## 2022-07-18 ASSESSMENT — ENCOUNTER SYMPTOMS
CHILLS: 0
NAUSEA: 0
BACK PAIN: 1
FEVER: 0
VOMITING: 0
COUGH: 0
SHORTNESS OF BREATH: 0
ABDOMINAL PAIN: 0

## 2022-07-18 NOTE — PROGRESS NOTES
Pt. Received in bed awake, orientedx4. With complaints of mild to mod pain on L knee with some stiffness. Pt. Given PRN pain med. Condom cath in place. PICC line intact and flushing and drawing blood. Call light placed within reach. Needs attended.

## 2022-07-18 NOTE — PROGRESS NOTES
"   Orthopaedic Progress Note    Interval changes:  Patient doing well  Dressing CDI  Cleared for DC by ortho pending medicine clearance    ROS - Patient denies any new issues.  Pain well controlled.    /84   Pulse 78   Temp 36.1 °C (97 °F) (Temporal)   Resp 18   Ht 1.778 m (5' 10\")   Wt 83.9 kg (184 lb 15.5 oz)   SpO2 97%       Patient seen and examined  No acute distress  Breathing non labored  RRR  LLE incision open to air, incision CDI without issue, DNVI, moves all toes, cap refill <2 sec.     Recent Labs     07/17/22  0530   WBC 7.1   RBC 4.02*   HEMOGLOBIN 11.6*   HEMATOCRIT 33.9*   MCV 84.3   MCH 28.9   MCHC 34.2   RDW 44.3   PLATELETCT 174   MPV 8.4*       Active Hospital Problems    Diagnosis    • Hypomagnesemia [E83.42]    • Acute cystitis without hematuria [N30.00]    • MSSA bacteremia, epidural abscess/septic arthritis of L knee/UTI [R78.81, B95.61]    • Falls [W19.XXXA]    • Pyogenic arthritis of left knee joint (HCC) [M00.9]    • Thrombocytopenia (HCC) [D69.6]    • Atrial fibrillation with RVR (HCC) [I48.91]    • Type 2 diabetes mellitus with hyperglycemia, without long-term current use of insulin (HCC) [E11.65]    • Hyponatremia [E87.1]    • Hepatocellular carcinoma (HCC) [C22.0]    • Cirrhosis of liver without ascites (HCC) [K74.60]    • Essential hypertension, benign [I10]        Assessment/Plan:  Patient doing well  Dressing CDI  Cleared for DC by ortho pending medicine clearance  POD#12 S/P Arthrotomy left knee with exploration and drainage   Wt bearing status - WBAT LLE  Wound care/Drains - open to air  Future Procedures - none planned   Sutures/Staples out- 14 days post operatively  PT/OT-initiated  Antibiotics: naficillin 2g IV q4  DVT Prophylaxis- TEDS/SCDs/Foot pumps/eliquis  Zamorano-none  Case Coordination for Discharge Planning - Disposition pending abx needs   "

## 2022-07-18 NOTE — CARE PLAN
The patient is Stable - Low risk of patient condition declining or worsening    Shift Goals  Clinical Goals: Pt. pain will be under control      Progress made toward(s) clinical / shift goals:  Pt. PRN meds given and able to help pt. Sleep and rest.       Problem: Pain - Standard  Goal: Alleviation of pain or a reduction in pain to the patient’s comfort goal  Outcome: Progressing     Problem: Knowledge Deficit - Standard  Goal: Patient and family/care givers will demonstrate understanding of plan of care, disease process/condition, diagnostic tests and medications  Outcome: Progressing     Problem: Fall Risk  Goal: Patient will remain free from falls  Outcome: Progressing

## 2022-07-18 NOTE — PROGRESS NOTES
[facility] Medicine Daily Progress Note    Date of Service  7/18/2022    Chief Complaint  Nitesh Duron is a 72 y.o. male admitted 7/4/2022 with weakness and fall    Hospital Course    72-year-old male with history of diabetes, dyslipidemia hepatitis C and cirrhosis with atrial fibrillation presented 7/4 with fall and weakness.  Patient states he fell 4 days ago at home and he landed on his left knee.  He noticed swelling on his left knee and severe back pain with some weakness on the left leg, patient has been on the ground most of the time and not able to get up.  Patient lives by himself.  Patient was found on the ground by his girlfriend and she called EMS.  On admission patient was found to have A. fib with RVR.  Dehydration however blood pressure was stable.  Labs did not show leukocytosis however showed hyponatremia, creatinine 1.2 with CPK 2411 and lactic acid 4.4.  IV fluid was started.  Blood culture came back positive with MSSA, cefazolin was initiated and ID was consulted.  Echo is pending.      Patient underwent arthrocentesis by Ortho on his left knee and showed more than 37,000 white blood cell with no red blood cell high suspicious of septic arthritis specially with positive blood culture, ID on board patient was recently on cefazolin transition to nafcillin.  Left knee pain is improved  Blood cultures from 7/10/2022 are negative to date  Sodium 130 improved  Having loose stool  Discussed with ID Dr. Ford is recommending repeat MRI  Discussed with Ortho    Interval Problem Update:  7/13. MRI resulted yesterday with epidural abscess now seen compared to previous MRI.  Updated Dr. Hernández who will evaluate.  Discussed with ID.  Patient himself is reluctant to have surgery. Currently no cauda equina or compressive symptoms.  Reviewed MRI, because he is doing well clinically and he resisted surgery, plan is to continue antibiotic course as before per Dr. Hernández.  7/14. SNF recommended. 6 week  course IV antibiotics STOP date 8/21. Outpatient MRI after course with follow-up to [facility].   7/15. Awaiting SNF. No compression or cauda equina symptoms at this time.  7/16. Awaiting SNF  7/18. Watching TV. Eating his meals.    I have discussed this patient's plan of care and discharge plan at IDT rounds today with Case Management, Nursing, Nursing leadership, and other members of the IDT team.    Consultants/Specialty  cardiology, critical care and orthopedics   Spine surgery  ID      Code Status  Full Code    Disposition  Patient is medically cleared for discharge.   Anticipate discharge to to skilled nursing facility.  I have placed the appropriate orders for post-discharge needs.    Review of Systems  Review of Systems   Constitutional: Negative for chills and fever.   Respiratory: Negative for cough and shortness of breath.    Cardiovascular: Negative for chest pain.   Gastrointestinal: Negative for abdominal pain, nausea and vomiting.   Musculoskeletal: Positive for back pain and joint pain.   All other systems reviewed and are negative.       Physical Exam  Temp:  [36.6 °C (97.8 °F)-36.8 °C (98.3 °F)] 36.7 °C (98 °F)  Pulse:  [] 88  Resp:  [18-19] 19  BP: (142-156)/(67-89) 152/88  SpO2:  [93 %-97 %] 96 %    Physical Exam  Vitals and nursing note reviewed.   Constitutional:       Appearance: He is well-developed. He is not diaphoretic.   HENT:      Head: Normocephalic and atraumatic.      Mouth/Throat:      Pharynx: No oropharyngeal exudate.   Eyes:      General: No scleral icterus.        Right eye: No discharge.         Left eye: No discharge.      Conjunctiva/sclera: Conjunctivae normal.      Pupils: Pupils are equal, round, and reactive to light.   Neck:      Vascular: No JVD.      Trachea: No tracheal deviation.   Cardiovascular:      Rate and Rhythm: Normal rate and regular rhythm.      Heart sounds: No murmur heard.    No friction rub. No gallop.   Pulmonary:      Effort:  Pulmonary effort is normal. No respiratory distress.      Breath sounds: Normal breath sounds. No stridor. No wheezing.   Chest:      Chest wall: No tenderness.   Abdominal:      General: There is distension.      Palpations: Abdomen is soft.      Tenderness: There is no abdominal tenderness. There is no rebound.   Musculoskeletal:         General: Swelling and tenderness present.      Cervical back: Neck supple.      Comments: Left knee compressive dressing in place   Skin:     General: Skin is warm and dry.      Nails: There is no clubbing.   Neurological:      Mental Status: He is alert and oriented to person, place, and time.      Cranial Nerves: No cranial nerve deficit.      Motor: Weakness (Both lower extremities left greater than right mainly limited by pain) present. No abnormal muscle tone.   Psychiatric:         Behavior: Behavior normal.      Comments: Calm         Fluids    Intake/Output Summary (Last 24 hours) at 7/18/2022 0707  Last data filed at 7/18/2022 0519  Gross per 24 hour   Intake 462 ml   Output 2400 ml   Net -1938 ml       Laboratory  Recent Labs     07/17/22  0530   WBC 7.1   RBC 4.02*   HEMOGLOBIN 11.6*   HEMATOCRIT 33.9*   MCV 84.3   MCH 28.9   MCHC 34.2   RDW 44.3   PLATELETCT 174   MPV 8.4*     Recent Labs     07/17/22  0530 07/18/22  0140   SODIUM 131* 133*   POTASSIUM 4.1 4.4   CHLORIDE 100 99   CO2 25 25   GLUCOSE 119* 111*   BUN 15 17   CREATININE 0.67 0.78   CALCIUM 8.0* 8.2*                   Imaging  IR-PICC LINE PLACEMENT W/ GUIDANCE > AGE 5   Final Result                  Ultrasound-guided PICC placement performed by qualified nursing staff as    above.          MR-LUMBAR SPINE-WITH & W/O   Final Result      1.  Left L4-5 septic facet joint arthritis with associated left lateral epidural phlegmon/abscess and paravertebral cellulitis. There is severe central canal stenosis at this level secondary to the epidural phlegmon/abscess and degenerative disease.    There has been no  significant interval change.   2.  Severe central canal stenosis at L3-4 secondary to the degeneration.   3.  Multifocal degenerative disease as described above.      EC-CARLI W/O CONT   Final Result      MR-LUMBAR SPINE-WITH & W/O   Final Result         Multilevel degenerative changes in lumbar spine as described above. There is severe canal stenosis at the L3-4, L4-5 level with cauda equina compression.      At L2-3 there is moderate canal stenosis with cauda equina compression.      Disc bulge extends into the right extra foraminal zone at the L3-4 and L4-5 level with impingement upon exiting nerves in the extraforaminal zone.      Abnormal edema is identified in the bilateral paraspinal soft tissues at the L3-4, L4-5 and L5-S1 levels with mild enhancement identified in the right-sided paraspinous soft tissues. These findings are concerning for an ongoing infectious inflammatory    process involving the facet joints.      Minimal epidural thickening is identified dorsally in the spinal canal behind L3 and L4. There is also a hypoenhancing area along the left posterolateral spinal canal that impinges upon the thecal sac. This could represent a developing epidural abscess.    Recommend follow-up examination in 2-3 days.      CT-HEAD W/O   Final Result      1. No CT evidence of acute infarct, hemorrhage or mass.   2. Mild global parenchymal atrophy. Chronic small vessel ischemic changes.      EC-ECHOCARDIOGRAM COMPLETE W/ CONT   Final Result      DX-KNEE 3 VIEWS LEFT   Final Result      1.  Severe tricompartmental left knee osteoarthritis      2.  osteoarthritis the proximal tibiofibular articulation      3.  Multiple intra-articular ossific body      4.  Diffuse atherosclerotic plaque      DX-CHEST-PORTABLE (1 VIEW)   Final Result      No acute cardiopulmonary abnormality identified.      CL-CARDIOVERSION    (Results Pending)        Assessment/Plan  * MSSA bacteremia, epidural abscess/septic arthritis of L  "knee/UTI  Assessment & Plan  Blood culture on admission 7/4 positive for MSSA 2/2  Source likely septic arthritis  Status post left knee I&D on 7/6/2022 Ortho service following    MRI lumbar spine with spinal stenosis and concern for possible developing epidural abscess evaluated by spine surgery with recommendation for medical treatment and outpatient follow-up     Blood culture 7/10 negative to date continue to monitor  ID following c continue nafcillin repeat MRI per ID recommendations    Will need PICC line when blood cultures negative for 48 hours\"    ID plans PICC placement and antibiotic course  Follow up with Ortho SPine, currently no indication for surgery and patient reluctant  7/18. SNF/rehab planned.    Hypomagnesemia  Assessment & Plan  Replete and monitor    Pyogenic arthritis of left knee joint (HCC)  Assessment & Plan  Synovial fluid culture MSSA  Status post I&D by Ortho on 7/6/2022    Reviewed importance of mobilization  Continue IV nafcillin  Discussed with Ortho no plans for further interventions    Falls  Assessment & Plan  PT OT   Encourage mobilization reviewed with patient    Acute cystitis without hematuria  Assessment & Plan  Urine culture MSSA   On nafcillin    Hyponatremia  Assessment & Plan  Continue free fluid restriction  Sodium 128 overall improved continue to monitor\"    Mild category    Type 2 diabetes mellitus with hyperglycemia, without long-term current use of insulin ([facility])  Assessment & Plan  Last a1c 6.3  Stable on Lantus and sliding scale insulin continue to monitor\"    BGs 100-200 stable.    Atrial fibrillation with RVR ([facility])  Assessment & Plan  Echo EF 55% no valvular abnormalities  Evaluated by cardiology  status post CARLI directed cardioversion on 7/8/2022  Continue metoprolol and amiodarone  On amiodarone taper  Continue Eliquis\"    Vitals:    07/18/22 0732   BP: 132/84   Pulse: 78   Resp: 18   Temp: 36.1 °C (97 °F)   SpO2: 97%     Resume Eliquis as no surgery " "indicated        Thrombocytopenia (HCC)  Assessment & Plan  Improved platelets 148      Hepatocellular carcinoma (HCC)- (present on admission)  Assessment & Plan  History of hepatocellular carcinoma.   Patient has history of cirrhosis  Following with oncology as an outpatient.    Cirrhosis of liver without ascites (HCC)- (present on admission)  Assessment & Plan  History of hepatitis C virus and received treatment     Outpatient follow-up\"    Follow up with GI/hepatology    Essential hypertension, benign- (present on admission)  Assessment & Plan  Improved continue lisinopril and metoprolol and monitor blood pressure\"    Vitals:    07/18/22 0732   BP: 132/84   Pulse: 78   Resp: 18   Temp: 36.1 °C (97 °F)   SpO2: 97%   Stable BPs  Increased lisinopril         VTE prophylaxis: therapeutic anticoagulation with eliquis    I have performed a physical exam and reviewed and updated ROS and Plan today (7/18/2022). In review of yesterday's note (7/17/2022), there are no changes except as documented above.          "

## 2022-07-18 NOTE — DISCHARGE PLANNING
Agency/Facility Name: [facility]  Outcome: Left a message for admissions.  Awaiting a call back.    Agency[facility]Facility Name: [facility]  Spoke To: Alice  Outcome: Will call this DPA back.  Having computer issues.

## 2022-07-18 NOTE — THERAPY
"Missed Therapy     Patient Name: Nitesh Duron  Age:  72 y.o., Sex:  male  Medical Record #: [id number]  Today's Date: 7/18/2022    Discussed missed therapy with    07/18/22 1405   Treatment Variance   Reason For Missed Therapy Non-Medical - Patient Refused   Interdisciplinary Plan of Care Collaboration   IDT Collaboration with  Nursing   Patient Position at End of Therapy In Bed;Bed Alarm On;Call Light within Reach;Tray Table within Reach;Phone within Reach   Collaboration Comments PT tx order received, chart reviewed. Attempted PT tx this date however pt adamately refuses due to multiple different excuses. He says \" I'm in too much pain to do anything\" ( although pt reported he did just receive pain meds).  He adamately refuses all exercises in bed as well, and states \" well I also just got my lunch so I would rather eat, that's more important\". Educated pt on benefits of therapy and the detrimetal effects of being in bed, pt continues to adamately refuse despite all attempts. RN notified.   Session Information   Date / Session Number  7/14-3(3/4,7/17) attempt 7/18       Autumn Jacome, PT,DPT   "

## 2022-07-19 LAB
GLUCOSE BLD STRIP.AUTO-MCNC: 108 MG/DL (ref 65–99)
GLUCOSE BLD STRIP.AUTO-MCNC: 142 MG/DL (ref 65–99)
GLUCOSE BLD STRIP.AUTO-MCNC: 175 MG/DL (ref 65–99)
GLUCOSE BLD STRIP.AUTO-MCNC: 178 MG/DL (ref 65–99)

## 2022-07-19 PROCEDURE — 700102 HCHG RX REV CODE 250 W/ 637 OVERRIDE(OP): Performed by: HOSPITALIST

## 2022-07-19 PROCEDURE — 97535 SELF CARE MNGMENT TRAINING: CPT | Mod: CO

## 2022-07-19 PROCEDURE — 99232 SBSQ HOSP IP/OBS MODERATE 35: CPT | Performed by: HOSPITALIST

## 2022-07-19 PROCEDURE — 97530 THERAPEUTIC ACTIVITIES: CPT | Mod: CQ

## 2022-07-19 PROCEDURE — 97530 THERAPEUTIC ACTIVITIES: CPT | Mod: CO

## 2022-07-19 PROCEDURE — A9270 NON-COVERED ITEM OR SERVICE: HCPCS | Performed by: HOSPITALIST

## 2022-07-19 PROCEDURE — 82962 GLUCOSE BLOOD TEST: CPT | Mod: 91

## 2022-07-19 PROCEDURE — A9270 NON-COVERED ITEM OR SERVICE: HCPCS | Performed by: NURSE PRACTITIONER

## 2022-07-19 PROCEDURE — 700102 HCHG RX REV CODE 250 W/ 637 OVERRIDE(OP): Performed by: INTERNAL MEDICINE

## 2022-07-19 PROCEDURE — 700105 HCHG RX REV CODE 258: Performed by: HOSPITALIST

## 2022-07-19 PROCEDURE — 770006 HCHG ROOM/CARE - MED/SURG/GYN SEMI*

## 2022-07-19 PROCEDURE — 700101 HCHG RX REV CODE 250: Performed by: HOSPITALIST

## 2022-07-19 PROCEDURE — 700102 HCHG RX REV CODE 250 W/ 637 OVERRIDE(OP): Performed by: STUDENT IN AN ORGANIZED HEALTH CARE EDUCATION/TRAINING PROGRAM

## 2022-07-19 PROCEDURE — 700102 HCHG RX REV CODE 250 W/ 637 OVERRIDE(OP): Performed by: NURSE PRACTITIONER

## 2022-07-19 PROCEDURE — A9270 NON-COVERED ITEM OR SERVICE: HCPCS | Performed by: STUDENT IN AN ORGANIZED HEALTH CARE EDUCATION/TRAINING PROGRAM

## 2022-07-19 PROCEDURE — A9270 NON-COVERED ITEM OR SERVICE: HCPCS | Performed by: INTERNAL MEDICINE

## 2022-07-19 PROCEDURE — 700105 HCHG RX REV CODE 258: Performed by: INTERNAL MEDICINE

## 2022-07-19 PROCEDURE — 700101 HCHG RX REV CODE 250: Performed by: INTERNAL MEDICINE

## 2022-07-19 RX ADMIN — APIXABAN 5 MG: 5 TABLET, FILM COATED ORAL at 04:59

## 2022-07-19 RX ADMIN — NAFCILLIN SODIUM 2 G: 2 INJECTION, POWDER, FOR SOLUTION INTRAMUSCULAR; INTRAVENOUS at 01:53

## 2022-07-19 RX ADMIN — OXYCODONE 5 MG: 5 TABLET ORAL at 08:10

## 2022-07-19 RX ADMIN — OXYCODONE 5 MG: 5 TABLET ORAL at 11:08

## 2022-07-19 RX ADMIN — AMIODARONE HYDROCHLORIDE 400 MG: 200 TABLET ORAL at 04:59

## 2022-07-19 RX ADMIN — INSULIN HUMAN 2 UNITS: 100 INJECTION, SOLUTION PARENTERAL at 20:28

## 2022-07-19 RX ADMIN — OXYCODONE 5 MG: 5 TABLET ORAL at 01:52

## 2022-07-19 RX ADMIN — METOPROLOL TARTRATE 50 MG: 50 TABLET, FILM COATED ORAL at 18:20

## 2022-07-19 RX ADMIN — METOPROLOL TARTRATE 50 MG: 50 TABLET, FILM COATED ORAL at 04:59

## 2022-07-19 RX ADMIN — INSULIN GLARGINE-YFGN 13 UNITS: 100 INJECTION, SOLUTION SUBCUTANEOUS at 18:23

## 2022-07-19 RX ADMIN — LISINOPRIL 40 MG: 20 TABLET ORAL at 18:21

## 2022-07-19 RX ADMIN — EZETIMIBE 10 MG: 10 TABLET ORAL at 18:20

## 2022-07-19 RX ADMIN — NAFCILLIN SODIUM 2 G: 2 INJECTION, POWDER, FOR SOLUTION INTRAMUSCULAR; INTRAVENOUS at 04:59

## 2022-07-19 RX ADMIN — APIXABAN 5 MG: 5 TABLET, FILM COATED ORAL at 18:21

## 2022-07-19 RX ADMIN — NAFCILLIN SODIUM 12 G: 2 INJECTION, POWDER, FOR SOLUTION INTRAMUSCULAR; INTRAVENOUS at 11:08

## 2022-07-19 RX ADMIN — OXYCODONE 5 MG: 5 TABLET ORAL at 16:07

## 2022-07-19 RX ADMIN — OXYCODONE 5 MG: 5 TABLET ORAL at 20:21

## 2022-07-19 RX ADMIN — INSULIN HUMAN 2 UNITS: 100 INJECTION, SOLUTION PARENTERAL at 13:23

## 2022-07-19 RX ADMIN — AMIODARONE HYDROCHLORIDE 400 MG: 200 TABLET ORAL at 18:20

## 2022-07-19 ASSESSMENT — COGNITIVE AND FUNCTIONAL STATUS - GENERAL
HELP NEEDED FOR BATHING: A LOT
MOVING FROM LYING ON BACK TO SITTING ON SIDE OF FLAT BED: UNABLE
CLIMB 3 TO 5 STEPS WITH RAILING: TOTAL
TURNING FROM BACK TO SIDE WHILE IN FLAT BAD: UNABLE
DRESSING REGULAR LOWER BODY CLOTHING: A LOT
STANDING UP FROM CHAIR USING ARMS: A LOT
DRESSING REGULAR UPPER BODY CLOTHING: A LITTLE
TOILETING: A LOT
WALKING IN HOSPITAL ROOM: TOTAL
DAILY ACTIVITIY SCORE: 17
SUGGESTED CMS G CODE MODIFIER DAILY ACTIVITY: CK
MOBILITY SCORE: 7
SUGGESTED CMS G CODE MODIFIER MOBILITY: CM
MOVING TO AND FROM BED TO CHAIR: UNABLE

## 2022-07-19 ASSESSMENT — ENCOUNTER SYMPTOMS
ABDOMINAL PAIN: 0
FEVER: 0
COUGH: 0
BACK PAIN: 1
CHILLS: 0
NAUSEA: 0
SHORTNESS OF BREATH: 0
VOMITING: 0

## 2022-07-19 ASSESSMENT — PAIN DESCRIPTION - PAIN TYPE: TYPE: ACUTE PAIN

## 2022-07-19 NOTE — THERAPY
Occupational Therapy  Daily Treatment     Patient Name: Nitesh Duron  Age:  72 y.o., Sex:  male  Medical Record #: [id number]  Today's Date: 7/19/2022       Precautions: Fall Risk, Weight Bearing As Tolerated Left Lower Extremity      Assessment    Pt seen for OT tx. Continues to be limited by decreased activity tolerance, pain and generalized weakness impacting ability to complete ADLs and ADL transfers independently. Pt c/o pain during bed mobility in L knee. Required support for get to EOB d/t pain. Once seated EOB pt c/o being dizzy. Pt asking for assistance for self-feeding but able to complete full ROM to self feed independently. Encouraged pt to get up w/ nrsg staff to EOB as tolerated. Will continue to benefit from OT services while in house.      Plan    Continue current treatment plan.    DC Equipment Recommendations: Raised Toilet Seat with Arms, Grab Bar(s) in Tub / Shower, Tub / Shower Seat  Discharge Recommendations: Recommend post-acute placement for additional occupational therapy services prior to discharge home       07/19/22 0840   Cognition    Cognition / Consciousness X   Attention Impaired   Initiation Impaired   Active ROM Upper Body   Active ROM Upper Body  X   Comments limited ROM in B shoulders at baseline. Able to assist w/ ADLs   Strength Upper Body   Upper Body Strength  X   Comments generalized weakness   Balance   Sitting Balance (Static) Fair   Sitting Balance (Dynamic) Fair -   Weight Shift Sitting Poor   Bed Mobility    Supine to Sit Moderate Assist   Sit to Supine Moderate Assist   Activities of Daily Living   Grooming Supervision;Seated   Upper Body Dressing Minimal Assist   Lower Body Dressing Maximal Assist   Toileting Maximal Assist   Functional Mobility   Comments declined standing d/t dizziness   Short Term Goals   Short Term Goal # 1 Pt will LB dress w/ Rashid   Goal Outcome # 1 Progressing slower than expected   Short Term Goal # 2 Pt will demo functional txf w/ SPV    Goal Outcome # 2 Progressing slower than expected   Short Term Goal # 3 Pt will demo standing grooming > 5min w/ SPV   Goal Outcome # 3 Progressing slower than expected   Anticipated Discharge Equipment and Recommendations   DC Equipment Recommendations Raised Toilet Seat with Arms;Grab Bar(s) in Tub / Shower;Tub / Shower Seat   Discharge Recommendations Recommend post-acute placement for additional occupational therapy services prior to discharge home

## 2022-07-19 NOTE — CARE PLAN
The patient is Stable - Low risk of patient condition declining or worsening    Shift Goals  Clinical Goals: Pain management  Patient Goals: Rest  Family Goals: no family present at this time    Progress made toward(s) clinical / shift goals: Patient alert oriented x 4 on room air. Prn medication administered for pain see mar. No acute distress noted, patient call light within reach.      Patient is not progressing towards the following goals:

## 2022-07-19 NOTE — PROGRESS NOTES
"   Orthopaedic Progress Note    Interval changes:  Patient doing well  Staple removed from left knee and seristrips placed with benzoin  Cleared for DC by ortho pending medicine clearance    ROS - Patient denies any new issues.  Pain well controlled.    BP (!) 148/84   Pulse 76   Temp 36.4 °C (97.6 °F) (Temporal)   Resp 16   Ht 1.778 m (5' 10\")   Wt 83.9 kg (184 lb 15.5 oz)   SpO2 96%       Patient seen and examined  No acute distress  Breathing non labored  RRR  LLE staples removed and seristrips placed with benzoin, incision CDI without issue, DNVI, moves all toes, cap refill <2 sec.     Recent Labs     07/17/22  0530   WBC 7.1   RBC 4.02*   HEMOGLOBIN 11.6*   HEMATOCRIT 33.9*   MCV 84.3   MCH 28.9   MCHC 34.2   RDW 44.3   PLATELETCT 174   MPV 8.4*       Active Hospital Problems    Diagnosis    • Hypomagnesemia [E83.42]    • Acute cystitis without hematuria [N30.00]    • MSSA bacteremia, epidural abscess/septic arthritis of L knee/UTI [R78.81, B95.61]    • Falls [W19.XXXA]    • Pyogenic arthritis of left knee joint (HCC) [M00.9]    • Thrombocytopenia (HCC) [D69.6]    • Atrial fibrillation with RVR (HCC) [I48.91]    • Type 2 diabetes mellitus with hyperglycemia, without long-term current use of insulin (HCC) [E11.65]    • Hyponatremia [E87.1]    • Hepatocellular carcinoma (HCC) [C22.0]    • Cirrhosis of liver without ascites (HCC) [K74.60]    • Essential hypertension, benign [I10]        Assessment/Plan:  Patient doing well  Staple removed from left knee and seristrips placed with benzoin  Cleared for DC by ortho pending medicine clearance  POD#13 S/P Arthrotomy left knee with exploration and drainage   Wt bearing status - WBAT LLE  Wound care/Drains - open to air  Future Procedures - none planned   Sutures/Staples out- 14 days post operatively  PT/OT-initiated  Antibiotics: naficillin 2g IV q4  DVT Prophylaxis- TEDS/SCDs/Foot pumps/eliquis  Zamorano-none  Case Coordination for Discharge Planning - Disposition " pending abx needs

## 2022-07-19 NOTE — PROGRESS NOTES
"Hospital Medicine Daily Progress Note    Date of Service  7/19/2022    Chief Complaint  Nitesh Duron is a 72 y.o. male admitted 7/4/2022 with weakness and fall    Hospital Course  \"72-year-old male with history of diabetes, dyslipidemia hepatitis C and cirrhosis with atrial fibrillation presented 7/4 with fall and weakness.  Patient states he fell 4 days ago at home and he landed on his left knee.  He noticed swelling on his left knee and severe back pain with some weakness on the left leg, patient has been on the ground most of the time and not able to get up.  Patient lives by himself.  Patient was found on the ground by his girlfriend and she called EMS.  On admission patient was found to have A. fib with RVR.  Dehydration however blood pressure was stable.  Labs did not show leukocytosis however showed hyponatremia, creatinine 1.2 with CPK 2411 and lactic acid 4.4.  IV fluid was started.  Blood culture came back positive with MSSA, cefazolin was initiated and ID was consulted.  Echo is pending.\"    Patient underwent arthrocentesis by Ortho on his left knee and showed more than 37,000 white blood cell with no red blood cell high suspicious of septic arthritis specially with positive blood culture, ID on board patient was recently on cefazolin transition to nafcillin.  Blood cultures from 7/10/2022 are negative to date.    Interval Problem Update:  7/13. MRI resulted yesterday with epidural abscess now seen compared to previous MRI.  Updated Dr. Hernández who will evaluate.  Discussed with ID.  Patient himself is reluctant to have surgery. Currently no cauda equina or compressive symptoms.  Reviewed MRI, because he is doing well clinically and he resisted surgery, plan is to continue antibiotic course as before per Dr. Hernández.  7/14. SNF recommended. 6 week course IV antibiotics STOP date 8/21. Outpatient MRI after course with follow-up to [facility].   7/18. Watching TV. Eating his meals.  7/19 No " change, sitting in bed, comfortable    I have discussed this patient's plan of care and discharge plan at IDT rounds today with Case Management, Nursing, Nursing leadership, and other members of the IDT team.    Consultants/Specialty  cardiology, critical care and orthopedics   Spine surgery  ID    Code Status  Full Code    Disposition  Patient is medically cleared for discharge.   Anticipate discharge to to skilled nursing facility.  I have placed the appropriate orders for post-discharge needs.    Review of Systems  Review of Systems   Constitutional: Negative for chills and fever.   Respiratory: Negative for cough and shortness of breath.    Cardiovascular: Negative for chest pain.   Gastrointestinal: Negative for abdominal pain, nausea and vomiting.   Musculoskeletal: Positive for back pain and joint pain.   All other systems reviewed and are negative.     Physical Exam  Temp:  [36.3 °C (97.3 °F)-36.5 °C (97.7 °F)] 36.4 °C (97.6 °F)  Pulse:  [76-96] 76  Resp:  [16-18] 16  BP: (137-155)/(83-97) 148/84  SpO2:  [95 %-96 %] 96 %    Physical Exam  Vitals and nursing note reviewed.   Constitutional:       Appearance: He is well-developed. He is not diaphoretic.   HENT:      Head: Normocephalic and atraumatic.      Mouth/Throat:      Pharynx: No oropharyngeal exudate.   Eyes:      General: No scleral icterus.        Right eye: No discharge.         Left eye: No discharge.      Conjunctiva/sclera: Conjunctivae normal.      Pupils: Pupils are equal, round, and reactive to light.   Neck:      Vascular: No JVD.      Trachea: No tracheal deviation.   Cardiovascular:      Rate and Rhythm: Normal rate and regular rhythm.      Heart sounds: No murmur heard.    No friction rub. No gallop.   Pulmonary:      Effort: Pulmonary effort is normal. No respiratory distress.      Breath sounds: Normal breath sounds. No stridor. No wheezing.   Chest:      Chest wall: No tenderness.   Abdominal:      General: There is distension.       Palpations: Abdomen is soft.      Tenderness: There is no abdominal tenderness. There is no rebound.   Musculoskeletal:         General: Swelling and tenderness present.      Cervical back: Neck supple.      Comments: Left knee compressive dressing in place   Skin:     General: Skin is warm and dry.      Nails: There is no clubbing.   Neurological:      Mental Status: He is alert and oriented to person, place, and time.      Cranial Nerves: No cranial nerve deficit.      Motor: Weakness (Both lower extremities left greater than right mainly limited by pain) present. No abnormal muscle tone.   Psychiatric:         Behavior: Behavior normal.      Comments: Calm         Fluids    Intake/Output Summary (Last 24 hours) at 7/19/2022 0855  Last data filed at 7/19/2022 0539  Gross per 24 hour   Intake 720 ml   Output 1400 ml   Net -680 ml       Laboratory  Recent Labs     07/17/22  0530   WBC 7.1   RBC 4.02*   HEMOGLOBIN 11.6*   HEMATOCRIT 33.9*   MCV 84.3   MCH 28.9   MCHC 34.2   RDW 44.3   PLATELETCT 174   MPV 8.4*     Recent Labs     07/17/22  0530 07/18/22  0140   SODIUM 131* 133*   POTASSIUM 4.1 4.4   CHLORIDE 100 99   CO2 25 25   GLUCOSE 119* 111*   BUN 15 17   CREATININE 0.67 0.78   CALCIUM 8.0* 8.2*                   Imaging  IR-PICC LINE PLACEMENT W/ GUIDANCE > AGE 5   Final Result                  Ultrasound-guided PICC placement performed by qualified nursing staff as    above.          MR-LUMBAR SPINE-WITH & W/O   Final Result      1.  Left L4-5 septic facet joint arthritis with associated left lateral epidural phlegmon/abscess and paravertebral cellulitis. There is severe central canal stenosis at this level secondary to the epidural phlegmon/abscess and degenerative disease.    There has been no significant interval change.   2.  Severe central canal stenosis at L3-4 secondary to the degeneration.   3.  Multifocal degenerative disease as described above.      EC-CARLI W/O CONT   Final Result      MR-LUMBAR  SPINE-WITH & W/O   Final Result         Multilevel degenerative changes in lumbar spine as described above. There is severe canal stenosis at the L3-4, L4-5 level with cauda equina compression.      At L2-3 there is moderate canal stenosis with cauda equina compression.      Disc bulge extends into the right extra foraminal zone at the L3-4 and L4-5 level with impingement upon exiting nerves in the extraforaminal zone.      Abnormal edema is identified in the bilateral paraspinal soft tissues at the L3-4, L4-5 and L5-S1 levels with mild enhancement identified in the right-sided paraspinous soft tissues. These findings are concerning for an ongoing infectious inflammatory    process involving the facet joints.      Minimal epidural thickening is identified dorsally in the spinal canal behind L3 and L4. There is also a hypoenhancing area along the left posterolateral spinal canal that impinges upon the thecal sac. This could represent a developing epidural abscess.    Recommend follow-up examination in 2-3 days.      CT-HEAD W/O   Final Result      1. No CT evidence of acute infarct, hemorrhage or mass.   2. Mild global parenchymal atrophy. Chronic small vessel ischemic changes.      EC-ECHOCARDIOGRAM COMPLETE W/ CONT   Final Result      DX-KNEE 3 VIEWS LEFT   Final Result      1.  Severe tricompartmental left knee osteoarthritis      2.  osteoarthritis the proximal tibiofibular articulation      3.  Multiple intra-articular ossific body      4.  Diffuse atherosclerotic plaque      DX-CHEST-PORTABLE (1 VIEW)   Final Result      No acute cardiopulmonary abnormality identified.      CL-CARDIOVERSION    (Results Pending)        Assessment/Plan  * MSSA bacteremia, epidural abscess/septic arthritis of L knee/UTI  Assessment & Plan  Blood culture on admission 7/4 positive for MSSA 2/2  Source likely septic arthritis  Status post left knee I&D on 7/6/2022 Ortho service following    MRI lumbar spine with spinal stenosis and  "concern for possible developing epidural abscess evaluated by spine surgery with recommendation for medical treatment and outpatient follow-up     Blood culture 7/10 negative to date continue to monitor  ID following c continue nafcillin repeat MRI per ID recommendations    Will need PICC line when blood cultures negative for 48 hours\"    ID plans PICC placement and antibiotic course  Follow up with Ortho SPine, currently no indication for surgery and patient reluctant  7/18. SNF/rehab planned.    Hypomagnesemia  Assessment & Plan  Replete and monitor    Pyogenic arthritis of left knee joint (HCC)  Assessment & Plan  Synovial fluid culture MSSA  Status post I&D by Ortho on 7/6/2022    Reviewed importance of mobilization  Continue IV nafcillin  Discussed with Ortho no plans for further interventions    Falls  Assessment & Plan  PT OT   Encourage mobilization reviewed with patient    Acute cystitis without hematuria  Assessment & Plan  Urine culture MSSA   On nafcillin    Hyponatremia  Assessment & Plan  Continue free fluid restriction  Sodium 128 overall improved continue to monitor\"    Mild category    Type 2 diabetes mellitus with hyperglycemia, without long-term current use of insulin ([facility])  Assessment & Plan  Last a1c 6.3  Stable on Lantus and sliding scale insulin continue to monitor\"    BGs 100-200 stable.    Atrial fibrillation with RVR (HCC)  Assessment & Plan  Echo EF 55% no valvular abnormalities  Evaluated by cardiology  status post CARLI directed cardioversion on 7/8/2022  Continue metoprolol and amiodarone  On amiodarone taper  Continue Eliquis\"    Vitals:    07/18/22 0732   BP: 132/84   Pulse: 78   Resp: 18   Temp: 36.1 °C (97 °F)   SpO2: 97%     Resume Eliquis as no surgery indicated        Thrombocytopenia (HCC)  Assessment & Plan  Improved platelets 148      Hepatocellular carcinoma (HCC)- (present on admission)  Assessment & Plan  History of hepatocellular carcinoma.   Patient has history of " "cirrhosis  Following with oncology as an outpatient.    Cirrhosis of liver without ascites (HCC)- (present on admission)  Assessment & Plan  History of hepatitis C virus and received treatment     Outpatient follow-up\"    Follow up with GI/hepatology    Essential hypertension, benign- (present on admission)  Assessment & Plan  Improved continue lisinopril and metoprolol and monitor blood pressure\"    Vitals:    07/18/22 0732   BP: 132/84   Pulse: 78   Resp: 18   Temp: 36.1 °C (97 °F)   SpO2: 97%   Stable BPs  Increased lisinopril         VTE prophylaxis: therapeutic anticoagulation with eliquis    I have performed a physical exam and reviewed and updated ROS and Plan today (7/19/2022). In review of yesterday's note (7/18/2022), there are no changes except as documented above.          "

## 2022-07-19 NOTE — THERAPY
Physical Therapy   Daily Treatment     Patient Name: Nitesh Duron  Age:  72 y.o., Sex:  male  Medical Record #: [id number]  Today's Date: 7/19/2022     Precautions  Precautions: Fall Risk;Weight Bearing As Tolerated Left Lower Extremity  Comments: staples in left knee    Assessment    Pt was pleasant and agreeable to therapy session today. He continues to present with some self limiting behaviors however progressed with mobility. Limited due to pain in LLE. HE reports performing supine exercises however difficulty flexing LLE. First attempt to EOB screaming out in pain, returned to supine and than reached EOB second time with modA and keeping LLE off the ground. Than he was able to tolerate knee flexion while seated EOB. Performed weight shifting but  deferred standing due to pain. Assisted back to bed with modA as well, holding LLE despite encouragement to assist. Will continue to follow while in house.     Plan    Continue current treatment plan.    DC Equipment Recommendations: Unable to determine at this time  Discharge Recommendations: Recommend post-acute placement for additional physical therapy services prior to discharge home       07/19/22 0945   Other Treatments   Other Treatments Provided extensive education regarding importance of mobility and supine exercises. He reports he performed them however than wont even initiate moving LLE   Balance   Sitting Balance (Static) Fair   Sitting Balance (Dynamic) Fair -   Weight Shift Sitting Poor   Skilled Intervention Verbal Cuing;Facilitation   Comments holding self up at EOB   Bed Mobility    Supine to Sit Moderate Assist   Sit to Supine Moderate Assist   Scooting Minimal Assist   Skilled Intervention Tactile Cuing;Verbal Cuing   Comments requires assist for LLE holding up during mobility due to pain. Two attempts to reach EOB, modA for trunk and LLE at this time   Functional Mobility   Sit to Stand Refused   Short Term Goals    Short Term Goal # 1  Patient will move supine<>sitting EOB without bed features with min A within 6tx in order to get in/out of bed   Goal Outcome # 1 goal not met   Short Term Goal # 2 Patient will move sitting<>standing with min A within 6tx in order to initiate transfers and gait   Goal Outcome # 2 Goal not met   Short Term Goal # 3 Patient will ambulate 50ft with min A within 6tx in order to access environment   Goal Outcome # 3 Goal not met   Short Term Goal # 4 Patient will ascend/descend 2 steps with min A within 6tx in order to prepare for entering home at [place]   Goal Outcome # 4 Goal not met

## 2022-07-20 LAB
GLUCOSE BLD STRIP.AUTO-MCNC: 112 MG/DL (ref 65–99)
GLUCOSE BLD STRIP.AUTO-MCNC: 161 MG/DL (ref 65–99)
GLUCOSE BLD STRIP.AUTO-MCNC: 169 MG/DL (ref 65–99)
GLUCOSE BLD STRIP.AUTO-MCNC: 183 MG/DL (ref 65–99)

## 2022-07-20 PROCEDURE — 82962 GLUCOSE BLOOD TEST: CPT | Mod: 91

## 2022-07-20 PROCEDURE — A9270 NON-COVERED ITEM OR SERVICE: HCPCS | Performed by: STUDENT IN AN ORGANIZED HEALTH CARE EDUCATION/TRAINING PROGRAM

## 2022-07-20 PROCEDURE — A9270 NON-COVERED ITEM OR SERVICE: HCPCS | Performed by: INTERNAL MEDICINE

## 2022-07-20 PROCEDURE — 700102 HCHG RX REV CODE 250 W/ 637 OVERRIDE(OP): Performed by: NURSE PRACTITIONER

## 2022-07-20 PROCEDURE — 700102 HCHG RX REV CODE 250 W/ 637 OVERRIDE(OP): Performed by: HOSPITALIST

## 2022-07-20 PROCEDURE — 770006 HCHG ROOM/CARE - MED/SURG/GYN SEMI*

## 2022-07-20 PROCEDURE — 700105 HCHG RX REV CODE 258: Performed by: HOSPITALIST

## 2022-07-20 PROCEDURE — 700102 HCHG RX REV CODE 250 W/ 637 OVERRIDE(OP): Performed by: STUDENT IN AN ORGANIZED HEALTH CARE EDUCATION/TRAINING PROGRAM

## 2022-07-20 PROCEDURE — 700102 HCHG RX REV CODE 250 W/ 637 OVERRIDE(OP): Performed by: INTERNAL MEDICINE

## 2022-07-20 PROCEDURE — A9270 NON-COVERED ITEM OR SERVICE: HCPCS | Performed by: HOSPITALIST

## 2022-07-20 PROCEDURE — 97535 SELF CARE MNGMENT TRAINING: CPT | Mod: CQ

## 2022-07-20 PROCEDURE — 99232 SBSQ HOSP IP/OBS MODERATE 35: CPT | Performed by: HOSPITALIST

## 2022-07-20 PROCEDURE — 700101 HCHG RX REV CODE 250: Performed by: HOSPITALIST

## 2022-07-20 PROCEDURE — A9270 NON-COVERED ITEM OR SERVICE: HCPCS | Performed by: NURSE PRACTITIONER

## 2022-07-20 RX ADMIN — METOPROLOL TARTRATE 50 MG: 50 TABLET, FILM COATED ORAL at 18:27

## 2022-07-20 RX ADMIN — OXYCODONE 5 MG: 5 TABLET ORAL at 16:16

## 2022-07-20 RX ADMIN — OXYCODONE 5 MG: 5 TABLET ORAL at 00:40

## 2022-07-20 RX ADMIN — INSULIN HUMAN 2 UNITS: 100 INJECTION, SOLUTION PARENTERAL at 20:21

## 2022-07-20 RX ADMIN — METOPROLOL TARTRATE 50 MG: 50 TABLET, FILM COATED ORAL at 05:09

## 2022-07-20 RX ADMIN — OXYCODONE 5 MG: 5 TABLET ORAL at 12:48

## 2022-07-20 RX ADMIN — OXYCODONE 5 MG: 5 TABLET ORAL at 20:21

## 2022-07-20 RX ADMIN — INSULIN HUMAN 2 UNITS: 100 INJECTION, SOLUTION PARENTERAL at 18:30

## 2022-07-20 RX ADMIN — APIXABAN 5 MG: 5 TABLET, FILM COATED ORAL at 05:10

## 2022-07-20 RX ADMIN — AMIODARONE HYDROCHLORIDE 400 MG: 200 TABLET ORAL at 18:27

## 2022-07-20 RX ADMIN — INSULIN GLARGINE-YFGN 13 UNITS: 100 INJECTION, SOLUTION SUBCUTANEOUS at 18:30

## 2022-07-20 RX ADMIN — NAFCILLIN SODIUM 12 G: 2 INJECTION, POWDER, FOR SOLUTION INTRAMUSCULAR; INTRAVENOUS at 10:38

## 2022-07-20 RX ADMIN — EZETIMIBE 10 MG: 10 TABLET ORAL at 18:27

## 2022-07-20 RX ADMIN — AMIODARONE HYDROCHLORIDE 400 MG: 200 TABLET ORAL at 05:10

## 2022-07-20 RX ADMIN — APIXABAN 5 MG: 5 TABLET, FILM COATED ORAL at 18:27

## 2022-07-20 RX ADMIN — OXYCODONE 5 MG: 5 TABLET ORAL at 05:09

## 2022-07-20 RX ADMIN — INSULIN HUMAN 2 UNITS: 100 INJECTION, SOLUTION PARENTERAL at 12:49

## 2022-07-20 RX ADMIN — OXYCODONE 5 MG: 5 TABLET ORAL at 09:24

## 2022-07-20 RX ADMIN — LISINOPRIL 40 MG: 20 TABLET ORAL at 18:27

## 2022-07-20 ASSESSMENT — ENCOUNTER SYMPTOMS
CHILLS: 0
FEVER: 0

## 2022-07-20 ASSESSMENT — PAIN DESCRIPTION - PAIN TYPE: TYPE: ACUTE PAIN

## 2022-07-20 NOTE — CARE PLAN
The patient is Stable - Low risk of patient condition declining or worsening    Shift Goals  Clinical Goals: pain management  Patient Goals: pain management  Family Goals: no family present at this time    Progress made toward(s) clinical / shift goals: Patient alert oriented x 4 on room air. Prn medication administered for pain see mar. Patient remain free from fall during shift. No acute distress noted, patient call light within reach.      Patient is not progressing towards the following goals:

## 2022-07-20 NOTE — PROGRESS NOTES
"Hospital Medicine Daily Progress Note    Date of Service  7/20/2022    Chief Complaint  Nitesh Duron is a 72 y.o. male admitted 7/4/2022 with weakness and fall    Hospital Course  \"72-year-old male with history of diabetes, dyslipidemia hepatitis C and cirrhosis with atrial fibrillation presented 7/4 with fall and weakness.  Patient states he fell 4 days ago at home and he landed on his left knee.  He noticed swelling on his left knee and severe back pain with some weakness on the left leg, patient has been on the ground most of the time and not able to get up.  Patient lives by himself.  Patient was found on the ground by his girlfriend and she called EMS.  On admission patient was found to have A. fib with RVR.  Dehydration however blood pressure was stable.  Labs did not show leukocytosis however showed hyponatremia, creatinine 1.2 with CPK 2411 and lactic acid 4.4.  IV fluid was started.  Blood culture came back positive with MSSA, cefazolin was initiated and ID was consulted.  Echo is pending.\"    Patient underwent arthrocentesis by Ortho on his left knee and showed more than 37,000 white blood cell with no red blood cell high suspicious of septic arthritis specially with positive blood culture, ID on board patient was recently on cefazolin transition to nafcillin.  Blood cultures from 7/10/2022 are negative to date.    Interval Problem Update:  7/13. MRI resulted yesterday with epidural abscess now seen compared to previous MRI.  Updated Dr. Hernández who will evaluate.  Discussed with ID.  Patient himself is reluctant to have surgery. Currently no cauda equina or compressive symptoms.  Reviewed MRI, because he is doing well clinically and he resisted surgery, plan is to continue antibiotic course as before per Dr. Hernández.  7/14. SNF recommended. 6 week course IV antibiotics STOP date 8/21. Outpatient MRI after course with follow-up to [facility].   7/18. Watching TV. Eating his meals.  7/19 No " change, sitting in bed, comfortable  7/20 staples removed yesterday continue discharge planning    I have discussed this patient's plan of care and discharge plan at IDT rounds today with Case Management, Nursing, Nursing leadership, and other members of the IDT team.    Consultants/Specialty  cardiology, critical care and orthopedics   Spine surgery  ID    Code Status  Full Code    Disposition  Patient is medically cleared for discharge.   Anticipate discharge to to skilled nursing facility.  I have placed the appropriate orders for post-discharge needs.    Review of Systems  Review of Systems   Constitutional: Negative for chills and fever.      Physical Exam  Temp:  [36.4 °C (97.6 °F)-36.7 °C (98 °F)] 36.4 °C (97.6 °F)  Pulse:  [78-99] 78  Resp:  [17-18] 18  BP: (132-153)/(81-93) 132/92  SpO2:  [94 %-97 %] 97 %    Physical Exam  Vitals and nursing note reviewed.   Constitutional:       Appearance: He is well-developed. He is not diaphoretic.   HENT:      Head: Normocephalic and atraumatic.      Mouth/Throat:      Pharynx: No oropharyngeal exudate.   Eyes:      General: No scleral icterus.        Right eye: No discharge.         Left eye: No discharge.      Conjunctiva/sclera: Conjunctivae normal.      Pupils: Pupils are equal, round, and reactive to light.   Neck:      Vascular: No JVD.      Trachea: No tracheal deviation.   Cardiovascular:      Rate and Rhythm: Normal rate and regular rhythm.   Pulmonary:      Effort: Pulmonary effort is normal.   Abdominal:      General: There is no distension.      Palpations: Abdomen is soft.      Tenderness: There is no abdominal tenderness.   Musculoskeletal:         General: Swelling and tenderness present.      Cervical back: Neck supple.   Skin:     General: Skin is warm and dry.      Nails: There is no clubbing.   Neurological:      Mental Status: He is alert.      Motor: Weakness (Both lower extremities left greater than right mainly limited by pain) present. No  abnormal muscle tone.   Psychiatric:      Comments: Calm         Fluids    Intake/Output Summary (Last 24 hours) at 7/20/2022 1136  Last data filed at 7/20/2022 0509  Gross per 24 hour   Intake 360 ml   Output 1800 ml   Net -1440 ml       Laboratory      Recent Labs     07/18/22  0140   SODIUM 133*   POTASSIUM 4.4   CHLORIDE 99   CO2 25   GLUCOSE 111*   BUN 17   CREATININE 0.78   CALCIUM 8.2*                   Imaging  IR-PICC LINE PLACEMENT W/ GUIDANCE > AGE 5   Final Result                  Ultrasound-guided PICC placement performed by qualified nursing staff as    above.          MR-LUMBAR SPINE-WITH & W/O   Final Result      1.  Left L4-5 septic facet joint arthritis with associated left lateral epidural phlegmon/abscess and paravertebral cellulitis. There is severe central canal stenosis at this level secondary to the epidural phlegmon/abscess and degenerative disease.    There has been no significant interval change.   2.  Severe central canal stenosis at L3-4 secondary to the degeneration.   3.  Multifocal degenerative disease as described above.      EC-CARLI W/O CONT   Final Result      MR-LUMBAR SPINE-WITH & W/O   Final Result         Multilevel degenerative changes in lumbar spine as described above. There is severe canal stenosis at the L3-4, L4-5 level with cauda equina compression.      At L2-3 there is moderate canal stenosis with cauda equina compression.      Disc bulge extends into the right extra foraminal zone at the L3-4 and L4-5 level with impingement upon exiting nerves in the extraforaminal zone.      Abnormal edema is identified in the bilateral paraspinal soft tissues at the L3-4, L4-5 and L5-S1 levels with mild enhancement identified in the right-sided paraspinous soft tissues. These findings are concerning for an ongoing infectious inflammatory    process involving the facet joints.      Minimal epidural thickening is identified dorsally in the spinal canal behind L3 and L4. There is also a  "hypoenhancing area along the left posterolateral spinal canal that impinges upon the thecal sac. This could represent a developing epidural abscess.    Recommend follow-up examination in 2-3 days.      CT-HEAD W/O   Final Result      1. No CT evidence of acute infarct, hemorrhage or mass.   2. Mild global parenchymal atrophy. Chronic small vessel ischemic changes.      EC-ECHOCARDIOGRAM COMPLETE W/ CONT   Final Result      DX-KNEE 3 VIEWS LEFT   Final Result      1.  Severe tricompartmental left knee osteoarthritis      2.  osteoarthritis the proximal tibiofibular articulation      3.  Multiple intra-articular ossific body      4.  Diffuse atherosclerotic plaque      DX-CHEST-PORTABLE (1 VIEW)   Final Result      No acute cardiopulmonary abnormality identified.      CL-CARDIOVERSION    (Results Pending)        Assessment/Plan  * MSSA bacteremia, epidural abscess/septic arthritis of L knee/UTI  Assessment & Plan  Blood culture on admission 7/4 positive for MSSA 2/2  Source likely septic arthritis  Status post left knee I&D on 7/6/2022 Ortho service following    MRI lumbar spine with spinal stenosis and concern for possible developing epidural abscess evaluated by spine surgery with recommendation for medical treatment and outpatient follow-up     Blood culture 7/10 negative to date continue to monitor  ID following c continue nafcillin repeat MRI per ID recommendations    Will need PICC line when blood cultures negative for 48 hours\"    ID plans PICC placement and antibiotic course  Follow up with Ortho SPine, currently no indication for surgery and patient reluctant  7/18. SNF/rehab planned.    Hypomagnesemia  Assessment & Plan  Replete and monitor    Pyogenic arthritis of left knee joint (HCC)  Assessment & Plan  Synovial fluid culture MSSA  Status post I&D by Ortho on 7/6/2022    Reviewed importance of mobilization  Continue IV nafcillin  Discussed with Ortho no plans for further interventions    Falls  Assessment " "& Plan  PT OT   Encourage mobilization reviewed with patient    Acute cystitis without hematuria  Assessment & Plan  Urine culture MSSA   On nafcillin    Hyponatremia  Assessment & Plan  Continue free fluid restriction  Sodium 128 overall improved continue to monitor\"    Mild category    Type 2 diabetes mellitus with hyperglycemia, without long-term current use of insulin (HCC)  Assessment & Plan  Last a1c 6.3  Stable on Lantus and sliding scale insulin continue to monitor\"    BGs 100-200 stable.    Atrial fibrillation with RVR (HCC)  Assessment & Plan  Echo EF 55% no valvular abnormalities  Evaluated by cardiology  status post CALRI directed cardioversion on 7/8/2022  Continue metoprolol and amiodarone  On amiodarone taper  Continue Eliquis\"    Vitals:    07/18/22 0732   BP: 132/84   Pulse: 78   Resp: 18   Temp: 36.1 °C (97 °F)   SpO2: 97%     Resume Eliquis as no surgery indicated        Thrombocytopenia (HCC)  Assessment & Plan  Improved platelets 148      Hepatocellular carcinoma (HCC)- (present on admission)  Assessment & Plan  History of hepatocellular carcinoma.   Patient has history of cirrhosis  Following with oncology as an outpatient.    Cirrhosis of liver without ascites (HCC)- (present on admission)  Assessment & Plan  History of hepatitis C virus and received treatment     Outpatient follow-up\"    Follow up with GI/hepatology    Essential hypertension, benign- (present on admission)  Assessment & Plan  Improved continue lisinopril and metoprolol and monitor blood pressure\"    Vitals:    07/18/22 0732   BP: 132/84   Pulse: 78   Resp: 18   Temp: 36.1 °C (97 °F)   SpO2: 97%   Stable BPs  Increased lisinopril         VTE prophylaxis: therapeutic anticoagulation with eliquis    I have performed a physical exam and reviewed and updated ROS and Plan today (7/20/2022). In review of yesterday's note (7/19/2022), there are no changes except as documented above.          "

## 2022-07-20 NOTE — DISCHARGE PLANNING
0844  Agency/Facility Name: [facility]  Spoke To: Luis  Outcome: Will start insurance auth today.

## 2022-07-21 LAB
GLUCOSE BLD STRIP.AUTO-MCNC: 119 MG/DL (ref 65–99)
GLUCOSE BLD STRIP.AUTO-MCNC: 165 MG/DL (ref 65–99)
GLUCOSE BLD STRIP.AUTO-MCNC: 165 MG/DL (ref 65–99)
GLUCOSE BLD STRIP.AUTO-MCNC: 204 MG/DL (ref 65–99)

## 2022-07-21 PROCEDURE — 700102 HCHG RX REV CODE 250 W/ 637 OVERRIDE(OP): Performed by: HOSPITALIST

## 2022-07-21 PROCEDURE — 700102 HCHG RX REV CODE 250 W/ 637 OVERRIDE(OP): Performed by: INTERNAL MEDICINE

## 2022-07-21 PROCEDURE — 770006 HCHG ROOM/CARE - MED/SURG/GYN SEMI*

## 2022-07-21 PROCEDURE — A9270 NON-COVERED ITEM OR SERVICE: HCPCS | Performed by: HOSPITALIST

## 2022-07-21 PROCEDURE — A9270 NON-COVERED ITEM OR SERVICE: HCPCS | Performed by: INTERNAL MEDICINE

## 2022-07-21 PROCEDURE — 700102 HCHG RX REV CODE 250 W/ 637 OVERRIDE(OP): Performed by: STUDENT IN AN ORGANIZED HEALTH CARE EDUCATION/TRAINING PROGRAM

## 2022-07-21 PROCEDURE — 97535 SELF CARE MNGMENT TRAINING: CPT | Mod: CO

## 2022-07-21 PROCEDURE — 700101 HCHG RX REV CODE 250: Performed by: HOSPITALIST

## 2022-07-21 PROCEDURE — 82962 GLUCOSE BLOOD TEST: CPT

## 2022-07-21 PROCEDURE — 700105 HCHG RX REV CODE 258: Performed by: HOSPITALIST

## 2022-07-21 PROCEDURE — A9270 NON-COVERED ITEM OR SERVICE: HCPCS | Performed by: STUDENT IN AN ORGANIZED HEALTH CARE EDUCATION/TRAINING PROGRAM

## 2022-07-21 PROCEDURE — A9270 NON-COVERED ITEM OR SERVICE: HCPCS | Performed by: NURSE PRACTITIONER

## 2022-07-21 PROCEDURE — 99232 SBSQ HOSP IP/OBS MODERATE 35: CPT | Performed by: HOSPITALIST

## 2022-07-21 PROCEDURE — 700102 HCHG RX REV CODE 250 W/ 637 OVERRIDE(OP): Performed by: NURSE PRACTITIONER

## 2022-07-21 RX ADMIN — NAFCILLIN SODIUM 12 G: 2 INJECTION, POWDER, FOR SOLUTION INTRAMUSCULAR; INTRAVENOUS at 13:15

## 2022-07-21 RX ADMIN — OXYCODONE 5 MG: 5 TABLET ORAL at 04:31

## 2022-07-21 RX ADMIN — APIXABAN 5 MG: 5 TABLET, FILM COATED ORAL at 04:32

## 2022-07-21 RX ADMIN — INSULIN HUMAN 2 UNITS: 100 INJECTION, SOLUTION PARENTERAL at 17:34

## 2022-07-21 RX ADMIN — INSULIN HUMAN 3 UNITS: 100 INJECTION, SOLUTION PARENTERAL at 20:30

## 2022-07-21 RX ADMIN — AMIODARONE HYDROCHLORIDE 400 MG: 200 TABLET ORAL at 04:32

## 2022-07-21 RX ADMIN — INSULIN HUMAN 2 UNITS: 100 INJECTION, SOLUTION PARENTERAL at 13:01

## 2022-07-21 RX ADMIN — EZETIMIBE 10 MG: 10 TABLET ORAL at 17:29

## 2022-07-21 RX ADMIN — OXYCODONE 5 MG: 5 TABLET ORAL at 17:29

## 2022-07-21 RX ADMIN — APIXABAN 5 MG: 5 TABLET, FILM COATED ORAL at 17:29

## 2022-07-21 RX ADMIN — METOPROLOL TARTRATE 50 MG: 50 TABLET, FILM COATED ORAL at 04:32

## 2022-07-21 RX ADMIN — AMIODARONE HYDROCHLORIDE 400 MG: 200 TABLET ORAL at 17:28

## 2022-07-21 RX ADMIN — OXYCODONE 5 MG: 5 TABLET ORAL at 22:22

## 2022-07-21 RX ADMIN — OXYCODONE 5 MG: 5 TABLET ORAL at 08:17

## 2022-07-21 RX ADMIN — INSULIN GLARGINE-YFGN 13 UNITS: 100 INJECTION, SOLUTION SUBCUTANEOUS at 17:34

## 2022-07-21 RX ADMIN — METOPROLOL TARTRATE 50 MG: 50 TABLET, FILM COATED ORAL at 17:29

## 2022-07-21 RX ADMIN — OXYCODONE 5 MG: 5 TABLET ORAL at 13:03

## 2022-07-21 RX ADMIN — LISINOPRIL 40 MG: 20 TABLET ORAL at 17:29

## 2022-07-21 ASSESSMENT — PAIN DESCRIPTION - PAIN TYPE
TYPE: ACUTE PAIN
TYPE: ACUTE PAIN

## 2022-07-21 ASSESSMENT — ENCOUNTER SYMPTOMS
FEVER: 0
CHILLS: 0
MYALGIAS: 1

## 2022-07-21 NOTE — CARE PLAN
The patient is Stable - Low risk of patient condition declining or worsening    Shift Goals  Clinical Goals: pain management  Patient Goals: pain mangement  Family Goals: no family present at this time    Progress made toward(s) clinical / shift goals:  Pt receiving prn oxycodone to manage pain which has been effective.      Patient is not progressing towards the following goals:      Problem: Pain - Standard  Goal: Alleviation of pain or a reduction in pain to the patient’s comfort goal  Outcome: Not Progressing     Problem: Knowledge Deficit - Standard  Goal: Patient and family/care givers will demonstrate understanding of plan of care, disease process/condition, diagnostic tests and medications  Outcome: Not Progressing     Problem: Fall Risk  Goal: Patient will remain free from falls  Outcome: Not Progressing     Problem: Communication  Goal: The ability to communicate needs accurately and effectively will improve  Outcome: Not Progressing     Problem: Hemodynamics  Goal: Patient's hemodynamics, fluid balance and neurologic status will be stable or improve  Outcome: Not Progressing     Problem: Respiratory  Goal: Patient will achieve/maintain optimum respiratory ventilation and gas exchange  Outcome: Not Progressing     Problem: Urinary Elimination  Goal: Establish and maintain regular urinary output  Outcome: Not Progressing     Problem: Bowel Elimination  Goal: Establish and maintain regular bowel function  Outcome: Not Progressing     Problem: Skin Integrity  Goal: Skin integrity is maintained or improved  Outcome: Not Progressing

## 2022-07-21 NOTE — THERAPY
"Physical Therapy   Daily Treatment     Patient Name: Nitesh Duron  Age:  72 y.o., Sex:  male  Medical Record #: [id number]  Today's Date: 7/20/2022     Precautions  Precautions: Fall Risk;Weight Bearing As Tolerated Left Lower Extremity  Comments: .      Plan    Continue current treatment plan.    DC Equipment Recommendations: Unable to determine at this time  Discharge Recommendations: Recommend post-acute placement for additional physical therapy services prior to discharge home         07/20/22 1529   Other Treatments   Other Treatments Provided Attempted therapy follow up session this pm. Extensive time educating pt on mobility and risks of laying supine. PT reports attempted to get up with nursing this am but in too much pain. He than proceeded to state he wants a  \"hard cast\" on his left knee because it will help him heal faster. Educated on physicians allowing him to range his knee and bear weight. As well educated on muscle fatigue from being in a cast. Pt seemed not open to education and was perseverating on cast and or not mobilizing until antibiotics are finished and he is in no pain. Again reinterated supine exercises and mobility at this time, pt declining today. Will continue to follow while in house.     "

## 2022-07-21 NOTE — THERAPY
Missed Therapy     Patient Name: Nitesh Duron  Age:  72 y.o., Sex:  male  Medical Record #: [id number]  Today's Date: 7/21/2022      Pt seen for OT tx. Attempted supine > sit, pt declined at this time d/t wanting to pay bills. Discussed goals and POC. Despite encouragement and education about increasing OOB activity tolerance and strength. Pt agreeable to a later time, will try again later as time permits.        07/21/22 0825   Cognition    Cognition / Consciousness X   Attention Impaired   Comments poor safety awareness and insight   Balance   Comments attempted supine > sit, pt declined and perseverating on wanting to pay his bills. Pt agreeable to a later time. Will try again later as able.   Short Term Goals   Short Term Goal # 1 Pt will LB dress w/ Rashid   Goal Outcome # 1 Progressing slower than expected   Short Term Goal # 2 Pt will demo functional txf w/ [facility]   Goal Outcome # 2 Progressing slower than expected   Short Term Goal # 3 Pt will demo standing grooming > 5min w/ [facility]   Goal Outcome # 3 Progressing slower than expected   Anticipated Discharge Equipment and Recommendations   Discharge Recommendations Recommend post-acute placement for additional occupational therapy services prior to discharge home

## 2022-07-21 NOTE — PROGRESS NOTES
"Hospital Medicine Daily Progress Note    Date of Service  7/21/2022    Chief Complaint  Nitesh Duron is a 72 y.o. male admitted 7/4/2022 with weakness and fall    Hospital Course  \"72-year-old male with history of diabetes, dyslipidemia hepatitis C and cirrhosis with atrial fibrillation presented 7/4 with fall and weakness.  Patient states he fell 4 days ago at home and he landed on his left knee.  He noticed swelling on his left knee and severe back pain with some weakness on the left leg, patient has been on the ground most of the time and not able to get up.  Patient lives by himself.  Patient was found on the ground by his girlfriend and she called EMS.  On admission patient was found to have A. fib with RVR.  Dehydration however blood pressure was stable.  Labs did not show leukocytosis however showed hyponatremia, creatinine 1.2 with CPK 2411 and lactic acid 4.4.  IV fluid was started.  Blood culture came back positive with MSSA, cefazolin was initiated and ID was consulted.  Echo is pending.\"    Patient underwent arthrocentesis by Ortho on his left knee and showed more than 37,000 white blood cell with no red blood cell high suspicious of septic arthritis specially with positive blood culture, ID on board patient was recently on cefazolin transition to nafcillin.  Blood cultures from 7/10/2022 are negative to date.    Interval Problem Update:  7/13. MRI resulted yesterday with epidural abscess now seen compared to previous MRI.  Updated Dr. Hernández who will evaluate.  Discussed with ID.  Patient himself is reluctant to have surgery. Currently no cauda equina or compressive symptoms.  Reviewed MRI, because he is doing well clinically and he resisted surgery, plan is to continue antibiotic course as before per Dr. Hernández.  7/14. SNF recommended. 6 week course IV antibiotics STOP date 8/21. Outpatient MRI after course with follow-up to ID and [facility] Orthoped[facility].   7/18. Watching TV. Eating his meals.  7/19 No " change, sitting in bed, comfortable  7/20 staples removed yesterday continue discharge planning  7/21 no major changes overnight minimally engaged with therapy d/t 'pain'    I have discussed this patient's plan of care and discharge plan at IDT rounds today with Case Management, Nursing, Nursing leadership, and other members of the IDT team.    Consultants/Specialty  cardiology, critical care and orthopedics   Spine surgery  ID    Code Status  Full Code    Disposition  Patient is medically cleared for discharge.   Anticipate discharge to to skilled nursing facility.  I have placed the appropriate orders for post-discharge needs.    Review of Systems  Review of Systems   Constitutional: Negative for chills and fever.   Musculoskeletal: Positive for joint pain and myalgias.      Physical Exam  Temp:  [36.3 °C (97.4 °F)-36.8 °C (98.3 °F)] 36.8 °C (98.3 °F)  Pulse:  [78-98] 78  Resp:  [18] 18  BP: (116-138)/(78-86) 137/79  SpO2:  [94 %-97 %] 94 %    Physical Exam  Vitals and nursing note reviewed.   Constitutional:       Appearance: He is well-developed. He is not diaphoretic.   HENT:      Head: Normocephalic and atraumatic.      Mouth/Throat:      Pharynx: No oropharyngeal exudate.   Eyes:      General: No scleral icterus.        Right eye: No discharge.         Left eye: No discharge.   Neck:      Vascular: No JVD.      Trachea: No tracheal deviation.   Cardiovascular:      Rate and Rhythm: Normal rate and regular rhythm.   Pulmonary:      Effort: Pulmonary effort is normal.   Abdominal:      General: There is no distension.      Palpations: Abdomen is soft.      Tenderness: There is no abdominal tenderness.   Musculoskeletal:         General: Swelling and tenderness present.      Cervical back: Neck supple.   Skin:     General: Skin is warm and dry.      Nails: There is no clubbing.   Neurological:      Mental Status: He is alert.      Motor: Weakness (Both lower extremities left greater than right mainly limited by  pain) present. No abnormal muscle tone.   Psychiatric:      Comments: Calm         Fluids    Intake/Output Summary (Last 24 hours) at 7/21/2022 1425  Last data filed at 7/21/2022 1334  Gross per 24 hour   Intake --   Output 1850 ml   Net -1850 ml       Laboratory                        Imaging  IR-PICC LINE PLACEMENT W/ GUIDANCE > AGE 5   Final Result                  Ultrasound-guided PICC placement performed by qualified nursing staff as    above.          MR-LUMBAR SPINE-WITH & W/O   Final Result      1.  Left L4-5 septic facet joint arthritis with associated left lateral epidural phlegmon/abscess and paravertebral cellulitis. There is severe central canal stenosis at this level secondary to the epidural phlegmon/abscess and degenerative disease.    There has been no significant interval change.   2.  Severe central canal stenosis at L3-4 secondary to the degeneration.   3.  Multifocal degenerative disease as described above.      EC-CARLI W/O CONT   Final Result      MR-LUMBAR SPINE-WITH & W/O   Final Result         Multilevel degenerative changes in lumbar spine as described above. There is severe canal stenosis at the L3-4, L4-5 level with cauda equina compression.      At L2-3 there is moderate canal stenosis with cauda equina compression.      Disc bulge extends into the right extra foraminal zone at the L3-4 and L4-5 level with impingement upon exiting nerves in the extraforaminal zone.      Abnormal edema is identified in the bilateral paraspinal soft tissues at the L3-4, L4-5 and L5-S1 levels with mild enhancement identified in the right-sided paraspinous soft tissues. These findings are concerning for an ongoing infectious inflammatory    process involving the facet joints.      Minimal epidural thickening is identified dorsally in the spinal canal behind L3 and L4. There is also a hypoenhancing area along the left posterolateral spinal canal that impinges upon the thecal sac. This could represent a  "developing epidural abscess.    Recommend follow-up examination in 2-3 days.      CT-HEAD W/O   Final Result      1. No CT evidence of acute infarct, hemorrhage or mass.   2. Mild global parenchymal atrophy. Chronic small vessel ischemic changes.      EC-ECHOCARDIOGRAM COMPLETE W/ CONT   Final Result      DX-KNEE 3 VIEWS LEFT   Final Result      1.  Severe tricompartmental left knee osteoarthritis      2.  osteoarthritis the proximal tibiofibular articulation      3.  Multiple intra-articular ossific body      4.  Diffuse atherosclerotic plaque      DX-CHEST-PORTABLE (1 VIEW)   Final Result      No acute cardiopulmonary abnormality identified.      CL-CARDIOVERSION    (Results Pending)        Assessment/Plan  * MSSA bacteremia, epidural abscess/septic arthritis of L knee/UTI  Assessment & Plan  Blood culture on admission 7/4 positive for MSSA 2/2  Source likely septic arthritis  Status post left knee I&D on 7/6/2022 Ortho service following    MRI lumbar spine with spinal stenosis and concern for possible developing epidural abscess evaluated by spine surgery with recommendation for medical treatment and outpatient follow-up     Blood culture 7/10 negative to date continue to monitor  ID following c continue nafcillin repeat MRI per ID recommendations    Will need PICC line when blood cultures negative for 48 hours\"    ID plans PICC placement and antibiotic course  Follow up with Ortho SPine, currently no indication for surgery and patient reluctant  7/18. SNF/rehab planned.    Hypomagnesemia  Assessment & Plan  Replete and monitor    Pyogenic arthritis of left knee joint (HCC)  Assessment & Plan  Synovial fluid culture MSSA  Status post I&D by Ortho on 7/6/2022    Reviewed importance of mobilization  Continue IV nafcillin  Discussed with Ortho no plans for further interventions    Falls  Assessment & Plan  PT OT   Encourage mobilization reviewed with patient    Acute cystitis without hematuria  Assessment & " "Plan  Urine culture MSSA   On nafcillin    Hyponatremia  Assessment & Plan  Continue free fluid restriction  Sodium 128 overall improved continue to monitor\"    Mild category    Type 2 diabetes mellitus with hyperglycemia, without long-term current use of insulin (HCC)  Assessment & Plan  Last a1c 6.3  Stable on Lantus and sliding scale insulin continue to monitor\"    BGs 100-200 stable.    Atrial fibrillation with RVR (HCC)  Assessment & Plan  Echo EF 55% no valvular abnormalities  Evaluated by cardiology  status post CARLI directed cardioversion on 7/8/2022  Continue metoprolol and amiodarone  On amiodarone taper  Continue Eliquis\"    Vitals:    07/18/22 0732   BP: 132/84   Pulse: 78   Resp: 18   Temp: 36.1 °C (97 °F)   SpO2: 97%     Resume Eliquis as no surgery indicated        Thrombocytopenia (HCC)  Assessment & Plan  Improved platelets 148      Hepatocellular carcinoma (HCC)- (present on admission)  Assessment & Plan  History of hepatocellular carcinoma.   Patient has history of cirrhosis  Following with oncology as an outpatient.    Cirrhosis of liver without ascites (HCC)- (present on admission)  Assessment & Plan  History of hepatitis C virus and received treatment     Outpatient follow-up\"    Follow up with GI/hepatology    Essential hypertension, benign- (present on admission)  Assessment & Plan  Improved continue lisinopril and metoprolol and monitor blood pressure\"    Vitals:    07/18/22 0732   BP: 132/84   Pulse: 78   Resp: 18   Temp: 36.1 °C (97 °F)   SpO2: 97%   Stable BPs  Increased lisinopril         VTE prophylaxis: therapeutic anticoagulation with eliquis    I have performed a physical exam and reviewed and updated ROS and Plan today (7/21/2022). In review of yesterday's note (7/20/2022), there are no changes except as documented above.          "

## 2022-07-22 LAB
GLUCOSE BLD STRIP.AUTO-MCNC: 122 MG/DL (ref 65–99)
GLUCOSE BLD STRIP.AUTO-MCNC: 164 MG/DL (ref 65–99)
GLUCOSE BLD STRIP.AUTO-MCNC: 165 MG/DL (ref 65–99)
GLUCOSE BLD STRIP.AUTO-MCNC: 173 MG/DL (ref 65–99)
SARS-COV+SARS-COV-2 AG RESP QL IA.RAPID: NOTDETECTED
SPECIMEN SOURCE: NORMAL

## 2022-07-22 PROCEDURE — 700102 HCHG RX REV CODE 250 W/ 637 OVERRIDE(OP): Performed by: INTERNAL MEDICINE

## 2022-07-22 PROCEDURE — 700101 HCHG RX REV CODE 250: Performed by: HOSPITALIST

## 2022-07-22 PROCEDURE — 99232 SBSQ HOSP IP/OBS MODERATE 35: CPT | Performed by: HOSPITALIST

## 2022-07-22 PROCEDURE — A9270 NON-COVERED ITEM OR SERVICE: HCPCS | Performed by: INTERNAL MEDICINE

## 2022-07-22 PROCEDURE — 700102 HCHG RX REV CODE 250 W/ 637 OVERRIDE(OP): Performed by: HOSPITALIST

## 2022-07-22 PROCEDURE — A9270 NON-COVERED ITEM OR SERVICE: HCPCS | Performed by: STUDENT IN AN ORGANIZED HEALTH CARE EDUCATION/TRAINING PROGRAM

## 2022-07-22 PROCEDURE — A9270 NON-COVERED ITEM OR SERVICE: HCPCS | Performed by: NURSE PRACTITIONER

## 2022-07-22 PROCEDURE — 82962 GLUCOSE BLOOD TEST: CPT | Mod: 91

## 2022-07-22 PROCEDURE — A9270 NON-COVERED ITEM OR SERVICE: HCPCS | Performed by: HOSPITALIST

## 2022-07-22 PROCEDURE — 700105 HCHG RX REV CODE 258: Performed by: HOSPITALIST

## 2022-07-22 PROCEDURE — 770006 HCHG ROOM/CARE - MED/SURG/GYN SEMI*

## 2022-07-22 PROCEDURE — 700102 HCHG RX REV CODE 250 W/ 637 OVERRIDE(OP): Performed by: NURSE PRACTITIONER

## 2022-07-22 PROCEDURE — 700102 HCHG RX REV CODE 250 W/ 637 OVERRIDE(OP): Performed by: STUDENT IN AN ORGANIZED HEALTH CARE EDUCATION/TRAINING PROGRAM

## 2022-07-22 PROCEDURE — 87426 SARSCOV CORONAVIRUS AG IA: CPT

## 2022-07-22 RX ADMIN — OXYCODONE 5 MG: 5 TABLET ORAL at 17:43

## 2022-07-22 RX ADMIN — OXYCODONE 5 MG: 5 TABLET ORAL at 20:56

## 2022-07-22 RX ADMIN — METOPROLOL TARTRATE 50 MG: 50 TABLET, FILM COATED ORAL at 04:09

## 2022-07-22 RX ADMIN — OXYCODONE 5 MG: 5 TABLET ORAL at 04:07

## 2022-07-22 RX ADMIN — LISINOPRIL 40 MG: 20 TABLET ORAL at 17:44

## 2022-07-22 RX ADMIN — APIXABAN 5 MG: 5 TABLET, FILM COATED ORAL at 04:08

## 2022-07-22 RX ADMIN — METOPROLOL TARTRATE 50 MG: 50 TABLET, FILM COATED ORAL at 17:43

## 2022-07-22 RX ADMIN — AMIODARONE HYDROCHLORIDE 400 MG: 200 TABLET ORAL at 04:06

## 2022-07-22 RX ADMIN — OXYCODONE 5 MG: 5 TABLET ORAL at 07:49

## 2022-07-22 RX ADMIN — APIXABAN 5 MG: 5 TABLET, FILM COATED ORAL at 17:43

## 2022-07-22 RX ADMIN — INSULIN GLARGINE-YFGN 13 UNITS: 100 INJECTION, SOLUTION SUBCUTANEOUS at 17:48

## 2022-07-22 RX ADMIN — INSULIN HUMAN 2 UNITS: 100 INJECTION, SOLUTION PARENTERAL at 17:49

## 2022-07-22 RX ADMIN — AMIODARONE HYDROCHLORIDE 400 MG: 200 TABLET ORAL at 17:44

## 2022-07-22 RX ADMIN — OXYCODONE 5 MG: 5 TABLET ORAL at 12:37

## 2022-07-22 RX ADMIN — INSULIN HUMAN 2 UNITS: 100 INJECTION, SOLUTION PARENTERAL at 20:57

## 2022-07-22 RX ADMIN — EZETIMIBE 10 MG: 10 TABLET ORAL at 17:44

## 2022-07-22 RX ADMIN — NAFCILLIN SODIUM 12 G: 2 INJECTION, POWDER, FOR SOLUTION INTRAMUSCULAR; INTRAVENOUS at 12:37

## 2022-07-22 ASSESSMENT — PAIN DESCRIPTION - PAIN TYPE
TYPE: ACUTE PAIN

## 2022-07-22 ASSESSMENT — ENCOUNTER SYMPTOMS
MYALGIAS: 1
FEVER: 0
CHILLS: 0

## 2022-07-22 NOTE — PROGRESS NOTES
Received report from PRUDENCE Leiva. Pt is A&O x4. Pt on RA, no signs of acute distress. Pt has no complaints of pain. Posterior of thigh presents with small white, blister-like formations on skin. Wound care will be consulted. POC discussed with patient. Safety precautions in place, bed in lowest position, and call light and personal belongings within reach. Hourly rounding in place.

## 2022-07-22 NOTE — PROGRESS NOTES
"Hospital Medicine Daily Progress Note    Date of Service  7/22/2022    Chief Complaint  iNtesh Duron is a 72 y.o. male admitted 7/4/2022 with weakness and fall    Hospital Course  \"72-year-old male with history of diabetes, dyslipidemia hepatitis C and cirrhosis with atrial fibrillation presented 7/4 with fall and weakness.  Patient states he fell 4 days ago at home and he landed on his left knee.  He noticed swelling on his left knee and severe back pain with some weakness on the left leg, patient has been on the ground most of the time and not able to get up.  Patient lives by himself.  Patient was found on the ground by his girlfriend and she called EMS.  On admission patient was found to have A. fib with RVR.  Dehydration however blood pressure was stable.  Labs did not show leukocytosis however showed hyponatremia, creatinine 1.2 with CPK 2411 and lactic acid 4.4.  IV fluid was started.  Blood culture came back positive with MSSA, cefazolin was initiated and ID was consulted.  Echo is pending.\"    Patient underwent arthrocentesis by Ortho on his left knee and showed more than 37,000 white blood cell with no red blood cell high suspicious of septic arthritis specially with positive blood culture, ID on board patient was recently on cefazolin transition to nafcillin.  Blood cultures from 7/10/2022 are negative to date.    Interval Problem Update:  7/13. MRI resulted yesterday with epidural abscess now seen compared to previous MRI.  Updated Dr. Hernández who will evaluate.  Discussed with ID.  Patient himself is reluctant to have surgery. Currently no cauda equina or compressive symptoms.  Reviewed MRI, because he is doing well clinically and he resisted surgery, plan is to continue antibiotic course as before per Dr. Hernández.  7/14. SNF recommended. 6 week course IV antibiotics STOP date 8/21. Outpatient MRI after course with follow-up to [facility].   7/18. Watching TV. Eating his meals.  7/19 No " change, sitting in bed, comfortable  7/20 staples removed yesterday continue discharge planning  7/21 no major changes overnight minimally engaged with therapy d/t 'pain'  7/22 Working on DC plan, pending SNF    I have discussed this patient's plan of care and discharge plan at IDT rounds today with Case Management, Nursing, Nursing leadership, and other members of the IDT team.    Consultants/Specialty  cardiology, critical care and orthopedics   Spine surgery  ID    Code Status  Full Code    Disposition  Patient is medically cleared for discharge.   Anticipate discharge to to skilled nursing facility.  I have placed the appropriate orders for post-discharge needs.    Review of Systems  Review of Systems   Constitutional: Negative for chills and fever.   Musculoskeletal: Positive for joint pain and myalgias.      Physical Exam  Temp:  [36.6 °C (97.9 °F)-36.8 °C (98.2 °F)] 36.6 °C (97.9 °F)  Pulse:  [63-91] 77  Resp:  [17-18] 17  BP: (122-151)/(65-84) 141/65  SpO2:  [96 %-98 %] 98 %    Physical Exam  Vitals and nursing note reviewed.   Constitutional:       Appearance: He is well-developed. He is not diaphoretic.   HENT:      Head: Normocephalic and atraumatic.      Mouth/Throat:      Pharynx: No oropharyngeal exudate.   Eyes:      General: No scleral icterus.        Right eye: No discharge.         Left eye: No discharge.   Neck:      Vascular: No JVD.      Trachea: No tracheal deviation.   Cardiovascular:      Rate and Rhythm: Normal rate and regular rhythm.   Pulmonary:      Effort: Pulmonary effort is normal.   Abdominal:      General: There is no distension.      Palpations: Abdomen is soft.      Tenderness: There is no abdominal tenderness.   Musculoskeletal:         General: Swelling and tenderness present.      Cervical back: Neck supple.   Skin:     General: Skin is warm and dry.      Nails: There is no clubbing.   Neurological:      Mental Status: He is alert.      Motor: Weakness (Both lower extremities  left greater than right mainly limited by pain) present. No abnormal muscle tone.   Psychiatric:      Comments: Calm         Fluids    Intake/Output Summary (Last 24 hours) at 7/22/2022 1434  Last data filed at 7/22/2022 1000  Gross per 24 hour   Intake 480 ml   Output 825 ml   Net -345 ml       Laboratory                        Imaging  IR-PICC LINE PLACEMENT W/ GUIDANCE > AGE 5   Final Result                  Ultrasound-guided PICC placement performed by qualified nursing staff as    above.          MR-LUMBAR SPINE-WITH & W/O   Final Result      1.  Left L4-5 septic facet joint arthritis with associated left lateral epidural phlegmon/abscess and paravertebral cellulitis. There is severe central canal stenosis at this level secondary to the epidural phlegmon/abscess and degenerative disease.    There has been no significant interval change.   2.  Severe central canal stenosis at L3-4 secondary to the degeneration.   3.  Multifocal degenerative disease as described above.      EC-CARLI W/O CONT   Final Result      MR-LUMBAR SPINE-WITH & W/O   Final Result         Multilevel degenerative changes in lumbar spine as described above. There is severe canal stenosis at the L3-4, L4-5 level with cauda equina compression.      At L2-3 there is moderate canal stenosis with cauda equina compression.      Disc bulge extends into the right extra foraminal zone at the L3-4 and L4-5 level with impingement upon exiting nerves in the extraforaminal zone.      Abnormal edema is identified in the bilateral paraspinal soft tissues at the L3-4, L4-5 and L5-S1 levels with mild enhancement identified in the right-sided paraspinous soft tissues. These findings are concerning for an ongoing infectious inflammatory    process involving the facet joints.      Minimal epidural thickening is identified dorsally in the spinal canal behind L3 and L4. There is also a hypoenhancing area along the left posterolateral spinal canal that impinges upon  "the thecal sac. This could represent a developing epidural abscess.    Recommend follow-up examination in 2-3 days.      CT-HEAD W/O   Final Result      1. No CT evidence of acute infarct, hemorrhage or mass.   2. Mild global parenchymal atrophy. Chronic small vessel ischemic changes.      EC-ECHOCARDIOGRAM COMPLETE W/ CONT   Final Result      DX-KNEE 3 VIEWS LEFT   Final Result      1.  Severe tricompartmental left knee osteoarthritis      2.  osteoarthritis the proximal tibiofibular articulation      3.  Multiple intra-articular ossific body      4.  Diffuse atherosclerotic plaque      DX-CHEST-PORTABLE (1 VIEW)   Final Result      No acute cardiopulmonary abnormality identified.      CL-CARDIOVERSION    (Results Pending)        Assessment/Plan  * MSSA bacteremia, epidural abscess/septic arthritis of L knee/UTI  Assessment & Plan  Blood culture on admission 7/4 positive for MSSA 2/2  Source likely septic arthritis  Status post left knee I&D on 7/6/2022 Ortho service following    MRI lumbar spine with spinal stenosis and concern for possible developing epidural abscess evaluated by spine surgery with recommendation for medical treatment and outpatient follow-up     Blood culture 7/10 negative to date continue to monitor  ID following c continue nafcillin repeat MRI per ID recommendations    Will need PICC line when blood cultures negative for 48 hours\"    ID plans PICC placement and antibiotic course  Follow up with Ortho SPine, currently no indication for surgery and patient reluctant  7/18. SNF/rehab planned.    Hypomagnesemia  Assessment & Plan  Replete and monitor    Pyogenic arthritis of left knee joint (HCC)  Assessment & Plan  Synovial fluid culture MSSA  Status post I&D by Ortho on 7/6/2022    Reviewed importance of mobilization  Continue IV nafcillin  Discussed with Ortho no plans for further interventions    Falls  Assessment & Plan  PT OT   Encourage mobilization reviewed with patient    Acute cystitis " "without hematuria  Assessment & Plan  Urine culture MSSA   On nafcillin    Hyponatremia  Assessment & Plan  Continue free fluid restriction  Sodium 128 overall improved continue to monitor\"    Mild category    Type 2 diabetes mellitus with hyperglycemia, without long-term current use of insulin (HCC)  Assessment & Plan  Last a1c 6.3  Stable on Lantus and sliding scale insulin continue to monitor\"    BGs 100-200 stable.    Atrial fibrillation with RVR (HCC)  Assessment & Plan  Echo EF 55% no valvular abnormalities  Evaluated by cardiology  status post CARLI directed cardioversion on 7/8/2022  Continue metoprolol and amiodarone  On amiodarone taper  Continue Eliquis\"    Vitals:    07/18/22 0732   BP: 132/84   Pulse: 78   Resp: 18   Temp: 36.1 °C (97 °F)   SpO2: 97%     Resume Eliquis as no surgery indicated        Thrombocytopenia (HCC)  Assessment & Plan  Improved platelets 148      Hepatocellular carcinoma (HCC)- (present on admission)  Assessment & Plan  History of hepatocellular carcinoma.   Patient has history of cirrhosis  Following with oncology as an outpatient.    Cirrhosis of liver without ascites (HCC)- (present on admission)  Assessment & Plan  History of hepatitis C virus and received treatment     Outpatient follow-up\"    Follow up with GI/hepatology    Essential hypertension, benign- (present on admission)  Assessment & Plan  Improved continue lisinopril and metoprolol and monitor blood pressure\"    Vitals:    07/18/22 0732   BP: 132/84   Pulse: 78   Resp: 18   Temp: 36.1 °C (97 °F)   SpO2: 97%   Stable BPs  Increased lisinopril         VTE prophylaxis: therapeutic anticoagulation with eliquis    I have performed a physical exam and reviewed and updated ROS and Plan today (7/22/2022). In review of yesterday's note (7/21/2022), there are no changes except as documented above.          "

## 2022-07-22 NOTE — CARE PLAN
The patient is Stable - Low risk of patient condition declining or worsening    Shift Goals  Clinical Goals: pain management  Patient Goals: pain mangement  Family Goals: no family present at this time    Progress made toward(s) clinical / shift goals:  Pain assessed during shift. Pain medication administered per MAR.    Patient is not progressing towards the following goals: Possible skin injury found on posterior thigh. Wound consult in place.       Problem: Skin Integrity  Goal: Skin integrity is maintained or improved  Outcome: Not Progressing

## 2022-07-22 NOTE — DISCHARGE PLANNING
Case Management Discharge Planning    Admission Date: 7/4/2022  GMLOS: 8  ALOS: 18    6-Clicks ADL Score: 17  6-Clicks Mobility Score: 7  PT and/or OT Eval ordered: Yes  Post-acute Referrals Ordered: Yes  Post-acute Choice Obtained: Yes  Has referral(s) been sent to post-acute provider:  Yes      Anticipated Discharge Dispo: Discharge Disposition: D/T to SNF with Medicare cert in anticipation of skilled care (03)    DME Needed: No    Action(s) Taken: RN CM called [facility] to follow up on status of pending insurance auth.Per [facility] insurance auth was submitted 2 days, but they have not received an answer. Per [facility]ita if Covid test is done today they will still accept the results on Monday. Requested Covid test. [facility] confirmed they can accept the patient over the weekend if insurance auth goes through.     Escalations Completed: Pending Discharge Destination    Medically Clear: Yes    Next Steps: Follow for pending insurance auth.      Barriers to Discharge: Pending Insurance Authorization    Is the patient up for discharge tomorrow: No

## 2022-07-22 NOTE — CARE PLAN
The patient is Stable - Low risk of patient condition declining or worsening    Shift Goals  Clinical Goals: pain management  Patient Goals: pain management  Family Goals: no family present at this time    Progress made toward(s) clinical / shift goals:  Pt receiving oxycodone which has been effective for pain management.    Patient is not progressing towards the following goals:

## 2022-07-23 LAB
GLUCOSE BLD STRIP.AUTO-MCNC: 119 MG/DL (ref 65–99)
GLUCOSE BLD STRIP.AUTO-MCNC: 132 MG/DL (ref 65–99)
GLUCOSE BLD STRIP.AUTO-MCNC: 160 MG/DL (ref 65–99)
GLUCOSE BLD STRIP.AUTO-MCNC: 193 MG/DL (ref 65–99)

## 2022-07-23 PROCEDURE — 700101 HCHG RX REV CODE 250: Performed by: HOSPITALIST

## 2022-07-23 PROCEDURE — 700102 HCHG RX REV CODE 250 W/ 637 OVERRIDE(OP): Performed by: HOSPITALIST

## 2022-07-23 PROCEDURE — 700102 HCHG RX REV CODE 250 W/ 637 OVERRIDE(OP): Performed by: STUDENT IN AN ORGANIZED HEALTH CARE EDUCATION/TRAINING PROGRAM

## 2022-07-23 PROCEDURE — 99232 SBSQ HOSP IP/OBS MODERATE 35: CPT | Performed by: HOSPITALIST

## 2022-07-23 PROCEDURE — 700102 HCHG RX REV CODE 250 W/ 637 OVERRIDE(OP): Performed by: INTERNAL MEDICINE

## 2022-07-23 PROCEDURE — A9270 NON-COVERED ITEM OR SERVICE: HCPCS | Performed by: NURSE PRACTITIONER

## 2022-07-23 PROCEDURE — A9270 NON-COVERED ITEM OR SERVICE: HCPCS | Performed by: STUDENT IN AN ORGANIZED HEALTH CARE EDUCATION/TRAINING PROGRAM

## 2022-07-23 PROCEDURE — A9270 NON-COVERED ITEM OR SERVICE: HCPCS | Performed by: INTERNAL MEDICINE

## 2022-07-23 PROCEDURE — 82962 GLUCOSE BLOOD TEST: CPT

## 2022-07-23 PROCEDURE — A9270 NON-COVERED ITEM OR SERVICE: HCPCS | Performed by: HOSPITALIST

## 2022-07-23 PROCEDURE — 770006 HCHG ROOM/CARE - MED/SURG/GYN SEMI*

## 2022-07-23 PROCEDURE — 700102 HCHG RX REV CODE 250 W/ 637 OVERRIDE(OP): Performed by: NURSE PRACTITIONER

## 2022-07-23 PROCEDURE — 700105 HCHG RX REV CODE 258: Performed by: HOSPITALIST

## 2022-07-23 RX ADMIN — INSULIN GLARGINE-YFGN 13 UNITS: 100 INJECTION, SOLUTION SUBCUTANEOUS at 18:13

## 2022-07-23 RX ADMIN — LISINOPRIL 40 MG: 20 TABLET ORAL at 17:34

## 2022-07-23 RX ADMIN — METOPROLOL TARTRATE 50 MG: 50 TABLET, FILM COATED ORAL at 04:11

## 2022-07-23 RX ADMIN — OXYCODONE 5 MG: 5 TABLET ORAL at 09:41

## 2022-07-23 RX ADMIN — OXYCODONE 5 MG: 5 TABLET ORAL at 02:52

## 2022-07-23 RX ADMIN — METOPROLOL TARTRATE 50 MG: 50 TABLET, FILM COATED ORAL at 17:34

## 2022-07-23 RX ADMIN — AMIODARONE HYDROCHLORIDE 400 MG: 200 TABLET ORAL at 04:12

## 2022-07-23 RX ADMIN — APIXABAN 5 MG: 5 TABLET, FILM COATED ORAL at 17:35

## 2022-07-23 RX ADMIN — AMIODARONE HYDROCHLORIDE 400 MG: 200 TABLET ORAL at 17:35

## 2022-07-23 RX ADMIN — INSULIN HUMAN 2 UNITS: 100 INJECTION, SOLUTION PARENTERAL at 20:52

## 2022-07-23 RX ADMIN — OXYCODONE 5 MG: 5 TABLET ORAL at 22:27

## 2022-07-23 RX ADMIN — EZETIMIBE 10 MG: 10 TABLET ORAL at 17:35

## 2022-07-23 RX ADMIN — APIXABAN 5 MG: 5 TABLET, FILM COATED ORAL at 04:11

## 2022-07-23 RX ADMIN — OXYCODONE 5 MG: 5 TABLET ORAL at 14:33

## 2022-07-23 RX ADMIN — INSULIN HUMAN 2 UNITS: 100 INJECTION, SOLUTION PARENTERAL at 18:13

## 2022-07-23 RX ADMIN — OXYCODONE 5 MG: 5 TABLET ORAL at 17:34

## 2022-07-23 RX ADMIN — NAFCILLIN SODIUM 12 G: 2 INJECTION, POWDER, FOR SOLUTION INTRAMUSCULAR; INTRAVENOUS at 11:17

## 2022-07-23 ASSESSMENT — PAIN DESCRIPTION - PAIN TYPE
TYPE: ACUTE PAIN

## 2022-07-23 ASSESSMENT — ENCOUNTER SYMPTOMS
CHILLS: 0
MYALGIAS: 1
FEVER: 0

## 2022-07-23 NOTE — CARE PLAN
The patient is Stable - Low risk of patient condition declining or worsening    Shift Goals  Clinical Goals: pain management  Patient Goals: pain management  Family Goals: no family present at this time    Progress made toward(s) clinical / shift goals:  Pain assessed during shift. Pain medication administered per MAR.    Patient is not progressing towards the following goals:

## 2022-07-23 NOTE — PROGRESS NOTES
Received report from PRUDENCE Leiva. Pt is A&O x4. Pt on RA, no signs of acute distress. Pt complains of 7/10 pain, Oxycodone administered per MAR. POC discussed with patient. Safety precautions in place, bed in lowest position, and call light and personal belongings within reach. Hourly rounding in place.

## 2022-07-23 NOTE — CARE PLAN
The patient is Watcher - Medium risk of patient condition declining or worsening    Shift Goals  Clinical Goals: pain management  Patient Goals: pain management  Family Goals: no family present at this time    Progress made toward(s) clinical / shift goals:      Problem: Pain - Standard  Goal: Alleviation of pain or a reduction in pain to the patient’s comfort goal  Outcome: Progressing     Problem: Knowledge Deficit - Standard  Goal: Patient and family/care givers will demonstrate understanding of plan of care, disease process/condition, diagnostic tests and medications  Outcome: Progressing     Problem: Fall Risk  Goal: Patient will remain free from falls  Outcome: Progressing     Problem: Communication  Goal: The ability to communicate needs accurately and effectively will improve  Outcome: Progressing       Patient is not progressing towards the following goals:NA

## 2022-07-23 NOTE — PROGRESS NOTES
"Hospital Medicine Daily Progress Note    Date of Service  7/23/2022    Chief Complaint  Nitesh Duron is a 72 y.o. male admitted 7/4/2022 with weakness and fall    Hospital Course  \"72-year-old male with history of diabetes, dyslipidemia hepatitis C and cirrhosis with atrial fibrillation presented 7/4 with fall and weakness.  Patient states he fell 4 days ago at home and he landed on his left knee.  He noticed swelling on his left knee and severe back pain with some weakness on the left leg, patient has been on the ground most of the time and not able to get up.  Patient lives by himself.  Patient was found on the ground by his girlfriend and she called EMS.  On admission patient was found to have A. fib with RVR.  Dehydration however blood pressure was stable.  Labs did not show leukocytosis however showed hyponatremia, creatinine 1.2 with CPK 2411 and lactic acid 4.4.  IV fluid was started.  Blood culture came back positive with MSSA, cefazolin was initiated and ID was consulted.  Echo is pending.\"    Patient underwent arthrocentesis by Ortho on his left knee and showed more than 37,000 white blood cell with no red blood cell high suspicious of septic arthritis specially with positive blood culture, ID on board patient was recently on cefazolin transition to nafcillin.  Blood cultures from 7/10/2022 are negative to date.    Interval Problem Update:  7/13. MRI resulted yesterday with epidural abscess now seen compared to previous MRI.  Updated Dr. Hernández who will evaluate.  Discussed with ID.  Patient himself is reluctant to have surgery. Currently no cauda equina or compressive symptoms.  Reviewed MRI, because he is doing well clinically and he resisted surgery, plan is to continue antibiotic course as before per Dr. Hernández.  7/14. SNF recommended. 6 week course IV antibiotics STOP date 8/21. Outpatient MRI after course with follow-up to [facility].   7/18. Watching TV. Eating his meals.  7/19 No " change, sitting in bed, comfortable  7/20 staples removed yesterday continue discharge planning  7/21 no major changes overnight minimally engaged with therapy d/t 'pain'  7/22 Working on DC plan, pending SNF  7/23 no major issues overnight continues to have some complaints of pain no fevers    I have discussed this patient's plan of care and discharge plan at IDT rounds today with Case Management, Nursing, Nursing leadership, and other members of the IDT team.    Consultants/Specialty  cardiology, critical care and orthopedics   Spine surgery  ID    Code Status  Full Code    Disposition  Patient is medically cleared for discharge.   Anticipate discharge to to skilled nursing facility.  I have placed the appropriate orders for post-discharge needs.    Review of Systems  Review of Systems   Constitutional: Negative for chills and fever.   Musculoskeletal: Positive for joint pain and myalgias.      Physical Exam  Temp:  [36.3 °C (97.4 °F)-37 °C (98.6 °F)] 36.8 °C (98.2 °F)  Pulse:  [77-93] 77  Resp:  [16-20] 18  BP: (117-142)/(77-89) 142/77  SpO2:  [91 %-97 %] 91 %    Physical Exam  Vitals and nursing note reviewed.   Constitutional:       Appearance: He is well-developed. He is not diaphoretic.   HENT:      Head: Normocephalic and atraumatic.      Mouth/Throat:      Pharynx: No oropharyngeal exudate.   Eyes:      General: No scleral icterus.        Right eye: No discharge.         Left eye: No discharge.   Neck:      Vascular: No JVD.      Trachea: No tracheal deviation.   Cardiovascular:      Rate and Rhythm: Normal rate and regular rhythm.   Pulmonary:      Effort: Pulmonary effort is normal.   Abdominal:      General: There is no distension.      Palpations: Abdomen is soft.      Tenderness: There is no abdominal tenderness.   Musculoskeletal:         General: Swelling and tenderness present.      Cervical back: Neck supple.   Skin:     General: Skin is warm and dry.      Nails: There is no clubbing.    Neurological:      Mental Status: He is alert.      Motor: Weakness (Both lower extremities left greater than right mainly limited by pain) present. No abnormal muscle tone.   Psychiatric:      Comments: Calm         Fluids    Intake/Output Summary (Last 24 hours) at 7/23/2022 1032  Last data filed at 7/23/2022 0642  Gross per 24 hour   Intake 240 ml   Output 1100 ml   Net -860 ml       Laboratory                        Imaging  IR-PICC LINE PLACEMENT W/ GUIDANCE > AGE 5   Final Result                  Ultrasound-guided PICC placement performed by qualified nursing staff as    above.          MR-LUMBAR SPINE-WITH & W/O   Final Result      1.  Left L4-5 septic facet joint arthritis with associated left lateral epidural phlegmon/abscess and paravertebral cellulitis. There is severe central canal stenosis at this level secondary to the epidural phlegmon/abscess and degenerative disease.    There has been no significant interval change.   2.  Severe central canal stenosis at L3-4 secondary to the degeneration.   3.  Multifocal degenerative disease as described above.      EC-CARLI W/O CONT   Final Result      MR-LUMBAR SPINE-WITH & W/O   Final Result         Multilevel degenerative changes in lumbar spine as described above. There is severe canal stenosis at the L3-4, L4-5 level with cauda equina compression.      At L2-3 there is moderate canal stenosis with cauda equina compression.      Disc bulge extends into the right extra foraminal zone at the L3-4 and L4-5 level with impingement upon exiting nerves in the extraforaminal zone.      Abnormal edema is identified in the bilateral paraspinal soft tissues at the L3-4, L4-5 and L5-S1 levels with mild enhancement identified in the right-sided paraspinous soft tissues. These findings are concerning for an ongoing infectious inflammatory    process involving the facet joints.      Minimal epidural thickening is identified dorsally in the spinal canal behind L3 and L4.  "There is also a hypoenhancing area along the left posterolateral spinal canal that impinges upon the thecal sac. This could represent a developing epidural abscess.    Recommend follow-up examination in 2-3 days.      CT-HEAD W/O   Final Result      1. No CT evidence of acute infarct, hemorrhage or mass.   2. Mild global parenchymal atrophy. Chronic small vessel ischemic changes.      EC-ECHOCARDIOGRAM COMPLETE W/ CONT   Final Result      DX-KNEE 3 VIEWS LEFT   Final Result      1.  Severe tricompartmental left knee osteoarthritis      2.  osteoarthritis the proximal tibiofibular articulation      3.  Multiple intra-articular ossific body      4.  Diffuse atherosclerotic plaque      DX-CHEST-PORTABLE (1 VIEW)   Final Result      No acute cardiopulmonary abnormality identified.      CL-CARDIOVERSION    (Results Pending)        Assessment/Plan  * MSSA bacteremia, epidural abscess/septic arthritis of L knee/UTI  Assessment & Plan  Blood culture on admission 7/4 positive for MSSA 2/2  Source likely septic arthritis  Status post left knee I&D on 7/6/2022 Ortho service following    MRI lumbar spine with spinal stenosis and concern for possible developing epidural abscess evaluated by spine surgery with recommendation for medical treatment and outpatient follow-up     Blood culture 7/10 negative to date continue to monitor  ID following c continue nafcillin repeat MRI per ID recommendations    Will need PICC line when blood cultures negative for 48 hours\"    ID plans PICC placement and antibiotic course  Follow up with Ortho SPine, currently no indication for surgery and patient reluctant  7/18. SNF/rehab planned.    Hypomagnesemia  Assessment & Plan  Replete and monitor    Pyogenic arthritis of left knee joint (HCC)  Assessment & Plan  Synovial fluid culture MSSA  Status post I&D by Ortho on 7/6/2022    Reviewed importance of mobilization  Continue IV nafcillin  Discussed with Ortho no plans for further " "interventions    Falls  Assessment & Plan  PT OT   Encourage mobilization reviewed with patient    Acute cystitis without hematuria  Assessment & Plan  Urine culture MSSA   On nafcillin    Hyponatremia  Assessment & Plan  Continue free fluid restriction  Sodium 128 overall improved continue to monitor\"    Mild category    Type 2 diabetes mellitus with hyperglycemia, without long-term current use of insulin (HCC)  Assessment & Plan  Last a1c 6.3  Stable on Lantus and sliding scale insulin continue to monitor\"    BGs 100-200 stable.    Atrial fibrillation with RVR (HCC)  Assessment & Plan  Echo EF 55% no valvular abnormalities  Evaluated by cardiology  status post CARLI directed cardioversion on 7/8/2022  Continue metoprolol and amiodarone  On amiodarone taper  Continue Eliquis\"    Vitals:    07/18/22 0732   BP: 132/84   Pulse: 78   Resp: 18   Temp: 36.1 °C (97 °F)   SpO2: 97%     Resume Eliquis as no surgery indicated        Thrombocytopenia (HCC)  Assessment & Plan  Improved platelets 148      Hepatocellular carcinoma (HCC)- (present on admission)  Assessment & Plan  History of hepatocellular carcinoma.   Patient has history of cirrhosis  Following with oncology as an outpatient.    Cirrhosis of liver without ascites (HCC)- (present on admission)  Assessment & Plan  History of hepatitis C virus and received treatment     Outpatient follow-up\"    Follow up with GI/hepatology    Essential hypertension, benign- (present on admission)  Assessment & Plan  Improved continue lisinopril and metoprolol and monitor blood pressure\"    Vitals:    07/18/22 0732   BP: 132/84   Pulse: 78   Resp: 18   Temp: 36.1 °C (97 °F)   SpO2: 97%   Stable BPs  Increased lisinopril         VTE prophylaxis: therapeutic anticoagulation with eliquis    I have performed a physical exam and reviewed and updated ROS and Plan today (7/23/2022). In review of yesterday's note (7/22/2022), there are no changes except as documented above.          "

## 2022-07-23 NOTE — WOUND TEAM
Wound team in to see pt for R post thigh.  No wounds present.  Area resolved.  Pt states there were blisters in area and pt was able to point out the area.  Wound team signing off for now.  Please re consult as needs arise

## 2022-07-24 LAB
GLUCOSE BLD STRIP.AUTO-MCNC: 161 MG/DL (ref 65–99)
GLUCOSE BLD STRIP.AUTO-MCNC: 164 MG/DL (ref 65–99)
GLUCOSE BLD STRIP.AUTO-MCNC: 195 MG/DL (ref 65–99)
GLUCOSE BLD STRIP.AUTO-MCNC: 91 MG/DL (ref 65–99)

## 2022-07-24 PROCEDURE — A9270 NON-COVERED ITEM OR SERVICE: HCPCS | Performed by: INTERNAL MEDICINE

## 2022-07-24 PROCEDURE — A9270 NON-COVERED ITEM OR SERVICE: HCPCS | Performed by: NURSE PRACTITIONER

## 2022-07-24 PROCEDURE — 700102 HCHG RX REV CODE 250 W/ 637 OVERRIDE(OP): Performed by: INTERNAL MEDICINE

## 2022-07-24 PROCEDURE — 770006 HCHG ROOM/CARE - MED/SURG/GYN SEMI*

## 2022-07-24 PROCEDURE — 700102 HCHG RX REV CODE 250 W/ 637 OVERRIDE(OP): Performed by: STUDENT IN AN ORGANIZED HEALTH CARE EDUCATION/TRAINING PROGRAM

## 2022-07-24 PROCEDURE — A9270 NON-COVERED ITEM OR SERVICE: HCPCS | Performed by: HOSPITALIST

## 2022-07-24 PROCEDURE — 700102 HCHG RX REV CODE 250 W/ 637 OVERRIDE(OP): Performed by: NURSE PRACTITIONER

## 2022-07-24 PROCEDURE — 99232 SBSQ HOSP IP/OBS MODERATE 35: CPT | Performed by: HOSPITALIST

## 2022-07-24 PROCEDURE — 700101 HCHG RX REV CODE 250: Performed by: HOSPITALIST

## 2022-07-24 PROCEDURE — 700102 HCHG RX REV CODE 250 W/ 637 OVERRIDE(OP): Performed by: HOSPITALIST

## 2022-07-24 PROCEDURE — 82962 GLUCOSE BLOOD TEST: CPT

## 2022-07-24 PROCEDURE — 700105 HCHG RX REV CODE 258: Performed by: HOSPITALIST

## 2022-07-24 PROCEDURE — A9270 NON-COVERED ITEM OR SERVICE: HCPCS | Performed by: STUDENT IN AN ORGANIZED HEALTH CARE EDUCATION/TRAINING PROGRAM

## 2022-07-24 RX ADMIN — AMIODARONE HYDROCHLORIDE 200 MG: 200 TABLET ORAL at 05:23

## 2022-07-24 RX ADMIN — EZETIMIBE 10 MG: 10 TABLET ORAL at 17:47

## 2022-07-24 RX ADMIN — OXYCODONE 5 MG: 5 TABLET ORAL at 13:12

## 2022-07-24 RX ADMIN — INSULIN HUMAN 2 UNITS: 100 INJECTION, SOLUTION PARENTERAL at 13:06

## 2022-07-24 RX ADMIN — NAFCILLIN SODIUM 12 G: 2 INJECTION, POWDER, FOR SOLUTION INTRAMUSCULAR; INTRAVENOUS at 11:40

## 2022-07-24 RX ADMIN — OXYCODONE 5 MG: 5 TABLET ORAL at 09:07

## 2022-07-24 RX ADMIN — INSULIN HUMAN 2 UNITS: 100 INJECTION, SOLUTION PARENTERAL at 17:43

## 2022-07-24 RX ADMIN — INSULIN GLARGINE-YFGN 13 UNITS: 100 INJECTION, SOLUTION SUBCUTANEOUS at 17:41

## 2022-07-24 RX ADMIN — METOPROLOL TARTRATE 50 MG: 50 TABLET, FILM COATED ORAL at 17:47

## 2022-07-24 RX ADMIN — METOPROLOL TARTRATE 50 MG: 50 TABLET, FILM COATED ORAL at 05:24

## 2022-07-24 RX ADMIN — OXYCODONE 5 MG: 5 TABLET ORAL at 06:04

## 2022-07-24 RX ADMIN — INSULIN HUMAN 2 UNITS: 100 INJECTION, SOLUTION PARENTERAL at 20:10

## 2022-07-24 RX ADMIN — LISINOPRIL 40 MG: 20 TABLET ORAL at 17:47

## 2022-07-24 RX ADMIN — OXYCODONE 5 MG: 5 TABLET ORAL at 02:14

## 2022-07-24 RX ADMIN — OXYCODONE 5 MG: 5 TABLET ORAL at 17:47

## 2022-07-24 RX ADMIN — APIXABAN 5 MG: 5 TABLET, FILM COATED ORAL at 05:23

## 2022-07-24 RX ADMIN — APIXABAN 5 MG: 5 TABLET, FILM COATED ORAL at 17:47

## 2022-07-24 ASSESSMENT — ENCOUNTER SYMPTOMS
FEVER: 0
CHILLS: 0
MYALGIAS: 1

## 2022-07-24 ASSESSMENT — PAIN DESCRIPTION - PAIN TYPE
TYPE: ACUTE PAIN

## 2022-07-24 NOTE — PROGRESS NOTES
Received report from NOC RN and assumed care of pt. Pt is A&Ox4, resting in bed on room air. Pt reports 7/10 L knee pain, medicated per MAR. POC discussed with pt; all questions answered. No other needs at this time. Bed locked in lowest position, call light within reach, bed alarm on, pt educated to call for assistance.

## 2022-07-24 NOTE — PROGRESS NOTES
"Hospital Medicine Daily Progress Note    Date of Service  7/24/2022    Chief Complaint  Nitesh Duron is a 72 y.o. male admitted 7/4/2022 with weakness and fall    Hospital Course  \"72-year-old male with history of diabetes, dyslipidemia hepatitis C and cirrhosis with atrial fibrillation presented 7/4 with fall and weakness.  Patient states he fell 4 days ago at home and he landed on his left knee.  He noticed swelling on his left knee and severe back pain with some weakness on the left leg, patient has been on the ground most of the time and not able to get up.  Patient lives by himself.  Patient was found on the ground by his girlfriend and she called EMS.  On admission patient was found to have A. fib with RVR.  Dehydration however blood pressure was stable.  Labs did not show leukocytosis however showed hyponatremia, creatinine 1.2 with CPK 2411 and lactic acid 4.4.  IV fluid was started.  Blood culture came back positive with MSSA, cefazolin was initiated and ID was consulted.  Echo is pending.\"    Patient underwent arthrocentesis by Ortho on his left knee and showed more than 37,000 white blood cell with no red blood cell high suspicious of septic arthritis specially with positive blood culture, ID on board patient was recently on cefazolin transition to nafcillin.  Blood cultures from 7/10/2022 are negative to date.    Interval Problem Update:  7/13. MRI resulted yesterday with epidural abscess now seen compared to previous MRI.  Updated Dr. Hernández who will evaluate.  Discussed with ID.  Patient himself is reluctant to have surgery. Currently no cauda equina or compressive symptoms.  Reviewed MRI, because he is doing well clinically and he resisted surgery, plan is to continue antibiotic course as before per Dr. Hernández.  7/14. SNF recommended. 6 week course IV antibiotics STOP date 8/21. Outpatient MRI after course with follow-up to [facility].   7/18. Watching TV. Eating his meals.  7/19 No " change, sitting in bed, comfortable  7/20 staples removed yesterday continue discharge planning  7/21 no major changes overnight minimally engaged with therapy d/t 'pain'  7/22 Working on DC plan, pending SNF  7/23 no major issues overnight continues to have some complaints of pain no fevers  7/24 no changes overnight    I have discussed this patient's plan of care and discharge plan at IDT rounds today with Case Management, Nursing, Nursing leadership, and other members of the IDT team.    Consultants/Specialty  cardiology, critical care and orthopedics   Spine surgery  ID    Code Status  Full Code    Disposition  Patient is medically cleared for discharge.   Anticipate discharge to to skilled nursing facility.  I have placed the appropriate orders for post-discharge needs.    Review of Systems  Review of Systems   Constitutional: Negative for chills and fever.   Musculoskeletal: Positive for joint pain and myalgias.      Physical Exam  Temp:  [36.3 °C (97.4 °F)-36.8 °C (98.2 °F)] 36.5 °C (97.7 °F)  Pulse:  [77-87] 77  Resp:  [17-18] 18  BP: (119-141)/(69-83) 125/81  SpO2:  [94 %-97 %] 96 %    Physical Exam  Vitals and nursing note reviewed.   Constitutional:       Appearance: He is well-developed. He is not diaphoretic.   HENT:      Head: Normocephalic and atraumatic.      Mouth/Throat:      Pharynx: No oropharyngeal exudate.   Eyes:      General: No scleral icterus.        Right eye: No discharge.         Left eye: No discharge.   Neck:      Vascular: No JVD.      Trachea: No tracheal deviation.   Cardiovascular:      Rate and Rhythm: Normal rate and regular rhythm.   Pulmonary:      Effort: Pulmonary effort is normal.   Abdominal:      General: There is no distension.      Palpations: Abdomen is soft.      Tenderness: There is no abdominal tenderness.   Musculoskeletal:         General: Swelling and tenderness present.      Cervical back: Neck supple.   Skin:     General: Skin is warm and dry.      Nails: There  is no clubbing.   Neurological:      Mental Status: He is alert.      Motor: Weakness (Both lower extremities left greater than right mainly limited by pain) present. No abnormal muscle tone.   Psychiatric:      Comments: Calm         Fluids    Intake/Output Summary (Last 24 hours) at 7/24/2022 1248  Last data filed at 7/24/2022 1000  Gross per 24 hour   Intake 1800 ml   Output 600 ml   Net 1200 ml       Laboratory                        Imaging  IR-PICC LINE PLACEMENT W/ GUIDANCE > AGE 5   Final Result                  Ultrasound-guided PICC placement performed by qualified nursing staff as    above.          MR-LUMBAR SPINE-WITH & W/O   Final Result      1.  Left L4-5 septic facet joint arthritis with associated left lateral epidural phlegmon/abscess and paravertebral cellulitis. There is severe central canal stenosis at this level secondary to the epidural phlegmon/abscess and degenerative disease.    There has been no significant interval change.   2.  Severe central canal stenosis at L3-4 secondary to the degeneration.   3.  Multifocal degenerative disease as described above.      EC-CARLI W/O CONT   Final Result      MR-LUMBAR SPINE-WITH & W/O   Final Result         Multilevel degenerative changes in lumbar spine as described above. There is severe canal stenosis at the L3-4, L4-5 level with cauda equina compression.      At L2-3 there is moderate canal stenosis with cauda equina compression.      Disc bulge extends into the right extra foraminal zone at the L3-4 and L4-5 level with impingement upon exiting nerves in the extraforaminal zone.      Abnormal edema is identified in the bilateral paraspinal soft tissues at the L3-4, L4-5 and L5-S1 levels with mild enhancement identified in the right-sided paraspinous soft tissues. These findings are concerning for an ongoing infectious inflammatory    process involving the facet joints.      Minimal epidural thickening is identified dorsally in the spinal canal  "behind L3 and L4. There is also a hypoenhancing area along the left posterolateral spinal canal that impinges upon the thecal sac. This could represent a developing epidural abscess.    Recommend follow-up examination in 2-3 days.      CT-HEAD W/O   Final Result      1. No CT evidence of acute infarct, hemorrhage or mass.   2. Mild global parenchymal atrophy. Chronic small vessel ischemic changes.      EC-ECHOCARDIOGRAM COMPLETE W/ CONT   Final Result      DX-KNEE 3 VIEWS LEFT   Final Result      1.  Severe tricompartmental left knee osteoarthritis      2.  osteoarthritis the proximal tibiofibular articulation      3.  Multiple intra-articular ossific body      4.  Diffuse atherosclerotic plaque      DX-CHEST-PORTABLE (1 VIEW)   Final Result      No acute cardiopulmonary abnormality identified.      CL-CARDIOVERSION    (Results Pending)        Assessment/Plan  * MSSA bacteremia, epidural abscess/septic arthritis of L knee/UTI  Assessment & Plan  Blood culture on admission 7/4 positive for MSSA 2/2  Source likely septic arthritis  Status post left knee I&D on 7/6/2022 Ortho service following    MRI lumbar spine with spinal stenosis and concern for possible developing epidural abscess evaluated by spine surgery with recommendation for medical treatment and outpatient follow-up     Blood culture 7/10 negative to date continue to monitor  ID following c continue nafcillin repeat MRI per ID recommendations    Will need PICC line when blood cultures negative for 48 hours\"    ID plans PICC placement and antibiotic course  Follow up with Ortho SPine, currently no indication for surgery and patient reluctant  7/18. SNF/rehab planned.    Hypomagnesemia  Assessment & Plan  Replete and monitor    Pyogenic arthritis of left knee joint (HCC)  Assessment & Plan  Synovial fluid culture MSSA  Status post I&D by Ortho on 7/6/2022    Reviewed importance of mobilization  Continue IV nafcillin  Discussed with Ortho no plans for further " "interventions    Falls  Assessment & Plan  PT OT   Encourage mobilization reviewed with patient    Acute cystitis without hematuria  Assessment & Plan  Urine culture MSSA   On nafcillin    Hyponatremia  Assessment & Plan  Continue free fluid restriction  Sodium 128 overall improved continue to monitor\"    Mild category    Type 2 diabetes mellitus with hyperglycemia, without long-term current use of insulin (HCC)  Assessment & Plan  Last a1c 6.3  Stable on Lantus and sliding scale insulin continue to monitor\"    BGs 100-200 stable.    Atrial fibrillation with RVR (HCC)  Assessment & Plan  Echo EF 55% no valvular abnormalities  Evaluated by cardiology  status post CARLI directed cardioversion on 7/8/2022  Continue metoprolol and amiodarone  On amiodarone taper  Continue Eliquis\"    Vitals:    07/18/22 0732   BP: 132/84   Pulse: 78   Resp: 18   Temp: 36.1 °C (97 °F)   SpO2: 97%     Resume Eliquis as no surgery indicated        Thrombocytopenia (HCC)  Assessment & Plan  Improved platelets 148      Hepatocellular carcinoma (HCC)- (present on admission)  Assessment & Plan  History of hepatocellular carcinoma.   Patient has history of cirrhosis  Following with oncology as an outpatient.    Cirrhosis of liver without ascites (HCC)- (present on admission)  Assessment & Plan  History of hepatitis C virus and received treatment     Outpatient follow-up\"    Follow up with GI/hepatology    Essential hypertension, benign- (present on admission)  Assessment & Plan  Improved continue lisinopril and metoprolol and monitor blood pressure\"    Vitals:    07/18/22 0732   BP: 132/84   Pulse: 78   Resp: 18   Temp: 36.1 °C (97 °F)   SpO2: 97%   Stable BPs  Increased lisinopril         VTE prophylaxis: therapeutic anticoagulation with eliquis    I have performed a physical exam and reviewed and updated ROS and Plan today (7/24/2022). In review of yesterday's note (7/23/2022), there are no changes except as documented above.          "

## 2022-07-24 NOTE — CARE PLAN
The patient is Stable - Low risk of patient condition declining or worsening    Shift Goals  Clinical Goals: pt pain will be well managed  Patient Goals: Pain management  Family Goals: JERRY    Progress made toward(s) clinical / shift goals: Assess pain frequently. Encourage pt to alert nursing staff to patient's pain. Administer pain medication per MAR.     Patient is not progressing towards the following goals:

## 2022-07-24 NOTE — CARE PLAN
The patient is Stable - Low risk of patient condition declining or worsening    Shift Goals  Clinical Goals: Pain management  Patient Goals: Pain management  Family Goals: JERRY    Progress made toward(s) clinical / shift goals:  Pt pain managed throughout shift. Pt slept intermittently throughout the night.     Problem: Pain - Standard  Goal: Alleviation of pain or a reduction in pain to the patient’s comfort goal  Outcome: Progressing     Problem: Knowledge Deficit - Standard  Goal: Patient and family/care givers will demonstrate understanding of plan of care, disease process/condition, diagnostic tests and medications  Outcome: Progressing     Problem: Fall Risk  Goal: Patient will remain free from falls  Outcome: Progressing     Problem: Communication  Goal: The ability to communicate needs accurately and effectively will improve  Outcome: Progressing     Problem: Hemodynamics  Goal: Patient's hemodynamics, fluid balance and neurologic status will be stable or improve  Outcome: Progressing     Problem: Respiratory  Goal: Patient will achieve/maintain optimum respiratory ventilation and gas exchange  Outcome: Progressing     Problem: Urinary Elimination  Goal: Establish and maintain regular urinary output  Outcome: Progressing     Problem: Bowel Elimination  Goal: Establish and maintain regular bowel function  Outcome: Progressing     Problem: Skin Integrity  Goal: Skin integrity is maintained or improved  Outcome: Progressing

## 2022-07-24 NOTE — PROGRESS NOTES
Assumed care of pt and received bedside report. Pt is A&O x 4, denies pain at this time, bedside table and personal items within reach, bed locked in low position, bed alarm on, helped pt call his friend Lainey on the phone in room. No additional needs at this time.

## 2022-07-25 VITALS
OXYGEN SATURATION: 96 % | TEMPERATURE: 98.4 F | DIASTOLIC BLOOD PRESSURE: 70 MMHG | WEIGHT: 184.97 LBS | HEART RATE: 88 BPM | RESPIRATION RATE: 16 BRPM | HEIGHT: 70 IN | BODY MASS INDEX: 26.48 KG/M2 | SYSTOLIC BLOOD PRESSURE: 122 MMHG

## 2022-07-25 LAB
GLUCOSE BLD STRIP.AUTO-MCNC: 132 MG/DL (ref 65–99)
GLUCOSE BLD STRIP.AUTO-MCNC: 150 MG/DL (ref 65–99)
GLUCOSE BLD STRIP.AUTO-MCNC: 163 MG/DL (ref 65–99)
GLUCOSE BLD STRIP.AUTO-MCNC: 228 MG/DL (ref 65–99)

## 2022-07-25 PROCEDURE — A9270 NON-COVERED ITEM OR SERVICE: HCPCS | Performed by: STUDENT IN AN ORGANIZED HEALTH CARE EDUCATION/TRAINING PROGRAM

## 2022-07-25 PROCEDURE — 97530 THERAPEUTIC ACTIVITIES: CPT

## 2022-07-25 PROCEDURE — 700102 HCHG RX REV CODE 250 W/ 637 OVERRIDE(OP): Performed by: INTERNAL MEDICINE

## 2022-07-25 PROCEDURE — 700105 HCHG RX REV CODE 258: Performed by: HOSPITALIST

## 2022-07-25 PROCEDURE — 97535 SELF CARE MNGMENT TRAINING: CPT | Mod: CO

## 2022-07-25 PROCEDURE — A9270 NON-COVERED ITEM OR SERVICE: HCPCS | Performed by: HOSPITALIST

## 2022-07-25 PROCEDURE — 700102 HCHG RX REV CODE 250 W/ 637 OVERRIDE(OP): Performed by: HOSPITALIST

## 2022-07-25 PROCEDURE — A9270 NON-COVERED ITEM OR SERVICE: HCPCS | Performed by: INTERNAL MEDICINE

## 2022-07-25 PROCEDURE — 82962 GLUCOSE BLOOD TEST: CPT

## 2022-07-25 PROCEDURE — A9270 NON-COVERED ITEM OR SERVICE: HCPCS | Performed by: NURSE PRACTITIONER

## 2022-07-25 PROCEDURE — 700102 HCHG RX REV CODE 250 W/ 637 OVERRIDE(OP): Performed by: NURSE PRACTITIONER

## 2022-07-25 PROCEDURE — 700101 HCHG RX REV CODE 250: Performed by: HOSPITALIST

## 2022-07-25 PROCEDURE — 99239 HOSP IP/OBS DSCHRG MGMT >30: CPT | Performed by: HOSPITALIST

## 2022-07-25 PROCEDURE — 700102 HCHG RX REV CODE 250 W/ 637 OVERRIDE(OP): Performed by: STUDENT IN AN ORGANIZED HEALTH CARE EDUCATION/TRAINING PROGRAM

## 2022-07-25 RX ORDER — LISINOPRIL 40 MG/1
40 TABLET ORAL EVERY EVENING
Qty: 30 TABLET | Refills: 0 | Status: SHIPPED | OUTPATIENT
Start: 2022-07-25

## 2022-07-25 RX ORDER — OXYCODONE HYDROCHLORIDE 5 MG/1
5 TABLET ORAL EVERY 6 HOURS PRN
Qty: 12 TABLET | Refills: 0 | Status: SHIPPED | OUTPATIENT
Start: 2022-07-25 | End: 2022-07-28

## 2022-07-25 RX ORDER — OXYCODONE HYDROCHLORIDE 5 MG/1
5 TABLET ORAL EVERY 6 HOURS PRN
Qty: 12 TABLET | Refills: 0 | Status: SHIPPED | OUTPATIENT
Start: 2022-07-25 | End: 2022-07-25 | Stop reason: SDUPTHER

## 2022-07-25 RX ORDER — OXYCODONE HYDROCHLORIDE 5 MG/1
5 TABLET ORAL EVERY 6 HOURS PRN
Qty: 12 TABLET | Refills: 0 | Status: SHIPPED
Start: 2022-07-25 | End: 2022-07-25 | Stop reason: SDUPTHER

## 2022-07-25 RX ADMIN — OXYCODONE 5 MG: 5 TABLET ORAL at 00:17

## 2022-07-25 RX ADMIN — OXYCODONE 5 MG: 5 TABLET ORAL at 04:17

## 2022-07-25 RX ADMIN — EZETIMIBE 10 MG: 10 TABLET ORAL at 17:55

## 2022-07-25 RX ADMIN — LISINOPRIL 40 MG: 20 TABLET ORAL at 17:55

## 2022-07-25 RX ADMIN — APIXABAN 5 MG: 5 TABLET, FILM COATED ORAL at 04:17

## 2022-07-25 RX ADMIN — METOPROLOL TARTRATE 50 MG: 50 TABLET, FILM COATED ORAL at 18:00

## 2022-07-25 RX ADMIN — AMIODARONE HYDROCHLORIDE 200 MG: 200 TABLET ORAL at 04:17

## 2022-07-25 RX ADMIN — METOPROLOL TARTRATE 50 MG: 50 TABLET, FILM COATED ORAL at 04:17

## 2022-07-25 RX ADMIN — OXYCODONE 5 MG: 5 TABLET ORAL at 10:46

## 2022-07-25 RX ADMIN — OXYCODONE 5 MG: 5 TABLET ORAL at 07:53

## 2022-07-25 RX ADMIN — NAFCILLIN SODIUM 12 G: 2 INJECTION, POWDER, FOR SOLUTION INTRAMUSCULAR; INTRAVENOUS at 12:13

## 2022-07-25 RX ADMIN — INSULIN GLARGINE-YFGN 13 UNITS: 100 INJECTION, SOLUTION SUBCUTANEOUS at 18:00

## 2022-07-25 RX ADMIN — APIXABAN 5 MG: 5 TABLET, FILM COATED ORAL at 18:00

## 2022-07-25 RX ADMIN — OXYCODONE 5 MG: 5 TABLET ORAL at 14:54

## 2022-07-25 RX ADMIN — INSULIN HUMAN 2 UNITS: 100 INJECTION, SOLUTION PARENTERAL at 11:00

## 2022-07-25 RX ADMIN — INSULIN HUMAN 3 UNITS: 100 INJECTION, SOLUTION PARENTERAL at 20:33

## 2022-07-25 RX ADMIN — OXYCODONE 5 MG: 5 TABLET ORAL at 18:02

## 2022-07-25 ASSESSMENT — COGNITIVE AND FUNCTIONAL STATUS - GENERAL
CLIMB 3 TO 5 STEPS WITH RAILING: TOTAL
DAILY ACTIVITIY SCORE: 17
SUGGESTED CMS G CODE MODIFIER MOBILITY: CM
DRESSING REGULAR UPPER BODY CLOTHING: A LITTLE
MOBILITY SCORE: 7
MOVING FROM LYING ON BACK TO SITTING ON SIDE OF FLAT BED: UNABLE
MOVING TO AND FROM BED TO CHAIR: UNABLE
TURNING FROM BACK TO SIDE WHILE IN FLAT BAD: UNABLE
SUGGESTED CMS G CODE MODIFIER DAILY ACTIVITY: CK
STANDING UP FROM CHAIR USING ARMS: A LOT
DRESSING REGULAR LOWER BODY CLOTHING: A LOT
WALKING IN HOSPITAL ROOM: TOTAL
TOILETING: A LOT
HELP NEEDED FOR BATHING: A LOT

## 2022-07-25 ASSESSMENT — ENCOUNTER SYMPTOMS
CHILLS: 0
FEVER: 0
MYALGIAS: 1

## 2022-07-25 ASSESSMENT — PAIN DESCRIPTION - PAIN TYPE
TYPE: ACUTE PAIN
TYPE: ACUTE PAIN

## 2022-07-25 ASSESSMENT — GAIT ASSESSMENTS: GAIT LEVEL OF ASSIST: UNABLE TO PARTICIPATE

## 2022-07-25 NOTE — THERAPY
Physical Therapy   Daily Treatment     Patient Name: Nitesh Duron  Age:  72 y.o., Sex:  male  Medical Record #: [id number]  Today's Date: 7/25/2022     Precautions  Precautions: Fall Risk;Weight Bearing As Tolerated Left Lower Extremity    Assessment    Rec'd pt alert, in bed, premedicated for pain prior to session.  Max assist to sit eob.  Max assist to move LLE and max to elevate trunk off the bed.  He is able to sit unsupported at the eob.  Agreeable to attempt standing, needing max assist.  Able to maintain for 3 seconds when he sat back down on the bed.  Refused additional attempts.  Max assist to return to supine.  ROM left knee 5-90 degrees.  Educated pt regarding positioning of LLE while in bed in order to assist w/ knee extension (not having a pillow behind his knee).  Bed was locked w/ knees in extension.     Plan    Continue current treatment plan.    DC Equipment Recommendations: Unable to determine at this time  Discharge Recommendations: Recommend post-acute placement for additional physical therapy services prior to discharge home        Objective       07/25/22 0856   Balance   Sitting Balance (Static) Fair   Sitting Balance (Dynamic) Fair   Standing Balance (Static) Dependent   Standing Balance (Dynamic) Dependent   Weight Shift Sitting Poor   Weight Shift Standing Absent   Skilled Intervention Verbal Cuing;Tactile Cuing;Sequencing;Facilitation   Gait Analysis   Gait Level Of Assist Unable to Participate   Bed Mobility    Supine to Sit Maximal Assist   Sit to Supine Maximal Assist   Scooting Maximal Assist   Skilled Intervention Verbal Cuing;Tactile Cuing;Sequencing;Facilitation   Functional Mobility   Sit to Stand Maximal Assist   Bed, Chair, Wheelchair Transfer Unable to Participate   Short Term Goals    Short Term Goal # 1 Patient will move supine<>sitting EOB without bed features with min A within 6tx in order to get in/out of bed   Goal Outcome # 1 goal not met   Short Term Goal # 2  Patient will move sitting<>standing with min A within 6tx in order to initiate transfers and gait   Goal Outcome # 2 Goal not met   Short Term Goal # 3 Patient will ambulate 50ft with min A within 6tx in order to access environment   Goal Outcome # 3 Goal not met   Short Term Goal # 4 Patient will ascend/descend 2 steps with min A within 6tx in order to prepare for entering home at DC   Goal Outcome # 4 Goal not met   Education Group   Education Provided   (positioning of LLE)   Anticipated Discharge Equipment and Recommendations   DC Equipment Recommendations Unable to determine at this time   Discharge Recommendations Recommend post-acute placement for additional physical therapy services prior to discharge home

## 2022-07-25 NOTE — DISCHARGE PLANNING
Medical Social Work  PC from [facility], [facility]    [employer] faxed transport communication form to [employer] for a tentative 5:00 transport to [facility] to Dr. Ku with an update  [facility] informed patient of pending discharge time.

## 2022-07-25 NOTE — PROGRESS NOTES
Assumed care of pt and received bedside report. Pt is A&O x 4, reports pain as 7/10 but when I re-entered the room pt was asleep. Decided it was more therapeutic for pt to sleep at this time. Bedside table and personal items within reach, bed locked in low position, bed alarm on, no additional needs at this time.

## 2022-07-25 NOTE — DISCHARGE INSTRUCTIONS
Discharge Instructions    Discharged to other by ambulance with escort. Discharged via ambulance, hospital escort: Yes.  Special equipment needed: Not Applicable    Be sure to schedule a follow-up appointment with your primary care doctor or any specialists as instructed.     Discharge Plan:        I understand that a diet low in cholesterol, fat, and sodium is recommended for good health. Unless I have been given specific instructions below for another diet, I accept this instruction as my diet prescription.   Other diet: healthy    Special Instructions: None    -Is this patient being discharged with medication to prevent blood clots?  No    Is patient discharged on Warfarin / Coumadin?   No   Discharge Instructions    Discharged to {DISCHARGE TO:330920} by {TRANSPORTATION:417714} with {WIITH:956086}. Discharged via {DISCHARGED VIA:446042}, hospital escort: {HOSPITAL ESCORT:044412}.  Special equipment needed: {SPECIAL EQUIPMENT:690854}    Be sure to schedule a follow-up appointment with your primary care doctor or any specialists as instructed.     Discharge Plan:        I understand that a diet low in cholesterol, fat, and sodium is recommended for good health. Unless I have been given specific instructions below for another diet, I accept this instruction as my diet prescription.   Other diet: ***    Special Instructions: {BODY SYSTEMS:52056}    -Is this patient being discharged with medication to prevent blood clots?  {VTE Prevention Med:179876}    Is patient discharged on Warfarin / Coumadin?   {WARFARIN YES/NO?:758297}

## 2022-07-25 NOTE — DISCHARGE PLANNING
DC Transport Scheduled    Received request at: [phone]    Transport Company Scheduled:  [employer]  Spoke with Susi at [facility] to schedule transport.      Scheduled Date: 07/25/2022  Scheduled Time: 1700    Destination: [facility]     Notified care team of scheduled transport via [employer].     If there are any changes needed to the DC transportation scheduled, please contact [facility] at ext. [phone] between the hours of 8852-4228 Mon-Fri. If outside those hours, contact the ED Case Manager at ext. [phone].

## 2022-07-25 NOTE — CARE PLAN
The patient is Stable - Low risk of patient condition declining or worsening    Shift Goals  Clinical Goals: Pain management  Patient Goals: Pain management  Family Goals: JERRY    Progress made toward(s) clinical / shift goals:  Pt pain was managed this shift and pt was able to rest comfortably in bed for the remainder of shift after getting pt into a LOGAN bed.     Problem: Pain - Standard  Goal: Alleviation of pain or a reduction in pain to the patient’s comfort goal  Outcome: Progressing     Problem: Knowledge Deficit - Standard  Goal: Patient and family/care givers will demonstrate understanding of plan of care, disease process/condition, diagnostic tests and medications  Outcome: Progressing     Problem: Fall Risk  Goal: Patient will remain free from falls  Outcome: Progressing     Problem: Communication  Goal: The ability to communicate needs accurately and effectively will improve  Outcome: Progressing     Problem: Hemodynamics  Goal: Patient's hemodynamics, fluid balance and neurologic status will be stable or improve  Outcome: Progressing     Problem: Respiratory  Goal: Patient will achieve/maintain optimum respiratory ventilation and gas exchange  Outcome: Progressing     Problem: Urinary Elimination  Goal: Establish and maintain regular urinary output  Outcome: Progressing     Problem: Bowel Elimination  Goal: Establish and maintain regular bowel function  Outcome: Progressing     Problem: Skin Integrity  Goal: Skin integrity is maintained or improved  Outcome: Progressing

## 2022-07-25 NOTE — PROGRESS NOTES
"Hospital Medicine Daily Progress Note    Date of Service  7/25/2022    Chief Complaint  Nitesh Duron is a 72 y.o. male admitted 7/4/2022 with weakness and fall    Hospital Course  \"72-year-old male with history of diabetes, dyslipidemia hepatitis C and cirrhosis with atrial fibrillation presented 7/4 with fall and weakness.  Patient states he fell 4 days ago at home and he landed on his left knee.  He noticed swelling on his left knee and severe back pain with some weakness on the left leg, patient has been on the ground most of the time and not able to get up.  Patient lives by himself.  Patient was found on the ground by his girlfriend and she called EMS.  On admission patient was found to have A. fib with RVR.  Dehydration however blood pressure was stable.  Labs did not show leukocytosis however showed hyponatremia, creatinine 1.2 with CPK 2411 and lactic acid 4.4.  IV fluid was started.  Blood culture came back positive with MSSA, cefazolin was initiated and ID was consulted.  Echo is pending.\"    Patient underwent arthrocentesis by Ortho on his left knee and showed more than 37,000 white blood cell with no red blood cell high suspicious of septic arthritis specially with positive blood culture, ID on board patient was recently on cefazolin transition to nafcillin.  Blood cultures from 7/10/2022 are negative to date.    Interval Problem Update:  7/13. MRI resulted yesterday with epidural abscess now seen compared to previous MRI.  Updated Dr. Hernández who will evaluate.  Discussed with ID.  Patient himself is reluctant to have surgery. Currently no cauda equina or compressive symptoms.  Reviewed MRI, because he is doing well clinically and he resisted surgery, plan is to continue antibiotic course as before per Dr. Hernández.  7/14. SNF recommended. 6 week course IV antibiotics STOP date 8/21. Outpatient MRI after course with follow-up to ID and Spine Orthopedics.   7/18. Watching TV. Eating his meals.  7/19 No " change, sitting in bed, comfortable  7/20 staples removed yesterday continue discharge planning  7/21 no major changes overnight minimally engaged with therapy d/t 'pain'  7/22 Working on DC plan, pending SNF  7/23 no major issues overnight continues to have some complaints of pain no fevers  7/24 no changes overnight  1/25 discussed with case management patient is still accepted at [facility], they are following up to see if bed is available, no other major changes overnight patient still with occasional pain in knee    I have discussed this patient's plan of care and discharge plan at IDT rounds today with Case Management, Nursing, Nursing leadership, and other members of the IDT team.    Consultants/Specialty  cardiology, critical care and orthopedics   Spine surgery  ID    Code Status  Full Code    Disposition  Patient is medically cleared for discharge.   Anticipate discharge to to skilled nursing facility.  I have placed the appropriate orders for post-discharge needs.    Review of Systems  Review of Systems   Constitutional: Negative for chills and fever.   Musculoskeletal: Positive for joint pain and myalgias.      Physical Exam  Temp:  [36.2 °C (97.2 °F)-37 °C (98.6 °F)] 36.2 °C (97.2 °F)  Pulse:  [] 97  Resp:  [16-19] 16  BP: (117-139)/(62-86) 117/81  SpO2:  [95 %-97 %] 97 %    Physical Exam  Vitals and nursing note reviewed.   Constitutional:       Appearance: He is well-developed. He is not diaphoretic.   HENT:      Head: Normocephalic and atraumatic.      Mouth/Throat:      Pharynx: No oropharyngeal exudate.   Eyes:      General: No scleral icterus.        Right eye: No discharge.         Left eye: No discharge.   Neck:      Vascular: No JVD.      Trachea: No tracheal deviation.   Cardiovascular:      Rate and Rhythm: Normal rate and regular rhythm.   Pulmonary:      Effort: Pulmonary effort is normal.   Abdominal:      General: There is no distension.      Palpations: Abdomen is soft.       Tenderness: There is no abdominal tenderness.   Musculoskeletal:         General: Swelling and tenderness present.      Cervical back: Neck supple.   Skin:     General: Skin is warm and dry.      Nails: There is no clubbing.   Neurological:      Mental Status: He is alert.      Motor: Weakness (Both lower extremities left greater than right mainly limited by pain) present. No abnormal muscle tone.   Psychiatric:      Comments: Calm         Fluids    Intake/Output Summary (Last 24 hours) at 7/25/2022 1139  Last data filed at 7/25/2022 0527  Gross per 24 hour   Intake 1080 ml   Output 2600 ml   Net -1520 ml       Laboratory                        Imaging  IR-PICC LINE PLACEMENT W/ GUIDANCE > AGE 5   Final Result                  Ultrasound-guided PICC placement performed by qualified nursing staff as    above.          MR-LUMBAR SPINE-WITH & W/O   Final Result      1.  Left L4-5 septic facet joint arthritis with associated left lateral epidural phlegmon/abscess and paravertebral cellulitis. There is severe central canal stenosis at this level secondary to the epidural phlegmon/abscess and degenerative disease.    There has been no significant interval change.   2.  Severe central canal stenosis at L3-4 secondary to the degeneration.   3.  Multifocal degenerative disease as described above.      EC-CARLI W/O CONT   Final Result      MR-LUMBAR SPINE-WITH & W/O   Final Result         Multilevel degenerative changes in lumbar spine as described above. There is severe canal stenosis at the L3-4, L4-5 level with cauda equina compression.      At L2-3 there is moderate canal stenosis with cauda equina compression.      Disc bulge extends into the right extra foraminal zone at the L3-4 and L4-5 level with impingement upon exiting nerves in the extraforaminal zone.      Abnormal edema is identified in the bilateral paraspinal soft tissues at the L3-4, L4-5 and L5-S1 levels with mild enhancement identified in the right-sided  "paraspinous soft tissues. These findings are concerning for an ongoing infectious inflammatory    process involving the facet joints.      Minimal epidural thickening is identified dorsally in the spinal canal behind L3 and L4. There is also a hypoenhancing area along the left posterolateral spinal canal that impinges upon the thecal sac. This could represent a developing epidural abscess.    Recommend follow-up examination in 2-3 days.      CT-HEAD W/O   Final Result      1. No CT evidence of acute infarct, hemorrhage or mass.   2. Mild global parenchymal atrophy. Chronic small vessel ischemic changes.      EC-ECHOCARDIOGRAM COMPLETE W/ CONT   Final Result      DX-KNEE 3 VIEWS LEFT   Final Result      1.  Severe tricompartmental left knee osteoarthritis      2.  osteoarthritis the proximal tibiofibular articulation      3.  Multiple intra-articular ossific body      4.  Diffuse atherosclerotic plaque      DX-CHEST-PORTABLE (1 VIEW)   Final Result      No acute cardiopulmonary abnormality identified.      CL-CARDIOVERSION    (Results Pending)        Assessment/Plan  * MSSA bacteremia, epidural abscess/septic arthritis of L knee/UTI  Assessment & Plan  Blood culture on admission 7/4 positive for MSSA 2/2  Source likely septic arthritis  Status post left knee I&D on 7/6/2022 Ortho service following    MRI lumbar spine with spinal stenosis and concern for possible developing epidural abscess evaluated by spine surgery with recommendation for medical treatment and outpatient follow-up     Blood culture 7/10 negative to date continue to monitor  ID following c continue nafcillin repeat MRI per ID recommendations    Will need PICC line when blood cultures negative for 48 hours\"    ID plans PICC placement and antibiotic course  Follow up with Ortho SPine, currently no indication for surgery and patient reluctant  7/18. SNF/rehab planned.    Hypomagnesemia  Assessment & Plan  Replete and monitor    Pyogenic arthritis of " "left knee joint (HCC)  Assessment & Plan  Synovial fluid culture MSSA  Status post I&D by Ortho on 7/6/2022    Reviewed importance of mobilization  Continue IV nafcillin  Discussed with Ortho no plans for further interventions    Falls  Assessment & Plan  PT OT   Encourage mobilization reviewed with patient    Acute cystitis without hematuria  Assessment & Plan  Urine culture MSSA   On nafcillin    Hyponatremia  Assessment & Plan  Continue free fluid restriction  Sodium 128 overall improved continue to monitor\"    Mild category    Type 2 diabetes mellitus with hyperglycemia, without long-term current use of insulin (HCC)  Assessment & Plan  Last a1c 6.3  Stable on Lantus and sliding scale insulin continue to monitor\"    BGs 100-200 stable.    Atrial fibrillation with RVR (HCC)  Assessment & Plan  Echo EF 55% no valvular abnormalities  Evaluated by cardiology  status post CARLI directed cardioversion on 7/8/2022  Continue metoprolol and amiodarone  On amiodarone taper  Continue Eliquis\"    Vitals:    07/18/22 0732   BP: 132/84   Pulse: 78   Resp: 18   Temp: 36.1 °C (97 °F)   SpO2: 97%     Resume Eliquis as no surgery indicated        Thrombocytopenia (HCC)  Assessment & Plan  Improved platelets 148      Hepatocellular carcinoma (HCC)- (present on admission)  Assessment & Plan  History of hepatocellular carcinoma.   Patient has history of cirrhosis  Following with oncology as an outpatient.    Cirrhosis of liver without ascites (HCC)- (present on admission)  Assessment & Plan  History of hepatitis C virus and received treatment     Outpatient follow-up\"    Follow up with GI/hepatology    Essential hypertension, benign- (present on admission)  Assessment & Plan  Improved continue lisinopril and metoprolol and monitor blood pressure\"    Vitals:    07/18/22 0732   BP: 132/84   Pulse: 78   Resp: 18   Temp: 36.1 °C (97 °F)   SpO2: 97%   Stable BPs  Increased lisinopril         VTE prophylaxis: therapeutic anticoagulation " with eliquis    I have performed a physical exam and reviewed and updated ROS and Plan today (7/25/2022). In review of yesterday's note (7/24/2022), there are no changes except as documented above.

## 2022-07-25 NOTE — DISCHARGE PLANNING
Agency/Facility Name: [facility]  Outcome: DPA left VM requesting call back for update on Pt.     LSW notified    1349-  Agency/Facility Name: [facility]   Spoke To: Kristine  Outcome: DPA notified that auth is still pending for Pt, though it was submitted on 7/20. Kristine spoke with [employer] about long wait, Jessikaence says that it's due to a large volume of authorizations being processed.     LSW notified     1450-  Agency/Facility Name: [facility]  Spoke To: Kristine  Outcome: SNF informed DPA auth has been completed, and SNF is able to take Pt today if possible. Transport will need to be provided by CM team. LSW to begin DC Summary process and is planning for 6410-0470 transport time through T. DPA confirmed time with SNF, and will update as needed.     LSW notified.

## 2022-07-25 NOTE — DISCHARGE SUMMARY
"Discharge Summary    CHIEF COMPLAINT ON ADMISSION  Chief Complaint   Patient presents with   • Atrial Fibrillation     Uncontrolled -200   • Weakness     Generalized x3 days   • GLF     Denies LOC, no trauma noted, denies hitting head       Reason for Admission  EMS    Admission Date  7/4/2022     CODE STATUS  Full Code    HPI & HOSPITAL COURSE  \"72-year-old male with history of diabetes, dyslipidemia hepatitis C and cirrhosis with atrial fibrillation presented 7/4 with fall and weakness.  Patient states he fell 4 days ago at home and he landed on his left knee.  He noticed swelling on his left knee and severe back pain with some weakness on the left leg, patient has been on the ground most of the time and not able to get up.  Patient lives by himself.  Patient was found on the ground by his girlfriend and she called EMS.  On admission patient was found to have A. fib with RVR.  Dehydration however blood pressure was stable.  Labs did not show leukocytosis however showed hyponatremia, creatinine 1.2 with CPK 2411 and lactic acid 4.4.  IV fluid was started.  Blood culture came back positive with MSSA, cefazolin was initiated and ID was consulted.  Echo is pending.\"     Patient underwent arthrocentesis by Ortho on his left knee and showed more than 37,000 white blood cell with no red blood cell high suspicious of septic arthritis specially with positive blood culture, ID on board patient was recently on cefazolin transition to nafcillin. Blood cultures from 7/10/2022 are negative to date.    SNF recommended. 6 week course IV antibiotics STOP date 8/21. Outpatient MRI after course with follow-up to ID and [facility] Orth[facility].     Therefore, he is discharged in fair and stable condition to skilled nursing facility.    The patient met 2-midnight criteria for an inpatient stay at the time of discharge.    FOLLOW UP ITEMS POST DISCHARGE   6 week course IV antibiotics STOP date 8/21. Outpatient MRI after course with " follow-up to [facility].       DISCHARGE DIAGNOSES  Principal Problem:    MSSA bacteremia, epidural abscess/septic arthritis of L knee/UTI POA: Unknown  Active Problems:    Essential hypertension, benign POA: Yes    Cirrhosis of liver without ascites (HCC) POA: Yes    Hepatocellular carcinoma (HCC) POA: Yes    Thrombocytopenia (HCC) POA: Unknown    Atrial fibrillation with RVR (HCC) POA: Unknown    Type 2 diabetes mellitus with hyperglycemia, without long-term current use of insulin (HCC) POA: Unknown    Hyponatremia POA: Unknown    Acute cystitis without hematuria POA: Unknown    Falls POA: Unknown    Pyogenic arthritis of left knee joint (HCC) POA: Unknown    Hypomagnesemia POA: Unknown  Resolved Problems:    Traumatic rhabdomyolysis (HCC) POA: Unknown      FOLLOW UP  No future appointments.  No follow-up provider specified.    MEDICATIONS ON DISCHARGE     Medication List      START taking these medications      Instructions   * amiodarone 200 MG Tabs  Commonly known as: Cordarone   Take 1 Tablet by mouth every day.  Dose: 200 mg     apixaban 5mg Tabs  Commonly known as: ELIQUIS   Take 1 Tablet by mouth 2 times a day. Indications: Thromboembolism secondary to Atrial Fibrillation  Dose: 5 mg     dextrose 5% 5 % SOLN 600 mL with nafcillin 2 GM SOLR 12 g   Infuse 12 g into a venous catheter continuous for 27 days.  Dose: 12 g     insulin GLARGINE 100 UNIT/ML Soln  Commonly known as: Lantus,Semglee   Inject 13 Units under the skin every evening.  Dose: 13 Units     insulin regular 100 Unit/mL Soln  Commonly known as: HumuLIN R   Inject 2-9 Units under the skin 4 Times a Day,Before Meals and at Bedtime.  Dose: 2-9 Units     metoprolol tartrate 50 MG Tabs  Commonly known as: LOPRESSOR   Take 1 Tablet by mouth 2 times a day.  Dose: 50 mg     ondansetron 4 MG Tbdp  Commonly known as: ZOFRAN ODT   Take 1 Tablet by mouth every four hours as needed for Nausea.  Dose: 4 mg         * This list has 1 medication(s)  that are the same as other medications prescribed for you. Read the directions carefully, and ask your doctor or other care provider to review them with you.            CHANGE how you take these medications      Instructions   ezetimibe 10 MG Tabs  What changed: when to take this  Commonly known as: KEVINTIA   Doctor's comments: Please call to schedule follow up appointment for further refills. Please call [phone]. Thank you.  Take 1 Tablet by mouth every day. NEEDS TO BE SEEN FOR FURTHER REFILLS.  Dose: 10 mg     lisinopril 40 MG tablet  What changed:   · medication strength  · how much to take  · when to take this  Commonly known as: PRINIVIL   Take 1 Tablet by mouth every evening.  Dose: 40 mg     * oxyCODONE immediate-release 5 MG Tabs  What changed: Another medication with the same name was added. Make sure you understand how and when to take each.  Commonly known as: ROXICODONE   Take 5 mg by mouth every four hours as needed for Severe Pain.  Dose: 5 mg     * oxyCODONE immediate-release 5 MG Tabs  What changed: You were already taking a medication with the same name, and this prescription was added. Make sure you understand how and when to take each.  Commonly known as: ROXICODONE   Take 1 Tablet by mouth every 6 hours as needed for Severe Pain for up to 3 days.  Dose: 5 mg         * This list has 2 medication(s) that are the same as other medications prescribed for you. Read the directions carefully, and ask your doctor or other care provider to review them with you.            CONTINUE taking these medications      Instructions   GINKGO BILOBA EXTRACT PO   Take 1 Tablet by mouth every day.  Dose: 1 Tablet     NON SPECIFIED   Take 1 Tablet by mouth 2 times a day. Uric Acid Cleanse  Dose: 1 Tablet     RED YEAST RICE PO   Take 1 Tablet by mouth 2 times a day.  Dose: 1 Tablet     SAW PALMETTO PO   Take 1 Capsule by mouth 2 times a day.  Dose: 1 Capsule     VITAMIN A PO   Take 1 Capsule by mouth 2 times a  day.  Dose: 1 Capsule     VITAMIN B-12 PO   Take 1 Tablet by mouth every day.  Dose: 1 Tablet     VITAMIN D3 PO   Take 1 Capsule by mouth every day.  Dose: 1 Capsule     VITAMIN E PO   Take 1 Capsule by mouth every day.  Dose: 1 Capsule        STOP taking these medications    metoprolol SR 25 MG Tb24  Commonly known as: TOPROL XL        ASK your doctor about these medications      Instructions   * amiodarone 400 MG tablet  Commonly known as: PACERONE  Ask about: Should I take this medication?   Take 1 Tablet by mouth 2 times a day for 9 days.  Dose: 400 mg         * This list has 1 medication(s) that are the same as other medications prescribed for you. Read the directions carefully, and ask your doctor or other care provider to review them with you.                Allergies  Allergies   Allergen Reactions   • Ciprofloxacin Unspecified     convulsions   • Statins [Hmg-Coa-R Inhibitors] Unspecified     Stabbing pains in kidneys   • Acetaminophen      Kidney pain    • Atorvastatin      Patient declines   • Ibuprofen      Kidney pain   • Naproxen      Kidney pain   • Shellfish Allergy      Kidney stones   • Soap &  [Alcohol] Rash     Antibacterial soap- contact dermatitis       DIET  Orders Placed This Encounter   Procedures   • Diet Order Diet: Consistent CHO (Diabetic); Fluid modifications: (optional): 1500 ml Fluid Restriction     Standing Status:   Standing     Number of Occurrences:   1     Order Specific Question:   Diet:     Answer:   Consistent CHO (Diabetic) [4]     Order Specific Question:   Fluid modifications: (optional)     Answer:   1500 ml Fluid Restriction [9]       ACTIVITY  As tolerated.  Weight bearing as tolerated    LINES, DRAINS, AND WOUNDS  This is an automated list. Peripheral IVs will be removed prior to discharge.  PICC Single Lumen 07/14/22 Right Basilic (Active)   Site Assessment Clean;Dry;Intact 07/24/22 2300   Line Status Infusing 07/24/22 2300   Line Secured at (cm) 0 cm 07/14/22  1044   Extremity Circumference (cm) 30 cm 07/14/22 1044   Dressing Type Biopatch;Transparent 07/24/22 2300   Dressing Status Clean;Dry;Intact 07/24/22 2300   Dressing Intervention N/A 07/24/22 2300   Dressing Change Due 07/30/22 07/24/22 2300   Date Primary Tubing Changed 07/23/22 07/23/22 2300   Date Secondary Tubing Changed 07/23/22 07/23/22 2300   Date IV Connector(s) Changed 07/23/22 07/23/22 2300   NEXT Primary Tubing Change  07/30/22 07/23/22 2300   NEXT Secondary Tubing Change  07/26/22 07/23/22 2300   NEXT IV Connector(s) Change 07/30/22 07/23/22 2300   Line Necessity Assessed Antibiotic Therapy Greater than 7 Days 07/24/22 2300   $ Single Lumen PICC Charge Single kit used 07/23/22 2300     External Urinary Catheter (Condom) (Active)   Collection Container Standard drainage bag 07/24/22 2300   Output (mL) 700 mL 07/24/22 1600      Wound 07/05/22 Abrasion Elbow Left (Active)   Wound Image   07/13/22 2112   Site Assessment Clean;Dry;Intact 07/24/22 2200   Periwound Assessment Clean;Dry;Intact 07/24/22 2200   Margins Attached edges 07/24/22 2200   Closure Secondary intention 07/18/22 2100   Drainage Amount None 07/19/22 1000   Wound Cleansing Soap and Water 07/17/22 0800   Dressing Status Open to Air 07/24/22 2200       Wound 07/05/22 Abrasion Elbow Right (Active)   Wound Image   07/13/22 2112   Site Assessment Red;[place] 07/24/22 2200   Periwound Assessment Clean;Dry 07/24/22 2200   Margins Attached edges 07/23/22 2300   Closure Open to air 07/24/22 2200   Drainage Amount None 07/19/22 2100   Wound Cleansing Approved Wound Cleanser 07/18/22 0800   Dressing Status Open to Air 07/24/22 1400       Wound 07/05/22 Abrasion Knee Right (Active)   Site Assessment Clean;Dry;Intact 07/24/22 2200   Periwound Assessment Pink;Red 07/24/22 2200   Margins Attached edges 07/23/22 2300   Closure Open to air 07/23/22 2300   Drainage Amount None 07/24/22 1400   Wound Cleansing Approved Wound Cleanser 07/18/22 0800   Dressing  Options Ace Wrap 07/18/22 0800   Dressing Changed Observed 07/18/22 0800   Dressing Status Open to Air 07/24/22 2200       Wound 07/05/22 Skin Tear Arm Distal Right (Active)   Wound Image   07/13/22 2112   Site Assessment Red;Pink;Clean 07/24/22 2200   Periwound Assessment Dry;Clean;[place] 07/24/22 2200   Drainage Amount None 07/23/22 2300   Wound Cleansing Approved Wound Cleanser 07/18/22 0800   Dressing Options Open to Air 07/24/22 2200   Dressing Status Clean;Dry;Intact 07/18/22 2100   NEXT Weekly Photo (Inpatient Only) 07/20/22 07/13/22 2112       Wound 07/13/22 Incision Knee Left (Active)   Wound Image   07/13/22 2112   Site Assessment Clean;Dry;Brown;Healing ridge 07/24/22 2200   Periwound Assessment Clean;Dry;Pink 07/24/22 2200   Margins Attached edges 07/24/22 2200   Closure None 07/21/22 2020   Drainage Amount None 07/22/22 0700   Treatments Offloading;Site care 07/20/22 0947   Wound Cleansing Approved Wound Cleanser 07/14/22 2000   Dressing Options Steri-Strip 07/24/22 1400   Dressing Status Clean;Dry;Intact 07/24/22 2200   NEXT Weekly Photo (Inpatient Only) 07/20/22 07/15/22 2000       Wound 07/21/22 Partial Thickness Wound Thigh Posterior Right (Active)   Wound Image   07/21/22 2020   Site Assessment Clean;Dry;White 07/24/22 2200   Periwound Assessment Clean;Dry;Intact;Red 07/24/22 2200   Margins Attached edges 07/24/22 2200   Drainage Amount None 07/24/22 1400   Dressing Status Open to Air 07/24/22 1400      PICC Single Lumen 07/14/22 Right Basilic (Active)   Site Assessment Clean;Dry;Intact 07/24/22 2300   Line Status Infusing 07/24/22 2300   Line Secured at (cm) 0 cm 07/14/22 1044   Extremity Circumference (cm) 30 cm 07/14/22 1044   Dressing Type Biopatch;Transparent 07/24/22 2300   Dressing Status Clean;Dry;Intact 07/24/22 2300   Dressing Intervention N/A 07/24/22 2300   Dressing Change Due 07/30/22 07/24/22 2300   Date Primary Tubing Changed 07/23/22 07/23/22 2300   Date Secondary Tubing Changed  07/23/22 07/23/22 2300   Date IV Connector(s) Changed 07/23/22 07/23/22 2300   NEXT Primary Tubing Change  07/30/22 07/23/22 2300   NEXT Secondary Tubing Change  07/26/22 07/23/22 2300   NEXT IV Connector(s) Change 07/30/22 07/23/22 2300   Line Necessity Assessed Antibiotic Therapy Greater than 7 Days 07/24/22 2300   $ Single Lumen PICC Charge Single kit used 07/23/22 2300                LABORATORY  Lab Results   Component Value Date    SODIUM 133 (L) 07/18/2022    POTASSIUM 4.4 07/18/2022    CHLORIDE 99 07/18/2022    CO2 25 07/18/2022    GLUCOSE 111 (H) 07/18/2022    BUN 17 07/18/2022    CREATININE 0.78 07/18/2022        Lab Results   Component Value Date    WBC 7.1 07/17/2022    HEMOGLOBIN 11.6 (L) 07/17/2022    HEMATOCRIT 33.9 (L) 07/17/2022    PLATELETCT 174 07/17/2022        Total time of the discharge process exceeds 41 minutes.

## 2022-07-26 NOTE — DISCHARGE PLANNING
Medical Social Work    MSW received a call from nursing staff that [facility] [place] and [facility] has not arrived.  MSW contacted [facility] dispatch and they stated that they are behind due to 911 calls.  ETA is about 1.5 hours.  MSW contacted Ma with [facility] who states that they are fine with pt being a late arrival.  MSW updated nursing staff.

## 2022-07-26 NOTE — PROGRESS NOTES
Pt is off floor in stable condition with copy of d/c instructions and personal items in hand. PICC line intact on RUE. Transported via gurney and accompanied by ZOYA to be d/c to [place]. Day shift RN told me in report that he called report to RN at facility but did not mention RN name. No additional needs at this time.

## 2022-07-26 NOTE — PROGRESS NOTES
Assumed care of pt and received bedside report. Pt is A&O x 4, complains of pain 4/10 following pain med, bedside table and personal items within reach, bed locked in low position, bed alarm on, pt cleaned up following a BM, [facility] to come to pick pt up later tonight, no additional needs at this time.

## 2022-07-27 ENCOUNTER — APPOINTMENT (OUTPATIENT)
Dept: RADIOLOGY | Facility: MEDICAL CENTER | Age: 72
End: 2022-07-27
Attending: RADIOLOGY
Payer: MEDICARE

## 2022-07-27 NOTE — THERAPY
Occupational Therapy  Daily Treatment     Patient Name: Nitesh Duron  Age:  72 y.o., Sex:  male  Medical Record #: [id number]  Today's Date: 7/27/2022       Precautions: Fall Risk, Weight Bearing As Tolerated Left Lower Extremity      Assessment    Pt seen for OT tx. Continues to be limited by decreased activity tolerance, balance deficits, inattention and poor safety awareness impacting ability to complete ADLs and ADL transfers independently. Requires max A for bed mobility d/t pain in LLE. Attempted sit > stand to initiate BSC txfs. Pt required max A for initial stand and requested to return BTB. Will continue to follow while in house.     Plan    Continue current treatment plan.    DC Equipment Recommendations: Unable to determine at this time  Discharge Recommendations: Recommend post-acute placement for additional occupational therapy services prior to discharge home       07/25/22 0831   Cognition    Cognition / Consciousness X   Attention Impaired   Initiation Impaired   Active ROM Upper Body   Active ROM Upper Body  X   Comments limited ROM in B shoulders at baseline, funcitonal to assist w/ ADLs   Strength Upper Body   Upper Body Strength  X   Comments generalized weakness BUEs   Balance   Sitting Balance (Static) Fair   Sitting Balance (Dynamic) Fair   Standing Balance (Static) Dependent   Standing Balance (Dynamic) Dependent   Weight Shift Sitting Poor   Weight Shift Standing Absent   Bed Mobility    Supine to Sit Maximal Assist   Sit to Supine Maximal Assist   Scooting Maximal Assist   Comments requires assist for LLE support d/t pain   Activities of Daily Living   Grooming Supervision;Seated   Upper Body Dressing Minimal Assist   Lower Body Dressing Maximal Assist   Toileting Maximal Assist   Functional Mobility   Sit to Stand Maximal Assist   Short Term Goals   Short Term Goal # 1 Pt will LB dress w/ Rashid   Goal Outcome # 1 Progressing slower than expected   Short Term Goal # 2 Pt will demo  functional txf w/ SPV   Goal Outcome # 2 Progressing slower than expected   Short Term Goal # 3 Pt will demo standing grooming > 5min w/ SPV   Goal Outcome # 3 Progressing slower than expected   Anticipated Discharge Equipment and Recommendations   DC Equipment Recommendations Unable to determine at this time   Discharge Recommendations Recommend post-acute placement for additional occupational therapy services prior to discharge home

## 2022-08-24 ENCOUNTER — OFFICE VISIT (OUTPATIENT)
Dept: INFECTIOUS DISEASES | Facility: MEDICAL CENTER | Age: 72
End: 2022-08-24
Attending: INTERNAL MEDICINE
Payer: MEDICARE

## 2022-08-24 VITALS
RESPIRATION RATE: 14 BRPM | DIASTOLIC BLOOD PRESSURE: 70 MMHG | TEMPERATURE: 99.6 F | BODY MASS INDEX: 27.2 KG/M2 | WEIGHT: 190 LBS | HEIGHT: 70 IN | SYSTOLIC BLOOD PRESSURE: 112 MMHG | OXYGEN SATURATION: 95 % | HEART RATE: 95 BPM

## 2022-08-24 DIAGNOSIS — R78.81 MSSA BACTEREMIA: ICD-10-CM

## 2022-08-24 DIAGNOSIS — B95.61 MSSA BACTEREMIA: ICD-10-CM

## 2022-08-24 DIAGNOSIS — M25.462 KNEE EFFUSION, LEFT: ICD-10-CM

## 2022-08-24 DIAGNOSIS — C22.0 HEPATOCELLULAR CARCINOMA (HCC): ICD-10-CM

## 2022-08-24 DIAGNOSIS — L03.116 CELLULITIS OF LEFT LOWER EXTREMITY: ICD-10-CM

## 2022-08-24 PROCEDURE — 99214 OFFICE O/P EST MOD 30 MIN: CPT | Performed by: INTERNAL MEDICINE

## 2022-08-24 PROCEDURE — 99212 OFFICE O/P EST SF 10 MIN: CPT | Performed by: INTERNAL MEDICINE

## 2022-08-24 ASSESSMENT — FIBROSIS 4 INDEX: FIB4 SCORE: 2.99

## 2022-08-24 NOTE — PROGRESS NOTES
"Chief Complaint   Patient presents with    Follow-Up     MSSA bacteremia and septic joint       HISTORY OF PRESENT ILLNESS: Patient is a 72 y.o. male established patient who presents today for hosp FU  Admitted 7/4/2022. Pt has a past medical history of HTN, HLD, A fib, DMT2 and chronic hepatitis C cirrhosis. He presented complaining of weakness and left knee pain. Patient also with chronic back pain and states he had 4 injections in his back approximately 1 week ago. Blood cultures positive for MSSA.  The fluid aspirated which is also positive for MSSA.  Patient went to the OR on 7/6 for washout of septic left knee Records reviewed:  Blood cultures on 7/4 & 7/7 +MSSA  -TTE on 7/5 with no vegetations or valvular disease, EF 55%  -CARLI on 7/8, no evidence of endocarditis  Native left knee septic arthritis  -Aspirated synovial fluid with 30 7K WBCs, 66% polys, cultures +MSSA   -OR with orthopedics on 7/6 for I&D of the left knee, per op note significant amount of purulent material was encountered and sent for culture  Rhabdomyolysis  Paraspinal infection, left L4-5 septic facet joint arthritis with left lateral epidural abscess  Lumbar spine with enhancement in the right-sided paraspinous soft tissues at L3-S1 concerning for infection potential developing epidural abscess at L3-L4 per MRI-Neurosurgery recommended no surgery   Treated with nafcillin in the hospital-unclear if discharged on nafcillin or cefazolin  Discharged on 7/25/2022  No FU MRI done. No labs in media  Completed abx 3 days ago-PICC out  Patient brought labs-reviewed No leukocytosis, normal renal function ESR 39  Alb 3.2 Calcium 8.4  Seen by ABDOUL \" a week or two ago\"-per patient no concerns  Has noticed increased swelling in LLE with PT  Back about the same-primary c/o neuropathy    Patient Active Problem List    Diagnosis Date Noted    Hypomagnesemia 07/08/2022    Acute cystitis without hematuria 07/05/2022    MSSA bacteremia, epidural abscess/septic " arthritis of L knee/UTI 07/05/2022    Falls 07/05/2022    Pyogenic arthritis of left knee joint (HCC) 07/05/2022    Thrombocytopenia (HCC) 07/04/2022    Atrial fibrillation with RVR (HCC) 07/04/2022    Type 2 diabetes mellitus with hyperglycemia, without long-term current use of insulin (HCC) 07/04/2022    Hyponatremia 07/04/2022    Hepatocellular carcinoma (HCC) 03/15/2022    Hyperlipidemia 03/15/2022    Cirrhosis of liver without ascites (HCC) 01/07/2021    Impaired fasting blood sugar 01/07/2021    Pancytopenia (HCC) 11/11/2019    Splenomegaly 11/11/2019    Cellulitis 09/07/2018    Venous stasis 09/07/2018    High risk medication use 02/28/2018    Coronary artery disease involving native coronary artery without angina pectoris 06/13/2017    Elevated coronary artery calcium score 02/08/2017    Undiagnosed cardiac murmurs 01/18/2016    Essential hypertension, benign 01/18/2016    Calculus of both kidneys 01/18/2016       Allergies:Ciprofloxacin, Statins [hmg-coa-r inhibitors], Acetaminophen, Atorvastatin, Ibuprofen, Naproxen, Shellfish allergy, and Soap &  [alcohol]    Current Outpatient Medications   Medication Sig Dispense Refill    lisinopril (PRINIVIL) 40 MG tablet Take 1 Tablet by mouth every evening. 30 Tablet 0    amiodarone (CORDARONE) 200 MG Tab Take 1 Tablet by mouth every day. 30 Tablet     apixaban (ELIQUIS) 5mg Tab Take 1 Tablet by mouth 2 times a day. Indications: Thromboembolism secondary to Atrial Fibrillation 60 Tablet     insulin GLARGINE (LANTUS,SEMGLEE) 100 UNIT/ML Solution Inject 13 Units under the skin every evening. 10 mL     insulin regular (HUMULIN R) 100 Unit/mL Solution Inject 2-9 Units under the skin 4 Times a Day,Before Meals and at Bedtime. 10 mL     metoprolol tartrate (LOPRESSOR) 50 MG Tab Take 1 Tablet by mouth 2 times a day. 60 Tablet     ondansetron (ZOFRAN ODT) 4 MG TABLET DISPERSIBLE Take 1 Tablet by mouth every four hours as needed for Nausea. 10 Tablet 0    ezetimibe  "(ZETIA) 10 MG Tab Take 1 Tablet by mouth every day. NEEDS TO BE SEEN FOR FURTHER REFILLS. (Patient taking differently: Take 10 mg by mouth every evening. NEEDS TO BE SEEN FOR FURTHER REFILLS.) 90 Tablet 0    VITAMIN A PO Take 1 Capsule by mouth 2 times a day.      NON SPECIFIED Take 1 Tablet by mouth 2 times a day. Uric Acid Cleanse      GINKGO BILOBA EXTRACT PO Take 1 Tablet by mouth every day.      oxyCODONE immediate-release (ROXICODONE) 5 MG Tab Take 5 mg by mouth every four hours as needed for Severe Pain.      Saw Palmetto, Serenoa repens, (SAW PALMETTO PO) Take 1 Capsule by mouth 2 times a day.      Red Yeast Rice Extract (RED YEAST RICE PO) Take 1 Tablet by mouth 2 times a day.      Cyanocobalamin (VITAMIN B-12 PO) Take 1 Tablet by mouth every day.      VITAMIN E PO Take 1 Capsule by mouth every day.      Cholecalciferol (VITAMIN D3 PO) Take 1 Capsule by mouth every day.       No current facility-administered medications for this visit.       Social History     Tobacco Use    Smoking status: Former     Packs/day: 0.50     Years: 43.00     Pack years: 21.50     Types: Cigarettes     Quit date: 2008     Years since quittin.6    Smokeless tobacco: Never    Tobacco comments:     quit    Vaping Use    Vaping Use: Never used   Substance Use Topics    Alcohol use: No    Drug use: Yes     Types: Marijuana, Inhaled     Comment: marijuana       Family History   Problem Relation Age of Onset    Other Mother     Heart Attack Father     Heart Failure Brother        ROS:  Review of Systems   Constitutional: Negative for fever, chills, weight loss and malaise/fatigue.   Musculoskeletal: + myalgias, back pain and joint pain.   Unstable transfer walker to chair. noted    All other systems reviewed and are negative except as in HPI.      Exam:  /70 (BP Location: Left arm, Patient Position: Sitting, BP Cuff Size: Adult)   Pulse 95   Temp 37.6 °C (99.6 °F)   Resp 14   Ht 1.778 m (5' 10\")   Wt 86.2 kg " (190 lb)   SpO2 95%   General:  Chronically ill, well developed male in NAD. Pleasant and cooperative  Head: NCAT, Pupils are equal round reactive to light. Extraocular movements intact. Oropharynx is clear.  Neck: Supple.  No JVD  Pulmonary: Clear  No rales, rhonchi, or wheezing. Unlabored  Cardiovascular: IRR  Abdominal: Soft, nondistended.   Skin: No rashes.  Extremities: no clubbing, cyanosis BLE edema left greater than right-left knee effusion-no erythema well healed incision  Prior PICC site no erythema  Neurologic: Alert and oriented x4. Speech is fluent without dysarthria. Cranial nerves intact. Moving all extremities. Gait unstable transfer Prox muscle weakness to stand Unstable standing    Lab Results   Component Value Date/Time    WBC 7.1 07/17/2022 05:30 AM    RBC 4.02 (L) 07/17/2022 05:30 AM    HEMOGLOBIN 11.6 (L) 07/17/2022 05:30 AM    HEMATOCRIT 33.9 (L) 07/17/2022 05:30 AM    MCV 84.3 07/17/2022 05:30 AM    MCH 28.9 07/17/2022 05:30 AM    MCHC 34.2 07/17/2022 05:30 AM    MPV 8.4 (L) 07/17/2022 05:30 AM    NEUTSPOLYS 77.50 (H) 07/13/2022 08:49 AM    LYMPHOCYTES 15.20 (L) 07/13/2022 08:49 AM    MONOCYTES 5.10 07/13/2022 08:49 AM    EOSINOPHILS 0.60 07/13/2022 08:49 AM    BASOPHILS 0.20 07/13/2022 08:49 AM    HYPOCHROMIA 1+ 01/29/2019 10:32 AM    ANISOCYTOSIS 1+ 01/29/2019 10:32 AM        Assessment/Plan:  1. MSSA bacteremia, epidural abscess/septic arthritis of L knee/UTI        2. Hepatocellular carcinoma (HCC)        3. Cellulitis of left lower extremity        4. Knee effusion, left        Completed IV antibiotics on 8/21/2022  Recommend FU Ortho one week to assess effusion-query normal sequelae.  Currently no erythema or increased warmth. Monitor closely-if worsens go to ER  Repeat MRI if worsening back pain-currently stable. ESR not done in hospital for comparison    FU ID [id number]     Deandra Wise M.D.

## 2022-09-02 DIAGNOSIS — C22.0 HEPATOCELLULAR CARCINOMA (HCC): ICD-10-CM

## 2022-09-02 NOTE — PROGRESS NOTES
Pt contacted IR Scheduling staff about r/s Y90 procedures. He was ill with sepsis since July, and is off antibiotics now. No recent imaging or labs available, but there is an MRI ordered by his surgeon. Asked that pt schedule MRI and complete updated labs, then will review with Dr. Rizzo in clinic or in virtual appt to determine if he is still a candidate for Y90 therapy.

## 2022-09-14 ENCOUNTER — TELEPHONE (OUTPATIENT)
Dept: SURGERY | Facility: MEDICAL CENTER | Age: 72
End: 2022-09-14
Payer: MEDICARE

## 2022-09-14 ENCOUNTER — TELEPHONE (OUTPATIENT)
Dept: SURGICAL ONCOLOGY | Facility: MEDICAL CENTER | Age: 72
End: 2022-09-14
Payer: MEDICARE

## 2022-09-14 DIAGNOSIS — K74.60 CIRRHOSIS OF LIVER WITHOUT ASCITES, UNSPECIFIED HEPATIC CIRRHOSIS TYPE (HCC): ICD-10-CM

## 2022-09-14 DIAGNOSIS — C22.0 HEPATOCELLULAR CARCINOMA (HCC): ICD-10-CM

## 2022-09-14 NOTE — TELEPHONE ENCOUNTER
1306 09/14/22  Pt was called and identity verified. Pt was informed of the need to schedule his MRI and come back into the office. Pt stated that he goes to [facility] [place] for imaging. The auth department was notified to send the order to [facility] with authorization. Pt stated that he has recently become disabled and has a difficult time making it to appointments. Dr. Fisher was informed of this to see if the pt could be connected with nurse navigation. Pt agreed to call back once his scans are scheduled at [facility].  Sumi TYSON

## 2022-09-14 NOTE — TELEPHONE ENCOUNTER
New orders placed for referral to RN navigation and Oncology Social work given patient reports is recently disabled and having transportation issues to appointments.    In chart review it seems patient was set up for repeat MRI and y90 - however this never happened 2/2 his prolonged hospitalization and recent infectious issues.  Will loop IR back in as will likely need repeat IR evaluation after updated imaging.

## 2022-09-19 ENCOUNTER — PATIENT OUTREACH (OUTPATIENT)
Dept: ONCOLOGY | Facility: MEDICAL CENTER | Age: 72
End: 2022-09-19
Payer: MEDICARE

## 2022-09-19 NOTE — PROGRESS NOTES
Oncology nurse navigator called patient to follow up on referral stating that the patient was struggling with transportation.  Oncology nurse navigator called and introduced self my role and support services.  Patient reports that he was recently hospitalized for sepsis and it left him very debilitated and he spent over a month at [facility].  He states that it is difficult to walk around he states he can walk around his house without his walker about  ft.  He states when he uses his walker he can go further.  He states he normally would drive himself but currently he is unable to drive so he is relying on 2 friends one of them is is his girlfriend who lives in [place] he states 50 miles away.  ONN asked him if he had scheduled his MRI yet he stated that he did get authorization from his insurance for the MRI he just needs to get it scheduled.  He states that he has to have it at [facility].  He states that he is waiting until he can get a little stronger that he does not feel strong enough yet.  He states that his cancer has not spread and he does not want to wait to long for this imaging and lab work.  After he completes this workup then he will follow up with IR at [facility] for possible treatment.  SUZIE confirmed e-mail with the patient and it was not correct so it was updated in the chart.  SUZIE e-mail the patient [facility] [[email or web address]] application for transportation and support and also provided [facility] number to e-mail.

## 2022-09-30 ENCOUNTER — PATIENT OUTREACH (OUTPATIENT)
Dept: ONCOLOGY | Facility: MEDICAL CENTER | Age: 72
End: 2022-09-30
Payer: MEDICARE

## 2022-09-30 NOTE — PROGRESS NOTES
On September 30, 2022, Oncology Social Worker Genny Fitzgerald attempted telephone contact with pt.  OSW Amilcar left voicemail message for pt. requesting pt. call back at earliest convenience.  OSW Amilcar left contact information in voicemail message.

## 2022-11-06 DIAGNOSIS — E78.5 HYPERLIPIDEMIA, UNSPECIFIED HYPERLIPIDEMIA TYPE: ICD-10-CM

## 2022-11-07 ENCOUNTER — HOSPITAL ENCOUNTER (OUTPATIENT)
Dept: RADIOLOGY | Facility: MEDICAL CENTER | Age: 72
End: 2022-11-07
Payer: MEDICARE

## 2022-11-10 RX ORDER — EZETIMIBE 10 MG/1
TABLET ORAL
Qty: 30 TABLET | Refills: 0 | Status: SHIPPED | OUTPATIENT
Start: 2022-11-10 | End: 2022-12-15

## 2022-11-10 NOTE — TELEPHONE ENCOUNTER
2nd courtesy refill. Note sent to schedulers to follow up.     
Is the patient due for a refill? Yes    Was the patient seen the past year? Yes    Date of last office visit: 7/12/21    Does the patient have an upcoming appointment?  No   If yes, When?     Provider to refill:RO    Does the patients insurance require a 100 day supply?  No    
PT has OON ins.   SLC  
No

## 2022-12-10 DIAGNOSIS — E78.5 HYPERLIPIDEMIA, UNSPECIFIED HYPERLIPIDEMIA TYPE: ICD-10-CM

## 2022-12-14 NOTE — TELEPHONE ENCOUNTER
Is the patient due for a refill? Yes    Was the patient seen the past year? Yes    Date of last office visit: 7/12/21    Does the patient have an upcoming appointment?  No   If yes, When?     Provider to refill:RO    Does the patients insurance require a 100 day supply?  No

## 2022-12-15 RX ORDER — EZETIMIBE 10 MG/1
TABLET ORAL
Qty: 14 TABLET | Refills: 0 | Status: SHIPPED | OUTPATIENT
Start: 2022-12-15

## 2023-10-03 ENCOUNTER — HOSPITAL ENCOUNTER (OUTPATIENT)
Dept: RADIOLOGY | Facility: MEDICAL CENTER | Age: 73
End: 2023-10-03
Payer: MEDICARE

## 2024-01-01 ENCOUNTER — APPOINTMENT (OUTPATIENT)
Dept: RADIOLOGY | Facility: MEDICAL CENTER | Age: 74
End: 2024-01-01
Attending: STUDENT IN AN ORGANIZED HEALTH CARE EDUCATION/TRAINING PROGRAM
Payer: MEDICARE

## 2024-01-01 ENCOUNTER — HOSPITAL ENCOUNTER (INPATIENT)
Facility: MEDICAL CENTER | Age: 74
LOS: 3 days | End: 2024-07-29
Attending: STUDENT IN AN ORGANIZED HEALTH CARE EDUCATION/TRAINING PROGRAM
Payer: MEDICARE

## 2024-01-01 VITALS
BODY MASS INDEX: 26.55 KG/M2 | DIASTOLIC BLOOD PRESSURE: 13 MMHG | SYSTOLIC BLOOD PRESSURE: 89 MMHG | RESPIRATION RATE: 16 BRPM | WEIGHT: 179.23 LBS | OXYGEN SATURATION: 64 % | TEMPERATURE: 95.6 F | HEART RATE: 99 BPM | HEIGHT: 69 IN

## 2024-01-01 DIAGNOSIS — I48.91 ATRIAL FIBRILLATION WITH RVR (HCC): ICD-10-CM

## 2024-01-01 DIAGNOSIS — E87.29 INCREASED ANION GAP METABOLIC ACIDOSIS: ICD-10-CM

## 2024-01-01 DIAGNOSIS — I26.93 SINGLE SUBSEGMENTAL PULMONARY EMBOLISM WITHOUT ACUTE COR PULMONALE (HCC): ICD-10-CM
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
## 2024-01-01 DIAGNOSIS — D69.6 THROMBOCYTOPENIA (HCC): ICD-10-CM

## 2024-01-01 DIAGNOSIS — K76.7 HEPATORENAL SYNDROME (HCC): ICD-10-CM

## 2024-01-01 DIAGNOSIS — Z71.89 GOALS OF CARE, COUNSELING/DISCUSSION: ICD-10-CM

## 2024-01-01 DIAGNOSIS — C22.0 HEPATOCELLULAR CARCINOMA (HCC): ICD-10-CM

## 2024-01-01 LAB
ALBUMIN SERPL BCP-MCNC: 2.8 G/DL (ref 3.2–4.9)
ALBUMIN SERPL BCP-MCNC: 2.8 G/DL (ref 3.2–4.9)
ALBUMIN/GLOB SERPL: 0.9 G/DL
ALBUMIN/GLOB SERPL: 1.2 G/DL
ALP SERPL-CCNC: 262 U/L (ref 30–99)
ALP SERPL-CCNC: 337 U/L (ref 30–99)
ALT SERPL-CCNC: 122 U/L (ref 2–50)
ALT SERPL-CCNC: 151 U/L (ref 2–50)
AMMONIA PLAS-SCNC: 28 UMOL/L (ref 11–45)
ANION GAP SERPL CALC-SCNC: 15 MMOL/L (ref 7–16)
ANION GAP SERPL CALC-SCNC: 17 MMOL/L (ref 7–16)
ANISOCYTOSIS BLD QL SMEAR: ABNORMAL
ANISOCYTOSIS BLD QL SMEAR: ABNORMAL
APTT PPP: 28.4 SEC (ref 24.7–36)
APTT PPP: 35.5 SEC (ref 24.7–36)
AST SERPL-CCNC: 331 U/L (ref 12–45)
AST SERPL-CCNC: 400 U/L (ref 12–45)
BASOPHILS # BLD AUTO: 0 % (ref 0–1.8)
BASOPHILS # BLD AUTO: 0.1 % (ref 0–1.8)
BASOPHILS # BLD: 0 K/UL (ref 0–0.12)
BASOPHILS # BLD: 0.01 K/UL (ref 0–0.12)
BILIRUB CONJ SERPL-MCNC: 2.6 MG/DL (ref 0.1–0.5)
BILIRUB INDIRECT SERPL-MCNC: 1 MG/DL (ref 0–1)
BILIRUB SERPL-MCNC: 3.6 MG/DL (ref 0.1–1.5)
BILIRUB SERPL-MCNC: 4.2 MG/DL (ref 0.1–1.5)
BUN SERPL-MCNC: 65 MG/DL (ref 8–22)
BUN SERPL-MCNC: 71 MG/DL (ref 8–22)
BURR CELLS BLD QL SMEAR: NORMAL
BURR CELLS BLD QL SMEAR: NORMAL
CALCIUM ALBUM COR SERPL-MCNC: 10 MG/DL (ref 8.5–10.5)
CALCIUM ALBUM COR SERPL-MCNC: 9.2 MG/DL (ref 8.5–10.5)
CALCIUM SERPL-MCNC: 8.2 MG/DL (ref 8.5–10.5)
CALCIUM SERPL-MCNC: 9 MG/DL (ref 8.5–10.5)
CHLORIDE SERPL-SCNC: 100 MMOL/L (ref 96–112)
CHLORIDE SERPL-SCNC: 95 MMOL/L (ref 96–112)
CO2 SERPL-SCNC: 17 MMOL/L (ref 20–33)
CO2 SERPL-SCNC: 19 MMOL/L (ref 20–33)
COMMENT 1642: NORMAL
CREAT SERPL-MCNC: 1.22 MG/DL (ref 0.5–1.4)
CREAT SERPL-MCNC: 1.4 MG/DL (ref 0.5–1.4)
EKG IMPRESSION: NORMAL
EKG IMPRESSION: NORMAL
EOSINOPHIL # BLD AUTO: 0 K/UL (ref 0–0.51)
EOSINOPHIL # BLD AUTO: 0 K/UL (ref 0–0.51)
EOSINOPHIL NFR BLD: 0 % (ref 0–6.9)
EOSINOPHIL NFR BLD: 0 % (ref 0–6.9)
ERYTHROCYTE [DISTWIDTH] IN BLOOD BY AUTOMATED COUNT: 69.7 FL (ref 35.9–50)
ERYTHROCYTE [DISTWIDTH] IN BLOOD BY AUTOMATED COUNT: 71.4 FL (ref 35.9–50)
GFR SERPLBLD CREATININE-BSD FMLA CKD-EPI: 53 ML/MIN/1.73 M 2
GFR SERPLBLD CREATININE-BSD FMLA CKD-EPI: 62 ML/MIN/1.73 M 2
GLOBULIN SER CALC-MCNC: 2.3 G/DL (ref 1.9–3.5)
GLOBULIN SER CALC-MCNC: 3 G/DL (ref 1.9–3.5)
GLUCOSE BLD STRIP.AUTO-MCNC: 111 MG/DL (ref 65–99)
GLUCOSE BLD STRIP.AUTO-MCNC: 113 MG/DL (ref 65–99)
GLUCOSE BLD STRIP.AUTO-MCNC: 52 MG/DL (ref 65–99)
GLUCOSE BLD STRIP.AUTO-MCNC: 58 MG/DL (ref 65–99)
GLUCOSE SERPL-MCNC: 122 MG/DL (ref 65–99)
GLUCOSE SERPL-MCNC: 83 MG/DL (ref 65–99)
HCT VFR BLD AUTO: 35.2 % (ref 42–52)
HCT VFR BLD AUTO: 39 % (ref 42–52)
HGB BLD-MCNC: 11.1 G/DL (ref 14–18)
HGB BLD-MCNC: 12.4 G/DL (ref 14–18)
HYPOCHROMIA BLD QL SMEAR: ABNORMAL
IMM GRANULOCYTES # BLD AUTO: 0.21 K/UL (ref 0–0.11)
IMM GRANULOCYTES NFR BLD AUTO: 1.8 % (ref 0–0.9)
INR PPP: 1.87 (ref 0.87–1.13)
INR PPP: 2.97 (ref 0.87–1.13)
LACTATE SERPL-SCNC: 0.8 MMOL/L (ref 0.5–2)
LACTATE SERPL-SCNC: 4.8 MMOL/L (ref 0.5–2)
LIPASE SERPL-CCNC: 120 U/L (ref 11–82)
LYMPHOCYTES # BLD AUTO: 0 K/UL (ref 1–4.8)
LYMPHOCYTES # BLD AUTO: 0.16 K/UL (ref 1–4.8)
LYMPHOCYTES NFR BLD: 0 % (ref 22–41)
LYMPHOCYTES NFR BLD: 1.4 % (ref 22–41)
MACROCYTES BLD QL SMEAR: ABNORMAL
MACROCYTES BLD QL SMEAR: ABNORMAL
MANUAL DIFF BLD: NORMAL
MCH RBC QN AUTO: 27.4 PG (ref 27–33)
MCH RBC QN AUTO: 27.8 PG (ref 27–33)
MCHC RBC AUTO-ENTMCNC: 31.5 G/DL (ref 32.3–36.5)
MCHC RBC AUTO-ENTMCNC: 31.8 G/DL (ref 32.3–36.5)
MCV RBC AUTO: 86.3 FL (ref 81.4–97.8)
MCV RBC AUTO: 88 FL (ref 81.4–97.8)
MONOCYTES # BLD AUTO: 0.22 K/UL (ref 0–0.85)
MONOCYTES # BLD AUTO: 0.74 K/UL (ref 0–0.85)
MONOCYTES NFR BLD AUTO: 1.7 % (ref 0–13.4)
MONOCYTES NFR BLD AUTO: 6.4 % (ref 0–13.4)
MORPHOLOGY BLD-IMP: NORMAL
MORPHOLOGY BLD-IMP: NORMAL
NEUTROPHILS # BLD AUTO: 10.38 K/UL (ref 1.82–7.42)
NEUTROPHILS # BLD AUTO: 12.47 K/UL (ref 1.82–7.42)
NEUTROPHILS NFR BLD: 90.3 % (ref 44–72)
NEUTROPHILS NFR BLD: 97.4 % (ref 44–72)
NRBC # BLD AUTO: 0.03 K/UL
NRBC # BLD AUTO: 0.03 K/UL
NRBC BLD-RTO: 0.2 /100 WBC (ref 0–0.2)
NRBC BLD-RTO: 0.3 /100 WBC (ref 0–0.2)
OVALOCYTES BLD QL SMEAR: NORMAL
PLATELET # BLD AUTO: 49 K/UL (ref 164–446)
PLATELET # BLD AUTO: 59 K/UL (ref 164–446)
PLATELET BLD QL SMEAR: NORMAL
PLATELET BLD QL SMEAR: NORMAL
PLATELETS.RETICULATED NFR BLD AUTO: 8.2 % (ref 0.6–13.1)
PLATELETS.RETICULATED NFR BLD AUTO: 8.4 % (ref 0.6–13.1)
PMV BLD AUTO: 10.5 FL (ref 9–12.9)
POIKILOCYTOSIS BLD QL SMEAR: NORMAL
POIKILOCYTOSIS BLD QL SMEAR: NORMAL
POLYCHROMASIA BLD QL SMEAR: NORMAL
POTASSIUM SERPL-SCNC: 5.1 MMOL/L (ref 3.6–5.5)
POTASSIUM SERPL-SCNC: 5.3 MMOL/L (ref 3.6–5.5)
PROMYELOCYTES NFR BLD MANUAL: 0.9 %
PROT SERPL-MCNC: 5.1 G/DL (ref 6–8.2)
PROT SERPL-MCNC: 5.8 G/DL (ref 6–8.2)
PROTHROMBIN TIME: 21.8 SEC (ref 12–14.6)
PROTHROMBIN TIME: 31.3 SEC (ref 12–14.6)
RBC # BLD AUTO: 4 M/UL (ref 4.7–6.1)
RBC # BLD AUTO: 4.52 M/UL (ref 4.7–6.1)
RBC BLD AUTO: PRESENT
RBC BLD AUTO: PRESENT
SODIUM SERPL-SCNC: 131 MMOL/L (ref 135–145)
SODIUM SERPL-SCNC: 132 MMOL/L (ref 135–145)
UFH PPP CHRO-ACNC: <0.1 IU/ML
WBC # BLD AUTO: 11.5 K/UL (ref 4.8–10.8)
WBC # BLD AUTO: 12.8 K/UL (ref 4.8–10.8)

## 2024-01-01 PROCEDURE — 700117 HCHG RX CONTRAST REV CODE 255: Performed by: STUDENT IN AN ORGANIZED HEALTH CARE EDUCATION/TRAINING PROGRAM

## 2024-01-01 PROCEDURE — 770020 HCHG ROOM/CARE - TELE (206)

## 2024-01-01 PROCEDURE — 80053 COMPREHEN METABOLIC PANEL: CPT

## 2024-01-01 PROCEDURE — 700102 HCHG RX REV CODE 250 W/ 637 OVERRIDE(OP)

## 2024-01-01 PROCEDURE — 700111 HCHG RX REV CODE 636 W/ 250 OVERRIDE (IP): Mod: JZ | Performed by: STUDENT IN AN ORGANIZED HEALTH CARE EDUCATION/TRAINING PROGRAM

## 2024-01-01 PROCEDURE — 93005 ELECTROCARDIOGRAM TRACING: CPT

## 2024-01-01 PROCEDURE — 96365 THER/PROPH/DIAG IV INF INIT: CPT

## 2024-01-01 PROCEDURE — 85027 COMPLETE CBC AUTOMATED: CPT

## 2024-01-01 PROCEDURE — 86480 TB TEST CELL IMMUN MEASURE: CPT

## 2024-01-01 PROCEDURE — 85007 BL SMEAR W/DIFF WBC COUNT: CPT

## 2024-01-01 PROCEDURE — 700102 HCHG RX REV CODE 250 W/ 637 OVERRIDE(OP): Performed by: STUDENT IN AN ORGANIZED HEALTH CARE EDUCATION/TRAINING PROGRAM

## 2024-01-01 PROCEDURE — 99285 EMERGENCY DEPT VISIT HI MDM: CPT

## 2024-01-01 PROCEDURE — 99232 SBSQ HOSP IP/OBS MODERATE 35: CPT | Mod: GC | Performed by: STUDENT IN AN ORGANIZED HEALTH CARE EDUCATION/TRAINING PROGRAM

## 2024-01-01 PROCEDURE — 74177 CT ABD & PELVIS W/CONTRAST: CPT

## 2024-01-01 PROCEDURE — 96366 THER/PROPH/DIAG IV INF ADDON: CPT

## 2024-01-01 PROCEDURE — 700111 HCHG RX REV CODE 636 W/ 250 OVERRIDE (IP): Mod: JA | Performed by: STUDENT IN AN ORGANIZED HEALTH CARE EDUCATION/TRAINING PROGRAM

## 2024-01-01 PROCEDURE — 96367 TX/PROPH/DG ADDL SEQ IV INF: CPT

## 2024-01-01 PROCEDURE — 85055 RETICULATED PLATELET ASSAY: CPT

## 2024-01-01 PROCEDURE — 71275 CT ANGIOGRAPHY CHEST: CPT

## 2024-01-01 PROCEDURE — 700111 HCHG RX REV CODE 636 W/ 250 OVERRIDE (IP): Mod: JG | Performed by: STUDENT IN AN ORGANIZED HEALTH CARE EDUCATION/TRAINING PROGRAM

## 2024-01-01 PROCEDURE — 85025 COMPLETE CBC W/AUTO DIFF WBC: CPT

## 2024-01-01 PROCEDURE — A9270 NON-COVERED ITEM OR SERVICE: HCPCS | Performed by: STUDENT IN AN ORGANIZED HEALTH CARE EDUCATION/TRAINING PROGRAM

## 2024-01-01 PROCEDURE — 82140 ASSAY OF AMMONIA: CPT

## 2024-01-01 PROCEDURE — P9047 ALBUMIN (HUMAN), 25%, 50ML: HCPCS | Mod: JZ,JG | Performed by: STUDENT IN AN ORGANIZED HEALTH CARE EDUCATION/TRAINING PROGRAM

## 2024-01-01 PROCEDURE — 99223 1ST HOSP IP/OBS HIGH 75: CPT | Mod: AI,GC

## 2024-01-01 PROCEDURE — A9270 NON-COVERED ITEM OR SERVICE: HCPCS

## 2024-01-01 PROCEDURE — 85610 PROTHROMBIN TIME: CPT | Mod: 91

## 2024-01-01 PROCEDURE — 36415 COLL VENOUS BLD VENIPUNCTURE: CPT

## 2024-01-01 PROCEDURE — 700105 HCHG RX REV CODE 258: Performed by: STUDENT IN AN ORGANIZED HEALTH CARE EDUCATION/TRAINING PROGRAM

## 2024-01-01 PROCEDURE — 72125 CT NECK SPINE W/O DYE: CPT

## 2024-01-01 PROCEDURE — 96375 TX/PRO/DX INJ NEW DRUG ADDON: CPT

## 2024-01-01 PROCEDURE — 82962 GLUCOSE BLOOD TEST: CPT | Mod: 91

## 2024-01-01 PROCEDURE — 700101 HCHG RX REV CODE 250: Performed by: STUDENT IN AN ORGANIZED HEALTH CARE EDUCATION/TRAINING PROGRAM

## 2024-01-01 PROCEDURE — 87040 BLOOD CULTURE FOR BACTERIA: CPT

## 2024-01-01 PROCEDURE — 72128 CT CHEST SPINE W/O DYE: CPT

## 2024-01-01 PROCEDURE — 70450 CT HEAD/BRAIN W/O DYE: CPT

## 2024-01-01 PROCEDURE — 93005 ELECTROCARDIOGRAM TRACING: CPT | Performed by: STUDENT IN AN ORGANIZED HEALTH CARE EDUCATION/TRAINING PROGRAM

## 2024-01-01 PROCEDURE — 700111 HCHG RX REV CODE 636 W/ 250 OVERRIDE (IP): Performed by: STUDENT IN AN ORGANIZED HEALTH CARE EDUCATION/TRAINING PROGRAM

## 2024-01-01 PROCEDURE — 72131 CT LUMBAR SPINE W/O DYE: CPT

## 2024-01-01 PROCEDURE — 85730 THROMBOPLASTIN TIME PARTIAL: CPT | Mod: 91

## 2024-01-01 PROCEDURE — 85520 HEPARIN ASSAY: CPT

## 2024-01-01 PROCEDURE — 770001 HCHG ROOM/CARE - MED/SURG/GYN PRIV*

## 2024-01-01 PROCEDURE — 82248 BILIRUBIN DIRECT: CPT

## 2024-01-01 PROCEDURE — 71045 X-RAY EXAM CHEST 1 VIEW: CPT

## 2024-01-01 PROCEDURE — 83690 ASSAY OF LIPASE: CPT

## 2024-01-01 PROCEDURE — 83605 ASSAY OF LACTIC ACID: CPT | Mod: 91

## 2024-01-01 RX ORDER — OXYCODONE HYDROCHLORIDE 5 MG/1
2.5 TABLET ORAL
Status: DISCONTINUED | OUTPATIENT
Start: 2024-01-01 | End: 2024-01-01

## 2024-01-01 RX ORDER — ONDANSETRON 2 MG/ML
4 INJECTION INTRAMUSCULAR; INTRAVENOUS EVERY 4 HOURS PRN
Status: DISCONTINUED | OUTPATIENT
Start: 2024-01-01 | End: 2024-01-01 | Stop reason: HOSPADM

## 2024-01-01 RX ORDER — LISINOPRIL 10 MG/1
10 TABLET ORAL DAILY
Status: DISCONTINUED | OUTPATIENT
Start: 2024-01-01 | End: 2024-01-01

## 2024-01-01 RX ORDER — MIDODRINE HYDROCHLORIDE 5 MG/1
5 TABLET ORAL
Status: DISCONTINUED | OUTPATIENT
Start: 2024-01-01 | End: 2024-01-01

## 2024-01-01 RX ORDER — CEFTRIAXONE 2 G/1
2000 INJECTION, POWDER, FOR SOLUTION INTRAMUSCULAR; INTRAVENOUS ONCE
Status: COMPLETED | OUTPATIENT
Start: 2024-01-01 | End: 2024-01-01

## 2024-01-01 RX ORDER — LACTULOSE 20 G/30ML
30 SOLUTION ORAL 3 TIMES DAILY
Status: DISCONTINUED | OUTPATIENT
Start: 2024-01-01 | End: 2024-01-01 | Stop reason: HOSPADM

## 2024-01-01 RX ORDER — METOPROLOL SUCCINATE 25 MG/1
25 TABLET, EXTENDED RELEASE ORAL ONCE
Status: COMPLETED | OUTPATIENT
Start: 2024-01-01 | End: 2024-01-01

## 2024-01-01 RX ORDER — LORAZEPAM 2 MG/ML
1 CONCENTRATE ORAL
Status: DISCONTINUED | OUTPATIENT
Start: 2024-01-01 | End: 2024-01-01 | Stop reason: HOSPADM

## 2024-01-01 RX ORDER — ACETAMINOPHEN 650 MG/1
650 SUPPOSITORY RECTAL EVERY 4 HOURS PRN
Status: DISCONTINUED | OUTPATIENT
Start: 2024-01-01 | End: 2024-01-01 | Stop reason: HOSPADM

## 2024-01-01 RX ORDER — HEPARIN SODIUM 5000 [USP'U]/100ML
0-30 INJECTION, SOLUTION INTRAVENOUS CONTINUOUS
Status: DISCONTINUED | OUTPATIENT
Start: 2024-01-01 | End: 2024-01-01

## 2024-01-01 RX ORDER — LACTULOSE 20 G/30ML
30 SOLUTION ORAL 3 TIMES DAILY PRN
Status: DISCONTINUED | OUTPATIENT
Start: 2024-01-01 | End: 2024-01-01 | Stop reason: HOSPADM

## 2024-01-01 RX ORDER — METOPROLOL TARTRATE 1 MG/ML
5 INJECTION, SOLUTION INTRAVENOUS
Status: DISCONTINUED | OUTPATIENT
Start: 2024-01-01 | End: 2024-01-01

## 2024-01-01 RX ORDER — ACETAMINOPHEN 325 MG/1
650 TABLET ORAL EVERY 4 HOURS PRN
Status: DISCONTINUED | OUTPATIENT
Start: 2024-01-01 | End: 2024-01-01 | Stop reason: HOSPADM

## 2024-01-01 RX ORDER — INSULIN GLARGINE 100 [IU]/ML
24 INJECTION, SOLUTION SUBCUTANEOUS
Status: SHIPPED | COMMUNITY
Start: 2024-01-01 | End: 2024-01-01

## 2024-01-01 RX ORDER — SODIUM CHLORIDE 9 MG/ML
1000 INJECTION, SOLUTION INTRAVENOUS ONCE
Status: COMPLETED | OUTPATIENT
Start: 2024-01-01 | End: 2024-01-01

## 2024-01-01 RX ORDER — SODIUM CHLORIDE, SODIUM LACTATE, POTASSIUM CHLORIDE, AND CALCIUM CHLORIDE .6; .31; .03; .02 G/100ML; G/100ML; G/100ML; G/100ML
1000 INJECTION, SOLUTION INTRAVENOUS ONCE
Status: COMPLETED | OUTPATIENT
Start: 2024-01-01 | End: 2024-01-01

## 2024-01-01 RX ORDER — MORPHINE SULFATE 100 MG/5ML
20 SOLUTION ORAL
Status: DISCONTINUED | OUTPATIENT
Start: 2024-01-01 | End: 2024-01-01 | Stop reason: HOSPADM

## 2024-01-01 RX ORDER — MORPHINE SULFATE 100 MG/5ML
10 SOLUTION ORAL
Status: DISCONTINUED | OUTPATIENT
Start: 2024-01-01 | End: 2024-01-01 | Stop reason: HOSPADM

## 2024-01-01 RX ORDER — METOPROLOL TARTRATE 25 MG/1
12.5 TABLET, FILM COATED ORAL 2 TIMES DAILY
Status: DISCONTINUED | OUTPATIENT
Start: 2024-01-01 | End: 2024-01-01

## 2024-01-01 RX ORDER — INSULIN LISPRO 100 [IU]/ML
0.2 INJECTION, SOLUTION INTRAVENOUS; SUBCUTANEOUS
Status: DISCONTINUED | OUTPATIENT
Start: 2024-01-01 | End: 2024-01-01

## 2024-01-01 RX ORDER — HYDROMORPHONE HYDROCHLORIDE 1 MG/ML
0.25 INJECTION, SOLUTION INTRAMUSCULAR; INTRAVENOUS; SUBCUTANEOUS
Status: DISCONTINUED | OUTPATIENT
Start: 2024-01-01 | End: 2024-01-01

## 2024-01-01 RX ORDER — LORAZEPAM 2 MG/ML
1 INJECTION INTRAMUSCULAR
Status: DISCONTINUED | OUTPATIENT
Start: 2024-01-01 | End: 2024-01-01 | Stop reason: HOSPADM

## 2024-01-01 RX ORDER — LISINOPRIL 10 MG/1
10 TABLET ORAL DAILY
COMMUNITY

## 2024-01-01 RX ORDER — INSULIN LISPRO 100 [IU]/ML
2-9 INJECTION, SOLUTION INTRAVENOUS; SUBCUTANEOUS
Status: DISCONTINUED | OUTPATIENT
Start: 2024-01-01 | End: 2024-01-01

## 2024-01-01 RX ORDER — ALBUMIN (HUMAN) 12.5 G/50ML
25 SOLUTION INTRAVENOUS ONCE
Status: COMPLETED | OUTPATIENT
Start: 2024-01-01 | End: 2024-01-01

## 2024-01-01 RX ORDER — SODIUM CHLORIDE, SODIUM LACTATE, POTASSIUM CHLORIDE, CALCIUM CHLORIDE 600; 310; 30; 20 MG/100ML; MG/100ML; MG/100ML; MG/100ML
INJECTION, SOLUTION INTRAVENOUS CONTINUOUS
Status: DISCONTINUED | OUTPATIENT
Start: 2024-01-01 | End: 2024-01-01

## 2024-01-01 RX ORDER — SODIUM CHLORIDE 9 MG/ML
30 INJECTION, SOLUTION INTRAVENOUS ONCE
Status: COMPLETED | OUTPATIENT
Start: 2024-01-01 | End: 2024-01-01

## 2024-01-01 RX ORDER — OXYCODONE HYDROCHLORIDE 5 MG/1
5 TABLET ORAL
Status: DISCONTINUED | OUTPATIENT
Start: 2024-01-01 | End: 2024-01-01

## 2024-01-01 RX ORDER — DEXTROSE MONOHYDRATE 25 G/50ML
25 INJECTION, SOLUTION INTRAVENOUS
Status: DISCONTINUED | OUTPATIENT
Start: 2024-01-01 | End: 2024-01-01

## 2024-01-01 RX ORDER — METOPROLOL TARTRATE 25 MG/1
25 TABLET, FILM COATED ORAL 2 TIMES DAILY
Status: DISCONTINUED | OUTPATIENT
Start: 2024-01-01 | End: 2024-01-01

## 2024-01-01 RX ORDER — HEPARIN SODIUM 1000 [USP'U]/ML
40 INJECTION, SOLUTION INTRAVENOUS; SUBCUTANEOUS PRN
Status: DISCONTINUED | OUTPATIENT
Start: 2024-01-01 | End: 2024-01-01

## 2024-01-01 RX ORDER — POLYETHYLENE GLYCOL 3350 17 G/17G
1 POWDER, FOR SOLUTION ORAL
Status: DISCONTINUED | OUTPATIENT
Start: 2024-01-01 | End: 2024-01-01 | Stop reason: HOSPADM

## 2024-01-01 RX ORDER — AMOXICILLIN 250 MG
2 CAPSULE ORAL EVERY EVENING
Status: DISCONTINUED | OUTPATIENT
Start: 2024-01-01 | End: 2024-01-01 | Stop reason: HOSPADM

## 2024-01-01 RX ORDER — METRONIDAZOLE 500 MG/100ML
500 INJECTION, SOLUTION INTRAVENOUS ONCE
Status: COMPLETED | OUTPATIENT
Start: 2024-01-01 | End: 2024-01-01

## 2024-01-01 RX ORDER — HEPARIN SODIUM 1000 [USP'U]/ML
80 INJECTION, SOLUTION INTRAVENOUS; SUBCUTANEOUS ONCE
Status: COMPLETED | OUTPATIENT
Start: 2024-01-01 | End: 2024-01-01

## 2024-01-01 RX ORDER — ONDANSETRON 4 MG/1
4 TABLET, ORALLY DISINTEGRATING ORAL EVERY 4 HOURS PRN
Status: DISCONTINUED | OUTPATIENT
Start: 2024-01-01 | End: 2024-01-01 | Stop reason: HOSPADM

## 2024-01-01 RX ORDER — CARBOXYMETHYLCELLULOSE SODIUM 5 MG/ML
1 SOLUTION/ DROPS OPHTHALMIC PRN
Status: DISCONTINUED | OUTPATIENT
Start: 2024-01-01 | End: 2024-01-01 | Stop reason: HOSPADM

## 2024-01-01 RX ORDER — BISACODYL 10 MG
10 SUPPOSITORY, RECTAL RECTAL
Status: DISCONTINUED | OUTPATIENT
Start: 2024-01-01 | End: 2024-01-01 | Stop reason: HOSPADM

## 2024-01-01 RX ORDER — OCTREOTIDE ACETATE 100 UG/ML
100 INJECTION, SOLUTION INTRAVENOUS; SUBCUTANEOUS 3 TIMES DAILY
Status: DISCONTINUED | OUTPATIENT
Start: 2024-01-01 | End: 2024-01-01

## 2024-01-01 RX ADMIN — LACTULOSE 30 ML: 10 SOLUTION ORAL at 16:19

## 2024-01-01 RX ADMIN — MORPHINE SULFATE 20 MG: 100 SOLUTION ORAL at 20:07

## 2024-01-01 RX ADMIN — ALBUMIN (HUMAN) 25 G: 0.25 INJECTION, SOLUTION INTRAVENOUS at 04:17

## 2024-01-01 RX ADMIN — SODIUM CHLORIDE, POTASSIUM CHLORIDE, SODIUM LACTATE AND CALCIUM CHLORIDE 1000 ML: 600; 310; 30; 20 INJECTION, SOLUTION INTRAVENOUS at 23:39

## 2024-01-01 RX ADMIN — MIDODRINE HYDROCHLORIDE 5 MG: 5 TABLET ORAL at 02:17

## 2024-01-01 RX ADMIN — OXYCODONE 5 MG: 5 TABLET ORAL at 03:20

## 2024-01-01 RX ADMIN — IOHEXOL 100 ML: 350 INJECTION, SOLUTION INTRAVENOUS at 20:20

## 2024-01-01 RX ADMIN — HEPARIN SODIUM 6400 UNITS: 1000 INJECTION, SOLUTION INTRAVENOUS; SUBCUTANEOUS at 22:01

## 2024-01-01 RX ADMIN — ONDANSETRON 4 MG: 2 INJECTION INTRAMUSCULAR; INTRAVENOUS at 16:25

## 2024-01-01 RX ADMIN — SODIUM CHLORIDE, POTASSIUM CHLORIDE, SODIUM LACTATE AND CALCIUM CHLORIDE: 600; 310; 30; 20 INJECTION, SOLUTION INTRAVENOUS at 22:52

## 2024-01-01 RX ADMIN — MORPHINE SULFATE 10 MG: 100 SOLUTION ORAL at 16:19

## 2024-01-01 RX ADMIN — OCTREOTIDE ACETATE 100 MCG: 100 INJECTION, SOLUTION INTRAVENOUS; SUBCUTANEOUS at 02:17

## 2024-01-01 RX ADMIN — SODIUM CHLORIDE 1000 ML: 9 INJECTION, SOLUTION INTRAVENOUS at 18:49

## 2024-01-01 RX ADMIN — HEPARIN SODIUM 18 UNITS/KG/HR: 5000 INJECTION, SOLUTION INTRAVENOUS at 22:02

## 2024-01-01 RX ADMIN — MORPHINE SULFATE 20 MG: 100 SOLUTION ORAL at 22:44

## 2024-01-01 RX ADMIN — DEXTROSE MONOHYDRATE 25 G: 25 INJECTION, SOLUTION INTRAVENOUS at 01:48

## 2024-01-01 RX ADMIN — METOPROLOL TARTRATE 5 MG: 5 INJECTION INTRAVENOUS at 19:51

## 2024-01-01 RX ADMIN — METRONIDAZOLE 500 MG: 500 INJECTION, SOLUTION INTRAVENOUS at 19:42

## 2024-01-01 RX ADMIN — MORPHINE SULFATE 20 MG: 100 SOLUTION ORAL at 04:02

## 2024-01-01 RX ADMIN — LORAZEPAM 1 MG: 2 INJECTION INTRAMUSCULAR; INTRAVENOUS at 02:36

## 2024-01-01 RX ADMIN — CEFTRIAXONE SODIUM 2000 MG: 2 INJECTION, POWDER, FOR SOLUTION INTRAMUSCULAR; INTRAVENOUS at 19:29

## 2024-01-01 RX ADMIN — MORPHINE SULFATE 10 MG: 100 SOLUTION ORAL at 03:13

## 2024-01-01 RX ADMIN — LACTULOSE 30 ML: 10 SOLUTION ORAL at 13:16

## 2024-01-01 RX ADMIN — SODIUM CHLORIDE 2382 ML: 9 INJECTION, SOLUTION INTRAVENOUS at 19:29

## 2024-01-01 RX ADMIN — MIDODRINE HYDROCHLORIDE 5 MG: 5 TABLET ORAL at 10:21

## 2024-01-01 RX ADMIN — METOPROLOL SUCCINATE 25 MG: 25 TABLET, EXTENDED RELEASE ORAL at 20:32

## 2024-01-01 ASSESSMENT — COGNITIVE AND FUNCTIONAL STATUS - GENERAL
MOVING FROM LYING ON BACK TO SITTING ON SIDE OF FLAT BED: A LITTLE
TOILETING: A LITTLE
SUGGESTED CMS G CODE MODIFIER DAILY ACTIVITY: CK
DAILY ACTIVITIY SCORE: 16
TURNING FROM BACK TO SIDE WHILE IN FLAT BAD: A LITTLE
WALKING IN HOSPITAL ROOM: TOTAL
CLIMB 3 TO 5 STEPS WITH RAILING: TOTAL
HELP NEEDED FOR BATHING: A LOT
DRESSING REGULAR LOWER BODY CLOTHING: A LITTLE
DRESSING REGULAR UPPER BODY CLOTHING: A LOT
EATING MEALS: A LITTLE
PERSONAL GROOMING: A LITTLE
STANDING UP FROM CHAIR USING ARMS: A LOT
MOBILITY SCORE: 13
MOVING TO AND FROM BED TO CHAIR: A LITTLE
SUGGESTED CMS G CODE MODIFIER MOBILITY: CL

## 2024-01-01 ASSESSMENT — PAIN DESCRIPTION - PAIN TYPE
TYPE: ACUTE PAIN

## 2024-01-01 ASSESSMENT — PAIN SCALES - WONG BAKER
WONGBAKER_NUMERICALRESPONSE: HURTS A WHOLE LOT
WONGBAKER_NUMERICALRESPONSE: HURTS A WHOLE LOT
WONGBAKER_NUMERICALRESPONSE: DOESN'T HURT AT ALL
WONGBAKER_NUMERICALRESPONSE: HURTS A WHOLE LOT

## 2024-01-01 ASSESSMENT — PATIENT HEALTH QUESTIONNAIRE - PHQ9
1. LITTLE INTEREST OR PLEASURE IN DOING THINGS: NOT AT ALL
2. FEELING DOWN, DEPRESSED, IRRITABLE, OR HOPELESS: NOT AT ALL
SUM OF ALL RESPONSES TO PHQ9 QUESTIONS 1 AND 2: 0
1. LITTLE INTEREST OR PLEASURE IN DOING THINGS: NOT AT ALL
SUM OF ALL RESPONSES TO PHQ9 QUESTIONS 1 AND 2: 0
2. FEELING DOWN, DEPRESSED, IRRITABLE, OR HOPELESS: NOT AT ALL

## 2024-01-01 ASSESSMENT — FIBROSIS 4 INDEX: FIB4 SCORE: 49.16

## 2024-01-10 NOTE — PROGRESS NOTES
2 Week CAM ID #: [id number] - [id number] placed today. Written and verbal instructions given to patient and son Carlos Manuel who was present. Verb understanding.  Per THADDEUS Antonio -- AFIB   " Provider Encounter- Pressure Injury          HISTORY OF PRESENT ILLNESS     START OF CARE IN CLINIC: 8/10/53183    REFERRING PROVIDER: Tiesha Breaux,      WOUND- Pressure injury   STAGE:2   LOCATION: Right anterior ankle   WOUND HISTORY: Patient wears boots and it sounds like it rubbed on the anterior aspect of the ankle.     PREVIOUS TREATMENT: Compression and wound care for a different type of wound in 2019   PERTINENT PMH: Diabetes \"beat after I lost 60 pounds\"; venous insufficiency,  High cholesterol     IMAGING:none   LAST  WOUND CULTURE:  DATE : none                OFFLOADING: N/A    DIABETES: resolved with weight loss    TOBACCO USE: no    Interval History: 8/25/2020: Clinic visit with WALTER Berger.  Patient states that they are feeling well today.  Patient denies fever, chills, nausea, vomiting, lightheadedness, dizziness, shortness of breath and chest pain. Ulcer has resolved patient does have significant denuded tissue applied Unna boot with hopeful resolution next week.    9/1/2020: Clinic visit with WALTER Berger. Patient states that they are feeling well today.  Patient denies fever, chills, nausea, vomiting, lightheadedness, dizziness, shortness of breath and chest pain.  Patient continues to have severely denuded tissue to right lower extremity.  Right lateral lower extremity with weeping.    9/8/2020: Clinic visit with WALTER Berger. Patient states that they are feeling well today.  Patient denies fever, chills, nausea, vomiting, lightheadedness, dizziness, shortness of breath and chest pain.  Improving erythema and denuded tissue to right lower extremity.  Review of patient's ultrasound results on 9/9/2020 following patient visit positive for venous reflux as well as perforators patient needs to be referred to vascular see Dr. Hemphill in clinic.    10/13/2020: Clinic visit with WALTER Berger. Patient states that they are feeling well today.  Patient denies " fever, chills, nausea, vomiting, lightheadedness, dizziness, shortness of breath and chest pain.  Erythema to right foot has significantly improved.  Patient has 2 small open wounds to right ankle.  Erythema significantly improved with nystatin powder.  Patient to continue that this week.  Patient has appointment with Dr. Hemphill on 10/26/2020 for vascular procedure.    10/20/20: Clinic visit with WALTER Ackerman, [facility].  This is my first time meeting Mr. Duron.  Patient reports feeling well.  Does feel the erythema is improved with nystatin powder. Denies wound drainage, odor. Denies erythema, swelling, pain.  Patient stating that he does not check his blood sugars as he was told he is no longer diabetic after losing 60 pounds.  Last A1c 5.8 on July 30, 2020.  History of tobacco use, but denies any current use.    10/29/20:  Clinic visit with Dr. Hemphill.  Venous studies show greater saphenous vein reflux bilaterally.  Patient is adamant about not wanting any intervention.     11/17/2020: Clinic visit with WALTER Berger. Patient states that they are feeling well today.  Patient denies fever, chills, nausea, vomiting, lightheadedness, dizziness, shortness of breath and chest pain.  I reviewed with the patient the discussion the patient had with Dr. Hemphill.  Dr. Hemphill clearly states that the patient's venous studies show greater saphenous vein reflux bilaterally.  However, the patient is adamant about not wanting any intervention.  Patient denied this and again I have read the direct quote from the past note by Dr. Hemphill.  Patient now has 2 small medial and lateral ulcers to his right ankle.  Again I discussed the patient needs to reconsider vascular intervention to help with these wounds heal and prevent further breakdown and loss of limb.  Patient reports that he will reconsider however he does not have any time off until after January he is used up all of his sick days.    12/1/2020:  Clinic visit with WALTER Berger. Patient states that they are feeling well today.  Patient denies fever, chills, nausea, vomiting, lightheadedness, dizziness, shortness of breath and chest pain.  Patient has had a decrease in erythema to the distal aspect of his right foot.  Wounds are approximately the same size.  Patient reports that he is changing his insurance at the first of the year and will have available time off.  At this point hopeful that he may be able to undergo venous procedure with Dr. Hemphill.    12/8/2020: Clinic visit with WALTER Berger. Patient states that they are feeling well today.  Patient denies fever, chills, nausea, vomiting, lightheadedness, dizziness, shortness of breath and chest pain.  Overall skin condition quality has improved however, the patient's wounds are relatively the same size in clinic this week.  I do believe venous intervention is needed to help resolve these wounds.  We will wait to the first of the year when the patient's insurance will allow him to have the vascular procedure performed and he will be able to take some time off of work.    12/15/2020: Clinic visit with WALTER Berger. Patient states that they are feeling well today.  Patient denies fever, chills, nausea, vomiting, lightheadedness, dizziness, shortness of breath and chest pain.  Overall Nitesh's skin has improved.  Wounds are relatively the same size in clinic today.  As noted previously patient will have an opportunity for vascular intervention in the new year once his insurance has come into the new calendar year.        RESULTS:   Most recent A1c:  5.8 DATE 7/30/2020    VASCULAR STUDIES:            REVIEW OF SYSTEMS:   Review of Systems   Constitutional: Negative for chills and fever.   HENT: Negative.    Eyes: Negative for blurred vision.   Respiratory: Negative for cough, shortness of breath and wheezing.    Cardiovascular: Negative for chest pain and palpitations.    Gastrointestinal: Negative for nausea and vomiting.   Skin:        Denuded tissue to right lower extremity   Neurological: Negative for dizziness, weakness and headaches.       PHYSICAL EXAMINATION:   /61   Pulse 74   Temp 36.8 °C (98.3 °F)   Resp 20   SpO2 98%   Physical Exam   Constitutional: He is oriented to person, place, and time and well-developed, well-nourished, and in no distress.   HENT:   Head: Normocephalic and atraumatic.   Eyes: Pupils are equal, round, and reactive to light.   Neck: Normal range of motion. Neck supple.   Cardiovascular: Regular rhythm and intact distal pulses.   Pulmonary/Chest: Effort normal. No respiratory distress. He has no wheezes.   Musculoskeletal: Normal range of motion.         General: Edema present. No deformity.      Comments:  edema dependent to right LE improved     Neurological: He is alert and oriented to person, place, and time.   Skin: There is erythema.   Denuded skin to right foot improved  Patient has small ulcers to lateral and medial right ankle   Psychiatric: Memory, affect and judgment normal.       WOUND ASSESSMENT          Wound 09/29/20 Venous Ulcer Foot Dorsal Right CVI ulcer distal R dorsal foot and base of toes (Active)   Wound Image   12/15/20 0845   Site Assessment Pink;Red 12/15/20 0845   Periwound Assessment Fragile;Denuded 12/15/20 0845   Margins Undefined edges 12/15/20 0845   Closure Secondary intention 10/16/20 1630   Drainage Amount Moderate 12/15/20 0845   Drainage Description Serous 12/15/20 0845   Treatments Cleansed 12/15/20 0845   Wound Cleansing Foam Cleanser/Washcloth 12/15/20 0845   Periwound Protectant Antifungal Therapy;Barrier Paste 12/15/20 0845   Dressing Cleansing/Solutions Antifungal Therapy;Other (Comments) 11/10/20 0830   Dressing Options Viscopaste;Soft Conforming Roll;Hypafix Tape;Lamb's Wool 12/15/20 0845   Dressing Changed Changed 12/15/20 0845   Dressing Status Clean;Dry;Intact 12/15/20 0845   Dressing  Change/Treatment Frequency Every 72 hrs, and As Needed 12/15/20 0845   Non-staged Wound Description Partial thickness 12/15/20 0845   Wound Length (cm) 7 cm 11/03/20 0900   Wound Width (cm) 8.2 cm 11/03/20 0900   Wound Depth (cm) 0 cm 11/03/20 0900   Wound Surface Area (cm^2) 57.4 cm^2 11/03/20 0900   Wound Volume (cm^3) 0 cm^3 11/03/20 0900   Post-Procedure Length (cm) 5 cm 10/13/20 1500   Post-Procedure Width (cm) 9 cm 10/13/20 1500   Post-Procedure Depth (cm) 0.1 cm 10/13/20 1500   Post-Procedure Surface Area (cm^2) 45 cm^2 10/13/20 1500   Post-Procedure Volume (cm^3) 4.5 cm^3 10/13/20 1500   Wound Healing % 100 11/03/20 0900   Wound Bed Granulation (%) 100 % 10/06/20 0900   Wound Bed Slough (%) 5 % 10/20/20 1632   Wound Bed Granulation (%) - Post-Procedure 100 % 10/13/20 1500   Tunneling (cm) 0 cm 12/15/20 0845   Undermining (cm) 0 cm 12/15/20 0845   Wound Odor None 12/15/20 0845   Pulses Right;1+;DP;PT 11/03/20 0900   Exposed Structures None 12/15/20 0845       Wound 09/29/20 Venous Ulcer Ankle Lateral Right R lateral ankle (Active)   Wound Image    12/15/20 0845   Site Assessment Red;Yellow 12/15/20 0845   Periwound Assessment Edema;Fragile;Denuded 12/15/20 0845   Margins Attached edges 12/15/20 0845   Closure Secondary intention 12/15/20 0845   Drainage Amount Moderate 12/15/20 0845   Drainage Description Serosanguineous 12/15/20 0845   Treatments Cleansed;Topical Lidocaine;Provider debridement 12/15/20 0845   Wound Cleansing Foam Cleanser/Washcloth 12/15/20 0845   Periwound Protectant Barrier Paste;Antifungal Therapy 12/15/20 0845   Dressing Cleansing/Solutions Not Applicable 11/27/20 0800   Dressing Options Collagen Dressing;Hydrofiber Silver;Viscopaste;Dry Roll Gauze;Coban 12/15/20 0845   Dressing Changed Changed 12/15/20 0845   Dressing Status Clean;Dry;Intact 12/15/20 0845   Dressing Change/Treatment Frequency Every 72 hrs, and As Needed 12/15/20 0845   Non-staged Wound Description Full thickness  12/15/20 0845   Wound Length (cm) 0.7 cm 12/15/20 0845   Wound Width (cm) 0.3 cm 12/15/20 0845   Wound Depth (cm) 0.2 cm 12/15/20 0845   Wound Surface Area (cm^2) 0.21 cm^2 12/15/20 0845   Wound Volume (cm^3) 0.04 cm^3 12/15/20 0845   Post-Procedure Length (cm) 0.7 cm 12/15/20 0845   Post-Procedure Width (cm) 0.4 cm 12/15/20 0845   Post-Procedure Depth (cm) 0.2 cm 12/15/20 0845   Post-Procedure Surface Area (cm^2) 0.28 cm^2 12/15/20 0845   Post-Procedure Volume (cm^3) 0.06 cm^3 12/15/20 0845   Wound Healing % 100 12/15/20 0845   Wound Bed Granulation (%) 100 % 11/27/20 0800   Wound Bed Slough (%) 75 % 10/20/20 1632   Wound Bed Granulation (%) - Post-Procedure 100 % 10/13/20 1500   Tunneling (cm) 0 cm 12/15/20 0845   Undermining (cm) 0 cm 12/15/20 0845   Wound Odor None 12/15/20 0845   Exposed Structures None 12/15/20 0845       Wound 10/30/20 Right Medial Ankle (Active)   Wound Image    12/15/20 0845   Site Assessment Red;Yellow 12/15/20 0845   Periwound Assessment Edema;Denuded 12/15/20 0845   Margins Attached edges 12/15/20 0845   Closure Secondary intention 11/27/20 0800   Drainage Amount Moderate 12/15/20 0845   Drainage Description Serosanguineous 12/15/20 0845   Treatments Cleansed;Topical Lidocaine;Provider debridement 12/15/20 0845   Wound Cleansing Normal Saline Irrigation 12/15/20 0845   Periwound Protectant Barrier Paste;Antifungal Therapy 12/15/20 0845   Dressing Cleansing/Solutions Antifungal Therapy;Other (Comments) 11/27/20 0800   Dressing Options Collagen Dressing;Hydrofiber Silver;Viscopaste;Dry Roll Gauze;Coban 12/15/20 0845   Dressing Changed Changed 12/15/20 0845   Dressing Status Clean;Dry;Intact 12/15/20 0845   Dressing Change/Treatment Frequency Every 72 hrs, and As Needed 12/15/20 0845   Non-staged Wound Description Full thickness 12/15/20 0845   Wound Length (cm) 1 cm 12/15/20 0845   Wound Width (cm) 1.3 cm 12/15/20 0845   Wound Depth (cm) 0.1 cm 12/15/20 0845   Wound Surface Area (cm^2) 1.3  cm^2 12/15/20 0845   Wound Volume (cm^3) 0.13 cm^3 12/15/20 0845   Post-Procedure Length (cm) 1.2 cm 12/15/20 0845   Post-Procedure Width (cm) 1.4 cm 12/15/20 0845   Post-Procedure Depth (cm) 0.2 cm 12/15/20 0845   Post-Procedure Surface Area (cm^2) 1.68 cm^2 12/15/20 0845   Post-Procedure Volume (cm^3) 0.34 cm^3 12/15/20 0845   Wound Bed Granulation (%) 100 % 11/27/20 0800   Tunneling (cm) 0 cm 12/15/20 0845   Undermining (cm) 0 cm 12/15/20 0845   Wound Odor None 12/15/20 0845   Exposed Structures None 12/15/20 0845          -2% viscous lidocaine applied topically to wound bed for approximately 5 minutes prior to debridement  -Curette used to debride wound bed.  Excisional debridement was performed to remove devitalized tissue until healthy, bleeding tissue was visualized.   Entire surface of wound,  1.96 cm2 debrided.  Tissue debrided into the subcutaneous  layer.    -Bleeding controlled with manual pressure.    -Wound care completed by wound RN, refer to flowsheet  -Patient tolerated the procedure well, without c/o pain or discomfort.             ASSESSMENT AND PLAN:   1. Pressure injury of right ankle, stage 2 (HCC)  12/15//20:   Comments: Patient now has 2 ulcers to the right ankle 1 medial one lateral  -Excisional debridement of wound in clinic today, medically necessary to promote wound healing.  -Patient to return to clinic weekly for assessment and debridement  -Discussed the possibility of venous intervention for greater saphenous vein reflux due to the nonhealing nature of the patient's wounds.  Patient is to reconsider after January 1st .  Patient reports that he is changing his insurance in January which will have a lower co-pay hopeful that the patient will be able to undergo vascular procedure sometime in January. I believe this will be beneficial and assist the wounds to heal.  Comments: Patient reports that he is taking collagen supplements and eating a significant amount of plant-based protein as  well as vegetables.   Wound care: Lambswool between toes, Viscopaste-Unna boot, dry roll gauze, absorbent abdominal pad.  Antifungal powder between toes and distal aspect of right foot.      2. Venous insufficiency of right leg  12/15/20:     -Bilateral venous insufficiency.   -Continue with compression to help with venous insufficiency and reduce right lower extremity edema.  - Denuded tissue to right lower extremity.  Improved with nystatin.  -Weeping right lateral lower extremity significantly improved, minimal at this time      Please note that this dictation was created using voice recognition software. I have worked with technical experts from [facility] to optimize the interface.  I have made every reasonable attempt to correct obvious errors, but there may be errors of grammar and possibly content that I did not discover before finalizing the note.

## 2024-07-26 PROBLEM — I26.99 PULMONARY EMBOLISM ON RIGHT (HCC): Status: ACTIVE | Noted: 2024-01-01

## 2024-07-27 PROBLEM — D72.829 LEUKOCYTOSIS: Status: ACTIVE | Noted: 2024-01-01

## 2024-07-27 PROBLEM — Z71.89 GOALS OF CARE, COUNSELING/DISCUSSION: Status: ACTIVE | Noted: 2024-01-01

## 2024-07-27 PROBLEM — R29.6 FREQUENT FALLS: Status: ACTIVE | Noted: 2024-01-01

## 2024-07-27 PROBLEM — E11.9 DIABETES (HCC): Status: ACTIVE | Noted: 2022-07-04

## 2024-07-27 PROBLEM — E87.29 HIGH ANION GAP METABOLIC ACIDOSIS: Status: ACTIVE | Noted: 2024-01-01

## 2024-07-27 PROBLEM — K76.7 HEPATORENAL SYNDROME (HCC): Status: ACTIVE | Noted: 2024-01-01

## 2024-07-27 PROBLEM — E43 SEVERE PROTEIN-CALORIE MALNUTRITION (HCC): Status: ACTIVE | Noted: 2024-01-01

## 2024-07-27 PROBLEM — R10.9 ABDOMINAL PAIN: Status: ACTIVE | Noted: 2024-01-01

## 2024-07-27 NOTE — ASSESSMENT & PLAN NOTE
Suspect this is likely due to ascites and malignancy.  Pain regimen, including narcotics    Comfort care  Pain medications as needed

## 2024-07-27 NOTE — ED NOTES
Medication history reviewed with patient at bedside.   Med rec is complete  Allergies reviewed.     Patient has not had any outpatient antibiotics in the last 30 days.   Anticoagulants: No    Pranav Garcia

## 2024-07-27 NOTE — ED NOTES
Patient noted to have HR between 150-175, ERP updated and repeat EKG ordered as well as medication. Patient medicated per MAR and EKG complete by ED Tech and signed by ERP. Patient placed on 2L/NC prophylactic. Patient to CT on transport kael with trauma RN

## 2024-07-27 NOTE — ED NOTES
Patient returned from CT and medicated per MAR.  ERP updated on patient 90/70 BP during CT scan, patient normotensive on return to room.

## 2024-07-27 NOTE — ASSESSMENT & PLAN NOTE
Patient has significant change in kidney function in light of suspected worsening of liver function and hypotension.    Comfort care  Discontinue medications

## 2024-07-27 NOTE — HOSPITAL COURSE
Patient 74-year-old male who initially presented with abdominal pain found to have recurrent falls.    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 He did not want to pursue any further chemotherapy with oncologist.    In the ER he was found to have atrial fibrillation with RVR and was given pushes of metoprolol with improvement in rate control. CT of his chest, which revealed a right lower lobe subsegmental pulmonary embolism and he was started on heparin drip, but later on he developed hematuria. Heparin drip stopped based on shared decision making with the patient. He become hypotensive and showed little response to 3-4L of LR. Consequently, the patient was third spacing fluid, labs showed SILVIA and increased firmness and ascites were observed. He was admitted.    During hospitalization patient required multiple fluid boluses to maintain blood pressure with complications of atrial fibrillation with rapid ventricular response. Transition patient to octreotide, midodrine, and albumin as well as metoprolol for rate control. Patient is medically complex, however after goals of care discussion, patient would like to be DNR/DNI as well as speak with hospice/palliative care to pursue more quality of life rather than aggressive treatments.     Treatment was adjusted to comfort care and will work with  to arrange hospice for the patient.

## 2024-07-27 NOTE — ED NOTES
Patient transferred to CT with this RN. BP to 90/72. ERP and primary RN made aware. Fluids running.

## 2024-07-27 NOTE — CARE PLAN
The patient is Watcher - Medium risk of patient condition declining or worsening    Shift Goals  Clinical Goals: Hemodynamic stability  Patient Goals: Comfort  Family Goals: JERRY    Progress made toward(s) clinical / shift goals:    Problem: Skin Integrity  Goal: Skin integrity is maintained or improved  Description: Target End Date:  Prior to discharge or change in level of care    Document interventions on Skin Risk/Lionel flowsheet groups and corresponding LDA    1.  Assess and monitor skin integrity, appearance and/or temperature  2.  Assess risk factors for impaired skin integrity and/or pressures ulcers  3.  Implement precautions to protect skin integrity in collaboration with interdisciplinary team  4.  Implement pressure ulcer prevention protocol if at risk for skin breakdown  5.  Confirm wound care consult if at risk for skin breakdown  6.  Ensure patient use of pressure relieving devices  (Low air loss bed, waffle overlay, heel protectors, ROHO cushion, etc)  Outcome: Progressing  Note: LOGAN bed in use, Q2 turns, condom cath in place to prevent any further moisture breakdown.      Problem: Psychosocial - Comfort Care  Goal: Patient's level of anxiety will decrease  Description: Target End Date:  1-3 days or as soon as patient condition allows    1.  Collaborate with patient and family/caregiver to identify triggers and develop strategies to cope with anxiety  2.  Implement stimuli reduction, calming techniques  3.  Pharmacologic management per provider order  4.  Encourage patient/family/care giver participation  5.  Collaborate with interdisciplinary team including Psychologist or Behavioral Health Team as needed  Outcome: Progressing  Flowsheets  Taken 7/27/2024 1645  Decrease Anxiety Level:   Collaborated with patient to identify and develop coping strategies   Implemented stimuli reduction, calming techniques   Encouraged support system participation  Taken 7/27/2024 0900  Patient Behaviors:   Fatigue    Lethargic  Note: Patient medicated per mar, reports feeling more relaxed.

## 2024-07-27 NOTE — ED TRIAGE NOTES
"Chief Complaint   Patient presents with    Abdominal Pain     Began x5 days ago, hx liver cancer, RUQ pain, distended, last BM yesterday     Weakness     Began after GLF x2 days ago, low /62, initially altered with EMS, given 1200ml NS, Aox4 at ER        [facility] for above complaint. EMS vitals 100/62 HR 94 95% RA gcs 14 glucose 84    Sepsis and EKG protocols ordered, score 3.    Hx arrhthymias, a fib on monitor, states no thinners.     /78   Pulse (!) 138   Temp 36.2 °C (97.1 °F) (Oral)   Resp (!) 25   Ht 1.753 m (5' 9\")   Wt 79.4 kg (175 lb)   SpO2 93%   BMI 25.84 kg/m²      "

## 2024-07-27 NOTE — PROGRESS NOTES
Pt transported to unit via [facility] with this RN and charge RN. Safely transferred from [facility] to bed. Heparin gtt running, verified with this RN and ED RN before arriving to unit. Upon assessment, this RN noted urine color change from clear, yellow to red/bloody. Multiple attempts to obtain blood pressure with finally getting a pressure of 95/40. Paged provider Sathe and asked to come to bedside. Orders to hold heparin. Providers Sathe and Milana at bedside to assess patient. STAT H&H ordered. Paged phleb to notify of new order. New orders for octreotide injection, midodrine, and albumin. Pressures now 110/70s. Pt blood sugar checked - 52. Initiated hypoglycemic protocol (see other note), post protocol blood sugar 114. Provider Sathe at bedside to discuss code status, changed from full code to DNR/DNI.

## 2024-07-27 NOTE — H&P
[facility] Internal Medicine History & Physical Note    Date of Service  7/26/2024    Molly Duron is a 74 year-old male who presented 7/26/2024 with abdominal pain.    The patient says that he has been falling a lot recently, and when he fell today he was worried because he was having abdominal pain.  On presentation to the emergency department, the patient was noted to have atrial fibrillation with RVR and was given pushes of metoprolol with improvement in rate control.  During this time of evaluation, the patient had a CT of his chest, which revealed a right lower lobe subsegmental pulmonary embolism.  The patient also has a history of hepatocellular carcinoma, and the patient reports that he had talk to oncology about treatment of this lesion, however he determined that he did not want to pursue any further chemotherapy.    Based on chart review, the patient has a history of hepatocellular carcinoma of segment 8 of the liver measuring 2.4 cm in 4/2024.  This was discovered on an ultrasound of August 2021, and AFP was 15.1 February 2022.  An MRI of the abdomen 3/2022 showed LI-RADS 5 mass and was seen by [facility] though he was lost to follow-up.  Repeat MRI 9/2023 showed enlargement of the mass, and on 1/2024 a CT of the liver triple phase revealed a larger liver mass now with invasion to the right portal vein with tumor thrombus in the right portal vein branch.  He had a previous plan to start atezolizumab and bevacizumab, however he was lost to follow-up after.  He repeats that he does not have much interest in seeing the oncologist again.    Upon discussion with the patient about his cancer history, the patient says that he prefers to focus on quality of life and does not want to do treatment because he has seen too many VFs friends die from chemotherapy.  He has not smoked cigarettes or use any other drugs and denies alcohol use since 2015. He also has a history of treated hepatitis B and C's  status post Harvoni 2017, and has a history of alcoholic abuse last use 2015.  He has a chart history of esophageal varices and neuropathy, the latter likely secondary to diabetes.    The patient uses Lantus 14 units nightly, though he reported 24 units to the pharmacist and his chart notes 13 units.  The patient lives alone and says that he has no family or friends nearby.  He says that he is sometimes friendly with his neighbors who might check in on him once in a while.  Most concerning to the patient is that he fell and continues to have multiple falls.  His family history is notable for no cancer in his family but diabetes in his father.  The patient has no allergies and other than Lantus and lisinopril he does not report taking other medications.    A review of systems was performed as above. Past medical history, surgical history, family history, social history, allergies, and medications were reviewed with the patient.    ----------------------------  Update:  Later in the night after admission, received report from nursing that patient's SBP in the low low to mid 80s and patient was appearing more tired and pale.  Patient with started on heparin drip for right lower lobe subsegmental PE, nurse observed hematuria in the container.  Based on shared decision making with the patient, we elected to stop the heparin transfusion at the current moment.  Hemoglobin dropped from 12.4-11.1 during this time, patient is more pale appearing but otherwise stable and mental status unchanged.  Patient was unresponsive to 3 to 4 L of LR, and blood pressure remained low with SBP in the 80s to 90s.  Patient on exam was more pale appearing but had consistent mental status.  Observed slightly increased firmness of his abdomen and ascites from prior.  Due to suspicion that patient was third spacing of fluid, and given his labs showing acute kidney injury based on increased creatinine from suspected baseline of 0.7 or so, elected  to move forward through shared decision making with hepatorenal treatment of octreotide, midodrine, and albumin.  Patient's blood pressure improved.  The patient was also noted to have a glucose 50s which improved slightly with dextrose administration.    Separately, had a discussion with patient about CODE STATUS and goals of care.  During this conversation, patient stated that he was focused more on the quality of life and he had had so much trauma over the last several years due to significant deaths in his family and friends.  In particular he reported that things he enjoys in his life include fixing up and looking motorcycle, riding motorcycles.  He said that he really enjoys walking and hiking in the [place] area is perfect for him to do that and that is what he wants to get back to.  He notes that he lost all 4 of his brothers during COVID and said that he lost his parent to cancer.  He said that many of his friends over the last few years have  from chemotherapy and he states that he misses them the most because they were there living during chemotherapy.  Based on this, he notes that chemotherapy was not in line with his goals of care and is not interested in pursuing further treatment with oncology at this time.  We discussed options for his care in the case that his clinical status was worsening or remained stable was able to improve slightly.  We discussed that his imaging shows pulmonary emboli which are likely the consequence of cancer and multiple pulmonary nodules, which appear metastatic disease.  I also discussed with him that based on review of his prior abdominal imaging that his tumor has increased in size and has now invaded local structures.  Based on this discussion, the patient says that if his condition were to worsen he would certainly not want heroic life-saving measures including chest compressions, shocks, and intubation.  He also says that he would not want any treatments not aimed  toward improving quality of life.  If his condition were to remain stable or improved, he would want to minimize hospitalization focus on being outside doing the things that he enjoys for the remainder of his life.  He reports that he does not have any close family or friends anymore because of the recent deaths secondary to COVID and cancer, but he would like to focus on things that make him happy.  We discussed that I was the overnight doctor and that the day team would likely take over his care and discuss further with him regarding options including hospice, which he could do outside the hospital assuming his condition were to become stable or improved, we could consider comfort care measures if he were to clinically decline.  The patient expressed agreement with these plans.    Objective  Vitals, labs, and imaging reviewed.  Patient tachycardic in the emergency room otherwise vital stable.  See HPI for changes overnight.    Gen: Ill-appearing man in no acute distress  HEENT: normocephalic, atraumatic, appears somewhat pale and conjunctiva  Neck: no tracheal deviation  CV: Tachycardic and irregular, no m/r/g  Lungs: CTAB  Abd: Diffusely tender to palpation, with appreciation of ascites and thrombus of abdominal wall  Neuro: Alert and oriented to person place and location.  Psych: normal affect; despite discussing challenging topics of mortality, patient appears to have linear thought process and good judgment and understanding of his cancer and prognosis.  Skin: warm and dry    Assessment/Plan:  74M with hypertension, hyperlipidemia, atrial fibrillation, type 2 diabetes, chronic hepatitis C cirrhosis who presented 7/26/2024 with abdominal pain, recurrent falls likely due to atrial fibrillation. Clinically, the patient is tenuous. If he were to decompensate, based on discussion in subjective above, likely would like to move more to comfort care and hospice.  If he were to be stable or improving, the patient wants  to like to consider moving to hospice or focusing on quality of life rather than seeking out further treatment for his cancer.    * Pulmonary embolism on right (HCC)- (present on admission)  Assessment & Plan  Observed on CT.  Initiated initially on treatment dose heparin, however this was stopped due to observation of hematuria.  -Holding heparin due to hematuria    Hepatorenal syndrome (HCC)  Assessment & Plan  Patient has significant change in kidney function in light of suspected worsening of liver function and hypotension.  -3 times daily octreotide, midodrine, one-time albumin    Abdominal pain  Assessment & Plan  Suspect this is likely due to ascites and malignancy.  Pain regimen, including p.o. and IV narcotics, was ordered for this patient requiring close hemodynamic monitoring.  -Pain regimen  -Consider therapeutic paracentesis, depending on goals of care.    Goals of care, counseling/discussion  Assessment & Plan  See hepatocellular carcinoma.    Severe protein-calorie malnutrition (HCC)  Assessment & Plan  Based on appearance and poor intake.  -nutrition consult    High anion gap metabolic acidosis  Assessment & Plan  Likely 2/2 lactic acidosis from poor intake, dehydration.    Leukocytosis  Assessment & Plan  Likely 2/2 stress response.    Frequent falls  Assessment & Plan  Patient came in with frequent falls, possibly due to combination of pulmonary embolism today though suspect arrhythmia and intravascular depletion.  -PT/OT    Diabetes (HCC)- (present on admission)  Assessment & Plan  Continue Lantus  Sliding scale insulin    Atrial fibrillation with RVR (HCC)- (present on admission)  Assessment & Plan  Rate controlled with metoprolol.  -metop tartrate ordered    Hepatocellular carcinoma (HCC)- (present on admission)  Assessment & Plan  See HPI for information. No longer seeking treatment, likely has mets  -consider palliative consult  -consider transition to hospice    Cirrhosis (HCC)- (present on  admission)  Assessment & Plan  Hx Hepatitis, now treatment. Likely explanation for anemia, elevated INR, elevated Lfts, hypotnatremia (2/2 anasarca). Suspect this is complicated by hepatorenal syndrome., no suspicion for hepatopulm at this time.    Hypertension- (present on admission)  Assessment & Plan  Holding home lisinopril in light of hypotension      The patient's care was discussed with the patient, any present family, and the attending Dr. Goyo Cortés. The patient will require at least two midnights for appropriate medical management, necessitating inpatient admission because of illness severity. The patient will require a med/surg bed on medical service due to pulmonary embolism.    VTE prophylaxis: heparin, held as above  Diet: Regular  Code Status: DNR/DNI (changed, see HPI above)  Disposition: not medically cleared    Andrey Simeon MD  Internal Medicine Resident

## 2024-07-27 NOTE — ED NOTES
"Bedside report received from off going RN/tech: Deanna, assumed care of patient.  POC discussed with patient. Call light within reach, all needs addressed at this time.       Fall risk interventions in place: Move the patient closer to the nurse's station, Patient's personal possessions are with in their safe reach, Place socks on patient, Place fall risk sign on patient's door, Give patient urinal if applicable, and Keep floor surfaces clean and dry (all applicable per [facility] Fall risk assessment)   Continuous monitoring: Cardiac Leads, Pulse Ox, or Blood Pressure  IVF/IV medications: Infusion per MAR (List Med(s)) NS Bolus  Oxygen: Room Air  Bedside sitter: Not Applicable   Isolation: Not Applicable    /69   Pulse (!) 107   Temp 36.2 °C (97.1 °F) (Oral)   Resp 19   Ht 1.753 m (5' 9\")   Wt 79.4 kg (175 lb)   SpO2 94%  - RA   "

## 2024-07-27 NOTE — ASSESSMENT & PLAN NOTE
Likely 2/2 lactic acidosis from poor intake, dehydration.    Comfort care  No medications  No IV fluids

## 2024-07-27 NOTE — PROGRESS NOTES
Hypoglycemia Intervention    Hypoglycemia protocol intervention:  Blood glucose 52 at 0120.  Intervention: 8 oz of fruit juice   Repeat blood glucose 58 at 0145.  Intervention: 25 g IV dextrose per MAR   Additional interventions needed: RECHECK BLOOD GLUCOSE: 114  Dr. Simeon notified of the above at 0200 at bedside.

## 2024-07-27 NOTE — ED PROVIDER NOTES
ED Provider Note    CHIEF COMPLAINT  Chief Complaint   Patient presents with    Abdominal Pain     Began x5 days ago, hx liver cancer, RUQ pain, distended, last BM yesterday     Weakness     Began after GLF x2 days ago, low /62, initially altered with EMS, given 1200ml NS, Aox4 at ER       EXTERNAL RECORDS REVIEWED  Outpatient Notes outpatient visits with interventional radiology and nuclear medicine for hepatocellular carcinoma in 2023    HPI/ROS  LIMITATION TO HISTORY   Select: : None  OUTSIDE HISTORIAN(S):  None    Nitesh Duron is a 74 y.o. male who presents with complaints of worsening abdominal pain for the past 5 days with known liver cancer as well as weakness and continued falls at home.  Patient lives alone and reports he fell 5 days ago.  EMS helped him get up but has been having back pain since.  He evidently fell again today.  Given the continued abdominal pain and weakness he was brought in for evaluation.  He notes he had CyberKnife therapy for his hepatocellular carcinoma, 5 treatments.  He did not want to pursue chemotherapy and so he has had no care for his cancer since March.  He otherwise reports a history of hyperlipidemia hypertension and hepatitis C.  He denies chest pain, denies shortness of breath, denies recent fevers, no urinary symptoms, no diarrhea.    PAST MEDICAL HISTORY   has a past medical history of Arrhythmia (07/01/2022), Arthritis, Cancer (HCC) (07/01/2022), Cataract (2015), Cirrhosis (HCC), Dental disorder (07/01/2022), Diabetes (05/12/2022), Hepatitis C, High cholesterol (07/01/2022), Hyperlipidemia, Hypertension (07/01/2022), and Renal disorder (09/07/2018).    SURGICAL HISTORY   has a past surgical history that includes rotator cuff repair (Left, 2014); cystoscopy (2004,2008); orif, ankle (Right, 1988); meniscectomy (Left, 1979); appendectomy (1958); gastroscopy (N/A, 9/18/2018); ultrasonic guidance, intraoperative (N/A, 5/19/2022); lap,diagnostic abdomen  "(N/A, 2022); and irrigation & debridement general (Left, 2022).    FAMILY HISTORY  Family History   Problem Relation Age of Onset    Other Mother     Heart Attack Father     Heart Failure Brother        SOCIAL HISTORY  Social History     Tobacco Use    Smoking status: Former     Current packs/day: 0.00     Average packs/day: 0.5 packs/day for 43.0 years (21.5 ttl pk-yrs)     Types: Cigarettes     Start date: 1965     Quit date: 2008     Years since quittin.5    Smokeless tobacco: Never    Tobacco comments:     quit    Vaping Use    Vaping status: Never Used   Substance and Sexual Activity    Alcohol use: No    Drug use: Not Currently     Types: Marijuana, Inhaled     Comment: marijuana    Sexual activity: Never       CURRENT MEDICATIONS  Home Medications       Reviewed by Pranav Garcia (Pharmacy Tech) on 24 at 2030  Med List Status: Complete     Medication Last Dose Status   insulin GLARGINE (LANTUS,SEMGLEE) 100 UNIT/ML Solution 1 week Active   insulin regular (HUMULIN R) 100 Unit/mL Solution Not Taking Active   lisinopril (PRINIVIL) 10 MG Tab 2024 Active   oxyCODONE immediate-release (ROXICODONE) 5 MG Tab 2024 Active                  Audit from Redirected Encounters    **Home medications have not yet been reviewed for this encounter**         ALLERGIES  Allergies   Allergen Reactions    Ciprofloxacin Unspecified     convulsions    Statins [Hmg-Coa-R Inhibitors] Unspecified     Stabbing pains in kidneys    Acetaminophen      Kidney pain     Atorvastatin      Patient declines    Ibuprofen      Kidney pain    Naproxen      Kidney pain    Shellfish Allergy      Kidney stones    Soap &  [Alcohol] Rash     Antibacterial soap- contact dermatitis       PHYSICAL EXAM  VITAL SIGNS: BP (!) 143/67   Pulse (!) 118   Temp 36.2 °C (97.1 °F) (Oral)   Resp 20   Ht 1.753 m (5' 9\")   Wt 79.4 kg (175 lb)   SpO2 94%   BMI 25.84 kg/m²    Constitutional: Awake and alert. No " acute distress.  Head: NCAT.  HEENT: Mild scleral icterus . PERRLA.  Neck: Grossly normal range of motion. Airway midline.  Cardiovascular: Normal heart rate, Normal rhythm.  Thorax & Lungs: No respiratory distress. Clear to Auscultation bilaterally.  Abdomen: Normal inspection.  Generalized abdominal tenderness most severe in the right upper quadrant. Nondis mild distention tended  Skin: No obvious rash.  Back: No midline tenderness, No CVA tenderness.   Musculoskeletal: No obvious deformity.   Neurologic: A&Ox3.  Moves all extremities well  Psychiatric: Mood and affect are appropriate for situation.    EKG/LABS  Results for orders placed or performed during the hospital encounter of 07/26/24   Lactic Acid   Result Value Ref Range    Lactic Acid 4.8 (HH) 0.5 - 2.0 mmol/L   Lactic Acid   Result Value Ref Range    Lactic Acid 0.8 0.5 - 2.0 mmol/L   CBC with Differential   Result Value Ref Range    WBC 11.5 (H) 4.8 - 10.8 K/uL    RBC 4.52 (L) 4.70 - 6.10 M/uL    Hemoglobin 12.4 (L) 14.0 - 18.0 g/dL    Hematocrit 39.0 (L) 42.0 - 52.0 %    MCV 86.3 81.4 - 97.8 fL    MCH 27.4 27.0 - 33.0 pg    MCHC 31.8 (L) 32.3 - 36.5 g/dL    RDW 69.7 (H) 35.9 - 50.0 fL    Platelet Count 59 (L) 164 - 446 K/uL    Neutrophils-Polys 90.30 (H) 44.00 - 72.00 %    Lymphocytes 1.40 (L) 22.00 - 41.00 %    Monocytes 6.40 0.00 - 13.40 %    Eosinophils 0.00 0.00 - 6.90 %    Basophils 0.10 0.00 - 1.80 %    Immature Granulocytes 1.80 (H) 0.00 - 0.90 %    Nucleated RBC 0.30 (H) 0.00 - 0.20 /100 WBC    Neutrophils (Absolute) 10.38 (H) 1.82 - 7.42 K/uL    Lymphs (Absolute) 0.16 (L) 1.00 - 4.80 K/uL    Monos (Absolute) 0.74 0.00 - 0.85 K/uL    Eos (Absolute) 0.00 0.00 - 0.51 K/uL    Baso (Absolute) 0.01 0.00 - 0.12 K/uL    Immature Granulocytes (abs) 0.21 (H) 0.00 - 0.11 K/uL    NRBC (Absolute) 0.03 K/uL    Anisocytosis 1+     Macrocytosis 1+    Complete Metabolic Panel   Result Value Ref Range    Sodium 131 (L) 135 - 145 mmol/L    Potassium 5.3 3.6 -  5.5 mmol/L    Chloride 95 (L) 96 - 112 mmol/L    Co2 19 (L) 20 - 33 mmol/L    Anion Gap 17.0 (H) 7.0 - 16.0    Glucose 83 65 - 99 mg/dL    Bun 71 (H) 8 - 22 mg/dL    Creatinine 1.40 0.50 - 1.40 mg/dL    Calcium 9.0 8.5 - 10.5 mg/dL    Correct Calcium 10.0 8.5 - 10.5 mg/dL    AST(SGOT) 400 (H) 12 - 45 U/L    ALT(SGPT) 151 (H) 2 - 50 U/L    Alkaline Phosphatase 337 (H) 30 - 99 U/L    Total Bilirubin 4.2 (H) 0.1 - 1.5 mg/dL    Albumin 2.8 (L) 3.2 - 4.9 g/dL    Total Protein 5.8 (L) 6.0 - 8.2 g/dL    Globulin 3.0 1.9 - 3.5 g/dL    A-G Ratio 0.9 g/dL   Blood Culture - Draw one from central line and one from peripheral site    Specimen: Peripheral; Blood   Result Value Ref Range    Significant Indicator NEG     Source BLD     Site PERIPHERAL     Culture Result       No Growth  Note: Blood cultures are incubated for 5 days and  are monitored continuously.Positive blood cultures  are called to the RN and reported as soon as  they are identified.     Blood Culture - Draw one from central line and one from peripheral site    Specimen: Line; Blood   Result Value Ref Range    Significant Indicator NEG     Source BLD     Site eripheral     Culture Result       No Growth  Note: Blood cultures are incubated for 5 days and  are monitored continuously.Positive blood cultures  are called to the RN and reported as soon as  they are identified.     PROTHROMBIN TIME (INR)   Result Value Ref Range    PT 21.8 (H) 12.0 - 14.6 sec    INR 1.87 (H) 0.87 - 1.13   APTT   Result Value Ref Range    APTT 28.4 24.7 - 36.0 sec   ESTIMATED GFR   Result Value Ref Range    GFR (CKD-EPI) 53 (A) >60 mL/min/1.73 m 2   PLATELET ESTIMATE   Result Value Ref Range    Plt Estimation Decreased    MORPHOLOGY   Result Value Ref Range    RBC Morphology Present     Polychromia 1+     Poikilocytosis 2+     Echinocytes 2+    PERIPHERAL SMEAR REVIEW   Result Value Ref Range    Peripheral Smear Review see below    IMMATURE PLT FRACTION   Result Value Ref Range    Imm.  Plt Fraction 8.2 0.6 - 13.1 %   DIFFERENTIAL COMMENT   Result Value Ref Range    Comments-Diff see below    aPTT   Result Value Ref Range    APTT 35.5 24.7 - 36.0 sec   Prothrombin Time   Result Value Ref Range    PT 31.3 (H) 12.0 - 14.6 sec    INR 2.97 (H) 0.87 - 1.13   Heparin Xa (Unfractionated)   Result Value Ref Range    Heparin Xa (UFH) <0.10 IU/mL   EKG   Result Value Ref Range    Report       [facility] Emergency Dept.    Test Date:  2024  Pt Name:    CECILLE [place]             Department: ER  MRN:        [id number]                      Room:       BL 13  Gender:     Male                         Technician: [id number]  :        1950                   Requested By:ER TRIAGE PROTOCOL  Order #:    [id number]                    Reading MD:    Measurements  Intervals                                Axis  Rate:       153                          P:          0  KY:         0                            QRS:        -24  QRSD:       86                           T:          226  QT:         288  QTc:        460    Interpretive Statements  Atrial fibrillation with rapid V-rate  Paired ventricular premature complexes  Borderline left axis deviation  Low voltage, extremity and precordial leads  Repolarization abnormality, prob rate related  Compared to ECG 2022 10:23:23  Ventricular premature complex(es) now present  Low QRS voltage now present  Early rep olarization now present  Sinus tachycardia no longer present  T-wave abnormality no longer present     EKG   Result Value Ref Range    Report       [facility] Emergency Dept.    Test Date:  2024  Pt Name:    CECILLE [place]             Department: ER  MRN:        [id number]                      Room:       BL 13  Gender:     Male                         Technician: [id number]  :        1950                   Requested By:JERED QUINTANILLA  Order #:    [id number]                    Reading  MD:    Measurements  Intervals                                Axis  Rate:       142                          P:          51  CA:         109                          QRS:        -25  QRSD:       91                           T:          209  QT:         302  QTc:        464    Interpretive Statements  Sinus tachycardia  Ventricular premature complex  Inferior infarct, old  Lateral leads are also involved  Compared to ECG 07/26/2024 17:44:50  Myocardial infarct finding now present  Atrial fibrillation no longer present  Early repolarization no longer present       EKG with a rate of 142.  Appears irregularly irregular although low voltage and poor baseline.  I have independently interpreted this EKG    RADIOLOGY/PROCEDURES   I have independently interpreted the diagnostic imaging associated with this visit and am waiting the final reading from the radiologist.   My preliminary interpretation is as follows: no pneumonia, no ICH    Radiologist interpretation:  CT-ABDOMEN-PELVIS WITH   Final Result         1.  Multiple heterogeneous hepatic masses, appearance most compatible with metastatic disease.   2.  Irregular hepatic contour, compatible with changes of cirrhosis.   3.  Scattered moderate abdominal ascites   4.  Small fat-containing bilateral inguinal hernias   5.  Diverticulosis   6.  Atherosclerosis and atherosclerotic coronary artery disease      CT-CTA CHEST PULMONARY ARTERY W/ RECONS   Final Result         1.  Right lower lobe subsegmental pulmonary emboli without findings of right heart strain.   2.  Small layering right pleural effusion   3.  Atherosclerosis and atherosclerotic coronary artery disease.   4.  Bilateral pulmonary nodules, appearance favoring metastatic disease.      These findings were discussed with the patient's clinician, Hill Hernandez, on 7/26/2024 8:43 PM.      CT-LSPINE W/O PLUS RECONS   Final Result         1.  No acute traumatic bony injury of the lumbar spine.   2.   Atherosclerosis      CT-TSPINE W/O PLUS RECONS   Final Result         1.  No acute traumatic bony injury of the thoracic spine.      CT-CSPINE WITHOUT PLUS RECONS   Final Result         1.  Multilevel degenerative changes of the cervical spine limit diagnostic sensitivity of this examination, otherwise no acute traumatic bony injury of the cervical spine is apparent.   2.  See dedicated CT chest for thoracic findings.   3.  Atherosclerosis      CT-HEAD W/O   Final Result         1.  No acute intracranial abnormality is identified, there are nonspecific white matter changes, commonly associated with small vessel ischemic disease.  Associated mild cerebral atrophy is noted.   2.  Atherosclerosis.               DX-CHEST-PORTABLE (1 VIEW)   Final Result      No acute cardiac or pulmonary abnormalities are identified.          COURSE & MEDICAL DECISION MAKING    ASSESSMENT, COURSE AND PLAN  Care Narrative:   This is a 74-year-old male with known hepatocellular carcinoma here for multiple falls and worsening abdominal pain at home  Afebrile, in A-fib with heart rate 130s to 150s, hypertensive  On exam GCS 15, scleral icterus, tenderness to the abdomen right upper quadrant tenderness.  Differential includes A-fib with RVR, PE, metastatic disease, cirrhosis, SBP, electrolyte derangement  IV fluids and IV analgesics ordered.  Labs with normocytic anemia, platelets of 59, anion gap acidosis, worsening liver studies in the setting of known cancer.  Initial lactate is 4.8, repeated after fluids and has normalized.  Initially did not treat the A-fib RVR given elevated lactate.  I have not identified a source of infection but I have empirically treated with ceftriaxone and metronidazole.  Now that lactate is normalized I have treated him with IV metoprolol x 1 and his heart rate improved dramatically.  He was orally loaded with metoprolol thereafter.  Patient was scanned both for trauma as well as identification of metastatic  disease.  He has no traumatic findings to the head or spine after his falls.  His abdomen and pelvis CT does demonstrate worsening of his cancer and findings of cirrhosis but no other acute findings.  He does have a PE on CTA.  No evidence of right heart strain.  He is started on anticoagulation for PE, he is agreeable for admission for the above multiple problems.  Hospitalist accepts him for admission.  Sepsis: Infection was suspected 19:42 (Time). Sepsis pathway was initiated. Less than 30cc/kg because of concern for volume overload in the setting of cirrhosis. 2000 mL of crystalloid was ordered. Antibiotics were given per protocol.    CRITICAL CARE  The very real possibilty of a deterioration of this patient's condition required the highest level of my preparedness for sudden, emergent intervention.  I provided critical care services, which included medication orders, frequent reevaluations of the patient's condition and response to treatment, ordering and reviewing test results, and discussing the case with various consultants.  The critical care time associated with the care of the patient was 32 minutes. Review chart for interventions. This time is exclusive of any other billable procedures.       ADDITIONAL PROBLEMS MANAGED  Thrombocytopenia  Hepatocellular carcinoma with metastatic disease  Anion gap acidosis  PE    DISPOSITION AND DISCUSSIONS  I have discussed management of the patient with the following physicians and FILI's: Hospitalist    Discussion of management with other Q or appropriate source(s): None     Barriers to care at this time, including but not limited to: Patient lacks transportation  and Patient lacks financial resources.     Decision tools and prescription drugs considered including, but not limited to: Antibiotics .  Empiric given sepsis criteria .  FINAL DIAGNOSIS  1. Single subsegmental pulmonary embolism without acute cor pulmonale (HCC)    2. Atrial fibrillation with RVR (HCC)     3. Increased anion gap metabolic acidosis    4. Fall, initial encounter    5. Thrombocytopenia (HCC)    6. Hepatocellular carcinoma (HCC)           Electronically signed by: Hill Hernandez D.O., 7/26/2024 6:15 PM

## 2024-07-27 NOTE — PROGRESS NOTES
[facility] Internal Medicine Daily Progress Note    Date of Service  7/27/2024    [facility] Team: [facility] IM Green Team   Attending: Maikel Wilson M.d.  Senior Resident: Dr. Neville  Intern:  Dr. Garay  Contact Number: [phone]    Chief Complaint  Nitesh Duron is a 74 y.o. male admitted 7/26/2024 with abdominal pain and recurrent falls    Hospital Course  Patient 74-year-old male who initially presented with abdominal pain found to have recurrent falls.    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 He did not want to pursue any further chemotherapy with oncologist.    In the ER he was found to have atrial fibrillation with RVR and was given pushes of metoprolol with improvement in rate control. CT of his chest, which revealed a right lower lobe subsegmental pulmonary embolism and he was started on heparin drip, but later on he developed hematuria. Heparin drip stopped based on shared decision making with the patient. He become hypotensive and showed little response to 3-4L of LR. Consequently, the patient was third spacing fluid, labs showed SILVIA and increased firmness and ascites were observed. He was admitted.    During hospitalization patient required multiple fluid boluses to maintain blood pressure with complications of atrial fibrillation with rapid ventricular response. Transition patient to octreotide, midodrine, and albumin as well as metoprolol for rate control. Patient is medically complex, however after goals of care discussion, patient would like to be DNR/DNI as well as speak with hospice/palliative care to pursue more quality of life rather than aggressive treatments.     Interval Problem Update  The patient is comfortably lying on bed, breathing  normally without any signs of respiratory distress or pain. He did mention mild abdominal discomfort. He is encephalopathic, with moments of lucidity, but does have capacity and understand why is he in the hospital.  After extensive talk with the patient regarding goals of care and prognosis, he agreed in comfort care and possible hospice. He mentioned that he wants his son to be called and explain the case.  Treatment was adjusted to comfort care and will work with  to arrange hospice for the patient.    I have discussed this patient's plan of care and discharge plan at IDT rounds today with Case Management, Nursing, Nursing leadership, and other members of the IDT team.    Consultants/Specialty  None    Code Status  Comfort Care/DNR    Disposition  The patient is not medically cleared for discharge to home or a post-acute facility.      I have placed the appropriate orders for post-discharge needs.    Review of Systems  ROS     Physical Exam  Temp:  [36.1 °C (97 °F)-36.3 °C (97.3 °F)] 36.3 °C (97.3 °F)  Pulse:  [] 76  Resp:  [12-25] 16  BP: ()/(40-85) 93/64  SpO2:  [91 %-98 %] 94 %    Physical Exam    Fluids    Intake/Output Summary (Last 24 hours) at 7/27/2024 1554  Last data filed at 7/27/2024 1200  Gross per 24 hour   Intake 4922 ml   Output 750 ml   Net 4172 ml       Laboratory  Recent Labs     07/26/24  1800 07/27/24  0051   WBC 11.5* 12.8*   RBC 4.52* 4.00*   HEMOGLOBIN 12.4* 11.1*   HEMATOCRIT 39.0* 35.2*   MCV 86.3 88.0   MCH 27.4 27.8   MCHC 31.8* 31.5*   RDW 69.7* 71.4*   PLATELETCT 59* 49*   MPV  --  10.5     Recent Labs     07/26/24  1800 07/27/24  0724   SODIUM 131* 132*   POTASSIUM 5.3 5.1   CHLORIDE 95* 100   CO2 19* 17*   GLUCOSE 83 122*   BUN 71* 65*   CREATININE 1.40 1.22   CALCIUM 9.0 8.2*     Recent Labs     07/26/24  1800 07/26/24  2150   APTT 28.4 35.5   INR 1.87* 2.97*               Imaging  CT-ABDOMEN-PELVIS WITH   Final Result         1.  Multiple heterogeneous  hepatic masses, appearance most compatible with metastatic disease.   2.  Irregular hepatic contour, compatible with changes of cirrhosis.   3.  Scattered moderate abdominal ascites   4.  Small fat-containing bilateral inguinal hernias   5.  Diverticulosis   6.  Atherosclerosis and atherosclerotic coronary artery disease      CT-CTA CHEST PULMONARY ARTERY W/ RECONS   Final Result         1.  Right lower lobe subsegmental pulmonary emboli without findings of right heart strain.   2.  Small layering right pleural effusion   3.  Atherosclerosis and atherosclerotic coronary artery disease.   4.  Bilateral pulmonary nodules, appearance favoring metastatic disease.      These findings were discussed with the patient's clinician, Hill Hernandez, on 7/26/2024 8:43 PM.      CT-LSPINE W/O PLUS RECONS   Final Result         1.  No acute traumatic bony injury of the lumbar spine.   2.  Atherosclerosis      CT-TSPINE W/O PLUS RECONS   Final Result         1.  No acute traumatic bony injury of the thoracic spine.      CT-CSPINE WITHOUT PLUS RECONS   Final Result         1.  Multilevel degenerative changes of the cervical spine limit diagnostic sensitivity of this examination, otherwise no acute traumatic bony injury of the cervical spine is apparent.   2.  See dedicated CT chest for thoracic findings.   3.  Atherosclerosis      CT-HEAD W/O   Final Result         1.  No acute intracranial abnormality is identified, there are nonspecific white matter changes, commonly associated with small vessel ischemic disease.  Associated mild cerebral atrophy is noted.   2.  Atherosclerosis.               DX-CHEST-PORTABLE (1 VIEW)   Final Result      No acute cardiac or pulmonary abnormalities are identified.            Assessment/Plan  Problem Representation:    * Pulmonary embolism on right (HCC)- (present on admission)  Assessment & Plan  Observed on CT.  Initiated initially on treatment dose heparin, however this was stopped due to  observation of hematuria.    Comfort care  Discontinue medications    Hepatorenal syndrome (HCC)  Assessment & Plan  Patient has significant change in kidney function in light of suspected worsening of liver function and hypotension.    Comfort care  Discontinue medications    Abdominal pain  Assessment & Plan  Suspect this is likely due to ascites and malignancy.  Pain regimen, including narcotics    Comfort care  Pain medications as needed    Goals of care, counseling/discussion  Assessment & Plan  Patient wants comfort care and hospice  Code status DNAR/DNI    Severe protein-calorie malnutrition (HCC)  Assessment & Plan  Based on appearance and poor intake.    Patient diet is in his preference, no restrictions    High anion gap metabolic acidosis  Assessment & Plan  Likely 2/2 lactic acidosis from poor intake, dehydration.    Comfort care  No medications  No IV fluids    Leukocytosis  Assessment & Plan  Likely 2/2 stress response.    No need to follow    Frequent falls  Assessment & Plan  Patient came in with frequent falls, possibly due to combination of pulmonary embolism today though suspect arrhythmia and intravascular depletion.    Comfort care  Fall precautions    Diabetes (HCC)- (present on admission)  Assessment & Plan  Comfort care  Discontinue medications  No more POC glucose    Atrial fibrillation with RVR (HCC)- (present on admission)  Assessment & Plan  Comfort care  Discontinue medications    Hepatocellular carcinoma (HCC)- (present on admission)  Assessment & Plan  Advanced cancer with metastases.    Comfort care  No need to follow    Cirrhosis (HCC)- (present on admission)  Assessment & Plan  Hx Hepatitis, now treatment. Likely explanation for anemia, elevated INR, elevated Lfts, hypotnatremia (2/2 anasarca). Suspect this is complicated by hepatorenal syndrome., no suspicion for hepatopulm at this time.    Comfort care  Discontinue medications    Hypertension- (present on admission)  Assessment &  Plan  Comfort care  Discontinue medications         VTE prophylaxis: none    I have performed a physical exam and reviewed and updated ROS and Plan today (7/27/2024). In review of yesterday's note (7/26/2024), there are no changes except as documented above.

## 2024-07-27 NOTE — ASSESSMENT & PLAN NOTE
Patient came in with frequent falls, possibly due to combination of pulmonary embolism today though suspect arrhythmia and intravascular depletion.    Comfort care  Fall precautions

## 2024-07-27 NOTE — PROGRESS NOTES
4 Eyes Skin Assessment Completed by PRUDENCE Sahu and PRUDENCE Ruiz.    Head WDL  Ears WDL  Nose WDL  Mouth WDL  Neck WDL  Breast/Chest Jaundice  Shoulder Blades WDL  Spine Redness and Blanching  (R) Arm/Elbow/Hand Edema  (L) Arm/Elbow/Hand Edema  Abdomen WDL  Groin WDL  Scrotum/Coccyx/Buttocks Redness and Blanching, open area  (R) Leg Redness, Scab, and Jaundice  (L) Leg Redness, Scab, and Jaundice  (R) Heel/Foot/Toe Redness, Blanching, and Boggy  (L) Heel/Foot/Toe Redness, Blanching, and Boggy                        Devices In Places Tele Box, Blood Pressure Cuff, Pulse Ox, Condom Cath, and Nasal Cannula      Interventions In Place Gray Ear Foams, NC W/Ear Foams, Sacral Mepilex, and Pillows    Possible Skin Injury Yes    Pictures Uploaded Into Epic Yes  Wound Consult Placed Yes  RN Wound Prevention Protocol Ordered Yes

## 2024-07-27 NOTE — ASSESSMENT & PLAN NOTE
Observed on CT.  Initiated initially on treatment dose heparin, however this was stopped due to observation of hematuria.    Comfort care  Discontinue medications

## 2024-07-27 NOTE — ASSESSMENT & PLAN NOTE
Hx Hepatitis, now treatment. Likely explanation for anemia, elevated INR, elevated Lfts, hypotnatremia (2/2 anasarca). Suspect this is complicated by hepatorenal syndrome., no suspicion for hepatopulm at this time.    Comfort care  Discontinue medications

## 2024-07-27 NOTE — ED NOTES
Patient noted to be hypotensive, asymptomatic. Hospitalist and Resident updated via voalte and orders placed by resident. S1 RN at bedside and updated on BP and new orders. Patient and all belongings transferred to S143-00 on transport zoll by S1 RN x2.

## 2024-07-27 NOTE — ED NOTES
2nd set of cultures drawn and sent  Condom cath applied  Sepsis bolus started  Rocephin started per MAR

## 2024-07-28 NOTE — PROGRESS NOTES
[facility] Internal Medicine Daily Progress Note    Date of Service  7/28/2024    [facility] Team: [facility] IM Green Team   Attending: Maikel Wilson M.d.  Senior Resident: Dr. Neville  Intern:  Dr. Garay  Contact Number: [phone]    Chief Complaint  Nitesh Duron is a 74 y.o. male admitted 7/26/2024 with abdominal pain and recurrent falls    Hospital Course  Patient 74-year-old male who initially presented with abdominal pain found to have recurrent falls.    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 He did not want to pursue any further chemotherapy with oncologist.    In the ER he was found to have atrial fibrillation with RVR and was given pushes of metoprolol with improvement in rate control. CT of his chest, which revealed a right lower lobe subsegmental pulmonary embolism and he was started on heparin drip, but later on he developed hematuria. Heparin drip stopped based on shared decision making with the patient. He become hypotensive and showed little response to 3-4L of LR. Consequently, the patient was third spacing fluid, labs showed SILVIA and increased firmness and ascites were observed. He was admitted.    During hospitalization patient required multiple fluid boluses to maintain blood pressure with complications of atrial fibrillation with rapid ventricular response. Transition patient to octreotide, midodrine, and albumin as well as metoprolol for rate control. Patient is medically complex, however after goals of care discussion, patient would like to be DNR/DNI as well as speak with hospice/palliative care to pursue more quality of life rather than aggressive treatments.     Treatment was adjusted to comfort care and will work with  to  arrange hospice for the patient.    Interval Problem Update  The patient is comfortably lying on bed, breathing normally without any signs of respiratory distress or pain. Although somnolent he is lucid and responsive.    Plan: Coordination of care with son and  for hospice. Manage the pain as needed and supportive measures.      I have discussed this patient's plan of care and discharge plan at IDT rounds today with Case Management, Nursing, Nursing leadership, and other members of the IDT team.    Consultants/Specialty  None    Code Status  Comfort Care/DNR    Disposition  The patient is medically cleared for discharge to home or a post-acute facility.      I have placed the appropriate orders for post-discharge needs.    Review of Systems  ROS     Physical Exam  Temp:  [36.3 °C (97.4 °F)] 36.3 °C (97.4 °F)  Pulse:  [79] 79  Resp:  [16] 16  BP: (101)/(71) 101/71  SpO2:  [95 %] 95 %    Physical Exam  Patient lying on bed comfortably, no breathing distress, somnolent but has periods of lucidity and is responsive.  He keeps his mouth open and seems a little dry    Fluids    Intake/Output Summary (Last 24 hours) at 7/28/2024 1640  Last data filed at 7/28/2024 1600  Gross per 24 hour   Intake 120 ml   Output 300 ml   Net -180 ml       Laboratory  Recent Labs     07/26/24  1800 07/27/24  0051   WBC 11.5* 12.8*   RBC 4.52* 4.00*   HEMOGLOBIN 12.4* 11.1*   HEMATOCRIT 39.0* 35.2*   MCV 86.3 88.0   MCH 27.4 27.8   MCHC 31.8* 31.5*   RDW 69.7* 71.4*   PLATELETCT 59* 49*   MPV  --  10.5     Recent Labs     07/26/24  1800 07/27/24  0724   SODIUM 131* 132*   POTASSIUM 5.3 5.1   CHLORIDE 95* 100   CO2 19* 17*   GLUCOSE 83 122*   BUN 71* 65*   CREATININE 1.40 1.22   CALCIUM 9.0 8.2*     Recent Labs     07/26/24  1800 07/26/24  2150   APTT 28.4 35.5   INR 1.87* 2.97*               Imaging  CT-ABDOMEN-PELVIS WITH   Final Result         1.  Multiple heterogeneous hepatic masses, appearance most compatible with metastatic  disease.   2.  Irregular hepatic contour, compatible with changes of cirrhosis.   3.  Scattered moderate abdominal ascites   4.  Small fat-containing bilateral inguinal hernias   5.  Diverticulosis   6.  Atherosclerosis and atherosclerotic coronary artery disease      CT-CTA CHEST PULMONARY ARTERY W/ RECONS   Final Result         1.  Right lower lobe subsegmental pulmonary emboli without findings of right heart strain.   2.  Small layering right pleural effusion   3.  Atherosclerosis and atherosclerotic coronary artery disease.   4.  Bilateral pulmonary nodules, appearance favoring metastatic disease.      These findings were discussed with the patient's clinician, Hill Hernandez, on 7/26/2024 8:43 PM.      CT-LSPINE W/O PLUS RECONS   Final Result         1.  No acute traumatic bony injury of the lumbar spine.   2.  Atherosclerosis      CT-TSPINE W/O PLUS RECONS   Final Result         1.  No acute traumatic bony injury of the thoracic spine.      CT-CSPINE WITHOUT PLUS RECONS   Final Result         1.  Multilevel degenerative changes of the cervical spine limit diagnostic sensitivity of this examination, otherwise no acute traumatic bony injury of the cervical spine is apparent.   2.  See dedicated CT chest for thoracic findings.   3.  Atherosclerosis      CT-HEAD W/O   Final Result         1.  No acute intracranial abnormality is identified, there are nonspecific white matter changes, commonly associated with small vessel ischemic disease.  Associated mild cerebral atrophy is noted.   2.  Atherosclerosis.               DX-CHEST-PORTABLE (1 VIEW)   Final Result      No acute cardiac or pulmonary abnormalities are identified.            Assessment/Plan  Problem Representation:    * Pulmonary embolism on right (HCC)- (present on admission)  Assessment & Plan  Observed on CT.  Initiated initially on treatment dose heparin, however this was stopped due to observation of hematuria.    Comfort care  Discontinue  medications    Hepatorenal syndrome (HCC)  Assessment & Plan  Patient has significant change in kidney function in light of suspected worsening of liver function and hypotension.    Comfort care  Discontinue medications    Abdominal pain  Assessment & Plan  Suspect this is likely due to ascites and malignancy.  Pain regimen, including narcotics    Comfort care  Pain medications as needed    Goals of care, counseling/discussion  Assessment & Plan  Patient wants comfort care and hospice  Code status DNAR/DNI    Severe protein-calorie malnutrition (HCC)  Assessment & Plan  Based on appearance and poor intake.    Patient diet is in his preference, no restrictions    High anion gap metabolic acidosis  Assessment & Plan  Likely 2/2 lactic acidosis from poor intake, dehydration.    Comfort care  No medications  No IV fluids    Leukocytosis  Assessment & Plan  Likely 2/2 stress response.    No need to follow    Frequent falls  Assessment & Plan  Patient came in with frequent falls, possibly due to combination of pulmonary embolism today though suspect arrhythmia and intravascular depletion.    Comfort care  Fall precautions    Diabetes (HCC)- (present on admission)  Assessment & Plan  Comfort care  Discontinue medications  No more POC glucose    Atrial fibrillation with RVR (HCC)- (present on admission)  Assessment & Plan  Comfort care  Discontinue medications    Hepatocellular carcinoma (HCC)- (present on admission)  Assessment & Plan  Advanced cancer with metastases.    Comfort care  No need to follow    Cirrhosis (HCC)- (present on admission)  Assessment & Plan  Hx Hepatitis, now treatment. Likely explanation for anemia, elevated INR, elevated Lfts, hypotnatremia (2/2 anasarca). Suspect this is complicated by hepatorenal syndrome., no suspicion for hepatopulm at this time.    Comfort care  Discontinue medications    Hypertension- (present on admission)  Assessment & Plan  Comfort care  Discontinue medications         VTE  prophylaxis: none    I have performed a physical exam and reviewed and updated ROS and Plan today (7/28/2024). In review of yesterday's note (7/27/2024), there are no changes except as documented above.

## 2024-07-28 NOTE — PROGRESS NOTES
Pt. Resting in bed. No complaints of pain, sleeping comfortably. Personal items and call light left within reach of pt. Bed alarm on for pt. Safety.

## 2024-07-28 NOTE — CARE PLAN
The patient is comfort care  Shift Goals  Clinical Goals: comfort  Patient Goals: comfort  Family Goals: JERRY    Progress made toward(s) clinical / shift goals:  comfort care, resting comfortable      Problem: Skin Integrity  Goal: Skin integrity is maintained or improved  Outcome: Progressing  Note: Q2hr turns, condom cath in place to prevent any further moisture breakdown. Pt educated about the importance of frequent repositioning to prevent skin breakdown. LOGAN bed in use, pillows in use for offloading.     Problem: Psychosocial - Comfort Care  Goal: Patient's level of anxiety will decrease  Outcome: Progressing  Note: Comfort care orders are being followed. Pt. Resting comfortable in bed.

## 2024-07-29 NOTE — CARE PLAN
The patient is Unstable - High likelihood or risk of patient condition declining or worsening    Shift Goals  Clinical Goals: comfort  Patient Goals: rest  Family Goals: sara    Progress made toward(s) clinical / shift goals:      Problem: Pain - Standard  Goal: Alleviation of pain or a reduction in pain to the patient’s comfort goal  Description: Target End Date:  Prior to discharge or change in level of care    Document on Vitals flowsheet    1.  Document pain using the appropriate pain scale per order or unit policy  2.  Educate and implement non-pharmacologic comfort measures (i.e. relaxation, distraction, massage, cold/heat therapy, etc.)  3.  Pain management medications as ordered  4.  Reassess pain after pain med administration per policy  5.  If opiods administered assess patient's response to pain medication is appropriate per POSS sedation scale  6.  Follow pain management plan developed in collaboration with patient and interdisciplinary team (including palliative care or pain specialists if applicable)  Outcome: Progressing  Note: Patient did not have any complaints of pain during shift. Patient was educated on pain scale and medications     Problem: Knowledge Deficit - Standard  Goal: Patient and family/care givers will demonstrate understanding of plan of care, disease process/condition, diagnostic tests and medications  Description: Target End Date:  1-3 days or as soon as patient condition allows    Document in Patient Education    1.  Patient and family/caregiver oriented to unit, equipment, visitation policy and means for communicating concern  2.  Complete/review Learning Assessment  3.  Assess knowledge level of disease process/condition, treatment plan, diagnostic tests and medications  4.  Explain disease process/condition, treatment plan, diagnostic tests and medications  Outcome: Progressing  Note: Patient was educated on the plan of care, medications, and safety. No further questions at the  moment     Problem: Skin Integrity  Goal: Skin integrity is maintained or improved  Description: Target End Date:  Prior to discharge or change in level of care    Document interventions on Skin Risk/Lionel flowsheet groups and corresponding LDA    1.  Assess and monitor skin integrity, appearance and/or temperature  2.  Assess risk factors for impaired skin integrity and/or pressures ulcers  3.  Implement precautions to protect skin integrity in collaboration with interdisciplinary team  4.  Implement pressure ulcer prevention protocol if at risk for skin breakdown  5.  Confirm wound care consult if at risk for skin breakdown  6.  Ensure patient use of pressure relieving devices  (Low air loss bed, waffle overlay, heel protectors, ROHO cushion, etc)  Outcome: Progressing  Note: Q2 turns, LOGAN, and pillows for positioning have been implemented to promote skin integrity.      Problem: Fall Risk  Goal: Patient will remain free from falls  Description: Target End Date:  Prior to discharge or change in level of care    Document interventions on the Delmy Kim Fall Risk Assessment    1.  Assess for fall risk factors  2.  Implement fall precautions  Outcome: Progressing  Note: Patient has been educated on fall risk. Bed is locked and in lowest position, bed alarm on, hourly rounding done, and call light placed within reach.        Patient is not progressing towards the following goals:

## 2024-07-29 NOTE — PROGRESS NOTES
Post mortem care provided for pt by this writer and PRUDENCE Leiva. IV sites removed w/o complication. No other lines, drains, or drsgs in place to remove. Body cleansed, and moist gauze placed to pt's eyes. Pt's cell phone and wallet left with the Charge Nurse, and will be picked up by pt's son at approx 1430 today per conversation with Son. Son also to let us know which mortuary he would prefer the pt to go to at that time. No other personal belongings noted in pt's room. Pt picked up by transport and escorted to the [facility] w/o complication.

## 2024-07-29 NOTE — DISCHARGE SUMMARY
Death Summary    Cause of Death  Hepatocellular Carcinoma due to Cirrhosis with Ascites due to Hepatitis C Virus Infection due to Unknown Etiology    Comorbid Conditions at the Time of Death  Principal Problem:    Pulmonary embolism on right (HCC) (POA: Yes)  Active Problems:    Hypertension (POA: Yes)    Cirrhosis (HCC) (POA: Yes)    Hepatocellular carcinoma (HCC) (POA: Yes)    Atrial fibrillation with RVR (HCC) (POA: Yes)    Diabetes (HCC) (POA: Yes)    Frequent falls (POA: Unknown)    Leukocytosis (POA: Unknown)    High anion gap metabolic acidosis (POA: Unknown)    Severe protein-calorie malnutrition (HCC) (POA: Unknown)    Goals of care, counseling/discussion (POA: Unknown)    Abdominal pain (POA: Unknown)    Hepatorenal syndrome (HCC) (POA: Unknown)  Resolved Problems:    * No resolved hospital problems. *      History of Presenting Illness and Hospital Course  This is a 74 y.o. male with HCV cirrhosis s/p SVR complicated by HCC s/p radiation and hepatic encephalopathy admitted 7/26/2024 with recurrent falls and incidental finding of pulmonary embolus.    He was brought to [facility] due to recurrent falls and abdominal pain. He was hypotensive and imaging found a RLE subsegmental PE. CTA/P demonstrated multifocal hepatic masses. He was initiated on heparin gtt, however he indicated he was previously in communication with hospice and was consistent that he no longer wished for antineoplastic therapy. GOC were discussed and he was agreeable to transition to comfort care. He passed peacefully during coordination of hospice services.    Death Date: 07/29/24   Death Time: 0605     Pronounced By (RN1): Hetal  Pronounced By (RN2): Joseph

## 2024-07-29 NOTE — PROGRESS NOTES
The following information was completed with PRUDENCE Ruiz and Hetal RN.    TOD 0605 7/29/24 verified by 2 RN, PRUDENCE Ruiz and Hetal, RN  Provider Sudhakarkrystal Rut notified of expiration.  ALLIE Sheets notified.  Son Estevan notified.  Son is not sure what mortuary they want to use.  Unit phone number was provided for him to follow up.  [facility] office notified- spoke with Luis A Cuevas and body was released.  [facility] notified.  Spoke with Jac[facility] case #[id number].  They will call the unit back in about an hour regarding tissue donation.  Patient belongings include a pair of shoes and wallet.  The wallet was placed in a bag and given to day shift charge PRUDENCE Johnson.  She will notify son to discuss belongings.    Report given to oncoming nurse PRUDENCE Humphrey.

## 2024-07-30 LAB — TEST NAME 95000: NORMAL

## 2024-07-31 LAB
BACTERIA BLD CULT: NORMAL
BACTERIA BLD CULT: NORMAL
SIGNIFICANT IND 70042: NORMAL
SIGNIFICANT IND 70042: NORMAL
SITE SITE: NORMAL
SITE SITE: NORMAL
SOURCE SOURCE: NORMAL
SOURCE SOURCE: NORMAL

## 2024-08-07 NOTE — DOCUMENTATION QUERY
[facility]                                                                       Query Response Note      PATIENT:               CECILLE ARAUJO  ACCT #:                  [id number]  MRN:                     [id number]  :                      1950  ADMIT DATE:       2024 5:40 PM  DISCH DATE:        2024 6:05 AM  RESPONDING  PROVIDER #:        [id number]           QUERY TEXT:    Patient with extensive mets from HCC and cirrhosis due to past hepatitis, found to have elevated LFTs and INR, thrombocytopenia, ascites, hepatic encephalopathy and HRS.    Please clarify the clinical/diagnostic relevance for the documented findings.    The patient's clinical indicators include:  Clinical Findings    -Labs: ; ; Total bilirubin 4.2; ammonia 28; INR 1.87; Plt 59    -Physical exam: Ascites, abd pain, weakness, hepatic encephalopathy, somnolent, hypotension    -CT A&P: 1.Multiple heterogeneous hepatic masses, appearance most compatible with metastatic disease.  2.Irregular hepatic contour, compatible with changes of cirrhosis. 3.Scattered moderate abdominal ascites    Risk Factors: Cirrhosis, Metastatic hepatocellular cancer, HRS, advanced age  Treatments: Labs, imaging, palliative consult, comfort care    Contact me with any questions.    Thank you for your time and attention,  Estela Bonds RN, CDI  Connect via email, Voalte or messenger.    Note: If you agree with a listed diagnosis, please remember to include it in your concurrent daily documentation and onto the Discharge Summary.  Options provided:   -- Acute liver failure without coma exists   -- Acute on chronic liver failure without coma exists   -- Chronic liver failure exists   -- Findings of no clinical significance   -- Other explanation, (please specify the other explanation)      Query created by: Estela Bonds on 2024 2:18 PM    RESPONSE TEXT:    Acute on  chronic liver failure without coma exists       QUERY TEXT:    Patient with elevated INR.    Please clarify the clinical/diagnostic relevance for the documented findings.    The patient's clinical indicators include:  Clinical Findings:     Labs:   INR 1.87-->2.97  PT 21.8-->31.3    -started on hep gtt for PT, stopped due to hematuria    Risk Factors: Hepatocellular cancer, cirrhosis, chronic hep C, HRS  Treatments: monitor labs, palliative consult, comfort care    Contact me with any questions.    Thank you for your time and attention,  Estela Bonds RN, CDI  Connect via email, Voalte or messenger.    Note: If you agree with a listed diagnosis, please remember to include it in your concurrent daily documentation and onto the Discharge Summary.  Options provided:   -- Coagulopathy due to or associated with liver disease   -- Coagulopathy, unspecified   -- Findings of no clinical significance   -- Other explanation, (please specify the other explanation)   -- Unable to determine      Query created by: Estela Bonds on 8/2/2024 2:18 PM    RESPONSE TEXT:    Coagulopathy due to or associated with liver disease          Electronically signed by:  SUMAN PENNY MD 8/6/2024 9:11 PM
